# Patient Record
Sex: FEMALE | Race: WHITE | NOT HISPANIC OR LATINO | Employment: OTHER | URBAN - METROPOLITAN AREA
[De-identification: names, ages, dates, MRNs, and addresses within clinical notes are randomized per-mention and may not be internally consistent; named-entity substitution may affect disease eponyms.]

---

## 2017-02-28 DIAGNOSIS — I21.02 ST ELEVATION MYOCARDIAL INFARCTION INVOLVING LEFT ANTERIOR DESCENDING (LAD) CORONARY ARTERY (HCC): ICD-10-CM

## 2017-03-01 RX ORDER — METOPROLOL SUCCINATE 50 MG/1
50 TABLET, EXTENDED RELEASE ORAL DAILY
Qty: 90 TAB | Refills: 3 | Status: ON HOLD | OUTPATIENT
Start: 2017-03-01 | End: 2018-10-14

## 2017-04-13 ENCOUNTER — HOSPITAL ENCOUNTER (OUTPATIENT)
Dept: RADIOLOGY | Facility: MEDICAL CENTER | Age: 72
End: 2017-04-13
Attending: FAMILY MEDICINE
Payer: MEDICARE

## 2017-04-13 ENCOUNTER — OFFICE VISIT (OUTPATIENT)
Dept: URGENT CARE | Facility: PHYSICIAN GROUP | Age: 72
End: 2017-04-13
Payer: MEDICARE

## 2017-04-13 VITALS
SYSTOLIC BLOOD PRESSURE: 130 MMHG | OXYGEN SATURATION: 98 % | BODY MASS INDEX: 28.62 KG/M2 | WEIGHT: 172 LBS | RESPIRATION RATE: 24 BRPM | DIASTOLIC BLOOD PRESSURE: 78 MMHG | TEMPERATURE: 99 F | HEART RATE: 117 BPM

## 2017-04-13 DIAGNOSIS — R05.8 PRODUCTIVE COUGH: ICD-10-CM

## 2017-04-13 DIAGNOSIS — R07.9 CHEST PAIN, UNSPECIFIED TYPE: ICD-10-CM

## 2017-04-13 DIAGNOSIS — R07.89 CHEST WALL PAIN: ICD-10-CM

## 2017-04-13 DIAGNOSIS — I25.2 HISTORY OF ACUTE MYOCARDIAL INFARCTION: ICD-10-CM

## 2017-04-13 DIAGNOSIS — J20.9 ACUTE BRONCHITIS, UNSPECIFIED ORGANISM: ICD-10-CM

## 2017-04-13 PROCEDURE — 3014F SCREEN MAMMO DOC REV: CPT | Mod: 8P | Performed by: FAMILY MEDICINE

## 2017-04-13 PROCEDURE — 1101F PT FALLS ASSESS-DOCD LE1/YR: CPT | Performed by: FAMILY MEDICINE

## 2017-04-13 PROCEDURE — 93000 ELECTROCARDIOGRAM COMPLETE: CPT | Performed by: FAMILY MEDICINE

## 2017-04-13 PROCEDURE — 71020 DX-CHEST-2 VIEWS: CPT

## 2017-04-13 PROCEDURE — G8432 DEP SCR NOT DOC, RNG: HCPCS | Performed by: FAMILY MEDICINE

## 2017-04-13 PROCEDURE — 1036F TOBACCO NON-USER: CPT | Performed by: FAMILY MEDICINE

## 2017-04-13 PROCEDURE — 99214 OFFICE O/P EST MOD 30 MIN: CPT | Performed by: FAMILY MEDICINE

## 2017-04-13 PROCEDURE — G8598 ASA/ANTIPLAT THER USED: HCPCS | Performed by: FAMILY MEDICINE

## 2017-04-13 PROCEDURE — 4040F PNEUMOC VAC/ADMIN/RCVD: CPT | Performed by: FAMILY MEDICINE

## 2017-04-13 PROCEDURE — 3017F COLORECTAL CA SCREEN DOC REV: CPT | Mod: 8P | Performed by: FAMILY MEDICINE

## 2017-04-13 PROCEDURE — G8419 CALC BMI OUT NRM PARAM NOF/U: HCPCS | Performed by: FAMILY MEDICINE

## 2017-04-13 RX ORDER — LEVOFLOXACIN 500 MG/1
500 TABLET, FILM COATED ORAL DAILY
Qty: 7 TAB | Refills: 0 | Status: SHIPPED | OUTPATIENT
Start: 2017-04-13 | End: 2017-04-20

## 2017-04-13 RX ORDER — ALBUTEROL SULFATE 90 UG/1
1-2 AEROSOL, METERED RESPIRATORY (INHALATION) EVERY 6 HOURS PRN
Qty: 8.5 G | Refills: 0 | Status: ON HOLD | OUTPATIENT
Start: 2017-04-13 | End: 2018-10-14

## 2017-04-13 ASSESSMENT — ENCOUNTER SYMPTOMS
DIARRHEA: 0
MYALGIAS: 1
SHORTNESS OF BREATH: 1
FEVER: 1
EYE DISCHARGE: 0
DOUBLE VISION: 0
BRUISES/BLEEDS EASILY: 0
VOMITING: 0
CHILLS: 1
NERVOUS/ANXIOUS: 0
SPUTUM PRODUCTION: 1
NAUSEA: 0
SORE THROAT: 0
DIZZINESS: 0
PALPITATIONS: 0
HEADACHES: 0
BACK PAIN: 1
COUGH: 1

## 2017-04-13 NOTE — PROGRESS NOTES
Subjective:      Amrita Torrez is a 72 y.o. female who presents with Shortness of Breath    Patient presents to urgent care with cough and chest congestion for 3 days. She feels some pain on the left side of her back when she coughs or takes really deep breaths. The cough has become productive over the past 24 hours with yellow mucus. She's had fevers and chills as well patient had an acute MI in December 2015. She is a diabetic state under good control. Denies any nausea vomiting or diarrhea.    Shortness of Breath  Associated symptoms include a fever and sputum production. Pertinent negatives include no headaches, leg swelling, rash, sore throat or vomiting.       Review of Systems   Constitutional: Positive for fever, chills and malaise/fatigue.   HENT: Negative for sore throat.    Eyes: Negative for double vision and discharge.   Respiratory: Positive for cough, sputum production and shortness of breath.    Cardiovascular: Negative for palpitations and leg swelling.   Gastrointestinal: Negative for nausea, vomiting and diarrhea.   Genitourinary: Negative for dysuria and urgency.   Musculoskeletal: Positive for myalgias and back pain.   Skin: Negative for itching and rash.   Neurological: Negative for dizziness and headaches.   Endo/Heme/Allergies: Does not bruise/bleed easily.   Psychiatric/Behavioral: The patient is not nervous/anxious.        PMH:  has a past medical history of Hypertension; Diabetes; CAD (coronary artery disease); Goiter; Thyroid nodule; Hyperlipidemia; Heart attack; and Pericardial effusion. She also has no past medical history of Breast cancer.  MEDS:   Current outpatient prescriptions:   •  metoprolol SR (TOPROL XL) 50 MG TABLET SR 24 HR, Take 1 Tab by mouth every day., Disp: 90 Tab, Rfl: 3  •  clindamycin (CLEOCIN) 300 MG Cap, 1 CAP 3 TIMES A DAY X 10 DAYS., Disp: 30 Cap, Rfl: 0  •  benazepril (LOTENSIN) 10 MG Tab, Take 10 mg by mouth every day., Disp: , Rfl:   •  glimepiride (AMARYL)  4 MG Tab, Take 4 mg by mouth 2 times a day., Disp: , Rfl:   •  trazodone (DESYREL) 50 MG Tab, Take 50 mg by mouth every evening., Disp: , Rfl:   •  vitamin D, Ergocalciferol, (DRISDOL) 02936 UNITS Cap capsule, Take 50,000 Units by mouth every 7 days., Disp: , Rfl:   •  prasugrel (EFFIENT) 10 MG Tab, Take 1 Tab by mouth every day., Disp: 30 Tab, Rfl: 6  •  therapeutic multivitamin-minerals (THERAGRAN-M) Tab, Take 1 Tab by mouth every day., Disp: , Rfl:   •  atorvastatin (LIPITOR) 20 MG Tab, Take 1 Tab by mouth every evening., Disp: 30 Tab, Rfl: 0  •  aspirin (ASA) 81 MG CHEW, Take 81 mg by mouth every day., Disp: , Rfl:   ALLERGIES:   Allergies   Allergen Reactions   • Pcn [Penicillins] Hives and Swelling   • Sulfa Drugs Hives and Swelling   SURGHX:   Past Surgical History   Procedure Laterality Date   • Other orthopedic surgery  7/11     knee   • Other abdominal surgery  1966     appendectomy   • Gyn surgery  1978     fibroids   • Gyn surgery       hysterectomy   • Charity by laparoscopy  9/14/2012     Performed by ABDI NAVARRO at SURGERY SAME DAY [facility] ORS   • Laparoscopy  12/1/2014     Performed by Abdi Navarro M.D. at SURGERY SAME DAY [facility] ORS   • Cardiac cath     SOCHX:  reports that she quit smoking about 39 years ago. Her smoking use included Cigarettes. She has a 40 pack-year smoking history. She has never used smokeless tobacco. She reports that she does not drink alcohol or use illicit drugs.  FH: Family history was reviewed, no pertinent findings to report   Objective:     /78 mmHg  Pulse 117  Temp(Src) 37.2 °C (99 °F)  Resp 24  Wt 78.019 kg (172 lb)  SpO2 98%     Physical Exam   Constitutional: She is oriented to person, place, and time. She appears well-developed and well-nourished. No distress.   HENT:   Mouth/Throat: Oropharynx is clear and moist.   Cardiovascular:   tachycardic   Pulmonary/Chest: Effort normal. No respiratory distress. She has wheezes. She has no rales.  She exhibits tenderness.   Musculoskeletal: Normal range of motion. She exhibits no edema or tenderness.   Neurological: She is alert and oriented to person, place, and time.   Skin: Skin is warm and dry. No rash noted. She is not diaphoretic. No erythema. No pallor.   Psychiatric: She has a normal mood and affect. Her behavior is normal. Judgment and thought content normal.     Diagnostics: EKG compared to 11 2016 no significant changes                       Chest x-ray  Per my review no lobar consolidation     Assessment/Plan:     1. Chest pain, unspecified type  EKG    EKG    DX-CHEST-2 VIEWS   2. Chest wall pain     3. Productive cough     4. Acute bronchitis, unspecified organism  levofloxacin (LEVAQUIN) 500 MG tablet    albuterol 108 (90 BASE) MCG/ACT Aero Soln inhalation aerosol     Follow-up with primary care provider within 4-5 days, emergency room precautions discussed.  Patient and/or family appears understanding of information.

## 2017-04-13 NOTE — MR AVS SNAPSHOT
Amrita Costa Shan   2017 10:30 AM   Office Visit   MRN: [id number]    Department:  [facility]   Dept Phone:  [phone]    Description:  Female : 1945   Provider:  Candace Wheatley D.O.           Reason for Visit     Shortness of Breath cough with some pain in her back with deep breaths x3 days      Allergies as of 2017     Allergen Noted Reactions    Pcn [Penicillins] 2011   Hives, Swelling    Sulfa Drugs 2011   Hives, Swelling      You were diagnosed with     Chest pain, unspecified type   [1555103]       Chest wall pain   [470859]       Productive cough   [735568]         Vital Signs     Blood Pressure Pulse Temperature Respirations Weight Oxygen Saturation    130/78 mmHg 117 37.2 °C (99 °F) 24 78.019 kg (172 lb) 98%    Smoking Status                   Former Smoker           Basic Information     Date Of Birth Sex Race Ethnicity Preferred Language    1945 Female White Non- English      Your appointments     2017  9:15 AM   FOLLOW UP with Sylvester Tobar M.D.   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 Low  2016 - Present    HLD (hyperlipidemia) E78.5 Low  2016 - Present    Shortness of breath R06.02 High  2/3/2016 - Present    Pericardial effusion I31.3 High  2/3/2016 - Present    Cough R05 Medium  2/3/2016 - Present    Thyroid nodule E04.1 Low  2/3/2016 - Present    Paroxysmal supraventricular tachycardia (CMS-HCC) I47.1 Medium  2016 - Present    Palpitations R00.2   3/14/2016 - Present    Syncope R55 High  3/14/2016 - Present    CAD s/p DOMINGA mLAD for STEMI 2015 Z95.9 Medium  2016 - Present    Coronary artery disease involving  native coronary artery of native heart without angina pectoris I25.10   4/27/2016 - Present    UTI (urinary tract infection) N39.0   11/16/2016 - Present    DM2 (diabetes mellitus, type 2) (CMS-[facility]) E11.9 Medium  11/17/2016 - Present    CKD (chronic kidney disease), stage III N18.3 Medium  11/17/2016 - Present    Pyuria N39.0 Medium  11/17/2016 - Present    Hypokalemia E87.6 Medium  11/17/2016 - Present      Health Maintenance        Date Due Completion Dates    DIABETES MONOFILAMENT / LE EXAM 1945 ---    RETINAL SCREENING 4/13/1961 ---    IMM DTaP/Tdap/Td Vaccine (1 - Tdap) 4/13/1962 ---    PAP SMEAR 4/13/1964 ---    COLONOSCOPY 4/13/1993 ---    IMM ZOSTER VACCINE 4/13/2003 ---    URINE ACR / MICROALBUMIN 1/15/2014 1/15/2013    MAMMOGRAM 1/29/2014 1/29/2013, 5/4/2011, 4/15/2011    IMM PNEUMOCOCCAL 65+ (ADULT) LOW/MEDIUM RISK SERIES (2 of 2 - PPSV23) 10/23/2016 10/23/2015    A1C SCREENING 6/14/2017 12/14/2016, 6/27/2016, 1/4/2016, 9/14/2015, 7/26/2014, 11/15/2013, 1/15/2013, 6/5/2012, 11/8/2011    FASTING LIPID PROFILE 12/14/2017 12/14/2016, 6/27/2016, 9/14/2015, 5/7/2015, 7/26/2014, 11/15/2013, 1/15/2013, 6/5/2012, 11/8/2011    SERUM CREATININE 12/14/2017 12/14/2016, 11/17/2016, 11/16/2016, 6/27/2016, 3/14/2016, 1/4/2016, 12/26/2015, 12/25/2015, 12/24/2015, 12/24/2015, 12/23/2015, 12/23/2015, 12/23/2015, 12/22/2015, 9/14/2015, 5/7/2015, 12/1/2014, 7/26/2014, 1/9/2014, 11/15/2013, 1/15/2013, 9/12/2012, 8/20/2012, 6/5/2012, 11/8/2011    BONE DENSITY 1/29/2018 1/29/2013            Current Immunizations     13-VALENT PCV PREVNAR 10/23/2015    Influenza Vaccine Quad Inj (Pf) 10/23/2015    Tuberculin Skin Test 6/21/2013, 6/13/2013  9:10 AM, 6/6/2013 12:55 PM      Below and/or attached are the medications your provider expects you to take. Review all of your home medications and newly ordered medications with your provider and/or pharmacist. Follow medication instructions as directed by your provider and/or  pharmacist. Please keep your medication list with you and share with your provider. Update the information when medications are discontinued, doses are changed, or new medications (including over-the-counter products) are added; and carry medication information at all times in the event of emergency situations     Allergies:  PCN - Hives,Swelling     SULFA DRUGS - Hives,Swelling               Medications  Valid as of: April 13, 2017 - 11:42 AM    Generic Name Brand Name Tablet Size Instructions for use    Aspirin (Chew Tab) ASA 81 MG Take 81 mg by mouth every day.        Atorvastatin Calcium (Tab) LIPITOR 20 MG Take 1 Tab by mouth every evening.        Benazepril HCl (Tab) LOTENSIN 10 MG Take 10 mg by mouth every day.        Clindamycin HCl (Cap) CLEOCIN 300 MG 1 CAP 3 TIMES A DAY X 10 DAYS.        Ergocalciferol (Cap) DRISDOL 49255 UNITS Take 50,000 Units by mouth every 7 days.        Glimepiride (Tab) AMARYL 4 MG Take 4 mg by mouth 2 times a day.        Metoprolol Succinate (TABLET SR 24 HR) TOPROL XL 50 MG Take 1 Tab by mouth every day.        Multiple Vitamins-Minerals (Tab) THERAGRAN-M  Take 1 Tab by mouth every day.        Prasugrel HCl (Tab) EFFIENT 10 MG Take 1 Tab by mouth every day.        TraZODone HCl (Tab) DESYREL 50 MG Take 50 mg by mouth every evening.        .                 Medicines prescribed today were sent to:     [id number] - [place], [place] - [address] [address]    Phone: [phone] Fax: [phone]    Open 24 Hours?: No      Medication refill instructions:       If your prescription bottle indicates you have medication refills left, it is not necessary to call your provider’s office. Please contact your pharmacy and they will refill your medication.    If your prescription bottle indicates you do not have any refills left, you may request refills at any time through one of the following ways: The online GroupCharger system (except Urgent Care), by  calling your provider’s office, or by asking your pharmacy to contact your provider’s office with a refill request. Medication refills are processed only during regular business hours and may not be available until the next business day. Your provider may request additional information or to have a follow-up visit with you prior to refilling your medication.   *Please Note: Medication refills are assigned a new Rx number when refilled electronically. Your pharmacy may indicate that no refills were authorized even though a new prescription for the same medication is available at the pharmacy. Please request the medicine by name with the pharmacy before contacting your provider for a refill.        Your To Do List     Future Labs/Procedures Complete By Expires    DX-CHEST-2 VIEWS  As directed 10/14/2017    EKG  As directed 4/13/2018    EKG  As directed 4/13/2018         Packetworx Access Code: [email or web address]  Expires: 4/19/2017 12:19 PM    Packetworx  A secure, online tool to manage your health information     [employer]s Packetworx® is a secure, online tool that connects you to your personalized health information from the privacy of your home -- day or night - making it very easy for you to manage your healthcare. Once the activation process is completed, you can even access your medical information using the Packetworx jj, which is available for free in the Apple Jj store or Google Play store.     Packetworx provides the following levels of access (as shown below):   My Chart Features   [facility] Primary Care Doctor [facility]  Specialists [facility]  Urgent  Care Non-[facility]  Primary Care  Doctor   Email your healthcare team securely and privately 24/7 X X X    Manage appointments: schedule your next appointment; view details of past/upcoming appointments X      Request prescription refills. X      View recent personal medical records, including lab and immunizations X X X X   View health record, including health history,  allergies, medications X X X X   Read reports about your outpatient visits, procedures, consult and ER notes X X X X   See your discharge summary, which is a recap of your hospital and/or ER visit that includes your diagnosis, lab results, and care plan. X X       How to register for [employer]:  1. Go to  [email or web address].  2. Click on the Sign Up Now box, which takes you to the New Member Sign Up page. You will need to provide the following information:  a. Enter your [employer] Access Code exactly as it appears at the top of this page. (You will not need to use this code after you’ve completed the sign-up process. If you do not sign up before the expiration date, you must request a new code.)   b. Enter your date of birth.   c. Enter your home email address.   d. Click Submit, and follow the next screen’s instructions.  3. Create a [employer] ID. This will be your [employer] login ID and cannot be changed, so think of one that is secure and easy to remember.  4. Create a [employer] password. You can change your password at any time.  5. Enter your Password Reset Question and Answer. This can be used at a later time if you forget your password.   6. Enter your e-mail address. This allows you to receive e-mail notifications when new information is available in [employer].  7. Click Sign Up. You can now view your health information.    For assistance activating your [employer] account, call [phone]

## 2017-05-01 ENCOUNTER — HOSPITAL ENCOUNTER (OUTPATIENT)
Dept: LAB | Facility: MEDICAL CENTER | Age: 72
End: 2017-05-01
Attending: FAMILY MEDICINE
Payer: MEDICARE

## 2017-05-01 LAB
ALBUMIN SERPL BCP-MCNC: 3.6 G/DL (ref 3.2–4.9)
ALBUMIN/GLOB SERPL: 1.2 G/DL
ALP SERPL-CCNC: 70 U/L (ref 30–99)
ALT SERPL-CCNC: 10 U/L (ref 2–50)
ANION GAP SERPL CALC-SCNC: 8 MMOL/L (ref 0–11.9)
AST SERPL-CCNC: 10 U/L (ref 12–45)
BILIRUB SERPL-MCNC: 1.3 MG/DL (ref 0.1–1.5)
BUN SERPL-MCNC: 19 MG/DL (ref 8–22)
CALCIUM SERPL-MCNC: 9.1 MG/DL (ref 8.5–10.5)
CHLORIDE SERPL-SCNC: 101 MMOL/L (ref 96–112)
CHOLEST SERPL-MCNC: 180 MG/DL (ref 100–199)
CO2 SERPL-SCNC: 28 MMOL/L (ref 20–33)
CREAT SERPL-MCNC: 1.08 MG/DL (ref 0.5–1.4)
EST. AVERAGE GLUCOSE BLD GHB EST-MCNC: 249 MG/DL
GFR SERPL CREATININE-BSD FRML MDRD: 50 ML/MIN/1.73 M 2
GLOBULIN SER CALC-MCNC: 2.9 G/DL (ref 1.9–3.5)
GLUCOSE SERPL-MCNC: 315 MG/DL (ref 65–99)
HBA1C MFR BLD: 10.3 % (ref 0–5.6)
HDLC SERPL-MCNC: 37 MG/DL
LDLC SERPL CALC-MCNC: 112 MG/DL
POTASSIUM SERPL-SCNC: 4.4 MMOL/L (ref 3.6–5.5)
PROT SERPL-MCNC: 6.5 G/DL (ref 6–8.2)
SODIUM SERPL-SCNC: 137 MMOL/L (ref 135–145)
TRIGL SERPL-MCNC: 157 MG/DL (ref 0–149)

## 2017-05-01 PROCEDURE — 80061 LIPID PANEL: CPT

## 2017-05-01 PROCEDURE — 80053 COMPREHEN METABOLIC PANEL: CPT

## 2017-05-01 PROCEDURE — 83036 HEMOGLOBIN GLYCOSYLATED A1C: CPT

## 2017-06-19 ENCOUNTER — HOSPITAL ENCOUNTER (EMERGENCY)
Facility: MEDICAL CENTER | Age: 72
End: 2017-06-19
Attending: EMERGENCY MEDICINE
Payer: MEDICARE

## 2017-06-19 ENCOUNTER — APPOINTMENT (OUTPATIENT)
Dept: RADIOLOGY | Facility: MEDICAL CENTER | Age: 72
End: 2017-06-19
Attending: EMERGENCY MEDICINE
Payer: MEDICARE

## 2017-06-19 VITALS
OXYGEN SATURATION: 95 % | TEMPERATURE: 97.6 F | HEART RATE: 90 BPM | SYSTOLIC BLOOD PRESSURE: 145 MMHG | DIASTOLIC BLOOD PRESSURE: 90 MMHG | WEIGHT: 163.58 LBS | RESPIRATION RATE: 16 BRPM | BODY MASS INDEX: 27.25 KG/M2 | HEIGHT: 65 IN

## 2017-06-19 DIAGNOSIS — R10.9 FLANK PAIN: ICD-10-CM

## 2017-06-19 LAB
ALBUMIN SERPL BCP-MCNC: 4.2 G/DL (ref 3.2–4.9)
ALBUMIN/GLOB SERPL: 1.2 G/DL
ALP SERPL-CCNC: 67 U/L (ref 30–99)
ALT SERPL-CCNC: 12 U/L (ref 2–50)
ANION GAP SERPL CALC-SCNC: 11 MMOL/L (ref 0–11.9)
APPEARANCE UR: CLEAR
AST SERPL-CCNC: 16 U/L (ref 12–45)
BASOPHILS # BLD AUTO: 0.4 % (ref 0–1.8)
BASOPHILS # BLD: 0.03 K/UL (ref 0–0.12)
BILIRUB SERPL-MCNC: 1.8 MG/DL (ref 0.1–1.5)
BUN SERPL-MCNC: 19 MG/DL (ref 8–22)
CALCIUM SERPL-MCNC: 9.8 MG/DL (ref 8.5–10.5)
CHLORIDE SERPL-SCNC: 99 MMOL/L (ref 96–112)
CO2 SERPL-SCNC: 22 MMOL/L (ref 20–33)
COLOR UR AUTO: YELLOW
CREAT SERPL-MCNC: 1.07 MG/DL (ref 0.5–1.4)
EOSINOPHIL # BLD AUTO: 0.14 K/UL (ref 0–0.51)
EOSINOPHIL NFR BLD: 1.7 % (ref 0–6.9)
ERYTHROCYTE [DISTWIDTH] IN BLOOD BY AUTOMATED COUNT: 39.1 FL (ref 35.9–50)
GFR SERPL CREATININE-BSD FRML MDRD: 50 ML/MIN/1.73 M 2
GLOBULIN SER CALC-MCNC: 3.6 G/DL (ref 1.9–3.5)
GLUCOSE SERPL-MCNC: 153 MG/DL (ref 65–99)
GLUCOSE UR QL STRIP.AUTO: NEGATIVE MG/DL
HCT VFR BLD AUTO: 47.1 % (ref 37–47)
HGB BLD-MCNC: 17.2 G/DL (ref 12–16)
IMM GRANULOCYTES # BLD AUTO: 0.03 K/UL (ref 0–0.11)
IMM GRANULOCYTES NFR BLD AUTO: 0.4 % (ref 0–0.9)
KETONES UR QL STRIP.AUTO: NEGATIVE MG/DL
LEUKOCYTE ESTERASE UR QL STRIP.AUTO: ABNORMAL
LIPASE SERPL-CCNC: 22 U/L (ref 11–82)
LYMPHOCYTES # BLD AUTO: 2.14 K/UL (ref 1–4.8)
LYMPHOCYTES NFR BLD: 25.6 % (ref 22–41)
MCH RBC QN AUTO: 28.9 PG (ref 27–33)
MCHC RBC AUTO-ENTMCNC: 36.5 G/DL (ref 33.6–35)
MCV RBC AUTO: 79.2 FL (ref 81.4–97.8)
MONOCYTES # BLD AUTO: 0.78 K/UL (ref 0–0.85)
MONOCYTES NFR BLD AUTO: 9.3 % (ref 0–13.4)
NEUTROPHILS # BLD AUTO: 5.23 K/UL (ref 2–7.15)
NEUTROPHILS NFR BLD: 62.6 % (ref 44–72)
NITRITE UR QL STRIP.AUTO: NEGATIVE
NRBC # BLD AUTO: 0 K/UL
NRBC BLD AUTO-RTO: 0 /100 WBC
PH UR STRIP.AUTO: 7 [PH]
PLATELET # BLD AUTO: 231 K/UL (ref 164–446)
PMV BLD AUTO: 9.2 FL (ref 9–12.9)
POTASSIUM SERPL-SCNC: 3.4 MMOL/L (ref 3.6–5.5)
PROT SERPL-MCNC: 7.8 G/DL (ref 6–8.2)
PROT UR QL STRIP: >=300 MG/DL
RBC # BLD AUTO: 5.95 M/UL (ref 4.2–5.4)
RBC UR QL AUTO: ABNORMAL
SODIUM SERPL-SCNC: 132 MMOL/L (ref 135–145)
SP GR UR: 1.02
WBC # BLD AUTO: 8.4 K/UL (ref 4.8–10.8)

## 2017-06-19 PROCEDURE — 36415 COLL VENOUS BLD VENIPUNCTURE: CPT

## 2017-06-19 PROCEDURE — 81002 URINALYSIS NONAUTO W/O SCOPE: CPT

## 2017-06-19 PROCEDURE — 99284 EMERGENCY DEPT VISIT MOD MDM: CPT

## 2017-06-19 PROCEDURE — 80053 COMPREHEN METABOLIC PANEL: CPT

## 2017-06-19 PROCEDURE — 700102 HCHG RX REV CODE 250 W/ 637 OVERRIDE(OP): Performed by: EMERGENCY MEDICINE

## 2017-06-19 PROCEDURE — 74176 CT ABD & PELVIS W/O CONTRAST: CPT

## 2017-06-19 PROCEDURE — 85025 COMPLETE CBC W/AUTO DIFF WBC: CPT

## 2017-06-19 PROCEDURE — A9270 NON-COVERED ITEM OR SERVICE: HCPCS | Performed by: EMERGENCY MEDICINE

## 2017-06-19 PROCEDURE — 83690 ASSAY OF LIPASE: CPT

## 2017-06-19 RX ORDER — NAPROXEN 375 MG/1
375 TABLET ORAL ONCE
Status: COMPLETED | OUTPATIENT
Start: 2017-06-19 | End: 2017-06-19

## 2017-06-19 RX ORDER — NAPROXEN 500 MG/1
500 TABLET ORAL 2 TIMES DAILY WITH MEALS
Qty: 15 TAB | Refills: 0 | Status: ON HOLD | OUTPATIENT
Start: 2017-06-19 | End: 2018-10-14

## 2017-06-19 RX ADMIN — NAPROXEN 375 MG: 375 TABLET ORAL at 20:46

## 2017-06-19 ASSESSMENT — PAIN SCALES - GENERAL: PAINLEVEL_OUTOF10: 8

## 2017-06-19 NOTE — ED AVS SNAPSHOT
6/19/2017    Amrita Torrez  [address]  [facility] [address]    Dear Amrita:    [facility] wants to ensure your discharge home is safe and you or your loved ones have had all of your questions answered regarding your care after you leave the hospital.    Below is a list of resources and contact information should you have any questions regarding your hospital stay, follow-up instructions, or active medical symptoms.    Questions or Concerns Regarding… Contact   Medical Questions Related to Your Discharge  (7 days a week, 8am-5pm) Contact a Nurse Care Coordinator   [phone]   Medical Questions Not Related to Your Discharge  (24 hours a day / 7 days a week)  Contact the Nurse Health Line   [phone]    Medications or Discharge Instructions Refer to your discharge packet   or contact your [facility] Primary Care Provider   [phone]   Follow-up Appointment(s) Schedule your appointment via EverybodyCar   or contact Scheduling [phone]   Billing Review your statement via EverybodyCar  or contact Billing [phone]   Medical Records Review your records via EverybodyCar   or contact Medical Records [phone]     You may receive a telephone call within two days of discharge. This call is to make certain you understand your discharge instructions and have the opportunity to have any questions answered. You can also easily access your medical information, test results and upcoming appointments via the EverybodyCar free online health management tool. You can learn more and sign up at ConsiderC/EverybodyCar. For assistance setting up your EverybodyCar account, please call [phone].    Once again, we want to ensure your discharge home is safe and that you have a clear understanding of any next steps in your care. If you have any questions or concerns, please do not hesitate to contact us, we are here for you. Thank you for choosing [facility] for your healthcare needs.    Sincerely,    Your [facility] Healthcare Team

## 2017-06-19 NOTE — ED AVS SNAPSHOT
bluepulse Access Code: [email or web address]  Expires: 7/15/2017  4:14 AM    Your email address is not on file at [facility].  Email Addresses are required for you to sign up for bluepulse, please contact [phone] to verify your personal information and to provide your email address prior to attempting to register for bluepulse.    Amrita Torrez  [address] [place] [address]    bluepulse  A secure, online tool to manage your health information     [facility]’s bluepulse® is a secure, online tool that connects you to your personalized health information from the privacy of your home -- day or night - making it very easy for you to manage your healthcare. Once the activation process is completed, you can even access your medical information using the bluepulse jj, which is available for free in the Apple Jj store or Google Play store.     To learn more about bluepulse, visit [email or web address]    There are two levels of access available (as shown below):   My Chart Features  [facility] Primary Care Doctor [facility]  Specialists [facility]  Urgent  Care Non-[facility] Primary Care Doctor   Email your healthcare team securely and privately 24/7 X X X    Manage appointments: schedule your next appointment; view details of past/upcoming appointments X      Request prescription refills. X      View recent personal medical records, including lab and immunizations X X X X   View health record, including health history, allergies, medications X X X X   Read reports about your outpatient visits, procedures, consult and ER notes X X X X   See your discharge summary, which is a recap of your hospital and/or ER visit that includes your diagnosis, lab results, and care plan X X  X     How to register for bluepulse:  Once your e-mail address has been verified, follow the following steps to sign up for ProspectNowt.     1. Go to  [email or web address]  2. Click on the Sign Up Now box, which takes you to the New Member Sign Up page. You  will need to provide the following information:  a. Enter your UCAN Access Code exactly as it appears at the top of this page. (You will not need to use this code after you’ve completed the sign-up process. If you do not sign up before the expiration date, you must request a new code.)   b. Enter your date of birth.   c. Enter your home email address.   d. Click Submit, and follow the next screen’s instructions.  3. Create a Varentect ID. This will be your UCAN login ID and cannot be changed, so think of one that is secure and easy to remember.  4. Create a UCAN password. You can change your password at any time.  5. Enter your Password Reset Question and Answer. This can be used at a later time if you forget your password.   6. Enter your e-mail address. This allows you to receive e-mail notifications when new information is available in UCAN.  7. Click Sign Up. You can now view your health information.    For assistance activating your UCAN account, call [phone]

## 2017-06-19 NOTE — ED NOTES
Chief Complaint   Patient presents with   • RUQ Pain   • Back Pain     Patient c/o RUQ pain that wraps around to her back, says it started approximately 1 week ago. Patient has some nausea but denies vomiting or diarrhea. VSS, told to inform RN of changes. Given urine specimen cup.

## 2017-06-19 NOTE — ED AVS SNAPSHOT
Home Care Instructions                                                                                                                Amrita Torrez   MRN: [id number]    Department:  [facility], Emergency Dept   Date of Visit:  6/19/2017            [facility], Emergency Dept    [address]    [place] [address]    Phone:  [phone]      You were seen by     Rolan Griffin M.D.      Your Diagnosis Was     Flank pain     R10.9       Follow-up Information     1. Follow up with Fitz Turpin D.O..    Specialty:  Family Medicine    Why:  As needed    Contact information    [address] [facility] [place] [address] [phone]        Medication Information     Review all of your home medications and newly ordered medications with your primary doctor and/or pharmacist as soon as possible. Follow medication instructions as directed by your doctor and/or pharmacist.     Please keep your complete medication list with you and share with your physician. Update the information when medications are discontinued, doses are changed, or new medications (including over-the-counter products) are added; and carry medication information at all times in the event of emergency situations.               Medication List      START taking these medications        Instructions    Morning Afternoon Evening Bedtime    naproxen 500 MG Tabs   Commonly known as:  NAPROSYN        Take 1 Tab by mouth 2 times a day, with meals.   Dose:  500 mg                          ASK your doctor about these medications        Instructions    Morning Afternoon Evening Bedtime    albuterol 108 (90 BASE) MCG/ACT Aers inhalation aerosol        Inhale 1-2 Puffs by mouth every 6 hours as needed for Shortness of Breath.   Dose:  1-2 Puff                        aspirin 81 MG Chew chewable tablet   Commonly known as:  ASA        Take 81 mg by mouth every day.   Dose:  81 mg                        atorvastatin 20 MG Tabs     Commonly known as:  LIPITOR        Take 1 Tab by mouth every evening.   Dose:  20 mg                        benazepril 10 MG Tabs   Commonly known as:  LOTENSIN        Take 10 mg by mouth every day.   Dose:  10 mg                        clindamycin 300 MG Caps   Commonly known as:  CLEOCIN        1 CAP 3 TIMES A DAY X 10 DAYS.                        glimepiride 4 MG Tabs   Commonly known as:  AMARYL        Take 4 mg by mouth 2 times a day.   Dose:  4 mg                        metoprolol SR 50 MG Tb24   Commonly known as:  TOPROL XL        Take 1 Tab by mouth every day.   Dose:  50 mg                        prasugrel 10 MG Tabs   Commonly known as:  EFFIENT        Take 1 Tab by mouth every day.   Dose:  10 mg                        therapeutic multivitamin-minerals Tabs        Take 1 Tab by mouth every day.   Dose:  1 Tab                        trazodone 50 MG Tabs   Commonly known as:  DESYREL        Take 50 mg by mouth every evening.   Dose:  50 mg                        vitamin D (Ergocalciferol) 34848 UNITS Caps capsule   Commonly known as:  DRISDOL        Take 50,000 Units by mouth every 7 days.   Dose:  45376 Units                             Where to Get Your Medications      You can get these medications from any pharmacy     Bring a paper prescription for each of these medications    - naproxen 500 MG Tabs            Procedures and tests performed during your visit     CBC WITH DIFFERENTIAL    COMP METABOLIC PANEL    CT-RENAL COLIC EVALUATION(A/P W/O)    ESTIMATED GFR    LIPASE    POC UA        Discharge Instructions       Abdominal Pain, Adult  Many things can cause abdominal pain. Usually, abdominal pain is not caused by a disease and will improve without treatment. It can often be observed and treated at home. Your health care provider will do a physical exam and possibly order blood tests and X-rays to help determine the seriousness of your pain. However, in many cases, more time must pass before a  clear cause of the pain can be found. Before that point, your health care provider may not know if you need more testing or further treatment.  HOME CARE INSTRUCTIONS   Monitor your abdominal pain for any changes. The following actions may help to alleviate any discomfort you are experiencing:  · Only take over-the-counter or prescription medicines as directed by your health care provider.  · Do not take laxatives unless directed to do so by your health care provider.  · Try a clear liquid diet (broth, tea, or water) as directed by your health care provider. Slowly move to a bland diet as tolerated.  SEEK MEDICAL CARE IF:  · You have unexplained abdominal pain.  · You have abdominal pain associated with nausea or diarrhea.  · You have pain when you urinate or have a bowel movement.  · You experience abdominal pain that wakes you in the night.  · You have abdominal pain that is worsened or improved by eating food.  · You have abdominal pain that is worsened with eating fatty foods.  · You have a fever.  SEEK IMMEDIATE MEDICAL CARE IF:   · Your pain does not go away within 2 hours.  · You keep throwing up (vomiting).  · Your pain is felt only in portions of the abdomen, such as the right side or the left lower portion of the abdomen.  · You pass bloody or black tarry stools.  MAKE SURE YOU:  · Understand these instructions.    · Will watch your condition.    · Will get help right away if you are not doing well or get worse.       This information is not intended to replace advice given to you by your health care provider. Make sure you discuss any questions you have with your health care provider.     Document Released: 09/27/2006 Document Revised: 01/08/2016 Document Reviewed: 08/27/2014  Elsevier Interactive Patient Education ©2016 [employer]            Patient Information     Patient Information    Following emergency treatment: all patient requiring follow-up care must return either to a private physician or a  clinic if your condition worsens before you are able to obtain further medical attention, please return to the emergency room.     Billing Information    At [facility], we work to make the billing process streamlined for our patients.  Our Representatives are here to answer any questions you may have regarding your hospital bill.  If you have insurance coverage and have supplied your insurance information to us, we will submit a claim to your insurer on your behalf.  Should you have any questions regarding your bill, we can be reached online or by phone as follows:  Online: You are able pay your bills online or live chat with our representatives about any billing questions you may have. We are here to help Monday - Friday from 8:00am to 7:30pm and 9:00am - 12:00pm on Saturdays.  Please visit [email or web address]  for more information.   Phone:  [phone] or [phone]    Please note that your emergency physician, surgeon, pathologist, radiologist, anesthesiologist, and other specialists are not employed by [facility] and will therefore bill separately for their services.  Please contact them directly for any questions concerning their bills at the numbers below:     Emergency Physician Services:  [phone]  [facility]:  [phone]  [facility]:  [phone]  [facility]:  [phone]    1. Your final bill may vary from the amount quoted upon discharge if all procedures are not complete at that time, or if your doctor has additional procedures of which we are not aware. You will receive an additional bill if you return to the Emergency Department at [facility] for suture removal regardless of the facility of which the sutures were placed.     2. Please arrange for settlement of this account at the emergency registration.    3. All self-pay accounts are due in full at the time of treatment.  If you are unable to meet  this obligation then payment is expected within 4-5 days.     4. If you have had radiology studies (CT, X-ray, Ultrasound, MRI), you have received a preliminary result during your emergency department visit. Please contact the radiology department [phone] to receive a copy of your final result. Please discuss the Final result with your primary physician or with the follow up physician provided.     Crisis Hotline:  [place] Crisis Hotline:  8-965-RDDVXMW or 1-408.423.4992  [place] Crisis Hotline:    1-750.622.7505 or 387-967-3179         ED Discharge Follow Up Questions    1. In order to provide you with very good care, we would like to follow up with a phone call in the next few days.  May we have your permission to contact you?     YES /  NO    2. What is the best phone number to call you? (       )_____-__________    3. What is the best time to call you?      Morning  /  Afternoon  /  Evening                   Patient Signature:  ____________________________________________________________    Date:  ____________________________________________________________

## 2017-06-20 NOTE — ED NOTES
Discharge paperwork given with prescriptions.  Pt verbalized to follow up with pcp and return to er with any worsening symptoms or concerns.  Pt ambulated out of er in nad.

## 2017-06-20 NOTE — ED PROVIDER NOTES
ED Provider Note    HPI: Patient is a 72-year-old female who presented to the emergency department June 19, 2017 at 2:25 PM with a chief complaint of right flank pain.    Patient has had right flank pain that began about a week ago. The patient thinks this may be related to her lifting some furniture but is uncertain. She has had no pain in the anterior portion of her abdomen. She has had no nausea or vomiting. Pain is not really in positional in nature. Nothing in particular seems to make it better or worse. No fever no chills no cough. No other somatic complaints    Review of Systems: Positive for right flank and back pain negative for fever chills cough nausea vomiting. Review of systems reviewed the patient, all other systems negative    Past medical/surgical history: Hypertension diabetes coronary artery disease I cholesterol myocardial infarction cholecystectomy    Medications: Albuterol metoprolol Lotensin and Amaryl trazodone Effient Lipitor    Allergies: Penicillin sulfa    Social History:  Previous history smoking or alcohol use      Physical exam: Constitutional:  Pleasant female awake and alert  Vital signs: Temperature 97.8 pulse 91 respirations 16 blood pressure 182/100 pulse oximetry 99%  EYES: PERRL, EOMI, Conjunctivae and sclera normal, eyelids normal bilaterally.  Neck: Trachea midline. No cervical masses seen or palpated. Normal range of motion, supple. No meningeal signs elicited.  Cardiac: Regular rate and rhythm. S1-S2 present. No S3 or S4 present. No murmurs, rubs, or gallops heard. No edema or varicosities were seen.   Lungs: Clear to auscultation with good aeration throughout. No wheezes, rales, or rhonchi heard. Patient's chest wall moved symmetrically with each respiratory effort. Patient was not making use of accessory muscles of respiration in breathing.  Abdomen: Soft nontender to palpation. Subjective pain in the right flank is not increased with palpation. No rebound or guarding  elicited. No organomegaly identified. No pulsatile abdominal masses identified.   Musculoskeletal:  no  pain with palpitation or movement of muscle, bone or joint , no obvious musculoskeletal deformities identified.  Neurologic: alert and awake answers questions appropriately. Moves all four extremities independently, no gross focal abnormalities identified. Normal strength and motor.  Skin: no rash or lesion seen, no palpable dermatologic lesions identified.  Psychiatric: not anxious, delusional, or hallucinating.    Medical decision making: Patient noted to be somewhat hypertensive. Patient has a history of hypertension and will follow up with primary care provider. Laboratory studies were obtained (please see lab sheet for all results) significant findings included mild hyponatremia sodium 132. Patient has a mild elevation in bilirubin. Her other liver functions are normal (1.8) lipase normal at 22 making pancreatitis unlikely. White count is normal making infection less likely. Urinalysis showed small blood and trace of leukocyte esterase but there were no nitrates. This would make infection unlikely but kidney stone is a possibility    CT imaging without contrast of the abdomen obtained to rule out kidney stones; no evidence of kidney stone or other abnormality seen.    Patient would appear to have musculoskeletal pain possibly caused by exertion. She'll be discharged on Naprosyn. She will follow-up with her primary care provider for general care. I suspect her symptoms have persisted due to the fact that this pain is in the right lower chest right upper quadrant is likely exacerbated due to breathing.    Impression right flank pain, likely musculoskeletal

## 2017-07-10 ENCOUNTER — HOSPITAL ENCOUNTER (OUTPATIENT)
Dept: RADIOLOGY | Facility: MEDICAL CENTER | Age: 72
End: 2017-07-10
Attending: FAMILY MEDICINE
Payer: MEDICARE

## 2017-07-10 DIAGNOSIS — S05.91XA RIGHT EYE INJURY, INITIAL ENCOUNTER: ICD-10-CM

## 2017-07-10 DIAGNOSIS — M25.551 RIGHT HIP PAIN: ICD-10-CM

## 2017-07-10 PROCEDURE — 70200 X-RAY EXAM OF EYE SOCKETS: CPT | Mod: RT

## 2017-07-10 PROCEDURE — 73502 X-RAY EXAM HIP UNI 2-3 VIEWS: CPT | Mod: RT

## 2017-10-03 ENCOUNTER — HOSPITAL ENCOUNTER (OUTPATIENT)
Dept: LAB | Facility: MEDICAL CENTER | Age: 72
End: 2017-10-03
Attending: NURSE PRACTITIONER
Payer: MEDICARE

## 2017-10-03 LAB
25(OH)D3 SERPL-MCNC: 33 NG/ML (ref 30–100)
ALBUMIN SERPL BCP-MCNC: 4 G/DL (ref 3.2–4.9)
ALBUMIN/GLOB SERPL: 1.1 G/DL
ALP SERPL-CCNC: 70 U/L (ref 30–99)
ALT SERPL-CCNC: 12 U/L (ref 2–50)
ANION GAP SERPL CALC-SCNC: 9 MMOL/L (ref 0–11.9)
AST SERPL-CCNC: 16 U/L (ref 12–45)
BILIRUB SERPL-MCNC: 1.1 MG/DL (ref 0.1–1.5)
BUN SERPL-MCNC: 23 MG/DL (ref 8–22)
CALCIUM SERPL-MCNC: 9.9 MG/DL (ref 8.5–10.5)
CHLORIDE SERPL-SCNC: 101 MMOL/L (ref 96–112)
CHOLEST SERPL-MCNC: 184 MG/DL (ref 100–199)
CO2 SERPL-SCNC: 26 MMOL/L (ref 20–33)
CREAT SERPL-MCNC: 1.05 MG/DL (ref 0.5–1.4)
EST. AVERAGE GLUCOSE BLD GHB EST-MCNC: 249 MG/DL
GFR SERPL CREATININE-BSD FRML MDRD: 51 ML/MIN/1.73 M 2
GLOBULIN SER CALC-MCNC: 3.6 G/DL (ref 1.9–3.5)
GLUCOSE SERPL-MCNC: 156 MG/DL (ref 65–99)
HBA1C MFR BLD: 10.3 % (ref 0–5.6)
HDLC SERPL-MCNC: 36 MG/DL
LDLC SERPL CALC-MCNC: 110 MG/DL
POTASSIUM SERPL-SCNC: 4.1 MMOL/L (ref 3.6–5.5)
PROT SERPL-MCNC: 7.6 G/DL (ref 6–8.2)
SODIUM SERPL-SCNC: 136 MMOL/L (ref 135–145)
TRIGL SERPL-MCNC: 190 MG/DL (ref 0–149)

## 2017-10-03 PROCEDURE — 82306 VITAMIN D 25 HYDROXY: CPT

## 2017-10-03 PROCEDURE — 80061 LIPID PANEL: CPT

## 2017-10-03 PROCEDURE — 36415 COLL VENOUS BLD VENIPUNCTURE: CPT

## 2017-10-03 PROCEDURE — 83036 HEMOGLOBIN GLYCOSYLATED A1C: CPT

## 2017-10-03 PROCEDURE — 80053 COMPREHEN METABOLIC PANEL: CPT

## 2017-12-15 ENCOUNTER — HOSPITAL ENCOUNTER (OUTPATIENT)
Dept: RADIOLOGY | Facility: MEDICAL CENTER | Age: 72
End: 2017-12-15
Attending: FAMILY MEDICINE
Payer: MEDICARE

## 2017-12-15 DIAGNOSIS — Z12.31 VISIT FOR SCREENING MAMMOGRAM: ICD-10-CM

## 2017-12-15 PROCEDURE — G0202 SCR MAMMO BI INCL CAD: HCPCS

## 2018-04-02 ENCOUNTER — HOSPITAL ENCOUNTER (OUTPATIENT)
Dept: LAB | Facility: MEDICAL CENTER | Age: 73
End: 2018-04-02
Attending: FAMILY MEDICINE
Payer: MEDICARE

## 2018-04-02 LAB
25(OH)D3 SERPL-MCNC: 38 NG/ML (ref 30–100)
ALBUMIN SERPL BCP-MCNC: 4 G/DL (ref 3.2–4.9)
ALBUMIN/GLOB SERPL: 1.2 G/DL
ALP SERPL-CCNC: 83 U/L (ref 30–99)
ALT SERPL-CCNC: 16 U/L (ref 2–50)
ANION GAP SERPL CALC-SCNC: 12 MMOL/L (ref 0–11.9)
AST SERPL-CCNC: 16 U/L (ref 12–45)
BASOPHILS # BLD AUTO: 0.5 % (ref 0–1.8)
BASOPHILS # BLD: 0.04 K/UL (ref 0–0.12)
BILIRUB SERPL-MCNC: 1.3 MG/DL (ref 0.1–1.5)
BUN SERPL-MCNC: 19 MG/DL (ref 8–22)
CALCIUM SERPL-MCNC: 9.7 MG/DL (ref 8.5–10.5)
CHLORIDE SERPL-SCNC: 101 MMOL/L (ref 96–112)
CHOLEST SERPL-MCNC: 204 MG/DL (ref 100–199)
CO2 SERPL-SCNC: 27 MMOL/L (ref 20–33)
CREAT SERPL-MCNC: 1.18 MG/DL (ref 0.5–1.4)
EOSINOPHIL # BLD AUTO: 0.17 K/UL (ref 0–0.51)
EOSINOPHIL NFR BLD: 2.1 % (ref 0–6.9)
ERYTHROCYTE [DISTWIDTH] IN BLOOD BY AUTOMATED COUNT: 45.2 FL (ref 35.9–50)
EST. AVERAGE GLUCOSE BLD GHB EST-MCNC: 275 MG/DL
GLOBULIN SER CALC-MCNC: 3.3 G/DL (ref 1.9–3.5)
GLUCOSE SERPL-MCNC: 224 MG/DL (ref 65–99)
HBA1C MFR BLD: 11.2 % (ref 0–5.6)
HCT VFR BLD AUTO: 47.2 % (ref 37–47)
HDLC SERPL-MCNC: 38 MG/DL
HGB BLD-MCNC: 16 G/DL (ref 12–16)
IMM GRANULOCYTES # BLD AUTO: 0.02 K/UL (ref 0–0.11)
IMM GRANULOCYTES NFR BLD AUTO: 0.2 % (ref 0–0.9)
LDLC SERPL CALC-MCNC: 131 MG/DL
LYMPHOCYTES # BLD AUTO: 2.06 K/UL (ref 1–4.8)
LYMPHOCYTES NFR BLD: 24.9 % (ref 22–41)
MCH RBC QN AUTO: 29.3 PG (ref 27–33)
MCHC RBC AUTO-ENTMCNC: 33.9 G/DL (ref 33.6–35)
MCV RBC AUTO: 86.3 FL (ref 81.4–97.8)
MONOCYTES # BLD AUTO: 0.69 K/UL (ref 0–0.85)
MONOCYTES NFR BLD AUTO: 8.3 % (ref 0–13.4)
NEUTROPHILS # BLD AUTO: 5.29 K/UL (ref 2–7.15)
NEUTROPHILS NFR BLD: 64 % (ref 44–72)
NRBC # BLD AUTO: 0 K/UL
NRBC BLD-RTO: 0 /100 WBC
PLATELET # BLD AUTO: 241 K/UL (ref 164–446)
PMV BLD AUTO: 10.5 FL (ref 9–12.9)
POTASSIUM SERPL-SCNC: 4.5 MMOL/L (ref 3.6–5.5)
PROT SERPL-MCNC: 7.3 G/DL (ref 6–8.2)
RBC # BLD AUTO: 5.47 M/UL (ref 4.2–5.4)
SODIUM SERPL-SCNC: 140 MMOL/L (ref 135–145)
TRIGL SERPL-MCNC: 176 MG/DL (ref 0–149)
WBC # BLD AUTO: 8.3 K/UL (ref 4.8–10.8)

## 2018-04-02 PROCEDURE — 85025 COMPLETE CBC W/AUTO DIFF WBC: CPT

## 2018-04-02 PROCEDURE — 83036 HEMOGLOBIN GLYCOSYLATED A1C: CPT

## 2018-04-02 PROCEDURE — 82306 VITAMIN D 25 HYDROXY: CPT

## 2018-04-02 PROCEDURE — 80061 LIPID PANEL: CPT

## 2018-04-02 PROCEDURE — 80053 COMPREHEN METABOLIC PANEL: CPT

## 2018-10-12 ENCOUNTER — APPOINTMENT (OUTPATIENT)
Dept: RADIOLOGY | Facility: MEDICAL CENTER | Age: 73
End: 2018-10-12
Attending: EMERGENCY MEDICINE
Payer: MEDICARE

## 2018-10-12 ENCOUNTER — HOSPITAL ENCOUNTER (OUTPATIENT)
Facility: MEDICAL CENTER | Age: 73
End: 2018-10-14
Attending: EMERGENCY MEDICINE | Admitting: HOSPITALIST
Payer: MEDICARE

## 2018-10-12 DIAGNOSIS — R73.9 HYPERGLYCEMIA: ICD-10-CM

## 2018-10-12 DIAGNOSIS — N17.9 AKI (ACUTE KIDNEY INJURY) (HCC): ICD-10-CM

## 2018-10-12 DIAGNOSIS — R06.03 ACUTE RESPIRATORY DISTRESS: ICD-10-CM

## 2018-10-12 DIAGNOSIS — I48.91 ATRIAL FIBRILLATION WITH RVR (HCC): ICD-10-CM

## 2018-10-12 LAB
ALBUMIN SERPL BCP-MCNC: 4 G/DL (ref 3.2–4.9)
ALBUMIN/GLOB SERPL: 1 G/DL
ALP SERPL-CCNC: 88 U/L (ref 30–99)
ALT SERPL-CCNC: 15 U/L (ref 2–50)
ANION GAP SERPL CALC-SCNC: 14 MMOL/L (ref 0–11.9)
APTT PPP: 26.8 SEC (ref 24.7–36)
AST SERPL-CCNC: 14 U/L (ref 12–45)
BASOPHILS # BLD AUTO: 0.5 % (ref 0–1.8)
BASOPHILS # BLD: 0.08 K/UL (ref 0–0.12)
BILIRUB SERPL-MCNC: 1.4 MG/DL (ref 0.1–1.5)
BNP SERPL-MCNC: 60 PG/ML (ref 0–100)
BUN SERPL-MCNC: 30 MG/DL (ref 8–22)
CALCIUM SERPL-MCNC: 9.8 MG/DL (ref 8.5–10.5)
CHLORIDE SERPL-SCNC: 96 MMOL/L (ref 96–112)
CO2 SERPL-SCNC: 20 MMOL/L (ref 20–33)
CREAT SERPL-MCNC: 1.79 MG/DL (ref 0.5–1.4)
EKG IMPRESSION: NORMAL
EOSINOPHIL # BLD AUTO: 0.07 K/UL (ref 0–0.51)
EOSINOPHIL NFR BLD: 0.5 % (ref 0–6.9)
ERYTHROCYTE [DISTWIDTH] IN BLOOD BY AUTOMATED COUNT: 38 FL (ref 35.9–50)
GLOBULIN SER CALC-MCNC: 4 G/DL (ref 1.9–3.5)
GLUCOSE SERPL-MCNC: 409 MG/DL (ref 65–99)
HCT VFR BLD AUTO: 45.4 % (ref 37–47)
HGB BLD-MCNC: 16.3 G/DL (ref 12–16)
IMM GRANULOCYTES # BLD AUTO: 0.05 K/UL (ref 0–0.11)
IMM GRANULOCYTES NFR BLD AUTO: 0.3 % (ref 0–0.9)
INR PPP: 1.03 (ref 0.87–1.13)
LIPASE SERPL-CCNC: 35 U/L (ref 11–82)
LYMPHOCYTES # BLD AUTO: 2.31 K/UL (ref 1–4.8)
LYMPHOCYTES NFR BLD: 14.9 % (ref 22–41)
MCH RBC QN AUTO: 28.9 PG (ref 27–33)
MCHC RBC AUTO-ENTMCNC: 35.9 G/DL (ref 33.6–35)
MCV RBC AUTO: 80.5 FL (ref 81.4–97.8)
MONOCYTES # BLD AUTO: 1.01 K/UL (ref 0–0.85)
MONOCYTES NFR BLD AUTO: 6.5 % (ref 0–13.4)
NEUTROPHILS # BLD AUTO: 12.01 K/UL (ref 2–7.15)
NEUTROPHILS NFR BLD: 77.3 % (ref 44–72)
NRBC # BLD AUTO: 0 K/UL
NRBC BLD-RTO: 0 /100 WBC
PLATELET # BLD AUTO: 306 K/UL (ref 164–446)
PMV BLD AUTO: 10.4 FL (ref 9–12.9)
POTASSIUM SERPL-SCNC: 4.5 MMOL/L (ref 3.6–5.5)
PROT SERPL-MCNC: 8 G/DL (ref 6–8.2)
PROTHROMBIN TIME: 13.7 SEC (ref 12–14.6)
RBC # BLD AUTO: 5.64 M/UL (ref 4.2–5.4)
SODIUM SERPL-SCNC: 130 MMOL/L (ref 135–145)
TROPONIN I SERPL-MCNC: 0.02 NG/ML (ref 0–0.04)
WBC # BLD AUTO: 15.5 K/UL (ref 4.8–10.8)

## 2018-10-12 PROCEDURE — 96374 THER/PROPH/DIAG INJ IV PUSH: CPT

## 2018-10-12 PROCEDURE — 85025 COMPLETE CBC W/AUTO DIFF WBC: CPT

## 2018-10-12 PROCEDURE — 83690 ASSAY OF LIPASE: CPT

## 2018-10-12 PROCEDURE — 85730 THROMBOPLASTIN TIME PARTIAL: CPT

## 2018-10-12 PROCEDURE — 71045 X-RAY EXAM CHEST 1 VIEW: CPT

## 2018-10-12 PROCEDURE — 84484 ASSAY OF TROPONIN QUANT: CPT

## 2018-10-12 PROCEDURE — 36415 COLL VENOUS BLD VENIPUNCTURE: CPT

## 2018-10-12 PROCEDURE — 700111 HCHG RX REV CODE 636 W/ 250 OVERRIDE (IP)

## 2018-10-12 PROCEDURE — 93005 ELECTROCARDIOGRAM TRACING: CPT | Performed by: EMERGENCY MEDICINE

## 2018-10-12 PROCEDURE — 93005 ELECTROCARDIOGRAM TRACING: CPT

## 2018-10-12 PROCEDURE — 700105 HCHG RX REV CODE 258: Performed by: EMERGENCY MEDICINE

## 2018-10-12 PROCEDURE — 80053 COMPREHEN METABOLIC PANEL: CPT

## 2018-10-12 PROCEDURE — 83880 ASSAY OF NATRIURETIC PEPTIDE: CPT

## 2018-10-12 PROCEDURE — 85610 PROTHROMBIN TIME: CPT

## 2018-10-12 PROCEDURE — 99285 EMERGENCY DEPT VISIT HI MDM: CPT

## 2018-10-12 RX ORDER — DILTIAZEM HYDROCHLORIDE 5 MG/ML
10 INJECTION INTRAVENOUS ONCE
Status: COMPLETED | OUTPATIENT
Start: 2018-10-12 | End: 2018-10-12

## 2018-10-12 RX ORDER — SODIUM CHLORIDE 9 MG/ML
500 INJECTION, SOLUTION INTRAVENOUS ONCE
Status: COMPLETED | OUTPATIENT
Start: 2018-10-12 | End: 2018-10-13

## 2018-10-12 RX ORDER — DILTIAZEM HYDROCHLORIDE 5 MG/ML
INJECTION INTRAVENOUS
Status: COMPLETED
Start: 2018-10-12 | End: 2018-10-12

## 2018-10-12 RX ADMIN — SODIUM CHLORIDE 500 ML: 9 INJECTION, SOLUTION INTRAVENOUS at 23:23

## 2018-10-12 RX ADMIN — DILTIAZEM HYDROCHLORIDE 10 MG: 5 INJECTION INTRAVENOUS at 23:31

## 2018-10-12 ASSESSMENT — PAIN SCALES - GENERAL: PAINLEVEL_OUTOF10: 6

## 2018-10-13 PROBLEM — R73.9 HYPERGLYCEMIA WITHOUT KETOSIS: Status: ACTIVE | Noted: 2018-10-13

## 2018-10-13 PROBLEM — I48.91 NEW ONSET ATRIAL FIBRILLATION (HCC): Status: ACTIVE | Noted: 2018-10-13

## 2018-10-13 PROBLEM — I10 HTN (HYPERTENSION): Status: ACTIVE | Noted: 2018-10-13

## 2018-10-13 PROBLEM — N28.9 ACUTE ON CHRONIC RENAL INSUFFICIENCY: Status: ACTIVE | Noted: 2018-10-13

## 2018-10-13 PROBLEM — E87.1 HYPONATREMIA: Status: ACTIVE | Noted: 2018-10-13

## 2018-10-13 PROBLEM — N18.9 ACUTE ON CHRONIC RENAL INSUFFICIENCY: Status: ACTIVE | Noted: 2018-10-13

## 2018-10-13 PROBLEM — D72.829 LEUKOCYTOSIS: Status: ACTIVE | Noted: 2018-10-13

## 2018-10-13 LAB
ANION GAP SERPL CALC-SCNC: 8 MMOL/L (ref 0–11.9)
ANISOCYTOSIS BLD QL SMEAR: ABNORMAL
BASOPHILS # BLD AUTO: 0 % (ref 0–1.8)
BASOPHILS # BLD: 0 K/UL (ref 0–0.12)
BUN SERPL-MCNC: 30 MG/DL (ref 8–22)
CALCIUM SERPL-MCNC: 9.3 MG/DL (ref 8.5–10.5)
CHLORIDE SERPL-SCNC: 103 MMOL/L (ref 96–112)
CO2 SERPL-SCNC: 25 MMOL/L (ref 20–33)
CREAT SERPL-MCNC: 1.44 MG/DL (ref 0.5–1.4)
EKG IMPRESSION: NORMAL
EOSINOPHIL # BLD AUTO: 0 K/UL (ref 0–0.51)
EOSINOPHIL NFR BLD: 0 % (ref 0–6.9)
ERYTHROCYTE [DISTWIDTH] IN BLOOD BY AUTOMATED COUNT: 39.7 FL (ref 35.9–50)
GLUCOSE BLD-MCNC: 161 MG/DL (ref 65–99)
GLUCOSE BLD-MCNC: 165 MG/DL (ref 65–99)
GLUCOSE BLD-MCNC: 198 MG/DL (ref 65–99)
GLUCOSE BLD-MCNC: 211 MG/DL (ref 65–99)
GLUCOSE BLD-MCNC: 212 MG/DL (ref 65–99)
GLUCOSE BLD-MCNC: 312 MG/DL (ref 65–99)
GLUCOSE BLD-MCNC: 352 MG/DL (ref 65–99)
GLUCOSE SERPL-MCNC: 118 MG/DL (ref 65–99)
HCT VFR BLD AUTO: 39.7 % (ref 37–47)
HGB BLD-MCNC: 14.1 G/DL (ref 12–16)
LYMPHOCYTES # BLD AUTO: 3.4 K/UL (ref 1–4.8)
LYMPHOCYTES NFR BLD: 37.4 % (ref 22–41)
MAGNESIUM SERPL-MCNC: 1.7 MG/DL (ref 1.5–2.5)
MANUAL DIFF BLD: NORMAL
MCH RBC QN AUTO: 29.1 PG (ref 27–33)
MCHC RBC AUTO-ENTMCNC: 35.5 G/DL (ref 33.6–35)
MCV RBC AUTO: 82 FL (ref 81.4–97.8)
MICROCYTES BLD QL SMEAR: ABNORMAL
MONOCYTES # BLD AUTO: 0.64 K/UL (ref 0–0.85)
MONOCYTES NFR BLD AUTO: 7 % (ref 0–13.4)
MORPHOLOGY BLD-IMP: NORMAL
NEUTROPHILS # BLD AUTO: 5.06 K/UL (ref 2–7.15)
NEUTROPHILS NFR BLD: 55.6 % (ref 44–72)
NRBC # BLD AUTO: 0 K/UL
NRBC BLD-RTO: 0 /100 WBC
OVALOCYTES BLD QL SMEAR: NORMAL
PLATELET # BLD AUTO: 233 K/UL (ref 164–446)
PLATELET BLD QL SMEAR: NORMAL
PMV BLD AUTO: 10.2 FL (ref 9–12.9)
POTASSIUM SERPL-SCNC: 4.1 MMOL/L (ref 3.6–5.5)
RBC # BLD AUTO: 4.84 M/UL (ref 4.2–5.4)
RBC BLD AUTO: PRESENT
SODIUM SERPL-SCNC: 136 MMOL/L (ref 135–145)
T4 FREE SERPL-MCNC: 0.94 NG/DL (ref 0.53–1.43)
TROPONIN I SERPL-MCNC: 0.08 NG/ML (ref 0–0.04)
TROPONIN I SERPL-MCNC: 0.1 NG/ML (ref 0–0.04)
TROPONIN I SERPL-MCNC: 0.1 NG/ML (ref 0–0.04)
TROPONIN I SERPL-MCNC: 0.13 NG/ML (ref 0–0.04)
TSH SERPL DL<=0.005 MIU/L-ACNC: 0.53 UIU/ML (ref 0.38–5.33)
WBC # BLD AUTO: 9.1 K/UL (ref 4.8–10.8)

## 2018-10-13 PROCEDURE — 84484 ASSAY OF TROPONIN QUANT: CPT

## 2018-10-13 PROCEDURE — 700102 HCHG RX REV CODE 250 W/ 637 OVERRIDE(OP): Performed by: EMERGENCY MEDICINE

## 2018-10-13 PROCEDURE — G0378 HOSPITAL OBSERVATION PER HR: HCPCS

## 2018-10-13 PROCEDURE — A9270 NON-COVERED ITEM OR SERVICE: HCPCS | Performed by: HOSPITALIST

## 2018-10-13 PROCEDURE — 85007 BL SMEAR W/DIFF WBC COUNT: CPT

## 2018-10-13 PROCEDURE — A9270 NON-COVERED ITEM OR SERVICE: HCPCS | Performed by: INTERNAL MEDICINE

## 2018-10-13 PROCEDURE — 700102 HCHG RX REV CODE 250 W/ 637 OVERRIDE(OP): Performed by: HOSPITALIST

## 2018-10-13 PROCEDURE — 36415 COLL VENOUS BLD VENIPUNCTURE: CPT

## 2018-10-13 PROCEDURE — 700102 HCHG RX REV CODE 250 W/ 637 OVERRIDE(OP): Performed by: INTERNAL MEDICINE

## 2018-10-13 PROCEDURE — 700105 HCHG RX REV CODE 258: Performed by: HOSPITALIST

## 2018-10-13 PROCEDURE — 84439 ASSAY OF FREE THYROXINE: CPT

## 2018-10-13 PROCEDURE — 82962 GLUCOSE BLOOD TEST: CPT

## 2018-10-13 PROCEDURE — 99220 PR INITIAL OBSERVATION CARE,LEVL III: CPT | Performed by: HOSPITALIST

## 2018-10-13 PROCEDURE — 93010 ELECTROCARDIOGRAM REPORT: CPT | Performed by: INTERNAL MEDICINE

## 2018-10-13 PROCEDURE — 85027 COMPLETE CBC AUTOMATED: CPT

## 2018-10-13 PROCEDURE — 93005 ELECTROCARDIOGRAM TRACING: CPT | Performed by: HOSPITALIST

## 2018-10-13 PROCEDURE — 99203 OFFICE O/P NEW LOW 30 MIN: CPT | Performed by: INTERNAL MEDICINE

## 2018-10-13 PROCEDURE — 84443 ASSAY THYROID STIM HORMONE: CPT

## 2018-10-13 PROCEDURE — 80048 BASIC METABOLIC PNL TOTAL CA: CPT

## 2018-10-13 PROCEDURE — 99226 PR SUBSEQUENT OBSERVATION CARE,LEVEL III: CPT | Performed by: INTERNAL MEDICINE

## 2018-10-13 PROCEDURE — 96372 THER/PROPH/DIAG INJ SC/IM: CPT

## 2018-10-13 PROCEDURE — 83735 ASSAY OF MAGNESIUM: CPT

## 2018-10-13 RX ORDER — BENAZEPRIL HYDROCHLORIDE 10 MG/1
10 TABLET ORAL DAILY
Status: DISCONTINUED | OUTPATIENT
Start: 2018-10-13 | End: 2018-10-13

## 2018-10-13 RX ORDER — TRAZODONE HYDROCHLORIDE 50 MG/1
50 TABLET ORAL NIGHTLY
Status: DISCONTINUED | OUTPATIENT
Start: 2018-10-13 | End: 2018-10-14 | Stop reason: HOSPADM

## 2018-10-13 RX ORDER — METOPROLOL TARTRATE 1 MG/ML
5 INJECTION, SOLUTION INTRAVENOUS
Status: DISCONTINUED | OUTPATIENT
Start: 2018-10-13 | End: 2018-10-14 | Stop reason: HOSPADM

## 2018-10-13 RX ORDER — SODIUM CHLORIDE 9 MG/ML
2000 INJECTION, SOLUTION INTRAVENOUS ONCE
Status: COMPLETED | OUTPATIENT
Start: 2018-10-13 | End: 2018-10-13

## 2018-10-13 RX ORDER — METOPROLOL SUCCINATE 50 MG/1
50 TABLET, EXTENDED RELEASE ORAL DAILY
Status: DISCONTINUED | OUTPATIENT
Start: 2018-10-13 | End: 2018-10-14 | Stop reason: HOSPADM

## 2018-10-13 RX ORDER — POLYETHYLENE GLYCOL 3350 17 G/17G
1 POWDER, FOR SOLUTION ORAL
Status: DISCONTINUED | OUTPATIENT
Start: 2018-10-13 | End: 2018-10-14 | Stop reason: HOSPADM

## 2018-10-13 RX ORDER — INSULIN GLARGINE 100 [IU]/ML
18 INJECTION, SOLUTION SUBCUTANEOUS EVERY EVENING
Status: DISCONTINUED | OUTPATIENT
Start: 2018-10-13 | End: 2018-10-14 | Stop reason: HOSPADM

## 2018-10-13 RX ORDER — METOPROLOL SUCCINATE 50 MG/1
100 TABLET, EXTENDED RELEASE ORAL DAILY
Status: DISCONTINUED | OUTPATIENT
Start: 2018-10-13 | End: 2018-10-13

## 2018-10-13 RX ORDER — SODIUM CHLORIDE 9 MG/ML
1000 INJECTION, SOLUTION INTRAVENOUS ONCE
Status: COMPLETED | OUTPATIENT
Start: 2018-10-13 | End: 2018-10-13

## 2018-10-13 RX ORDER — AMOXICILLIN 250 MG
2 CAPSULE ORAL 2 TIMES DAILY
Status: DISCONTINUED | OUTPATIENT
Start: 2018-10-13 | End: 2018-10-14 | Stop reason: HOSPADM

## 2018-10-13 RX ORDER — ASPIRIN 81 MG/1
81 TABLET, CHEWABLE ORAL DAILY
Status: DISCONTINUED | OUTPATIENT
Start: 2018-10-13 | End: 2018-10-14 | Stop reason: HOSPADM

## 2018-10-13 RX ORDER — CLINDAMYCIN HYDROCHLORIDE 150 MG/1
300 CAPSULE ORAL 3 TIMES DAILY
Status: DISCONTINUED | OUTPATIENT
Start: 2018-10-13 | End: 2018-10-14 | Stop reason: HOSPADM

## 2018-10-13 RX ORDER — INSULIN GLARGINE 100 [IU]/ML
20 INJECTION, SOLUTION SUBCUTANEOUS NIGHTLY
Status: ON HOLD | COMMUNITY
End: 2020-09-15 | Stop reason: SDUPTHER

## 2018-10-13 RX ORDER — ATORVASTATIN CALCIUM 20 MG/1
20 TABLET, FILM COATED ORAL EVERY EVENING
Status: DISCONTINUED | OUTPATIENT
Start: 2018-10-13 | End: 2018-10-14 | Stop reason: HOSPADM

## 2018-10-13 RX ORDER — METOPROLOL SUCCINATE 50 MG/1
50 TABLET, EXTENDED RELEASE ORAL DAILY
Status: DISCONTINUED | OUTPATIENT
Start: 2018-10-13 | End: 2018-10-13

## 2018-10-13 RX ORDER — SODIUM CHLORIDE 9 MG/ML
INJECTION, SOLUTION INTRAVENOUS CONTINUOUS
Status: DISCONTINUED | OUTPATIENT
Start: 2018-10-13 | End: 2018-10-14

## 2018-10-13 RX ORDER — BISACODYL 10 MG
10 SUPPOSITORY, RECTAL RECTAL
Status: DISCONTINUED | OUTPATIENT
Start: 2018-10-13 | End: 2018-10-14 | Stop reason: HOSPADM

## 2018-10-13 RX ADMIN — TRAZODONE HYDROCHLORIDE 50 MG: 50 TABLET ORAL at 03:31

## 2018-10-13 RX ADMIN — INSULIN HUMAN 5 UNITS: 100 INJECTION, SOLUTION PARENTERAL at 00:35

## 2018-10-13 RX ADMIN — MAGNESIUM GLUCONATE 500 MG ORAL TABLET 400 MG: 500 TABLET ORAL at 12:23

## 2018-10-13 RX ADMIN — CLINDAMYCIN HYDROCHLORIDE 300 MG: 150 CAPSULE ORAL at 12:23

## 2018-10-13 RX ADMIN — INSULIN HUMAN 4 UNITS: 100 INJECTION, SOLUTION PARENTERAL at 17:00

## 2018-10-13 RX ADMIN — INSULIN GLARGINE 18 UNITS: 100 INJECTION, SOLUTION SUBCUTANEOUS at 17:00

## 2018-10-13 RX ADMIN — CLINDAMYCIN HYDROCHLORIDE 300 MG: 150 CAPSULE ORAL at 05:49

## 2018-10-13 RX ADMIN — METOPROLOL SUCCINATE 50 MG: 50 TABLET, EXTENDED RELEASE ORAL at 05:49

## 2018-10-13 RX ADMIN — INSULIN HUMAN 4 UNITS: 100 INJECTION, SOLUTION PARENTERAL at 12:19

## 2018-10-13 RX ADMIN — APIXABAN 5 MG: 5 TABLET, FILM COATED ORAL at 17:05

## 2018-10-13 RX ADMIN — SODIUM CHLORIDE 2000 ML: 9 INJECTION, SOLUTION INTRAVENOUS at 05:49

## 2018-10-13 RX ADMIN — SODIUM CHLORIDE 1000 ML: 9 INJECTION, SOLUTION INTRAVENOUS at 03:32

## 2018-10-13 RX ADMIN — TRAZODONE HYDROCHLORIDE 50 MG: 50 TABLET ORAL at 19:57

## 2018-10-13 RX ADMIN — Medication 81 MG: at 05:49

## 2018-10-13 RX ADMIN — CLINDAMYCIN HYDROCHLORIDE 300 MG: 150 CAPSULE ORAL at 17:05

## 2018-10-13 RX ADMIN — INSULIN HUMAN 3 UNITS: 100 INJECTION, SOLUTION PARENTERAL at 19:54

## 2018-10-13 RX ADMIN — INSULIN HUMAN 3 UNITS: 100 INJECTION, SOLUTION PARENTERAL at 05:53

## 2018-10-13 RX ADMIN — APIXABAN 2.5 MG: 2.5 TABLET, FILM COATED ORAL at 03:35

## 2018-10-13 RX ADMIN — ATORVASTATIN CALCIUM 20 MG: 20 TABLET, FILM COATED ORAL at 17:05

## 2018-10-13 RX ADMIN — INSULIN GLARGINE 18 UNITS: 100 INJECTION, SOLUTION SUBCUTANEOUS at 03:42

## 2018-10-13 RX ADMIN — STANDARDIZED SENNA CONCENTRATE AND DOCUSATE SODIUM 2 TABLET: 8.6; 5 TABLET ORAL at 17:05

## 2018-10-13 ASSESSMENT — ENCOUNTER SYMPTOMS
SHORTNESS OF BREATH: 1
DEPRESSION: 0
WHEEZING: 0
NAUSEA: 0
DIARRHEA: 0
FEVER: 0
PALPITATIONS: 1
SENSORY CHANGE: 1
CHILLS: 0
ABDOMINAL PAIN: 0
DIZZINESS: 1
MYALGIAS: 0
COUGH: 0
VOMITING: 0
SORE THROAT: 0
HEMOPTYSIS: 0
HEADACHES: 0
BLURRED VISION: 0
LOSS OF CONSCIOUSNESS: 0
EYE PAIN: 0
PALPITATIONS: 0
BRUISES/BLEEDS EASILY: 0
EYE DISCHARGE: 0
SPEECH CHANGE: 0
TINGLING: 0
PHOTOPHOBIA: 0
FOCAL WEAKNESS: 0
NERVOUS/ANXIOUS: 0

## 2018-10-13 ASSESSMENT — LIFESTYLE VARIABLES
EVER_SMOKED: YES
ALCOHOL_USE: NO

## 2018-10-13 ASSESSMENT — COGNITIVE AND FUNCTIONAL STATUS - GENERAL
MOBILITY SCORE: 24
SUGGESTED CMS G CODE MODIFIER MOBILITY: CH
DAILY ACTIVITIY SCORE: 24
SUGGESTED CMS G CODE MODIFIER DAILY ACTIVITY: CH

## 2018-10-13 ASSESSMENT — COPD QUESTIONNAIRES
DO YOU EVER COUGH UP ANY MUCUS OR PHLEGM?: NO/ONLY WITH OCCASIONAL COLDS OR INFECTIONS
COPD SCREENING SCORE: 5
DURING THE PAST 4 WEEKS HOW MUCH DID YOU FEEL SHORT OF BREATH: SOME OF THE TIME
HAVE YOU SMOKED AT LEAST 100 CIGARETTES IN YOUR ENTIRE LIFE: YES
IN THE PAST 12 MONTHS DO YOU DO LESS THAN YOU USED TO BECAUSE OF YOUR BREATHING PROBLEMS: DISAGREE/UNSURE

## 2018-10-13 ASSESSMENT — PAIN SCALES - GENERAL
PAINLEVEL_OUTOF10: 0

## 2018-10-13 ASSESSMENT — PATIENT HEALTH QUESTIONNAIRE - PHQ9
2. FEELING DOWN, DEPRESSED, IRRITABLE, OR HOPELESS: NOT AT ALL
SUM OF ALL RESPONSES TO PHQ9 QUESTIONS 1 AND 2: 0
1. LITTLE INTEREST OR PLEASURE IN DOING THINGS: NOT AT ALL

## 2018-10-13 NOTE — CONSULTS
Cardiology Consult    Requested by Dr. Lane Peters      REASON FOR CONSULT: Chest pain shortness of breath atrial fibrillation paroxysmal previous CAD.    HPI: This a pleasant 73-year-old white female who is admitted with 3-4-day history of shortness of breath and orthopnea also noted palpitations no previous history of atrial fibrillation.  Was noted to be in atrial fibrillation with rapid ventricular response.  Now back in sinus rhythm.  Previous stenting by Dr. Verdin.  Possibly has some mild chest pain although it is unsure.  No previous history of stroke.  Does have history of diabetes.  Currently on Eliquis and beta-blockers.  Previous echocardiogram with some mild aortic stenosis.  Troponins were in the indeterminate range.  Patient denies smoking denies alcohol use.  No syncope but did get lightheaded with this arrhythmia.  Previous stenting of the LAD in 2015.  Chronic total occlusion of RCA at that time.    MEDICATIONS:      Current Facility-Administered Medications:   •  traZODone (DESYREL) tablet 50 mg, 50 mg, Oral, Nightly, Casandra Rutherford M.D., 50 mg at 10/13/18 0331  •  clindamycin (CLEOCIN) capsule 300 mg, 300 mg, Oral, TID, Casandra Rutherford M.D., 300 mg at 10/13/18 0549  •  atorvastatin (LIPITOR) tablet 20 mg, 20 mg, Oral, Q EVENING, Casandra Rutherford M.D.  •  aspirin (ASA) chewable tab 81 mg, 81 mg, Oral, DAILY, Casandra Rutherford M.D., 81 mg at 10/13/18 0549  •  senna-docusate (PERICOLACE or SENOKOT S) 8.6-50 MG per tablet 2 Tab, 2 Tab, Oral, BID **AND** polyethylene glycol/lytes (MIRALAX) PACKET 1 Packet, 1 Packet, Oral, QDAY PRN **AND** magnesium hydroxide (MILK OF MAGNESIA) suspension 30 mL, 30 mL, Oral, QDAY PRN **AND** bisacodyl (DULCOLAX) suppository 10 mg, 10 mg, Rectal, QDAY PRN, Casandra Rutherford M.D.  •  Metoprolol Tartrate (LOPRESSOR) injection 5 mg, 5 mg, Intravenous, Q5 MIN PRN, Casandra Rutherford M.D.  •  insulin regular (HUMULIN R) injection 2-10 Units, 2-10 Units, Subcutaneous, 4X/DAY  ACHS, Kim Camara M.D., 3 Units at 10/13/18 0553  •  insulin glargine (LANTUS) injection 18 Units, 18 Units, Subcutaneous, Q EVENING, Kim Camara M.D., 18 Units at 10/13/18 0342  •  metoprolol SR (TOPROL XL) tablet 50 mg, 50 mg, Oral, DAILY, Kim Camara M.D., 50 mg at 10/13/18 0549  •  apixaban (ELIQUIS) 2.5mg tablet 2.5 mg, 2.5 mg, Oral, BID, Casandra Rutherford M.D., 2.5 mg at 10/13/18 0335  •  magnesium oxide (MAG-OX) tablet 400 mg, 400 mg, Oral, DAILY, Lane Peters D.O.    ALLERGIES:   Ms. Torrez  is allergic to pcn [penicillins] and sulfa drugs.     SURGERIES:  Ms. Torrez   has a past surgical history that includes other orthopedic surgery (7/11); other abdominal surgery (1966); gyn surgery (1978); gyn surgery; aditya by laparoscopy (9/14/2012); laparoscopy (12/1/2014); and cardiac cath.     MEDICAL ILLNESSES:  Ms. Torrez   has a past medical history of CAD (coronary artery disease); Diabetes; Goiter; Heart attack (HCC); Hyperlipidemia; Hypertension; Pericardial effusion; and Thyroid nodule. She also has no past medical history of Breast cancer (HCC).     SOCIAL HISTORY:  Ms. Torrez   reports that she quit smoking about 40 years ago. Her smoking use included Cigarettes. She has a 40.00 pack-year smoking history. She has never used smokeless tobacco. She reports that she does not drink alcohol or use drugs.     FAMILY HISTORY:  Ms.'s Torrez  family history is not on file.      ROS:  Review of Systems   Constitutional: Negative for chills and fever.   HENT: Negative for congestion.    Eyes: Negative for blurred vision, pain and discharge.   Respiratory: Positive for shortness of breath. Negative for cough, hemoptysis and wheezing.    Cardiovascular: Positive for chest pain and palpitations.   Gastrointestinal: Negative for abdominal pain, nausea and vomiting.   Musculoskeletal: Negative for joint pain and myalgias.   Skin: Negative for itching and rash.   Neurological: Negative for speech change and loss of  "consciousness.   Endo/Heme/Allergies: Does not bruise/bleed easily.   Psychiatric/Behavioral: Negative for depression. The patient is not nervous/anxious.    All other systems reviewed and are negative.    In addition to the above, a complete review of system was performed and all other organs systems were normal    PHYSICAL EXAM:  Physical Exam   Constitutional: She is oriented to person, place, and time and well-developed, well-nourished, and in no distress.   Obese   HENT:   Head: Normocephalic and atraumatic.   Eyes: Pupils are equal, round, and reactive to light. EOM are normal.   Neck: Normal range of motion. Neck supple.   Cardiovascular: Normal rate and regular rhythm.  Exam reveals no gallop and no friction rub.    Pulmonary/Chest: Effort normal and breath sounds normal.   Abdominal: Soft. Bowel sounds are normal.   Musculoskeletal: Normal range of motion. She exhibits no edema, tenderness or deformity.   Neurological: She is alert and oriented to person, place, and time.   Skin: Skin is dry.   Psychiatric: Mood, memory, affect and judgment normal.       /55   Pulse 83   Temp 36.5 °C (97.7 °F)   Resp 18   Ht 1.626 m (5' 4\")   Wt 73.4 kg (161 lb 13.1 oz)   SpO2 96%   Breastfeeding? No   BMI 27.78 kg/m²      Lab Results   Component Value Date/Time    CHOLSTRLTOT 204 (H) 04/02/2018 09:45 AM     (H) 04/02/2018 09:45 AM    HDL 38 (A) 04/02/2018 09:45 AM    TRIGLYCERIDE 176 (H) 04/02/2018 09:45 AM       Lab Results   Component Value Date/Time    SODIUM 136 10/13/2018 09:12 AM    POTASSIUM 4.1 10/13/2018 09:12 AM    CHLORIDE 103 10/13/2018 09:12 AM    CO2 25 10/13/2018 09:12 AM    GLUCOSE 118 (H) 10/13/2018 09:12 AM    BUN 30 (H) 10/13/2018 09:12 AM    CREATININE 1.44 (H) 10/13/2018 09:12 AM     Lab Results   Component Value Date/Time    ALKPHOSPHAT 88 10/12/2018 10:55 PM    ASTSGOT 14 10/12/2018 10:55 PM    ALTSGPT 15 10/12/2018 10:55 PM    TBILIRUBIN 1.4 10/12/2018 10:55 PM      Lab Results "   Component Value Date/Time    BNPBTYPENAT 60 10/12/2018 10:55 PM     Recent Labs      10/12/18   2255  10/13/18   0246  10/13/18   0912   TROPONINI  0.02  0.10*  0.13*   BNPBTYPENAT  60   --    --      Recent Labs      10/12/18   2255  10/13/18   0912   WBC  15.5*  9.1   RBC  5.64*  4.84   HEMOGLOBIN  16.3*  14.1   HEMATOCRIT  45.4  39.7   MCV  80.5*  82.0   MCH  28.9  29.1   MCHC  35.9*  35.5*   RDW  38.0  39.7   PLATELETCT  306  233   MPV  10.4  10.2     TFTs normal    EKG: EKG this morning showing sinus rhythm Q's inferiorly poor R wave progression.  Previous EKG showing atrial fibrillation with rapid ventricular response.  Old electric cardiogram from  showing inferior cues and poor R wave progression.    Echocardiogram from  showing preserved LV function mild aortic stenosis.  New echo  pending.  Results for orders placed or performed during the hospital encounter of 10/12/18   EKG   Result Value Ref Range    Report       [facility] Emergency Dept.    Test Date:  2018-10-12  Pt Name:    SHANNAN ALCANTARA                 Department: ER  MRN:        [id number]                      Room:  Gender:     Female                       Technician: [id number]  :        1945                   Requested By:ER TRIAGE PROTOCOL  Order #:    [id number]                    Reading MD:    Measurements  Intervals                                Axis  Rate:       139                          P:  VA:                                      QRS:        -56  QRSD:       90                           T:          105  QT:         308  QTc:        469    Interpretive Statements  ATRIAL FIBRILLATION  PROBABLE LVH WITH SECONDARY REPOL ABNRM  PROBABLE INFERIOR INFARCT, OLD  BASELINE WANDER IN LEAD(S) II,III,aVR,aVL,aVF,V4  Compared to ECG 2016 12:37:26  Myocardial infarct finding now present  Sinus rhythm no longer present  Left anterior fascicular block no longer present     EKG   Result Value Ref Range    Report        [facility]    Test Date:  2018-10-13  Pt Name:    SHANNAN ALCANTARA                 Department: 183  MRN:        [id number]                      Room:       T823  Gender:     Female                       Technician: DARLENE  :        1945                   Requested By:HO HAUSER  Order #:    [id number]                    Reading MD: Isabelle Herman    Measurements  Intervals                                Axis  Rate:       91                           P:          50  SC:         172                          QRS:        -48  QRSD:       100                          T:          70  QT:         376  QTc:        463    Interpretive Statements  SINUS RHYTHM  LEFT ANTERIOR FASCICULAR BLOCK vs old inferior MI  Delayed R wave progression  Compared to ECG 10/12/2018 22:23:27  ATRIAL FIBRILLATION WITH RAPID VENTRICULAR RESPONSE no longer present      Electronically Signed On 10- 8:15:40 PDT by Isabelle Herman         IMAGING:   DX-CHEST-LIMITED (1 VIEW)   Final Result         No acute cardiopulmonary abnormalities are identified.      EC-ECHOCARDIOGRAM COMPLETE W/O CONT    (Results Pending)         Patient Active Problem List    Diagnosis Date Noted   • New onset atrial fibrillation (HCC) 10/13/2018     Priority: High   • Acute on chronic renal insufficiency 10/13/2018     Priority: High   • Syncope 2016     Priority: High   • Shortness of breath 2016     Priority: High   • Pericardial effusion 2016     Priority: High   • Hyponatremia 10/13/2018     Priority: Medium   • Hyperglycemia without ketosis 10/13/2018     Priority: Medium   • Leukocytosis 10/13/2018     Priority: Medium   • DM2 (diabetes mellitus, type 2) ([facility]) 2016     Priority: Medium   • CKD (chronic kidney disease), stage III ([facility]) 2016     Priority: Medium   • Pyuria 2016     Priority: Medium   • Hypokalemia 2016     Priority: Medium   • CAD s/p DOMINGA mLAD for STEMI 2015     Priority:  Medium   • Paroxysmal supraventricular tachycardia (HCC) 02/19/2016     Priority: Medium   • Cough 02/03/2016     Priority: Medium   • Type 2 diabetes mellitus without complication (HCC) 01/05/2016     Priority: Medium   • Thyroid nodule 02/03/2016     Priority: Low   • Meniere's disease of both ears 01/05/2016     Priority: Low   • HLD (hyperlipidemia) 01/05/2016     Priority: Low   • HTN (hypertension) 10/13/2018   • UTI (urinary tract infection) 11/16/2016   • Coronary artery disease involving native coronary artery of native heart without angina pectoris 04/27/2016   • Palpitations 03/14/2016         ASSESSMENT AND PLAN:   1.  Paroxysmal atrial fibrillation.  Now back in sinus rhythm.  Agree with use of beta-blockers.  If recurrent may need to consider antiarrhythmics.  Echocardiogram pending.  2.  Mild aortic stenosis echocardiogram pending.  3.  Anticoagulation continue.  Eliquis.  4.  Diabetes mellitus on medications.  5.  Old stenting borderline troponin recommend stress imaging.  6.  DC Effient.  7.  Continue statins.

## 2018-10-13 NOTE — ED NOTES
Pt reports a pervious Hx of MI. For the last week has been having increased weakness and SOB with exertion. Today having significant CP, right Arm pain/tinglking, slight dizziness, and feels anxious.

## 2018-10-13 NOTE — PROGRESS NOTES
Two RN skin assessment completed with DUANE Pride.  Bilateral heels boggy, calloused, red, and blanching.   Healing scabs noted to bilateral second toes.    Skin otherwise intact.

## 2018-10-13 NOTE — H&P
[facility] Medicine History & Physical Note    Date of Service  10/13/2018    Primary Care Physician  Fitz Turpin D.O.    Consultants  None    Code Status  Full    Chief Complaint  Chief Complaint   Patient presents with   • Shortness of Breath     Pt reporting SOB for the past 4 days, worse upon exertion. Hx: CHF and MI w/ stent placement.   • Numbness     LEFT arm numbness/tinging which started at approximately 19:30.   • Chest Pain     Chest pain described as a 7/10 aching since 19:30.       History of Presenting Illness  73 y.o. female who presented on 10/12/2018 with shortness of breath, palpitations, chest discomfort and right arm numbness.  The patient states that she has been short of breath and dizzy for the past week.  She did not seek immediate medical assistance.  However today, she developed a left-sided chest discomfort with radiation toward her right arm and associated right arm numbness.  She had no diaphoresis, but she does report significant palpitations.  She carries a known history of coronary artery disease and is on Effient, aspirin, and Lipitor.  She became concerned when she began to belch as she states that this is similar to her previous symptoms when she had an MI.  Otherwise, her recent medical history is positive for diagnosis of a skin infection secondary to a dog scratch for which she has been on clindamycin.  She denies any fevers, chills, vomiting, abdominal pain, diarrhea or dysuria.  Upon assessment in the emergency room, the patient is noted to be in new onset atrial fibrillation with rapid ventricular response.    Review of Systems  Review of Systems   Constitutional: Negative for chills and fever.   HENT: Negative for congestion and sore throat.    Eyes: Negative for photophobia.   Respiratory: Positive for shortness of breath. Negative for cough and wheezing.    Cardiovascular: Negative for chest pain and palpitations.        Chest discomfort with palpitations and  shortness of breath   Gastrointestinal: Negative for abdominal pain, diarrhea, nausea and vomiting.   Genitourinary: Negative for dysuria.   Musculoskeletal: Negative for myalgias.   Skin: Negative.    Neurological: Positive for dizziness and sensory change. Negative for tingling, focal weakness and headaches.   Psychiatric/Behavioral: Negative for depression and suicidal ideas.       Past Medical History  Past Medical History:   Diagnosis Date   • CAD (coronary artery disease)    • Diabetes     oral meds   • Goiter    • Heart attack (HCC)    • Hyperlipidemia    • Hypertension    • Pericardial effusion    • Thyroid nodule        Surgical History  Past Surgical History:   Procedure Laterality Date   • LAPAROSCOPY  12/1/2014    Performed by Abdi Navarro M.D. at SURGERY SAME DAY [facility] ORS   • BAN BY LAPAROSCOPY  9/14/2012    [facility]   • OTHER ORTHOPEDIC SURGERY  7/11    knee   • GYN SURGERY  1978    fibroids   • OTHER ABDOMINAL SURGERY  1966    appendectomy   • CARDIAC CATH     • GYN SURGERY      hysterectomy       Family History  History reviewed. No pertinent family history.    Social History  Social History   Substance Use Topics   • Smoking status: Former Smoker     Packs/day: 1.00     Years: 40.00     Types: Cigarettes     Quit date: 1/1/1978   • Smokeless tobacco: Never Used   • Alcohol use No       Allergies  Allergies   Allergen Reactions   • Pcn [Penicillins] Hives and Swelling   • Sulfa Drugs Hives and Swelling       Medications  No current facility-administered medications on file prior to encounter.      Current Outpatient Prescriptions on File Prior to Encounter   Medication Sig Dispense Refill   • naproxen (NAPROSYN) 500 MG Tab Take 1 Tab by mouth 2 times a day, with meals. 15 Tab 0   • albuterol 108 (90 BASE) MCG/ACT Aero Soln inhalation aerosol Inhale 1-2 Puffs by mouth every 6 hours as needed for Shortness of Breath. 8.5 g 0   •  metoprolol SR (TOPROL XL) 50 MG TABLET SR 24 HR Take 1 Tab by mouth every day. 90 Tab 3   • clindamycin (CLEOCIN) 300 MG Cap 1 CAP 3 TIMES A DAY X 10 DAYS. 30 Cap 0   • benazepril (LOTENSIN) 10 MG Tab Take 10 mg by mouth every day.     • glimepiride (AMARYL) 4 MG Tab Take 4 mg by mouth 2 times a day.     • trazodone (DESYREL) 50 MG Tab Take 50 mg by mouth every evening.     • vitamin D, Ergocalciferol, (DRISDOL) 32425 UNITS Cap capsule Take 50,000 Units by mouth every 7 days.     • prasugrel (EFFIENT) 10 MG Tab Take 1 Tab by mouth every day. 30 Tab 6   • therapeutic multivitamin-minerals (THERAGRAN-M) Tab Take 1 Tab by mouth every day.     • atorvastatin (LIPITOR) 20 MG Tab Take 1 Tab by mouth every evening. 30 Tab 0   • aspirin (ASA) 81 MG CHEW Take 81 mg by mouth every day.         Physical Exam  Hemodynamics  Temp (24hrs), Av.5 °C (97.7 °F), Min:36.5 °C (97.7 °F), Max:36.5 °C (97.7 °F)   Temperature: 36.5 °C (97.7 °F)  Pulse  Av.3  Min: 62  Max: 142 Heart Rate (Monitored): 93  Blood Pressure : (!) 182/76, NIBP: (!) 99/66      Respiratory      Respiration: 20, Pulse Oximetry: 98 %             Physical Exam  Capillary refill less than 3 seconds, distal pulses intact    Laboratory:  Recent Labs      10/12/18   2255   WBC  15.5*   RBC  5.64*   HEMOGLOBIN  16.3*   HEMATOCRIT  45.4   MCV  80.5*   MCH  28.9   MCHC  35.9*   RDW  38.0   PLATELETCT  306   MPV  10.4     Recent Labs      10/12/18   2255   SODIUM  130*   POTASSIUM  4.5   CHLORIDE  96   CO2  20   GLUCOSE  409*   BUN  30*   CREATININE  1.79*   CALCIUM  9.8     Recent Labs      10/12/18   2255   ALTSGPT  15   ASTSGOT  14   ALKPHOSPHAT  88   TBILIRUBIN  1.4   LIPASE  35   GLUCOSE  409*     Recent Labs      10/12/18   2255   APTT  26.8   INR  1.03     Recent Labs      10/12/18   2255   BNPBTYPENAT  60         Lab Results   Component Value Date    TROPONINI 0.02 10/12/2018       Imaging  Dx-chest-limited (1 View)    Result Date: 10/12/2018  10/12/2018  11:23 PM HISTORY/REASON FOR EXAM:  Chest Pain TECHNIQUE/EXAM DESCRIPTION AND NUMBER OF VIEWS: Single portable view of the chest. COMPARISON: 4/13/2017 FINDINGS: No pulmonary infiltrates or consolidations are noted. No pleural effusion. No pneumothorax. Stable cardiopericardial silhouette.     No acute cardiopulmonary abnormalities are identified.        Assessment/Plan:  Anticipate that patient will need less than 2 midnights for management of the discussed medical issues.    * New onset atrial fibrillation (HCC)   Assessment & Plan    Patient does not carry a history of atrial fibrillation.  Her chads score is 3 therefore I am starting her on warfarin.  She does not have a history of stroke therefore she does not require bridging.  We are somewhat limited in our choices of anticoagulation because of her current kidney function however if this improves overnight with fluids, then she may be a candidate for a novel oral anticoagulant.  In the meantime, I am going to increase the dose of her home metoprolol, provide her with as needed IV beta blockade.  We will monitor her on telemetry, check a TSH and T4 level, and obtain an echocardiogram.  If we have difficulty controlling her rates on an inpatient basis, then will consider a cardiology consultation otherwise she can follow-up on an outpatient basis.        Acute on chronic renal insufficiency   Assessment & Plan    Suspect that this is mild prerenal azotemia, expect improvement with fluids.  Continue fluids overnight and repeat chemistries in the morning.        Leukocytosis   Assessment & Plan    She recently had a skin infection on her right arm secondary to a dog scratch.  Continue home clindamycin.  Chest x-ray is negative.        Hyperglycemia without ketosis   Assessment & Plan    She has an elevated anion gap with hyperglycemia but no evidence of hypocapnia.  Expect improvement with fluids, continue fluids and monitor with Accu-Cheks.        Hyponatremia    Assessment & Plan    This is factitious secondary to hyperglycemia, expect improvement with fluids alone.  Continue and monitor.        DM2 (diabetes mellitus, type 2) (HCC)- (present on admission)   Assessment & Plan    Treatment as noted above, continue to monitor with Accu-Cheks and cover with insulin sliding scale.        HTN (hypertension)   Assessment & Plan    Okay to continue home Lotensin for now, if blood pressure drops and rates remain elevated, we will hold this to allow more room for rate control.        Coronary artery disease involving native coronary artery of native heart without angina pectoris- (present on admission)   Assessment & Plan    Continue home Effient, aspirin, and Lipitor.  Treatment as noted above for A. fib with RVR.  Patient had some chest discomfort but is now feeling improved with improvement in her heart rates.  Given her history, I will trend her troponin levels.  First troponin is negative.            Prophylaxis: Patient will be on Coumadin, no additional need for DVT prophylaxis, no PPI indicated, bowel protocol as needed

## 2018-10-13 NOTE — DISCHARGE PLANNING
Medical Social Work    Per chart review, pt may be switching from Effient to Eliquis. LSW or RN CM will need to check this for cost prior to hospital discharge.

## 2018-10-13 NOTE — ED NOTES
Hourly rounding performed. Assessed patient complaints and Bathroom/comfort needs.    Pt reports feeling significantly improved.

## 2018-10-13 NOTE — ASSESSMENT & PLAN NOTE
Okay to continue home Lotensin for now, if blood pressure drops and rates remain elevated, we will hold this to allow more room for rate control.

## 2018-10-13 NOTE — ASSESSMENT & PLAN NOTE
She recently had a skin infection on her right arm secondary to a dog scratch.  Continue home clindamycin.  Chest x-ray is negative.  -- likely hemo concentration as leukocytosis resolved with hydraiton.

## 2018-10-13 NOTE — ED PROVIDER NOTES
ER Provider Note     Scribed for Jaspreet Quinteros M.D. by Cherelle Edwards. 10/12/2018, 11:09 PM.    Primary Care Provider: Fitz Turpin D.O.  Means of Arrival: walk in  History obtained from: Patient  History limited by: None     CHIEF COMPLAINT  Chief Complaint   Patient presents with   • Shortness of Breath     Pt reporting SOB for the past 4 days, worse upon exertion. Hx: CHF and MI w/ stent placement.   • Numbness     LEFT arm numbness/tinging which started at approximately 19:30.   • Chest Pain     Chest pain described as a 7/10 aching since 19:30.       HPI  Amrita Torrez is a 73 y.o. Female with a history of congestive heart failure and myocardial infarction who presents to the Emergency Department for evaluation of shortness of breath onset one week ago. She confirms that the shortness of breath is exacerbated with exertion. She is additionally complaining of chest pain as well as left arm numbness and tingling that began at 7:30 PM tonight. She states that her heart rate has been increased today. Patient confirms associatd nausea, vomiting, and dizziness. Patient reports that she has two stents in place. She reports that she is unaware that she has atrial fibrillation.    REVIEW OF SYSTEMS  See HPI for further details. All other systems are negative.     PAST MEDICAL HISTORY   has a past medical history of CAD (coronary artery disease); Diabetes; Goiter; Heart attack (HCC); Hyperlipidemia; Hypertension; Pericardial effusion; and Thyroid nodule.    SURGICAL HISTORY   has a past surgical history that includes other orthopedic surgery (7/11); other abdominal surgery (1966); gyn surgery (1978); gyn surgery; aditya by laparoscopy (9/14/2012); laparoscopy (12/1/2014); and cardiac cath.    SOCIAL HISTORY  Social History   Substance Use Topics   • Smoking status: Former Smoker     Packs/day: 1.00     Years: 40.00     Types: Cigarettes     Quit date: 1/1/1978   • Smokeless tobacco: Never Used   • Alcohol use  "No      History   Drug Use No       FAMILY HISTORY  History reviewed. No pertinent family history.    CURRENT MEDICATIONS  Home Medications     Reviewed by Rahat Blanc R.N. (Registered Nurse) on 10/12/18 at 2232  Med List Status: Partial   Medication Last Dose Status   albuterol 108 (90 BASE) MCG/ACT Aero Soln inhalation aerosol  Active   aspirin (ASA) 81 MG CHEW  Active   atorvastatin (LIPITOR) 20 MG Tab  Active   benazepril (LOTENSIN) 10 MG Tab  Active   clindamycin (CLEOCIN) 300 MG Cap  Active   glimepiride (AMARYL) 4 MG Tab  Active   metoprolol SR (TOPROL XL) 50 MG TABLET SR 24 HR  Active   naproxen (NAPROSYN) 500 MG Tab  Active   prasugrel (EFFIENT) 10 MG Tab  Active   therapeutic multivitamin-minerals (THERAGRAN-M) Tab  Active   trazodone (DESYREL) 50 MG Tab  Active   vitamin D, Ergocalciferol, (DRISDOL) 24197 UNITS Cap capsule  Active                ALLERGIES  Allergies   Allergen Reactions   • Pcn [Penicillins] Hives and Swelling   • Sulfa Drugs Hives and Swelling       PHYSICAL EXAM  VITAL SIGNS: BP (!) 182/76   Pulse (!) 103   Temp 36.5 °C (97.7 °F) (Temporal)   Resp 20   Ht 1.626 m (5' 4\")   Wt 73.3 kg (161 lb 9.6 oz)   SpO2 99%   BMI 27.74 kg/m²       Constitutional: Alert in no apparent distress. Pale appearing.  HENT: No signs of trauma, Bilateral external ears normal, Nose normal.   Eyes: Pupils are equal and reactive, Conjunctiva normal, Non-icteric.   Neck: Normal range of motion, No tenderness, Supple, No stridor.   Lymphatic: No lymphadenopathy noted.   Cardiovascular: Irregularly irregular rate and tachycardic, no murmurs.   Thorax & Lungs: Normal breath sounds, No respiratory distress, No wheezing, No chest tenderness.   Abdomen: Bowel sounds normal, Soft, No tenderness, No masses, No pulsatile masses. No peritoneal signs.  Skin: Warm, Dry, No erythema, No rash.   Back: No bony tenderness, No CVA tenderness.   Extremities: Intact distal pulses, No edema, No tenderness, No " cyanosis.  Musculoskeletal: Good range of motion in all major joints. No tenderness to palpation or major deformities noted.   Neurologic: Alert , Normal motor function, Normal sensory function, No focal deficits noted.   Psychiatric: Affect normal, Judgment normal, Mood normal.     DIAGNOSTIC STUDIES / PROCEDURES    EKG Interpretation:  Interpreted by me  12 Lead EKG interpreted by me to show:  Atrial fibrillation with RVR  Rate 139  Axis: Normal  Intervals: Normal  Normal T waves  No ST elevation or depression  My impression of this EKG: Does not indicate ischemia at this time.     LABS  Labs Reviewed   CBC WITH DIFFERENTIAL - Abnormal; Notable for the following:        Result Value    WBC 15.5 (*)     RBC 5.64 (*)     Hemoglobin 16.3 (*)     MCV 80.5 (*)     MCHC 35.9 (*)     Neutrophils-Polys 77.30 (*)     Lymphocytes 14.90 (*)     Neutrophils (Absolute) 12.01 (*)     Monos (Absolute) 1.01 (*)     All other components within normal limits    Narrative:     Indicate which anticoagulants the patient is on:->UNKNOWN   COMP METABOLIC PANEL - Abnormal; Notable for the following:     Sodium 130 (*)     Anion Gap 14.0 (*)     Glucose 409 (*)     Bun 30 (*)     Creatinine 1.79 (*)     Globulin 4.0 (*)     All other components within normal limits    Narrative:     Indicate which anticoagulants the patient is on:->UNKNOWN   ESTIMATED GFR - Abnormal; Notable for the following:     GFR If  33 (*)     GFR If Non  28 (*)     All other components within normal limits    Narrative:     Indicate which anticoagulants the patient is on:->UNKNOWN   TROPONIN    Narrative:     Indicate which anticoagulants the patient is on:->UNKNOWN   BTYPE NATRIURETIC PEPTIDE    Narrative:     Indicate which anticoagulants the patient is on:->UNKNOWN   PROTHROMBIN TIME    Narrative:     Indicate which anticoagulants the patient is on:->UNKNOWN   APTT    Narrative:     Indicate which anticoagulants the patient is  on:->UNKNOWN   LIPASE    Narrative:     Indicate which anticoagulants the patient is on:->UNKNOWN     All labs reviewed by me.    RADIOLOGY  DX-CHEST-LIMITED (1 VIEW)   Final Result         No acute cardiopulmonary abnormalities are identified.           The radiologist's interpretation of all radiological studies have been reviewed by me.    COURSE & MEDICAL DECISION MAKING  Pertinent Labs & Imaging studies reviewed. (See chart for details)    This is a 73 y.o. female that presents with history of myocardial infarction and now in atrial fibrillation with rapid ventricular rate.  I am concerned this could be ischemic and less likely infectious.  I will treat her with diltiazem to control her rate.  This also could represent worsening heart failure.  We will get basic labs including coagulation studies and a BNP as well as troponin and then reassess the patient.    11:09 PM - Patient seen and examined at bedside. Ordered troponin, BNP, CBC, CMP, PTT, APTT, lipase, Dx-chest, EKG.  Patient will be medicated with  diltiazem for her symptoms. Patient will receive IV fluids secondary to tachycardia and inability to tolerate oral fluids due to clinical condition.    12:17 AM Paged Hospitalist.      12:48 AM Spoke to Dr. Rutherford (hospitalist) who agrees to admit the patient.    12:51 AM Patient reevaluated at bedside. She reports feeling better and has improved shortness of breath at this time. Additionally, her heart rate has improved after administration of IV fluids. Informed that she will be admitted at this time. Patient understands and agrees.     The patient was found to have a leukocytosis of 15.  She has no fevers.  The patient is sodium of 130 as well as a creatinine elevation of 1.7.  In addition the patient has a negative troponin.  Follow the patient's lipase is normal.  At this time I will admit the patient for a new kidney injury.  I will give her insulin for her hyperglycemia.  She was given fluids and had a  positive response with her heart rate improving.  She is no longer in atrial fibrillation with rapid ventricular rate after the medications have been given.    CRITICAL CARE  The very real possibilty of a deterioration of this patient's condition required the highest level of my preparedness for sudden, emergent intervention.  I provided critical care services, which included medication orders, frequent reevaluations of the patient's condition and response to treatment, ordering and reviewing test results, and discussing the case with various consultants.  The critical care time associated with the care of the patient was 35 minutes. Review chart for interventions. This time is exclusive of any other billable procedures.     DISPOSITION:  Patient will be admitted to Dr. Rutherford (hospoitalist) in critical condition.    FINAL IMPRESSION  1. Atrial fibrillation with RVR (HCC)    2. Acute respiratory distress    3. Hyperglycemia          The critical care time associated with the care of the patient was 35 minutes. This time is exclusive of any other billable procedures.     Cherelle ZAPIEN (Scribe), am scribing for, and in the presence of, Jaspreet Quinteros M.D..    Electronically signed by: Cherelle Edwards (Scribe), 10/12/2018    IJaspreet M.D. personally performed the services described in this documentation, as scribed by Cherelle Edwards in my presence, and it is both accurate and complete. C    The note accurately reflects work and decisions made by me.  Jaspreet Quinteros  10/13/2018  3:03 AM

## 2018-10-13 NOTE — PROGRESS NOTES
Bedside report received. Patient up to floor with belongings. Monitor room called, patient appears to be in sinus rhythm. EKG ordered to confirm. Patient encouraged to call before getting up, call light within reach. Bed locked and in lowest position.

## 2018-10-13 NOTE — ASSESSMENT & PLAN NOTE
She has an elevated anion gap with hyperglycemia but no evidence of hypocapnia.  Expect improvement with fluids, continue fluids and monitor with Accu-Cheks.

## 2018-10-13 NOTE — ASSESSMENT & PLAN NOTE
Patient does not carry a history of atrial fibrillation.    -- Her chads score is 3 therefore I am starting her on anticoagulant. I discussed coumadin versus eliquis with the patient.  Patient would like to try eliquis.  -- Continue  Metoprolol for rate control, provide her with as needed IV beta blockade.    -- monitor  telemetry,   -- normal TSH and T4 level  -- F/u echocardiogram.

## 2018-10-13 NOTE — PROGRESS NOTES
Report received from DUANE Henson . Assumed care of patient. Updated patient on plan of care. Fall precautions in place and call light within reach.

## 2018-10-13 NOTE — ASSESSMENT & PLAN NOTE
Continue home Effient, aspirin, and Lipitor.  Treatment as noted above for A. fib with RVR.   -- Patient had some chest discomfort but is now feeling improved with improvement in her heart rates.   -- However, there is elevation troponin levels, discussed with cardiologist.  Plan for stress test tomorrow.

## 2018-10-13 NOTE — ASSESSMENT & PLAN NOTE
This is factitious secondary to hyperglycemia, expect improvement with fluids alone.    -- improved with better controlled for blood glucose.  --Continue and monitor.

## 2018-10-13 NOTE — ED TRIAGE NOTES
"Amrita Costa Elder  73 y.o. female  Chief Complaint   Patient presents with   • Shortness of Breath     Pt reporting SOB for the past 4 days, worse upon exertion. Hx: CHF and MI w/ stent placement.   • Numbness     LEFT arm numbness/tinging which started at approximately 19:30.   • Chest Pain     Chest pain described as a 7/10 aching since 19:30.       Pt wheeled to triage for above complaint. Pt reports walking around [employer] when symptoms and above CC's became worse. Pt states this feels similar to her previous MI when she got stents placed.   Pt is alert and oriented, speaking in full sentences, follows commands and responds appropriately to questions. NAD. Resp are even and unlabored.     [facility], educated on triage process, and to inform staff of any changes or concerns.    Hx: CHF, MI (stents)    Blood Pressure : (!) 182/76, Pulse: (!) 121, Respiration: 20, Temperature: 36.5 °C (97.7 °F), Height: 162.6 cm (5' 4\"), Weight: 73.3 kg (161 lb 9.6 oz), BMI (Calculated): 27.74, BSA (Calculated): 1.8, Pulse Oximetry: 98 %, O2 Delivery: None (Room Air)    "

## 2018-10-13 NOTE — ASSESSMENT & PLAN NOTE
Suspect that this is mild prerenal azotemia,   -- improving with fluids.  Continue fluids overnight. Monitor renal function.

## 2018-10-13 NOTE — PROGRESS NOTES
Unable to complete med rec at this time. Pt reports that her daughter will be bringing in a list some time later today. Med rec tech will update med rec when information is verified

## 2018-10-13 NOTE — PROGRESS NOTES
Dr. Anderson updated that patient takes 18 units of lantus nightly as well as humulin sliding scale ACHS at home for her diabetes. These are not in her MAR for her current stay. Dr. Camara also updated that patient has converted back to sinus rhythm. Dr. Camara to put in further orders.

## 2018-10-13 NOTE — ASSESSMENT & PLAN NOTE
Treatment as noted above, continue to monitor with Accu-Cheks and cover with insulin sliding scale.  -- discussed importance of blood glucose control.

## 2018-10-14 ENCOUNTER — APPOINTMENT (OUTPATIENT)
Dept: RADIOLOGY | Facility: MEDICAL CENTER | Age: 73
End: 2018-10-14
Attending: INTERNAL MEDICINE
Payer: MEDICARE

## 2018-10-14 VITALS
SYSTOLIC BLOOD PRESSURE: 166 MMHG | BODY MASS INDEX: 28.04 KG/M2 | WEIGHT: 164.24 LBS | RESPIRATION RATE: 18 BRPM | DIASTOLIC BLOOD PRESSURE: 74 MMHG | HEART RATE: 66 BPM | OXYGEN SATURATION: 98 % | TEMPERATURE: 97.5 F | HEIGHT: 64 IN

## 2018-10-14 PROBLEM — I48.0 PAROXYSMAL ATRIAL FIBRILLATION (HCC): Status: ACTIVE | Noted: 2018-10-13

## 2018-10-14 PROBLEM — N28.9 ACUTE ON CHRONIC RENAL INSUFFICIENCY: Status: RESOLVED | Noted: 2018-10-13 | Resolved: 2018-10-14

## 2018-10-14 PROBLEM — D72.829 LEUKOCYTOSIS: Status: RESOLVED | Noted: 2018-10-13 | Resolved: 2018-10-14

## 2018-10-14 PROBLEM — N18.9 ACUTE ON CHRONIC RENAL INSUFFICIENCY: Status: RESOLVED | Noted: 2018-10-13 | Resolved: 2018-10-14

## 2018-10-14 LAB
ALBUMIN SERPL BCP-MCNC: 3.1 G/DL (ref 3.2–4.9)
BASOPHILS # BLD AUTO: 0.5 % (ref 0–1.8)
BASOPHILS # BLD: 0.04 K/UL (ref 0–0.12)
BUN SERPL-MCNC: 27 MG/DL (ref 8–22)
CALCIUM SERPL-MCNC: 9.2 MG/DL (ref 8.5–10.5)
CHLORIDE SERPL-SCNC: 107 MMOL/L (ref 96–112)
CO2 SERPL-SCNC: 25 MMOL/L (ref 20–33)
CREAT SERPL-MCNC: 1.3 MG/DL (ref 0.5–1.4)
EOSINOPHIL # BLD AUTO: 0.17 K/UL (ref 0–0.51)
EOSINOPHIL NFR BLD: 2.1 % (ref 0–6.9)
ERYTHROCYTE [DISTWIDTH] IN BLOOD BY AUTOMATED COUNT: 39.8 FL (ref 35.9–50)
GLUCOSE BLD-MCNC: 125 MG/DL (ref 65–99)
GLUCOSE BLD-MCNC: 125 MG/DL (ref 65–99)
GLUCOSE BLD-MCNC: 165 MG/DL (ref 65–99)
GLUCOSE SERPL-MCNC: 135 MG/DL (ref 65–99)
HCT VFR BLD AUTO: 36.4 % (ref 37–47)
HGB BLD-MCNC: 12.9 G/DL (ref 12–16)
IMM GRANULOCYTES # BLD AUTO: 0.02 K/UL (ref 0–0.11)
IMM GRANULOCYTES NFR BLD AUTO: 0.2 % (ref 0–0.9)
LYMPHOCYTES # BLD AUTO: 2.58 K/UL (ref 1–4.8)
LYMPHOCYTES NFR BLD: 32 % (ref 22–41)
MCH RBC QN AUTO: 29.3 PG (ref 27–33)
MCHC RBC AUTO-ENTMCNC: 35.4 G/DL (ref 33.6–35)
MCV RBC AUTO: 82.7 FL (ref 81.4–97.8)
MONOCYTES # BLD AUTO: 0.72 K/UL (ref 0–0.85)
MONOCYTES NFR BLD AUTO: 8.9 % (ref 0–13.4)
NEUTROPHILS # BLD AUTO: 4.54 K/UL (ref 2–7.15)
NEUTROPHILS NFR BLD: 56.3 % (ref 44–72)
NRBC # BLD AUTO: 0 K/UL
NRBC BLD-RTO: 0 /100 WBC
PHOSPHATE SERPL-MCNC: 3.9 MG/DL (ref 2.5–4.5)
PLATELET # BLD AUTO: 191 K/UL (ref 164–446)
PMV BLD AUTO: 10.1 FL (ref 9–12.9)
POTASSIUM SERPL-SCNC: 4.4 MMOL/L (ref 3.6–5.5)
RBC # BLD AUTO: 4.4 M/UL (ref 4.2–5.4)
SODIUM SERPL-SCNC: 140 MMOL/L (ref 135–145)
WBC # BLD AUTO: 8.1 K/UL (ref 4.8–10.8)

## 2018-10-14 PROCEDURE — 700102 HCHG RX REV CODE 250 W/ 637 OVERRIDE(OP): Performed by: INTERNAL MEDICINE

## 2018-10-14 PROCEDURE — 90662 IIV NO PRSV INCREASED AG IM: CPT | Performed by: INTERNAL MEDICINE

## 2018-10-14 PROCEDURE — A9270 NON-COVERED ITEM OR SERVICE: HCPCS | Performed by: HOSPITALIST

## 2018-10-14 PROCEDURE — 36415 COLL VENOUS BLD VENIPUNCTURE: CPT

## 2018-10-14 PROCEDURE — 82962 GLUCOSE BLOOD TEST: CPT | Mod: 91

## 2018-10-14 PROCEDURE — 99214 OFFICE O/P EST MOD 30 MIN: CPT | Mod: 25 | Performed by: INTERNAL MEDICINE

## 2018-10-14 PROCEDURE — 700111 HCHG RX REV CODE 636 W/ 250 OVERRIDE (IP): Performed by: INTERNAL MEDICINE

## 2018-10-14 PROCEDURE — 96372 THER/PROPH/DIAG INJ SC/IM: CPT

## 2018-10-14 PROCEDURE — 700105 HCHG RX REV CODE 258: Performed by: INTERNAL MEDICINE

## 2018-10-14 PROCEDURE — A9270 NON-COVERED ITEM OR SERVICE: HCPCS | Performed by: INTERNAL MEDICINE

## 2018-10-14 PROCEDURE — 700102 HCHG RX REV CODE 250 W/ 637 OVERRIDE(OP): Performed by: HOSPITALIST

## 2018-10-14 PROCEDURE — 85025 COMPLETE CBC W/AUTO DIFF WBC: CPT

## 2018-10-14 PROCEDURE — 700111 HCHG RX REV CODE 636 W/ 250 OVERRIDE (IP)

## 2018-10-14 PROCEDURE — 80069 RENAL FUNCTION PANEL: CPT

## 2018-10-14 PROCEDURE — G0378 HOSPITAL OBSERVATION PER HR: HCPCS

## 2018-10-14 PROCEDURE — 90471 IMMUNIZATION ADMIN: CPT

## 2018-10-14 PROCEDURE — A9502 TC99M TETROFOSMIN: HCPCS

## 2018-10-14 RX ORDER — METOPROLOL SUCCINATE 25 MG/1
25 TABLET, EXTENDED RELEASE ORAL ONCE
Status: COMPLETED | OUTPATIENT
Start: 2018-10-14 | End: 2018-10-14

## 2018-10-14 RX ORDER — HYDROCODONE BITARTRATE AND ACETAMINOPHEN 10; 325 MG/1; MG/1
1 TABLET ORAL ONCE
Status: COMPLETED | OUTPATIENT
Start: 2018-10-14 | End: 2018-10-14

## 2018-10-14 RX ORDER — LATANOPROST 50 UG/ML
1 SOLUTION/ DROPS OPHTHALMIC NIGHTLY
COMMUNITY
End: 2021-04-22

## 2018-10-14 RX ORDER — OMEPRAZOLE 20 MG/1
20 CAPSULE, DELAYED RELEASE ORAL DAILY
Status: ON HOLD | COMMUNITY
End: 2018-12-08

## 2018-10-14 RX ORDER — REGADENOSON 0.08 MG/ML
INJECTION, SOLUTION INTRAVENOUS
Status: COMPLETED
Start: 2018-10-14 | End: 2018-10-14

## 2018-10-14 RX ORDER — IRBESARTAN AND HYDROCHLOROTHIAZIDE 300; 12.5 MG/1; MG/1
1 TABLET, FILM COATED ORAL
Status: ON HOLD | COMMUNITY
End: 2018-10-14

## 2018-10-14 RX ADMIN — CLINDAMYCIN HYDROCHLORIDE 300 MG: 150 CAPSULE ORAL at 17:36

## 2018-10-14 RX ADMIN — ATORVASTATIN CALCIUM 20 MG: 20 TABLET, FILM COATED ORAL at 17:36

## 2018-10-14 RX ADMIN — METOPROLOL SUCCINATE 25 MG: 25 TABLET, EXTENDED RELEASE ORAL at 13:01

## 2018-10-14 RX ADMIN — STANDARDIZED SENNA CONCENTRATE AND DOCUSATE SODIUM 2 TABLET: 8.6; 5 TABLET ORAL at 17:36

## 2018-10-14 RX ADMIN — APIXABAN 5 MG: 5 TABLET, FILM COATED ORAL at 05:42

## 2018-10-14 RX ADMIN — HYDROCODONE BITARTRATE AND ACETAMINOPHEN 1 TABLET: 10; 325 TABLET ORAL at 13:01

## 2018-10-14 RX ADMIN — Medication 81 MG: at 05:42

## 2018-10-14 RX ADMIN — APIXABAN 5 MG: 5 TABLET, FILM COATED ORAL at 17:36

## 2018-10-14 RX ADMIN — INSULIN HUMAN 3 UNITS: 100 INJECTION, SOLUTION PARENTERAL at 17:29

## 2018-10-14 RX ADMIN — METOPROLOL SUCCINATE 50 MG: 50 TABLET, EXTENDED RELEASE ORAL at 05:42

## 2018-10-14 RX ADMIN — INFLUENZA A VIRUS A/MICHIGAN/45/2015 X-275 (H1N1) ANTIGEN (FORMALDEHYDE INACTIVATED), INFLUENZA A VIRUS A/SINGAPORE/INFIMH-16-0019/2016 IVR-186 (H3N2) ANTIGEN (FORMALDEHYDE INACTIVATED), AND INFLUENZA B VIRUS B/MARYLAND/15/2016 BX-69A (A B/COLORADO/6/2017-LIKE VIRUS) ANTIGEN (FORMALDEHYDE INACTIVATED) 0.5 ML: 60; 60; 60 INJECTION, SUSPENSION INTRAMUSCULAR at 18:04

## 2018-10-14 RX ADMIN — CLINDAMYCIN HYDROCHLORIDE 300 MG: 150 CAPSULE ORAL at 12:38

## 2018-10-14 RX ADMIN — SODIUM CHLORIDE: 9 INJECTION, SOLUTION INTRAVENOUS at 05:46

## 2018-10-14 RX ADMIN — MAGNESIUM GLUCONATE 500 MG ORAL TABLET 400 MG: 500 TABLET ORAL at 05:42

## 2018-10-14 RX ADMIN — REGADENOSON 0.4 MG: 0.08 INJECTION, SOLUTION INTRAVENOUS at 09:37

## 2018-10-14 RX ADMIN — INSULIN GLARGINE 18 UNITS: 100 INJECTION, SOLUTION SUBCUTANEOUS at 17:32

## 2018-10-14 ASSESSMENT — ENCOUNTER SYMPTOMS
STRIDOR: 0
PALPITATIONS: 0
FEVER: 0
DIZZINESS: 0
ABDOMINAL PAIN: 0
SHORTNESS OF BREATH: 0
WHEEZING: 0
COUGH: 0
APNEA: 0
BLURRED VISION: 0
CHEST TIGHTNESS: 0
CHOKING: 0

## 2018-10-14 ASSESSMENT — PAIN SCALES - GENERAL
PAINLEVEL_OUTOF10: 0
PAINLEVEL_OUTOF10: 0
PAINLEVEL_OUTOF10: 8
PAINLEVEL_OUTOF10: 0

## 2018-10-14 NOTE — PROGRESS NOTES
[facility] Hospitalist Progress Note    Date of Service: 10/14/2018    Chief Complaint  73 y.o. female admitted 10/12/2018 with shortness of breath and found to have afib rvr.  Patient denies any history of atrial fibrillation.    Interval Problem Update  10/13: Patient reports feeling much better after conversion from afib to sinus rhythm.   Patient denies chest pain now.  Discussed anticoagulation strategy with the patient.    Consultants/Specialty  Cardiologist: Dr. Garcia    Disposition  Home        Review of Systems   Constitutional: Negative for fever.   Eyes: Negative for blurred vision.   Respiratory: Negative for shortness of breath.    Cardiovascular: Negative for chest pain and palpitations.   Gastrointestinal: Negative for abdominal pain.   Genitourinary: Negative for dysuria.   Neurological: Negative for dizziness.      Physical Exam  Laboratory/Imaging   Hemodynamics  Temp (24hrs), Av.7 °C (98 °F), Min:36.3 °C (97.3 °F), Max:37 °C (98.6 °F)   Temperature: 36.3 °C (97.3 °F)  Pulse  Av.9  Min: 62  Max: 142   Blood Pressure : (!) 171/87 (RN notified)      Respiratory      Respiration: 20, Pulse Oximetry: 98 %             Fluids  No intake or output data in the 24 hours ending 10/14/18 1444    Nutrition  Orders Placed This Encounter   Procedures   • Diet Order Diabetic     Standing Status:   Standing     Number of Occurrences:   1     Order Specific Question:   Diet:     Answer:   Diabetic [3]     Physical Exam   Constitutional: She is oriented to person, place, and time. She appears well-nourished. No distress.   HENT:   Head: Normocephalic.   Mouth/Throat: No oropharyngeal exudate.   Eyes: Pupils are equal, round, and reactive to light. Right eye exhibits no discharge. Left eye exhibits no discharge.   Neck: Neck supple. No JVD present.   Cardiovascular: Normal rate and regular rhythm.    Pulmonary/Chest: Effort normal and breath sounds normal. No respiratory distress. She has no wheezes.    Abdominal: Soft. Bowel sounds are normal. She exhibits no distension. There is no tenderness.   Musculoskeletal: Normal range of motion. She exhibits no edema.   Neurological: She is alert and oriented to person, place, and time. No cranial nerve deficit.   Skin: Skin is warm and dry. She is not diaphoretic. No erythema.   Psychiatric: She has a normal mood and affect.       Recent Labs      10/12/18   2255  10/13/18   0912  10/14/18   0316   WBC  15.5*  9.1  8.1   RBC  5.64*  4.84  4.40   HEMOGLOBIN  16.3*  14.1  12.9   HEMATOCRIT  45.4  39.7  36.4*   MCV  80.5*  82.0  82.7   MCH  28.9  29.1  29.3   MCHC  35.9*  35.5*  35.4*   RDW  38.0  39.7  39.8   PLATELETCT  306  233  191   MPV  10.4  10.2  10.1     Recent Labs      10/12/18   2255  10/13/18   0912  10/14/18   0316   SODIUM  130*  136  140   POTASSIUM  4.5  4.1  4.4   CHLORIDE  96  103  107   CO2  20  25  25   GLUCOSE  409*  118*  135*   BUN  30*  30*  27*   CREATININE  1.79*  1.44*  1.30   CALCIUM  9.8  9.3  9.2     Recent Labs      10/12/18   2255   APTT  26.8   INR  1.03     Recent Labs      10/12/18   2255   BNPBTYPENAT  60              Assessment/Plan     * New onset atrial fibrillation (HCC)   Assessment & Plan    Patient does not carry a history of atrial fibrillation.    -- Her chads score is 3 therefore I am starting her on anticoagulant. I discussed coumadin versus eliquis with the patient.  Patient would like to try eliquis.  -- Continue  Metoprolol for rate control, provide her with as needed IV beta blockade.    -- monitor  telemetry,   -- normal TSH and T4 level  -- F/u echocardiogram.         Acute on chronic renal insufficiency   Assessment & Plan    Suspect that this is mild prerenal azotemia,   -- improving with fluids.  Continue fluids overnight. Monitor renal function.        Hyperglycemia without ketosis   Assessment & Plan    She has an elevated anion gap with hyperglycemia but no evidence of hypocapnia.  Expect improvement with fluids,  continue fluids and monitor with Accu-Cheks.        Hyponatremia   Assessment & Plan    This is factitious secondary to hyperglycemia, expect improvement with fluids alone.    -- improved with better controlled for blood glucose.  --Continue and monitor.        DM2 (diabetes mellitus, type 2) ([facility])- (present on admission)   Assessment & Plan    Treatment as noted above, continue to monitor with Accu-Cheks and cover with insulin sliding scale.  -- discussed importance of blood glucose control.        HTN (hypertension)   Assessment & Plan    Okay to continue home Lotensin for now, if blood pressure drops and rates remain elevated, we will hold this to allow more room for rate control.        Coronary artery disease involving native coronary artery of native heart without angina pectoris- (present on admission)   Assessment & Plan    Continue home Effient, aspirin, and Lipitor.  Treatment as noted above for A. fib with RVR.   -- Patient had some chest discomfort but is now feeling improved with improvement in her heart rates.   -- However, there is elevation troponin levels, discussed with cardiologist.  Plan for stress test tomorrow.          Quality-Core Measures   Reviewed items::  EKG reviewed, Labs reviewed and Medications reviewed  DVT: eliquis.

## 2018-10-14 NOTE — PROGRESS NOTES
Cardiology Follow Up Progress Note    Date of Service  10/14/2018    Attending Physician  Lane Peters D.O.    Chief Complaint   Dyspnea, found to be in new A. fib RVR, chest pain    HPI  Amrita Torrez is a 73 y.o. female admitted 10/12/2018 with symptomatic A. fib RVR      History of diabetes, coronary artery disease, PCI to LAD in 2015, chronic total occlusion of RCA at that time, hypertension, hyperlipidemia    Interim Events    10/14/18, maintaining normal sinus rhythm, MPI today  Review of Systems  Review of Systems   Respiratory: Negative for apnea, cough, choking, chest tightness, shortness of breath, wheezing and stridor.    Cardiovascular: Negative for chest pain and leg swelling.       Vital signs in last 24 hours  Temp:  [36.3 °C (97.3 °F)-37 °C (98.6 °F)] 36.6 °C (97.8 °F)  Pulse:  [63-83] 68  Resp:  [16-18] 16  BP: ()/(55-78) 123/75    Physical Exam  Physical Exam   Constitutional: She is oriented to person, place, and time. She appears well-developed.   HENT:   Head: Normocephalic.   Eyes: Conjunctivae are normal.   Neck: No JVD present. No thyromegaly present.   Cardiovascular: Normal rate and regular rhythm.    Pulses:       Carotid pulses are 2+ on the right side, and 2+ on the left side.       Radial pulses are 2+ on the right side, and 2+ on the left side.   Pulmonary/Chest: She has no wheezes.   Abdominal: Soft.   Neurological: She is alert and oriented to person, place, and time.   Skin: Skin is warm and dry.       Lab Review  Lab Results   Component Value Date/Time    WBC 8.1 10/14/2018 03:16 AM    RBC 4.40 10/14/2018 03:16 AM    HEMOGLOBIN 12.9 10/14/2018 03:16 AM    HEMATOCRIT 36.4 (L) 10/14/2018 03:16 AM    MCV 82.7 10/14/2018 03:16 AM    MCH 29.3 10/14/2018 03:16 AM    MCHC 35.4 (H) 10/14/2018 03:16 AM    MPV 10.1 10/14/2018 03:16 AM      Lab Results   Component Value Date/Time    SODIUM 140 10/14/2018 03:16 AM    POTASSIUM 4.4 10/14/2018 03:16 AM    CHLORIDE 107 10/14/2018 03:16  AM    CO2 25 10/14/2018 03:16 AM    GLUCOSE 135 (H) 10/14/2018 03:16 AM    BUN 27 (H) 10/14/2018 03:16 AM    CREATININE 1.30 10/14/2018 03:16 AM      Lab Results   Component Value Date/Time    ASTSGOT 14 10/12/2018 10:55 PM    ALTSGPT 15 10/12/2018 10:55 PM     Lab Results   Component Value Date/Time    CHOLSTRLTOT 204 (H) 04/02/2018 09:45 AM     (H) 04/02/2018 09:45 AM    HDL 38 (A) 04/02/2018 09:45 AM    TRIGLYCERIDE 176 (H) 04/02/2018 09:45 AM    TROPONINI 0.08 (H) 10/13/2018 03:23 PM       Recent Labs      10/12/18   2255   BNPBTYPENAT  60         Assessment/Plan    A. fib with RVR, new onset, paroxysmal   currently in normal sinus rhythm.  On beta-blockers and  oral anticoagulation with apixaban.  Effient was stopped ( HX of PCI to LAD in 2015 )  Plan to follow up on TTE and MPI

## 2018-10-14 NOTE — DISCHARGE PLANNING
Medical Social Work    Eliquis is $47 at [facility] pharmacy. LSW updated Unit LSW.

## 2018-10-14 NOTE — PROGRESS NOTES
Called pts daughter @ [phone], not able to leave a message.  Will have swing check if pts daughter is in her room.  Celeste 10/14/2018

## 2018-10-14 NOTE — PROGRESS NOTES
Report received from DUANE Henson. Assumed care of patient. Updated patient on plan of care. Fall precautions in place and call light within reach.

## 2018-10-14 NOTE — PROGRESS NOTES
Assumed care of pt, beside report received from DUANE Box. Updated on POC, call light within reach all fall precautions within place. Bed locked and lowered. Pt instructed to call for assistance before getting up. All questions answered, no other needs at this time.

## 2018-10-15 NOTE — DISCHARGE INSTRUCTIONS
Discharge Instructions    Discharged to home by car with relative. Discharged via wheelchair, hospital escort: Yes.  Special equipment needed: Not Applicable    Be sure to schedule a follow-up appointment with your primary care doctor or any specialists as instructed.     Discharge Plan:   Influenza Vaccine Indication: Indicated: 9 to 64 years of age    I understand that a diet low in cholesterol, fat, and sodium is recommended for good health. Unless I have been given specific instructions below for another diet, I accept this instruction as my diet prescription.   Other diet: diabetic    Special Instructions: None    · Is patient discharged on Warfarin / Coumadin?   No     Depression / Suicide Risk    As you are discharged from this [facility] facility, it is important to learn how to keep safe from harming yourself.    Recognize the warning signs:  · Abrupt changes in personality, positive or negative- including increase in energy   · Giving away possessions  · Change in eating patterns- significant weight changes-  positive or negative  · Change in sleeping patterns- unable to sleep or sleeping all the time   · Unwillingness or inability to communicate  · Depression  · Unusual sadness, discouragement and loneliness  · Talk of wanting to die  · Neglect of personal appearance   · Rebelliousness- reckless behavior  · Withdrawal from people/activities they love  · Confusion- inability to concentrate     If you or a loved one observes any of these behaviors or has concerns about self-harm, here's what you can do:  · Talk about it- your feelings and reasons for harming yourself  · Remove any means that you might use to hurt yourself (examples: pills, rope, extension cords, firearm)  · Get professional help from the community (Mental Health, Substance Abuse, psychological counseling)  · Do not be alone:Call your Safe Contact- someone whom you trust who will be there for you.  · Call your local CRISIS HOTLINE  832-6966 or 130-399-3744  · Call your local Children's Mobile Crisis Response Team [place] (096) 979-5594 or www.INRFOOD  · Call the toll free National Suicide Prevention Hotlines   · National Suicide Prevention Lifeline 822-677-YYBZ (7000)  · MemberConnection Line Network 800-SUICIDE (445-4382)    Apixaban oral tablets  What is this medicine?  APIXABAN (a PIX a ban) is an anticoagulant (blood thinner). It is used to lower the chance of stroke in people with a medical condition called atrial fibrillation. It is also used to treat or prevent blood clots in the lungs or in the veins.  This medicine may be used for other purposes; ask your health care provider or pharmacist if you have questions.  COMMON BRAND NAME(S): Eliquis  What should I tell my health care provider before I take this medicine?  They need to know if you have any of these conditions:  -bleeding disorders  -bleeding in the brain  -blood in your stools (black or tarry stools) or if you have blood in your vomit  -history of stomach bleeding  -kidney disease  -liver disease  -mechanical heart valve  -an unusual or allergic reaction to apixaban, other medicines, foods, dyes, or preservatives  -pregnant or trying to get pregnant  -breast-feeding  How should I use this medicine?  Take this medicine by mouth with a glass of water. Follow the directions on the prescription label. You can take it with or without food. If it upsets your stomach, take it with food. Take your medicine at regular intervals. Do not take it more often than directed. Do not stop taking except on your doctor's advice. Stopping this medicine may increase your risk of a blot clot. Be sure to refill your prescription before you run out of medicine.  Talk to your pediatrician regarding the use of this medicine in children. Special care may be needed.  Overdosage: If you think you have taken too much of this medicine contact a poison control center or emergency room at  once.  NOTE: This medicine is only for you. Do not share this medicine with others.  What if I miss a dose?  If you miss a dose, take it as soon as you can. If it is almost time for your next dose, take only that dose. Do not take double or extra doses.  What may interact with this medicine?  This medicine may interact with the following:  -aspirin and aspirin-like medicines  -certain medicines for fungal infections like ketoconazole and itraconazole  -certain medicines for seizures like carbamazepine and phenytoin  -certain medicines that treat or prevent blood clots like warfarin, enoxaparin, and dalteparin  -clarithromycin  -NSAIDs, medicines for pain and inflammation, like ibuprofen or naproxen  -rifampin  -ritonavir  -Marcia's wort  This list may not describe all possible interactions. Give your health care provider a list of all the medicines, herbs, non-prescription drugs, or dietary supplements you use. Also tell them if you smoke, drink alcohol, or use illegal drugs. Some items may interact with your medicine.  What should I watch for while using this medicine?  Visit your doctor or health care professional for regular checks on your progress.  Notify your doctor or health care professional and seek emergency treatment if you develop breathing problems; changes in vision; chest pain; severe, sudden headache; pain, swelling, warmth in the leg; trouble speaking; sudden numbness or weakness of the face, arm or leg. These can be signs that your condition has gotten worse.  If you are going to have surgery or other procedure, tell your doctor that you are taking this medicine.  What side effects may I notice from receiving this medicine?  Side effects that you should report to your doctor or health care professional as soon as possible:  -allergic reactions like skin rash, itching or hives, swelling of the face, lips, or tongue  -signs and symptoms of bleeding such as bloody or black, tarry stools; red or  "dark-brown urine; spitting up blood or brown material that looks like coffee grounds; red spots on the skin; unusual bruising or bleeding from the eye, gums, or nose  This list may not describe all possible side effects. Call your doctor for medical advice about side effects. You may report side effects to FDA at 4-794-WUI-4373.  Where should I keep my medicine?  Keep out of the reach of children.  Store at room temperature between 20 and 25 degrees C (68 and 77 degrees F). Throw away any unused medicine after the expiration date.  NOTE: This sheet is a summary. It may not cover all possible information. If you have questions about this medicine, talk to your doctor, pharmacist, or health care provider.  © 2018 Elsevier/Gold Standard (2017-07-10 11:54:23)      Influenza, Adult  Influenza (“the flu\") is an infection in the lungs, nose, and throat (respiratory tract). It is caused by a virus. The flu causes many common cold symptoms, as well as a high fever and body aches. It can make you feel very sick.  The flu spreads easily from person to person (is contagious). Getting a flu shot (influenza vaccination) every year is the best way to prevent the flu.  Follow these instructions at home:  · Take over-the-counter and prescription medicines only as told by your doctor.  · Use a cool mist humidifier to add moisture (humidity) to the air in your home. This can make it easier to breathe.  · Rest as needed.  · Drink enough fluid to keep your pee (urine) clear or pale yellow.  · Cover your mouth and nose when you cough or sneeze.  · Wash your hands with soap and water often, especially after you cough or sneeze. If you cannot use soap and water, use hand .  · Stay home from work or school as told by your doctor. Unless you are visiting your doctor, try to avoid leaving home until your fever has been gone for 24 hours without the use of medicine.  · Keep all follow-up visits as told by your doctor. This is " important.  How is this prevented?  · Getting a yearly (annual) flu shot is the best way to avoid getting the flu. You may get the flu shot in late summer, fall, or winter. Ask your doctor when you should get your flu shot.  · Wash your hands often or use hand  often.  · Avoid contact with people who are sick during cold and flu season.  · Eat healthy foods.  · Drink plenty of fluids.  · Get enough sleep.  · Exercise regularly.  Contact a doctor if:  · You get new symptoms.  · You have:  ¨ Chest pain.  ¨ Watery poop (diarrhea).  ¨ A fever.  · Your cough gets worse.  · You start to have more mucus.  · You feel sick to your stomach (nauseous).  · You throw up (vomit).  Get help right away if:  · You start to be short of breath or have trouble breathing.  · Your skin or nails turn a bluish color.  · You have very bad pain or stiffness in your neck.  · You get a sudden headache.  · You get sudden pain in your face or ear.  · You cannot stop throwing up.  This information is not intended to replace advice given to you by your health care provider. Make sure you discuss any questions you have with your health care provider.  Document Released: 09/26/2009 Document Revised: 05/25/2017 Document Reviewed: 10/11/2016  [employer] Interactive Patient Education © 2017 [employer]

## 2018-10-15 NOTE — PROGRESS NOTES
Med rec complete per pt's weekly pill box- identified by pharmacist. Pill box returned to pt and placed in purse-Pt stated she will be DC'd today.

## 2018-10-16 ENCOUNTER — PATIENT OUTREACH (OUTPATIENT)
Dept: HEALTH INFORMATION MANAGEMENT | Facility: OTHER | Age: 73
End: 2018-10-16

## 2018-10-16 DIAGNOSIS — I48.0 PAROXYSMAL ATRIAL FIBRILLATION (HCC): ICD-10-CM

## 2018-10-16 NOTE — PROGRESS NOTES
10/16/2018 1028 - Discharge Outreach - received call from patient. States she has trouble hearing and didn't really understand what they found. Discussed diag of Afib. Concerned she might have had heart attack. Per notes, it was Afib and not a heart attack. States she is taking her Rx as instructed and has f/u appts with PCP and Cardiology. No further questions.

## 2018-10-24 ENCOUNTER — PATIENT OUTREACH (OUTPATIENT)
Dept: HEALTH INFORMATION MANAGEMENT | Facility: OTHER | Age: 73
End: 2018-10-24

## 2018-10-24 NOTE — PROGRESS NOTES
[facility]erred by Evangelina Ulloa, RN d/c caller. Needs CC services for afib, DM, CAD. Recent hospitalization for SOB, numbness, chest pain. Was d/c on 10/14.    Chart review complete. Left voice message with contact information to return call. Will attempt outreach again next week if patient has not returned call before then.

## 2018-11-01 ENCOUNTER — PATIENT OUTREACH (OUTPATIENT)
Dept: HEALTH INFORMATION MANAGEMENT | Facility: OTHER | Age: 73
End: 2018-11-01

## 2018-11-08 ENCOUNTER — PATIENT OUTREACH (OUTPATIENT)
Dept: HEALTH INFORMATION MANAGEMENT | Facility: OTHER | Age: 73
End: 2018-11-08

## 2018-11-08 DIAGNOSIS — Z79.899 MEDICATION MANAGEMENT: Primary | ICD-10-CM

## 2018-11-08 ASSESSMENT — ENCOUNTER SYMPTOMS
DIABETIC ASSOCIATED SYMPTOMS COMMENT: RIGHT FOOT
OCCASIONAL FEELINGS OF UNSTEADINESS: 0
LOSS OF SENSATION IN FEET: 1
DEPRESSION: 0

## 2018-11-08 ASSESSMENT — PATIENT HEALTH QUESTIONNAIRE - PHQ9: CLINICAL INTERPRETATION OF PHQ2 SCORE: 0

## 2018-11-08 NOTE — PROGRESS NOTES
"Incoming call from patient returning outreach call.     Patient states she is feeling better since recent hospitalization. She has decreased incidence of shortness of breath. Patient states she has been compliant with her medications--some have changed after her recent hospitalization. Referral placed for medication review by [facility] Pharmacist.     Patient states she has had one fall this year when she was arranging a throw rug and tripped over it. She fell and struck her face on the ground with vision field changes for a few days. Patient states she has already had a procedure for a \"bleeder\" in her eye and will require a follow up before being eligible for cataract surgery.     Patient states her blood sugars have been high \"because I've been bad\" in regards to her diet. Patient states she has follow up labs soon, [facility] RN recommended re-checking HbA1C if not already in the plan as patient's A1C was elevated in April 2018:  Lab Results   Component Value Date/Time    HBA1C 11.2 (H) 04/02/2018 09:45 AM     Patient states she was able to go off of her sliding scale insulin but takes 18 units lantus at bedtime. She checks her blood sugar before every meal. Patient's main stated barrier for managing her diabetes is eating a healthy diet. She states she loves vegetables, but doesn't want to be restricted from certain items. She is trying to swap some of her grains to whole wheat/whole grain versions. Educated patient about having a good balance and being aware of portion control if she wants to indulge every once in a while. Will send patient \"Knowledge is Power: Conquering Type 2 Diabetes\" packet. Already initated discussion about diabetic diet and A1C. Educated patient about [facility] Health Improvement Programs: patient would benefit from nutrition counseling which requires a PCP referral.     Patient states she has regular follow ups with her PCP, podiatrist, and optometrist.      Patient Stated Goals:  Lose weight  Eat " healthier    Plan:  -Provide DM Education, assess for evidence of learning at regular intervals  -Referral to CCM Pharmacist  -Recommend PCP Referral to nutrition counseling  -Follow up in 2 weeks

## 2018-11-08 NOTE — LETTER
November 8, 2018        Amrita Torrez  [address]  [place] [address]        Dear Amrita:    Welcome to [facility] Community Care Management! Your team of Registered Nurses, Social Workers, and Pharmacists are partnered with your [facility] Providers to assist you with accessing resources and education to support your individual needs. As you work with your Community Care Management Team, you will be empowered to be successful with learning how to self-manage your health with the Patient-Centered goals of helping you to feel better and staying out of the hospital. This program is at no cost to you as this is a part of [facility]’s ongoing commitment to serve the people of our community.    Benefits of working with your Community Care Management Team includes:    - Comprehensive assessment by a Registered Nurse on the telephone to identify your medical and health needs.   - Telephonic review of your medications by a Care Management Pharmacist to answer any questions you may have about your medications.  - Evaluation of social needs, such as, transportation; financial; food; housing; etc. by a Care Management  to connect you with eligible resources.  - For those eligible, we will connect you with the [facility] Health Program, offering access to services to assist you to manage your Congestive Heart Failure or Chronic Obstructive Pulmonary Disease in your own home.    Please contact me at [phone] to schedule an introductory call with your Registered Nurse. We are available 5 days a week, Monday through Friday from 8:00 a.m. - 5:00 p.m.      If you have any questions or concerns, please don't hesitate to call.    Sincerely,      Ella Helton R.N. Care Coordinator  [phone]    Electronically Signed

## 2018-11-09 ENCOUNTER — TELEPHONE (OUTPATIENT)
Dept: HEALTH INFORMATION MANAGEMENT | Facility: OTHER | Age: 73
End: 2018-11-09

## 2018-11-09 ENCOUNTER — HOSPITAL ENCOUNTER (OUTPATIENT)
Dept: LAB | Facility: MEDICAL CENTER | Age: 73
End: 2018-11-09
Attending: FAMILY MEDICINE
Payer: MEDICARE

## 2018-11-09 ENCOUNTER — PATIENT OUTREACH (OUTPATIENT)
Dept: HEALTH INFORMATION MANAGEMENT | Facility: OTHER | Age: 73
End: 2018-11-09

## 2018-11-09 PROCEDURE — 85025 COMPLETE CBC W/AUTO DIFF WBC: CPT

## 2018-11-09 PROCEDURE — 83036 HEMOGLOBIN GLYCOSYLATED A1C: CPT

## 2018-11-09 PROCEDURE — 80061 LIPID PANEL: CPT

## 2018-11-09 PROCEDURE — 80053 COMPREHEN METABOLIC PANEL: CPT

## 2018-11-10 LAB
ALBUMIN SERPL BCP-MCNC: 3.8 G/DL (ref 3.2–4.9)
ALBUMIN/GLOB SERPL: 1.1 G/DL
ALP SERPL-CCNC: 90 U/L (ref 30–99)
ALT SERPL-CCNC: 12 U/L (ref 2–50)
ANION GAP SERPL CALC-SCNC: 11 MMOL/L (ref 0–11.9)
AST SERPL-CCNC: 14 U/L (ref 12–45)
BASOPHILS # BLD AUTO: 0.6 % (ref 0–1.8)
BASOPHILS # BLD: 0.05 K/UL (ref 0–0.12)
BILIRUB SERPL-MCNC: 1.1 MG/DL (ref 0.1–1.5)
BUN SERPL-MCNC: 24 MG/DL (ref 8–22)
CALCIUM SERPL-MCNC: 10.1 MG/DL (ref 8.5–10.5)
CHLORIDE SERPL-SCNC: 102 MMOL/L (ref 96–112)
CHOLEST SERPL-MCNC: 177 MG/DL (ref 100–199)
CO2 SERPL-SCNC: 28 MMOL/L (ref 20–33)
CREAT SERPL-MCNC: 1.11 MG/DL (ref 0.5–1.4)
EOSINOPHIL # BLD AUTO: 0.23 K/UL (ref 0–0.51)
EOSINOPHIL NFR BLD: 2.8 % (ref 0–6.9)
ERYTHROCYTE [DISTWIDTH] IN BLOOD BY AUTOMATED COUNT: 43.1 FL (ref 35.9–50)
EST. AVERAGE GLUCOSE BLD GHB EST-MCNC: 275 MG/DL
GLOBULIN SER CALC-MCNC: 3.4 G/DL (ref 1.9–3.5)
GLUCOSE SERPL-MCNC: 235 MG/DL (ref 65–99)
HBA1C MFR BLD: 11.2 % (ref 0–5.6)
HCT VFR BLD AUTO: 43.3 % (ref 37–47)
HDLC SERPL-MCNC: 37 MG/DL
HGB BLD-MCNC: 14.7 G/DL (ref 12–16)
IMM GRANULOCYTES # BLD AUTO: 0.02 K/UL (ref 0–0.11)
IMM GRANULOCYTES NFR BLD AUTO: 0.2 % (ref 0–0.9)
LDLC SERPL CALC-MCNC: 112 MG/DL
LYMPHOCYTES # BLD AUTO: 2.09 K/UL (ref 1–4.8)
LYMPHOCYTES NFR BLD: 25.4 % (ref 22–41)
MCH RBC QN AUTO: 29.5 PG (ref 27–33)
MCHC RBC AUTO-ENTMCNC: 33.9 G/DL (ref 33.6–35)
MCV RBC AUTO: 86.9 FL (ref 81.4–97.8)
MONOCYTES # BLD AUTO: 0.73 K/UL (ref 0–0.85)
MONOCYTES NFR BLD AUTO: 8.9 % (ref 0–13.4)
NEUTROPHILS # BLD AUTO: 5.12 K/UL (ref 2–7.15)
NEUTROPHILS NFR BLD: 62.1 % (ref 44–72)
NRBC # BLD AUTO: 0 K/UL
NRBC BLD-RTO: 0 /100 WBC
PLATELET # BLD AUTO: 256 K/UL (ref 164–446)
PMV BLD AUTO: 11.2 FL (ref 9–12.9)
POTASSIUM SERPL-SCNC: 5.5 MMOL/L (ref 3.6–5.5)
PROT SERPL-MCNC: 7.2 G/DL (ref 6–8.2)
RBC # BLD AUTO: 4.98 M/UL (ref 4.2–5.4)
SODIUM SERPL-SCNC: 141 MMOL/L (ref 135–145)
TRIGL SERPL-MCNC: 141 MG/DL (ref 0–149)
WBC # BLD AUTO: 8.2 K/UL (ref 4.8–10.8)

## 2018-11-10 NOTE — TELEPHONE ENCOUNTER
I saw her once and wrote in my consult she was to be on beta blockers. I do not see a DC summary either so I am not sure what the hospitalist sent her home on.   Sounds like she should be on a beta blocker but we need to get her in to see one of our NP's.  Claire please get her in with general or apn

## 2018-11-10 NOTE — PROGRESS NOTES
Referral from  DUANE Jaramillo for medication review. Patient was discharged on 10/18/18 with a fib. She was taken off of her antihypertensives and her BP has been persistently elevated (patient reported 178/82 this AM, ). Metoprolol was not addressed on discharge summary, although it was discontinued in patient's chart. Outbound call to [facility] pharmacy. Technician states there is an order from metoprolol on file from 5/2018, however patient has never filled or picked up this medication. Discharge summaries instruct patient to see Dr. Garcia for f/u within 2 weeks, however she states she called to schedule and was told she would get a call with an appointment time. Message sent to Dr. Garcia for clarification on metoprolol and f/u appointment.     Patient has been keeping a log with BP and HR for Dr. Turpin. She has appt scheduled on Tuesday for follow up. She reports adherence to current medication regimen and denies any side effects at this time. No symptoms from elevated HR/BP. Patient given ER precautions. She verbalizes understanding.

## 2018-11-10 NOTE — TELEPHONE ENCOUNTER
Dear Dr. Garcia,    Patient was discharged on 10/18/18 with a fib. She was taken off of her antihypertensives and her BP has been elevated (178/82 this AM, ). Metoprolol was not addressed on discharge summary. Was she supposed to start taking metoprolol? She states she called scheduling and was not able to get an appointment with you within 2 weeks of discharge as discharge summary instructs her to.  Would you be able to get her in for a f/u visit?    Thanks for your consideration,  Cisco Baer, PharmD [phone]

## 2018-11-21 ENCOUNTER — OFFICE VISIT (OUTPATIENT)
Dept: CARDIOLOGY | Facility: MEDICAL CENTER | Age: 73
End: 2018-11-21
Payer: MEDICARE

## 2018-11-21 VITALS
OXYGEN SATURATION: 94 % | SYSTOLIC BLOOD PRESSURE: 136 MMHG | BODY MASS INDEX: 28.16 KG/M2 | HEART RATE: 76 BPM | RESPIRATION RATE: 16 BRPM | HEIGHT: 65 IN | DIASTOLIC BLOOD PRESSURE: 82 MMHG | WEIGHT: 169 LBS

## 2018-11-21 DIAGNOSIS — I35.0 AORTIC VALVE STENOSIS, ETIOLOGY OF CARDIAC VALVE DISEASE UNSPECIFIED: ICD-10-CM

## 2018-11-21 DIAGNOSIS — Z79.4 TYPE 2 DIABETES MELLITUS WITHOUT COMPLICATION, WITH LONG-TERM CURRENT USE OF INSULIN (HCC): ICD-10-CM

## 2018-11-21 DIAGNOSIS — I10 ESSENTIAL HYPERTENSION: ICD-10-CM

## 2018-11-21 DIAGNOSIS — Z95.820 S/P ANGIOPLASTY WITH STENT: ICD-10-CM

## 2018-11-21 DIAGNOSIS — E11.9 TYPE 2 DIABETES MELLITUS WITHOUT COMPLICATION, WITH LONG-TERM CURRENT USE OF INSULIN (HCC): ICD-10-CM

## 2018-11-21 DIAGNOSIS — I48.0 PAROXYSMAL ATRIAL FIBRILLATION (HCC): ICD-10-CM

## 2018-11-21 DIAGNOSIS — Z79.01 ON CONTINUOUS ORAL ANTICOAGULATION: ICD-10-CM

## 2018-11-21 PROCEDURE — 99215 OFFICE O/P EST HI 40 MIN: CPT | Performed by: INTERNAL MEDICINE

## 2018-11-21 RX ORDER — CLINDAMYCIN HYDROCHLORIDE 300 MG/1
300 CAPSULE ORAL
COMMUNITY
Start: 2018-11-20 | End: 2018-12-06

## 2018-11-21 RX ORDER — ERGOCALCIFEROL 1.25 MG/1
50000 CAPSULE ORAL
Status: ON HOLD | COMMUNITY
Start: 2018-11-05 | End: 2021-11-24

## 2018-11-21 RX ORDER — METOPROLOL TARTRATE 50 MG/1
50 TABLET, FILM COATED ORAL 2 TIMES DAILY
Qty: 60 TAB | Refills: 11 | Status: SHIPPED | OUTPATIENT
Start: 2018-11-21 | End: 2019-08-15

## 2018-11-21 RX ORDER — ALPRAZOLAM 0.25 MG/1
0.25 TABLET ORAL EVERY 8 HOURS PRN
COMMUNITY
Start: 2018-11-20 | End: 2019-07-18

## 2018-11-21 NOTE — PROGRESS NOTES
Chief Complaint   Patient presents with   • Palpitations       Subjective:   Amrita Torrez is a 73 y.o. female who presents today for hospital follow-up after being seen by Dr. Garcia for atrial fibrillation.  She has a history of mild aortic stenosis and coronary disease PCI to the LAD for STEMI in 2016 (Amadou).  She was lost to follow-up and no showed several appointments.  She presented to the emergency department with palpitations and was found to be in atrial fibrillation with rapid ventricular response spontaneous he converted to sinus rhythm.  She was initiated on metoprolol by cardiology for anticoagulation with Eliquis.  She was however discharged from the hospital by the hospital medicine service without any of her antihypertensive therapy or the metoprolol directed by cardiology.  She is currently taking Eliquis without difficulty.  She continues to have intermittent palpitations that are not bothersome.  She had a stress test in the hospital that was normal.    Past Medical History:   Diagnosis Date   • CAD (coronary artery disease)    • Diabetes     oral meds   • Goiter    • Heart attack (HCC)    • Hyperlipidemia    • Hypertension    • Pericardial effusion    • Thyroid nodule      Past Surgical History:   Procedure Laterality Date   • LAPAROSCOPY  12/1/2014    Performed by Mian Navarro M.D. at [facility]   • BAN BY LAPAROSCOPY  9/14/2012    Performed by MIAN NAVARRO at [facility]   • OTHER ORTHOPEDIC SURGERY  7/11    knee   • GYN SURGERY  1978    fibroids   • OTHER ABDOMINAL SURGERY  1966    appendectomy   • CARDIAC CATH     • GYN SURGERY      hysterectomy     History reviewed. No pertinent family history.  Social History     Social History   • Marital status:      Spouse name: N/A   • Number of children: N/A   • Years of education: N/A     Occupational History   • Not on file.     Social History Main Topics   • Smoking status: Former  "Smoker     Packs/day: 1.00     Years: 40.00     Types: Cigarettes     Quit date: 1/1/1978   • Smokeless tobacco: Never Used   • Alcohol use No   • Drug use: No   • Sexual activity: Not on file     Other Topics Concern   • Not on file     Social History Narrative   • No narrative on file     Allergies   Allergen Reactions   • Pcn [Penicillins] Hives and Swelling   • Sulfa Drugs Hives and Swelling     Outpatient Encounter Prescriptions as of 11/21/2018   Medication Sig Dispense Refill   • ALPRAZolam (XANAX) 0.25 MG Tab Take 0.25 mg by mouth as needed.     • clindamycin (CLEOCIN) 300 MG Cap Take 300 mg by mouth. As directed.     • vitamin D, Ergocalciferol, (DRISDOL) 29872 units Cap capsule Take 50,000 Units by mouth every 7 days.     • metoprolol (LOPRESSOR) 50 MG Tab Take 1 Tab by mouth 2 times a day. 60 Tab 11   • apixaban (ELIQUIS) 5mg Tab Take 1 Tab by mouth 2 Times a Day. 60 Tab 1   • omeprazole (PRILOSEC) 20 MG delayed-release capsule Take 20 mg by mouth every day.     • sertraline (ZOLOFT) 50 MG Tab Take 50 mg by mouth every bedtime.     • latanoprost (XALATAN) 0.005 % Solution Place 1 Drop in both eyes every evening.     • insulin glargine (LANTUS) 100 UNIT/ML Solution Inject 18 Units as instructed every evening.     • glimepiride (AMARYL) 4 MG Tab Take 4 mg by mouth 2 times a day.     • trazodone (DESYREL) 50 MG Tab Take 50 mg by mouth every bedtime.       No facility-administered encounter medications on file as of 11/21/2018.      Review of Systems   All other systems reviewed and are negative.       Objective:   /82 (BP Location: Left arm, Patient Position: Sitting, BP Cuff Size: Adult)   Pulse 76   Resp 16   Ht 1.651 m (5' 5\")   Wt 76.7 kg (169 lb)   SpO2 94%   BMI 28.12 kg/m²     Physical Exam   Constitutional: She is oriented to person, place, and time. She appears well-developed and well-nourished. No distress.   HENT:   Head: Normocephalic and atraumatic.   Right Ear: External ear normal. "   Left Ear: External ear normal.   Mouth/Throat: No oropharyngeal exudate.   Eyes: Pupils are equal, round, and reactive to light. Conjunctivae and EOM are normal. Right eye exhibits no discharge. Left eye exhibits no discharge. No scleral icterus.   Neck: Normal range of motion. Neck supple. No JVD present. No tracheal deviation present. No thyromegaly present.   Cardiovascular: Normal rate, regular rhythm, S1 normal, S2 normal and intact distal pulses.  PMI is not displaced.  Exam reveals no gallop, no S3, no S4 and no friction rub.    Murmur ( 2 out of 6 systolic ejection murmur right upper sternal border) heard.  Pulses:       Carotid pulses are 2+ on the right side, and 2+ on the left side.       Radial pulses are 2+ on the right side, and 2+ on the left side.        Popliteal pulses are 2+ on the right side, and 2+ on the left side.        Dorsalis pedis pulses are 2+ on the right side, and 2+ on the left side.        Posterior tibial pulses are 2+ on the right side, and 2+ on the left side.   Pulmonary/Chest: Effort normal and breath sounds normal. No respiratory distress. She has no wheezes. She has no rales. She exhibits no tenderness.   Abdominal: Soft. Bowel sounds are normal. She exhibits no distension. There is no tenderness.   Musculoskeletal: Normal range of motion. She exhibits no edema or tenderness.   Neurological: She is alert and oriented to person, place, and time. No cranial nerve deficit (Cranial nerves II through XII grossly intact).   Skin: Skin is warm and dry. No rash noted. She is not diaphoretic. No erythema.   Psychiatric: She has a normal mood and affect. Her behavior is normal. Judgment and thought content normal.   Vitals reviewed.    LABS:  Lab Results   Component Value Date/Time    CHOLSTRLTOT 177 11/09/2018 09:30 AM     (H) 11/09/2018 09:30 AM    HDL 37 (A) 11/09/2018 09:30 AM    TRIGLYCERIDE 141 11/09/2018 09:30 AM       Lab Results   Component Value Date/Time    WBC 8.2  11/09/2018 09:30 AM    RBC 4.98 11/09/2018 09:30 AM    HEMOGLOBIN 14.7 11/09/2018 09:30 AM    HEMATOCRIT 43.3 11/09/2018 09:30 AM    MCV 86.9 11/09/2018 09:30 AM    NEUTSPOLYS 62.10 11/09/2018 09:30 AM    LYMPHOCYTES 25.40 11/09/2018 09:30 AM    MONOCYTES 8.90 11/09/2018 09:30 AM    EOSINOPHILS 2.80 11/09/2018 09:30 AM    BASOPHILS 0.60 11/09/2018 09:30 AM    HYPOCHROMIA 1+ 09/12/2012 04:16 PM    ANISOCYTOSIS 1+ 10/13/2018 09:12 AM     Lab Results   Component Value Date/Time    SODIUM 141 11/09/2018 09:30 AM    POTASSIUM 5.5 11/09/2018 09:30 AM    CHLORIDE 102 11/09/2018 09:30 AM    CO2 28 11/09/2018 09:30 AM    GLUCOSE 235 (H) 11/09/2018 09:30 AM    BUN 24 (H) 11/09/2018 09:30 AM    CREATININE 1.11 11/09/2018 09:30 AM     Lab Results   Component Value Date    HBA1C 11.2 (H) 11/09/2018      Lab Results   Component Value Date/Time    ALKPHOSPHAT 90 11/09/2018 09:30 AM    ASTSGOT 14 11/09/2018 09:30 AM    ALTSGPT 12 11/09/2018 09:30 AM    TBILIRUBIN 1.1 11/09/2018 09:30 AM      Lab Results   Component Value Date/Time    BNPBTYPENAT 60 10/12/2018 10:55 PM      No results found for: TSH  Lab Results   Component Value Date/Time    PROTHROMBTM 13.7 10/12/2018 10:55 PM    INR 1.03 10/12/2018 10:55 PM        STRESS (10/14/2018):  Normal left ventricular size, ejection fraction, and wall motion.   No evidence of significant jeopardized viable myocardium or prior myocardial    infarction.    ECHO CONCLUSIONS (11/17/2016):  Compared to the images of the study done 2/15/16 - there has been.     Mild concentric left ventricular hypertrophy.  Normal left ventricular systolic function.  Left ventricular ejection fraction is visually estimated to be 65%.  Grade I diastolic dysfunction.  Mild aortic stenosis with peak and mean grdients of 15 and 9   respectively.  Right ventricular systolic pressure is estimated to be 20 mmHg.  Normal aortic root for body surface area.      Assessment:     1. Paroxysmal atrial fibrillation (HCC)   metoprolol (LOPRESSOR) 50 MG Tab   2. CAD s/p DOMINGA mLAD for STEMI 12/22/2015     3. On continuous oral anticoagulation     4. Essential hypertension     5. Type 2 diabetes mellitus without complication, with long-term current use of insulin ([facility])     6. Aortic valve stenosis, etiology of cardiac valve disease unspecified  EC-ECHOCARDIOGRAM COMPLETE W/O CONT       Medical Decision Making:  Today's Assessment / Status / Plan:     She had paroxysmal atrial fibrillation now on anticoagulation tolerating well but she was not discharged on appropriate medications by hospital medicine service.  Recommend initiation of Lopressor 50 mg twice daily as she tolerated 25 twice daily well in the hospital.  Continue Eliquis 5 mg twice daily and monitor renal function every 6 months with her PCP.  She may need further adjustments in her antihypertensive therapy and for the time being oral anticoagulation without antiplatelet therapy given her age and bleeding risk is reasonable stenting was years ago.    1.  Continue anti-coagulation  2.  Lopressor 50 mg twice daily, titration instructions given  3.  Follow-up with PCP for further blood pressure adjustments as needed  4.  Follow-up renal function testing in 6 months with PCP for dosing of Eliquis  5.  Follow-up with cardiology in 1 year  6.  Echocardiogram to follow aortic stenosis

## 2018-11-26 ENCOUNTER — PATIENT OUTREACH (OUTPATIENT)
Dept: HEALTH INFORMATION MANAGEMENT | Facility: OTHER | Age: 73
End: 2018-11-26

## 2018-11-26 NOTE — PROGRESS NOTES
"Routine outgoing call to patient for follow up:    Patient answered and was quite out of breath. She states she has to stop every 5 minutes during activity to rest and catch her breath. Patient states \"It just scares me cause this is how I was when I went in for a-fib.\" patient has been to follow ups with Dr. Burdick and Dr. Turpin. They have gotten her back on BP medications. This am her BP was 168/72. Encouraged patient to continue monitoring BP daily. Patient states she manually checks her HR when it feels high, she knows to rest to return to baseline. Per patient, Dr. Turpin prescribed alprazolam for anxiety-patient picked up prescription today.  Patient will have echo on 12/10/18 and f/u with Dr. Turpin in 3 months.     Patient c/o swelling in her foot where she couldn't walk easily on it or put on shoes. She saw her podiatrist who placed her in a walking boot and plans to re-do imaging after swelling decreases.     Patient states she had a stressful holiday due to lots of family in town. She states she had her great-grandchildren with her overnight on one day despite informing her grandchildren she didn't feel up to taking care of them. When they were picked up, patient was able to set limits with her grandson that she wasn't well enough to watch the children anymore.  Encouraged patient that setting limits, especially for her health, was a healthy and positive step. Patient states she feels better today than she did last week.     Patient states her blood sugars have been labile-highest in the 300s, then she was shaking yesterday and blood sugar was 68. Patient received DM education packet sent by this RN and really likes the format--she states she has been working to follow the diet recommendations and has even encouraged her daughter to follow them as well. Patient states she loves chicken, fish, turkey and that she has been trying to eat lots of salads and lots of vegetables. For breakfasts she " "usually eats a clementines, half an apple, or half banana with either whole wheat waffle/toast 2 slices or 1 slice raisin-cinnamon bread. Patient has switched from eating candied sweet potatoes to baked sweet potatoes. For lunch she usually eats a chicken sandwich with light cole or a salad with dressing on the side. Patient is excited that she has been drinking more water recently.     Will continue to encourage patient to take steps towards lowering A1C. Patient will follow up in 3 months with PCp and her goal is to have lost some weight and lowered her blood sugars. Patient states at this time she is unable to increase her physical activity 2/2 breathlessness. She states she can \"barely walk to the mailbox\" but is able to complete some light housework with breaks.   Lab Results   Component Value Date/Time    HBA1C 11.2 (H) 11/09/2018 09:30 AM      Plan:    -Reinforce DM education, encourage changes patient has already made  -[facility] RN will send patient Zac patient education sheets on \"Progressive Relaxation\" and \"Coping with Shortness of Breath: Controlling Stress.\"  -Follow up in 2-3 weeks or earlier as needed.               "

## 2018-11-26 NOTE — LETTER
November 27, 2018        Amrita Torrez  [address]  [place] [place] [address]        Dear Amrita:    Thank you for speaking with me this week.     Keep up the great work--I can tell you are working hard to make positive changes for your health!    Here is some additional information about relaxation techniques, deep breathing, and how to relax if you are feeling short of breath.     If you have any questions or concerns, please don't hesitate to call.        Sincerely,        Ella Helton R.N. Care Coordinator  [phone]    Electronically Signed

## 2018-11-28 ENCOUNTER — DOCUMENTATION (OUTPATIENT)
Dept: HEALTH INFORMATION MANAGEMENT | Facility: OTHER | Age: 73
End: 2018-11-28

## 2018-12-06 ENCOUNTER — APPOINTMENT (OUTPATIENT)
Dept: RADIOLOGY | Facility: MEDICAL CENTER | Age: 73
End: 2018-12-06
Payer: MEDICARE

## 2018-12-06 ENCOUNTER — HOSPITAL ENCOUNTER (OUTPATIENT)
Facility: MEDICAL CENTER | Age: 73
End: 2018-12-08
Attending: EMERGENCY MEDICINE | Admitting: HOSPITALIST
Payer: MEDICARE

## 2018-12-06 ENCOUNTER — APPOINTMENT (OUTPATIENT)
Dept: RADIOLOGY | Facility: MEDICAL CENTER | Age: 73
End: 2018-12-06
Attending: EMERGENCY MEDICINE
Payer: MEDICARE

## 2018-12-06 DIAGNOSIS — R07.9 CHEST PAIN, UNSPECIFIED TYPE: ICD-10-CM

## 2018-12-06 LAB
ALBUMIN SERPL BCP-MCNC: 3.6 G/DL (ref 3.2–4.9)
ALBUMIN/GLOB SERPL: 0.9 G/DL
ALP SERPL-CCNC: 77 U/L (ref 30–99)
ALT SERPL-CCNC: 10 U/L (ref 2–50)
ANION GAP SERPL CALC-SCNC: 10 MMOL/L (ref 0–11.9)
APTT PPP: 33.2 SEC (ref 24.7–36)
AST SERPL-CCNC: 12 U/L (ref 12–45)
BASOPHILS # BLD AUTO: 0.3 % (ref 0–1.8)
BASOPHILS # BLD: 0.03 K/UL (ref 0–0.12)
BILIRUB SERPL-MCNC: 1 MG/DL (ref 0.1–1.5)
BNP SERPL-MCNC: 166 PG/ML (ref 0–100)
BUN SERPL-MCNC: 18 MG/DL (ref 8–22)
CALCIUM SERPL-MCNC: 9.2 MG/DL (ref 8.5–10.5)
CHLORIDE SERPL-SCNC: 101 MMOL/L (ref 96–112)
CO2 SERPL-SCNC: 23 MMOL/L (ref 20–33)
CREAT SERPL-MCNC: 1.17 MG/DL (ref 0.5–1.4)
EKG IMPRESSION: NORMAL
EOSINOPHIL # BLD AUTO: 0.21 K/UL (ref 0–0.51)
EOSINOPHIL NFR BLD: 2.1 % (ref 0–6.9)
ERYTHROCYTE [DISTWIDTH] IN BLOOD BY AUTOMATED COUNT: 40.6 FL (ref 35.9–50)
GLOBULIN SER CALC-MCNC: 3.8 G/DL (ref 1.9–3.5)
GLUCOSE SERPL-MCNC: 306 MG/DL (ref 65–99)
HCT VFR BLD AUTO: 41.5 % (ref 37–47)
HGB BLD-MCNC: 14.6 G/DL (ref 12–16)
IMM GRANULOCYTES # BLD AUTO: 0.02 K/UL (ref 0–0.11)
IMM GRANULOCYTES NFR BLD AUTO: 0.2 % (ref 0–0.9)
INR PPP: 1.28 (ref 0.87–1.13)
LIPASE SERPL-CCNC: 30 U/L (ref 11–82)
LYMPHOCYTES # BLD AUTO: 2.02 K/UL (ref 1–4.8)
LYMPHOCYTES NFR BLD: 19.9 % (ref 22–41)
MCH RBC QN AUTO: 28.9 PG (ref 27–33)
MCHC RBC AUTO-ENTMCNC: 35.2 G/DL (ref 33.6–35)
MCV RBC AUTO: 82 FL (ref 81.4–97.8)
MONOCYTES # BLD AUTO: 0.85 K/UL (ref 0–0.85)
MONOCYTES NFR BLD AUTO: 8.4 % (ref 0–13.4)
NEUTROPHILS # BLD AUTO: 7.01 K/UL (ref 2–7.15)
NEUTROPHILS NFR BLD: 69.1 % (ref 44–72)
NRBC # BLD AUTO: 0 K/UL
NRBC BLD-RTO: 0 /100 WBC
PLATELET # BLD AUTO: 236 K/UL (ref 164–446)
PMV BLD AUTO: 9.8 FL (ref 9–12.9)
POTASSIUM SERPL-SCNC: 4.1 MMOL/L (ref 3.6–5.5)
PROT SERPL-MCNC: 7.4 G/DL (ref 6–8.2)
PROTHROMBIN TIME: 16.1 SEC (ref 12–14.6)
RBC # BLD AUTO: 5.06 M/UL (ref 4.2–5.4)
SODIUM SERPL-SCNC: 134 MMOL/L (ref 135–145)
TROPONIN I SERPL-MCNC: 0.02 NG/ML (ref 0–0.04)
WBC # BLD AUTO: 10.1 K/UL (ref 4.8–10.8)

## 2018-12-06 PROCEDURE — 99285 EMERGENCY DEPT VISIT HI MDM: CPT

## 2018-12-06 PROCEDURE — 84443 ASSAY THYROID STIM HORMONE: CPT

## 2018-12-06 PROCEDURE — 84484 ASSAY OF TROPONIN QUANT: CPT

## 2018-12-06 PROCEDURE — 85730 THROMBOPLASTIN TIME PARTIAL: CPT

## 2018-12-06 PROCEDURE — 85610 PROTHROMBIN TIME: CPT

## 2018-12-06 PROCEDURE — 93005 ELECTROCARDIOGRAM TRACING: CPT | Performed by: EMERGENCY MEDICINE

## 2018-12-06 PROCEDURE — 93005 ELECTROCARDIOGRAM TRACING: CPT

## 2018-12-06 PROCEDURE — 80053 COMPREHEN METABOLIC PANEL: CPT

## 2018-12-06 PROCEDURE — 85025 COMPLETE CBC W/AUTO DIFF WBC: CPT

## 2018-12-06 PROCEDURE — 71045 X-RAY EXAM CHEST 1 VIEW: CPT

## 2018-12-06 PROCEDURE — 83690 ASSAY OF LIPASE: CPT

## 2018-12-06 PROCEDURE — 83880 ASSAY OF NATRIURETIC PEPTIDE: CPT

## 2018-12-07 ENCOUNTER — APPOINTMENT (OUTPATIENT)
Dept: RADIOLOGY | Facility: MEDICAL CENTER | Age: 73
End: 2018-12-07
Attending: INTERNAL MEDICINE
Payer: MEDICARE

## 2018-12-07 ENCOUNTER — DOCUMENTATION (OUTPATIENT)
Dept: HEALTH INFORMATION MANAGEMENT | Facility: OTHER | Age: 73
End: 2018-12-07

## 2018-12-07 PROBLEM — R07.9 PAIN IN THE CHEST: Status: ACTIVE | Noted: 2018-12-07

## 2018-12-07 LAB
D DIMER PPP IA.FEU-MCNC: 0.74 UG/ML (FEU) (ref 0–0.5)
EKG IMPRESSION: NORMAL
GLUCOSE BLD-MCNC: 136 MG/DL (ref 65–99)
GLUCOSE BLD-MCNC: 136 MG/DL (ref 65–99)
GLUCOSE BLD-MCNC: 165 MG/DL (ref 65–99)
GLUCOSE BLD-MCNC: 79 MG/DL (ref 65–99)
TROPONIN I SERPL-MCNC: 0.02 NG/ML (ref 0–0.04)
TROPONIN I SERPL-MCNC: 0.03 NG/ML (ref 0–0.04)
TSH SERPL DL<=0.005 MIU/L-ACNC: 0.96 UIU/ML (ref 0.38–5.33)

## 2018-12-07 PROCEDURE — 700102 HCHG RX REV CODE 250 W/ 637 OVERRIDE(OP): Performed by: INTERNAL MEDICINE

## 2018-12-07 PROCEDURE — 36415 COLL VENOUS BLD VENIPUNCTURE: CPT

## 2018-12-07 PROCEDURE — A9270 NON-COVERED ITEM OR SERVICE: HCPCS | Performed by: INTERNAL MEDICINE

## 2018-12-07 PROCEDURE — 90471 IMMUNIZATION ADMIN: CPT

## 2018-12-07 PROCEDURE — 93010 ELECTROCARDIOGRAM REPORT: CPT | Performed by: INTERNAL MEDICINE

## 2018-12-07 PROCEDURE — 82962 GLUCOSE BLOOD TEST: CPT | Mod: 91

## 2018-12-07 PROCEDURE — 85379 FIBRIN DEGRADATION QUANT: CPT

## 2018-12-07 PROCEDURE — 96372 THER/PROPH/DIAG INJ SC/IM: CPT | Mod: XU

## 2018-12-07 PROCEDURE — A9270 NON-COVERED ITEM OR SERVICE: HCPCS | Performed by: HOSPITALIST

## 2018-12-07 PROCEDURE — 90732 PPSV23 VACC 2 YRS+ SUBQ/IM: CPT | Performed by: HOSPITALIST

## 2018-12-07 PROCEDURE — G0378 HOSPITAL OBSERVATION PER HR: HCPCS

## 2018-12-07 PROCEDURE — 700111 HCHG RX REV CODE 636 W/ 250 OVERRIDE (IP): Performed by: HOSPITALIST

## 2018-12-07 PROCEDURE — 700105 HCHG RX REV CODE 258: Performed by: HOSPITALIST

## 2018-12-07 PROCEDURE — 700102 HCHG RX REV CODE 250 W/ 637 OVERRIDE(OP): Performed by: HOSPITALIST

## 2018-12-07 PROCEDURE — 700117 HCHG RX CONTRAST REV CODE 255: Performed by: INTERNAL MEDICINE

## 2018-12-07 PROCEDURE — 99220 PR INITIAL OBSERVATION CARE,LEVL III: CPT | Performed by: HOSPITALIST

## 2018-12-07 PROCEDURE — 71275 CT ANGIOGRAPHY CHEST: CPT

## 2018-12-07 PROCEDURE — 84484 ASSAY OF TROPONIN QUANT: CPT

## 2018-12-07 PROCEDURE — 93005 ELECTROCARDIOGRAM TRACING: CPT | Performed by: HOSPITALIST

## 2018-12-07 RX ORDER — METOPROLOL TARTRATE 50 MG/1
50 TABLET, FILM COATED ORAL 2 TIMES DAILY
Status: DISCONTINUED | OUTPATIENT
Start: 2018-12-07 | End: 2018-12-08 | Stop reason: HOSPADM

## 2018-12-07 RX ORDER — GLIMEPIRIDE 4 MG/1
4 TABLET ORAL 2 TIMES DAILY WITH MEALS
Status: DISCONTINUED | OUTPATIENT
Start: 2018-12-07 | End: 2018-12-07

## 2018-12-07 RX ORDER — INSULIN GLARGINE 100 [IU]/ML
18 INJECTION, SOLUTION SUBCUTANEOUS NIGHTLY
Status: DISCONTINUED | OUTPATIENT
Start: 2018-12-07 | End: 2018-12-08 | Stop reason: HOSPADM

## 2018-12-07 RX ORDER — POLYETHYLENE GLYCOL 3350 17 G/17G
1 POWDER, FOR SOLUTION ORAL
Status: DISCONTINUED | OUTPATIENT
Start: 2018-12-07 | End: 2018-12-08 | Stop reason: HOSPADM

## 2018-12-07 RX ORDER — SUCRALFATE 1 G/1
1 TABLET ORAL EVERY 6 HOURS
Status: DISCONTINUED | OUTPATIENT
Start: 2018-12-07 | End: 2018-12-08 | Stop reason: HOSPADM

## 2018-12-07 RX ORDER — HYDRALAZINE HYDROCHLORIDE 20 MG/ML
20 INJECTION INTRAMUSCULAR; INTRAVENOUS EVERY 6 HOURS PRN
Status: DISCONTINUED | OUTPATIENT
Start: 2018-12-07 | End: 2018-12-08 | Stop reason: HOSPADM

## 2018-12-07 RX ORDER — OMEPRAZOLE 20 MG/1
20 CAPSULE, DELAYED RELEASE ORAL DAILY
Status: DISCONTINUED | OUTPATIENT
Start: 2018-12-07 | End: 2018-12-07

## 2018-12-07 RX ORDER — AMOXICILLIN 250 MG
2 CAPSULE ORAL 2 TIMES DAILY
Status: DISCONTINUED | OUTPATIENT
Start: 2018-12-07 | End: 2018-12-08 | Stop reason: HOSPADM

## 2018-12-07 RX ORDER — ENALAPRILAT 1.25 MG/ML
1.25 INJECTION INTRAVENOUS EVERY 6 HOURS PRN
Status: DISCONTINUED | OUTPATIENT
Start: 2018-12-07 | End: 2018-12-08 | Stop reason: HOSPADM

## 2018-12-07 RX ORDER — DEXTROSE MONOHYDRATE 25 G/50ML
25 INJECTION, SOLUTION INTRAVENOUS
Status: DISCONTINUED | OUTPATIENT
Start: 2018-12-07 | End: 2018-12-08 | Stop reason: HOSPADM

## 2018-12-07 RX ORDER — OMEPRAZOLE 20 MG/1
20 CAPSULE, DELAYED RELEASE ORAL 2 TIMES DAILY
Status: DISCONTINUED | OUTPATIENT
Start: 2018-12-07 | End: 2018-12-08 | Stop reason: HOSPADM

## 2018-12-07 RX ORDER — BISACODYL 10 MG
10 SUPPOSITORY, RECTAL RECTAL
Status: DISCONTINUED | OUTPATIENT
Start: 2018-12-07 | End: 2018-12-08 | Stop reason: HOSPADM

## 2018-12-07 RX ORDER — SODIUM CHLORIDE 9 MG/ML
1000 INJECTION, SOLUTION INTRAVENOUS ONCE
Status: COMPLETED | OUTPATIENT
Start: 2018-12-07 | End: 2018-12-07

## 2018-12-07 RX ADMIN — INSULIN HUMAN 2 UNITS: 100 INJECTION, SOLUTION PARENTERAL at 11:37

## 2018-12-07 RX ADMIN — APIXABAN 5 MG: 5 TABLET, FILM COATED ORAL at 06:33

## 2018-12-07 RX ADMIN — OMEPRAZOLE 20 MG: 20 CAPSULE, DELAYED RELEASE ORAL at 06:33

## 2018-12-07 RX ADMIN — GLIMEPIRIDE 4 MG: 4 TABLET ORAL at 06:33

## 2018-12-07 RX ADMIN — METOPROLOL TARTRATE 50 MG: 50 TABLET ORAL at 06:33

## 2018-12-07 RX ADMIN — APIXABAN 5 MG: 5 TABLET, FILM COATED ORAL at 17:30

## 2018-12-07 RX ADMIN — IOHEXOL 60 ML: 350 INJECTION, SOLUTION INTRAVENOUS at 18:15

## 2018-12-07 RX ADMIN — OMEPRAZOLE 20 MG: 20 CAPSULE, DELAYED RELEASE ORAL at 17:30

## 2018-12-07 RX ADMIN — SODIUM CHLORIDE 1000 ML: 9 INJECTION, SOLUTION INTRAVENOUS at 03:31

## 2018-12-07 RX ADMIN — STANDARDIZED SENNA CONCENTRATE AND DOCUSATE SODIUM 2 TABLET: 8.6; 5 TABLET, FILM COATED ORAL at 17:30

## 2018-12-07 RX ADMIN — SUCRALFATE 1 G: 1 TABLET ORAL at 11:37

## 2018-12-07 RX ADMIN — SERTRALINE 50 MG: 50 TABLET, FILM COATED ORAL at 20:41

## 2018-12-07 RX ADMIN — METOPROLOL TARTRATE 50 MG: 50 TABLET ORAL at 17:30

## 2018-12-07 RX ADMIN — PNEUMOCOCCAL VACCINE POLYVALENT 25 MCG
25; 25; 25; 25; 25; 25; 25; 25; 25; 25; 25; 25; 25; 25; 25; 25; 25; 25; 25; 25; 25; 25; 25 INJECTION, SOLUTION INTRAMUSCULAR; SUBCUTANEOUS at 06:33

## 2018-12-07 RX ADMIN — SUCRALFATE 1 G: 1 TABLET ORAL at 17:30

## 2018-12-07 ASSESSMENT — PAIN SCALES - GENERAL
PAINLEVEL_OUTOF10: 0
PAINLEVEL_OUTOF10: 0
PAINLEVEL_OUTOF10: 4
PAINLEVEL_OUTOF10: 0
PAINLEVEL_OUTOF10: 0

## 2018-12-07 ASSESSMENT — COGNITIVE AND FUNCTIONAL STATUS - GENERAL
SUGGESTED CMS G CODE MODIFIER MOBILITY: CH
MOBILITY SCORE: 24
SUGGESTED CMS G CODE MODIFIER DAILY ACTIVITY: CH
DAILY ACTIVITIY SCORE: 24

## 2018-12-07 ASSESSMENT — ENCOUNTER SYMPTOMS
DIARRHEA: 0
COUGH: 0
PALPITATIONS: 1
ABDOMINAL PAIN: 0
SHORTNESS OF BREATH: 1
TINGLING: 0
HEADACHES: 0
PHOTOPHOBIA: 0
FOCAL WEAKNESS: 0
SORE THROAT: 0
CHILLS: 0
FEVER: 0
VOMITING: 0
MYALGIAS: 0
DIZZINESS: 0
NAUSEA: 0
WHEEZING: 0
DEPRESSION: 0

## 2018-12-07 ASSESSMENT — CHA2DS2 SCORE
PRIOR STROKE OR TIA OR THROMBOEMBOLISM: NO
CHA2DS2 VASC SCORE: 6
AGE 65 TO 74: NO
AGE 75 OR GREATER: YES
HYPERTENSION: YES
CHF OR LEFT VENTRICULAR DYSFUNCTION: NO
VASCULAR DISEASE: YES
DIABETES: YES
SEX: FEMALE

## 2018-12-07 ASSESSMENT — PATIENT HEALTH QUESTIONNAIRE - PHQ9
1. LITTLE INTEREST OR PLEASURE IN DOING THINGS: NOT AT ALL
2. FEELING DOWN, DEPRESSED, IRRITABLE, OR HOPELESS: NOT AT ALL
SUM OF ALL RESPONSES TO PHQ9 QUESTIONS 1 AND 2: 0

## 2018-12-07 ASSESSMENT — LIFESTYLE VARIABLES: EVER_SMOKED: NEVER

## 2018-12-07 NOTE — ED TRIAGE NOTES
"Chief Complaint   Patient presents with   • Shortness of Breath     1.5 wk, worse with exertion unable to ambulate across the room   • Chest Pain     1.5 wk, radiates to center of back     Blood pressure (!) 196/97, pulse (!) 117, temperature 36.4 °C (97.5 °F), temperature source Temporal, resp. rate 20, height 1.651 m (5' 5\"), weight 74.5 kg (164 lb 3.9 oz), SpO2 97 %, not currently breastfeeding.    Pt brought to triage in a wheelchair secondary to Parkinson's tremors c/o SOB at rest, worsening with any exertion to the point that the pt is unable perform ADL's. Pt reports feelings of indigestions, is belching, c/o nausea with eating. Pt states this is similar to how she felt 2 years ago when she states she went into Afib and into an MI. Pt reports having an appointment for an echocardiogram next week.   "

## 2018-12-07 NOTE — PROGRESS NOTES
2 RN skin check done with George ZEPEDA.    Bilateral ears red and blanching. Pt educated to remove glasses when sleeping. Pt verbalized understanding.    No other skin breakdown at this time.

## 2018-12-07 NOTE — CARE PLAN
Problem: Communication  Goal: The ability to communicate needs accurately and effectively will improve  Outcome: PROGRESSING AS EXPECTED  Discussed POC and medications with patient. Pt verbalized understanding.      Problem: Safety  Goal: Will remain free from injury  Outcome: PROGRESSING AS EXPECTED  Bed locked and in lowest position. Bed alarm on. Pt is standby assist to the bathroom. Treaded socks provided. Call light and belongings within reach.  Patient educated to call for assistance. Pt verbalized understanding. Hourly rounding in place.

## 2018-12-07 NOTE — ED PROVIDER NOTES
ED Provider Note    CHIEF COMPLAINT  Chief Complaint   Patient presents with   • Shortness of Breath     1.5 wk, worse with exertion unable to ambulate across the room   • Chest Pain     1.5 wk, radiates to center of back       HPI  Amrita Torrez is a 73 y.o. female here for evaluation of chest pain and dyspnea.  The patient states that she has had intermittent chest pain, that radiates to her back, over the last 1-2 weeks.  She states this is intermittent in nature, and not associated with any abdominal pain, vomiting, or headache.  The patient is currently on Eliquis, and has been compliant.  The patient has not taken anything for the pain prior to arrival, and states that nothing alleviates or exacerbates her symptoms.  She states that a few years ago she had an MI that felt exactly the same way, and this is why she came in here to be evaluated.  She is on Eliquis for atrial fibrillation, she follows up with cardiology as necessary.    PAST MEDICAL HISTORY   has a past medical history of CAD (coronary artery disease); Diabetes; Goiter; Heart attack (HCC); Hyperlipidemia; Hypertension; MI (myocardial infarction) (HCC) (2016); Pericardial effusion; and Thyroid nodule.    SOCIAL HISTORY  Social History     Social History Main Topics   • Smoking status: Former Smoker     Packs/day: 1.00     Years: 40.00     Types: Cigarettes     Quit date: 1/1/1978   • Smokeless tobacco: Never Used   • Alcohol use No   • Drug use: No   • Sexual activity: Not on file       Family History  Disorders    SURGICAL HISTORY   has a past surgical history that includes other orthopedic surgery (7/11); other abdominal surgery (1966); gyn surgery (1978); gyn surgery; aditya by laparoscopy (9/14/2012); laparoscopy (12/1/2014); and cardiac cath.    CURRENT MEDICATIONS  Home Medications     Reviewed by Rossy Salazar R.N. (Registered Nurse) on 12/06/18 at 2035  Med List Status: Not Addressed   Medication Last Dose Status   ALPRAZolam  (XANAX) 0.25 MG Tab 12/6/2018 Active   apixaban (ELIQUIS) 5mg Tab 12/6/2018 Active   glimepiride (AMARYL) 4 MG Tab 12/6/2018 Active   insulin glargine (LANTUS) 100 UNIT/ML Solution 12/5/2018 Active   latanoprost (XALATAN) 0.005 % Solution 12/6/2018 Active   metoprolol (LOPRESSOR) 50 MG Tab 12/6/2018 Active   omeprazole (PRILOSEC) 20 MG delayed-release capsule 12/6/2018 Active   sertraline (ZOLOFT) 50 MG Tab 12/5/2018 Active   trazodone (DESYREL) 50 MG Tab 12/5/2018 Active   vitamin D, Ergocalciferol, (DRISDOL) 01067 units Cap capsule 12/6/2018 Active                ALLERGIES  Allergies   Allergen Reactions   • Pcn [Penicillins] Hives and Swelling   • Sulfa Drugs Hives and Swelling       REVIEW OF SYSTEMS  See HPI for further details. Review of systems as above, otherwise all other systems are negative.     PHYSICAL EXAM  Constitutional: Well developed, well nourished. No acute distress.  HEENT: Normocephalic, atraumatic. Posterior pharynx clear and moist.  Eyes:  EOMI. Normal sclera.  Neck: Supple, Full range of motion, nontender.  Chest/Pulmonary: clear to ausculation. Symmetrical expansion.   Cardio: Regular rate and rhythm with no murmur.   Abdomen: Soft, nontender. No peritoneal signs. No guarding. No palpable masses.  Musculoskeletal: No deformity, no edema, neurovascular intact.   Neuro: Clear speech, appropriate, cooperative, cranial nerves II-XII grossly intact.  Psych: Normal mood and affect    Results for orders placed or performed during the hospital encounter of 12/06/18   Troponin   Result Value Ref Range    Troponin I 0.02 0.00 - 0.04 ng/mL   Btype Natriuretic Peptide   Result Value Ref Range    B Natriuretic Peptide 166 (H) 0 - 100 pg/mL   CBC with Differential   Result Value Ref Range    WBC 10.1 4.8 - 10.8 K/uL    RBC 5.06 4.20 - 5.40 M/uL    Hemoglobin 14.6 12.0 - 16.0 g/dL    Hematocrit 41.5 37.0 - 47.0 %    MCV 82.0 81.4 - 97.8 fL    MCH 28.9 27.0 - 33.0 pg    MCHC 35.2 (H) 33.6 - 35.0 g/dL     RDW 40.6 35.9 - 50.0 fL    Platelet Count 236 164 - 446 K/uL    MPV 9.8 9.0 - 12.9 fL    Neutrophils-Polys 69.10 44.00 - 72.00 %    Lymphocytes 19.90 (L) 22.00 - 41.00 %    Monocytes 8.40 0.00 - 13.40 %    Eosinophils 2.10 0.00 - 6.90 %    Basophils 0.30 0.00 - 1.80 %    Immature Granulocytes 0.20 0.00 - 0.90 %    Nucleated RBC 0.00 /100 WBC    Neutrophils (Absolute) 7.01 2.00 - 7.15 K/uL    Lymphs (Absolute) 2.02 1.00 - 4.80 K/uL    Monos (Absolute) 0.85 0.00 - 0.85 K/uL    Eos (Absolute) 0.21 0.00 - 0.51 K/uL    Baso (Absolute) 0.03 0.00 - 0.12 K/uL    Immature Granulocytes (abs) 0.02 0.00 - 0.11 K/uL    NRBC (Absolute) 0.00 K/uL   Complete Metabolic Panel (CMP)   Result Value Ref Range    Sodium 134 (L) 135 - 145 mmol/L    Potassium 4.1 3.6 - 5.5 mmol/L    Chloride 101 96 - 112 mmol/L    Co2 23 20 - 33 mmol/L    Anion Gap 10.0 0.0 - 11.9    Glucose 306 (H) 65 - 99 mg/dL    Bun 18 8 - 22 mg/dL    Creatinine 1.17 0.50 - 1.40 mg/dL    Calcium 9.2 8.5 - 10.5 mg/dL    AST(SGOT) 12 12 - 45 U/L    ALT(SGPT) 10 2 - 50 U/L    Alkaline Phosphatase 77 30 - 99 U/L    Total Bilirubin 1.0 0.1 - 1.5 mg/dL    Albumin 3.6 3.2 - 4.9 g/dL    Total Protein 7.4 6.0 - 8.2 g/dL    Globulin 3.8 (H) 1.9 - 3.5 g/dL    A-G Ratio 0.9 g/dL   Prothrombin Time   Result Value Ref Range    PT 16.1 (H) 12.0 - 14.6 sec    INR 1.28 (H) 0.87 - 1.13   APTT   Result Value Ref Range    APTT 33.2 24.7 - 36.0 sec   Lipase   Result Value Ref Range    Lipase 30 11 - 82 U/L   ESTIMATED GFR   Result Value Ref Range    GFR If  54 (A) >60 mL/min/1.73 m 2    GFR If Non African American 45 (A) >60 mL/min/1.73 m 2   TROPONIN   Result Value Ref Range    Troponin I 0.03 0.00 - 0.04 ng/mL   EKG (NOW)   Result Value Ref Range    Report       [facility] Emergency Dept.    Test Date:  2018-12-06  Pt Name:    SHANNAN QUINTON                 Department: ER  MRN:        [id number]                      Room:  Gender:     Female                        Technician: [id number]  :        [id number]                   Requested By:ER TRIAGE PROTOCOL  Order #:    [id number]                    Reading MD:    Measurements  Intervals                                Axis  Rate:       126                          P:  NC:                                      QRS:        -52  QRSD:       88                           T:          83  QT:         340  QTc:        493    Interpretive Statements  ATRIAL FIBRILLATION  LEFT ANTERIOR FASCICULAR BLOCK  PROBABLE LVH WITH SECONDARY REPOL ABNRM  BORDERLINE PROLONGED QT INTERVAL  Compared to ECG 10/13/2018 02:17:57  Sinus rhythm no longer present  Myocardial infarct finding no longer present  Poor R-wave progression no longer present       DX-CHEST-LIMITED (1 VIEW)   Final Result      New blunted costophrenic sulci are suspicious for small effusions            PROCEDURES     MEDICAL RECORD  I have reviewed patient's medical record and pertinent results are listed above.    COURSE & MEDICAL DECISION MAKING  I have reviewed any medical record information, laboratory studies and radiographic results as noted above.    Dr. Rutherford will admit the pt.    1:06 AM  I spoke to Dr. BROWN on for cardiology.  He states to put the pt in overnight, and trend her trops.       FINAL IMPRESSION  Chest pain      Electronically signed by: Jann Candelaria, 2018 11:09 PM

## 2018-12-07 NOTE — ASSESSMENT & PLAN NOTE
Continue home Lantus and oral glyburide, monitor with Accu-Cheks and cover with insulin sliding scale.

## 2018-12-07 NOTE — H&P
"Hospital Medicine History & Physical Note    Date of Service  12/7/2018    Primary Care Physician  Fitz Turpin D.O.    Consultants  None    Code Status  Full    Chief Complaint  Chief Complaint   Patient presents with   • Shortness of Breath     1.5 wk, worse with exertion unable to ambulate across the room   • Chest Pain     1.5 wk, radiates to center of back       History of Presenting Illness  73 y.o. female who presented on 12/6/2018 with dyspnea on exertion and chest pain.  The patient reports that her symptoms began over a week ago.  She describes a \"pounding\" chest pain which is centralized, nonradiating, and occurs with exertion.  It is associated with shortness of breath and aggravated when she takes a deep breath.  She described alleviation with rest.  Patient carries a known history of coronary artery disease status post stenting several years ago and was recently diagnosed.  She is medically optimized on beta blockade as well as anticoagulation with Eliquis and states that she is medically compliant with all of her medications.  She denies any associated diaphoresis, or nausea.  She states that she has otherwise been in her usual state of health and denies any recent fevers, chills, URIs, abdominal pain, diarrhea or dysuria.    Review of Systems  Review of Systems   Constitutional: Negative for chills and fever.   HENT: Negative for congestion and sore throat.    Eyes: Negative for photophobia.   Respiratory: Positive for shortness of breath (Dyspnea on exertion). Negative for cough and wheezing.    Cardiovascular: Positive for chest pain and palpitations.   Gastrointestinal: Negative for abdominal pain, diarrhea, nausea and vomiting.   Genitourinary: Negative for dysuria.   Musculoskeletal: Negative for myalgias.   Skin: Negative.    Neurological: Negative for dizziness, tingling, focal weakness and headaches.   Psychiatric/Behavioral: Negative for depression and suicidal ideas.       Past " Medical History  Past Medical History:   Diagnosis Date   • MI (myocardial infarction) (HCC) 2016    x2 stints placed   • CAD (coronary artery disease)    • Diabetes     oral meds   • Goiter    • Heart attack (HCC)    • Hyperlipidemia    • Hypertension    • Pericardial effusion    • Thyroid nodule        Surgical History  Past Surgical History:   Procedure Laterality Date   • LAPAROSCOPY  12/1/2014    [facility]   • BAN BY LAPAROSCOPY  9/14/2012    Performed by ABDI NAVARRO at [facility]   • OTHER ORTHOPEDIC SURGERY  7/11    knee   • GYN SURGERY  1978    fibroids   • OTHER ABDOMINAL SURGERY  1966    appendectomy   • CARDIAC CATH     • GYN SURGERY      hysterectomy       Family History  Denies any family history of MIs or CVAs    Social History  Social History   Substance Use Topics   • Smoking status: Former Smoker     Packs/day: 1.00     Years: 40.00     Types: Cigarettes     Quit date: 1/1/1978   • Smokeless tobacco: Never Used   • Alcohol use No       Allergies  Allergies   Allergen Reactions   • Pcn [Penicillins] Hives and Swelling   • Sulfa Drugs Hives and Swelling       Medications  No current facility-administered medications on file prior to encounter.      Current Outpatient Prescriptions on File Prior to Encounter   Medication Sig Dispense Refill   • ALPRAZolam (XANAX) 0.25 MG Tab Take 0.25 mg by mouth as needed.     • vitamin D, Ergocalciferol, (DRISDOL) 02465 units Cap capsule Take 50,000 Units by mouth every 7 days.     • metoprolol (LOPRESSOR) 50 MG Tab Take 1 Tab by mouth 2 times a day. 60 Tab 11   • apixaban (ELIQUIS) 5mg Tab Take 1 Tab by mouth 2 Times a Day. 60 Tab 1   • omeprazole (PRILOSEC) 20 MG delayed-release capsule Take 20 mg by mouth every day.     • sertraline (ZOLOFT) 50 MG Tab Take 50 mg by mouth every bedtime.     • latanoprost (XALATAN) 0.005 % Solution Place 1 Drop in both eyes every evening.      • insulin glargine (LANTUS) 100 UNIT/ML Solution Inject 18 Units as instructed every evening.     • glimepiride (AMARYL) 4 MG Tab Take 4 mg by mouth 2 times a day.     • trazodone (DESYREL) 50 MG Tab Take 50 mg by mouth every bedtime.         Physical Exam  Hemodynamics  Temp (24hrs), Av.4 °C (97.5 °F), Min:36.4 °C (97.5 °F), Max:36.4 °C (97.5 °F)   Temperature: 36.4 °C (97.5 °F)  Pulse  Av.6  Min: 70  Max: 117 Heart Rate (Monitored): 73  Blood Pressure : 124/67, NIBP: 129/54      Respiratory      Respiration: 18, Pulse Oximetry: 90 %             Physical Exam   Constitutional: She is oriented to person, place, and time. No distress.   HENT:   Head: Normocephalic and atraumatic.   Right Ear: External ear normal.   Left Ear: External ear normal.   Eyes: EOM are normal. Right eye exhibits no discharge. Left eye exhibits no discharge.   Neck: Neck supple. No JVD present.   Cardiovascular: Normal rate, regular rhythm and normal heart sounds.    Pulmonary/Chest: Effort normal and breath sounds normal. No respiratory distress. She exhibits no tenderness.   Abdominal: Soft. Bowel sounds are normal. She exhibits no distension. There is no tenderness.   Musculoskeletal: Normal range of motion. She exhibits no edema.   Neurological: She is alert and oriented to person, place, and time. No cranial nerve deficit.   Skin: Skin is warm and dry. She is not diaphoretic. No erythema.   Psychiatric: She has a normal mood and affect. Her behavior is normal.   Nursing note and vitals reviewed.    Capillary refill less than 3 seconds, distal pulses intact    Laboratory:  Recent Labs      18   2105   WBC  10.1   RBC  5.06   HEMOGLOBIN  14.6   HEMATOCRIT  41.5   MCV  82.0   MCH  28.9   MCHC  35.2*   RDW  40.6   PLATELETCT  236   MPV  9.8     Recent Labs      18   2105   SODIUM  134*   POTASSIUM  4.1   CHLORIDE  101   CO2  23   GLUCOSE  306*   BUN  18   CREATININE  1.17   CALCIUM  9.2     Recent Labs      18    2105   ALTSGPT  10   ASTSGOT  12   ALKPHOSPHAT  77   TBILIRUBIN  1.0   LIPASE  30   GLUCOSE  306*     Recent Labs      12/06/18 2105   APTT  33.2   INR  1.28*     Recent Labs      12/06/18 2105   BNPBTYPENAT  166*         Lab Results   Component Value Date    TROPONINI 0.03 12/07/2018       Imaging  Dx-chest-limited (1 View)    Result Date: 12/6/2018 12/6/2018 10:24 PM HISTORY/REASON FOR EXAM: Right chest pain and shortness of breath with walking. TECHNIQUE/EXAM DESCRIPTION AND NUMBER OF VIEWS: Single portable view of the chest. COMPARISON: October 12. FINDINGS: Cardiomediastinal contours are notable for stable mild cardiac silhouette enlargement, aortic ectasia and atherosclerosis. Lungs demonstrate no consolidation. New blunting of the costophrenic sulci     New blunted costophrenic sulci are suspicious for small effusions        Assessment/Plan:  Anticipate that patient will need less than 2 midnights for management of the discussed medical issues.    * Pain in the chest   Assessment & Plan    Patient has a known history of paroxysmal atrial fibrillation, she was last seen by Dr. Tobar last month and was continued on anticoagulation with beta blockade.  She also underwent a stress test in October which was unrevealing.  She does carry a known history of coronary artery disease with stent several years ago.  Cardiology was consulted by the emergency room physician and they recommended monitoring overnight with trending of cardiac enzymes.  We will continue to watch her on telemetry to ensure that her atrial fibrillation remains rate controlled.  She was noted to have elevated rates at the time of presentation in the emergency room which has now spontaneously converted to sinus rhythm.  Pending the results of her cardiac enzyme trend, we may require further studies but at this point, I am holding off on a repeat stress test given her normal stress less than 2 months ago.     Paroxysmal atrial fibrillation  (HCC)- (present on admission)   Assessment & Plan    Continue telemetry monitoring, continue home Eliquis and metoprolol.     Type 2 diabetes mellitus without complication (HCC)- (present on admission)   Assessment & Plan    Continue home Lantus and oral glyburide, monitor with Accu-Cheks and cover with insulin sliding scale.         Prophylaxis: Patient is on Eliquis, no additional need for DVT prophylaxis, home PPI indicated, bowel protocol as needed

## 2018-12-07 NOTE — PROGRESS NOTES
Received EPIC inbox message for abnormal labs. Per chart review, patient in ED last night and currently admitted to T8 under observation/outpatient for intermittent chest pain and dyspnea.     Will monitor chart for dc plans and outreach to patient upon discharge. Will coordinate with inpatient CM as needed for continuity of care.

## 2018-12-07 NOTE — ASSESSMENT & PLAN NOTE
Patient has a known history of paroxysmal atrial fibrillation, she was last seen by Dr. Tobar last month and was continued on anticoagulation with beta blockade.  She also underwent a stress test in October which was unrevealing at that time.  She does carry a known history of coronary artery disease with stent several years ago.  Cardiology was consulted by the emergency room physician and they recommended monitoring overnight with trending of cardiac enzymes.  We will continue to watch her on telemetry to ensure that her atrial fibrillation remains rate controlled.  She was noted to have elevated rates at the time of presentation in the emergency room which is now spontaneously converted to sinus rhythm.  Pending the results of her cardiac enzyme trend, we may require further studies but at this point, I am holding off on a repeat stress test given her normal stress less than 2 months ago.

## 2018-12-07 NOTE — PROGRESS NOTES
Bedside report received. Patient A&O x4. ALEJANDRO.  on the monitor.  Complains of pain in back with swallowing. POC discussed with patient. Patient verbalized understanding. Call light and belongings within reach. Bed locked and in lowest position, alarm and fall precautions in place.

## 2018-12-07 NOTE — ED NOTES
Bedside report to receiving DUANE Irby for T816. Questions answered. All belongings accounted for at transfer.

## 2018-12-07 NOTE — CARE PLAN
Problem: Communication  Goal: The ability to communicate needs accurately and effectively will improve  Outcome: PROGRESSING AS EXPECTED      Problem: Safety  Goal: Will remain free from injury  Outcome: PROGRESSING AS EXPECTED  Pt educated about use of call light and bed alarm when getting out of bed. Call light within reach. Bed alarm in use. Pt verbalized understanding and calls appropriately. Personal slipper socks in place. Bed in low and locked position. Appropriate sign outside of room.       Problem: Knowledge Deficit  Goal: Knowledge of disease process/condition, treatment plan, diagnostic tests, and medications will improve  Outcome: PROGRESSING AS EXPECTED  Discussed POC with pt. Answered all questions appropriately. White board updated. All medications and interventions explained before performed. Pt verbalized understanding.

## 2018-12-07 NOTE — PROGRESS NOTES
73-year-old female with history of CAD, 2 stents placement, diabetes, hypertension, pericardial effusion, admitted with complaint of chest pain and shortness of breath.  Ordered CTA due to elevated d-dimer.  Additionally, ordered repeat heart ultrasound.

## 2018-12-07 NOTE — PROGRESS NOTES
Pt up to unit via [facility] with two RN. Pt walked from [facility] to bed SBA with shuffle gait. Tele monitor in place. Monitor room notified.    Assumed care report received. Assessment completed. AOx4. Pt resting in bed, denies any pain at this time. Pt states she usually gets the chest pain when she is up walking around. Pt denied chest pain during transfer to bed from [facility]. No other complaints at this time. Plan of care discussed, verbalized understanding. Fall precautions in place. Call light within reach. White board updated. Bed alarm in use. Pt wears boot on L foot when up walking around to reduce swelling given to her by her foot doctor. Pt not currently wearing boot at this time. Pt states she doesn't have to wear it for short distances, such as getting up to the bathroom.

## 2018-12-07 NOTE — ED NOTES
Pt wheeled to R3 with family at side. Pt asked to change into gown. Chart up for ERP.    3-point gait

## 2018-12-08 ENCOUNTER — APPOINTMENT (OUTPATIENT)
Dept: CARDIOLOGY | Facility: MEDICAL CENTER | Age: 73
End: 2018-12-08
Attending: INTERNAL MEDICINE
Payer: MEDICARE

## 2018-12-08 ENCOUNTER — PATIENT OUTREACH (OUTPATIENT)
Dept: HEALTH INFORMATION MANAGEMENT | Facility: OTHER | Age: 73
End: 2018-12-08

## 2018-12-08 VITALS
SYSTOLIC BLOOD PRESSURE: 162 MMHG | WEIGHT: 175.71 LBS | BODY MASS INDEX: 29.27 KG/M2 | DIASTOLIC BLOOD PRESSURE: 86 MMHG | OXYGEN SATURATION: 94 % | RESPIRATION RATE: 17 BRPM | HEIGHT: 65 IN | HEART RATE: 73 BPM | TEMPERATURE: 98.7 F

## 2018-12-08 LAB
GLUCOSE BLD-MCNC: 133 MG/DL (ref 65–99)
GLUCOSE BLD-MCNC: 156 MG/DL (ref 65–99)
GLUCOSE BLD-MCNC: 178 MG/DL (ref 65–99)
LV EJECT FRACT  99904: 65

## 2018-12-08 PROCEDURE — 93306 TTE W/DOPPLER COMPLETE: CPT | Mod: 26 | Performed by: INTERNAL MEDICINE

## 2018-12-08 PROCEDURE — 99217 PR OBSERVATION CARE DISCHARGE: CPT | Performed by: INTERNAL MEDICINE

## 2018-12-08 PROCEDURE — A9270 NON-COVERED ITEM OR SERVICE: HCPCS | Performed by: INTERNAL MEDICINE

## 2018-12-08 PROCEDURE — G0378 HOSPITAL OBSERVATION PER HR: HCPCS

## 2018-12-08 PROCEDURE — 82962 GLUCOSE BLOOD TEST: CPT

## 2018-12-08 PROCEDURE — 700102 HCHG RX REV CODE 250 W/ 637 OVERRIDE(OP): Performed by: INTERNAL MEDICINE

## 2018-12-08 PROCEDURE — 93306 TTE W/DOPPLER COMPLETE: CPT

## 2018-12-08 PROCEDURE — A9270 NON-COVERED ITEM OR SERVICE: HCPCS | Performed by: HOSPITALIST

## 2018-12-08 PROCEDURE — 700102 HCHG RX REV CODE 250 W/ 637 OVERRIDE(OP): Performed by: HOSPITALIST

## 2018-12-08 PROCEDURE — 96372 THER/PROPH/DIAG INJ SC/IM: CPT

## 2018-12-08 RX ORDER — AMLODIPINE BESYLATE 5 MG/1
5 TABLET ORAL DAILY
Qty: 30 TAB | Refills: 1 | Status: SHIPPED | OUTPATIENT
Start: 2018-12-08 | End: 2019-07-18

## 2018-12-08 RX ORDER — SUCRALFATE 1 G/1
1 TABLET ORAL EVERY 6 HOURS
Qty: 120 TAB | Refills: 3 | Status: SHIPPED | OUTPATIENT
Start: 2018-12-08 | End: 2019-08-15

## 2018-12-08 RX ORDER — OMEPRAZOLE 20 MG/1
20 CAPSULE, DELAYED RELEASE ORAL 2 TIMES DAILY
Qty: 60 CAP | Refills: 1 | Status: SHIPPED | OUTPATIENT
Start: 2018-12-08 | End: 2019-07-18

## 2018-12-08 RX ORDER — LISINOPRIL 5 MG/1
5 TABLET ORAL
Status: DISCONTINUED | OUTPATIENT
Start: 2018-12-08 | End: 2018-12-08 | Stop reason: HOSPADM

## 2018-12-08 RX ADMIN — SUCRALFATE 1 G: 1 TABLET ORAL at 16:51

## 2018-12-08 RX ADMIN — OMEPRAZOLE 20 MG: 20 CAPSULE, DELAYED RELEASE ORAL at 05:44

## 2018-12-08 RX ADMIN — SUCRALFATE 1 G: 1 TABLET ORAL at 00:27

## 2018-12-08 RX ADMIN — OMEPRAZOLE 20 MG: 20 CAPSULE, DELAYED RELEASE ORAL at 16:51

## 2018-12-08 RX ADMIN — INSULIN HUMAN 2 UNITS: 100 INJECTION, SOLUTION PARENTERAL at 16:54

## 2018-12-08 RX ADMIN — SUCRALFATE 1 G: 1 TABLET ORAL at 05:45

## 2018-12-08 RX ADMIN — METOPROLOL TARTRATE 50 MG: 50 TABLET ORAL at 05:45

## 2018-12-08 RX ADMIN — STANDARDIZED SENNA CONCENTRATE AND DOCUSATE SODIUM 2 TABLET: 8.6; 5 TABLET, FILM COATED ORAL at 05:45

## 2018-12-08 RX ADMIN — SUCRALFATE 1 G: 1 TABLET ORAL at 11:47

## 2018-12-08 RX ADMIN — STANDARDIZED SENNA CONCENTRATE AND DOCUSATE SODIUM 2 TABLET: 8.6; 5 TABLET, FILM COATED ORAL at 16:51

## 2018-12-08 RX ADMIN — APIXABAN 5 MG: 5 TABLET, FILM COATED ORAL at 05:45

## 2018-12-08 RX ADMIN — METOPROLOL TARTRATE 50 MG: 50 TABLET ORAL at 16:51

## 2018-12-08 RX ADMIN — INSULIN HUMAN 2 UNITS: 100 INJECTION, SOLUTION PARENTERAL at 11:42

## 2018-12-08 RX ADMIN — APIXABAN 5 MG: 5 TABLET, FILM COATED ORAL at 16:51

## 2018-12-08 RX ADMIN — LISINOPRIL 5 MG: 5 TABLET ORAL at 08:23

## 2018-12-08 ASSESSMENT — PAIN SCALES - GENERAL: PAINLEVEL_OUTOF10: 0

## 2018-12-08 NOTE — PROGRESS NOTES
Received report from Monica ZEPEDA. Discussed pt plan of care. Any questions and concerns addressed at this time. Pt resting in bed. Bed locked and in lowest position. Educated pt on use of call light and call light within reach. Tele monitor in place. No other needs at this time.

## 2018-12-08 NOTE — CARE PLAN
Problem: Safety  Goal: Will remain free from falls  Outcome: PROGRESSING AS EXPECTED  Educated pt on fall precautions and safety. Mobility assessed. Bed locked and in lowest position. Call light and personal belongings within reach. Pt calls appropriately.     Problem: Pain Management  Goal: Pain level will decrease to patient's comfort goal  Outcome: PROGRESSING AS EXPECTED  Discussed importance of pain management and notifying RN of any chest pain. Pt denies any pain at this time.

## 2018-12-08 NOTE — CARE PLAN
Problem: Safety  Goal: Will remain free from falls  Outcome: PROGRESSING AS EXPECTED  Educated patient on the importance of good hand hygiene for self and staff, verbalized understanding.

## 2018-12-08 NOTE — DISCHARGE SUMMARY
Discharge Summary    CHIEF COMPLAINT ON ADMISSION  Chief Complaint   Patient presents with   • Shortness of Breath     1.5 wk, worse with exertion unable to ambulate across the room   • Chest Pain     1.5 wk, radiates to center of back       Reason for Admission  Trouble breathing; Chest pain     Admission Date  12/6/2018    CODE STATUS  Full Code    HPI & HOSPITAL COURSE  This is a 73 y.o. female with history of diabetes type 2, paroxysmal A. fib, CAD presented with complaints of chest pain radiating to the and shortness of breath on exertion.  For details see H&P note.  She has had a typical chest pain, mostly when she was swallowing liquids or food.  Her EKG was non-concerning and troponin were not elevated.  She was started on antiacid and sucralfate with improvement of symptoms.  Due to elevated d-dimer, CTA of pulmonary artery was done negative for pulmonary embolism.  Telemetry did not reveal any arrhythmia or tachycardia.  Amlodipine added for better blood pressure control  Heart ultrasound did not reveal any significant changes from previous echo.  Patient was reassured and discharged home with cardiology follow-up         Therefore, she is discharged in fair and stable condition to home with close outpatient follow-up.    The patient recovered much more quickly than anticipated on admission.    Discharge Date  12/8/2018    FOLLOW UP ITEMS POST DISCHARGE  Cardiology    DISCHARGE DIAGNOSES  Principal Problem:    Pain in the chest POA: Unknown  Active Problems:    Type 2 diabetes mellitus without complication (HCC) POA: Yes    Paroxysmal atrial fibrillation (HCC) POA: Yes  Resolved Problems:    * No resolved hospital problems. *      FOLLOW UP  Future Appointments  Date Time Provider Department Center   12/10/2018 7:15 AM [facility] EXAM 9 ECHO [facility]   11/15/2019 8:15 AM Sylvester Tobar M.D. [facility] None     No follow-up provider specified.    MEDICATIONS ON DISCHARGE     Medication List      START  taking these medications      Instructions   amLODIPine 5 MG Tabs  Commonly known as:  NORVASC   Take 1 Tab by mouth every day.  Dose:  5 mg     sucralfate 1 GM Tabs  Commonly known as:  CARAFATE   Take 1 Tab by mouth every 6 hours.  Dose:  1 g        CHANGE how you take these medications      Instructions   omeprazole 20 MG delayed-release capsule  What changed:  when to take this  Commonly known as:  PRILOSEC   Take 1 Cap by mouth 2 times a day.  Dose:  20 mg        CONTINUE taking these medications      Instructions   ALPRAZolam 0.25 MG Tabs  Commonly known as:  XANAX   Take 0.25 mg by mouth every 8 hours as needed for Anxiety.  Dose:  0.25 mg     apixaban 5mg Tabs  Commonly known as:  ELIQUIS   Take 1 Tab by mouth 2 Times a Day.  Dose:  5 mg     glimepiride 4 MG Tabs  Commonly known as:  AMARYL   Take 4 mg by mouth 2 times a day.  Dose:  4 mg     insulin glargine 100 UNIT/ML Soln  Commonly known as:  LANTUS   Inject 18 Units as instructed every evening.  Dose:  18 Units     metoprolol 50 MG Tabs  Commonly known as:  LOPRESSOR   Take 1 Tab by mouth 2 times a day.  Dose:  50 mg     sertraline 50 MG Tabs  Commonly known as:  ZOLOFT   Take 50 mg by mouth every bedtime.  Dose:  50 mg     traZODone 50 MG Tabs  Commonly known as:  DESYREL   Take 50 mg by mouth every bedtime.  Dose:  50 mg     vitamin D (Ergocalciferol) 37165 units Caps capsule  Commonly known as:  DRISDOL   Take 50,000 Units by mouth every 7 days.  Dose:  37037 Units     XALATAN 0.005 % Soln  Generic drug:  latanoprost   Place 1 Drop in both eyes every evening.  Dose:  1 Drop            Allergies  Allergies   Allergen Reactions   • Pcn [Penicillins] Hives and Swelling   • Sulfa Drugs Hives and Swelling       DIET  Orders Placed This Encounter   Procedures   • Diet Order Diabetic     Standing Status:   Standing     Number of Occurrences:   1     Order Specific Question:   Diet:     Answer:   Diabetic [3]       ACTIVITY  As tolerated.  Weight bearing as  tolerated    CONSULTATIONS  none    PROCEDURES  None    LABORATORY  Lab Results   Component Value Date    SODIUM 134 (L) 12/06/2018    POTASSIUM 4.1 12/06/2018    CHLORIDE 101 12/06/2018    CO2 23 12/06/2018    GLUCOSE 306 (H) 12/06/2018    BUN 18 12/06/2018    CREATININE 1.17 12/06/2018        Lab Results   Component Value Date    WBC 10.1 12/06/2018    HEMOGLOBIN 14.6 12/06/2018    HEMATOCRIT 41.5 12/06/2018    PLATELETCT 236 12/06/2018      EC-ECHOCARDIOGRAM COMPLETE W/O CONT   Final Result      EC-ECHOCARDIOGRAM COMPLETE W/ CONT         CT-CTA CHEST PULMONARY ARTERY W/ RECONS   Final Result      1.  No evidence of pulmonary embolism.      2.  Minimal bibasilar atelectasis or pneumonia and small bilateral pleural effusions.            DX-CHEST-LIMITED (1 VIEW)   Final Result      New blunted costophrenic sulci are suspicious for small effusions        CONCLUSIONS  Normal left ventricular systolic function.   Mild aortic stenosis. Transvalvular gradients are - Peak:  30 mmHg,   Mean: 16 mmHg.  Mild concentric left ventricular hypertrophy.     Total time of the discharge process exceeds 48 minutes.

## 2018-12-09 ENCOUNTER — PATIENT OUTREACH (OUTPATIENT)
Dept: HEALTH INFORMATION MANAGEMENT | Facility: OTHER | Age: 73
End: 2018-12-09

## 2018-12-09 NOTE — DISCHARGE INSTRUCTIONS
Discharge Instructions    Discharged to home by car with relative. Discharged via wheelchair, hospital escort: Yes.  Special equipment needed: Not Applicable    Be sure to schedule a follow-up appointment with your primary care doctor or any specialists as instructed.     Discharge Plan:   Pneumococcal Vaccine Administered/Refused: Given (See MAR)  Influenza Vaccine Indication: Not indicated: Previously immunized this influenza season and > 8 years of age    I understand that a diet low in cholesterol, fat, and sodium is recommended for good health. Unless I have been given specific instructions below for another diet, I accept this instruction as my diet prescription.   Other diet: diabetic      Special Instructions: Chest Pain, Nonspecific  It is often hard to give a specific diagnosis for the cause of chest pain. There is always a chance that your pain could be related to something serious, like a heart attack or a blood clot in the lungs. You need to follow up with your caregiver for further evaluation. More lab tests or other studies such as X-rays, electrocardiography, stress testing, or cardiac imaging may be needed to find the cause of your pain.  Most of the time, nonspecific chest pain improves within 2 to 3 days with rest and mild pain medicine. For the next few days, avoid physical exertion or activities that bring on pain. Do not smoke. Avoid drinking alcohol. Call your caregiver for routine follow-up as advised.   SEEK IMMEDIATE MEDICAL CARE IF:  · You develop increased chest pain or pain that radiates to the arm, neck, jaw, back, or abdomen.   · You develop shortness of breath, increased coughing, or you start coughing up blood.   · You have severe back or abdominal pain, nausea, or vomiting.   · You develop severe weakness, fainting, fever, or chills.   Document Released: 12/18/2006 Document Revised: 03/11/2013 Document Reviewed: 06/06/2008  [employer]® Patient Information ©2013 [employer].Sucralfate tablets  What is this medicine?  SUCRALFATE (GLADYS rainer fate) helps to treat ulcers of the intestine.  This medicine may be used for other purposes; ask your health care provider or pharmacist if you have questions.  COMMON BRAND NAME(S): Carafate  What should I tell my health care provider before I take this medicine?  They need to know if you have any of these conditions:  -kidney disease  -an unusual or allergic reaction to sucralfate, other medicines, foods, dyes, or preservatives  -pregnant or trying to get pregnant  -breast-feeding  How should I use this medicine?  Take this medicine by mouth with a glass of water. Follow the directions on the prescription label. This medicine works best if you take it on an empty stomach, 1 hour before meals. Take your doses at regular intervals. Do not take your medicine more often than directed. Do not stop taking except on your doctor's advice.  Talk to your pediatrician regarding the use of this medicine in children. Special care may be needed.  Overdosage: If you think you have taken too much of this medicine contact a poison control center or emergency room at once.  NOTE: This medicine is only for you. Do not share this medicine with others.  What if I miss a dose?  If you miss a dose, take it as soon as you can. If it is almost time for your next dose, take only that dose. Do not take double or extra doses.  What may interact with this medicine?  -antacid  -cimetidine  -digoxin  -ketoconazole  -phenytoin  -quinidine  -ranitidine  -some antibiotics like ciprofloxacin, norfloxacin, and ofloxacin  -theophylline  -thyroid hormones  -warfarin  This list may not describe all possible interactions. Give your health care provider a list of all the medicines, herbs, non-prescription drugs, or dietary supplements you use. Also tell them if you smoke, drink alcohol, or use illegal drugs. Some items may interact with your medicine.  What should I watch for while  using this medicine?  Visit your doctor or health care professional for regular check ups. Let your doctor know if your symptoms do not improve or if you feel worse.  Antacids should not be taken within one half hour before or after this medicine.  What side effects may I notice from receiving this medicine?  Side effects that you should report to your doctor or health care professional as soon as possible:  -allergic reactions like skin rash, itching or hives, swelling of the face, lips, or tongue  -difficulty breathing  Side effects that usually do not require medical attention (report to your doctor or health care professional if they continue or are bothersome):  -back pain  -constipation  -drowsy, dizzy  -dry mouth  -headache  -stomach upset, gas  -trouble sleeping  This list may not describe all possible side effects. Call your doctor for medical advice about side effects. You may report side effects to FDA at 5-746-FDA-3852.  Where should I keep my medicine?  Keep out of the reach of children.  Store at room temperature between 15 and 30 degrees C (59 and 86 degrees F). Keep container tightly closed. Throw away any unused medicine after the expiration date.  NOTE: This sheet is a summary. It may not cover all possible information. If you have questions about this medicine, talk to your doctor, pharmacist, or health care provider.  © 2018 Elsevier/Gold Standard (2009-08-19 15:46:20)  Amlodipine tablets  What is this medicine?  AMLODIPINE (am MANUEL di peen) is a calcium-channel blocker. It affects the amount of calcium found in your heart and muscle cells. This relaxes your blood vessels, which can reduce the amount of work the heart has to do. This medicine is used to lower high blood pressure. It is also used to prevent chest pain.  This medicine may be used for other purposes; ask your health care provider or pharmacist if you have questions.  COMMON BRAND NAME(S): Norvasc  What should I tell my health care  provider before I take this medicine?  They need to know if you have any of these conditions:  -heart problems like heart failure or aortic stenosis  -liver disease  -an unusual or allergic reaction to amlodipine, other medicines, foods, dyes, or preservatives  -pregnant or trying to get pregnant  -breast-feeding  How should I use this medicine?  Take this medicine by mouth with a glass of water. Follow the directions on the prescription label. Take your medicine at regular intervals. Do not take more medicine than directed.  Talk to your pediatrician regarding the use of this medicine in children. Special care may be needed. This medicine has been used in children as young as 6.  Persons over 65 years old may have a stronger reaction to this medicine and need smaller doses.  Overdosage: If you think you have taken too much of this medicine contact a poison control center or emergency room at once.  NOTE: This medicine is only for you. Do not share this medicine with others.  What if I miss a dose?  If you miss a dose, take it as soon as you can. If it is almost time for your next dose, take only that dose. Do not take double or extra doses.  What may interact with this medicine?  -herbal or dietary supplements  -local or general anesthetics  -medicines for high blood pressure  -medicines for prostate problems  -rifampin  This list may not describe all possible interactions. Give your health care provider a list of all the medicines, herbs, non-prescription drugs, or dietary supplements you use. Also tell them if you smoke, drink alcohol, or use illegal drugs. Some items may interact with your medicine.  What should I watch for while using this medicine?  Visit your doctor or health care professional for regular check ups. Check your blood pressure and pulse rate regularly. Ask your health care professional what your blood pressure and pulse rate should be, and when you should contact him or her.  This medicine may  make you feel confused, dizzy or lightheaded. Do not drive, use machinery, or do anything that needs mental alertness until you know how this medicine affects you. To reduce the risk of dizzy or fainting spells, do not sit or stand up quickly, especially if you are an older patient. Avoid alcoholic drinks; they can make you more dizzy.  Do not suddenly stop taking amlodipine. Ask your doctor or health care professional how you can gradually reduce the dose.  What side effects may I notice from receiving this medicine?  Side effects that you should report to your doctor or health care professional as soon as possible:  -allergic reactions like skin rash, itching or hives, swelling of the face, lips, or tongue  -breathing problems  -changes in vision or hearing  -chest pain  -fast, irregular heartbeat  -swelling of legs or ankles  Side effects that usually do not require medical attention (report to your doctor or health care professional if they continue or are bothersome):  -dry mouth  -facial flushing  -nausea, vomiting  -stomach gas, pain  -tired, weak  -trouble sleeping  This list may not describe all possible side effects. Call your doctor for medical advice about side effects. You may report side effects to FDA at 2-655-FDA-0560.  Where should I keep my medicine?  Keep out of the reach of children.  Store at room temperature between 59 and 86 degrees F (15 and 30 degrees C). Protect from light. Keep container tightly closed. Throw away any unused medicine after the expiration date.  NOTE: This sheet is a summary. It may not cover all possible information. If you have questions about this medicine, talk to your doctor, pharmacist, or health care provider.  © 2018 Elsevier/Gold Standard (2013-11-15 11:40:58)      · Is patient discharged on Warfarin / Coumadin?   No     Depression / Suicide Risk    As you are discharged from this [facility] facility, it is important to learn how to keep safe from harming  yourself.    Recognize the warning signs:  · Abrupt changes in personality, positive or negative- including increase in energy   · Giving away possessions  · Change in eating patterns- significant weight changes-  positive or negative  · Change in sleeping patterns- unable to sleep or sleeping all the time   · Unwillingness or inability to communicate  · Depression  · Unusual sadness, discouragement and loneliness  · Talk of wanting to die  · Neglect of personal appearance   · Rebelliousness- reckless behavior  · Withdrawal from people/activities they love  · Confusion- inability to concentrate     If you or a loved one observes any of these behaviors or has concerns about self-harm, here's what you can do:  · Talk about it- your feelings and reasons for harming yourself  · Remove any means that you might use to hurt yourself (examples: pills, rope, extension cords, firearm)  · Get professional help from the community (Mental Health, Substance Abuse, psychological counseling)  · Do not be alone:Call your Safe Contact- someone whom you trust who will be there for you.  · Call your local CRISIS HOTLINE 691-3716 or 717-101-4210  · Call your local Children's Mobile Crisis Response Team [place] (344) 802-3866 or www.[employer]  · Call the toll free National Suicide Prevention Hotlines   · National Suicide Prevention Lifeline 586-302-OULQ (9474)  · National Hope Line Network 800-SUICIDE (179-2301)

## 2018-12-09 NOTE — PROGRESS NOTES
Discharge Summary  Educated patient on new medications, s/s to look for when returning to the emergency room, and to make appointment with cardiologist and PCP within the next week. Patient verbalized understanding. PIV removed with no s/s of bleeding or infection at site. Vitals WNL. Escorted patient to ER  for discharge home.

## 2018-12-09 NOTE — PROGRESS NOTES
Placed discharge outreach phone call to pt s/p hospital discharge 12/8/18.  Left voicemail providing my contact information and instructions to call with any questions or concerns.

## 2018-12-10 ENCOUNTER — PATIENT OUTREACH (OUTPATIENT)
Dept: HEALTH INFORMATION MANAGEMENT | Facility: OTHER | Age: 73
End: 2018-12-10

## 2018-12-10 NOTE — PROGRESS NOTES
Situation, Background, Assessment, Recommendation     Situation    Outgoing call to patient to follow up on hospitalization last week.    Background       Patient admitted to [facility] for shortness of breath and chest pain/pressure x 1.5 weeks.    Assessment    Per chart review, patient started on amlodipine and sucralfate, to continue at discharge. Patient does not yet have PCP post-discharge follow up scheduled.    Recommendation Left voice message with contact information to return call. Will attempt outreach again at later date/time if no return call.

## 2018-12-13 ENCOUNTER — PATIENT OUTREACH (OUTPATIENT)
Dept: HEALTH INFORMATION MANAGEMENT | Facility: OTHER | Age: 73
End: 2018-12-13

## 2018-12-13 NOTE — PROGRESS NOTES
"Situation, Background, Assessment, Recommendation     Situation    Incoming call from patient returning CCM RN call to follow up after hospitalization.    Background       Patient on tele unit for 3 days last week for chest pain and shortness of breath. Patient states she was having a lot of belching and back pain/gas pain that scared her since she felt similar during a previous cardiac event.    Assessment    Patient states she feels a lot better now than when she went into the hospital. Patient denies chest pain, shortness of breath, and states that gas pains are gone.     Patient states she does have a cold now and has followed up with PCP who suggested OTC medication for symptom management and rest. Encouraged patient to maintain good hydration-patient states she does not like plain water but has found ICE sugar-free sparkling water helps her stay hydrated. Patient states she has a dry cough every once in a while.     Patient states blood sugars have been \"good\" since being out of the hospital, but that her BP has remained elevated. Yesterday's BP was 176/89. Patient states she picked up her amlodipine yesterday and has started taking it.    Recommendation -[facility] RN to follow up next week to check in with patient re: BP readings after starting the new medication and DM management.   -Patient to follow up with Dr. Burdick in January and PCP as needed.        "

## 2018-12-21 ENCOUNTER — PATIENT OUTREACH (OUTPATIENT)
Dept: HEALTH INFORMATION MANAGEMENT | Facility: OTHER | Age: 73
End: 2018-12-21

## 2018-12-21 NOTE — LETTER
"December 21, 2018        Amrita Torrez  [address]  [place] [address]        Dear Amrita:    Thank you for speaking with me today!     Here is additional information about the deep breathing and cough techniques we spoke about on the phone. Please do not hesitate to seek additional care if you have any worsening symptoms as described in the \"When to Seek Medical Care\" or \"Call 911\" sections.     Sending you wishes of good health and happiness for the holidays!    If you have any questions or concerns, please don't hesitate to call.        Sincerely,        Ella Helton R.N. Care Coordinator  [phone]    Electronically Signed     "

## 2018-12-26 ENCOUNTER — PATIENT OUTREACH (OUTPATIENT)
Dept: HEALTH INFORMATION MANAGEMENT | Facility: OTHER | Age: 73
End: 2018-12-26

## 2019-01-02 ENCOUNTER — PATIENT OUTREACH (OUTPATIENT)
Dept: HEALTH INFORMATION MANAGEMENT | Facility: OTHER | Age: 74
End: 2019-01-02

## 2019-01-02 ASSESSMENT — ENCOUNTER SYMPTOMS
DIABETIC SYMPTOM PROGRESSION: STABLE
DIABETIC ASSOCIATED SYMPTOMS: 1

## 2019-01-02 NOTE — PROGRESS NOTES
"SBAR Documentation: Situation, Background, Assessment, Recommendation     Situation    Routine outgoing call to follow up with patient.   Background       PMH: DM2, afib, CKD, HTN   Assessment    At last outreach, patient had a cold and/or bronchities with clear phlegm from post nasal drip. Patient saw PCP this week and he said she was stable, just continue to rest and avoid crowds of people where she could potentially  another virus. post nasal drip. Patient states she is releived that her heart and echo were good.     PCP discussed patient's depression as well as he felt she wasn't her \"normal\" self. Patient states the holidays have been hard without her  but she is enthusiastic about the new year and making positive changes in her life. Patient states she has been cleaning out some of her 's old things     Patient's BP at office visit--178/76. Patient states they have been ok at home but she hadn't checked yet today. Encouraged patient to continue checking BP regularly.     Patient states blood sugars have been labile from 120-160-400. She states she feels very shaky if BG goes below 100 but that it doesn't happen very often. Patient states her biggest habit to overcome is peppermints/chocolate candies and diet Pepsi. She is working on replacing the candies with fresh veggies/fruits and replacing diet Pepsi with ICE flavored welch. Patient states she recently bought celery, radishes, broccoli, cauliflower and is planning on eating salads for lunch with chicken.     Patient's goal continues to be to change her eating and exercise habits enough to see a change on her labwork next month.    Recommendation -[facility] RN to follow up in 2-3 weeks or sooner, if needed.    Escalation Protocol: No Escalation Needed     "

## 2019-01-23 ENCOUNTER — PATIENT OUTREACH (OUTPATIENT)
Dept: HEALTH INFORMATION MANAGEMENT | Facility: OTHER | Age: 74
End: 2019-01-23

## 2019-01-23 NOTE — PROGRESS NOTES
"SBAR Documentation: Situation, Background, Assessment, Recommendation     Situation    Routine outgoing call to patient to follow up on DM2 management.   Background       DM2, HTN, afib   Assessment    Patient reports she is feeling better. Patient denies continuing cold symptoms.     Patient reports her blood sugars have been a little high and labile this am 102 but usually >200 up to 360. Patient states one time she was \"really shaky\" and checked her blood sugar and it was high--pt states based on her symptoms she expected to be low.     Patient reports \"I dumped out all the candy cause I can't stop myself\" Patient asked her daughter not to bring sweets or cookies into the house; patient reports her daughter is supportive of her health goals and she expects her to cooperate with her request. Patient states she found some sugar free alternatives for her favorite items: chocolate and sugar free jelly.     Phone call disconnected when patient was midsentence. Attempted to call patient back x2 but it went straight to [facility] with contact info to return call.    Recommendation [facility] RN to follow up in 1-2 weeks or sooner, if needed.    Escalation Protocol: No Escalation Needed     "

## 2019-02-08 ENCOUNTER — PATIENT OUTREACH (OUTPATIENT)
Dept: HEALTH INFORMATION MANAGEMENT | Facility: OTHER | Age: 74
End: 2019-02-08

## 2019-02-19 ENCOUNTER — HOSPITAL ENCOUNTER (OUTPATIENT)
Dept: LAB | Facility: MEDICAL CENTER | Age: 74
End: 2019-02-19
Attending: FAMILY MEDICINE
Payer: MEDICARE

## 2019-02-19 LAB
ALBUMIN SERPL BCP-MCNC: 3.7 G/DL (ref 3.2–4.9)
ALBUMIN/GLOB SERPL: 1.3 G/DL
ALP SERPL-CCNC: 74 U/L (ref 30–99)
ALT SERPL-CCNC: 12 U/L (ref 2–50)
AMBIGUOUS DTTM AMBI4: NORMAL
ANION GAP SERPL CALC-SCNC: 8 MMOL/L (ref 0–11.9)
AST SERPL-CCNC: 15 U/L (ref 12–45)
BILIRUB SERPL-MCNC: 0.8 MG/DL (ref 0.1–1.5)
BUN SERPL-MCNC: 13 MG/DL (ref 8–22)
CALCIUM SERPL-MCNC: 9.7 MG/DL (ref 8.5–10.5)
CHLORIDE SERPL-SCNC: 102 MMOL/L (ref 96–112)
CHOLEST SERPL-MCNC: 183 MG/DL (ref 100–199)
CO2 SERPL-SCNC: 27 MMOL/L (ref 20–33)
CREAT SERPL-MCNC: 1.07 MG/DL (ref 0.5–1.4)
GLOBULIN SER CALC-MCNC: 2.8 G/DL (ref 1.9–3.5)
GLUCOSE SERPL-MCNC: 135 MG/DL (ref 65–99)
HDLC SERPL-MCNC: 36 MG/DL
LDLC SERPL CALC-MCNC: 110 MG/DL
POTASSIUM SERPL-SCNC: 4.1 MMOL/L (ref 3.6–5.5)
PROT SERPL-MCNC: 6.5 G/DL (ref 6–8.2)
SODIUM SERPL-SCNC: 137 MMOL/L (ref 135–145)
TRIGL SERPL-MCNC: 184 MG/DL (ref 0–149)

## 2019-02-19 PROCEDURE — 80053 COMPREHEN METABOLIC PANEL: CPT

## 2019-02-19 PROCEDURE — 80061 LIPID PANEL: CPT

## 2019-02-19 PROCEDURE — 83036 HEMOGLOBIN GLYCOSYLATED A1C: CPT

## 2019-02-20 LAB
EST. AVERAGE GLUCOSE BLD GHB EST-MCNC: 266 MG/DL
HBA1C MFR BLD: 10.9 % (ref 0–5.6)

## 2019-02-22 ENCOUNTER — PATIENT OUTREACH (OUTPATIENT)
Dept: HEALTH INFORMATION MANAGEMENT | Facility: OTHER | Age: 74
End: 2019-02-22

## 2019-02-23 NOTE — PROGRESS NOTES
SBAR Documentation: Situation, Background, Assessment, Recommendation     Situation    Attempted outgoing call to patient to follow up on status. Call attempted multiple times but continued to say the line has been disconnected.    Background       DM2, CKD, HTN. Labwork completed this week shows improvement with A1C since 11/9/18  Lab Results   Component Value Date/Time    HBA1C 10.9 (H) 02/19/2019 08:02 PM             Assessment    Patient's PCP office closed this afternoon. Patient had lab work completed this week that was ordered by PCP--CCM RN will reach out to PCP's office next week to see if patient's phone number has been changed.    Recommendation CCM RN to attempt outreach again next week.    Escalation Protocol: No Escalation Needed

## 2019-02-25 ENCOUNTER — PATIENT OUTREACH (OUTPATIENT)
Dept: HEALTH INFORMATION MANAGEMENT | Facility: OTHER | Age: 74
End: 2019-02-25

## 2019-03-08 ENCOUNTER — PATIENT OUTREACH (OUTPATIENT)
Dept: HEALTH INFORMATION MANAGEMENT | Facility: OTHER | Age: 74
End: 2019-03-08

## 2019-03-08 ASSESSMENT — ENCOUNTER SYMPTOMS
DIABETIC SYMPTOM PROGRESSION: STABLE
DIABETIC ASSOCIATED SYMPTOMS: 1

## 2019-03-08 NOTE — PROGRESS NOTES
3/8/19 @ 9460       SBAR Documentation: Situation, Background, Assessment, Recommendation     Situation    Outgoing call to follow up on patient status.    Background       Last several outreach calls have been unsuccessful.    Assessment    Patient answered and stated she has been doing ok. Patient c/o sore throat right now.     Patient states her BP has still been high. She has worked with her PCP and pharmacy to ensure her medication list was up to date, patient states some medications got cancelled last year during hospitalization.     Discussed recent lab work. Discussed progress made with A1C from 11.2 to 10.9. Patient seemed dissapointed she had not made better progress as she feels she made good changes. Provided encouragement to patient to continue with changes she has made to diet. Encouraged patient to increase activity and discuss possible endocrinology referral from PCP.     Patient states her weight has decreased slightly. At PCP office, 168 lbs. She is going to try a keto diet cookbook she received. Patient reports she threw all the candy away in the house to avoid temptation.     Patient is working on getting extra bedroom redone for family to come stay with her in April. Clarified with patient that it is different family members than those she felt overburdened with at the beginning of CCM services. Patient states she is excited for her family to be with her for a while.   Recommendation CCM RN to follow up in 2-3 weeks or sooner, if needed.    Escalation Protocol: No Escalation Needed     
caffeine

## 2019-03-26 ENCOUNTER — PATIENT OUTREACH (OUTPATIENT)
Dept: HEALTH INFORMATION MANAGEMENT | Facility: OTHER | Age: 74
End: 2019-03-26

## 2019-03-26 NOTE — LETTER
April 10, 2019        Amrita Torrez  [address]  [place] [address]        Dear Amrita:     Thank you for the privilege of working with you to understand and self-manage your diabetes. Keep up the great work you have been doing to improve your health and lower your A1C! Keep up the momentum you have going!    I have been unable to make contact with you at my last three (3) outreach attempts. At this time, I will discharge you from [facility] due to loss of contact. Please follow up with your provider(s) as scheduled.     In the event you continue to need [facility] Care Management services, please do not hesitate to call and we will continue working together. The Community Care Management Team is available 5 days a week, Monday through Friday from 8:00 a.m. - 5:00 p.m. at [phone].    This program is at no cost to you as this is a part of [facility]’s ongoing commitment to serve the people of our community.    If you have any questions or concerns, please don't hesitate to call.      Sincerely,      Ella Helton R.N. Care Coordinator    Electronically Signed

## 2019-04-10 NOTE — PROGRESS NOTES
SBAR Documentation: Situation, Background, Assessment, Recommendation     Situation    3/26/19 0914 Routine outgoing call to follow up on patient status. LVM for return call.     4/2/19 Follow up outreach attempt #2. LVM for return call.     4/10/19 Follow up outreach attempt #3. LVM for return call.    Background       PMH DM2, CKD stage III, afib   Assessment    Patient has been making steady strides to improve health over the last several months. She has been focusing on improving her diet. HbA1c has decreased from 11.2 to 10.9.     Patient has follow up appointments scheduled with PCP and cardiology.   Recommendation CCM RN will send loss of contact DC letter and discharge patient from CCM services at this time. Should patient respond to VM or letter requesting continued services, will reactivate Episode of Care.    Escalation Protocol: No Escalation Needed

## 2019-05-22 ENCOUNTER — HOSPITAL ENCOUNTER (OUTPATIENT)
Dept: RADIOLOGY | Facility: MEDICAL CENTER | Age: 74
End: 2019-05-22
Attending: INTERNAL MEDICINE
Payer: MEDICARE

## 2019-05-22 ENCOUNTER — HOSPITAL ENCOUNTER (OUTPATIENT)
Dept: RADIOLOGY | Facility: MEDICAL CENTER | Age: 74
End: 2019-05-22
Attending: FAMILY MEDICINE
Payer: MEDICARE

## 2019-05-22 DIAGNOSIS — N95.1 SYMPTOMATIC MENOPAUSAL OR FEMALE CLIMACTERIC STATES: ICD-10-CM

## 2019-05-22 DIAGNOSIS — Z12.31 VISIT FOR SCREENING MAMMOGRAM: ICD-10-CM

## 2019-05-22 PROCEDURE — 77063 BREAST TOMOSYNTHESIS BI: CPT

## 2019-05-22 PROCEDURE — 77080 DXA BONE DENSITY AXIAL: CPT

## 2019-05-25 ENCOUNTER — HOSPITAL ENCOUNTER (EMERGENCY)
Facility: MEDICAL CENTER | Age: 74
End: 2019-05-25
Attending: EMERGENCY MEDICINE
Payer: MEDICARE

## 2019-05-25 ENCOUNTER — APPOINTMENT (OUTPATIENT)
Dept: RADIOLOGY | Facility: MEDICAL CENTER | Age: 74
End: 2019-05-25
Attending: EMERGENCY MEDICINE
Payer: MEDICARE

## 2019-05-25 VITALS
BODY MASS INDEX: 27.49 KG/M2 | WEIGHT: 165 LBS | HEIGHT: 65 IN | TEMPERATURE: 97 F | DIASTOLIC BLOOD PRESSURE: 78 MMHG | SYSTOLIC BLOOD PRESSURE: 145 MMHG | OXYGEN SATURATION: 93 % | RESPIRATION RATE: 18 BRPM | HEART RATE: 89 BPM

## 2019-05-25 DIAGNOSIS — S80.02XA CONTUSION OF LEFT KNEE, INITIAL ENCOUNTER: ICD-10-CM

## 2019-05-25 PROCEDURE — 73564 X-RAY EXAM KNEE 4 OR MORE: CPT | Mod: LT

## 2019-05-25 PROCEDURE — 99283 EMERGENCY DEPT VISIT LOW MDM: CPT

## 2019-05-25 NOTE — ED NOTES
Pt received discharge instructions and understood allincluding follow up, [facility]

## 2019-05-25 NOTE — ED TRIAGE NOTES
"Chief Complaint   Patient presents with   • Knee Pain     L side knee. Pt states she hit her knee on some rocks a week ago when going down some stairs.      Pt wheeled to triage for above complaint.  Pt educated on triage process and informed to notify staff of any changes or worsening.  /83   Pulse (!) 110   Temp 36.1 °C (97 °F) (Temporal)   Resp 16   Ht 1.651 m (5' 5\")   Wt 74.8 kg (165 lb)   SpO2 93%   BMI 27.46 kg/m²     "

## 2019-05-25 NOTE — ED PROVIDER NOTES
ED Provider Note    CHIEF COMPLAINT   Chief Complaint   Patient presents with   • Knee Pain     L side knee. Pt states she hit her knee on some rocks a week ago when going down some stairs.        HPI   Amrita Torrez is a 74 y.o. female who presents anterior left knee pain 1 week after striking her knee against a rock.  No distal swelling or pain.  Pain greatest over the last 2 days however persistent since the injury.  No proximal leg pain or swelling.  She is currently taking antibiotics for ear infection.  She has been ambulating on the leg since the injury with some pain.    REVIEW OF SYSTEMS   Musculoskeletal: Left knee pain  Neurologic: No headache  Skin: Left knee swelling  Constitutional: No fever  ENT: Current treatment for ear infection      PAST MEDICAL HISTORY   Past Medical History:   Diagnosis Date   • CAD (coronary artery disease)    • Diabetes     oral meds   • Goiter    • Heart attack (HCC)    • Hyperlipidemia    • Hypertension    • MI (myocardial infarction) (HCC) 2016    x2 stints placed   • Pericardial effusion    • Thyroid nodule        FAMILY HISTORY  History reviewed. No pertinent family history.    SOCIAL HISTORY  Social History     Social History   • Marital status:      Spouse name: N/A   • Number of children: N/A   • Years of education: N/A     Social History Main Topics   • Smoking status: Former Smoker     Packs/day: 1.00     Years: 40.00     Types: Cigarettes     Quit date: 1/1/1978   • Smokeless tobacco: Never Used   • Alcohol use No   • Drug use: No   • Sexual activity: Not on file     Other Topics Concern   • Not on file     Social History Narrative   • No narrative on file       SURGICAL HISTORY  Past Surgical History:   Procedure Laterality Date   • LAPAROSCOPY  12/1/2014    Performed by Abdi Navarro M.D. at SURGERY SAME DAY [facility] ORS   • BAN BY LAPAROSCOPY  9/14/2012    Performed by ABDI NAVARRO at SURGERY SAME DAY [facility] ORS   • OTHER ORTHOPEDIC  "SURGERY  7/11    knee   • GYN SURGERY  1978    fibroids   • OTHER ABDOMINAL SURGERY  1966    appendectomy   • GYN SURGERY      hysterectomy   • ZZZ CARDIAC CATH         CURRENT MEDICATIONS   Home Medications    **Home medications have not yet been reviewed for this encounter**         ALLERGIES   Allergies   Allergen Reactions   • Pcn [Penicillins] Hives and Swelling   • Sulfa Drugs Hives and Swelling       PHYSICAL EXAM  VITAL SIGNS: /83   Pulse (!) 110   Temp 36.1 °C (97 °F) (Temporal)   Resp 16   Ht 1.651 m (5' 5\")   Wt 74.8 kg (165 lb)   SpO2 93%   BMI 27.46 kg/m²   Skin: Slight anterior swelling left knee, no bruising or erythema.  No abnormal warmth to palpation of the knee.   Vascular: Intact distal capillary refill.   Musculoskeletal: Left patella is tender.  Medial knee joint line mildly tender.  No ligamentous instability, no pain with stressing the knee.  Posterior knee and lateral knee nontender.  Left hip, left ankle are nontender.  Neurologic: Sensation intact    RADIOLOGY/PROCEDURES  DX-KNEE COMPLETE 4+ LEFT   Final Result      1.  No acute findings.      2.  Moderate osteoarthritis      3.  Atherosclerosis.            COURSE & MEDICAL DECISION MAKING  Pertinent Labs & Imaging studies reviewed. (See chart for details)  On exam no evidence of septic arthritis.  Patient is on blood thinners however knee x-ray does not show effusion, hemarthrosis unlikely.  No evidence of distal swelling or pain, no thigh pain or swelling, DVT unlikely by exam as well as patient currently taking blood thinner.  X-ray negative for fracture.  She is advised likely contusion or sprain.  Patient did not require immobilizer or crutches.  She is advised to follow-up with orthopedist in 1 week if no improvement.    FINAL IMPRESSION     1. Contusion of left knee, initial encounter              Electronically signed by: Javier Scales, 5/25/2019 7:20 AM    "

## 2019-05-28 DIAGNOSIS — I48.0 PAF (PAROXYSMAL ATRIAL FIBRILLATION) (HCC): Primary | ICD-10-CM

## 2019-07-18 ENCOUNTER — APPOINTMENT (OUTPATIENT)
Dept: RADIOLOGY | Facility: MEDICAL CENTER | Age: 74
End: 2019-07-18
Attending: EMERGENCY MEDICINE
Payer: MEDICARE

## 2019-07-18 ENCOUNTER — APPOINTMENT (OUTPATIENT)
Dept: RADIOLOGY | Facility: MEDICAL CENTER | Age: 74
End: 2019-07-18
Attending: INTERNAL MEDICINE
Payer: MEDICARE

## 2019-07-18 ENCOUNTER — HOSPITAL ENCOUNTER (OUTPATIENT)
Facility: MEDICAL CENTER | Age: 74
End: 2019-07-20
Attending: EMERGENCY MEDICINE | Admitting: INTERNAL MEDICINE
Payer: MEDICARE

## 2019-07-18 ENCOUNTER — APPOINTMENT (OUTPATIENT)
Dept: CARDIOLOGY | Facility: MEDICAL CENTER | Age: 74
End: 2019-07-18
Attending: INTERNAL MEDICINE
Payer: MEDICARE

## 2019-07-18 ENCOUNTER — APPOINTMENT (OUTPATIENT)
Dept: RADIOLOGY | Facility: MEDICAL CENTER | Age: 74
End: 2019-07-18
Attending: HOSPITALIST
Payer: MEDICARE

## 2019-07-18 DIAGNOSIS — E87.6 HYPOKALEMIA: ICD-10-CM

## 2019-07-18 DIAGNOSIS — N18.30 CKD (CHRONIC KIDNEY DISEASE), STAGE III (HCC): ICD-10-CM

## 2019-07-18 DIAGNOSIS — R73.9 HYPERGLYCEMIA: ICD-10-CM

## 2019-07-18 DIAGNOSIS — R55 SYNCOPE, UNSPECIFIED SYNCOPE TYPE: ICD-10-CM

## 2019-07-18 DIAGNOSIS — E87.1 HYPONATREMIA: ICD-10-CM

## 2019-07-18 DIAGNOSIS — I10 ESSENTIAL HYPERTENSION: ICD-10-CM

## 2019-07-18 PROBLEM — D68.318 ACQUIRED CIRCULATING ANTICOAGULANTS (HCC): Status: ACTIVE | Noted: 2019-07-18

## 2019-07-18 PROBLEM — R79.89 ELEVATED TROPONIN: Status: ACTIVE | Noted: 2019-07-18

## 2019-07-18 LAB
ALBUMIN SERPL BCP-MCNC: 3.4 G/DL (ref 3.2–4.9)
ALBUMIN/GLOB SERPL: 1 G/DL
ALP SERPL-CCNC: 91 U/L (ref 30–99)
ALT SERPL-CCNC: 11 U/L (ref 2–50)
ANION GAP SERPL CALC-SCNC: 13 MMOL/L (ref 0–11.9)
APTT PPP: 26 SEC (ref 24.7–36)
AST SERPL-CCNC: 14 U/L (ref 12–45)
BASOPHILS # BLD AUTO: 0.6 % (ref 0–1.8)
BASOPHILS # BLD: 0.04 K/UL (ref 0–0.12)
BILIRUB SERPL-MCNC: 1.5 MG/DL (ref 0.1–1.5)
BUN SERPL-MCNC: 17 MG/DL (ref 8–22)
CALCIUM SERPL-MCNC: 8.9 MG/DL (ref 8.5–10.5)
CHLORIDE SERPL-SCNC: 95 MMOL/L (ref 96–112)
CO2 SERPL-SCNC: 23 MMOL/L (ref 20–33)
CREAT SERPL-MCNC: 1.09 MG/DL (ref 0.5–1.4)
D DIMER PPP IA.FEU-MCNC: 0.68 UG/ML (FEU) (ref 0–0.5)
EKG IMPRESSION: NORMAL
EOSINOPHIL # BLD AUTO: 0.13 K/UL (ref 0–0.51)
EOSINOPHIL NFR BLD: 1.8 % (ref 0–6.9)
ERYTHROCYTE [DISTWIDTH] IN BLOOD BY AUTOMATED COUNT: 38.3 FL (ref 35.9–50)
EST. AVERAGE GLUCOSE BLD GHB EST-MCNC: 338 MG/DL
ETHANOL BLD-MCNC: 0 G/DL
GLOBULIN SER CALC-MCNC: 3.4 G/DL (ref 1.9–3.5)
GLUCOSE BLD-MCNC: 409 MG/DL (ref 65–99)
GLUCOSE BLD-MCNC: 448 MG/DL (ref 65–99)
GLUCOSE SERPL-MCNC: 548 MG/DL (ref 65–99)
HBA1C MFR BLD: 13.4 % (ref 0–5.6)
HCT VFR BLD AUTO: 44.2 % (ref 37–47)
HGB BLD-MCNC: 16.1 G/DL (ref 12–16)
IMM GRANULOCYTES # BLD AUTO: 0.04 K/UL (ref 0–0.11)
IMM GRANULOCYTES NFR BLD AUTO: 0.6 % (ref 0–0.9)
INR PPP: 1.12 (ref 0.87–1.13)
LYMPHOCYTES # BLD AUTO: 1.56 K/UL (ref 1–4.8)
LYMPHOCYTES NFR BLD: 21.7 % (ref 22–41)
MAGNESIUM SERPL-MCNC: 1.9 MG/DL (ref 1.5–2.5)
MCH RBC QN AUTO: 28.2 PG (ref 27–33)
MCHC RBC AUTO-ENTMCNC: 36.4 G/DL (ref 33.6–35)
MCV RBC AUTO: 77.5 FL (ref 81.4–97.8)
MONOCYTES # BLD AUTO: 0.54 K/UL (ref 0–0.85)
MONOCYTES NFR BLD AUTO: 7.5 % (ref 0–13.4)
NEUTROPHILS # BLD AUTO: 4.87 K/UL (ref 2–7.15)
NEUTROPHILS NFR BLD: 67.8 % (ref 44–72)
NRBC # BLD AUTO: 0 K/UL
NRBC BLD-RTO: 0 /100 WBC
PLATELET # BLD AUTO: 214 K/UL (ref 164–446)
PMV BLD AUTO: 10.4 FL (ref 9–12.9)
POTASSIUM SERPL-SCNC: 3.6 MMOL/L (ref 3.6–5.5)
PROT SERPL-MCNC: 6.8 G/DL (ref 6–8.2)
PROTHROMBIN TIME: 14.7 SEC (ref 12–14.6)
RBC # BLD AUTO: 5.7 M/UL (ref 4.2–5.4)
SODIUM SERPL-SCNC: 131 MMOL/L (ref 135–145)
TROPONIN T SERPL-MCNC: 27 NG/L (ref 6–19)
TROPONIN T SERPL-MCNC: 28 NG/L (ref 6–19)
TROPONIN T SERPL-MCNC: 28 NG/L (ref 6–19)
WBC # BLD AUTO: 7.2 K/UL (ref 4.8–10.8)

## 2019-07-18 PROCEDURE — 70450 CT HEAD/BRAIN W/O DYE: CPT

## 2019-07-18 PROCEDURE — 80307 DRUG TEST PRSMV CHEM ANLYZR: CPT

## 2019-07-18 PROCEDURE — 93005 ELECTROCARDIOGRAM TRACING: CPT

## 2019-07-18 PROCEDURE — 94760 N-INVAS EAR/PLS OXIMETRY 1: CPT

## 2019-07-18 PROCEDURE — 83036 HEMOGLOBIN GLYCOSYLATED A1C: CPT

## 2019-07-18 PROCEDURE — 96374 THER/PROPH/DIAG INJ IV PUSH: CPT | Mod: XU

## 2019-07-18 PROCEDURE — 36415 COLL VENOUS BLD VENIPUNCTURE: CPT

## 2019-07-18 PROCEDURE — 85730 THROMBOPLASTIN TIME PARTIAL: CPT

## 2019-07-18 PROCEDURE — 85025 COMPLETE CBC W/AUTO DIFF WBC: CPT

## 2019-07-18 PROCEDURE — 71275 CT ANGIOGRAPHY CHEST: CPT

## 2019-07-18 PROCEDURE — 96375 TX/PRO/DX INJ NEW DRUG ADDON: CPT | Mod: XU

## 2019-07-18 PROCEDURE — 700117 HCHG RX CONTRAST REV CODE 255: Performed by: HOSPITALIST

## 2019-07-18 PROCEDURE — 80053 COMPREHEN METABOLIC PANEL: CPT

## 2019-07-18 PROCEDURE — 96376 TX/PRO/DX INJ SAME DRUG ADON: CPT | Mod: XU

## 2019-07-18 PROCEDURE — 700111 HCHG RX REV CODE 636 W/ 250 OVERRIDE (IP): Performed by: INTERNAL MEDICINE

## 2019-07-18 PROCEDURE — 71045 X-RAY EXAM CHEST 1 VIEW: CPT

## 2019-07-18 PROCEDURE — 99285 EMERGENCY DEPT VISIT HI MDM: CPT

## 2019-07-18 PROCEDURE — G0378 HOSPITAL OBSERVATION PER HR: HCPCS

## 2019-07-18 PROCEDURE — 99218 PR INITIAL OBSERVATION CARE,LEVL I: CPT | Performed by: INTERNAL MEDICINE

## 2019-07-18 PROCEDURE — 85610 PROTHROMBIN TIME: CPT

## 2019-07-18 PROCEDURE — 96372 THER/PROPH/DIAG INJ SC/IM: CPT | Mod: XU

## 2019-07-18 PROCEDURE — 85379 FIBRIN DEGRADATION QUANT: CPT

## 2019-07-18 PROCEDURE — 93005 ELECTROCARDIOGRAM TRACING: CPT | Performed by: EMERGENCY MEDICINE

## 2019-07-18 PROCEDURE — 83735 ASSAY OF MAGNESIUM: CPT

## 2019-07-18 PROCEDURE — 700102 HCHG RX REV CODE 250 W/ 637 OVERRIDE(OP): Performed by: INTERNAL MEDICINE

## 2019-07-18 PROCEDURE — 82962 GLUCOSE BLOOD TEST: CPT

## 2019-07-18 PROCEDURE — A9270 NON-COVERED ITEM OR SERVICE: HCPCS | Performed by: INTERNAL MEDICINE

## 2019-07-18 PROCEDURE — 84484 ASSAY OF TROPONIN QUANT: CPT

## 2019-07-18 PROCEDURE — 93880 EXTRACRANIAL BILAT STUDY: CPT

## 2019-07-18 PROCEDURE — 700105 HCHG RX REV CODE 258: Performed by: INTERNAL MEDICINE

## 2019-07-18 PROCEDURE — 700111 HCHG RX REV CODE 636 W/ 250 OVERRIDE (IP): Performed by: HOSPITALIST

## 2019-07-18 PROCEDURE — 700111 HCHG RX REV CODE 636 W/ 250 OVERRIDE (IP): Performed by: EMERGENCY MEDICINE

## 2019-07-18 RX ORDER — LABETALOL HYDROCHLORIDE 5 MG/ML
10 INJECTION, SOLUTION INTRAVENOUS EVERY 4 HOURS PRN
Status: DISCONTINUED | OUTPATIENT
Start: 2019-07-18 | End: 2019-07-20 | Stop reason: HOSPADM

## 2019-07-18 RX ORDER — SUCRALFATE 1 G/1
1 TABLET ORAL EVERY 6 HOURS
Status: DISCONTINUED | OUTPATIENT
Start: 2019-07-18 | End: 2019-07-20 | Stop reason: HOSPADM

## 2019-07-18 RX ORDER — ROSUVASTATIN CALCIUM 5 MG/1
5 TABLET, COATED ORAL
Refills: 0 | Status: ON HOLD | COMMUNITY
Start: 2019-04-25 | End: 2019-07-20

## 2019-07-18 RX ORDER — ONDANSETRON 2 MG/ML
4 INJECTION INTRAMUSCULAR; INTRAVENOUS EVERY 4 HOURS PRN
Status: DISCONTINUED | OUTPATIENT
Start: 2019-07-18 | End: 2019-07-20 | Stop reason: HOSPADM

## 2019-07-18 RX ORDER — ONDANSETRON 2 MG/ML
4 INJECTION INTRAMUSCULAR; INTRAVENOUS ONCE
Status: COMPLETED | OUTPATIENT
Start: 2019-07-18 | End: 2019-07-18

## 2019-07-18 RX ORDER — HYDRALAZINE HYDROCHLORIDE 20 MG/ML
10 INJECTION INTRAMUSCULAR; INTRAVENOUS EVERY 4 HOURS PRN
Status: DISCONTINUED | OUTPATIENT
Start: 2019-07-18 | End: 2019-07-20 | Stop reason: HOSPADM

## 2019-07-18 RX ORDER — ACETAMINOPHEN 325 MG/1
650 TABLET ORAL EVERY 6 HOURS PRN
Status: DISCONTINUED | OUTPATIENT
Start: 2019-07-18 | End: 2019-07-20 | Stop reason: HOSPADM

## 2019-07-18 RX ORDER — AMOXICILLIN 250 MG
2 CAPSULE ORAL 2 TIMES DAILY
Status: DISCONTINUED | OUTPATIENT
Start: 2019-07-18 | End: 2019-07-20 | Stop reason: HOSPADM

## 2019-07-18 RX ORDER — SODIUM CHLORIDE, SODIUM LACTATE, POTASSIUM CHLORIDE, CALCIUM CHLORIDE 600; 310; 30; 20 MG/100ML; MG/100ML; MG/100ML; MG/100ML
INJECTION, SOLUTION INTRAVENOUS CONTINUOUS
Status: DISCONTINUED | OUTPATIENT
Start: 2019-07-18 | End: 2019-07-20 | Stop reason: HOSPADM

## 2019-07-18 RX ORDER — TRAZODONE HYDROCHLORIDE 50 MG/1
50 TABLET ORAL
Status: DISCONTINUED | OUTPATIENT
Start: 2019-07-18 | End: 2019-07-20 | Stop reason: HOSPADM

## 2019-07-18 RX ORDER — POLYETHYLENE GLYCOL 3350 17 G/17G
1 POWDER, FOR SOLUTION ORAL
Status: DISCONTINUED | OUTPATIENT
Start: 2019-07-18 | End: 2019-07-20 | Stop reason: HOSPADM

## 2019-07-18 RX ORDER — BISACODYL 10 MG
10 SUPPOSITORY, RECTAL RECTAL
Status: DISCONTINUED | OUTPATIENT
Start: 2019-07-18 | End: 2019-07-20 | Stop reason: HOSPADM

## 2019-07-18 RX ORDER — OMEPRAZOLE 20 MG/1
20 CAPSULE, DELAYED RELEASE ORAL DAILY
Status: DISCONTINUED | OUTPATIENT
Start: 2019-07-19 | End: 2019-07-20 | Stop reason: HOSPADM

## 2019-07-18 RX ORDER — IRBESARTAN 150 MG/1
150 TABLET ORAL DAILY
Status: DISCONTINUED | OUTPATIENT
Start: 2019-07-19 | End: 2019-07-20 | Stop reason: HOSPADM

## 2019-07-18 RX ORDER — LATANOPROST 50 UG/ML
1 SOLUTION/ DROPS OPHTHALMIC NIGHTLY
Status: DISCONTINUED | OUTPATIENT
Start: 2019-07-18 | End: 2019-07-20 | Stop reason: HOSPADM

## 2019-07-18 RX ORDER — ENALAPRILAT 1.25 MG/ML
1.25 INJECTION INTRAVENOUS EVERY 6 HOURS PRN
Status: DISCONTINUED | OUTPATIENT
Start: 2019-07-18 | End: 2019-07-20 | Stop reason: HOSPADM

## 2019-07-18 RX ORDER — METOPROLOL TARTRATE 50 MG/1
50 TABLET, FILM COATED ORAL 2 TIMES DAILY
Status: DISCONTINUED | OUTPATIENT
Start: 2019-07-18 | End: 2019-07-20 | Stop reason: HOSPADM

## 2019-07-18 RX ORDER — IRBESARTAN 150 MG/1
150 TABLET ORAL DAILY
COMMUNITY
Start: 2019-05-24 | End: 2019-08-15

## 2019-07-18 RX ORDER — OMEPRAZOLE 20 MG/1
20 CAPSULE, DELAYED RELEASE ORAL DAILY
COMMUNITY
End: 2020-09-14

## 2019-07-18 RX ORDER — ERGOCALCIFEROL 1.25 MG/1
50000 CAPSULE ORAL
Status: DISCONTINUED | OUTPATIENT
Start: 2019-07-21 | End: 2019-07-20 | Stop reason: HOSPADM

## 2019-07-18 RX ORDER — INSULIN GLARGINE 100 [IU]/ML
20 INJECTION, SOLUTION SUBCUTANEOUS NIGHTLY
Status: DISCONTINUED | OUTPATIENT
Start: 2019-07-18 | End: 2019-07-20 | Stop reason: HOSPADM

## 2019-07-18 RX ORDER — ROSUVASTATIN CALCIUM 5 MG/1
5 TABLET, COATED ORAL DAILY
Status: DISCONTINUED | OUTPATIENT
Start: 2019-07-19 | End: 2019-07-19

## 2019-07-18 RX ORDER — ONDANSETRON 4 MG/1
4 TABLET, ORALLY DISINTEGRATING ORAL EVERY 4 HOURS PRN
Status: DISCONTINUED | OUTPATIENT
Start: 2019-07-18 | End: 2019-07-20 | Stop reason: HOSPADM

## 2019-07-18 RX ADMIN — IOHEXOL 55 ML: 350 INJECTION, SOLUTION INTRAVENOUS at 21:55

## 2019-07-18 RX ADMIN — ONDANSETRON 4 MG: 2 INJECTION INTRAMUSCULAR; INTRAVENOUS at 18:57

## 2019-07-18 RX ADMIN — SUCRALFATE 1 G: 1 TABLET ORAL at 18:23

## 2019-07-18 RX ADMIN — PROCHLORPERAZINE EDISYLATE 10 MG: 5 INJECTION INTRAMUSCULAR; INTRAVENOUS at 21:09

## 2019-07-18 RX ADMIN — SUCRALFATE 1 G: 1 TABLET ORAL at 23:42

## 2019-07-18 RX ADMIN — METOPROLOL TARTRATE 50 MG: 50 TABLET ORAL at 18:27

## 2019-07-18 RX ADMIN — APIXABAN 5 MG: 5 TABLET, FILM COATED ORAL at 18:27

## 2019-07-18 RX ADMIN — INSULIN GLARGINE 20 UNITS: 100 INJECTION, SOLUTION SUBCUTANEOUS at 21:17

## 2019-07-18 RX ADMIN — SENNOSIDES, DOCUSATE SODIUM 2 TABLET: 50; 8.6 TABLET, FILM COATED ORAL at 18:23

## 2019-07-18 RX ADMIN — SODIUM CHLORIDE, POTASSIUM CHLORIDE, SODIUM LACTATE AND CALCIUM CHLORIDE: 600; 310; 30; 20 INJECTION, SOLUTION INTRAVENOUS at 18:57

## 2019-07-18 RX ADMIN — LATANOPROST 1 DROP: 50 SOLUTION OPHTHALMIC at 21:09

## 2019-07-18 RX ADMIN — HYDRALAZINE HYDROCHLORIDE 10 MG: 20 INJECTION INTRAMUSCULAR; INTRAVENOUS at 18:23

## 2019-07-18 RX ADMIN — ONDANSETRON 4 MG: 2 INJECTION INTRAMUSCULAR; INTRAVENOUS at 15:40

## 2019-07-18 RX ADMIN — INSULIN HUMAN 14 UNITS: 100 INJECTION, SOLUTION PARENTERAL at 19:03

## 2019-07-18 RX ADMIN — FENTANYL CITRATE 50 MCG: 0.05 INJECTION, SOLUTION INTRAMUSCULAR; INTRAVENOUS at 15:40

## 2019-07-18 ASSESSMENT — LIFESTYLE VARIABLES
EVER_SMOKED: YES
ALCOHOL_USE: NO

## 2019-07-18 NOTE — ED NOTES
Med Rec completed per patient and home pharmacy ([facility])   Allergies reviewed  No ORAL antibiotics in last 14 days

## 2019-07-18 NOTE — ED NOTES
After Visit Summary   2/26/2018    Pinky Page    MRN: [id number]           Patient Information     Date Of Birth          1951        Visit Information        Provider Department      2/26/2018 12:45 PM Joana De Leon MD [facility]        Today's Diagnoses     Encounter for long-term (current) use of medications    -  1    Schizoaffective disorder, depressive type (H)          Care Instructions    -continue current medications, no changes  -complete lab work before next visit, this includes fasting lipid panel  -return to clinic in 6 weeks or sooner if needed          Follow-ups after your visit        Follow-up notes from your care team     Return in about 6 weeks (around 4/9/2018) for 30 MFU.      Your next 10 appointments already scheduled     Mar 19, 2018  1:00 PM CDT   (Arrive by 12:45 PM)   Return Visit with Damon Gr, PhD [facility] ([facility])    [address] [place] [address]  [address]rd [facility] [address]   [phone]            Apr 09, 2018 12:45 PM CDT   Adult Med Follow UP with Joana De Leon MD   Psychiatry Clinic ([facility])    [address] [address] [phone]            May 10, 2018 11:30 AM CDT   Return Sleep Patient with Brianda Reddy MD   [facility] [place], Sleep Study ([facility])    [address] [place] [id number] [phone]            Jul 09, 2018 11:30 AM CDT   (Arrive by 11:15 AM)   Return Visit with Marcos Magdaleno MD   [facility] ([facility])    [address] [place] [address]  [address] [facility] [address]   [phone]            Nov 15, 2018  1:30 PM CST   RETURN GENERAL with Michael Lopez MD   Eye Clinic ([facility])    [address] Clin  Going to [place].    [address]  [facility] [address]   [phone]              Who to contact     Please call your clinic at [phone] to:    Ask questions about your health    Make or cancel appointments    Discuss your medicines    Learn about your test results    Speak to your doctor            Additional Information About Your Visit        MyChart Information     [employer] is an electronic gateway that provides easy, online access to your medical records. With [employer], you can request a clinic appointment, read your test results, renew a prescription or communicate with your care team.     To sign up for [employer] visit the website at [email or web address]   You will be asked to enter the access code listed below, as well as some personal information. Please follow the directions to create your username and password.     Your access code is: [email or web address]  Expires: 2018  3:00 PM     Your access code will  in 90 days. If you need help or a new code, please contact your [facility] or call [phone] for assistance.        Care EveryWhere ID     This is your Care EveryWhere ID. This could be used by other organizations to access your [facility] medical records  [id number]        Your Vitals Were     Pulse BMI (Body Mass Index)                78 40.68 kg/m2           Blood Pressure from Last 3 Encounters:   18 144/78   18 151/78   18 149/84    Weight from Last 3 Encounters:   18 107.5 kg (237 lb)   18 107 kg (236 lb)   18 105.8 kg (233 lb 3.2 oz)                 Today's Medication Changes          These changes are accurate as of 18 11:59 PM.  If you have any questions, ask your nurse or doctor.               These medicines have changed or have updated prescriptions.        Dose/Directions    metFORMIN 500 MG 24 hr tablet   Commonly known as:  GLUCOPHAGE-XR   This may have changed:    - how much to take  - when to take this   Used for:  Diabetes  mellitus, type 2 (H)        Dose:  1000 mg   Take 2 tablets (1,000 mg) by mouth 2 times daily (with meals)   Quantity:  90 tablet   Refills:  3            Where to get your medicines      These medications were sent to [place], [place] - [address] [facility]  [address] [facility] [address]     Phone:  [phone]     FLUoxetine 40 MG capsule    ziprasidone 40 MG capsule                Primary Care Provider Office Phone # Fax #    Shamika Ileana Kelley [phone]       [facility] [address] [facility] [address]        Equal Access to Services     [facility]: Hadii james ku hadasho Soomaali, waaxda luqadaha, qaybta kaalmada aderupinderyada, autumn caldwell . So [facility] [phone].    ATENCIÓN: Si habla español, tiene a deluca disposición servicios gratuitos de asistencia lingüística. Llame al [phone].    We comply with applicable federal civil rights laws and [place] laws. We do not discriminate on the basis of race, color, national origin, age, disability, sex, sexual orientation, or gender identity.            Thank you!     Thank you for choosing [facility]  for your care. Our goal is always to provide you with excellent care. Hearing back from our patients is one way we can continue to improve our services. Please take a few minutes to complete the written survey that you may receive in the mail after your visit with us. Thank you!             Your Updated Medication List - Protect others around you: Learn how to safely use, store and throw away your medicines at www.disposemymeds.org.          This list is accurate as of 2/26/18 11:59 PM.  Always use your most recent med list.                   Brand Name Dispense Instructions for use Diagnosis    acetaminophen 500 MG tablet    TYLENOL    1 Bottle    Take 2 tablets (1,000 mg) by mouth every 6 hours as needed    Sprain of left ankle, unspecified ligament, initial  encounter       alum & mag hydroxide-simethicone 200-200-20 MG/5ML Susp suspension    MYLANTA/MAALOX     Take 30 mLs by mouth daily as needed for indigestion        amLODIPine 5 MG tablet    NORVASC    30 tablet    Take 2 tablets (10 mg) by mouth daily    Essential hypertension with goal blood pressure less than 130/85       artificial saliva Aers spray      Take 1 spray by mouth 3 times daily as needed for dry mouth        artificial tears Oint ophthalmic ointment     1 Tube    Apply  to eye. Place 0.5 inches into both eyes at bedtime    Insufficiency of tear film of both eyes       ARTIFICIAL TEARS OP      Apply 1 drop to eye 4 times daily.        aspirin 81 MG tablet     100    Take 1 tablet by mouth daily. ENTERIC COATED.        atorvastatin 40 MG tablet    LIPITOR    90 tablet    Take 1 tablet (40 mg) by mouth daily    Hyperlipidemia LDL goal <100       blood glucose monitoring test strip    no brand specified    100 each    Use to test blood sugar 3 times daily or as directed.    Type 2 diabetes mellitus with microalbuminuria, with long-term current use of insulin (H)       calcium polycarbophil 625 MG tablet    FIBERCON     Take 2 tablets by mouth daily        calcium-vitamin D 250-125 MG-UNIT Tabs      Take 1 tablet by mouth 2 times daily. Calcium 250 mg/Vit D 125 IU        CLARITIN 10 MG tablet   Generic drug:  loratadine     30    1 TAB PO QD (Once per day) as needed for ALLERGY SYMPTOMS        clotrimazole 1 % cream    LOTRIMIN    50 g    Apply topically 2 times daily as needed    Dermatophytosis       eucerin cream      Apply  topically as needed. Apply to thigh PRN dry skin        exenatide 10 MCG/0.04ML injection    BYETTA    1 Syringe    Inject 10 mcg Subcutaneous 2 times daily (before meals)    Type 2 diabetes mellitus with microalbuminuria, with long-term current use of insulin (H)       FLUoxetine 40 MG capsule    PROzac    30 capsule    Take 1 capsule (40 mg) by mouth daily    Schizoaffective  disorder, depressive type (H)       fluticasone 50 MCG/ACT spray    FLONASE    16 g    Spray 1 spray into both nostrils daily    Seasonal allergic rhinitis       furosemide 20 MG tablet    LASIX    60 tablet    Take 1 tablet (20 mg) by mouth 2 times daily    Lower leg edema       GEL-SASHA DENTINBLOC DT      Apply  to affected area. GEL. Apply to 2nd molar on bottom right daily at bedtime.        glucagon 1 MG kit    GLUCAGON EMERGENCY    2 mg    Inject 1 mg into the muscle once for 1 dose    Type 2 diabetes mellitus with microalbuminuria, with long-term current use of insulin (H)       insulin glargine 100 UNIT/ML injection    LANTUS    8 mL    Inject 20 Units Subcutaneous At Bedtime    Type 2 diabetes mellitus with microalbuminuria, with long-term current use of insulin (H)       LACTAID 3000 UNIT tablet   Generic drug:  lactase      Take 4 tabs daily with meals.        levothyroxine 175 MCG tablet    SYNTHROID/LEVOTHROID    30 tablet    Take 1 tablet (175 mcg) by mouth daily Give on empty stomach    Other specified hypothyroidism       LORazepam 1 MG tablet    ATIVAN    35 tablet    Take 1 tablet (1 mg) by mouth At Bedtime .  May take additional 1mg daily PRN for agitation.    Generalized anxiety disorder       losartan 100 MG tablet    COZAAR    90 tablet    Take 1 tablet by mouth daily.    Hypertension goal BP (blood pressure) < 130/80, Diabetes mellitus type 2, insulin dependent (H), CKD (chronic kidney disease) stage 2, GFR 60-89 ml/min       melatonin 3 MG tablet     60 tablet    Take 1 tablet (3 mg) by mouth nightly as needed for sleep    Other insomnia       metFORMIN 500 MG 24 hr tablet    GLUCOPHAGE-XR    90 tablet    Take 2 tablets (1,000 mg) by mouth 2 times daily (with meals)    Diabetes mellitus, type 2 (H)       MILK OF MAGNESIA PO      Take  by mouth. Take 30 mL as needed for constipation.        NEURONTIN PO      Take 900 mg by mouth 3 times daily.        nystatin 949429 UNIT/GM Powd    MYCOSTATIN     60 g    Apply topically 3 times daily as needed    Candidiasis of skin       polyethylene glycol powder    MIRALAX/GLYCOLAX     Take 1 capful by mouth 2 times daily. 17 GM PO BID        saline 0.9 % Soln      Spray 2 sprays in nostril as needed.        SENNA S 8.6-50 MG per tablet   Generic drug:  senna-docusate      Take 2 tablets by mouth At Bedtime.        solifenacin 10 MG tablet    VESICARE    30 tablet    Take 1 tablet (10 mg) by mouth daily    Urge incontinence       ziprasidone 40 MG capsule    GEODON    60 capsule    Take 1 capsule (40 mg) by mouth 2 times daily (with meals)    Schizoaffective disorder, depressive type (H)

## 2019-07-18 NOTE — ED TRIAGE NOTES
BIB [facility] to rm 12, pt was at [employer], felt weak, sat down, and does not remember falling.  Did not feel dizzy prior to falling.  C/o of a L sided headache, BP elevated, FS >500.  Pt also vomited a couple times after her syncopal episode.  Family at the bedside.

## 2019-07-18 NOTE — ED PROVIDER NOTES
ED Provider Note    Scribed for Roc Uribe M.D. by John Brown. 7/18/2019  2:56 PM    Primary care provider: Fitz Turpin D.O.  Means of arrival: Ambulance  History obtained from: Patient  History limited by: None    CHIEF COMPLAINT  Chief Complaint   Patient presents with   • Syncope   • Headache       HPI  Amrita Torrez is a 74 y.o. female who presents to the Emergency Department via ambulance for evaluation after experiencing a syncopal episode today. She remembers feeling associated lightheaded while at [employer] and sat down on a bench; She does not know what happened afterwards, however when she woke up people were standing over her. She is now complaining of a headache with associated nausea, and left finger tingling. Family member states that the patient called her when she was having the episode and sounded slurred in her speech. Denies chest pain, or difficulty breathing. She is currently on Eliquis. Her last MI was approximately 3 years ago and when she received her stents. She has chronic left leg weakness.    REVIEW OF SYSTEMS  Pertinent positives include syncope, nausea, and left finger tingling. Pertinent negatives include no chest pain, or difficulty breathing..  All other systems reviewed and negative.    PAST MEDICAL HISTORY   has a past medical history of CAD (coronary artery disease); Diabetes; Goiter; Heart attack (HCC); Hyperlipidemia; Hypertension; MI (myocardial infarction) (HCC) (2016); Pericardial effusion; and Thyroid nodule.    SURGICAL HISTORY   has a past surgical history that includes other orthopedic surgery (7/11); other abdominal surgery (1966); gyn surgery (1978); gyn surgery; aditya by laparoscopy (9/14/2012); laparoscopy (12/1/2014); and zzz cardiac cath.    SOCIAL HISTORY  Social History   Substance Use Topics   • Smoking status: Former Smoker     Packs/day: 1.00     Years: 40.00     Types: Cigarettes     Quit date: 1/1/1978   • Smokeless tobacco: Never Used   •  "Alcohol use No      History   Drug Use No       FAMILY HISTORY  None noted     CURRENT MEDICATIONS  No current facility-administered medications on file prior to encounter.      Current Outpatient Prescriptions on File Prior to Encounter   Medication Sig Dispense Refill   • apixaban (ELIQUIS) 5mg Tab Take 1 Tab by mouth 2 Times a Day. 180 Tab 2   • sucralfate (CARAFATE) 1 GM Tab Take 1 Tab by mouth every 6 hours. 120 Tab 3   • vitamin D, Ergocalciferol, (DRISDOL) 27274 units Cap capsule Take 50,000 Units by mouth every Sunday.     • metoprolol (LOPRESSOR) 50 MG Tab Take 1 Tab by mouth 2 times a day. 60 Tab 11   • sertraline (ZOLOFT) 50 MG Tab Take 50 mg by mouth every bedtime.     • latanoprost (XALATAN) 0.005 % Solution Place 1 Drop in both eyes every evening.     • insulin glargine (LANTUS) 100 UNIT/ML Solution Inject 20 Units as instructed every evening.     • trazodone (DESYREL) 50 MG Tab Take 50 mg by mouth every bedtime.        ALLERGIES  Allergies   Allergen Reactions   • Pcn [Penicillins] Hives and Swelling   • Sulfa Drugs Hives and Swelling       PHYSICAL EXAM  VITAL SIGNS: BP (!) 192/79   Pulse 80   Temp 36.3 °C (97.3 °F) (Temporal)   Resp 16   Ht 1.651 m (5' 5\")   Wt 70.3 kg (155 lb)   SpO2 99%   BMI 25.79 kg/m²     Constitutional: Well developed, Well nourished, no distress, Non-toxic appearance.   HENT: Normocephalic, Tenderness to palpation to left occipital area without hematoma, Atraumatic, Bilateral external ears normal, Oropharynx moist, No oral exudates.   Eyes: PERRLA, EOMI, Conjunctiva normal, No discharge.   Neck: Slight left sided soft tissue swelling, no midline tenderness, Supple, No stridor.   Lymphatic: No lymphadenopathy noted.   Cardiovascular: Normal heart rate, Normal rhythm.   Thorax & Lungs: Clear to auscultation bilaterally, No respiratory distress, No wheezing, No crackles.   Abdomen: Soft, No tenderness, No masses, No pulsatile masses.   Skin: Warm, Dry, No erythema, No " rash.   Extremities:, No edema No cyanosis.   Musculoskeletal: No tenderness to palpation or major deformities noted.  Intact distal pulses  Neurologic: Awake, alert. Cranial nerves 2-11 intact, muscle strength 5/5 in bilateral upper and lower extremities except left lower extremity  with 4/5 weakness.  Psychiatric: Affect normal, Judgment normal, Mood normal.     LABS  Labs Reviewed   CBC WITH DIFFERENTIAL - Abnormal; Notable for the following:        Result Value    RBC 5.70 (*)     Hemoglobin 16.1 (*)     MCV 77.5 (*)     MCHC 36.4 (*)     Lymphocytes 21.70 (*)     All other components within normal limits    Narrative:     Indicate which anticoagulants the patient is on:->UNKNOWN   COMP METABOLIC PANEL - Abnormal; Notable for the following:     Sodium 131 (*)     Chloride 95 (*)     Anion Gap 13.0 (*)     Glucose 548 (*)     All other components within normal limits    Narrative:     Indicate which anticoagulants the patient is on:->UNKNOWN   TROPONIN - Abnormal; Notable for the following:     Troponin T 27 (*)     All other components within normal limits    Narrative:     Indicate which anticoagulants the patient is on:->UNKNOWN   PROTHROMBIN TIME - Abnormal; Notable for the following:     PT 14.7 (*)     All other components within normal limits    Narrative:     Indicate which anticoagulants the patient is on:->UNKNOWN   ESTIMATED GFR - Abnormal; Notable for the following:     GFR If  59 (*)     GFR If Non  49 (*)     All other components within normal limits    Narrative:     Indicate which anticoagulants the patient is on:->UNKNOWN   D-DIMER - Abnormal; Notable for the following:     D-Dimer Screen 0.68 (*)     All other components within normal limits   TROPONIN - Abnormal; Notable for the following:     Troponin T 28 (*)     All other components within normal limits   APTT    Narrative:     Indicate which anticoagulants the patient is on:->UNKNOWN   MAGNESIUM   HEMOGLOBIN  A1C    Narrative:     Please call lab to add to previous sample   DIAGNOSTIC ALCOHOL    Narrative:     Please call lab to add to previous sample   TROPONIN   URINE DRUG SCREEN     All labs reviewed by me.    EKG  Results for orders placed or performed during the hospital encounter of 19   EKG (NOW)   Result Value Ref Range    Report       [facility] Emergency Dept[facility]    Test Date:  2019  Pt Name:    SHANNAN ALCANTARA                 Department: ER  MRN:        [id number]                      Room:       RD 12  Gender:     Female                       Technician: [id number]  :        [id number]                   Requested By:ER TRIAGE PROTOCOL  Order #:    [id number]                    Reading MD: PEDRO AUGUSTIN MD    Measurements  Intervals                                Axis  Rate:       78                           P:          31  WV:         156                          QRS:        -55  QRSD:       104                          T:          65  QT:         416  QTc:        474    Interpretive Statements  SINUS RHYTHM  LEFT ANTERIOR FASCICULAR BLOCK  LATE PRECORDIAL R/S TRANSITION  LVH WITH SECONDARY REPOLARIZATION ABNORMALITY  Compared to ECG 2018 06:59:39  Early repolarization now present    Electronically Signed On 2019 15:03:28 PDT by PEDRO AUGUSTIN MD          RADIOLOGY  CT-HEAD W/O   Final Result      No acute intracranial abnormality is identified.      Atrophy      There are periventricular and subcortical white matter changes present.  This finding is nonspecific and could be from previous small vessel ischemia, demyelination, or gliosis.      Focal hypodensities in the basal ganglia bilaterally are likely related to prior lacunar infarcts.      DX-CHEST-PORTABLE (1 VIEW)   Final Result      No consolidation.      EC-ECHOCARDIOGRAM COMPLETE W/O CONT    (Results Pending)   US-CAROTID DOPPLER BILAT    (Results Pending)     The radiologist's interpretation of all  radiological studies have been reviewed by me.      COURSE & MEDICAL DECISION MAKING  Pertinent Labs & Imaging studies reviewed. (See chart for details)    I reviewed the patient's medical records which showed the patient has a history of diabetes, hypertension, CAD, and is status post 2 stents.     2:56 PM - Patient seen and examined at bedside. Patient will be treated with zofran 4 mg and fentanyl 50 mcg. Ordered CT head, chest x-ray, CBC, CMp, troponin, prtothrombin time, APTT, estimated GFR, and EKG to evaluate her symptoms. The differential diagnoses include but are not limited to: Intracranial bleeding, cardiac syncope, dehydration, orthostatic hypotension.    4:30 PM - Reviewed the patient's lab and radiology results.      4:43 PM I discussed the patient's case and the above findings with Dr. Luis (CDU) who agrees to admit the patient.           Decision Making:  Patient is coming in with syncope, head trauma, the patient is on blood thinners.  CT scan is negative, the patient is hyperglycemic without any evidence of DKA.  Discussed case with the hospitalist for admission the hospital for syncope work-up.    DISPOSITION:  Patient will be admitted to Dr. Mccoy, CDU  in guarded condition.     FINAL IMPRESSION  1. Syncope, unspecified syncope type    2. Hyperglycemia    3. Hyponatremia         IJohn (Patricibjessee), am scribing for, and in the presence of, Roc Uribe M.D..    Electronically signed by: John Brown (Scribe), 7/18/2019    Roc ZAPIEN M.D. personally performed the services described in this documentation, as scribed by John Brown in my presence, and it is both accurate and complete. C.     The note accurately reflects work and decisions made by me.  Roc Uribe  7/18/2019  9:07 PM

## 2019-07-19 ENCOUNTER — APPOINTMENT (OUTPATIENT)
Dept: CARDIOLOGY | Facility: MEDICAL CENTER | Age: 74
End: 2019-07-19
Attending: INTERNAL MEDICINE
Payer: MEDICARE

## 2019-07-19 ENCOUNTER — PATIENT OUTREACH (OUTPATIENT)
Dept: HEALTH INFORMATION MANAGEMENT | Facility: OTHER | Age: 74
End: 2019-07-19

## 2019-07-19 ENCOUNTER — TELEPHONE (OUTPATIENT)
Dept: CARDIOLOGY | Facility: MEDICAL CENTER | Age: 74
End: 2019-07-19

## 2019-07-19 PROBLEM — R07.9 PAIN IN THE CHEST: Status: RESOLVED | Noted: 2018-12-07 | Resolved: 2019-07-19

## 2019-07-19 PROBLEM — R73.9 HYPERGLYCEMIA WITHOUT KETOSIS: Status: RESOLVED | Noted: 2018-10-13 | Resolved: 2019-07-19

## 2019-07-19 PROBLEM — E87.1 HYPONATREMIA: Status: RESOLVED | Noted: 2018-10-13 | Resolved: 2019-07-19

## 2019-07-19 PROBLEM — I35.0 NONRHEUMATIC AORTIC VALVE STENOSIS: Status: ACTIVE | Noted: 2019-07-19

## 2019-07-19 LAB
AMPHET UR QL SCN: NEGATIVE
ANION GAP SERPL CALC-SCNC: 10 MMOL/L (ref 0–11.9)
BARBITURATES UR QL SCN: NEGATIVE
BASOPHILS # BLD AUTO: 0.4 % (ref 0–1.8)
BASOPHILS # BLD: 0.04 K/UL (ref 0–0.12)
BENZODIAZ UR QL SCN: NEGATIVE
BUN SERPL-MCNC: 21 MG/DL (ref 8–22)
BZE UR QL SCN: NEGATIVE
CALCIUM SERPL-MCNC: 8.6 MG/DL (ref 8.5–10.5)
CANNABINOIDS UR QL SCN: NEGATIVE
CHLORIDE SERPL-SCNC: 101 MMOL/L (ref 96–112)
CK MB SERPL-MCNC: 1.6 NG/ML (ref 0–5)
CO2 SERPL-SCNC: 24 MMOL/L (ref 20–33)
CREAT SERPL-MCNC: 1.4 MG/DL (ref 0.5–1.4)
EKG IMPRESSION: NORMAL
EOSINOPHIL # BLD AUTO: 0.01 K/UL (ref 0–0.51)
EOSINOPHIL NFR BLD: 0.1 % (ref 0–6.9)
ERYTHROCYTE [DISTWIDTH] IN BLOOD BY AUTOMATED COUNT: 39.7 FL (ref 35.9–50)
GLUCOSE BLD-MCNC: 236 MG/DL (ref 65–99)
GLUCOSE BLD-MCNC: 290 MG/DL (ref 65–99)
GLUCOSE BLD-MCNC: 301 MG/DL (ref 65–99)
GLUCOSE SERPL-MCNC: 260 MG/DL (ref 65–99)
HCT VFR BLD AUTO: 39.5 % (ref 37–47)
HGB BLD-MCNC: 13.8 G/DL (ref 12–16)
IMM GRANULOCYTES # BLD AUTO: 0.04 K/UL (ref 0–0.11)
IMM GRANULOCYTES NFR BLD AUTO: 0.4 % (ref 0–0.9)
LV EJECT FRACT  99904: 70
LV EJECT FRACT MOD 2C 99903: 69.78
LV EJECT FRACT MOD 4C 99902: 53.05
LV EJECT FRACT MOD BP 99901: 62.64
LYMPHOCYTES # BLD AUTO: 1.39 K/UL (ref 1–4.8)
LYMPHOCYTES NFR BLD: 12.9 % (ref 22–41)
MCH RBC QN AUTO: 27.4 PG (ref 27–33)
MCHC RBC AUTO-ENTMCNC: 34.9 G/DL (ref 33.6–35)
MCV RBC AUTO: 78.4 FL (ref 81.4–97.8)
METHADONE UR QL SCN: NEGATIVE
MONOCYTES # BLD AUTO: 0.74 K/UL (ref 0–0.85)
MONOCYTES NFR BLD AUTO: 6.9 % (ref 0–13.4)
NEUTROPHILS # BLD AUTO: 8.52 K/UL (ref 2–7.15)
NEUTROPHILS NFR BLD: 79.3 % (ref 44–72)
NRBC # BLD AUTO: 0 K/UL
NRBC BLD-RTO: 0 /100 WBC
OPIATES UR QL SCN: NEGATIVE
OXYCODONE UR QL SCN: NEGATIVE
PCP UR QL SCN: NEGATIVE
PLATELET # BLD AUTO: 232 K/UL (ref 164–446)
PMV BLD AUTO: 10.5 FL (ref 9–12.9)
POTASSIUM SERPL-SCNC: 3.5 MMOL/L (ref 3.6–5.5)
PROPOXYPH UR QL SCN: NEGATIVE
RBC # BLD AUTO: 5.04 M/UL (ref 4.2–5.4)
SODIUM SERPL-SCNC: 135 MMOL/L (ref 135–145)
TROPONIN T SERPL-MCNC: 35 NG/L (ref 6–19)
TROPONIN T SERPL-MCNC: 38 NG/L (ref 6–19)
WBC # BLD AUTO: 10.7 K/UL (ref 4.8–10.8)

## 2019-07-19 PROCEDURE — 93306 TTE W/DOPPLER COMPLETE: CPT | Mod: 26 | Performed by: INTERNAL MEDICINE

## 2019-07-19 PROCEDURE — 700102 HCHG RX REV CODE 250 W/ 637 OVERRIDE(OP): Performed by: INTERNAL MEDICINE

## 2019-07-19 PROCEDURE — 96372 THER/PROPH/DIAG INJ SC/IM: CPT

## 2019-07-19 PROCEDURE — 36415 COLL VENOUS BLD VENIPUNCTURE: CPT

## 2019-07-19 PROCEDURE — G0378 HOSPITAL OBSERVATION PER HR: HCPCS

## 2019-07-19 PROCEDURE — 82962 GLUCOSE BLOOD TEST: CPT

## 2019-07-19 PROCEDURE — 97165 OT EVAL LOW COMPLEX 30 MIN: CPT

## 2019-07-19 PROCEDURE — 99214 OFFICE O/P EST MOD 30 MIN: CPT | Mod: GC | Performed by: INTERNAL MEDICINE

## 2019-07-19 PROCEDURE — 82553 CREATINE MB FRACTION: CPT

## 2019-07-19 PROCEDURE — 700105 HCHG RX REV CODE 258: Performed by: INTERNAL MEDICINE

## 2019-07-19 PROCEDURE — A9270 NON-COVERED ITEM OR SERVICE: HCPCS | Performed by: INTERNAL MEDICINE

## 2019-07-19 PROCEDURE — 93005 ELECTROCARDIOGRAM TRACING: CPT | Performed by: INTERNAL MEDICINE

## 2019-07-19 PROCEDURE — 93010 ELECTROCARDIOGRAM REPORT: CPT | Performed by: INTERNAL MEDICINE

## 2019-07-19 PROCEDURE — 84484 ASSAY OF TROPONIN QUANT: CPT

## 2019-07-19 PROCEDURE — 97161 PT EVAL LOW COMPLEX 20 MIN: CPT

## 2019-07-19 PROCEDURE — 80307 DRUG TEST PRSMV CHEM ANLYZR: CPT

## 2019-07-19 PROCEDURE — 80048 BASIC METABOLIC PNL TOTAL CA: CPT

## 2019-07-19 PROCEDURE — 93306 TTE W/DOPPLER COMPLETE: CPT

## 2019-07-19 PROCEDURE — 99226 PR SUBSEQUENT OBSERVATION CARE,LEVEL III: CPT | Performed by: INTERNAL MEDICINE

## 2019-07-19 PROCEDURE — 85025 COMPLETE CBC W/AUTO DIFF WBC: CPT

## 2019-07-19 RX ORDER — ROSUVASTATIN CALCIUM 20 MG/1
20 TABLET, COATED ORAL DAILY
Status: DISCONTINUED | OUTPATIENT
Start: 2019-07-20 | End: 2019-07-20 | Stop reason: HOSPADM

## 2019-07-19 RX ADMIN — METOPROLOL TARTRATE 50 MG: 50 TABLET ORAL at 17:58

## 2019-07-19 RX ADMIN — IRBESARTAN 150 MG: 150 TABLET ORAL at 05:28

## 2019-07-19 RX ADMIN — ACETAMINOPHEN 650 MG: 325 TABLET, FILM COATED ORAL at 23:46

## 2019-07-19 RX ADMIN — SODIUM CHLORIDE, POTASSIUM CHLORIDE, SODIUM LACTATE AND CALCIUM CHLORIDE: 600; 310; 30; 20 INJECTION, SOLUTION INTRAVENOUS at 05:27

## 2019-07-19 RX ADMIN — SUCRALFATE 1 G: 1 TABLET ORAL at 17:58

## 2019-07-19 RX ADMIN — SUCRALFATE 1 G: 1 TABLET ORAL at 23:44

## 2019-07-19 RX ADMIN — APIXABAN 5 MG: 5 TABLET, FILM COATED ORAL at 17:59

## 2019-07-19 RX ADMIN — METOPROLOL TARTRATE 50 MG: 50 TABLET ORAL at 05:28

## 2019-07-19 RX ADMIN — INSULIN HUMAN 7 UNITS: 100 INJECTION, SOLUTION PARENTERAL at 11:59

## 2019-07-19 RX ADMIN — INSULIN HUMAN 4 UNITS: 100 INJECTION, SOLUTION PARENTERAL at 05:35

## 2019-07-19 RX ADMIN — INSULIN GLARGINE 20 UNITS: 100 INJECTION, SOLUTION SUBCUTANEOUS at 23:35

## 2019-07-19 RX ADMIN — INSULIN HUMAN 10 UNITS: 100 INJECTION, SOLUTION PARENTERAL at 17:57

## 2019-07-19 RX ADMIN — OMEPRAZOLE 20 MG: 20 CAPSULE, DELAYED RELEASE ORAL at 05:28

## 2019-07-19 RX ADMIN — APIXABAN 5 MG: 5 TABLET, FILM COATED ORAL at 05:27

## 2019-07-19 RX ADMIN — SODIUM CHLORIDE, POTASSIUM CHLORIDE, SODIUM LACTATE AND CALCIUM CHLORIDE: 600; 310; 30; 20 INJECTION, SOLUTION INTRAVENOUS at 12:59

## 2019-07-19 RX ADMIN — SERTRALINE HYDROCHLORIDE 50 MG: 50 TABLET ORAL at 19:37

## 2019-07-19 RX ADMIN — INSULIN HUMAN 3 UNITS: 100 INJECTION, SOLUTION PARENTERAL at 22:52

## 2019-07-19 RX ADMIN — TRAZODONE HYDROCHLORIDE 50 MG: 50 TABLET ORAL at 19:37

## 2019-07-19 RX ADMIN — SUCRALFATE 1 G: 1 TABLET ORAL at 13:00

## 2019-07-19 RX ADMIN — SENNOSIDES, DOCUSATE SODIUM 2 TABLET: 50; 8.6 TABLET, FILM COATED ORAL at 17:59

## 2019-07-19 RX ADMIN — LATANOPROST 1 DROP: 50 SOLUTION OPHTHALMIC at 19:37

## 2019-07-19 RX ADMIN — SODIUM CHLORIDE, POTASSIUM CHLORIDE, SODIUM LACTATE AND CALCIUM CHLORIDE: 600; 310; 30; 20 INJECTION, SOLUTION INTRAVENOUS at 19:35

## 2019-07-19 RX ADMIN — ROSUVASTATIN CALCIUM 5 MG: 5 TABLET, FILM COATED ORAL at 05:29

## 2019-07-19 RX ADMIN — SUCRALFATE 1 G: 1 TABLET ORAL at 05:29

## 2019-07-19 ASSESSMENT — COGNITIVE AND FUNCTIONAL STATUS - GENERAL
SUGGESTED CMS G CODE MODIFIER MOBILITY: CK
SUGGESTED CMS G CODE MODIFIER DAILY ACTIVITY: CH
MOBILITY SCORE: 24
DAILY ACTIVITIY SCORE: 24
SUGGESTED CMS G CODE MODIFIER MOBILITY: CH
MOVING FROM LYING ON BACK TO SITTING ON SIDE OF FLAT BED: A LITTLE
MOBILITY SCORE: 19
WALKING IN HOSPITAL ROOM: A LITTLE
MOVING TO AND FROM BED TO CHAIR: A LITTLE
SUGGESTED CMS G CODE MODIFIER DAILY ACTIVITY: CH
STANDING UP FROM CHAIR USING ARMS: A LITTLE
CLIMB 3 TO 5 STEPS WITH RAILING: A LITTLE
DAILY ACTIVITIY SCORE: 24

## 2019-07-19 ASSESSMENT — ENCOUNTER SYMPTOMS
ORTHOPNEA: 0
WEAKNESS: 0
WHEEZING: 0
FEVER: 0
TINGLING: 0
DIZZINESS: 0
SENSORY CHANGE: 0
DIARRHEA: 0
ABDOMINAL PAIN: 0
PALPITATIONS: 0
DIAPHORESIS: 0
PND: 0
INSOMNIA: 0
FOCAL WEAKNESS: 0
BRUISES/BLEEDS EASILY: 0
COUGH: 0
SHORTNESS OF BREATH: 0
DEPRESSION: 0
BLOOD IN STOOL: 0
FLANK PAIN: 0
CONSTIPATION: 0
HEADACHES: 0
SORE THROAT: 0
CHILLS: 0
NAUSEA: 0
VOMITING: 0
NERVOUS/ANXIOUS: 0
SINUS PAIN: 0
SPEECH CHANGE: 0

## 2019-07-19 ASSESSMENT — CHA2DS2 SCORE
PRIOR STROKE OR TIA OR THROMBOEMBOLISM: NO
AGE 75 OR GREATER: YES
SEX: FEMALE
CHF OR LEFT VENTRICULAR DYSFUNCTION: NO
VASCULAR DISEASE: YES
HYPERTENSION: YES
CHA2DS2 VASC SCORE: 6
DIABETES: YES
AGE 65 TO 74: NO

## 2019-07-19 ASSESSMENT — COPD QUESTIONNAIRES
IN THE PAST 12 MONTHS DO YOU DO LESS THAN YOU USED TO BECAUSE OF YOUR BREATHING PROBLEMS: DISAGREE/UNSURE
DURING THE PAST 4 WEEKS HOW MUCH DID YOU FEEL SHORT OF BREATH: NONE/LITTLE OF THE TIME
HAVE YOU SMOKED AT LEAST 100 CIGARETTES IN YOUR ENTIRE LIFE: YES
COPD SCREENING SCORE: 2
DO YOU EVER COUGH UP ANY MUCUS OR PHLEGM?: NO/ONLY WITH OCCASIONAL COLDS OR INFECTIONS
DO YOU EVER COUGH UP ANY MUCUS OR PHLEGM?: NO/ONLY WITH OCCASIONAL COLDS OR INFECTIONS
HAVE YOU SMOKED AT LEAST 100 CIGARETTES IN YOUR ENTIRE LIFE: NO/DON'T KNOW
DURING THE PAST 4 WEEKS HOW MUCH DID YOU FEEL SHORT OF BREATH: NONE/LITTLE OF THE TIME
COPD SCREENING SCORE: 4

## 2019-07-19 ASSESSMENT — LIFESTYLE VARIABLES
EVER_SMOKED: YES
EVER_SMOKED: NEVER

## 2019-07-19 ASSESSMENT — GAIT ASSESSMENTS
ASSISTIVE DEVICE: NONE;FRONT WHEEL WALKER
GAIT LEVEL OF ASSIST: MINIMAL ASSIST
DISTANCE (FEET): 200

## 2019-07-19 ASSESSMENT — PATIENT HEALTH QUESTIONNAIRE - PHQ9
SUM OF ALL RESPONSES TO PHQ9 QUESTIONS 1 AND 2: 0
1. LITTLE INTEREST OR PLEASURE IN DOING THINGS: NOT AT ALL
2. FEELING DOWN, DEPRESSED, IRRITABLE, OR HOPELESS: NOT AT ALL

## 2019-07-19 ASSESSMENT — ACTIVITIES OF DAILY LIVING (ADL): TOILETING: INDEPENDENT

## 2019-07-19 NOTE — THERAPY
"Occupational Therapy Evaluation completed.   Functional Status:  Supervised with ADLs and txfs  Plan of Care: Patient with no further skilled OT needs in the acute care setting at this time  Discharge Recommendations: Currently anticipate no further skilled therapy needs once patient is discharged from the inpatient setting.    See \"Rehab Therapy-Acute\" Patient Summary Report for complete documentation.    "

## 2019-07-19 NOTE — PROGRESS NOTES
Pt escorted  to CT via bed with transport, pt tolerated procedure well, now back to room, hooked back to the monitor.

## 2019-07-19 NOTE — PROGRESS NOTES
Got patient from ER, Fall precaution in placed, unsteady right now. Baseline is steady gait. Discuss plan of care.

## 2019-07-19 NOTE — PROGRESS NOTES
A/o,assessment completed,poc discussed, verbalized understanding, pt stated not feeling  good,denies cp,sob, continues to vomit, zofran ineffective, will notify MD, Labs drawn and sent to lab.call button within reach,will continue to monitor.

## 2019-07-19 NOTE — CARE PLAN
Problem: Communication  Goal: The ability to communicate needs accurately and effectively will improve  Outcome: PROGRESSING AS EXPECTED      Problem: Safety  Goal: Will remain free from injury  Outcome: PROGRESSING AS EXPECTED      Problem: Pain Management  Goal: Pain level will decrease to patient's comfort goal  Outcome: PROGRESSING AS EXPECTED      Problem: Knowledge Deficit  Goal: Knowledge of disease process/condition, treatment plan, diagnostic tests, and medications will improve  Outcome: PROGRESSING AS EXPECTED

## 2019-07-19 NOTE — ASSESSMENT & PLAN NOTE
-Mild, suspect this is demand ischemia from head trauma and syncope.   -Further work-up for syncope as above.  Trend troponins.  Check echocardiogram, and sent for d-dimer.  Monitor on telemetry.

## 2019-07-19 NOTE — PROGRESS NOTES
CTA  Chest and Trop  Resulted ,trop trending up to 35., Pt resting comfortably,not in any form of distress. SR on tele hr=85, Dr. Camara  Notified .

## 2019-07-19 NOTE — ASSESSMENT & PLAN NOTE
S/p DOMINGA to LAD for STEMI on 12/22/2015.  Continue home irbesartan, metoprolol, rosuvastatin, and Eliquis.  Cardiology consulted.  Planning for stress test tomorrow.

## 2019-07-19 NOTE — PROGRESS NOTES
Assumed patient care, update her Plan of care. For PT / OT this morning. Feeling much better, continue monitor FSBS and Troponin. Denies chest pain and no SOB.

## 2019-07-19 NOTE — TELEPHONE ENCOUNTER
Received clearance request from [facility] for pt to have a Colonoscopy on 7/24/19. Pt on [facility] and [facility] requesting 3 day hold.     To ADD to advise

## 2019-07-19 NOTE — THERAPY
"Physical Therapy Evaluation completed.   Bed Mobility:  Supine to Sit: Supervised  Transfers: Sit to Stand: Minimal Assist  Gait: Level Of Assist: Minimal Assist with Front-Wheel Walker       Plan of Care: Will benefit from Physical Therapy 2 times per week and Plan to complete next treatment by Monday 7/22  Discharge Recommendations: Equipment: Front-Wheel Walker. Post-acute therapy Discharge to home with outpatient or home health for additional skilled therapy services.    Pt is a 74 year old female admitted to the hospital for syncopal episode. Pt found to have hyperglycemia and hyponatremia. Pt with elevated trops that have continued to trend upwards, however, pt cleared by MD to see for PT eval. At time of initial evaluation, pt required minimal assist to SPV for mobility tasks. Pt's B LE strength WNL's seated EOB, however, did note multiple LOB posteriorly in sitting with dynamic movements of LE's. Sit to stand without AD, minimal assist due to balance. Pt ambulated around unit with and without use of FWW. Without use of FWW, pt with significant veering in B direction and lateral trunk sway. Pt with narrow SAMPSON and inconsistent step and stride length. With use of FWW, improved stability. Pt reports feeling more stable with FWW. Spoke with pt about benefits of using FWW for balance/safety until she returns to baseline mobility. Pt would benefit from ongoing PT intervention while in the acute care setting. Pt will need FWW for DC home and follow up with HH vs outpatient PT upon DC.     See \"Rehab Therapy-Acute\" Patient Summary Report for complete documentation.     "

## 2019-07-19 NOTE — ASSESSMENT & PLAN NOTE
Echocardiogram showed mild worsening of her aortic stenosis.  Peak gradient 39 mmHg, mean gradient 26 mmHg.  Spoke with our TAVR team who recommended outpatient follow-up.  Murmur present.

## 2019-07-19 NOTE — ASSESSMENT & PLAN NOTE
Rate controlled on current regimen.  Continue home metoprolol.  Continue home Eliquis for anticoagulation.

## 2019-07-19 NOTE — DISCHARGE PLANNING
Care Transition Team Assessment    Spoke with patient, daughter and friend  at bedside with verbal consent. Anticipate no needs @ present time. PT recommended walker and one was ordered and delivered. Daughter will be ride @ D/C.     Information Source  Orientation : Oriented x 4  Information Given By: Patient    Readmission Evaluation  Is this a readmission?: No    Interdisciplinary Discharge Planning  Does Admitting Nurse Feel This Could be a Complex Discharge?: No  Primary Care Physician: Papi  Lives with - Patient's Self Care Capacity: Adult Children  Patient or legal guardian wants to designate a caregiver (see row info): No  Support Systems: Children, Friends / Neighbors  Housing / Facility: [address]  Do You Take your Prescribed Medications Regularly: Yes  Able to Return to Previous ADL's: Yes  Mobility Issues: No  Prior Services: Home-Independent  Patient Expects to be Discharged to:: Home  Assistance Needed: No  Durable Medical Equipment: Not Applicable    Discharge Preparedness  What are your discharge supports?: Child  Prior Functional Level: Ambulatory    Functional Assesment  Prior Functional Level: Ambulatory    Finances  Prescription Coverage: Yes    Anticipated Discharge Information  Anticipated discharge disposition: Home  Discharge Address: [address] [place] [address]  Discharge Contact Phone Number: [phone]

## 2019-07-19 NOTE — ASSESSMENT & PLAN NOTE
Could be from dehydration/volume depletion, given poor skin turgor, and dry lips and oral mucosa.  Continue IV fluids.  Blood sugars improving.    No evidence of seizure activity.  Carotid ultrasound showed mild stenosis bilaterally.  Echocardiogram showed mildly worse aortic stenosis.    D-dimer elevated but CTA was negative for PE.  No arrhythmias noted on telemetry monitoring.  Urine drug screen and alcohol level were negative.  No neurologic deficits on exam.  Troponins are trending up so cardiology was consulted and is planning for stress test tomorrow.

## 2019-07-19 NOTE — ASSESSMENT & PLAN NOTE
-Likely stress related.  She is not in DKA.  -I will hydrate her with lactated Ringer at 125 cc/h.  I will resume her home dose Lantus, and put her on sliding cell insulin coverage.  Continue to trend.

## 2019-07-19 NOTE — ASSESSMENT & PLAN NOTE
Continue home Lantus.   Glucose improving since admission, down to 236.  Continue Accu-Cheks ACHS with SSI as required.  Updated A1c is 13.4%.  Continue diabetic diet.  Hypoglycemia protocol in place if needed.

## 2019-07-19 NOTE — PROGRESS NOTES
Did gave FSBS coverage, Sinus Rhythm on cardiac monitor, denies chest pain. Start IV fluids, discuss monitoring BP and FSBS. PRN medication given .

## 2019-07-19 NOTE — CONSULTS
Cardiology Consult Note:  Resident:  Luciano Dumont M.D.  Attending:  Marco A Rowland M.D.    Date of Note:  2019   Referring MD:  Dr. Dumas / DEANDRE Rasheed      Patient ID:  Amrita Gao     1945   Age/Sex 74 y.o. female   MRN [id number]       Chief Complaint / Reason for Consult:  AS, and elv.trop    History of Presenting Illness:  Amrita Torrez is a 74 y.o. female with prior history of CAD, with MI in 2015 (with 2 stents placed), as well as HTN, HLD, DM-2, p-A-fib (on Eliquis); who came to the hospital after syncopal episode.    Patient was walking at [facility] with her grandchild, when she felt short of breath, lightheaded, and had to sit down.  After she sat on the bench, she then passed out.  She does not recall the specifics of passing out, but says there was a gradual onset, after getting short of breath, tired, and feeling lightheaded.  When she came to, she states that she was told that she was speaking strangely, with weird word choices, and slurred speech.      She came to the hospital, and had no acute finding on head CT, negative CTA of the chest, and found to have elevated blood sugar of 448, in the ER (with an A1c of 13.4).  She was then started on LR fluids, in the ER (but felt not to be DKA), and given Lantus.  Her blood-sugars have since improved to the mid-200's.  She states that she takes 1 dose of insulins, at night, at home.  However, she states that she had previously been on 3 times daily insulin as well; however, that was stopped about 2 months ago, by an NP in her PCP office.    Her troponins have been somewhat elevated: 27, 28, 28, 35, 38; and her echo noted: moderate AS, with area 1.32, peak-gradient of 39, and mean gradient of 26.  In 2018, she also had an echo, but at that time, only had peak of 30, and mean of 16. (worsened from prior echo).   As a result, cardiology was consulted.       Additionally, she notes for the past 2 months, she  has been noting some balance problems, as well as shortness of breath / dyspnea, and near-syncope feeling, while walking long distances.  She notes that this is gotten worse over the past month.  However, she states that today was the first time that she actually passed out from this type of feeling.  She states that most of the prior sensations were primarily of being off balance (that had required some family members, help support her - as she felt as if she was going to fall backwards, while taking large/weird steps).  Chart review also notes past history of Meniere's; however, she again verifies that she was also having shortness of breath and lightheadedness during these episodes, as well as sensation of feeling as though she was about to pass-out (intermittently, for the past couple months).    Chart review notes she was previously lost to cardiology follow-up, but she notes that she has an out-patient cardiologist (Dr. Tobar), and was scheduled to have an appointment with him today.  However, she missed this appointment, as she is now in the hospital.       Review of Symptoms  GEN/CONST:   Denies fever, chills or diaphoresis.    H/E:     Denies blurry vision or eye pain.  ENT:   Denies sore throat, or ear pain.      + Tinnitus (Hx of Meniere's)     + chronic hearing decrease (forgot hearing aids)   CARDIO:    Denies chest pain, palpitations, orthopnea, or edema.      + See HPI.   RESP:   Denies wheezing, or coughing.       + shortness of breath / dyspnea;  see HPI.   GI:    Denies nausea, vomiting, diarrhea, constipation, or abdominal pain.   :   Denies dysuria or frequency.    HEME:  Denies easy bruising or bleeding,        (despite being on Eliquis).   ALLG:  Denies  asthma, or hives.    MSK:  Denies focal weakness, joint pains, or swellings,   SKIN:  Denies rashes, or sores.   NEURO:  Denies headache, memory loss      + syncope, as per HPI.   ENDO:  Denies heat or cold intolerance,    PSYCH:  Denies  mood disorders or substance abuse.          Past Medical History:   Past Medical History:   Diagnosis Date   • CAD (coronary artery disease)    • Diabetes     oral meds   • Goiter    • Heart attack (HCC)    • Hyperlipidemia    • Hypertension    • MI (myocardial infarction) (HCC) 2016    x2 stints placed   • Pericardial effusion    • Thyroid nodule        Past Surgical History:  Past Surgical History:   Procedure Laterality Date   • LAPAROSCOPY  12/1/2014    Performed by Abdi Navarro M.D. at SURGERY SAME DAY [facility] ORS   • BAN BY LAPAROSCOPY  9/14/2012    Performed by ABDI NAVARRO at SURGERY SAME DAY [facility] ORS   • OTHER ORTHOPEDIC SURGERY  7/11    knee   • GYN SURGERY  1978    fibroids   • OTHER ABDOMINAL SURGERY  1966    appendectomy   • GYN SURGERY      hysterectomy   • [facility] CARDIAC CATH           Family History:  No family history on file.    Social History:  Social History     Social History   • Marital status:      Spouse name: N/A   • Number of children: N/A   • Years of education: N/A     Occupational History   • Not on file.     Social History Main Topics   • Smoking status: Former Smoker     Packs/day: 1.00     Years: 40.00     Types: Cigarettes     Quit date: 1/1/1978   • Smokeless tobacco: Never Used   • Alcohol use No   • Drug use: No   • Sexual activity: Not on file     Other Topics Concern   • Not on file     Social History Narrative   • No narrative on file       Medication Allergy/Sensitivities:  Allergies   Allergen Reactions   • Pcn [Penicillins] Hives and Swelling   • Sulfa Drugs Hives and Swelling       Current Outpatient Medications:  Home Medications     Reviewed by Michael Chan (Pharmacy Tech) on 07/18/19 at 1614  Med List Status: Complete   Medication Last Dose Status   apixaban (ELIQUIS) 5mg Tab 7/18/2019 Active   insulin glargine (LANTUS) 100 UNIT/ML Solution 7/17/2019 Active   irbesartan (AVAPRO) 150 MG Tab 7/18/2019 Active   latanoprost (XALATAN) 0.005 %  Solution 7/17/2019 Active   metoprolol (LOPRESSOR) 50 MG Tab 7/18/2019 Active   omeprazole (PRILOSEC) 20 MG delayed-release capsule 7/18/2019 Active   rosuvastatin (CRESTOR) 5 MG Tab 7/18/2019 Active   sertraline (ZOLOFT) 50 MG Tab 7/17/2019 Active   sucralfate (CARAFATE) 1 GM Tab 7/18/2019 Active   trazodone (DESYREL) 50 MG Tab 7/17/2019 Active   vitamin D, Ergocalciferol, (DRISDOL) 40510 units Cap capsule 7/14/2019 Active                Current Hospital Medications:    Current Facility-Administered Medications:   •  fentaNYL (SUBLIMAZE) injection 50 mcg, 50 mcg, Intravenous, Q30 MIN PRN, Roc Uribe M.D., 50 mcg at 07/18/19 1540  •  lactated ringers infusion, , Intravenous, Continuous, Robinson Dumas M.D., Last Rate: 125 mL/hr at 07/19/19 0527  •  apixaban (ELIQUIS) tablet 5 mg, 5 mg, Oral, BID, Robinson Dumas M.D., 5 mg at 07/19/19 0527  •  insulin glargine (LANTUS) injection 20 Units, 20 Units, Subcutaneous, Nightly, Robinson Dumas M.D., 20 Units at 07/18/19 2117  •  irbesartan (AVAPRO) tablet 150 mg, 150 mg, Oral, DAILY, Robinson Dumas M.D., 150 mg at 07/19/19 0528  •  latanoprost (XALATAN) 0.005 % ophthalmic solution 1 Drop, 1 Drop, Both Eyes, Nightly, Robinson Dumas M.D., 1 Drop at 07/18/19 2109  •  metoprolol (LOPRESSOR) tablet 50 mg, 50 mg, Oral, BID, Robinson Dumas M.D., 50 mg at 07/19/19 0528  •  omeprazole (PRILOSEC) capsule 20 mg, 20 mg, Oral, DAILY, Robinson Dumas M.D., 20 mg at 07/19/19 0528  •  rosuvastatin (CRESTOR) tablet 5 mg, 5 mg, Oral, DAILY, Robinson Dumas M.D., 5 mg at 07/19/19 0529  •  sertraline (ZOLOFT) tablet 50 mg, 50 mg, Oral, QHS, Robinson Dumas M.D., Stopped at 07/18/19 2100  •  sucralfate (CARAFATE) tablet 1 g, 1 g, Oral, Q6HRS, Robinson Dumas M.D., 1 g at 07/19/19 0529  •  traZODone (DESYREL) tablet 50 mg, 50 mg, Oral, QHS, Robinson Dumas M.D., Stopped at 07/18/19 2100  •  [START ON 7/21/2019] vitamin D (Ergocalciferol) (DRISDOL) capsule 50,000 Units, 50,000 Units,  Oral, Q SUNDAY, Robinson Dumas M.D.  •  Respiratory Care per Protocol, , Nebulization, Continuous RT, Robinson Dumas M.D.  •  acetaminophen (TYLENOL) tablet 650 mg, 650 mg, Oral, Q6HRS PRN, Robinosn Duams M.D.  •  enalaprilat (VASOTEC) injection 1.25 mg, 1.25 mg, Intravenous, Q6HRS PRN, Robinson Dumas M.D.  •  ondansetron (ZOFRAN) syringe/vial injection 4 mg, 4 mg, Intravenous, Q4HRS PRN, Robinson Dumas M.D., 4 mg at 07/18/19 1857  •  ondansetron (ZOFRAN ODT) dispertab 4 mg, 4 mg, Oral, Q4HRS PRN, Robinson Dumas M.D.  •  senna-docusate (PERICOLACE or SENOKOT S) 8.6-50 MG per tablet 2 Tab, 2 Tab, Oral, BID, 2 Tab at 07/18/19 1823 **AND** polyethylene glycol/lytes (MIRALAX) PACKET 1 Packet, 1 Packet, Oral, QDAY PRN **AND** magnesium hydroxide (MILK OF MAGNESIA) suspension 30 mL, 30 mL, Oral, QDAY PRN **AND** bisacodyl (DULCOLAX) suppository 10 mg, 10 mg, Rectal, QDAY PRN, Robinson Dumas M.D.  •  labetalol (NORMODYNE,TRANDATE) injection 10 mg, 10 mg, Intravenous, Q4HRS PRN, Robinson Dumas M.D.  •  insulin regular (HUMULIN R) injection 3-14 Units, 3-14 Units, Subcutaneous, 4X/DAY ACHS, 7 Units at 07/19/19 1159 **AND** Accu-Chek ACHS, , , Q AC AND BEDTIME(S) **AND** NOTIFY MD and PharmD, , , Once **AND** glucose 4 g chewable tablet 16 g, 16 g, Oral, Q15 MIN PRN **AND** DEXTROSE 10% BOLUS 250 mL, 250 mL, Intravenous, Q15 MIN PRN, Robinson Dumas M.D.  •  hydrALAZINE (APRESOLINE) injection 10 mg, 10 mg, Intravenous, Q4HRS PRN, Robinson Dumas M.D., 10 mg at 07/18/19 1823  •  prochlorperazine (COMPAZINE) injection 10 mg, 10 mg, Intravenous, Q6HRS PRN, Kim Christie M.D., 10 mg at 07/18/19 2109        Physical Exam     Vitals:    07/19/19 0456 07/19/19 0807 07/19/19 0954 07/19/19 1225   BP: 112/63 111/56  127/60   Pulse: 89 70 75 77   Resp: 17 17 16 17   Temp: 36.5 °C (97.7 °F) 36.9 °C (98.5 °F)  36.8 °C (98.3 °F)   TempSrc: Temporal Temporal  Temporal   SpO2: 97% 98% 96% 97%   Weight:       Height:          "Body mass index is 24.1 kg/m².  /60   Pulse 77   Temp 36.8 °C (98.3 °F) (Temporal)   Resp 17   Ht 1.651 m (5' 5\")   Wt 65.7 kg (144 lb 13.5 oz)   SpO2 97%   BMI 24.10 kg/m²   O2 therapy: Pulse Oximetry: 97 %, O2 (LPM): 0, FiO2%: 21 %, O2 Delivery: None (Room Air)    Physical Exam  GEN:   Alert and oriented, No apparent distress. (at time of exam).   H / E:   PERRLA.  EOMI. No scleral icterus.    ENT:   No erythema.  No exudates or discharges.     Trachea midline.  No stridor.  Supple neck.      Mallampati score: 3.     Impaired hearing.   HEART:  Regular rate and rhythm. No rubs or gallops.       + systolic murmur, but clear delineation of S2.     + radiation to carotids.   LUNGS:   Clear to auscultation and percussion bilaterally.   ABD/GI:  Soft.  +BS.  No rebound or guarding noted.  EXT/MSK:  No pedal edema.  Good distal pules.   Good general movements in all four extremities.   NEURO:  No focal weakness, bilaterally symmetrical sensation.   No tremor.  Currently with normal speech.  CN 2-12 intact.   SKIN: Clear. No rash noted.  PSYCH:  Normal mood and affect.       Data Review       Records Reviewed   (as per HPI, above, and as below).       Lab Data Review:  Recent Results (from the past 24 hour(s))   EKG (NOW)    Collection Time: 19  2:42 PM   Result Value Ref Range    Report       [facility] Emergency Dept.    Test Date:  2019  Pt Name:    SHANNAN ALCANTARA                 Department: ER  MRN:        [id number]                      Room:       RD 12  Gender:     Female                       Technician: [id number]  :        1945                   Requested By:ER TRIAGE PROTOCOL  Order #:    [id number]                    Reading MD: PEDRO AUGUSTIN MD    Measurements  Intervals                                Axis  Rate:       78                           P:          31  MO:         156                          QRS:        -55  QRSD:       104                          " T:          65  QT:         416  QTc:        474    Interpretive Statements  SINUS RHYTHM  LEFT ANTERIOR FASCICULAR BLOCK  LATE PRECORDIAL R/S TRANSITION  LVH WITH SECONDARY REPOLARIZATION ABNORMALITY  Compared to ECG 12/07/2018 06:59:39  Early repolarization now present    Electronically Signed On 7- 15:03:28 PDT by PEDRO AUGUSTIN MD     CBC WITH DIFFERENTIAL    Collection Time: 07/18/19  2:43 PM   Result Value Ref Range    WBC 7.2 4.8 - 10.8 K/uL    RBC 5.70 (H) 4.20 - 5.40 M/uL    Hemoglobin 16.1 (H) 12.0 - 16.0 g/dL    Hematocrit 44.2 37.0 - 47.0 %    MCV 77.5 (L) 81.4 - 97.8 fL    MCH 28.2 27.0 - 33.0 pg    MCHC 36.4 (H) 33.6 - 35.0 g/dL    RDW 38.3 35.9 - 50.0 fL    Platelet Count 214 164 - 446 K/uL    MPV 10.4 9.0 - 12.9 fL    Neutrophils-Polys 67.80 44.00 - 72.00 %    Lymphocytes 21.70 (L) 22.00 - 41.00 %    Monocytes 7.50 0.00 - 13.40 %    Eosinophils 1.80 0.00 - 6.90 %    Basophils 0.60 0.00 - 1.80 %    Immature Granulocytes 0.60 0.00 - 0.90 %    Nucleated RBC 0.00 /100 WBC    Neutrophils (Absolute) 4.87 2.00 - 7.15 K/uL    Lymphs (Absolute) 1.56 1.00 - 4.80 K/uL    Monos (Absolute) 0.54 0.00 - 0.85 K/uL    Eos (Absolute) 0.13 0.00 - 0.51 K/uL    Baso (Absolute) 0.04 0.00 - 0.12 K/uL    Immature Granulocytes (abs) 0.04 0.00 - 0.11 K/uL    NRBC (Absolute) 0.00 K/uL   COMP METABOLIC PANEL    Collection Time: 07/18/19  2:43 PM   Result Value Ref Range    Sodium 131 (L) 135 - 145 mmol/L    Potassium 3.6 3.6 - 5.5 mmol/L    Chloride 95 (L) 96 - 112 mmol/L    Co2 23 20 - 33 mmol/L    Anion Gap 13.0 (H) 0.0 - 11.9    Glucose 548 (HH) 65 - 99 mg/dL    Bun 17 8 - 22 mg/dL    Creatinine 1.09 0.50 - 1.40 mg/dL    Calcium 8.9 8.5 - 10.5 mg/dL    AST(SGOT) 14 12 - 45 U/L    ALT(SGPT) 11 2 - 50 U/L    Alkaline Phosphatase 91 30 - 99 U/L    Total Bilirubin 1.5 0.1 - 1.5 mg/dL    Albumin 3.4 3.2 - 4.9 g/dL    Total Protein 6.8 6.0 - 8.2 g/dL    Globulin 3.4 1.9 - 3.5 g/dL    A-G Ratio 1.0 g/dL   TROPONIN     Collection Time: 07/18/19  2:43 PM   Result Value Ref Range    Troponin T 27 (H) 6 - 19 ng/L   PRTOTHROMBIN TIME (INR)    Collection Time: 07/18/19  2:43 PM   Result Value Ref Range    PT 14.7 (H) 12.0 - 14.6 sec    INR 1.12 0.87 - 1.13   APTT    Collection Time: 07/18/19  2:43 PM   Result Value Ref Range    APTT 26.0 24.7 - 36.0 sec   ESTIMATED GFR    Collection Time: 07/18/19  2:43 PM   Result Value Ref Range    GFR If  59 (A) >60 mL/min/1.73 m 2    GFR If Non African American 49 (A) >60 mL/min/1.73 m 2   HEMOGLOBIN A1C    Collection Time: 07/18/19  2:43 PM   Result Value Ref Range    Glycohemoglobin 13.4 (H) 0.0 - 5.6 %    Est Avg Glucose 338 mg/dL   DIAGNOSTIC ALCOHOL    Collection Time: 07/18/19  2:43 PM   Result Value Ref Range    Diagnostic Alcohol 0.00 0.00 g/dL   D-DIMER    Collection Time: 07/18/19  5:06 PM   Result Value Ref Range    D-Dimer Screen 0.68 (H) 0.00 - 0.50 ug/mL (FEU)   Troponin    Collection Time: 07/18/19  5:06 PM   Result Value Ref Range    Troponin T 28 (H) 6 - 19 ng/L   MAGNESIUM    Collection Time: 07/18/19  5:06 PM   Result Value Ref Range    Magnesium 1.9 1.5 - 2.5 mg/dL   ACCU-CHEK GLUCOSE    Collection Time: 07/18/19  6:22 PM   Result Value Ref Range    Glucose - Accu-Ck 448 (HH) 65 - 99 mg/dL   Troponin    Collection Time: 07/18/19  8:35 PM   Result Value Ref Range    Troponin T 28 (H) 6 - 19 ng/L   ACCU-CHEK GLUCOSE    Collection Time: 07/18/19  9:04 PM   Result Value Ref Range    Glucose - Accu-Ck 409 (HH) 65 - 99 mg/dL   Troponin    Collection Time: 07/19/19  1:44 AM   Result Value Ref Range    Troponin T 35 (H) 6 - 19 ng/L   CBC with Differential    Collection Time: 07/19/19  1:44 AM   Result Value Ref Range    WBC 10.7 4.8 - 10.8 K/uL    RBC 5.04 4.20 - 5.40 M/uL    Hemoglobin 13.8 12.0 - 16.0 g/dL    Hematocrit 39.5 37.0 - 47.0 %    MCV 78.4 (L) 81.4 - 97.8 fL    MCH 27.4 27.0 - 33.0 pg    MCHC 34.9 33.6 - 35.0 g/dL    RDW 39.7 35.9 - 50.0 fL    Platelet Count  232 164 - 446 K/uL    MPV 10.5 9.0 - 12.9 fL    Neutrophils-Polys 79.30 (H) 44.00 - 72.00 %    Lymphocytes 12.90 (L) 22.00 - 41.00 %    Monocytes 6.90 0.00 - 13.40 %    Eosinophils 0.10 0.00 - 6.90 %    Basophils 0.40 0.00 - 1.80 %    Immature Granulocytes 0.40 0.00 - 0.90 %    Nucleated RBC 0.00 /100 WBC    Neutrophils (Absolute) 8.52 (H) 2.00 - 7.15 K/uL    Lymphs (Absolute) 1.39 1.00 - 4.80 K/uL    Monos (Absolute) 0.74 0.00 - 0.85 K/uL    Eos (Absolute) 0.01 0.00 - 0.51 K/uL    Baso (Absolute) 0.04 0.00 - 0.12 K/uL    Immature Granulocytes (abs) 0.04 0.00 - 0.11 K/uL    NRBC (Absolute) 0.00 K/uL   Basic Metabolic Panel (BMP)    Collection Time: 07/19/19  1:44 AM   Result Value Ref Range    Sodium 135 135 - 145 mmol/L    Potassium 3.5 (L) 3.6 - 5.5 mmol/L    Chloride 101 96 - 112 mmol/L    Co2 24 20 - 33 mmol/L    Glucose 260 (H) 65 - 99 mg/dL    Bun 21 8 - 22 mg/dL    Creatinine 1.40 0.50 - 1.40 mg/dL    Calcium 8.6 8.5 - 10.5 mg/dL    Anion Gap 10.0 0.0 - 11.9   ESTIMATED GFR    Collection Time: 07/19/19  1:44 AM   Result Value Ref Range    GFR If  44 (A) >60 mL/min/1.73 m 2    GFR If Non  37 (A) >60 mL/min/1.73 m 2   URINE DRUG SCREEN    Collection Time: 07/19/19  5:06 AM   Result Value Ref Range    Amphetamines Urine Negative Negative    Barbiturates Negative Negative    Benzodiazepines Negative Negative    Cocaine Metabolite Negative Negative    Methadone Negative Negative    Opiates Negative Negative    Oxycodone Negative Negative    Phencyclidine -Pcp Negative Negative    Propoxyphene Negative Negative    Cannabinoid Metab Negative Negative   ACCU-CHEK GLUCOSE    Collection Time: 07/19/19  5:26 AM   Result Value Ref Range    Glucose - Accu-Ck 236 (H) 65 - 99 mg/dL   EC-ECHOCARDIOGRAM COMPLETE W/O CONT    Collection Time: 07/19/19  6:00 AM   Result Value Ref Range    Eject.Frac. MOD BP 62.64     Eject.Frac. MOD 4C 53.05     Eject.Frac. MOD 2C 69.78     Left Ventrical Ejection  Fraction 70    TROPONIN    Collection Time: 19  9:45 AM   Result Value Ref Range    Troponin T 38 (H) 6 - 19 ng/L   EKG    Collection Time: 19 11:20 AM   Result Value Ref Range    Report       [facility]    Test Date:  2019  Pt Name:    SHANNAN ALCANTARA                 Department: [facility]  MRN:        [id number]                      Room:       [facility]  Gender:     Female                       Technician: SARAY  :        1945                   Requested By:JOSE RAUL CROWELL  Order #:    [id number]                    Reading MD:    Measurements  Intervals                                Axis  Rate:       79                           P:          60  TN:         164                          QRS:        -69  QRSD:       112                          T:          204  QT:         476  QTc:        546    Interpretive Statements  SINUS RHYTHM  LVH WITH IVCD, LAD AND SECONDARY REPOL ABNRM  Compared to ECG 2019 14:42:22  Intraventricular conduction delay now present  Left anterior fascicular block no longer present     ACCU-CHEK GLUCOSE    Collection Time: 19 11:57 AM   Result Value Ref Range    Glucose - Accu-Ck 290 (H) 65 - 99 mg/dL       Imaging/Procedures Review:    Imaging Review: Completed     EC-ECHOCARDIOGRAM COMPLETE W/O CONT   Final Result      CT-CTA CHEST PULMONARY ARTERY W/ RECONS   Final Result         1.  No pulmonary embolus appreciated.   2.  Large left thyroid nodule, recommend sonographic follow-up given size of lesion.   3.  Atherosclerosis and atherosclerotic coronary artery disease.      US-CAROTID DOPPLER BILAT   Final Result      CT-HEAD W/O   Final Result      No acute intracranial abnormality is identified.      Atrophy      There are periventricular and subcortical white matter changes present.  This finding is nonspecific and could be from previous small vessel ischemia, demyelination, or gliosis.      Focal hypodensities in the basal ganglia bilaterally are likely  related to prior lacunar infarcts.      DX-CHEST-PORTABLE (1 VIEW)   Final Result      No consolidation.           ECHO - 7/19/2019:  Moderate aortic stenosis   Transvalvular gradients are      - Peak: 39 mmHg, Mean: 26 mm Hg.  Mild concentric left ventricular hypertrophy.  Normal left ventricular systolic function.  Basal inferoseptal and basal inferior hypokinesis.  Left ventricular ejection fraction is visually estimated to be 70%.  Mild mitral regurgitation.  Mildly dilated left atrium.  Structurally normal tricuspid valve without significant stenosis or regurgitation.  Normal inferior vena cava size and inspiratory collapse.  The right ventricle was normal in size and function.  Unable to estimate pulmonary artery pressure due to an inadequate tricuspid regurgitant jet.  Normal pericardium without effusion.  Compared to prior echo on 12/8/18, aortic stenosis has slightly worsened.      EKG - review....   EKGs noted as above.   However, the most recent one had T-wave inversions (in lateral leads) - that were not there on prior EKG.   (in setting of slight increase of troponin levels).            Assessment & Plan       # Aortic Stenosis  (moderate).  # elevated Troponin level (mild)  # T-wave inversion (new) / abnormal EKG.   # Hx of paroxysmal A-fib  # HTN  Mild increase in AS, in past 8 months. But, given patient's DM-2, with hyperglycemia, unclear if AS is related to recent syncope episode.  Pt has 2 month hx of symptoms, that could be related to AS, but also might be attributed to hyperglycemia and hx of Meniere's dz.   - given symptoms, will require close cardiac follow-up, after (eventual) discharge.   - may consider changing HTN meds:       (can continue lopressor today).        -  Consider changing beta-blocker to coreg, tomorrow.           (for p-a-fib, in the setting of AS).         -  Consider use of ACE/ARB, or diuretic, in setting of AS.   - Given the change in EKG (new TWI), will get NM stress test  tomorrow.       Case discussed with primary team.   Primary team to manage her other issues, including goal of better diabetic control.      A computerized dictation system may have been used for this note.    Despite review, there may be some spelling or grammatical errors.    Luciano Dumont M.D.  7/19/2019   Service:  Cardiology    Attending: Marco A Rowland M.D.

## 2019-07-19 NOTE — H&P
Hospital Medicine History & Physical Note    Date of Service  7/18/2019    Primary Care Physician  Fitz Turpin D.O.    Consultants  none    Code Status  Full    Chief Complaint  Syncope    History of Presenting Illness  74 y.o. female with paroxysmal atrial fibrillation on anticoagulation, CAD, type 2 diabetes mellitus, hypertension, and hyperlipidemia, who was doing well until this afternoon while walking to the toy aisle at [facility] with her grandson, when she suddenly felt very weak, and lightheaded, and slightly short of breath.  She denied any chest pain, palpitations, focal weakness or numbness, or leg edema/pain.  She sat on the bench, and started to open a candy to eat.  However she subsequently passed out while sitting down.  She did not have any seizures, or episode of incontinence.  She hit her head on the floor.  She recovered her consciousness, but per report she had a brief confusion, with slight slurring of speech which resolved quickly.  She felt nauseated after she woke up, and had 3 episodes of vomiting in route to the ED.     Otherwise, she did not have any other complaints such as fevers, chills, diarrhea, abdominal pain, cough, or dysuria.  She states she ate adequately today, and actually went to [employer] [place] to eat before going to [facility].    ED course:  The patient was initially evaluated.  She had significantly elevated blood pressure.  Initial blood work-up showed no leukocytosis.  Sodium was 131, and anion gap of 13, with normal CO2.  Creatinine is within her baseline.  Troponin was mildly elevated at 27.  Head CT was obtained which did not show an acute intracranial pathology, with prior lacunar infarcts, and atrophy.  Chest x-ray (personally reviewed) did not show any infiltrates or consolidation, effusion, or pneumothorax.  EKG (personally reviewed) showed normal sinus rhythm, with old left anterior fascicular block, and LVH.  Patient was given fentanyl for pain control.  She was  subsequently admitted to the hospitalist service.    Review of Systems  ROS     Pertinent positives/negatives as mentioned above.     A complete review of systems was personally done by me. All other systems were negative.       Past Medical History   has a past medical history of CAD (coronary artery disease); Diabetes; Goiter; Heart attack (HCC); Hyperlipidemia; Hypertension; MI (myocardial infarction) (HCC) (2016); Pericardial effusion; and Thyroid nodule. She also has no past medical history of Breast cancer (HCC).    Surgical History   has a past surgical history that includes other orthopedic surgery (7/11); other abdominal surgery (1966); gyn surgery (1978); gyn surgery; aditya by laparoscopy (9/14/2012); laparoscopy (12/1/2014); and zzz cardiac cath.     Family History  Reviewed. Non-contributory.       Social History   reports that she quit smoking about 41 years ago. Her smoking use included Cigarettes. She has a 40.00 pack-year smoking history. She has never used smokeless tobacco. She reports that she does not drink alcohol or use drugs.    Allergies  Allergies   Allergen Reactions   • Pcn [Penicillins] Hives and Swelling   • Sulfa Drugs Hives and Swelling       Medications  Prior to Admission Medications   Prescriptions Last Dose Informant Patient Reported? Taking?   apixaban (ELIQUIS) 5mg Tab 7/18/2019 at AM Patient No No   Sig: Take 1 Tab by mouth 2 Times a Day.   insulin glargine (LANTUS) 100 UNIT/ML Solution 7/17/2019 at PM Patient Yes No   Sig: Inject 20 Units as instructed every evening.   irbesartan (AVAPRO) 150 MG Tab 7/18/2019 at AM Patient Yes No   Sig: Take 150 mg by mouth every day.   latanoprost (XALATAN) 0.005 % Solution 7/17/2019 at PM Patient Yes No   Sig: Place 1 Drop in both eyes every evening.   metoprolol (LOPRESSOR) 50 MG Tab 7/18/2019 at AM Patient No No   Sig: Take 1 Tab by mouth 2 times a day.   omeprazole (PRILOSEC) 20 MG delayed-release capsule 7/18/2019 at AM Patient Yes Yes    Sig: Take 20 mg by mouth every day.   rosuvastatin (CRESTOR) 5 MG Tab 7/18/2019 at AM Patient Yes No   Sig: Take 5 mg by mouth every day.   sertraline (ZOLOFT) 50 MG Tab 7/17/2019 at PM Patient Yes No   Sig: Take 50 mg by mouth every bedtime.   sucralfate (CARAFATE) 1 GM Tab 7/18/2019 at AM Patient No No   Sig: Take 1 Tab by mouth every 6 hours.   trazodone (DESYREL) 50 MG Tab 7/17/2019 at PM Patient Yes No   Sig: Take 50 mg by mouth every bedtime.   vitamin D, Ergocalciferol, (DRISDOL) 05743 units Cap capsule 7/14/2019 at UNK Patient Yes No   Sig: Take 50,000 Units by mouth every Sunday.      Facility-Administered Medications: None       Physical Exam  Temp:  [36.3 °C (97.3 °F)] 36.3 °C (97.3 °F)  Pulse:  [75-80] 78  Resp:  [16] 16  BP: (192)/(79) 192/79  SpO2:  [97 %-99 %] 97 %    Physical Exam   Constitutional: She is oriented to person, place, and time. She appears well-developed and well-nourished. No distress.   HENT:   Head: Normocephalic and atraumatic.   Mouth/Throat: No oropharyngeal exudate.   Dry lips and oral mucosa  Head contusion on the left forehead   Eyes: Pupils are equal, round, and reactive to light. Conjunctivae are normal. No scleral icterus.   Neck: Normal range of motion. Neck supple.   Cardiovascular: Normal rate and regular rhythm.  Exam reveals no gallop and no friction rub.    No murmur heard.  Pulmonary/Chest: Effort normal and breath sounds normal. No respiratory distress. She has no wheezes. She has no rales. She exhibits no tenderness.   Abdominal: Soft. Bowel sounds are normal. She exhibits no distension. There is no tenderness. There is no rebound and no guarding.   Musculoskeletal: Normal range of motion. She exhibits no edema or tenderness.   Lymphadenopathy:     She has no cervical adenopathy.   Neurological: She is alert and oriented to person, place, and time. No cranial nerve deficit.   Nonfocal neurologic exam.  Symmetric motor and sensory   Skin: Skin is warm and dry. No  rash noted. She is not diaphoretic. No erythema. No pallor.   Psychiatric: She has a normal mood and affect. Her behavior is normal. Judgment and thought content normal.   Nursing note and vitals reviewed.      Laboratory:  Recent Labs      07/18/19   1443   WBC  7.2   RBC  5.70*   HEMOGLOBIN  16.1*   HEMATOCRIT  44.2   MCV  77.5*   MCH  28.2   MCHC  36.4*   RDW  38.3   PLATELETCT  214   MPV  10.4     Recent Labs      07/18/19   1443   SODIUM  131*   POTASSIUM  3.6   CHLORIDE  95*   CO2  23   GLUCOSE  548*   BUN  17   CREATININE  1.09   CALCIUM  8.9     Recent Labs      07/18/19   1443   ALTSGPT  11   ASTSGOT  14   ALKPHOSPHAT  91   TBILIRUBIN  1.5   GLUCOSE  548*     Recent Labs      07/18/19   1443   APTT  26.0   INR  1.12     No results for input(s): NTPROBNP in the last 72 hours.      Recent Labs      07/18/19   1443   TROPONINT  27*       Urinalysis:    No results found     Imaging:  CT-HEAD W/O   Final Result      No acute intracranial abnormality is identified.      Atrophy      There are periventricular and subcortical white matter changes present.  This finding is nonspecific and could be from previous small vessel ischemia, demyelination, or gliosis.      Focal hypodensities in the basal ganglia bilaterally are likely related to prior lacunar infarcts.      DX-CHEST-PORTABLE (1 VIEW)   Final Result      No consolidation.      EC-ECHOCARDIOGRAM COMPLETE W/O CONT    (Results Pending)   US-CAROTID DOPPLER BILAT    (Results Pending)         Assessment/Plan:  I anticipate this patient is appropriate for observation status at this time.    * Syncope- (present on admission)   Assessment & Plan    -Could be from dehydration/volume depletion, given poor skin turgor, and dry lips and oral mucosa.  She is also hyperglycemic, along with hyponatremia.  She does have mild troponin elevation which could be demand ischemia.  No evidence of seizures.  - I will further work her up for syncope with echocardiogram, and  carotid ultrasound.  Check d-dimer, and if positive proceed to rule out PE.  Trend troponins.  - Monitor on telemetry to rule out any other arrhythmias. If telemetry negative, she may need an event monitor or ziopatch placed for extended monitoring. Will trend troponins.   - check blood alcohol level and urine drug screen.  - I will start her on IV fluid rehydration with LR at 125 cc/hr. Check orthostatic vitals.   -I will have nursing do neuro checks every 4 hours to monitor, especially that she fell and hit her head while on anticoagulation.  Low threshold to repeat head CT with any neurologic changes.     Elevated troponin- (present on admission)   Assessment & Plan    -Mild, suspect this is demand ischemia from head trauma and syncope.   -Further work-up for syncope as above.  Trend troponins.  Check echocardiogram, and sent for d-dimer.  Monitor on telemetry.     Hyperglycemia without ketosis- (present on admission)   Assessment & Plan    -Likely stress related.  She is not in DKA.  -I will hydrate her with lactated Ringer at 125 cc/h.  I will resume her home dose Lantus, and put her on sliding cell insulin coverage.  Continue to trend.     Acquired circulating anticoagulants (HCC)- (present on admission)   Assessment & Plan    -Will resume her on Eliquis.  No signs of bleeding, but watch closely.     Hypertension- (present on admission)   Assessment & Plan    -Elevated blood pressure likely due to stress.  Resume home dose Lopressor, and Avapro.  Monitor blood pressure trend closely, with as needed IV labetalol, Vasotec, and hydralazine for significant hypertension.     Paroxysmal atrial fibrillation (HCC)- (present on admission)   Assessment & Plan    -Maintaining normal sinus rhythm, and rate control.  Resume home dose Lopressor, and Eliquis.  Monitor on telemetry.     CKD (chronic kidney disease), stage III (HCC)- (present on admission)   Assessment & Plan    -Stable at baseline renal function.  Continue to  monitor.  Start IV fluid hydration.  - avoid nephrotoxins, and continue to renally dose all medications.     Coronary artery disease involving native coronary artery of native heart without angina pectoris- (present on admission)   Assessment & Plan    -Resume Eliquis, statin, metoprolol, and Avapro.  Does not appear to be an ACS.  Elevated troponin mild, likely from head trauma.  Trend troponins.  Check echocardiogram.  Monitor on telemetry.     HLD (hyperlipidemia)- (present on admission)   Assessment & Plan    -Resume Crestor.     Type 2 diabetes mellitus without complication (HCC)- (present on admission)   Assessment & Plan    -I will resume her home dose Lantus, and put her on sliding scale insulin coverage while in-house.  Check hemoglobin A1c. Accu-Cheks before meals and at bedtime. Goal to keep BP between 140-180 per 2019 ADA guidelines.           VTE prophylaxis: Eliquis

## 2019-07-19 NOTE — PROGRESS NOTES
Patient resting well, aware regarding stress test tomorrow. Denies chest pain. Seen by Cardiologist, for Tele transfer. Sinus Rhythm on cardiac monitor.

## 2019-07-19 NOTE — ASSESSMENT & PLAN NOTE
Maintained on Eliquis at home for paroxysmal atrial fibrillation.  No evidence of bleeding.  Will restart and monitor closely.

## 2019-07-19 NOTE — ASSESSMENT & PLAN NOTE
Elevated up to systolic of 214 yesterday.  Has trended down and been normal since approximately 8 PM last night.  Continue home irbesartan and metoprolol.  Per cardiology, will transition metoprolol to carvedilol prior to discharge.

## 2019-07-19 NOTE — ASSESSMENT & PLAN NOTE
Only mildly worse than her baseline. Continue IV fluids and recheck tomorrow.  Avoid nephrotoxins and renally dose all applicable medications.

## 2019-07-19 NOTE — PROGRESS NOTES
Hospital Medicine Daily Progress Note    Date of Service  7/19/2019    Chief Complaint  Syncope, headache    Hospital Course   Ms. Torrez is a 74-year-old female with a past medical history of paroxysmal atrial fibrillation maintained on Eliquis outpatient, CAD, type 2 diabetes, hypertension, and hyperlipidemia who was brought to the emergency department on 7/18/2019 after she experienced headache with a syncopal event while shopping at [employer].  After she woke up she vomited twice.  CT head was negative for acute abnormalities.  Her chest x-ray was unremarkable.  A blood sugar done by EMS was greater than 500 and her blood pressure was noted to be elevated.  Initial lab work-up done in the emergency room showed no leukocytosis, hyponatremia with a sodium of 131 likely related to hyperglycemia (blood sugar 548), anion gap elevation, and stable CKD.  An updated A1c was obtained and was 13.4.  Her diagnostic alcohol level was 0.  Troponins were elevated at 27 but were thought to be secondary to demand ischemia.  Her EKG showed sinus rhythm with no evidence of acute ischemia or infarct.  D-dimer was elevated at 0.68 so a CTA was done and was negative for pulmonary embolism. Carotid doppler studies showed very mild stenosis bilaterally.  An echocardiogram revealed mild worsening of her aortic stenosis.  This was discussed with our TAVR team who recommended outpatient follow-up.  However, her troponins continued to rise so cardiology was subsequently consulted.  On her follow-up EKG she did have new T wave inversions so they have recommended a nuclear medicine stress test to be done tomorrow.       Interval Problem Update  Feeling great, no chest pain or shortness of breath, denies any other abnormal symptoms.  Is in good spirits and laughing with her friends at the bedside.    Troponin is up to 35 today.  Urine drug screen was negative.  Her white count is normal, renal function stable, potassium minimally low at 3.5,  and her glucose is down to 236. Hyponatremia resolved now that her glucose is improved.      Afebrile overnight, HR 70s-90s, SBP 100s-214, O2 saturations are 95-99% on room air.    Ambulated with PT/OT this morning who recommended a front wheel walker, which has been delivered to the bedside.     Consultants/Specialty  Cardiology-Dr. Rowland    Code Status  Full code    Disposition  Admit to telemetry for continued observation pending stress test tomorrow.    Review of Systems  Review of Systems   Constitutional: Negative for chills, diaphoresis, fever and malaise/fatigue.   HENT: Positive for hearing loss. Negative for congestion, sinus pain and sore throat.    Respiratory: Negative for cough, shortness of breath and wheezing.    Cardiovascular: Negative for chest pain, palpitations, orthopnea, leg swelling and PND.   Gastrointestinal: Negative for abdominal pain, blood in stool, constipation, diarrhea, melena, nausea and vomiting.   Genitourinary: Negative for dysuria, flank pain, frequency, hematuria and urgency.   Musculoskeletal:        Denies pain   Skin: Negative for itching and rash.   Neurological: Negative for dizziness, tingling, sensory change, speech change, focal weakness, weakness and headaches.   Endo/Heme/Allergies: Does not bruise/bleed easily.   Psychiatric/Behavioral: Negative for depression. The patient is not nervous/anxious and does not have insomnia.    All other systems reviewed and are negative.     Physical Exam  Temp:  [36.3 °C (97.3 °F)-36.9 °C (98.5 °F)] 36.8 °C (98.3 °F)  Pulse:  [70-94] 77  Resp:  [16-18] 17  BP: (101-214)/() 127/60  SpO2:  [95 %-99 %] 97 %    Physical Exam   Constitutional: She is oriented to person, place, and time. She appears well-developed and well-nourished. She is active and cooperative. She does not appear ill. No distress.   HENT:   Head: Normocephalic and atraumatic.   Right Ear: External ear normal.   Left Ear: External ear normal.   Mouth/Throat:  Oropharynx is clear and moist and mucous membranes are normal. Abnormal dentition.   Eyes: Pupils are equal, round, and reactive to light. Conjunctivae are normal. Right eye exhibits no discharge. Left eye exhibits no discharge. No scleral icterus.   Neck: Normal range of motion and phonation normal. Neck supple. No JVD present.   Cardiovascular: Normal rate, regular rhythm and intact distal pulses.  Exam reveals no gallop and no friction rub.    Murmur heard.  Pulmonary/Chest: Effort normal and breath sounds normal. No accessory muscle usage or stridor. No respiratory distress. She has no decreased breath sounds. She has no wheezes. She has no rhonchi. She has no rales.   Abdominal: Soft. She exhibits no distension and no abdominal bruit. Bowel sounds are decreased. There is no tenderness. There is no rigidity and no guarding.   Musculoskeletal: Normal range of motion. She exhibits no edema.   Neurological: She is alert and oriented to person, place, and time. She has normal strength. No cranial nerve deficit or sensory deficit. Coordination and gait normal. GCS eye subscore is 4. GCS verbal subscore is 5. GCS motor subscore is 6.   Skin: Skin is warm and dry. No rash noted. She is not diaphoretic. No erythema. No pallor.   Psychiatric: She has a normal mood and affect. Her speech is normal and behavior is normal. Judgment and thought content normal. Cognition and memory are normal.   Nursing note and vitals reviewed.    Fluids    Intake/Output Summary (Last 24 hours) at 07/19/19 1324  Last data filed at 07/19/19 0509   Gross per 24 hour   Intake                0 ml   Output              550 ml   Net             -550 ml     Laboratory  Recent Labs      07/18/19   1443  07/19/19   0144   WBC  7.2  10.7   RBC  5.70*  5.04   HEMOGLOBIN  16.1*  13.8   HEMATOCRIT  44.2  39.5   MCV  77.5*  78.4*   MCH  28.2  27.4   MCHC  36.4*  34.9   RDW  38.3  39.7   PLATELETCT  214  232   MPV  10.4  10.5     Recent Labs       07/18/19   1443  07/19/19   0144   SODIUM  131*  135   POTASSIUM  3.6  3.5*   CHLORIDE  95*  101   CO2  23  24   GLUCOSE  548*  260*   BUN  17  21   CREATININE  1.09  1.40   CALCIUM  8.9  8.6     Recent Labs      07/18/19   1443   APTT  26.0   INR  1.12     Imaging  EC-ECHOCARDIOGRAM COMPLETE W/O CONT   Final Result      CT-CTA CHEST PULMONARY ARTERY W/ RECONS   Final Result         1.  No pulmonary embolus appreciated.   2.  Large left thyroid nodule, recommend sonographic follow-up given size of lesion.   3.  Atherosclerosis and atherosclerotic coronary artery disease.      US-CAROTID DOPPLER BILAT   Final Result      CT-HEAD W/O   Final Result      No acute intracranial abnormality is identified.      Atrophy      There are periventricular and subcortical white matter changes present.  This finding is nonspecific and could be from previous small vessel ischemia, demyelination, or gliosis.      Focal hypodensities in the basal ganglia bilaterally are likely related to prior lacunar infarcts.      DX-CHEST-PORTABLE (1 VIEW)   Final Result      No consolidation.         Assessment/Plan  * Syncope- (present on admission)   Assessment & Plan    Could be from dehydration/volume depletion, given poor skin turgor, and dry lips and oral mucosa.  Continue IV fluids.  Blood sugars improving.    No evidence of seizure activity.  Carotid ultrasound showed mild stenosis bilaterally.  Echocardiogram showed mildly worse aortic stenosis.    D-dimer elevated but CTA was negative for PE.  No arrhythmias noted on telemetry monitoring.  Urine drug screen and alcohol level were negative.  No neurologic deficits on exam.  Troponins are trending up so cardiology was consulted and is planning for stress test tomorrow.     Nonrheumatic aortic valve stenosis- (present on admission)   Assessment & Plan    Echocardiogram showed mild worsening of her aortic stenosis.  Peak gradient 39 mmHg, mean gradient 26 mmHg.  Spoke with our TAVR team who  recommended outpatient follow-up.  Murmur present.     Acquired circulating anticoagulants (HCC)- (present on admission)   Assessment & Plan    Maintained on Eliquis at home for paroxysmal atrial fibrillation.  No evidence of bleeding.  Will restart and monitor closely.     Hypertension- (present on admission)   Assessment & Plan    Elevated up to systolic of 214 yesterday.  Has trended down and been normal since approximately 8 PM last night.  Continue home irbesartan and metoprolol.  Per cardiology, will transition metoprolol to carvedilol prior to discharge.     Paroxysmal atrial fibrillation (HCC)- (present on admission)   Assessment & Plan    Rate controlled on current regimen.  Continue home metoprolol.  Continue home Eliquis for anticoagulation.     Hypokalemia- (present on admission)   Assessment & Plan    Very very mild at 3.5.  Will recheck tomorrow.     CKD (chronic kidney disease), stage III (HCC)- (present on admission)   Assessment & Plan    Only mildly worse than her baseline. Continue IV fluids and recheck tomorrow.  Avoid nephrotoxins and renally dose all applicable medications.     Coronary artery disease involving native coronary artery of native heart without angina pectoris- (present on admission)   Assessment & Plan    S/p DOMINGA to LAD for STEMI on 12/22/2015.  Continue home irbesartan, metoprolol, rosuvastatin, and Eliquis.  Cardiology consulted.  Planning for stress test tomorrow.     Dyslipidemia- (present on admission)   Assessment & Plan    Lipid profile checked in February of this year showed an elevated triglyceride level, low HDL, and elevated LDL.  Has been maintained on Crestor 5 mg nightly.  Increased to 20 mg nightly to start tomorrow.     Type 2 diabetes mellitus without complication (HCC)- (present on admission)   Assessment & Plan    Continue home Lantus.   Glucose improving since admission, down to 236.  Continue Accu-Cheks ACHS with SSI as required.  Updated A1c is  13.4%.  Continue diabetic diet.  Hypoglycemia protocol in place if needed.        VTE prophylaxis: Eliquis

## 2019-07-19 NOTE — CARE PLAN
Problem: Safety  Goal: Will remain free from injury  Outcome: MET Date Met: 07/19/19  Safety precaution in placed. Seen by PT and OT.     Problem: Knowledge Deficit  Goal: Knowledge of disease process/condition, treatment plan, diagnostic tests, and medications will improve  Outcome: MET Date Met: 07/19/19  Update plan of care. Continue monitoring Troponin .

## 2019-07-19 NOTE — ASSESSMENT & PLAN NOTE
Lipid profile checked in February of this year showed an elevated triglyceride level, low HDL, and elevated LDL.  Has been maintained on Crestor 5 mg nightly.  Increased to 20 mg nightly to start tomorrow.

## 2019-07-20 ENCOUNTER — APPOINTMENT (OUTPATIENT)
Dept: RADIOLOGY | Facility: MEDICAL CENTER | Age: 74
End: 2019-07-20
Attending: NURSE PRACTITIONER
Payer: MEDICARE

## 2019-07-20 VITALS
BODY MASS INDEX: 24.13 KG/M2 | HEIGHT: 65 IN | WEIGHT: 144.84 LBS | RESPIRATION RATE: 18 BRPM | OXYGEN SATURATION: 94 % | TEMPERATURE: 97.2 F | HEART RATE: 77 BPM | SYSTOLIC BLOOD PRESSURE: 148 MMHG | DIASTOLIC BLOOD PRESSURE: 75 MMHG

## 2019-07-20 LAB
ANION GAP SERPL CALC-SCNC: 8 MMOL/L (ref 0–11.9)
BASOPHILS # BLD AUTO: 0.4 % (ref 0–1.8)
BASOPHILS # BLD: 0.04 K/UL (ref 0–0.12)
BUN SERPL-MCNC: 22 MG/DL (ref 8–22)
CALCIUM SERPL-MCNC: 8.2 MG/DL (ref 8.5–10.5)
CHLORIDE SERPL-SCNC: 102 MMOL/L (ref 96–112)
CO2 SERPL-SCNC: 25 MMOL/L (ref 20–33)
CREAT SERPL-MCNC: 1.57 MG/DL (ref 0.5–1.4)
EOSINOPHIL # BLD AUTO: 0.24 K/UL (ref 0–0.51)
EOSINOPHIL NFR BLD: 2.7 % (ref 0–6.9)
ERYTHROCYTE [DISTWIDTH] IN BLOOD BY AUTOMATED COUNT: 41.5 FL (ref 35.9–50)
GLUCOSE BLD-MCNC: 185 MG/DL (ref 65–99)
GLUCOSE BLD-MCNC: 193 MG/DL (ref 65–99)
GLUCOSE BLD-MCNC: 257 MG/DL (ref 65–99)
GLUCOSE SERPL-MCNC: 156 MG/DL (ref 65–99)
HCT VFR BLD AUTO: 37.9 % (ref 37–47)
HGB BLD-MCNC: 12.8 G/DL (ref 12–16)
IMM GRANULOCYTES # BLD AUTO: 0.02 K/UL (ref 0–0.11)
IMM GRANULOCYTES NFR BLD AUTO: 0.2 % (ref 0–0.9)
LYMPHOCYTES # BLD AUTO: 3.07 K/UL (ref 1–4.8)
LYMPHOCYTES NFR BLD: 34.5 % (ref 22–41)
MAGNESIUM SERPL-MCNC: 1.7 MG/DL (ref 1.5–2.5)
MCH RBC QN AUTO: 27.2 PG (ref 27–33)
MCHC RBC AUTO-ENTMCNC: 33.8 G/DL (ref 33.6–35)
MCV RBC AUTO: 80.5 FL (ref 81.4–97.8)
MONOCYTES # BLD AUTO: 0.85 K/UL (ref 0–0.85)
MONOCYTES NFR BLD AUTO: 9.6 % (ref 0–13.4)
NEUTROPHILS # BLD AUTO: 4.67 K/UL (ref 2–7.15)
NEUTROPHILS NFR BLD: 52.6 % (ref 44–72)
NRBC # BLD AUTO: 0 K/UL
NRBC BLD-RTO: 0 /100 WBC
PLATELET # BLD AUTO: 187 K/UL (ref 164–446)
PMV BLD AUTO: 10.6 FL (ref 9–12.9)
POTASSIUM SERPL-SCNC: 3.1 MMOL/L (ref 3.6–5.5)
RBC # BLD AUTO: 4.71 M/UL (ref 4.2–5.4)
SODIUM SERPL-SCNC: 135 MMOL/L (ref 135–145)
TROPONIN T SERPL-MCNC: 25 NG/L (ref 6–19)
WBC # BLD AUTO: 8.9 K/UL (ref 4.8–10.8)

## 2019-07-20 PROCEDURE — 700102 HCHG RX REV CODE 250 W/ 637 OVERRIDE(OP): Performed by: INTERNAL MEDICINE

## 2019-07-20 PROCEDURE — 80048 BASIC METABOLIC PNL TOTAL CA: CPT

## 2019-07-20 PROCEDURE — A9270 NON-COVERED ITEM OR SERVICE: HCPCS | Performed by: INTERNAL MEDICINE

## 2019-07-20 PROCEDURE — G0378 HOSPITAL OBSERVATION PER HR: HCPCS

## 2019-07-20 PROCEDURE — A9270 NON-COVERED ITEM OR SERVICE: HCPCS | Performed by: NURSE PRACTITIONER

## 2019-07-20 PROCEDURE — 84484 ASSAY OF TROPONIN QUANT: CPT

## 2019-07-20 PROCEDURE — 700102 HCHG RX REV CODE 250 W/ 637 OVERRIDE(OP): Performed by: NURSE PRACTITIONER

## 2019-07-20 PROCEDURE — 700105 HCHG RX REV CODE 258: Performed by: INTERNAL MEDICINE

## 2019-07-20 PROCEDURE — 83735 ASSAY OF MAGNESIUM: CPT

## 2019-07-20 PROCEDURE — 99214 OFFICE O/P EST MOD 30 MIN: CPT | Mod: GC,25 | Performed by: INTERNAL MEDICINE

## 2019-07-20 PROCEDURE — 85025 COMPLETE CBC W/AUTO DIFF WBC: CPT

## 2019-07-20 PROCEDURE — 700111 HCHG RX REV CODE 636 W/ 250 OVERRIDE (IP)

## 2019-07-20 PROCEDURE — A9502 TC99M TETROFOSMIN: HCPCS

## 2019-07-20 PROCEDURE — 99217 PR OBSERVATION CARE DISCHARGE: CPT | Performed by: INTERNAL MEDICINE

## 2019-07-20 PROCEDURE — 82962 GLUCOSE BLOOD TEST: CPT

## 2019-07-20 PROCEDURE — 96372 THER/PROPH/DIAG INJ SC/IM: CPT | Mod: XU

## 2019-07-20 PROCEDURE — 36415 COLL VENOUS BLD VENIPUNCTURE: CPT

## 2019-07-20 RX ORDER — POTASSIUM CHLORIDE 20 MEQ/1
40 TABLET, EXTENDED RELEASE ORAL ONCE
Status: COMPLETED | OUTPATIENT
Start: 2019-07-20 | End: 2019-07-20

## 2019-07-20 RX ORDER — REGADENOSON 0.08 MG/ML
INJECTION, SOLUTION INTRAVENOUS
Status: COMPLETED
Start: 2019-07-20 | End: 2019-07-20

## 2019-07-20 RX ORDER — ROSUVASTATIN CALCIUM 5 MG/1
20 TABLET, COATED ORAL
Qty: 30 TAB | Refills: 0 | Status: SHIPPED | OUTPATIENT
Start: 2019-07-20 | End: 2019-08-15

## 2019-07-20 RX ADMIN — SENNOSIDES, DOCUSATE SODIUM 2 TABLET: 50; 8.6 TABLET, FILM COATED ORAL at 05:30

## 2019-07-20 RX ADMIN — SUCRALFATE 1 G: 1 TABLET ORAL at 12:42

## 2019-07-20 RX ADMIN — POTASSIUM CHLORIDE 40 MEQ: 1500 TABLET, EXTENDED RELEASE ORAL at 08:02

## 2019-07-20 RX ADMIN — ROSUVASTATIN CALCIUM 20 MG: 20 TABLET, FILM COATED ORAL at 05:30

## 2019-07-20 RX ADMIN — INSULIN HUMAN 7 UNITS: 100 INJECTION, SOLUTION PARENTERAL at 12:45

## 2019-07-20 RX ADMIN — REGADENOSON 0.4 MG: 0.08 INJECTION, SOLUTION INTRAVENOUS at 09:41

## 2019-07-20 RX ADMIN — IRBESARTAN 150 MG: 150 TABLET ORAL at 05:30

## 2019-07-20 RX ADMIN — OMEPRAZOLE 20 MG: 20 CAPSULE, DELAYED RELEASE ORAL at 05:30

## 2019-07-20 RX ADMIN — SODIUM CHLORIDE, POTASSIUM CHLORIDE, SODIUM LACTATE AND CALCIUM CHLORIDE: 600; 310; 30; 20 INJECTION, SOLUTION INTRAVENOUS at 05:29

## 2019-07-20 RX ADMIN — SUCRALFATE 1 G: 1 TABLET ORAL at 05:30

## 2019-07-20 RX ADMIN — APIXABAN 5 MG: 5 TABLET, FILM COATED ORAL at 05:30

## 2019-07-20 RX ADMIN — METOPROLOL TARTRATE 50 MG: 50 TABLET ORAL at 05:30

## 2019-07-20 RX ADMIN — INSULIN HUMAN 3 UNITS: 100 INJECTION, SOLUTION PARENTERAL at 08:05

## 2019-07-20 NOTE — PROGRESS NOTES
Handoff report received. Assumed patient care. Patient resting in bed.  Denied current pain. Patient not in distress, AAOX 4.Safety precautions in place. Call light and personal belongings within reach. Educated to call for assistance if needed.

## 2019-07-20 NOTE — DISCHARGE INSTRUCTIONS
Discharge Instructions    Discharged to home by car with relative. Discharged via wheelchair, hospital escort: Yes.  Special equipment needed: Not Applicable    Be sure to schedule a follow-up appointment with your primary care doctor or any specialists as instructed.     Discharge Plan:   Diet Plan: Discussed  Activity Level: Discussed  Confirmed Follow up Appointment: Appointment Scheduled  Confirmed Symptoms Management: Discussed  Medication Reconciliation Updated: Yes  Influenza Vaccine Indication: Not indicated: Previously immunized this influenza season and > 8 years of age    I understand that a diet low in cholesterol, fat, and sodium is recommended for good health. Unless I have been given specific instructions below for another diet, I accept this instruction as my diet prescription.     Special Instructions: None    · Is patient discharged on Warfarin / Coumadin?   No     Depression / Suicide Risk    As you are discharged from this [facility] facility, it is important to learn how to keep safe from harming yourself.    Recognize the warning signs:  · Abrupt changes in personality, positive or negative- including increase in energy   · Giving away possessions  · Change in eating patterns- significant weight changes-  positive or negative  · Change in sleeping patterns- unable to sleep or sleeping all the time   · Unwillingness or inability to communicate  · Depression  · Unusual sadness, discouragement and loneliness  · Talk of wanting to die  · Neglect of personal appearance   · Rebelliousness- reckless behavior  · Withdrawal from people/activities they love  · Confusion- inability to concentrate     If you or a loved one observes any of these behaviors or has concerns about self-harm, here's what you can do:  · Talk about it- your feelings and reasons for harming yourself  · Remove any means that you might use to hurt yourself (examples: pills, rope, extension cords, firearm)  · Get professional help  from the community (Mental Health, Substance Abuse, psychological counseling)  · Do not be alone:Call your Safe Contact- someone whom you trust who will be there for you.  · Call your local CRISIS HOTLINE 113-1022 or 010-389-3514  · Call your local Children's Mobile Crisis Response Team [place] (516) 546-2838 or www.Pulmonx  · Call the toll free National Suicide Prevention Hotlines   · National Suicide Prevention Lifeline 047-021-XABI (8565)  · National Hope Line Network 800-SUICIDE (055-0818)      Discharge Instructions per Edward Stevenson M.D.    DIET: cardiac, diabetic, Healthy, low-carb, high-fiber, fruits and vegetables    ACTIVITY: As tolerated    DIAGNOSIS: Troponin elevation, acute kidney injury, electrolyte dis balance     Return to ER if worsening chest pain, severe shortness of breath, significant blood in stool or urine associated with confusion, severe weakness, fever    It is very important to take prescription as instructed.  Follow-up with cardiology as scheduled  Please try to arrange an appointment with primary care within a week or Please call [phone] and make appointment with post discharge clinic if primary care not able to arrange an appointment within a week to reevaluate your kidney function.    You need repeat lab work within a week.  Lab orders are in place.  Patient needs to show up to any [facility] and she can get the lab work done.  Continue to drink plenty of fluid.   Avoid medication NSAIDs which are ibuprofen, a Advil, Motrin etc.        Syncope  Introduction  Syncope is when you lose temporarily pass out (faint). Signs that you may be about to pass out include:  · Feeling dizzy or light-headed.  · Feeling sick to your stomach (nauseous).  · Seeing all white or all black.  · Having cold, clammy skin.  If you passed out, get help right away. Call your local emergency services (911 in the [place] Do not drive yourself to the hospital.  Follow these instructions at home:  Pay  attention to any changes in your symptoms. Take these actions to help with your condition:  · Have someone stay with you until you feel stable.  · Do not drive, use machinery, or play sports until your doctor says it is okay.  · Keep all follow-up visits as told by your doctor. This is important.  · If you start to feel like you might pass out, lie down right away and raise (elevate) your feet above the level of your heart. Breathe deeply and steadily. Wait until all of the symptoms are gone.  · Drink enough fluid to keep your pee (urine) clear or pale yellow.  · If you are taking blood pressure or heart medicine, get up slowly and spend many minutes getting ready to sit and then stand. This can help with dizziness.  · Take over-the-counter and prescription medicines only as told by your doctor.  Get help right away if:  · You have a very bad headache.  · You have unusual pain in your chest, tummy, or back.  · You are bleeding from your mouth or rectum.  · You have black or tarry poop (stool).  · You have a very fast or uneven heartbeat (palpitations).  · It hurts to breathe.  · You pass out once or more than once.  · You have jerky movements that you cannot control (seizure).  · You are confused.  · You have trouble walking.  · You are very weak.  · You have vision problems.  These symptoms may be an emergency. Do not wait to see if the symptoms will go away. Get medical help right away. Call your local emergency services (911 in the [place] Do not drive yourself to the hospital.   This information is not intended to replace advice given to you by your health care provider. Make sure you discuss any questions you have with your health care provider.  Document Released: 06/05/2009 Document Revised: 05/25/2017 Document Reviewed: 08/31/2016  © 2017 Elsevier      Weakness  Weakness is a lack of strength. You may feel weak all over your body or just in one part of your body. Weakness can be serious. In some cases, you  may need more medical tests.  HOME CARE  · Rest.  · Eat a well-balanced diet.  · Try to exercise every day.  · Only take medicines as told by your doctor.  GET HELP RIGHT AWAY IF:   · You cannot do your normal daily activities.  · You cannot walk up and down stairs, or you feel very tired when you do so.  · You have shortness of breath or chest pain.  · You have trouble moving parts of your body.  · You have weakness in only one body part or on only one side of the body.  · You have a fever.  · You have trouble speaking or swallowing.  · You cannot control when you pee (urinate) or poop (bowel movement).  · You have black or bloody throw up (vomit) or poop.  · Your weakness gets worse or spreads to other body parts.  · You have new aches or pains.  MAKE SURE YOU:   · Understand these instructions.  · Will watch your condition.  · Will get help right away if you are not doing well or get worse.  This information is not intended to replace advice given to you by your health care provider. Make sure you discuss any questions you have with your health care provider.  Document Released: 11/30/2009 Document Revised: 06/18/2013 Document Reviewed: 10/07/2016  Elsevier Interactive Patient Education © 2017 Elsevier Inc.

## 2019-07-20 NOTE — DISCHARGE SUMMARY
Discharge Summary    CHIEF COMPLAINT ON ADMISSION  Chief Complaint   Patient presents with   • Syncope   • Headache       Reason for Admission  EMS     Admission Date  7/18/2019    CODE STATUS  Full Code    HPI & HOSPITAL COURSE  74-year-old female with a past medical history of paroxysmal atrial fibrillation maintained on Eliquis outpatient, CAD, type 2 diabetes, hypertension, and hyperlipidemia who was brought to the emergency department on 7/18/2019 after she experienced headache with a syncopal event while shopping at [employer].  After she woke up she vomited twice.  CT head was negative for acute abnormalities.  Her chest x-ray was unremarkable.  A blood sugar done by EMS was greater than 500 and her blood pressure was noted to be elevated.  Initial lab work-up done in the emergency room showed no leukocytosis, hyponatremia with a sodium of 131 likely related to hyperglycemia (blood sugar 548), anion gap elevation, and stable CKD.  An updated A1c was obtained and was 13.4.  Her diagnostic alcohol level was 0.  Troponins were elevated at 27 but were thought to be secondary to demand ischemia.  Her EKG showed sinus rhythm with no evidence of acute ischemia or infarct.  D-dimer was elevated at 0.68 so a CTA was done and was negative for pulmonary embolism. Carotid doppler studies showed very mild stenosis bilaterally.  An echocardiogram revealed mild worsening of her aortic stenosis.  This was discussed with our TAVR team who recommended outpatient follow-up.  However, her troponins continued to rise so cardiology was subsequently consulted.  On her follow-up EKG she did have new T wave inversions so they have recommended a nuclear medicine.  Stress test 07/20/2019 was unremarkable.  No further recommendation per cardiology besides compliant with medication and better blood pressure and diabetes controled.  Noticed slight worse if her creatinine due to contrast exposure on admission from chest CT. I did advise  patient to avoid nephrotoxic medications and also close follow-up with primary care and repeat lab work within a week.  Lab orders in place.  Patient will follow-up with primary care or post discharge clinic if she is not able to see primary care within a week.  Patient verbalized understanding.  Instructions provided      Therefore, she is discharged in guarded and stable condition to home with close outpatient follow-up.    The patient met 2-midnight criteria for an inpatient stay at the time of discharge.    Discharge Date  07/20/2019    FOLLOW UP ITEMS POST DISCHARGE  CMP. Kidney function     DISCHARGE DIAGNOSES  Principal Problem (Resolved):    Syncope POA: Yes  Active Problems:    Nonrheumatic aortic valve stenosis POA: Yes    Type 2 diabetes mellitus without complication (HCC) POA: Yes    Dyslipidemia POA: Yes    Coronary artery disease involving native coronary artery of native heart without angina pectoris POA: Yes    CKD (chronic kidney disease), stage III (HCC) POA: Yes    Paroxysmal atrial fibrillation (HCC) POA: Yes    Hypertension POA: Yes    Acquired circulating anticoagulants (HCC) POA: Yes  Resolved Problems:    Hyperglycemia without ketosis POA: Yes    Hypokalemia POA: Yes      FOLLOW UP  Future Appointments  Date Time Provider Department Center   9/6/2019 8:45 AM Sylvester Tobar M.D. [facility] None   11/15/2019 8:15 AM Sylvester Tobar M.D. [facility] None     Fitz Turpin D.O.  [address] [facility] [address]  [phone]      PLEASE CONTACT PCP OFFICE DIRECTLY TO ARRANGE YOUR FOLLOW UP APPOINTMENT. THANK YOU      MEDICATIONS ON DISCHARGE     Medication List      CHANGE how you take these medications      Instructions   rosuvastatin 5 MG Tabs  What changed:  how much to take  Commonly known as:  CRESTOR   Take 4 Tabs by mouth every day.  Dose:  20 mg        CONTINUE taking these medications      Instructions   apixaban 5mg Tabs  Commonly known as:  ELIQUIS   Take 1 Tab by mouth 2 Times a  Day.  Dose:  5 mg     insulin glargine 100 UNIT/ML Soln  Commonly known as:  LANTUS   Inject 20 Units as instructed every evening.  Dose:  20 Units     irbesartan 150 MG Tabs  Commonly known as:  AVAPRO   Take 150 mg by mouth every day.  Dose:  150 mg     metoprolol 50 MG Tabs  Commonly known as:  LOPRESSOR   Take 1 Tab by mouth 2 times a day.  Dose:  50 mg     omeprazole 20 MG delayed-release capsule  Commonly known as:  PRILOSEC   Take 20 mg by mouth every day.  Dose:  20 mg     sertraline 50 MG Tabs  Commonly known as:  ZOLOFT   Take 50 mg by mouth every bedtime.  Dose:  50 mg     sucralfate 1 GM Tabs  Commonly known as:  CARAFATE   Take 1 Tab by mouth every 6 hours.  Dose:  1 g     traZODone 50 MG Tabs  Commonly known as:  DESYREL   Take 50 mg by mouth every bedtime.  Dose:  50 mg     vitamin D (Ergocalciferol) 49153 units Caps capsule  Commonly known as:  DRISDOL   Take 50,000 Units by mouth every Sunday.  Dose:  29710 Units     XALATAN 0.005 % Soln  Generic drug:  latanoprost   Place 1 Drop in both eyes every evening.  Dose:  1 Drop            Allergies  Allergies   Allergen Reactions   • Pcn [Penicillins] Hives and Swelling   • Sulfa Drugs Hives and Swelling       DIET  Orders Placed This Encounter   Procedures   • Diet Order Diabetic     Standing Status:   Standing     Number of Occurrences:   1     Order Specific Question:   Diet:     Answer:   Diabetic [3]       ACTIVITY  As tolerated.  Weight bearing as tolerated    CONSULTATIONS  Cardiology     PROCEDURES  Stress test     LABORATORY  Lab Results   Component Value Date    SODIUM 135 07/20/2019    POTASSIUM 3.1 (L) 07/20/2019    CHLORIDE 102 07/20/2019    CO2 25 07/20/2019    GLUCOSE 156 (H) 07/20/2019    BUN 22 07/20/2019    CREATININE 1.57 (H) 07/20/2019        Lab Results   Component Value Date    WBC 8.9 07/20/2019    HEMOGLOBIN 12.8 07/20/2019    HEMATOCRIT 37.9 07/20/2019    PLATELETCT 187 07/20/2019        Total time of the discharge process exceeds  30 minutes.

## 2019-07-20 NOTE — CONSULTS
[facility] CARDIOLOGY CONSULT NOTE      Note Author: Ilana Hayward M.D.  Date and Time Note Created: 2019 9:14 AM     Referring Physician: Edward Stevenson M.D.      Patient ID:  Name:  Amrita Torrez   :  1945  Age:  74 y.o. female   MRN:  [id number]                                                               Reason for Consult:      Aortic stenosis and elevated troponin      HPI:    74-year-old female with PMH of CAD with MI in  with 2 stents placed, as well as HTN, HLD, DMT2 and PAF on Eliquis who presented on 19 after a syncopal episode. Noncontrast head CT and CTA chest were negative for acute abnormalities. The patient was found to have elevated serum glucose to 448 with A1c 13.4 this admission. In addition, she was found to have mildly elevated troponin-Ts of 27, 28, 28, 35 and 38 in chronological order, as well as moderate AS on TTE, and Cardiology was therefore consulted on 19      Interval Events:  No acute events overnight.  Sinus rhythm 66-80 with rare PACs on telemetry overnight.  Troponin-T 25 this morning, down from 38 the previous day.  BMP significant for RICHARD, Cr bumped to 1.4 on 19 from 1.09 on 19. Cr 1.57 today.   Underwent MPI this morning, which showed no evidence of jeopardized viable myocardium or prior MI.      ROS:     Constitutional: Denies fevers and unintentional weight gain.  HEENT: Denies change in vision and sore throat.   Cardiovascular: Denies CP and palpitations and orthopnea.  Respiratory: Denies cough and wheezing.  GI: Denies abdominal pain, nausea, vomiting, diarrhea, and constipation.   : Denies dysuria or frequency.  MSK: Denies joint pain and swelling.  Neurological: Denies dizziness and headache this morning.  Hematology-Oncology: Denies easy bruising and bleeding.          Physical Exam:    Vitals:    19 1657 19 2000 19 0000 19 0400   BP: 127/94 138/61 145/74 144/72   Pulse: 77 72 72 70   Resp: 18 18 16 16   Temp:  36.1 °C (96.9 °F) 36.6 °C (97.8 °F) 36.3 °C (97.4 °F) 37 °C (98.6 °F)   TempSrc: Temporal Temporal Temporal Temporal   SpO2: 96% 95% 96% 93%   Weight:       Height:         General: No apparent distress, pleasant, cooperative.  HEENT: NCAT, EOMI, sclerae anicteric, normal conjunctivae, oropharynx clear.  Neck: Supple, no lymphadenopathy.     Heart: Regular rhythm, normal rate, [place] with radiation to the carotids.  Lung: Normal work of breathing, clear to auscultation bilaterally.  Abdomen: Soft, bowel sounds present, no peritoneal signs.  Extremities: No cyanosis, no edema, distal pulses intact.  Neuro: Alert, oriented, no focal deficits.  Skin: Warm, dry, intact, no suspicious rashes or lesions on visualized skin.  Psych: Normal mood, appropriate affect.      Data Review:     All records, including histories, medications, allergies, lab results and radiology findings, have been personally reviewed and summarized in the current documentation.    Recent Results (from the past 24 hour(s))   TROPONIN    Collection Time: 19  9:45 AM   Result Value Ref Range    Troponin T 38 (H) 6 - 19 ng/L   CKMB    Collection Time: 19  9:45 AM   Result Value Ref Range    CK-Mb 1.6 0.0 - 5.0 ng/mL   EKG    Collection Time: 19 11:20 AM   Result Value Ref Range    Report       [facility]    Test Date:  2019  Pt Name:    SHANNAN ALCANTARA                 Department: [facility]  MRN:        [id number]                      Room:       T711  Gender:     Female                       Technician: SARAY  :        1945                   Requested By:JOSE RAUL CROWELL  Order #:    [id number]                    Reading MD: Juan Carlos Stephens MD    Measurements  Intervals                                Axis  Rate:       79                           P:          60  AR:         164                          QRS:        -69  QRSD:       112                          T:          204  QT:         476  QTc:        546    Interpretive  Statements  SINUS RHYTHM  LVH WITH IVCD, LAD AND SECONDARY REPOL ABNRM  Compared to ECG 07/18/2019 14:42:22  ST changes mpre prominent  Electronically Signed On 7- 18:15:40 PDT by Juan Carlos Stephens MD     ACCU-CHEK GLUCOSE    Collection Time: 07/19/19 11:57 AM   Result Value Ref Range    Glucose - Accu-Ck 290 (H) 65 - 99 mg/dL   ACCU-CHEK GLUCOSE    Collection Time: 07/19/19  5:54 PM   Result Value Ref Range    Glucose - Accu-Ck 301 (H) 65 - 99 mg/dL   ACCU-CHEK GLUCOSE    Collection Time: 07/19/19 10:48 PM   Result Value Ref Range    Glucose - Accu-Ck 185 (H) 65 - 99 mg/dL   CBC WITH DIFFERENTIAL    Collection Time: 07/20/19  2:41 AM   Result Value Ref Range    WBC 8.9 4.8 - 10.8 K/uL    RBC 4.71 4.20 - 5.40 M/uL    Hemoglobin 12.8 12.0 - 16.0 g/dL    Hematocrit 37.9 37.0 - 47.0 %    MCV 80.5 (L) 81.4 - 97.8 fL    MCH 27.2 27.0 - 33.0 pg    MCHC 33.8 33.6 - 35.0 g/dL    RDW 41.5 35.9 - 50.0 fL    Platelet Count 187 164 - 446 K/uL    MPV 10.6 9.0 - 12.9 fL    Neutrophils-Polys 52.60 44.00 - 72.00 %    Lymphocytes 34.50 22.00 - 41.00 %    Monocytes 9.60 0.00 - 13.40 %    Eosinophils 2.70 0.00 - 6.90 %    Basophils 0.40 0.00 - 1.80 %    Immature Granulocytes 0.20 0.00 - 0.90 %    Nucleated RBC 0.00 /100 WBC    Neutrophils (Absolute) 4.67 2.00 - 7.15 K/uL    Lymphs (Absolute) 3.07 1.00 - 4.80 K/uL    Monos (Absolute) 0.85 0.00 - 0.85 K/uL    Eos (Absolute) 0.24 0.00 - 0.51 K/uL    Baso (Absolute) 0.04 0.00 - 0.12 K/uL    Immature Granulocytes (abs) 0.02 0.00 - 0.11 K/uL    NRBC (Absolute) 0.00 K/uL   Basic Metabolic Panel    Collection Time: 07/20/19  2:41 AM   Result Value Ref Range    Sodium 135 135 - 145 mmol/L    Potassium 3.1 (L) 3.6 - 5.5 mmol/L    Chloride 102 96 - 112 mmol/L    Co2 25 20 - 33 mmol/L    Glucose 156 (H) 65 - 99 mg/dL    Bun 22 8 - 22 mg/dL    Creatinine 1.57 (H) 0.50 - 1.40 mg/dL    Calcium 8.2 (L) 8.5 - 10.5 mg/dL    Anion Gap 8.0 0.0 - 11.9   MAGNESIUM    Collection Time: 07/20/19   2:41 AM   Result Value Ref Range    Magnesium 1.7 1.5 - 2.5 mg/dL   ESTIMATED GFR    Collection Time: 07/20/19  2:41 AM   Result Value Ref Range    GFR If  39 (A) >60 mL/min/1.73 m 2    GFR If Non  32 (A) >60 mL/min/1.73 m 2   TROPONIN    Collection Time: 07/20/19  6:22 AM   Result Value Ref Range    Troponin T 25 (H) 6 - 19 ng/L   ACCU-CHEK GLUCOSE    Collection Time: 07/20/19  8:05 AM   Result Value Ref Range    Glucose - Accu-Ck 193 (H) 65 - 99 mg/dL       TTE  Moderate aortic stenosis Transvalvular gradients are - Peak: 39 mmHg,   Mean: 26 mm Hg.  Mild concentric left ventricular hypertrophy.  Normal left ventricular systolic function.  Basal inferoseptal and basal inferior hypokinesis.  Left ventricular ejection 0fraction is visually estimated to be 70%.  Mild mitral regurgitation.  Mildly dilated left atrium.  Structurally normal tricuspid valve without significant stenosis or   regurgitation.  Normal inferior vena cava size and inspiratory collapse.  The right ventricle was normal in size and function.  Unable to estimate pulmonary artery pressure due to an inadequate   tricuspid regurgitant jet.  Normal pericardium without effusion.  Compared to prior echo on 12/8/18, aortic stenosis has slightly   worsened.      Impression and Medical Decision Making:    Aortic Stenosis, moderate  Elevated Troponin-T, mild  PAF on Eliquis  Hypertension    Mild increase in AS over the past 8 months, however, given the patient's poorly controlled diabetes with hyperglycemia on admission, it is unclear whether AS was the cause of the patient syncopal episode.   No evidence of significant jeopardized viable myocardium or prior MI on myocardial perfusion imaging scan this morning.    The patient is medically stable from a cardiology perspective to be discharged home with close outpatient cardiology follow-up.  Primary team to address RICHARD.  Consider changing better blocker to carvedilol.  Schedule  the patient outpatient cardiology follow-up within 7 days of discharge.  Consider adding ACEi or ARB to patient's home regimen once her RICHARD has resolved given AS and elevated BP.   Continue Crestor 20 mg nightly for hyperlipidemia.  Continue Eliquis for PAF.  Recommend close PCP follow-up for further evaluation and management of the patient's other chronic conditions, including diabetes.      Thank you for referring this patient to our cardiology service, we will sign off, please call or text via [employer] for questions or clarifications.

## 2019-07-20 NOTE — PROGRESS NOTES
Pt discharged to home with daughter. Discharge instructions reviewed, and signed. Pt escorted out with self and daughter.

## 2019-07-20 NOTE — CARE PLAN
Problem: Knowledge Deficit  Goal: Knowledge of disease process/condition, treatment plan, diagnostic tests, and medications will improve    Intervention: Assess knowledge level of disease process/condition, treatment plan, diagnostic tests, and medications  Pt educated on POC for the day, disease process, upcoming tests, and medication information. Will continue to answer questions and educate pt as necessary.       Problem: Infection  Goal: Will remain free from infection    Intervention: Assess signs and symptoms of infection  Appropriate protocols and standards utilized to minimize likelihood of infection and the spread of infection. Proper hand hygiene utilized and pt and family members educated on hand hygiene.

## 2019-07-20 NOTE — PROGRESS NOTES
· 2 RN skin check complete.  · Devices in place NA.  · Confirmed pressure ulcers found on NA.  · New potential pressure ulcers noted on NA.    · The following interventions in place Ambulation, pillows in use for support and positioning.      Pt generalized skin integumentary warm, CDI

## 2019-07-22 NOTE — TELEPHONE ENCOUNTER
Lilia Kowalski M.D.   You 3 days ago      Ok to proceed as planned and requested.     Lilia Kowalski M.D     =============================  Clearance form completed and faxed to [phone] as requested. Confirmation received. Form sent for scanning .

## 2019-08-05 ENCOUNTER — HOSPITAL ENCOUNTER (OUTPATIENT)
Dept: LAB | Facility: MEDICAL CENTER | Age: 74
End: 2019-08-05
Attending: FAMILY MEDICINE
Payer: MEDICARE

## 2019-08-05 PROCEDURE — 87086 URINE CULTURE/COLONY COUNT: CPT

## 2019-08-09 LAB
BACTERIA UR CULT: NORMAL
SIGNIFICANT IND 70042: NORMAL
SITE SITE: NORMAL
SOURCE SOURCE: NORMAL

## 2019-08-15 ENCOUNTER — APPOINTMENT (OUTPATIENT)
Dept: RADIOLOGY | Facility: MEDICAL CENTER | Age: 74
DRG: 639 | End: 2019-08-15
Attending: EMERGENCY MEDICINE
Payer: MEDICARE

## 2019-08-15 ENCOUNTER — APPOINTMENT (OUTPATIENT)
Dept: RADIOLOGY | Facility: MEDICAL CENTER | Age: 74
DRG: 639 | End: 2019-08-15
Attending: STUDENT IN AN ORGANIZED HEALTH CARE EDUCATION/TRAINING PROGRAM
Payer: MEDICARE

## 2019-08-15 ENCOUNTER — HOSPITAL ENCOUNTER (INPATIENT)
Facility: MEDICAL CENTER | Age: 74
LOS: 5 days | DRG: 639 | End: 2019-08-20
Attending: EMERGENCY MEDICINE | Admitting: INTERNAL MEDICINE
Payer: MEDICARE

## 2019-08-15 DIAGNOSIS — E11.10 DIABETIC KETOACIDOSIS WITHOUT COMA ASSOCIATED WITH TYPE 2 DIABETES MELLITUS (HCC): ICD-10-CM

## 2019-08-15 DIAGNOSIS — I48.91 ATRIAL FIBRILLATION, UNSPECIFIED TYPE (HCC): ICD-10-CM

## 2019-08-15 DIAGNOSIS — Z79.4 TYPE 2 DIABETES MELLITUS WITHOUT COMPLICATION, WITH LONG-TERM CURRENT USE OF INSULIN (HCC): ICD-10-CM

## 2019-08-15 DIAGNOSIS — E11.9 TYPE 2 DIABETES MELLITUS WITHOUT COMPLICATION, WITH LONG-TERM CURRENT USE OF INSULIN (HCC): ICD-10-CM

## 2019-08-15 DIAGNOSIS — I48.0 PAROXYSMAL ATRIAL FIBRILLATION (HCC): ICD-10-CM

## 2019-08-15 PROBLEM — R11.2 NON-INTRACTABLE VOMITING WITH NAUSEA: Status: ACTIVE | Noted: 2019-08-15

## 2019-08-15 PROBLEM — R10.84 GENERALIZED ABDOMINAL PAIN: Status: ACTIVE | Noted: 2019-08-15

## 2019-08-15 LAB
ALBUMIN SERPL BCP-MCNC: 3.1 G/DL (ref 3.2–4.9)
ALBUMIN/GLOB SERPL: 0.9 G/DL
ALP SERPL-CCNC: 62 U/L (ref 30–99)
ALT SERPL-CCNC: 12 U/L (ref 2–50)
ANION GAP SERPL CALC-SCNC: 17 MMOL/L (ref 0–11.9)
ANION GAP SERPL CALC-SCNC: 19 MMOL/L (ref 0–11.9)
ANION GAP SERPL CALC-SCNC: 22 MMOL/L (ref 0–11.9)
AST SERPL-CCNC: 15 U/L (ref 12–45)
B-OH-BUTYR SERPL-MCNC: 5.5 MMOL/L (ref 0.02–0.27)
BASOPHILS # BLD AUTO: 0.4 % (ref 0–1.8)
BASOPHILS # BLD: 0.04 K/UL (ref 0–0.12)
BILIRUB SERPL-MCNC: 1.6 MG/DL (ref 0.1–1.5)
BUN SERPL-MCNC: 22 MG/DL (ref 8–22)
BUN SERPL-MCNC: 25 MG/DL (ref 8–22)
BUN SERPL-MCNC: 29 MG/DL (ref 8–22)
CALCIUM SERPL-MCNC: 7.9 MG/DL (ref 8.5–10.5)
CALCIUM SERPL-MCNC: 8.4 MG/DL (ref 8.5–10.5)
CALCIUM SERPL-MCNC: 8.4 MG/DL (ref 8.5–10.5)
CHLORIDE SERPL-SCNC: 101 MMOL/L (ref 96–112)
CHLORIDE SERPL-SCNC: 104 MMOL/L (ref 96–112)
CHLORIDE SERPL-SCNC: 99 MMOL/L (ref 96–112)
CO2 SERPL-SCNC: 14 MMOL/L (ref 20–33)
CO2 SERPL-SCNC: 14 MMOL/L (ref 20–33)
CO2 SERPL-SCNC: 15 MMOL/L (ref 20–33)
CREAT SERPL-MCNC: 1.11 MG/DL (ref 0.5–1.4)
CREAT SERPL-MCNC: 1.17 MG/DL (ref 0.5–1.4)
CREAT SERPL-MCNC: 1.29 MG/DL (ref 0.5–1.4)
EKG IMPRESSION: NORMAL
EKG IMPRESSION: NORMAL
EOSINOPHIL # BLD AUTO: 0.01 K/UL (ref 0–0.51)
EOSINOPHIL NFR BLD: 0.1 % (ref 0–6.9)
ERYTHROCYTE [DISTWIDTH] IN BLOOD BY AUTOMATED COUNT: 43.2 FL (ref 35.9–50)
GLOBULIN SER CALC-MCNC: 3.5 G/DL (ref 1.9–3.5)
GLUCOSE BLD-MCNC: 493 MG/DL (ref 65–99)
GLUCOSE SERPL-MCNC: 494 MG/DL (ref 65–99)
GLUCOSE SERPL-MCNC: 527 MG/DL (ref 65–99)
GLUCOSE SERPL-MCNC: 539 MG/DL (ref 65–99)
HCT VFR BLD AUTO: 40.8 % (ref 37–47)
HGB BLD-MCNC: 14.5 G/DL (ref 12–16)
IMM GRANULOCYTES # BLD AUTO: 0.08 K/UL (ref 0–0.11)
IMM GRANULOCYTES NFR BLD AUTO: 0.7 % (ref 0–0.9)
LACTATE BLD-SCNC: 1.8 MMOL/L (ref 0.5–2)
LYMPHOCYTES # BLD AUTO: 0.44 K/UL (ref 1–4.8)
LYMPHOCYTES NFR BLD: 4.1 % (ref 22–41)
MAGNESIUM SERPL-MCNC: 1.8 MG/DL (ref 1.5–2.5)
MCH RBC QN AUTO: 28.2 PG (ref 27–33)
MCHC RBC AUTO-ENTMCNC: 35.5 G/DL (ref 33.6–35)
MCV RBC AUTO: 79.4 FL (ref 81.4–97.8)
MONOCYTES # BLD AUTO: 0.61 K/UL (ref 0–0.85)
MONOCYTES NFR BLD AUTO: 5.7 % (ref 0–13.4)
NEUTROPHILS # BLD AUTO: 9.51 K/UL (ref 2–7.15)
NEUTROPHILS NFR BLD: 89 % (ref 44–72)
NRBC # BLD AUTO: 0 K/UL
NRBC BLD-RTO: 0 /100 WBC
PHOSPHATE SERPL-MCNC: 3.6 MG/DL (ref 2.5–4.5)
PLATELET # BLD AUTO: 154 K/UL (ref 164–446)
PMV BLD AUTO: 10.7 FL (ref 9–12.9)
POTASSIUM SERPL-SCNC: 3.6 MMOL/L (ref 3.6–5.5)
POTASSIUM SERPL-SCNC: 4 MMOL/L (ref 3.6–5.5)
POTASSIUM SERPL-SCNC: 4.1 MMOL/L (ref 3.6–5.5)
PROT SERPL-MCNC: 6.6 G/DL (ref 6–8.2)
RBC # BLD AUTO: 5.14 M/UL (ref 4.2–5.4)
SODIUM SERPL-SCNC: 134 MMOL/L (ref 135–145)
SODIUM SERPL-SCNC: 135 MMOL/L (ref 135–145)
SODIUM SERPL-SCNC: 136 MMOL/L (ref 135–145)
TROPONIN T SERPL-MCNC: 36 NG/L (ref 6–19)
WBC # BLD AUTO: 10.7 K/UL (ref 4.8–10.8)

## 2019-08-15 PROCEDURE — 82010 KETONE BODYS QUAN: CPT

## 2019-08-15 PROCEDURE — 700105 HCHG RX REV CODE 258: Performed by: STUDENT IN AN ORGANIZED HEALTH CARE EDUCATION/TRAINING PROGRAM

## 2019-08-15 PROCEDURE — 700102 HCHG RX REV CODE 250 W/ 637 OVERRIDE(OP): Performed by: STUDENT IN AN ORGANIZED HEALTH CARE EDUCATION/TRAINING PROGRAM

## 2019-08-15 PROCEDURE — 93005 ELECTROCARDIOGRAM TRACING: CPT

## 2019-08-15 PROCEDURE — 700111 HCHG RX REV CODE 636 W/ 250 OVERRIDE (IP): Performed by: STUDENT IN AN ORGANIZED HEALTH CARE EDUCATION/TRAINING PROGRAM

## 2019-08-15 PROCEDURE — 700111 HCHG RX REV CODE 636 W/ 250 OVERRIDE (IP): Performed by: INTERNAL MEDICINE

## 2019-08-15 PROCEDURE — 99291 CRITICAL CARE FIRST HOUR: CPT

## 2019-08-15 PROCEDURE — 82962 GLUCOSE BLOOD TEST: CPT

## 2019-08-15 PROCEDURE — 71045 X-RAY EXAM CHEST 1 VIEW: CPT

## 2019-08-15 PROCEDURE — 84100 ASSAY OF PHOSPHORUS: CPT

## 2019-08-15 PROCEDURE — 80053 COMPREHEN METABOLIC PANEL: CPT

## 2019-08-15 PROCEDURE — 96365 THER/PROPH/DIAG IV INF INIT: CPT

## 2019-08-15 PROCEDURE — 700105 HCHG RX REV CODE 258

## 2019-08-15 PROCEDURE — 85025 COMPLETE CBC W/AUTO DIFF WBC: CPT

## 2019-08-15 PROCEDURE — 700117 HCHG RX CONTRAST REV CODE 255: Performed by: STUDENT IN AN ORGANIZED HEALTH CARE EDUCATION/TRAINING PROGRAM

## 2019-08-15 PROCEDURE — 700111 HCHG RX REV CODE 636 W/ 250 OVERRIDE (IP): Performed by: EMERGENCY MEDICINE

## 2019-08-15 PROCEDURE — 80048 BASIC METABOLIC PNL TOTAL CA: CPT

## 2019-08-15 PROCEDURE — 84484 ASSAY OF TROPONIN QUANT: CPT

## 2019-08-15 PROCEDURE — 74174 CTA ABD&PLVS W/CONTRAST: CPT

## 2019-08-15 PROCEDURE — 83735 ASSAY OF MAGNESIUM: CPT

## 2019-08-15 PROCEDURE — 304561 HCHG STAT O2

## 2019-08-15 PROCEDURE — 99291 CRITICAL CARE FIRST HOUR: CPT | Performed by: INTERNAL MEDICINE

## 2019-08-15 PROCEDURE — 770022 HCHG ROOM/CARE - ICU (200)

## 2019-08-15 PROCEDURE — 96375 TX/PRO/DX INJ NEW DRUG ADDON: CPT

## 2019-08-15 PROCEDURE — 700105 HCHG RX REV CODE 258: Performed by: EMERGENCY MEDICINE

## 2019-08-15 PROCEDURE — 96366 THER/PROPH/DIAG IV INF ADDON: CPT

## 2019-08-15 PROCEDURE — 93005 ELECTROCARDIOGRAM TRACING: CPT | Performed by: EMERGENCY MEDICINE

## 2019-08-15 PROCEDURE — 83605 ASSAY OF LACTIC ACID: CPT

## 2019-08-15 RX ORDER — SODIUM CHLORIDE, SODIUM LACTATE, POTASSIUM CHLORIDE, AND CALCIUM CHLORIDE .6; .31; .03; .02 G/100ML; G/100ML; G/100ML; G/100ML
2000 INJECTION, SOLUTION INTRAVENOUS ONCE
Status: DISCONTINUED | OUTPATIENT
Start: 2019-08-15 | End: 2019-08-15

## 2019-08-15 RX ORDER — BISACODYL 10 MG
10 SUPPOSITORY, RECTAL RECTAL
Status: DISCONTINUED | OUTPATIENT
Start: 2019-08-15 | End: 2019-08-20 | Stop reason: HOSPADM

## 2019-08-15 RX ORDER — SODIUM CHLORIDE, SODIUM LACTATE, POTASSIUM CHLORIDE, CALCIUM CHLORIDE 600; 310; 30; 20 MG/100ML; MG/100ML; MG/100ML; MG/100ML
INJECTION, SOLUTION INTRAVENOUS CONTINUOUS
Status: DISCONTINUED | OUTPATIENT
Start: 2019-08-15 | End: 2019-08-15

## 2019-08-15 RX ORDER — MAGNESIUM SULFATE HEPTAHYDRATE 40 MG/ML
2 INJECTION, SOLUTION INTRAVENOUS
Status: DISCONTINUED | OUTPATIENT
Start: 2019-08-15 | End: 2019-08-15

## 2019-08-15 RX ORDER — DEXTROSE AND SODIUM CHLORIDE 10; .45 G/100ML; G/100ML
INJECTION, SOLUTION INTRAVENOUS CONTINUOUS
Status: DISCONTINUED | OUTPATIENT
Start: 2019-08-15 | End: 2019-08-15

## 2019-08-15 RX ORDER — SODIUM CHLORIDE 9 MG/ML
2000 INJECTION, SOLUTION INTRAVENOUS ONCE
Status: DISCONTINUED | OUTPATIENT
Start: 2019-08-15 | End: 2019-08-15

## 2019-08-15 RX ORDER — NITROFURANTOIN 25; 75 MG/1; MG/1
100 CAPSULE ORAL 2 TIMES DAILY
Status: ON HOLD | COMMUNITY
Start: 2019-08-05 | End: 2019-08-20

## 2019-08-15 RX ORDER — DEXTROSE AND SODIUM CHLORIDE 10; .45 G/100ML; G/100ML
INJECTION, SOLUTION INTRAVENOUS CONTINUOUS
Status: DISCONTINUED | OUTPATIENT
Start: 2019-08-15 | End: 2019-08-16

## 2019-08-15 RX ORDER — METOPROLOL TARTRATE 50 MG/1
50 TABLET, FILM COATED ORAL 2 TIMES DAILY
Status: DISCONTINUED | OUTPATIENT
Start: 2019-08-15 | End: 2019-08-18

## 2019-08-15 RX ORDER — LABETALOL HYDROCHLORIDE 5 MG/ML
10 INJECTION, SOLUTION INTRAVENOUS EVERY 4 HOURS PRN
Status: DISCONTINUED | OUTPATIENT
Start: 2019-08-15 | End: 2019-08-20 | Stop reason: HOSPADM

## 2019-08-15 RX ORDER — ROSUVASTATIN CALCIUM 10 MG/1
5 TABLET, COATED ORAL EVERY MORNING
Status: DISCONTINUED | OUTPATIENT
Start: 2019-08-15 | End: 2019-08-20 | Stop reason: HOSPADM

## 2019-08-15 RX ORDER — HEPARIN SODIUM 5000 [USP'U]/ML
5000 INJECTION, SOLUTION INTRAVENOUS; SUBCUTANEOUS EVERY 8 HOURS
Status: DISCONTINUED | OUTPATIENT
Start: 2019-08-15 | End: 2019-08-16

## 2019-08-15 RX ORDER — METOPROLOL TARTRATE 1 MG/ML
5 INJECTION, SOLUTION INTRAVENOUS
Status: DISCONTINUED | OUTPATIENT
Start: 2019-08-15 | End: 2019-08-17

## 2019-08-15 RX ORDER — MAGNESIUM SULFATE HEPTAHYDRATE 40 MG/ML
4 INJECTION, SOLUTION INTRAVENOUS
Status: DISCONTINUED | OUTPATIENT
Start: 2019-08-15 | End: 2019-08-15

## 2019-08-15 RX ORDER — MAGNESIUM SULFATE HEPTAHYDRATE 40 MG/ML
4 INJECTION, SOLUTION INTRAVENOUS
Status: COMPLETED | OUTPATIENT
Start: 2019-08-15 | End: 2019-08-15

## 2019-08-15 RX ORDER — DEXTROSE, SODIUM CHLORIDE, SODIUM LACTATE, POTASSIUM CHLORIDE, AND CALCIUM CHLORIDE 5; .6; .31; .03; .02 G/100ML; G/100ML; G/100ML; G/100ML; G/100ML
INJECTION, SOLUTION INTRAVENOUS CONTINUOUS
Status: DISCONTINUED | OUTPATIENT
Start: 2019-08-15 | End: 2019-08-16

## 2019-08-15 RX ORDER — SODIUM CHLORIDE 9 MG/ML
INJECTION, SOLUTION INTRAVENOUS
Status: COMPLETED
Start: 2019-08-15 | End: 2019-08-15

## 2019-08-15 RX ORDER — ROSUVASTATIN CALCIUM 5 MG/1
5 TABLET, COATED ORAL EVERY MORNING
COMMUNITY
End: 2021-04-22

## 2019-08-15 RX ORDER — DEXTROSE AND SODIUM CHLORIDE 5; .45 G/100ML; G/100ML
INJECTION, SOLUTION INTRAVENOUS CONTINUOUS
Status: DISCONTINUED | OUTPATIENT
Start: 2019-08-15 | End: 2019-08-15

## 2019-08-15 RX ORDER — SODIUM CHLORIDE 9 MG/ML
INJECTION, SOLUTION INTRAVENOUS CONTINUOUS
Status: DISCONTINUED | OUTPATIENT
Start: 2019-08-15 | End: 2019-08-16

## 2019-08-15 RX ORDER — ONDANSETRON 2 MG/ML
4 INJECTION INTRAMUSCULAR; INTRAVENOUS ONCE
Status: COMPLETED | OUTPATIENT
Start: 2019-08-15 | End: 2019-08-15

## 2019-08-15 RX ORDER — METOPROLOL TARTRATE 50 MG/1
50 TABLET, FILM COATED ORAL 2 TIMES DAILY
Status: ON HOLD | COMMUNITY
End: 2019-08-20 | Stop reason: SDUPTHER

## 2019-08-15 RX ORDER — ONDANSETRON 2 MG/ML
4 INJECTION INTRAMUSCULAR; INTRAVENOUS EVERY 4 HOURS PRN
Status: DISCONTINUED | OUTPATIENT
Start: 2019-08-15 | End: 2019-08-20 | Stop reason: HOSPADM

## 2019-08-15 RX ORDER — MAGNESIUM SULFATE HEPTAHYDRATE 40 MG/ML
2 INJECTION, SOLUTION INTRAVENOUS
Status: COMPLETED | OUTPATIENT
Start: 2019-08-15 | End: 2019-08-15

## 2019-08-15 RX ORDER — POTASSIUM CHLORIDE 7.45 MG/ML
10 INJECTION INTRAVENOUS
Status: COMPLETED | OUTPATIENT
Start: 2019-08-15 | End: 2019-08-16

## 2019-08-15 RX ORDER — POLYETHYLENE GLYCOL 3350 17 G/17G
1 POWDER, FOR SOLUTION ORAL
Status: DISCONTINUED | OUTPATIENT
Start: 2019-08-15 | End: 2019-08-20 | Stop reason: HOSPADM

## 2019-08-15 RX ORDER — DILTIAZEM HYDROCHLORIDE 5 MG/ML
0.25 INJECTION INTRAVENOUS ONCE
Status: COMPLETED | OUTPATIENT
Start: 2019-08-15 | End: 2019-08-15

## 2019-08-15 RX ORDER — DEXTROSE, SODIUM CHLORIDE, SODIUM LACTATE, POTASSIUM CHLORIDE, AND CALCIUM CHLORIDE 5; .6; .31; .03; .02 G/100ML; G/100ML; G/100ML; G/100ML; G/100ML
INJECTION, SOLUTION INTRAVENOUS CONTINUOUS
Status: DISCONTINUED | OUTPATIENT
Start: 2019-08-15 | End: 2019-08-15

## 2019-08-15 RX ORDER — SODIUM CHLORIDE 9 MG/ML
1000 INJECTION, SOLUTION INTRAVENOUS ONCE
Status: COMPLETED | OUTPATIENT
Start: 2019-08-15 | End: 2019-08-15

## 2019-08-15 RX ORDER — PROCHLORPERAZINE EDISYLATE 5 MG/ML
10 INJECTION INTRAMUSCULAR; INTRAVENOUS EVERY 6 HOURS PRN
Status: DISCONTINUED | OUTPATIENT
Start: 2019-08-15 | End: 2019-08-20 | Stop reason: HOSPADM

## 2019-08-15 RX ORDER — AMOXICILLIN 250 MG
2 CAPSULE ORAL 2 TIMES DAILY
Status: DISCONTINUED | OUTPATIENT
Start: 2019-08-15 | End: 2019-08-20 | Stop reason: HOSPADM

## 2019-08-15 RX ORDER — POTASSIUM CHLORIDE 7.45 MG/ML
10 INJECTION INTRAVENOUS
Status: DISPENSED | OUTPATIENT
Start: 2019-08-15 | End: 2019-08-15

## 2019-08-15 RX ORDER — SODIUM CHLORIDE 450 MG/100ML
INJECTION, SOLUTION INTRAVENOUS CONTINUOUS
Status: DISCONTINUED | OUTPATIENT
Start: 2019-08-15 | End: 2019-08-15

## 2019-08-15 RX ADMIN — SODIUM CHLORIDE: 9 INJECTION, SOLUTION INTRAVENOUS at 17:35

## 2019-08-15 RX ADMIN — SODIUM CHLORIDE 1000 ML: 9 INJECTION, SOLUTION INTRAVENOUS at 14:25

## 2019-08-15 RX ADMIN — SODIUM CHLORIDE 3 UNITS/HR: 9 INJECTION, SOLUTION INTRAVENOUS at 20:44

## 2019-08-15 RX ADMIN — ONDANSETRON 4 MG: 2 INJECTION INTRAMUSCULAR; INTRAVENOUS at 12:33

## 2019-08-15 RX ADMIN — FAMOTIDINE 20 MG: 10 INJECTION INTRAVENOUS at 12:32

## 2019-08-15 RX ADMIN — PROCHLORPERAZINE EDISYLATE 10 MG: 5 INJECTION INTRAMUSCULAR; INTRAVENOUS at 18:31

## 2019-08-15 RX ADMIN — DILTIAZEM HYDROCHLORIDE 16.45 MG: 5 INJECTION INTRAVENOUS at 12:23

## 2019-08-15 RX ADMIN — SODIUM CHLORIDE: 9 INJECTION, SOLUTION INTRAVENOUS at 16:15

## 2019-08-15 RX ADMIN — SODIUM CHLORIDE: 9 INJECTION, SOLUTION INTRAVENOUS at 23:34

## 2019-08-15 RX ADMIN — POTASSIUM CHLORIDE 10 MEQ: 7.46 INJECTION, SOLUTION INTRAVENOUS at 17:34

## 2019-08-15 RX ADMIN — HEPARIN SODIUM 5000 UNITS: 5000 INJECTION, SOLUTION INTRAVENOUS; SUBCUTANEOUS at 17:31

## 2019-08-15 RX ADMIN — DILTIAZEM HYDROCHLORIDE 5 MG/HR: 5 INJECTION INTRAVENOUS at 13:12

## 2019-08-15 RX ADMIN — POTASSIUM CHLORIDE 10 MEQ: 7.46 INJECTION, SOLUTION INTRAVENOUS at 18:02

## 2019-08-15 RX ADMIN — POTASSIUM CHLORIDE 10 MEQ: 7.46 INJECTION, SOLUTION INTRAVENOUS at 23:19

## 2019-08-15 RX ADMIN — IOHEXOL 100 ML: 350 INJECTION, SOLUTION INTRAVENOUS at 17:00

## 2019-08-15 RX ADMIN — HEPARIN SODIUM 5000 UNITS: 5000 INJECTION, SOLUTION INTRAVENOUS; SUBCUTANEOUS at 22:28

## 2019-08-15 RX ADMIN — MAGNESIUM SULFATE IN WATER 2 G: 40 INJECTION, SOLUTION INTRAVENOUS at 21:05

## 2019-08-15 ASSESSMENT — ENCOUNTER SYMPTOMS
INSOMNIA: 0
LOSS OF CONSCIOUSNESS: 0
WEIGHT LOSS: 1
PALPITATIONS: 0
DOUBLE VISION: 0
FALLS: 0
NAUSEA: 1
CONSTIPATION: 1
COUGH: 0
SINUS PAIN: 0
SPUTUM PRODUCTION: 0
FLANK PAIN: 0
DIZZINESS: 0
SENSORY CHANGE: 0
FEVER: 0
BLURRED VISION: 0
BRUISES/BLEEDS EASILY: 0
SEIZURES: 0
NERVOUS/ANXIOUS: 0
ABDOMINAL PAIN: 1
VOMITING: 1
SHORTNESS OF BREATH: 0
BACK PAIN: 0
SORE THROAT: 0
MYALGIAS: 0
PHOTOPHOBIA: 0
HEARTBURN: 0
WEAKNESS: 1
DIARRHEA: 0
BLOOD IN STOOL: 0
STRIDOR: 0
PALPITATIONS: 1
DIZZINESS: 1
SHORTNESS OF BREATH: 1
CHILLS: 0
FOCAL WEAKNESS: 0
HEADACHES: 0

## 2019-08-15 NOTE — ASSESSMENT & PLAN NOTE
[id number]: 6  Continue Eliquis  Now in A. fib with RVR  Likely due to inability to keep down medication in the setting of gastrointestinal upset  Placed on diltiazem infusion and titrate to heart rate of less than 100 off since 8/17  Restart home Lopressor  May require additional rate limiting medications  Closely monitor for hypotension  Continue to titrate home metoprolol 50mg -> 75mg TID  Correct electrolytes

## 2019-08-15 NOTE — ASSESSMENT & PLAN NOTE
Long-standing IDDM, HTN, CKD 3, at baseline    -Avoid nephro toxins   -Consider ACEi/ARB if needs additional antihypertensive as she has CKD.

## 2019-08-15 NOTE — H&P
Internal Medicine Admitting History and Physical    Note Author: Carlos Maria M.D.       Name Amrita Torrez     1945   Age/Sex 74 y.o. female   MRN [id number]   Code Status Full Code     After 5PM or if no immediate response to page, please call for cross-coverage  Attending/Team: Dr Ochoa / CLINTON Johnson See Patient List for primary contact information  Call [phone] to page    1st Call - Day Intern (R1):    2nd Call - Day Sr. Resident (R2/R3):   Dr Maria       Chief Complaint:   Dizziness, nausea, vomiting, poor PO intake x 1 week    HPI:  Pleasant, alert and oriented 74-year-old female with PMHx of paroxysmal A. fib on Eliquis and metoprolol, brought in by EMS for confusion, dizziness, nausea and vomiting for 1 week and found to have A. fib with ventricular rate in 150s.  Patient has not taken her outpatient metoprolol for 1 week, neither she taken her insulin, she is also not eaten or drunk anything during this time period.  She has had multiple admissions in the past for cardiovascular issues including A. fib RVR.    In the ER, B/P 172/115 on presentation which came down to 106/65, heart rate 149 on presentation which came down to 103 after diltiazem bolus and infusion, afebrile.  HCO3 14, AG 22, blood glucose 494, serum K3.6, subsequent beta hydroxybutyrate 5.5, GFR 48.  Administered 4 mg IV Zofran.    PMHx: Paroxysmal A. fib, Ménière's disease, CAD status post DOMINGA to LAD , CVA [not clinically significant, incidental finding], CKD 3, IDDM, HLD, essential hypertension and thyroid nodule clinically euthyroid as of .    She will be admitted to the ICU for titrated diltiazem infusion and management of DKA.        Review of Systems   Constitutional: Positive for malaise/fatigue. Negative for chills and fever.   HENT: Negative for congestion, hearing loss, sinus pain and sore throat.    Eyes: Negative for blurred vision, double vision and photophobia.   Respiratory: Negative for cough,  sputum production and shortness of breath.    Cardiovascular: Negative for chest pain, palpitations and leg swelling.   Gastrointestinal: Positive for abdominal pain, constipation, nausea and vomiting. Negative for blood in stool, diarrhea and heartburn.   Genitourinary: Negative for dysuria, flank pain, frequency and urgency.   Musculoskeletal: Negative for back pain, falls, joint pain and myalgias.   Skin: Negative for itching and rash.   Neurological: Positive for dizziness and weakness. Negative for focal weakness, seizures, loss of consciousness and headaches.   Endo/Heme/Allergies: Negative for environmental allergies. Does not bruise/bleed easily.   Psychiatric/Behavioral: The patient is not nervous/anxious and does not have insomnia.              Past Medical History (Chronic medical problem, known complications and current treatment)    Aortic dissection on the floor serviceParoxysmal A. fib, Ménière's disease, CAD status post DOMINGA to LAD 2016, CVA [not clinically significant, incidental finding], CKD 3, IDDM, HLD, essential hypertension and thyroid nodule clinically euthyroid as of 12/18.    Past Surgical History:  Past Surgical History:   Procedure Laterality Date   • LAPAROSCOPY  12/1/2014    Performed by Mian Navarro M.D. at SURGERY SAME DAY [facility] ORS   • BAN BY LAPAROSCOPY  9/14/2012    Performed by MIAN NAVARRO at SURGERY SAME DAY [facility] ORS   • OTHER ORTHOPEDIC SURGERY  7/11    knee   • GYN SURGERY  1978    fibroids   • OTHER ABDOMINAL SURGERY  1966    appendectomy   • GYN SURGERY      hysterectomy   • ZZZ CARDIAC CATH         Current Outpatient Medications:  Home Medications     Reviewed by Michael Dawkins (Pharmacy Tech) on 08/15/19 at 1333  Med List Status: Complete   Medication Last Dose Status   apixaban (ELIQUIS) 5mg Tab <2weeks Active   insulin glargine (LANTUS) 100 UNIT/ML Solution last week Active   latanoprost (XALATAN) 0.005 % Solution <2weeks Active   metoprolol  (LOPRESSOR) 50 MG Tab <2weeks Active   nitrofurantoin monohyd macro (MACROBID) 100 MG Cap unk Active   omeprazole (PRILOSEC) 20 MG delayed-release capsule <2weeks Active   POTASSIUM PO <2weeks Active   rosuvastatin (CRESTOR) 5 MG Tab <2weeks Active   trazodone (DESYREL) 50 MG Tab <2weeks Active   vitamin D, Ergocalciferol, (DRISDOL) 56816 units Cap capsule <2weeks Active                Medication Allergy/Sensitivities:  Allergies   Allergen Reactions   • Pcn [Penicillins] Hives, Shortness of Breath and Swelling   • Sulfa Drugs Hives, Shortness of Breath and Swelling         Family History (mandatory)   History reviewed. No pertinent family history.    Social History (mandatory)   Social History     Socioeconomic History   • Marital status:      Spouse name: Not on file   • Number of children: Not on file   • Years of education: Not on file   • Highest education level: Not on file   Occupational History   • Not on file   Social Needs   • Financial resource strain: Not on file   • Food insecurity:     Worry: Not on file     Inability: Not on file   • Transportation needs:     Medical: Not on file     Non-medical: Not on file   Tobacco Use   • Smoking status: Former Smoker     Packs/day: 1.00     Years: 40.00     Pack years: 40.00     Types: Cigarettes     Last attempt to quit: 1978     Years since quittin.6   • Smokeless tobacco: Never Used   Substance and Sexual Activity   • Alcohol use: No   • Drug use: No   • Sexual activity: Not on file   Lifestyle   • Physical activity:     Days per week: Not on file     Minutes per session: Not on file   • Stress: Not on file   Relationships   • Social connections:     Talks on phone: Not on file     Gets together: Not on file     Attends [employer] service: Not on file     Active member of club or organization: Not on file     Attends meetings of clubs or organizations: Not on file     Relationship status: Not on file   • Intimate partner violence:     Fear of  current or ex partner: Not on file     Emotionally abused: Not on file     Physically abused: Not on file     Forced sexual activity: Not on file   Other Topics Concern   • Not on file   Social History Narrative   • Not on file     Living situation: Lives alone  PCP : Fitz Turpin D.O.    Physical Exam     Vitals:    08/15/19 1600 08/15/19 1630 08/15/19 1730 08/15/19 1800   BP: 106/65 (!) 91/59 160/70 160/67   Pulse: (!) 103 84 94 88   Resp:   (!) 24 17   Temp:       TempSrc:       SpO2: 98% 98% 98% 97%   Weight:       Height:         Body mass index is 24.13 kg/m².  O2 therapy: Pulse Oximetry: 97 %, O2 (LPM): 2, O2 Delivery: Silicone Nasal Cannula    Physical Exam   Constitutional: She is oriented to person, place, and time. No distress.   Frail build   HENT:   Head: Normocephalic and atraumatic.   Right Ear: External ear normal.   Left Ear: External ear normal.   Nose: Nose normal.   Mouth/Throat: Oropharynx is clear and moist.   Eyes: Pupils are equal, round, and reactive to light. Conjunctivae and EOM are normal. Right eye exhibits no discharge. Left eye exhibits no discharge. No scleral icterus.   Neck: Normal range of motion. Neck supple. No thyromegaly present.   Cardiovascular: Regular rhythm and normal heart sounds.   No murmur heard.  Tachycardia, A. fib and RVR   Pulmonary/Chest: Effort normal and breath sounds normal. No respiratory distress. She has no wheezes. She has no rales.   Abdominal: Soft. She exhibits distension. She exhibits no mass. There is no tenderness. There is no guarding.   Hyperactive bowel sounds   Genitourinary:   Genitourinary Comments: Deferred   Musculoskeletal: Normal range of motion. She exhibits no edema, tenderness or deformity.   Lymphadenopathy:     She has no cervical adenopathy.   Neurological: She is alert and oriented to person, place, and time. She exhibits normal muscle tone. GCS score is 15.   Skin: Skin is warm and dry.   Psychiatric: Mood, memory, affect and  judgment normal.   Nursing note and vitals reviewed.        Data Review       Old Records Request:   Completed  Current Records review/summary: Completed    Lab Data Review:  Recent Results (from the past 24 hour(s))   CBC w/ Differential    Collection Time: 08/15/19 11:45 AM   Result Value Ref Range    WBC 10.7 4.8 - 10.8 K/uL    RBC 5.14 4.20 - 5.40 M/uL    Hemoglobin 14.5 12.0 - 16.0 g/dL    Hematocrit 40.8 37.0 - 47.0 %    MCV 79.4 (L) 81.4 - 97.8 fL    MCH 28.2 27.0 - 33.0 pg    MCHC 35.5 (H) 33.6 - 35.0 g/dL    RDW 43.2 35.9 - 50.0 fL    Platelet Count 154 (L) 164 - 446 K/uL    MPV 10.7 9.0 - 12.9 fL    Neutrophils-Polys 89.00 (H) 44.00 - 72.00 %    Lymphocytes 4.10 (L) 22.00 - 41.00 %    Monocytes 5.70 0.00 - 13.40 %    Eosinophils 0.10 0.00 - 6.90 %    Basophils 0.40 0.00 - 1.80 %    Immature Granulocytes 0.70 0.00 - 0.90 %    Nucleated RBC 0.00 /100 WBC    Neutrophils (Absolute) 9.51 (H) 2.00 - 7.15 K/uL    Lymphs (Absolute) 0.44 (L) 1.00 - 4.80 K/uL    Monos (Absolute) 0.61 0.00 - 0.85 K/uL    Eos (Absolute) 0.01 0.00 - 0.51 K/uL    Baso (Absolute) 0.04 0.00 - 0.12 K/uL    Immature Granulocytes (abs) 0.08 0.00 - 0.11 K/uL    NRBC (Absolute) 0.00 K/uL   Complete Metabolic Panel (CMP)    Collection Time: 08/15/19 11:45 AM   Result Value Ref Range    Sodium 135 135 - 145 mmol/L    Potassium 3.6 3.6 - 5.5 mmol/L    Chloride 99 96 - 112 mmol/L    Co2 14 (L) 20 - 33 mmol/L    Anion Gap 22.0 (H) 0.0 - 11.9    Glucose 494 (H) 65 - 99 mg/dL    Bun 22 8 - 22 mg/dL    Creatinine 1.11 0.50 - 1.40 mg/dL    Calcium 8.4 (L) 8.5 - 10.5 mg/dL    AST(SGOT) 15 12 - 45 U/L    ALT(SGPT) 12 2 - 50 U/L    Alkaline Phosphatase 62 30 - 99 U/L    Total Bilirubin 1.6 (H) 0.1 - 1.5 mg/dL    Albumin 3.1 (L) 3.2 - 4.9 g/dL    Total Protein 6.6 6.0 - 8.2 g/dL    Globulin 3.5 1.9 - 3.5 g/dL    A-G Ratio 0.9 g/dL   Troponin STAT    Collection Time: 08/15/19 11:45 AM   Result Value Ref Range    Troponin T 36 (H) 6 - 19 ng/L   ESTIMATED GFR     Collection Time: 08/15/19 11:45 AM   Result Value Ref Range    GFR If  58 (A) >60 mL/min/1.73 m 2    GFR If Non  48 (A) >60 mL/min/1.73 m 2   BETA-HYDROXYBUTYRIC ACID    Collection Time: 08/15/19 11:45 AM   Result Value Ref Range    beta-Hydroxybutyric Acid 5.50 (H) 0.02 - 0.27 mmol/L   EKG (NOW)    Collection Time: 08/15/19 11:51 AM   Result Value Ref Range    Report       [facility] Emergency Dept.    Test Date:  2019-08-15  Pt Name:    [facility]                 Department: ER  MRN:        [id number]                      Room:       RD 06  Gender:     Female                       Technician: [id number]  :        1945                   Requested By:ER TRIAGE PROTOCOL  Order #:    [id number]                    Reading MD:    Measurements  Intervals                                Axis  Rate:       147                          P:  AL:                                      QRS:        -50  QRSD:       104                          T:          94  QT:         320  QTc:        501    Interpretive Statements  Atrial fibrillation with rapid V-rate  Left anterior fascicular block  LVH with secondary repolarization abnormality  Anterior Q waves, possibly due to LVH  Compared to ECG 2019 11:20:56  Left anterior fascicular block now present  Q waves now present  Sinus rhythm no longer present  Intraventricular conduction delay no longer pr esent     EKG (NOW)    Collection Time: 08/15/19  4:48 PM   Result Value Ref Range    Report       [facility] Emergency Dept.    Test Date:  2019-08-15  Pt Name:    SHANNAN ELDER                 Department: ER  MRN:        [id number]                      Room:       RD 06  Gender:     Female                       Technician: [id number]  :        1945                   Requested By:ER TRIAGE PROTOCOL  Order #:    [id number]                    Reading MD:    Measurements  Intervals                                 Axis  Rate:       91                           P:          60  UT:         160                          QRS:        -43  QRSD:       100                          T:          49  QT:         396  QTc:        488    Interpretive Statements  SINUS RHYTHM  ATRIAL PREMATURE COMPLEX  LEFT AXIS DEVIATION  LATE PRECORDIAL R/S TRANSITION  LEFT VENTRICULAR HYPERTROPHY  BORDERLINE PROLONGED QT INTERVAL  Compared to ECG 08/15/2019 11:51:43  Atrial premature complex(es) now present  Left-axis deviation now present  Atrial fibrillation no longer present  Left anterior fas cicular block no longer present  Early repolarization no longer present  Q waves no longer present     ACCU-CHEK GLUCOSE    Collection Time: 08/15/19  5:53 PM   Result Value Ref Range    Glucose - Accu-Ck 493 (HH) 65 - 99 mg/dL       Imaging/Procedures Review:    Independant Imaging Review: Completed  CTA ABDOMEN PELVIS W & W/O POST PROCESS   Final Result      1.  Patent celiac, superior mesenteric and inferior mesenteric arteries.      2.  No evidence of bowel obstruction or inflammatory change.      3.  Atherosclerotic plaque involving the aorta primarily distally.      4.  Fatty liver.      5.  Splenomegaly.      6.  Small amount of ascites.      7.  Bibasilar atelectasis.      8.  Prior cholecystectomy.      DX-CHEST-PORTABLE (1 VIEW)   Final Result      Mild patchy opacity in the left costophrenic angle may represent atelectasis but pneumonitis is not excluded.      Atherosclerotic plaque.             EKG:   EKG Independent Review: Completed  QTc:501, HR: 147, Atrial fibrillation with rapid V-rate   Left anterior fascicular block   LVH with secondary repolarization abnormality   Anterior Q waves, possibly due to LVH     Records reviewed and summarized in current documentation :  Yes  [employer] teaching service handout given to patient:  No         Assessment/Plan     * Diabetic ketoacidosis without coma associated with type 2 diabetes mellitus (HCC)  Assessment &  Plan  - Hyperglycemia 494, patient has not been taking insulin for past 1 week, has also been having very poor p.o. intake for the past week secondary to nausea and vomiting  - Beta hydroxybutyrate elevated, bicarb 14, anion gap 22, serum K3.6 in ER  - Patient is likely significantly dehydrated secondary to poor p.o. intake  - Ongoing nausea and vomiting, received 1 dose of IV Zofran in ER  Plan  - Admit to ICU  - IVF NS 1 L bolus followed by infusion at 200 mL an hour  - IV KCl supplementation 40 mEq,   - recheck CMP after IV bolus and potassium supplementation to assess for ongoing fluid needs  - Caution with fluid resuscitation as patient has CHF with LVEF 47%, watch for clinical signs of fluid overload  - CXR in a.m.  -IV insulin per DKA protocol    Generalized abdominal pain  Assessment & Plan  -Abdominal pain, generalized, with periumbilical tenderness to palpation  - Concern for eccentric vascular disease/concentric ischemia due to recent stoppage of anticoagulation and prior history of coronary occlusion and CVA  - Pain might also be secondary to repeated nausea and vomiting  - CKD 3 at baseline  Plan  - IVF NS bolus and infusion at 200 mL/hour prior to CTA abdomen, continue IV fluids post study    Paroxysmal atrial fibrillation (HCC)- (present on admission)  Assessment & Plan  -Atrial fibrillation in RVR, ventricular rate 150 on presentation, currently low 100s post Cardizem bolus and infusion  - Patient has not been taking home medication [metoprolol 50 twice daily]  -Hemodynamically stable  -[id number] score 7  Plan  - Admit to ICU, telemetry  - Continue diltiazem drip  -Metoprolol tartrate 5 mg IV as needed x3 for ventricular rate> 130  - Consider digoxin load if refractory  - Resume metoprolol twice daily dose p.o. as tolerated    CKD (chronic kidney disease), stage III (HCC)- (present on admission)  Assessment & Plan  -Long-standing IDDM, HTN, CKD 3, at baseline  - CT abdomen with contrast IV to  assess for eccentric ischemia in setting of missed doses of anticoagulation and acute abdominal pain  -Monitor renal function, administer bolus and infusion fluids prior to study and post study      Thyroid nodule- (present on admission)  Assessment & Plan  -Does not complain of neck pain/symptoms of hypo-/hyperthyroidism  - Most recent TSH/FT4 WNL 12/18  Plan  -Recheck TSH, FT4      Anticipated Hospital stay:  >2 midnights        Quality Measures  Quality-Core Measures   Reviewed items::  EKG reviewed, Labs reviewed, Medications reviewed and Radiology images reviewed  Schaefer catheter::  No Schaefer  DVT prophylaxis pharmacological::  Heparin  Ulcer Prophylaxis::  Not indicated    PCP: Fitz Turpin D.O.

## 2019-08-15 NOTE — ED PROVIDER NOTES
ED Provider Note    CHIEF COMPLAINT  Chief Complaint   Patient presents with   • Atrial Fibrillation   • N/V       HPI  Amrita Torrez is a 74 y.o. female her for increased heart rate, nausea and vomiting. The pt states that she has been having some vomiting over the last two days.  Because of this, she has been unable to take her medications. She has a history of afib, and sees cards here for the same. She has no cp or sob currently.  She has had some intermittent dizziness over the last couple of days as well.  Nothing seems to alleviate or exacerbate her symptoms. She denies a history of the same.       PAST MEDICAL HISTORY   has a past medical history of CAD (coronary artery disease), Diabetes, Goiter, Heart attack (HCC), Hyperlipidemia, Hypertension, MI (myocardial infarction) ([facility]) (), Pericardial effusion, and Thyroid nodule.    SOCIAL HISTORY  Social History     Tobacco Use   • Smoking status: Former Smoker     Packs/day: 1.00     Years: 40.00     Pack years: 40.00     Types: Cigarettes     Last attempt to quit: 1978     Years since quittin.6   • Smokeless tobacco: Never Used   Substance and Sexual Activity   • Alcohol use: No   • Drug use: No   • Sexual activity: Not on file       Family History  No bleeding disorders     SURGICAL HISTORY   has a past surgical history that includes other orthopedic surgery (); other abdominal surgery (); gyn surgery (); gyn surgery; aditya by laparoscopy (2012); laparoscopy (2014); and zzz cardiac cath.    CURRENT MEDICATIONS  Home Medications    **Home medications have not yet been reviewed for this encounter**         ALLERGIES  Allergies   Allergen Reactions   • Pcn [Penicillins] Hives and Swelling   • Sulfa Drugs Hives and Swelling       REVIEW OF SYSTEMS  See HPI for further details. Review of systems as above, otherwise all other systems are negative.     PHYSICAL EXAM  Constitutional: Well developed, well nourished. mild acute  distress.  HEENT: Normocephalic, atraumatic. Posterior pharynx clear and moist.  Eyes:  EOMI. Normal sclera.  Neck: Supple, Full range of motion, nontender.  Chest/Pulmonary: clear to ausculation. Symmetrical expansion.   Cardio: irregularly irregular rate and rhythm with no murmur.   Abdomen: Soft, nontender. No peritoneal signs. No guarding. No palpable masses.  Musculoskeletal: No deformity, no edema, neurovascular intact.   Neuro: Clear speech, appropriate, cooperative, cranial nerves II-XII grossly intact.  Psych: Normal mood and affect    Results for orders placed or performed during the hospital encounter of 08/15/19   CBC w/ Differential   Result Value Ref Range    WBC 10.7 4.8 - 10.8 K/uL    RBC 5.14 4.20 - 5.40 M/uL    Hemoglobin 14.5 12.0 - 16.0 g/dL    Hematocrit 40.8 37.0 - 47.0 %    MCV 79.4 (L) 81.4 - 97.8 fL    MCH 28.2 27.0 - 33.0 pg    MCHC 35.5 (H) 33.6 - 35.0 g/dL    RDW 43.2 35.9 - 50.0 fL    Platelet Count 154 (L) 164 - 446 K/uL    MPV 10.7 9.0 - 12.9 fL    Neutrophils-Polys 89.00 (H) 44.00 - 72.00 %    Lymphocytes 4.10 (L) 22.00 - 41.00 %    Monocytes 5.70 0.00 - 13.40 %    Eosinophils 0.10 0.00 - 6.90 %    Basophils 0.40 0.00 - 1.80 %    Immature Granulocytes 0.70 0.00 - 0.90 %    Nucleated RBC 0.00 /100 WBC    Neutrophils (Absolute) 9.51 (H) 2.00 - 7.15 K/uL    Lymphs (Absolute) 0.44 (L) 1.00 - 4.80 K/uL    Monos (Absolute) 0.61 0.00 - 0.85 K/uL    Eos (Absolute) 0.01 0.00 - 0.51 K/uL    Baso (Absolute) 0.04 0.00 - 0.12 K/uL    Immature Granulocytes (abs) 0.08 0.00 - 0.11 K/uL    NRBC (Absolute) 0.00 K/uL   Complete Metabolic Panel (CMP)   Result Value Ref Range    Sodium 135 135 - 145 mmol/L    Potassium 3.6 3.6 - 5.5 mmol/L    Chloride 99 96 - 112 mmol/L    Co2 14 (L) 20 - 33 mmol/L    Anion Gap 22.0 (H) 0.0 - 11.9    Glucose 494 (H) 65 - 99 mg/dL    Bun 22 8 - 22 mg/dL    Creatinine 1.11 0.50 - 1.40 mg/dL    Calcium 8.4 (L) 8.5 - 10.5 mg/dL    AST(SGOT) 15 12 - 45 U/L    ALT(SGPT) 12 2 -  50 U/L    Alkaline Phosphatase 62 30 - 99 U/L    Total Bilirubin 1.6 (H) 0.1 - 1.5 mg/dL    Albumin 3.1 (L) 3.2 - 4.9 g/dL    Total Protein 6.6 6.0 - 8.2 g/dL    Globulin 3.5 1.9 - 3.5 g/dL    A-G Ratio 0.9 g/dL   Troponin STAT   Result Value Ref Range    Troponin T 36 (H) 6 - 19 ng/L   ESTIMATED GFR   Result Value Ref Range    GFR If  58 (A) >60 mL/min/1.73 m 2    GFR If Non  48 (A) >60 mL/min/1.73 m 2   EKG (NOW)   Result Value Ref Range    Report       [facility] Emergency Dept.    Test Date:  2019-08-15  Pt Name:    SHANNAN ALCANTARA                 Department: ER  MRN:        [id number]                      Room:       [facility]  Gender:     Female                       Technician: [id number]  :        1945                   Requested By:ER TRIAGE PROTOCOL  Order #:    [id number]                    Reading MD:    Measurements  Intervals                                Axis  Rate:       147                          P:  RI:                                      QRS:        -50  QRSD:       104                          T:          94  QT:         320  QTc:        501    Interpretive Statements  Atrial fibrillation with rapid V-rate  Left anterior fascicular block  LVH with secondary repolarization abnormality  Anterior Q waves, possibly due to LVH  Compared to ECG 2019 11:20:56  Left anterior fascicular block now present  Q waves now present  Sinus rhythm no longer present  Intraventricular conduction delay no longer pr esent       DX-CHEST-PORTABLE (1 VIEW)   Final Result      Mild patchy opacity in the left costophrenic angle may represent atelectasis but pneumonitis is not excluded.      Atherosclerotic plaque.          PROCEDURES     MEDICAL RECORD  I have reviewed patient's medical record and pertinent results are listed above.    COURSE & MEDICAL DECISION MAKING  I have reviewed any medical record information, laboratory studies and radiographic results as noted  above.    If you have had any blood pressure issues while here in the emergency department, please see your doctor for a further evaluation or work up.    CRITICAL CARE  The very real possibility of a deterioration of this patient's condition required the highest level of my preparedness for sudden, emergent intervention.  I provided critical care services, which included medication orders, frequent reevaluations of the patient's condition and response to treatment, ordering and reviewing test results, and discussing the case with various consultants.  The critical care time associated with the care of the patient was 40 minutes. Review chart for interventions. This time is exclusive of any other billable procedures.     12:15 pm  Patient remains comfortable, we will continue care with Cardizem bolus dose, and drip.    12:37 pm  Pt remains comfortable.      1:10 PM  The pt will be admitted to the [facility] service.  Heart rate 120s we are awaiting the cardizem drip    FINAL IMPRESSION  Atrial fibrillation with RVR.  Critical care 45 minutes.       Electronically signed by: Jann Candelaria, 8/15/2019 1:10 PM

## 2019-08-15 NOTE — CONSULTS
Critical Care Consultation    Date of consult: 8/15/2019    Referring Physician  Jann Candelaria D.O.    Reason for Consultation  AF RVR    History of Presenting Illness  74 y.o. female with a past medical history of coronary disease, diabetes, goiter, hyperlipidemia, hypertension, A. fib RVR, who presented 8/15/2019 with nausea, vomiting, tachycardia associated with dizziness.  Patient reports she has had 2 days of nausea vomiting is been unable to keep her medications down therefore has been unable to take her Lopressor for atrial fibrillation.  In the ER the patient's heart rate was noted to be 150s-160s, she was given a diltiazem bolus and started on infusion.  Unfortunately her heart rate is not able to be controlled without continuous infusion of Cardizem therefore she will require admission to the ICU for active titration of her calcium channel blocker infusion.  Initial labs also demonstrate significant hyperglycemia with a CO2 of 14, anion gap of 22 and beta hydroxybutyric acid 5.5.  The patient states she checks her blood sugar once a day and it was normal yesterday however she has not taken her insulin due to her illness.    Code Status  Full Code    Review of Systems  Review of Systems   Constitutional: Positive for malaise/fatigue and weight loss. Negative for chills and fever.   Respiratory: Positive for shortness of breath. Negative for cough, sputum production and stridor.    Cardiovascular: Positive for palpitations. Negative for chest pain.   Gastrointestinal: Positive for abdominal pain, nausea and vomiting.   Genitourinary: Negative for dysuria.   Musculoskeletal: Negative for myalgias.   Skin: Negative for rash.   Neurological: Positive for weakness. Negative for dizziness, sensory change and focal weakness.       Past Medical History   has a past medical history of CAD (coronary artery disease), Diabetes, Goiter, Heart attack (HCC), Hyperlipidemia, Hypertension, MI (myocardial infarction)  (HCC) (2016), Pericardial effusion, and Thyroid nodule. She also has no past medical history of Breast cancer (HCC).    Surgical History   has a past surgical history that includes other orthopedic surgery (7/11); other abdominal surgery (1966); gyn surgery (1978); gyn surgery; aditya by laparoscopy (9/14/2012); laparoscopy (12/1/2014); and zzz cardiac cath.    Family History  family history is not on file.    Social History   reports that she quit smoking about 41 years ago. Her smoking use included cigarettes. She has a 40.00 pack-year smoking history. She has never used smokeless tobacco. She reports that she does not drink alcohol or use drugs.    Medications  Home Medications     Reviewed by Michael Dawkins (Pharmacy Tech) on 08/15/19 at 1333  Med List Status: Complete   Medication Last Dose Status   apixaban (ELIQUIS) 5mg Tab <2weeks Active   insulin glargine (LANTUS) 100 UNIT/ML Solution last week Active   latanoprost (XALATAN) 0.005 % Solution <2weeks Active   metoprolol (LOPRESSOR) 50 MG Tab <2weeks Active   nitrofurantoin monohyd macro (MACROBID) 100 MG Cap unk Active   omeprazole (PRILOSEC) 20 MG delayed-release capsule <2weeks Active   POTASSIUM PO <2weeks Active   rosuvastatin (CRESTOR) 5 MG Tab <2weeks Active   trazodone (DESYREL) 50 MG Tab <2weeks Active   vitamin D, Ergocalciferol, (DRISDOL) 07165 units Cap capsule <2weeks Active              Current Facility-Administered Medications   Medication Dose Route Frequency Provider Last Rate Last Dose   • DILTIAZem (CARDIZEM) 100 mg in D5W 100 mL Infusion  0-15 mg/hr Intravenous Continuous Jann MISSY Candelaria, D.O. 5 mL/hr at 08/15/19 1312 5 mg/hr at 08/15/19 1312     Current Outpatient Medications   Medication Sig Dispense Refill   • apixaban (ELIQUIS) 5mg Tab Take 5 mg by mouth 2 Times a Day.     • metoprolol (LOPRESSOR) 50 MG Tab Take 50 mg by mouth 2 times a day.     • rosuvastatin (CRESTOR) 5 MG Tab Take 5 mg by mouth every morning.     • POTASSIUM PO  Take 1 Tab by mouth every morning.     • nitrofurantoin monohyd macro (MACROBID) 100 MG Cap Take 100 mg by mouth 2 times a day. 7 day course     • omeprazole (PRILOSEC) 20 MG delayed-release capsule Take 20 mg by mouth every day.     • vitamin D, Ergocalciferol, (DRISDOL) 73978 units Cap capsule Take 50,000 Units by mouth every Sunday.     • latanoprost (XALATAN) 0.005 % Solution Place 1 Drop in both eyes every evening.     • insulin glargine (LANTUS) 100 UNIT/ML Solution Inject 20 Units as instructed every evening.     • trazodone (DESYREL) 50 MG Tab Take 50 mg by mouth every bedtime.         Allergies  Allergies   Allergen Reactions   • Pcn [Penicillins] Hives, Shortness of Breath and Swelling   • Sulfa Drugs Hives, Shortness of Breath and Swelling       Vital Signs last 24 hours  Temp:  [37.2 °C (99 °F)] 37.2 °C (99 °F)  Pulse:  [111-149] 132  Resp:  [22] 22  BP: (121-172)/() 121/81  SpO2:  [97 %-98 %] 98 %    Physical Exam  Physical Exam   Constitutional: She is oriented to person, place, and time. She appears well-developed and well-nourished. No distress.   HENT:   Head: Normocephalic and atraumatic.   Right Ear: External ear normal.   Left Ear: External ear normal.   Nose: Nose normal.   Mouth/Throat: Mucous membranes are dry.   Eyes: Pupils are equal, round, and reactive to light. Conjunctivae and EOM are normal.   Neck: Neck supple. No JVD present. No tracheal deviation present.   Cardiovascular: Intact distal pulses and normal pulses. An irregularly irregular rhythm present. Tachycardia present.   Pulmonary/Chest: Breath sounds normal. No accessory muscle usage. Tachypnea noted. No respiratory distress. She has no decreased breath sounds.   Abdominal: Soft. She exhibits no distension. Bowel sounds are increased. There is tenderness.   Significantly tender to palpation, tenderness out of proportion to exam   Musculoskeletal: Normal range of motion. She exhibits no tenderness or deformity.    Neurological: She is alert and oriented to person, place, and time. She exhibits normal muscle tone. Coordination normal.   Skin: Skin is warm and dry. No rash noted.   Psychiatric: She has a normal mood and affect. Her behavior is normal.   Nursing note and vitals reviewed.      Fluids  No intake or output data in the 24 hours ending 08/15/19 1515    Laboratory  Recent Results (from the past 48 hour(s))   CBC w/ Differential    Collection Time: 08/15/19 11:45 AM   Result Value Ref Range    WBC 10.7 4.8 - 10.8 K/uL    RBC 5.14 4.20 - 5.40 M/uL    Hemoglobin 14.5 12.0 - 16.0 g/dL    Hematocrit 40.8 37.0 - 47.0 %    MCV 79.4 (L) 81.4 - 97.8 fL    MCH 28.2 27.0 - 33.0 pg    MCHC 35.5 (H) 33.6 - 35.0 g/dL    RDW 43.2 35.9 - 50.0 fL    Platelet Count 154 (L) 164 - 446 K/uL    MPV 10.7 9.0 - 12.9 fL    Neutrophils-Polys 89.00 (H) 44.00 - 72.00 %    Lymphocytes 4.10 (L) 22.00 - 41.00 %    Monocytes 5.70 0.00 - 13.40 %    Eosinophils 0.10 0.00 - 6.90 %    Basophils 0.40 0.00 - 1.80 %    Immature Granulocytes 0.70 0.00 - 0.90 %    Nucleated RBC 0.00 /100 WBC    Neutrophils (Absolute) 9.51 (H) 2.00 - 7.15 K/uL    Lymphs (Absolute) 0.44 (L) 1.00 - 4.80 K/uL    Monos (Absolute) 0.61 0.00 - 0.85 K/uL    Eos (Absolute) 0.01 0.00 - 0.51 K/uL    Baso (Absolute) 0.04 0.00 - 0.12 K/uL    Immature Granulocytes (abs) 0.08 0.00 - 0.11 K/uL    NRBC (Absolute) 0.00 K/uL   Complete Metabolic Panel (CMP)    Collection Time: 08/15/19 11:45 AM   Result Value Ref Range    Sodium 135 135 - 145 mmol/L    Potassium 3.6 3.6 - 5.5 mmol/L    Chloride 99 96 - 112 mmol/L    Co2 14 (L) 20 - 33 mmol/L    Anion Gap 22.0 (H) 0.0 - 11.9    Glucose 494 (H) 65 - 99 mg/dL    Bun 22 8 - 22 mg/dL    Creatinine 1.11 0.50 - 1.40 mg/dL    Calcium 8.4 (L) 8.5 - 10.5 mg/dL    AST(SGOT) 15 12 - 45 U/L    ALT(SGPT) 12 2 - 50 U/L    Alkaline Phosphatase 62 30 - 99 U/L    Total Bilirubin 1.6 (H) 0.1 - 1.5 mg/dL    Albumin 3.1 (L) 3.2 - 4.9 g/dL    Total Protein 6.6  6.0 - 8.2 g/dL    Globulin 3.5 1.9 - 3.5 g/dL    A-G Ratio 0.9 g/dL   Troponin STAT    Collection Time: 08/15/19 11:45 AM   Result Value Ref Range    Troponin T 36 (H) 6 - 19 ng/L   ESTIMATED GFR    Collection Time: 08/15/19 11:45 AM   Result Value Ref Range    GFR If  58 (A) >60 mL/min/1.73 m 2    GFR If Non  48 (A) >60 mL/min/1.73 m 2   EKG (NOW)    Collection Time: 08/15/19 11:51 AM   Result Value Ref Range    Report       [facility]    Test Date:  2019-08-15  Pt Name:    SHANNAN ALCANTARA                 Department: ER  MRN:        [id number]                      Room:       [facility]  Gender:     Female                       Technician: [id number]  :        [id number]                   Requested By:ER TRIAGE PROTOCOL  Order #:    [id number]                    Reading MD:    Measurements  Intervals                                Axis  Rate:       147                          P:  AZ:                                      QRS:        -50  QRSD:       104                          T:          94  QT:         320  QTc:        501    Interpretive Statements  Atrial fibrillation with rapid V-rate  Left anterior fascicular block  LVH with secondary repolarization abnormality  Anterior Q waves, possibly due to LVH  Compared to ECG 2019 11:20:56  Left anterior fascicular block now present  Q waves now present  Sinus rhythm no longer present  Intraventricular conduction delay no longer pr esent         Imaging  CTA ABDOMEN PELVIS W & W/O POST PROCESS   Final Result      1.  Patent celiac, superior mesenteric and inferior mesenteric arteries.      2.  No evidence of bowel obstruction or inflammatory change.      3.  Atherosclerotic plaque involving the aorta primarily distally.      4.  Fatty liver.      5.  Splenomegaly.      6.  Small amount of ascites.      7.  Bibasilar atelectasis.      8.  Prior cholecystectomy.      DX-CHEST-PORTABLE (1 VIEW)   Final Result       Mild patchy opacity in the left costophrenic angle may represent atelectasis but pneumonitis is not excluded.      Atherosclerotic plaque.      DX-CHEST-PORTABLE (1 VIEW)    (Results Pending)       Assessment/Plan  * Diabetic ketoacidosis without coma associated with type 2 diabetes mellitus (HCC)- (present on admission)  Assessment & Plan  ICU admission, cardiac monitoring  optimized intravascular volume with 2L IVF bolus  DKA protocol with insulin drip at 0.05 units/kg/hr  Every hour Accu-Cheks, every 4 hour BMP, mag, phos  Goal Magnesium: >2, Phosphorus: 2, K >4  Will transition to sliding scale insulin when anion gap <12, CO2 > 17      Non-intractable vomiting with nausea- (present on admission)  Assessment & Plan  Treat dehydration with IV fluid bolus  Symptomatically treat with Zofran, Compazine as needed    Generalized abdominal pain  Assessment & Plan  Pain out of proportion to exam  CT scan of abdomen to rule out mesenteric ischemia    Acquired circulating anticoagulants (HCC)- (present on admission)  Assessment & Plan  Eliquis use  Not actively bleeding    Hypertension- (present on admission)  Assessment & Plan  PRN medications available    Paroxysmal atrial fibrillation (HCC)- (present on admission)  Assessment & Plan  [id number]: 6  Continue Eliquis  Now in A. fib with RVR  Likely due to inability to keep down medication in the setting of gastrointestinal upset  Placed on diltiazem infusion and titrate to heart rate of less than 100  Restart home Lopressor  May require additional rate limiting medications  Closely monitor for hypotension    CKD (chronic kidney disease), stage III (HCC)- (present on admission)  Assessment & Plan  Monitor for worsening following CT with contrast  Renal dose meds, avoid nephrotoxins  Strict I/Os  Follow renal function      Thyroid nodule- (present on admission)  Assessment & Plan  TSH normal 8 months ago  Recheck today given A. fib with RVR    Dyslipidemia- (present on  admission)  Assessment & Plan  Continue Crestor      Discussed patient condition and risk of morbidity and/or mortality with RN, RT, Pharmacy, [employer] [facility] resident, Code status disscussed, Charge nurse / hot rounds, Patient and ERP.      The patient remains critically ill.  Critical care time = 35 minutes in directly providing and coordinating critical care and extensive data review.  No time overlap and excludes procedures.

## 2019-08-15 NOTE — ASSESSMENT & PLAN NOTE
Secondary to dehydration from poor oral intake, atrial fibrillation, non adherence. Patient had issue obtaining prescription for needles over the last couple months.    -RESOLVED.

## 2019-08-15 NOTE — ASSESSMENT & PLAN NOTE
Presented in afib w/RVR. Required diltiazem infusion. Likely secondary to non-adherence to medical regimen. [id number] >2. Metoprolol tartrate 75 mg po q8hrs needed to control rate inpatient (increased from 50 BID home dose). However, decreased adherence to regimen risk very high with this dosing. Will change to long acting succinate formula to help with adherence.    -Metoprolol Succinate 200 mg po qday.  -Apixaban 5 mg   -Follow up with cardiology and PCP.

## 2019-08-15 NOTE — ED TRIAGE NOTES
Pt BIB [facility], pt c/o N/V x4 days , pt has a history of A-fib , pt found to be in A-fib w/ RVR upon arrival of EMS. Pt also is diabetic and has not taken any meds or eaten or drank in 4 days. Pt received 8mg Zofran PTA

## 2019-08-15 NOTE — ASSESSMENT & PLAN NOTE
Eating and stooling without issues.    -IMPROVED/RESOVLED.  -Secondary to DKA and possible underlying gastroparesis component as well.

## 2019-08-16 ENCOUNTER — APPOINTMENT (OUTPATIENT)
Dept: RADIOLOGY | Facility: MEDICAL CENTER | Age: 74
DRG: 639 | End: 2019-08-16
Attending: STUDENT IN AN ORGANIZED HEALTH CARE EDUCATION/TRAINING PROGRAM
Payer: MEDICARE

## 2019-08-16 LAB
ALBUMIN SERPL BCP-MCNC: 2.1 G/DL (ref 3.2–4.9)
ALBUMIN SERPL BCP-MCNC: 2.6 G/DL (ref 3.2–4.9)
ALBUMIN SERPL BCP-MCNC: 2.6 G/DL (ref 3.2–4.9)
ALBUMIN/GLOB SERPL: 0.9 G/DL
ALBUMIN/GLOB SERPL: 0.9 G/DL
ALP SERPL-CCNC: 45 U/L (ref 30–99)
ALP SERPL-CCNC: 48 U/L (ref 30–99)
ALT SERPL-CCNC: 10 U/L (ref 2–50)
ALT SERPL-CCNC: 10 U/L (ref 2–50)
ANION GAP SERPL CALC-SCNC: 10 MMOL/L (ref 0–11.9)
ANION GAP SERPL CALC-SCNC: 5 MMOL/L (ref 0–11.9)
ANION GAP SERPL CALC-SCNC: 8 MMOL/L (ref 0–11.9)
ANION GAP SERPL CALC-SCNC: 9 MMOL/L (ref 0–11.9)
AST SERPL-CCNC: 16 U/L (ref 12–45)
AST SERPL-CCNC: 17 U/L (ref 12–45)
B-OH-BUTYR SERPL-MCNC: 0.03 MMOL/L (ref 0.02–0.27)
BASE EXCESS BLDV CALC-SCNC: -6 MMOL/L
BASOPHILS # BLD AUTO: 0.2 % (ref 0–1.8)
BASOPHILS # BLD: 0.02 K/UL (ref 0–0.12)
BILIRUB SERPL-MCNC: 0.6 MG/DL (ref 0.1–1.5)
BILIRUB SERPL-MCNC: 0.7 MG/DL (ref 0.1–1.5)
BODY TEMPERATURE: ABNORMAL CENTIGRADE
BUN SERPL-MCNC: 27 MG/DL (ref 8–22)
BUN SERPL-MCNC: 28 MG/DL (ref 8–22)
BUN SERPL-MCNC: 28 MG/DL (ref 8–22)
BUN SERPL-MCNC: 30 MG/DL (ref 8–22)
CA-I SERPL-SCNC: 1.1 MMOL/L (ref 1.1–1.3)
CALCIUM SERPL-MCNC: 6.7 MG/DL (ref 8.5–10.5)
CALCIUM SERPL-MCNC: 8.1 MG/DL (ref 8.5–10.5)
CALCIUM SERPL-MCNC: 8.2 MG/DL (ref 8.5–10.5)
CALCIUM SERPL-MCNC: 9.4 MG/DL (ref 8.5–10.5)
CHLORIDE SERPL-SCNC: 107 MMOL/L (ref 96–112)
CHLORIDE SERPL-SCNC: 108 MMOL/L (ref 96–112)
CHLORIDE SERPL-SCNC: 109 MMOL/L (ref 96–112)
CHLORIDE SERPL-SCNC: 116 MMOL/L (ref 96–112)
CO2 SERPL-SCNC: 14 MMOL/L (ref 20–33)
CO2 SERPL-SCNC: 16 MMOL/L (ref 20–33)
CO2 SERPL-SCNC: 21 MMOL/L (ref 20–33)
CO2 SERPL-SCNC: 22 MMOL/L (ref 20–33)
CREAT SERPL-MCNC: 1.11 MG/DL (ref 0.5–1.4)
CREAT SERPL-MCNC: 1.21 MG/DL (ref 0.5–1.4)
CREAT SERPL-MCNC: 1.29 MG/DL (ref 0.5–1.4)
CREAT SERPL-MCNC: 1.37 MG/DL (ref 0.5–1.4)
EOSINOPHIL # BLD AUTO: 0.01 K/UL (ref 0–0.51)
EOSINOPHIL NFR BLD: 0.1 % (ref 0–6.9)
ERYTHROCYTE [DISTWIDTH] IN BLOOD BY AUTOMATED COUNT: 45.1 FL (ref 35.9–50)
GLOBULIN SER CALC-MCNC: 2.9 G/DL (ref 1.9–3.5)
GLOBULIN SER CALC-MCNC: 3 G/DL (ref 1.9–3.5)
GLUCOSE BLD-MCNC: 103 MG/DL (ref 65–99)
GLUCOSE BLD-MCNC: 119 MG/DL (ref 65–99)
GLUCOSE BLD-MCNC: 124 MG/DL (ref 65–99)
GLUCOSE BLD-MCNC: 124 MG/DL (ref 65–99)
GLUCOSE BLD-MCNC: 128 MG/DL (ref 65–99)
GLUCOSE BLD-MCNC: 146 MG/DL (ref 65–99)
GLUCOSE BLD-MCNC: 151 MG/DL (ref 65–99)
GLUCOSE BLD-MCNC: 156 MG/DL (ref 65–99)
GLUCOSE BLD-MCNC: 171 MG/DL (ref 65–99)
GLUCOSE BLD-MCNC: 185 MG/DL (ref 65–99)
GLUCOSE BLD-MCNC: 189 MG/DL (ref 65–99)
GLUCOSE BLD-MCNC: 202 MG/DL (ref 65–99)
GLUCOSE BLD-MCNC: 283 MG/DL (ref 65–99)
GLUCOSE BLD-MCNC: 339 MG/DL (ref 65–99)
GLUCOSE BLD-MCNC: 350 MG/DL (ref 65–99)
GLUCOSE BLD-MCNC: 459 MG/DL (ref 65–99)
GLUCOSE BLD-MCNC: 476 MG/DL (ref 65–99)
GLUCOSE BLD-MCNC: 493 MG/DL (ref 65–99)
GLUCOSE BLD-MCNC: 83 MG/DL (ref 65–99)
GLUCOSE BLD-MCNC: 86 MG/DL (ref 65–99)
GLUCOSE BLD-MCNC: 96 MG/DL (ref 65–99)
GLUCOSE SERPL-MCNC: 110 MG/DL (ref 65–99)
GLUCOSE SERPL-MCNC: 279 MG/DL (ref 65–99)
GLUCOSE SERPL-MCNC: 295 MG/DL (ref 65–99)
GLUCOSE SERPL-MCNC: 346 MG/DL (ref 65–99)
HCO3 BLDV-SCNC: 19 MMOL/L (ref 24–28)
HCT VFR BLD AUTO: 37.1 % (ref 37–47)
HGB BLD-MCNC: 12.9 G/DL (ref 12–16)
IMM GRANULOCYTES # BLD AUTO: 0.07 K/UL (ref 0–0.11)
IMM GRANULOCYTES NFR BLD AUTO: 0.7 % (ref 0–0.9)
LACTATE BLD-SCNC: 1.6 MMOL/L (ref 0.5–2)
LYMPHOCYTES # BLD AUTO: 0.61 K/UL (ref 1–4.8)
LYMPHOCYTES NFR BLD: 5.8 % (ref 22–41)
MCH RBC QN AUTO: 28.2 PG (ref 27–33)
MCHC RBC AUTO-ENTMCNC: 34.8 G/DL (ref 33.6–35)
MCV RBC AUTO: 81 FL (ref 81.4–97.8)
MONOCYTES # BLD AUTO: 0.71 K/UL (ref 0–0.85)
MONOCYTES NFR BLD AUTO: 6.7 % (ref 0–13.4)
NEUTROPHILS # BLD AUTO: 9.11 K/UL (ref 2–7.15)
NEUTROPHILS NFR BLD: 86.5 % (ref 44–72)
NRBC # BLD AUTO: 0 K/UL
NRBC BLD-RTO: 0 /100 WBC
PCO2 BLDV: 35.6 MMHG (ref 41–51)
PH BLDV: 7.34 [PH] (ref 7.31–7.45)
PHOSPHATE SERPL-MCNC: 1.7 MG/DL (ref 2.5–4.5)
PLATELET # BLD AUTO: 157 K/UL (ref 164–446)
PMV BLD AUTO: 10.7 FL (ref 9–12.9)
PO2 BLDV: 23.2 MMHG (ref 25–40)
POTASSIUM SERPL-SCNC: 3.9 MMOL/L (ref 3.6–5.5)
POTASSIUM SERPL-SCNC: 4 MMOL/L (ref 3.6–5.5)
POTASSIUM SERPL-SCNC: 4.2 MMOL/L (ref 3.6–5.5)
POTASSIUM SERPL-SCNC: 4.6 MMOL/L (ref 3.6–5.5)
PROT SERPL-MCNC: 5.5 G/DL (ref 6–8.2)
PROT SERPL-MCNC: 5.6 G/DL (ref 6–8.2)
RBC # BLD AUTO: 4.58 M/UL (ref 4.2–5.4)
SAO2 % BLDV: 46.6 %
SODIUM SERPL-SCNC: 133 MMOL/L (ref 135–145)
SODIUM SERPL-SCNC: 136 MMOL/L (ref 135–145)
SODIUM SERPL-SCNC: 138 MMOL/L (ref 135–145)
SODIUM SERPL-SCNC: 138 MMOL/L (ref 135–145)
WBC # BLD AUTO: 10.5 K/UL (ref 4.8–10.8)

## 2019-08-16 PROCEDURE — 700102 HCHG RX REV CODE 250 W/ 637 OVERRIDE(OP): Performed by: INTERNAL MEDICINE

## 2019-08-16 PROCEDURE — 84100 ASSAY OF PHOSPHORUS: CPT

## 2019-08-16 PROCEDURE — A9270 NON-COVERED ITEM OR SERVICE: HCPCS | Performed by: INTERNAL MEDICINE

## 2019-08-16 PROCEDURE — 80048 BASIC METABOLIC PNL TOTAL CA: CPT

## 2019-08-16 PROCEDURE — 80053 COMPREHEN METABOLIC PANEL: CPT

## 2019-08-16 PROCEDURE — 700101 HCHG RX REV CODE 250: Performed by: STUDENT IN AN ORGANIZED HEALTH CARE EDUCATION/TRAINING PROGRAM

## 2019-08-16 PROCEDURE — 700111 HCHG RX REV CODE 636 W/ 250 OVERRIDE (IP): Performed by: INTERNAL MEDICINE

## 2019-08-16 PROCEDURE — 82010 KETONE BODYS QUAN: CPT

## 2019-08-16 PROCEDURE — 82803 BLOOD GASES ANY COMBINATION: CPT

## 2019-08-16 PROCEDURE — 99292 CRITICAL CARE ADDL 30 MIN: CPT | Performed by: INTERNAL MEDICINE

## 2019-08-16 PROCEDURE — 700105 HCHG RX REV CODE 258: Performed by: INTERNAL MEDICINE

## 2019-08-16 PROCEDURE — 99291 CRITICAL CARE FIRST HOUR: CPT | Performed by: INTERNAL MEDICINE

## 2019-08-16 PROCEDURE — 82962 GLUCOSE BLOOD TEST: CPT | Mod: 91

## 2019-08-16 PROCEDURE — 700102 HCHG RX REV CODE 250 W/ 637 OVERRIDE(OP): Performed by: STUDENT IN AN ORGANIZED HEALTH CARE EDUCATION/TRAINING PROGRAM

## 2019-08-16 PROCEDURE — 85025 COMPLETE CBC W/AUTO DIFF WBC: CPT

## 2019-08-16 PROCEDURE — 71045 X-RAY EXAM CHEST 1 VIEW: CPT

## 2019-08-16 PROCEDURE — 82040 ASSAY OF SERUM ALBUMIN: CPT

## 2019-08-16 PROCEDURE — 770022 HCHG ROOM/CARE - ICU (200)

## 2019-08-16 PROCEDURE — 700101 HCHG RX REV CODE 250: Performed by: INTERNAL MEDICINE

## 2019-08-16 PROCEDURE — 700105 HCHG RX REV CODE 258: Performed by: STUDENT IN AN ORGANIZED HEALTH CARE EDUCATION/TRAINING PROGRAM

## 2019-08-16 PROCEDURE — A9270 NON-COVERED ITEM OR SERVICE: HCPCS | Performed by: STUDENT IN AN ORGANIZED HEALTH CARE EDUCATION/TRAINING PROGRAM

## 2019-08-16 PROCEDURE — 700111 HCHG RX REV CODE 636 W/ 250 OVERRIDE (IP): Performed by: STUDENT IN AN ORGANIZED HEALTH CARE EDUCATION/TRAINING PROGRAM

## 2019-08-16 PROCEDURE — 82330 ASSAY OF CALCIUM: CPT

## 2019-08-16 PROCEDURE — 83605 ASSAY OF LACTIC ACID: CPT

## 2019-08-16 PROCEDURE — 74018 RADEX ABDOMEN 1 VIEW: CPT

## 2019-08-16 RX ORDER — CALCIUM CARBONATE 500 MG/1
500 TABLET, CHEWABLE ORAL DAILY
Status: DISCONTINUED | OUTPATIENT
Start: 2019-08-17 | End: 2019-08-20 | Stop reason: HOSPADM

## 2019-08-16 RX ORDER — INSULIN GLARGINE 100 [IU]/ML
20 INJECTION, SOLUTION SUBCUTANEOUS ONCE
Status: COMPLETED | OUTPATIENT
Start: 2019-08-16 | End: 2019-08-16

## 2019-08-16 RX ORDER — POTASSIUM CHLORIDE 7.45 MG/ML
10 INJECTION INTRAVENOUS
Status: COMPLETED | OUTPATIENT
Start: 2019-08-16 | End: 2019-08-16

## 2019-08-16 RX ORDER — FAMOTIDINE 20 MG/1
20 TABLET, FILM COATED ORAL 2 TIMES DAILY
Status: DISCONTINUED | OUTPATIENT
Start: 2019-08-16 | End: 2019-08-20 | Stop reason: HOSPADM

## 2019-08-16 RX ORDER — CALCIUM GLUCONATE 94 MG/ML
1 INJECTION, SOLUTION INTRAVENOUS ONCE
Status: DISCONTINUED | OUTPATIENT
Start: 2019-08-16 | End: 2019-08-16

## 2019-08-16 RX ORDER — MORPHINE SULFATE 4 MG/ML
2 INJECTION, SOLUTION INTRAMUSCULAR; INTRAVENOUS ONCE
Status: ACTIVE | OUTPATIENT
Start: 2019-08-16 | End: 2019-08-17

## 2019-08-16 RX ORDER — INSULIN GLARGINE 100 [IU]/ML
20 INJECTION, SOLUTION SUBCUTANEOUS EVERY EVENING
Status: DISCONTINUED | OUTPATIENT
Start: 2019-08-16 | End: 2019-08-20 | Stop reason: HOSPADM

## 2019-08-16 RX ADMIN — METOPROLOL TARTRATE 50 MG: 50 TABLET ORAL at 06:00

## 2019-08-16 RX ADMIN — DIBASIC SODIUM PHOSPHATE, MONOBASIC POTASSIUM PHOSPHATE AND MONOBASIC SODIUM PHOSPHATE 2 TABLET: 852; 155; 130 TABLET ORAL at 11:42

## 2019-08-16 RX ADMIN — FAMOTIDINE 20 MG: 20 TABLET ORAL at 19:39

## 2019-08-16 RX ADMIN — APIXABAN 5 MG: 5 TABLET, FILM COATED ORAL at 17:40

## 2019-08-16 RX ADMIN — POTASSIUM CHLORIDE 10 MEQ: 7.46 INJECTION, SOLUTION INTRAVENOUS at 03:27

## 2019-08-16 RX ADMIN — DIBASIC SODIUM PHOSPHATE, MONOBASIC POTASSIUM PHOSPHATE AND MONOBASIC SODIUM PHOSPHATE 2 TABLET: 852; 155; 130 TABLET ORAL at 17:42

## 2019-08-16 RX ADMIN — POTASSIUM CHLORIDE 10 MEQ: 7.46 INJECTION, SOLUTION INTRAVENOUS at 02:17

## 2019-08-16 RX ADMIN — PROCHLORPERAZINE EDISYLATE 10 MG: 5 INJECTION INTRAMUSCULAR; INTRAVENOUS at 19:35

## 2019-08-16 RX ADMIN — CALCIUM GLUCONATE 1 G: 98 INJECTION, SOLUTION INTRAVENOUS at 08:57

## 2019-08-16 RX ADMIN — POTASSIUM PHOSPHATE, MONOBASIC AND POTASSIUM PHOSPHATE, DIBASIC 30 MMOL: 224; 236 INJECTION, SOLUTION, CONCENTRATE INTRAVENOUS at 11:42

## 2019-08-16 RX ADMIN — POTASSIUM CHLORIDE 10 MEQ: 7.46 INJECTION, SOLUTION INTRAVENOUS at 08:22

## 2019-08-16 RX ADMIN — SODIUM CHLORIDE, SODIUM LACTATE, POTASSIUM CHLORIDE, CALCIUM CHLORIDE AND DEXTROSE MONOHYDRATE: 5; 600; 310; 30; 20 INJECTION, SOLUTION INTRAVENOUS at 03:09

## 2019-08-16 RX ADMIN — POTASSIUM CHLORIDE 10 MEQ: 7.46 INJECTION, SOLUTION INTRAVENOUS at 08:23

## 2019-08-16 RX ADMIN — HEPARIN SODIUM 5000 UNITS: 5000 INJECTION, SOLUTION INTRAVENOUS; SUBCUTANEOUS at 05:58

## 2019-08-16 RX ADMIN — LIDOCAINE HYDROCHLORIDE 15 ML: 20 SOLUTION OROPHARYNGEAL at 22:26

## 2019-08-16 RX ADMIN — SENNOSIDES, DOCUSATE SODIUM 2 TABLET: 50; 8.6 TABLET, FILM COATED ORAL at 05:57

## 2019-08-16 RX ADMIN — POTASSIUM CHLORIDE 10 MEQ: 7.46 INJECTION, SOLUTION INTRAVENOUS at 00:25

## 2019-08-16 RX ADMIN — INSULIN LISPRO 2 UNITS: 100 INJECTION, SOLUTION INTRAVENOUS; SUBCUTANEOUS at 17:40

## 2019-08-16 RX ADMIN — PROCHLORPERAZINE EDISYLATE 10 MG: 5 INJECTION INTRAMUSCULAR; INTRAVENOUS at 08:57

## 2019-08-16 RX ADMIN — LABETALOL HYDROCHLORIDE 10 MG: 5 INJECTION INTRAVENOUS at 21:27

## 2019-08-16 RX ADMIN — DEXTROSE AND SODIUM CHLORIDE: 10; .45 INJECTION, SOLUTION INTRAVENOUS at 12:05

## 2019-08-16 RX ADMIN — DEXTROSE AND SODIUM CHLORIDE: 10; .45 INJECTION, SOLUTION INTRAVENOUS at 05:13

## 2019-08-16 RX ADMIN — METOPROLOL TARTRATE 50 MG: 50 TABLET ORAL at 17:40

## 2019-08-16 RX ADMIN — INSULIN GLARGINE 20 UNITS: 100 INJECTION, SOLUTION SUBCUTANEOUS at 13:15

## 2019-08-16 RX ADMIN — ROSUVASTATIN CALCIUM 5 MG: 10 TABLET, FILM COATED ORAL at 05:58

## 2019-08-16 RX ADMIN — HEPARIN SODIUM 5000 UNITS: 5000 INJECTION, SOLUTION INTRAVENOUS; SUBCUTANEOUS at 14:12

## 2019-08-16 ASSESSMENT — ENCOUNTER SYMPTOMS
HEADACHES: 0
NERVOUS/ANXIOUS: 0
SEIZURES: 0
NAUSEA: 1
LOSS OF CONSCIOUSNESS: 0
DEPRESSION: 0
HEARTBURN: 0
SHORTNESS OF BREATH: 0
COUGH: 0
VOMITING: 0
WEIGHT LOSS: 0
PND: 0
HEARTBURN: 1
DOUBLE VISION: 0
FOCAL WEAKNESS: 0
CHILLS: 0
PALPITATIONS: 0
DIARRHEA: 1
DIZZINESS: 0
TINGLING: 0
DIARRHEA: 0
FEVER: 0
HEMOPTYSIS: 0
PALPITATIONS: 1
NAUSEA: 0
CLAUDICATION: 0
ABDOMINAL PAIN: 0
SORE THROAT: 0
INSOMNIA: 0
CONSTIPATION: 0

## 2019-08-16 ASSESSMENT — LIFESTYLE VARIABLES
EVER_SMOKED: YES
SUBSTANCE_ABUSE: 0

## 2019-08-16 NOTE — PROGRESS NOTES
Lab called with critical result of Calcium at 6.7. Critical lab result read back to lab.   Dr. Royal notified of critical lab result at 0700.  Critical lab result read back by Dr. Royal.

## 2019-08-16 NOTE — PROGRESS NOTES
Lab called with critical result of blood glucose of 539 at 1900. Critical lab result read back to lab.   Dr. Badillo notified of critical lab result at 1910.  Critical lab result read back by Dr. Badillo.

## 2019-08-16 NOTE — ASSESSMENT & PLAN NOTE
On 20 U Lantus at home, non adherence?     -HbA1c 13.4 July 2019  -DM education prior dc  -Social work consulted for eval as patient was not taking meds for quite a while (months due to no needles) prior to admission.

## 2019-08-16 NOTE — PROGRESS NOTES
Critical Care Progress Note    Date of admission  8/15/2019    Chief Complaint  74 y.o. female with a past medical history of coronary disease, diabetes, goiter, hyperlipidemia, hypertension, A. fib RVR, who presented 8/15/2019 with nausea, vomiting, tachycardia associated with dizziness.  Patient reports she has had 2 days of nausea vomiting is been unable to keep her medications down therefore has been unable to take her Lopressor for atrial fibrillation.  In the ER the patient's heart rate was noted to be 150s-160s, she was given a diltiazem bolus and started on infusion.  Unfortunately her heart rate is not able to be controlled without continuous infusion of Cardizem therefore she will require admission to the ICU for active titration of her calcium channel blocker infusion.  Initial labs also demonstrate significant hyperglycemia with a CO2 of 14, anion gap of 22 and beta hydroxybutyric acid 5.5.  The patient states she checks her blood sugar once a day and it was normal yesterday however she has not taken her insulin due to her illness. Taken from Dr Ochoa note.       Hospital Course  Admitted to ICU 8/15     Interval Problem Update  Reviewed last 24 hour events:  Neuro: a0x4 moves all ext no pain  HR: 60-80's  SBP:   Tmax: afebrile  GI: Nausea 2 loose stools  UOP: adequate  Lines: peripeheral   Resp: 2lk n/c   Vte: heparin sq  PPI/H2:none  Antibx: none    D5LR inc 175ml/hr  Insulin gtt 5->3 BS 86  Betahydroxy -> negative  Phos 1.7  iCa 1.1  kphos 40 meq  Na phos 2 BID    Diarrhea mucous  Transition insulin gtt off with home lantus  Tolerating clears no more nausea    Review of Systems  Review of Systems   Constitutional: Negative for chills, fever and weight loss.   HENT: Negative for congestion and sore throat.    Eyes: Negative for double vision.   Respiratory: Negative for cough, hemoptysis and shortness of breath.    Cardiovascular: Positive for palpitations. Negative for chest pain, claudication,  leg swelling and PND.   Gastrointestinal: Positive for diarrhea and heartburn. Negative for abdominal pain, nausea and vomiting.   Neurological: Negative for tingling, focal weakness, seizures, loss of consciousness and headaches.   Psychiatric/Behavioral: Negative for suicidal ideas. The patient is not nervous/anxious and does not have insomnia.    All other systems reviewed and are negative.       Vital Signs for last 24 hours   Temp:  [36.3 °C (97.4 °F)-37.3 °C (99.1 °F)] 36.6 °C (97.8 °F)  Pulse:  [61-88] 86  Resp:  [11-32] 24  BP: ()/(43-75) 137/74  SpO2:  [90 %-99 %] 91 %    Hemodynamic parameters for last 24 hours       Respiratory Information for the last 24 hours       Physical Exam   Physical Exam   Constitutional: She is oriented to person, place, and time. No distress.   Chronically ill appearing   Eyes: Pupils are equal, round, and reactive to light. EOM are normal.   Neck: No JVD present. No thyromegaly present.   Cardiovascular: Normal rate and regular rhythm.   No murmur heard.  Pulmonary/Chest: No respiratory distress. She has no wheezes. She has no rales.   Abdominal: She exhibits no distension and no mass. There is no rebound and no guarding.   Epigastric LUQ pain   Musculoskeletal: She exhibits no edema.   Neurological: She is alert and oriented to person, place, and time. No cranial nerve deficit. Coordination normal.   Answers quickly moves all extremities, AOX4   Skin: Skin is warm and dry. No rash noted. She is not diaphoretic. No erythema.   Psychiatric: She has a normal mood and affect.       Medications  Current Facility-Administered Medications   Medication Dose Route Frequency Provider Last Rate Last Dose   • phosphorus (K-PHOS-NEUTRAL, PHOSPHA 250 NEUTRAL) per tablet 2 Tab  2 Tab Oral BID Zay Flores M.D.   2 Tab at 08/16/19 1742   • insulin lispro (HUMALOG) injection 2-9 Units  2-9 Units Subcutaneous Q6HRS Kori Royal M.D.   2 Units at 08/16/19 1740    And   • glucose  4 g chewable tablet 16 g  16 g Oral Q15 MIN PRN Kori Royal M.D.        And   • DEXTROSE 10% BOLUS 250 mL  250 mL Intravenous Q15 MIN PRN Kori Roayl M.D.       • insulin glargine (LANTUS) injection 20 Units  20 Units Subcutaneous Q EVENING Kori Royal M.D.   Stopped at 08/16/19 1800   • apixaban (ELIQUIS) tablet 5 mg  5 mg Oral BID Kori Royal M.D.   5 mg at 08/16/19 1740   • Metoprolol Tartrate (LOPRESSOR) injection 5 mg  5 mg Intravenous Q5 MIN PRN Carlos Maria M.D.       • senna-docusate (PERICOLACE or SENOKOT S) 8.6-50 MG per tablet 2 Tab  2 Tab Oral BID Carlos Maria M.D.   Stopped at 08/16/19 1800    And   • polyethylene glycol/lytes (MIRALAX) PACKET 1 Packet  1 Packet Oral QDAY PRN Carlos Maria M.D.        And   • magnesium hydroxide (MILK OF MAGNESIA) suspension 30 mL  30 mL Oral QDAY PRN Carlos Maria M.D.        And   • bisacodyl (DULCOLAX) suppository 10 mg  10 mg Rectal QDAY PRN Carlos Maria M.D.       • Respiratory Care per Protocol   Nebulization Continuous RT Carlos Maria M.D.       • labetalol (NORMODYNE,TRANDATE) injection 10 mg  10 mg Intravenous Q4HRS PRN Carlos Maria M.D.       • metoprolol (LOPRESSOR) tablet 50 mg  50 mg Oral BID Carlos Maria M.D.   50 mg at 08/16/19 1740   • rosuvastatin (CRESTOR) tablet 5 mg  5 mg Oral QAM Carlos Maria M.D.   5 mg at 08/16/19 0558   • ondansetron (ZOFRAN) syringe/vial injection 4 mg  4 mg Intravenous Q4HRS PRN Marco A Ochoa Jr., D.O.       • prochlorperazine (COMPAZINE) injection 10 mg  10 mg Intravenous Q6HRS PRN Marco A Ochoa Jr., D.O.   10 mg at 08/16/19 0857   • MD ALERT-PHARMACY TO CONSULT FOR DKA MONITORING 1 Each  1 Each Other PRN Mike Morgan M.D.       • DILTIAZem (CARDIZEM) 100 mg in D5W 100 mL Infusion  0-15 mg/hr Intravenous Continuous Mike Morgan M.D.   Stopped at 08/15/19 2221   • potassium phosphates 30 mmol in  mL ivpb  30 mmol Intravenous Once PRN Mike QUINONEZ  CURTIS Morgan        Or   • sodium phosphate 30 mmol in 1/2  mL ivpb  30 mmol Intravenous Once PRN Mike Morgan M.D.           Fluids    Intake/Output Summary (Last 24 hours) at 8/16/2019 1748  Last data filed at 8/16/2019 1700  Gross per 24 hour   Intake 6864.51 ml   Output 400 ml   Net 6464.51 ml       Laboratory          Recent Labs     08/15/19  1806  08/16/19  0125 08/16/19  0540 08/16/19  0756 08/16/19  1118   SODIUM 134*   < > 138 138  --  136   POTASSIUM 4.1   < > 3.9 4.2  --  4.0   CHLORIDE 101   < > 107 116*  --  109   CO2 14*   < > 21 14*  --  22   BUN 25*   < > 30* 27*  --  28*   CREATININE 1.17   < > 1.29 1.11  --  1.37   MAGNESIUM 1.8  --   --   --   --   --    PHOSPHORUS 3.6  --   --   --  1.7*  --    CALCIUM 8.4*   < > 8.1* 6.7*  --  9.4    < > = values in this interval not displayed.     Recent Labs     08/15/19  1145  08/16/19  0125 08/16/19  0540 08/16/19  1118   ALTSGPT 12  --  10  --  10   ASTSGOT 15  --  17  --  16   ALKPHOSPHAT 62  --  48  --  45   TBILIRUBIN 1.6*  --  0.7  --  0.6   GLUCOSE 494*   < > 346* 110* 295*    < > = values in this interval not displayed.     Recent Labs     08/15/19  1145 08/16/19  0125 08/16/19  1118   WBC 10.7 10.5  --    NEUTSPOLYS 89.00* 86.50*  --    LYMPHOCYTES 4.10* 5.80*  --    MONOCYTES 5.70 6.70  --    EOSINOPHILS 0.10 0.10  --    BASOPHILS 0.40 0.20  --    ASTSGOT 15 17 16   ALTSGPT 12 10 10   ALKPHOSPHAT 62 48 45   TBILIRUBIN 1.6* 0.7 0.6     Recent Labs     08/15/19  1145 08/16/19  0125   RBC 5.14 4.58   HEMOGLOBIN 14.5 12.9   HEMATOCRIT 40.8 37.1   PLATELETCT 154* 157*       Imaging  X-Ray:  I have personally reviewed the images and compared with prior images., My impression is: left retrocardiac opacity ? effusion vs breast shadow and No film today    Assessment/Plan  * Diabetic ketoacidosis without coma associated with type 2 diabetes mellitus (HCC)- (present on admission)  Assessment & Plan  ICU admission, cardiac  monitoring  optimized intravascular volume with 2L IVF bolus  DKA protocol with insulin drip at 0.05 units/kg/hr  Every hour Accu-Cheks, every 4 hour BMP, mag, phos  Goal Magnesium: >2, Phosphorus: 2, K >4  Will transition to sliding scale insulin when anion gap <12, CO2 > 17    Transition today off insulin gtt recheck BMP at 8pm      Non-intractable vomiting with nausea- (present on admission)  Assessment & Plan  Treat dehydration with IV fluid bolus  Symptomatically treat with Zofran, Compazine as needed    Resolved tolerating diet/clears continue to monitor    Generalized abdominal pain  Assessment & Plan  Pain out of proportion to exam  CT scan of abdomen to rule out mesenteric ischemia -> negative for ischemia     Mild LUQ pain ? gastritis    Acquired circulating anticoagulants (HCC)- (present on admission)  Assessment & Plan  Eliquis use  Not actively bleeding    Hypertension- (present on admission)  Assessment & Plan  PRN medications available    Paroxysmal atrial fibrillation (HCC)- (present on admission)  Assessment & Plan  [id number]: 6  Continue Eliquis  Now in A. fib with RVR  Likely due to inability to keep down medication in the setting of gastrointestinal upset  Placed on diltiazem infusion and titrate to heart rate of less than 100  Restart home Lopressor  May require additional rate limiting medications  Closely monitor for hypotension    Off diltiazem gtt start home medication and monitor    CKD (chronic kidney disease), stage III (HCC)- (present on admission)  Assessment & Plan  Monitor for worsening following CT with contrast  Renal dose meds, avoid nephrotoxins  Strict I/Os  Follow renal function      Thyroid nodule- (present on admission)  Assessment & Plan  TSH normal 8 months ago  Recheck today given A. fib with RVR    Dyslipidemia- (present on admission)  Assessment & Plan  Continue Crestor       VTE:  Heparin  Ulcer: Not Indicated  Lines: None    I have performed a physical exam and  reviewed and updated ROS and Plan today (8/16/2019). In review of yesterday's note (8/15/2019), there are no changes except as documented above.     Discussed patient condition and risk of morbidity and/or mortality with RN, RT, Pharmacy, [facility] resident, Charge nurse / hot rounds and Patient     The patient remains critically ill from dka on insulin gtt with active titration.  Critical care time = 80 minutes in directly providing and coordinating critical care and extensive data review.  No time overlap and excludes procedures.

## 2019-08-16 NOTE — ASSESSMENT & PLAN NOTE
Monitor for worsening following CT with contrast  Renal dose meds, avoid nephrotoxins  Strict I/Os  Follow renal function

## 2019-08-16 NOTE — ASSESSMENT & PLAN NOTE
ICU admission, cardiac monitoring  optimized intravascular volume with 2L IVF bolus  DKA protocol with insulin drip at 0.05 units/kg/hr  Every hour Accu-Cheks, every 4 hour BMP, mag, phos  Goal Magnesium: >2, Phosphorus: 2, K >4  Will transition to sliding scale insulin when anion gap <12, CO2 > 17    Transition off insulin gtt 8/16  HgA1C 13 hasn't been using insulin diabetic education

## 2019-08-16 NOTE — PROGRESS NOTES
[facility] ICU Progress Note      Admit Date: 8/15/2019    Resident(s): Kori Royal M.D.   Attending:  CLINTON SANCHEZ/ Dr. Flores  Patient ID:    Name:  Amrita Torrez   YOB: 1945  Age:  74 y.o.  female   MRN:  [id number]    Hospital Course (carried forward and updated):  Amrita Torrez is a 74 y.o. female with PMH of PMHx of paroxysmal A. fib on Eliquis and metoprolol, poorly controlled DM on insulin (last HbA1C 13.4 on 07/2019) CAD status post DOMINGA to LAD 2016, CVA [not clinically significant, incidental finding], CKD 3, HLD, essential HTN presented mainly with confusion, dizziness, nausea and vomiting for 1 week.  Patient has not taken her outpatient metoprolol for 1 week, neither she taken her insulin, she is also not eaten or drunk anything during this time period. In the Er she was found to be in Afib with RVR in 150s, DKA with HCO3 14, AG 22, blood glucose 494. She was admitted to ICU for titrated diltiazem infusion for Afib and management of DKA.    Consultants:  Critical Care      Interval Events:  - No acute overnight events.   - Vitals stable, 100% paced, on 2 L O2  - Off Nicardipine drip since last night. Rate in 60-70s overnight  - Tolerating liquid diet, advance to regular diabetic diet as tolerated  - Transitioned to sub Q insulin (home dose 20 u Lantus + SSI) today in the afternoon   - PT/ OT consulted   - Ambulate as tolerated   - Wean o2 as tolerated   - Repleting electrolytes as needed     Review of Systems   Constitutional: Positive for malaise/fatigue. Negative for chills and fever.   Respiratory: Negative for cough and shortness of breath.    Cardiovascular: Negative for chest pain, palpitations and leg swelling.   Gastrointestinal: Positive for nausea. Negative for abdominal pain, constipation, diarrhea, heartburn and vomiting.   Genitourinary: Negative for dysuria, frequency and urgency.   Neurological: Negative for dizziness and headaches.   Psychiatric/Behavioral: Negative for  "depression, substance abuse and suicidal ideas.       PHYSICAL EXAM:  Vitals:    08/16/19 1000 08/16/19 1025 08/16/19 1100 08/16/19 1200   BP: 100/50 122/59 105/54 112/58   Pulse: 61 64 62 63   Resp: 19 18 17 (!) 21   Temp:    36.3 °C (97.4 °F)   TempSrc:    Temporal   SpO2: 97% 94% 90% 93%   Weight:       Height:        Body mass index is 25.75 kg/m².  Latest Vitals:  /58   Pulse 63   Temp 36.3 °C (97.4 °F) (Temporal)   Resp (!) 21   Ht 1.651 m (5' 5\")   Wt 70.2 kg (154 lb 12.2 oz)   SpO2 93%   BMI 25.75 kg/m²   O2 therapy: Pulse Oximetry: 93 %, O2 (LPM): 1, O2 Delivery: None (Room Air)  Vitals Range last 24h:  Temp:  [36.3 °C (97.4 °F)-37.3 °C (99.1 °F)] 36.3 °C (97.4 °F)  Pulse:  [] 63  Resp:  [11-24] 21  BP: ()/(43-95) 112/58  SpO2:  [90 %-99 %] 93 %  Date 08/16/19 0700 - 08/17/19 0659   Shift 4968-1661 0597-9391 1743-3441 24 Hour Total   INTAKE   P.O. 240   240     P.O. 240   240   I.V. 645.6   645.6     Insulin Volume 98.1   98.1     Volume (mL) (dextrose 10% and 0.45% NaCl infusion) 547.5   547.5   IV Piggyback 342.5   342.5     Volume (mL) (potassium chloride (KCL) ivpb 10 mEq) 100   100     Volume (mL) (calcium GLUConate 1 g in  mL IVPB) 205   205     Volume (mL) (potassium phosphates 30 mmol in  mL ivpb) 37.5   37.5   Shift Total 1228.1   1228.1   OUTPUT   Urine         Number of Times Voided 1 x   1 x   Stool         Number of Times Stooled 2 x   2 x   Shift Total       NET 1228.1   1228.1        Intake/Output Summary (Last 24 hours) at 8/16/2019 1256  Last data filed at 8/16/2019 1200  Gross per 24 hour   Intake 5584.51 ml   Output 400 ml   Net 5184.51 ml        Physical Exam   Constitutional: She is oriented to person, place, and time.   Elderly female lying in bed, in no apparent distress   HENT:   Head: Normocephalic and atraumatic.   Eyes: Pupils are equal, round, and reactive to light. Conjunctivae are normal.   Neck: Normal range of motion. No JVD present. "   Cardiovascular: Normal rate, regular rhythm and normal heart sounds.   Pulmonary/Chest: Effort normal and breath sounds normal. She has no wheezes.   Abdominal: Soft. She exhibits distension (Protuberant). There is no tenderness.   Musculoskeletal: Normal range of motion. She exhibits no edema.   Neurological: She is alert and oriented to person, place, and time.   No focal signs    Skin: Skin is dry. No erythema.   Psychiatric: Affect and judgment normal.           Recent Labs     08/15/19  1806  08/16/19  0125 08/16/19  0540 08/16/19  0756 08/16/19  1118   SODIUM 134*   < > 138 138  --  136   POTASSIUM 4.1   < > 3.9 4.2  --  4.0   CHLORIDE 101   < > 107 116*  --  109   CO2 14*   < > 21 14*  --  22   BUN 25*   < > 30* 27*  --  28*   CREATININE 1.17   < > 1.29 1.11  --  1.37   MAGNESIUM 1.8  --   --   --   --   --    PHOSPHORUS 3.6  --   --   --  1.7*  --    CALCIUM 8.4*   < > 8.1* 6.7*  --  9.4    < > = values in this interval not displayed.     Recent Labs     08/15/19  1145  08/16/19  0125 08/16/19  0540 08/16/19  1118   ALTSGPT 12  --  10  --  10   ASTSGOT 15  --  17  --  16   ALKPHOSPHAT 62  --  48  --  45   TBILIRUBIN 1.6*  --  0.7  --  0.6   GLUCOSE 494*   < > 346* 110* 295*    < > = values in this interval not displayed.     Recent Labs     08/15/19  1145 08/16/19  0125   RBC 5.14 4.58   HEMOGLOBIN 14.5 12.9   HEMATOCRIT 40.8 37.1   PLATELETCT 154* 157*     Recent Labs     08/15/19  1145 08/16/19  0125 08/16/19  1118   WBC 10.7 10.5  --    NEUTSPOLYS 89.00* 86.50*  --    LYMPHOCYTES 4.10* 5.80*  --    MONOCYTES 5.70 6.70  --    EOSINOPHILS 0.10 0.10  --    BASOPHILS 0.40 0.20  --    ASTSGOT 15 17 16   ALTSGPT 12 10 10   ALKPHOSPHAT 62 48 45   TBILIRUBIN 1.6* 0.7 0.6       Meds:  • phosphorus  2 Tab     • potassium phosphate ivpb  30 mmol 30 mmol (08/16/19 1142)   • insulin glargine  20 Units     • insulin lispro  2-9 Units      And   • glucose  16 g      And   • dextrose 10% bolus  250 mL     • Metoprolol  Tartrate  5 mg     • senna-docusate  2 Tab      And   • polyethylene glycol/lytes  1 Packet      And   • magnesium hydroxide  30 mL      And   • bisacodyl  10 mg     • Respiratory Care per Protocol       • heparin  5,000 Units     • labetalol  10 mg     • metoprolol  50 mg     • rosuvastatin  5 mg     • ondansetron  4 mg     • prochlorperazine  10 mg     • MD ALERT-PHARMACY TO CONSULT  1 Each     • insulin infusion (for DKA ONLY)  1 Units/hr 3 Units/hr (08/16/19 0802)   • DILTIAZem infusion  0-15 mg/hr Stopped (08/15/19 2221)   • potassium phosphate ivpb  30 mmol      Or   • sodium phosphate 30 mmol ivpb  30 mmol          Procedures:  NA    Imaging:  DX-CHEST-PORTABLE (1 VIEW)   Final Result         1.  No acute cardiopulmonary disease.   2.  Atherosclerosis      CTA ABDOMEN PELVIS W & W/O POST PROCESS   Final Result      1.  Patent celiac, superior mesenteric and inferior mesenteric arteries.      2.  No evidence of bowel obstruction or inflammatory change.      3.  Atherosclerotic plaque involving the aorta primarily distally.      4.  Fatty liver.      5.  Splenomegaly.      6.  Small amount of ascites.      7.  Bibasilar atelectasis.      8.  Prior cholecystectomy.      DX-CHEST-PORTABLE (1 VIEW)   Final Result      Mild patchy opacity in the left costophrenic angle may represent atelectasis but pneumonitis is not excluded.      Atherosclerotic plaque.          Problem and Plan:    * Diabetic ketoacidosis without coma associated with type 2 diabetes mellitus (HCC)- (present on admission)  Assessment & Plan  - Secondary to dehydration from poor oral intake, non compliance? Last HbA1c 13.8 on 07/2019  - Was on DKA protocol, transitioned today   - Still nauseous, no vomiting since this AM. On Compazine   - Repleting electrolytes as needed  - Advance to full Diabetic diet as tolerated       Non-intractable vomiting with nausea- (present on admission)  Assessment & Plan  2/2 DKA    Generalized abdominal  pain  Assessment & Plan  - Feeling better today  - 2/2 DKA  - CTM    Paroxysmal atrial fibrillation (HCC)- (present on admission)  Assessment & Plan  -Came with RVR, ventricular rate in 150s, currently stable in 60-70s. Off of nicardipine drip  -Hemodynamically stable currently   -[id number] score 7, on Eliquis at home  -Metoprolol tartrate 5 mg IV as needed x3 for ventricular rate> 130  -Resumed metoprolol twice daily dose p.o. as tolerated    CKD (chronic kidney disease), stage III (HCC)- (present on admission)  Assessment & Plan  -Long-standing IDDM, HTN, CKD 3, at baseline  - Avoid nephro toxins     Thyroid nodule- (present on admission)  Assessment & Plan  - Asymptomatic, stable   - Most recent TSH/FT4 WNL 12/18      Type 2 diabetes mellitus without complication (HCC)- (present on admission)  Assessment & Plan  On 20 U Lantus at home, non complaince?   Last HbA1c 13.4 (7/2019)  DM education prior dc   See DKA for mx      DISPO: ICU    CODE STATUS: FULL    Quality Measures:  Feeding: PO  Analgesia: PRN  Sedation: NA  Thromboprophylaxis: Heparin  Head of bed: >30 degrees  Ulcer prophylaxis: NA  Glycemic control: LAntus + SSI  Bowel care: bowel regimen  Indwelling lines: PIV  Deescalation of antibiotics:       Kori Royal M.D.

## 2019-08-16 NOTE — ASSESSMENT & PLAN NOTE
CT scan of abdomen to rule out mesenteric ischemia -> negative for ischemia or acute process    Mild LUQ pain ? Gastritis  Rx with Pepcid, Carafate, tums

## 2019-08-16 NOTE — CARE PLAN
Problem: Pain Management  Goal: Pain level will decrease to patient's comfort goal  Outcome: PROGRESSING AS EXPECTED   PRN meds available      Problem: Skin Integrity  Goal: Risk for impaired skin integrity will decrease  Outcome: PROGRESSING AS EXPECTED   Assess patient's skin at the beginning of the shift. Turn patient every two hours and monitor under all medical devices throughout entire shift. Maintain a clean, dry linen environment. Float heals and elbows. Waffle cushion and mepilex in place.

## 2019-08-16 NOTE — ASSESSMENT & PLAN NOTE
Treat dehydration with IV fluid bolus  Symptomatically treat with Zofran, Compazine as needed    Resolved/improving tolerating diet/clears continue to monitor  Treat for gastritis

## 2019-08-17 ENCOUNTER — APPOINTMENT (OUTPATIENT)
Dept: RADIOLOGY | Facility: MEDICAL CENTER | Age: 74
DRG: 639 | End: 2019-08-17
Attending: STUDENT IN AN ORGANIZED HEALTH CARE EDUCATION/TRAINING PROGRAM
Payer: MEDICARE

## 2019-08-17 LAB
ANION GAP SERPL CALC-SCNC: 10 MMOL/L (ref 0–11.9)
ANION GAP SERPL CALC-SCNC: 6 MMOL/L (ref 0–11.9)
BUN SERPL-MCNC: 22 MG/DL (ref 8–22)
BUN SERPL-MCNC: 26 MG/DL (ref 8–22)
CA-I SERPL-SCNC: 1.1 MMOL/L (ref 1.1–1.3)
CALCIUM SERPL-MCNC: 8 MG/DL (ref 8.5–10.5)
CALCIUM SERPL-MCNC: 8.3 MG/DL (ref 8.5–10.5)
CHLORIDE SERPL-SCNC: 106 MMOL/L (ref 96–112)
CHLORIDE SERPL-SCNC: 109 MMOL/L (ref 96–112)
CO2 SERPL-SCNC: 16 MMOL/L (ref 20–33)
CO2 SERPL-SCNC: 22 MMOL/L (ref 20–33)
CREAT SERPL-MCNC: 0.99 MG/DL (ref 0.5–1.4)
CREAT SERPL-MCNC: 1.11 MG/DL (ref 0.5–1.4)
EKG IMPRESSION: NORMAL
ERYTHROCYTE [DISTWIDTH] IN BLOOD BY AUTOMATED COUNT: 48.4 FL (ref 35.9–50)
GLUCOSE BLD-MCNC: 202 MG/DL (ref 65–99)
GLUCOSE BLD-MCNC: 275 MG/DL (ref 65–99)
GLUCOSE BLD-MCNC: 279 MG/DL (ref 65–99)
GLUCOSE BLD-MCNC: 297 MG/DL (ref 65–99)
GLUCOSE BLD-MCNC: 317 MG/DL (ref 65–99)
GLUCOSE SERPL-MCNC: 240 MG/DL (ref 65–99)
GLUCOSE SERPL-MCNC: 292 MG/DL (ref 65–99)
HCT VFR BLD AUTO: 40.5 % (ref 37–47)
HGB BLD-MCNC: 13.4 G/DL (ref 12–16)
LIPASE SERPL-CCNC: 11 U/L (ref 11–82)
MCH RBC QN AUTO: 27.3 PG (ref 27–33)
MCHC RBC AUTO-ENTMCNC: 33.1 G/DL (ref 33.6–35)
MCV RBC AUTO: 82.5 FL (ref 81.4–97.8)
PHOSPHATE SERPL-MCNC: 3.4 MG/DL (ref 2.5–4.5)
PLATELET # BLD AUTO: 189 K/UL (ref 164–446)
PMV BLD AUTO: 10.5 FL (ref 9–12.9)
POTASSIUM SERPL-SCNC: 4 MMOL/L (ref 3.6–5.5)
POTASSIUM SERPL-SCNC: 4.4 MMOL/L (ref 3.6–5.5)
RBC # BLD AUTO: 4.91 M/UL (ref 4.2–5.4)
SODIUM SERPL-SCNC: 132 MMOL/L (ref 135–145)
SODIUM SERPL-SCNC: 137 MMOL/L (ref 135–145)
TSH SERPL DL<=0.005 MIU/L-ACNC: 1.01 UIU/ML (ref 0.38–5.33)
WBC # BLD AUTO: 10.5 K/UL (ref 4.8–10.8)

## 2019-08-17 PROCEDURE — 83690 ASSAY OF LIPASE: CPT

## 2019-08-17 PROCEDURE — 99291 CRITICAL CARE FIRST HOUR: CPT | Performed by: INTERNAL MEDICINE

## 2019-08-17 PROCEDURE — 700111 HCHG RX REV CODE 636 W/ 250 OVERRIDE (IP): Performed by: STUDENT IN AN ORGANIZED HEALTH CARE EDUCATION/TRAINING PROGRAM

## 2019-08-17 PROCEDURE — 82330 ASSAY OF CALCIUM: CPT

## 2019-08-17 PROCEDURE — 700105 HCHG RX REV CODE 258: Performed by: INTERNAL MEDICINE

## 2019-08-17 PROCEDURE — 700101 HCHG RX REV CODE 250: Performed by: STUDENT IN AN ORGANIZED HEALTH CARE EDUCATION/TRAINING PROGRAM

## 2019-08-17 PROCEDURE — 84100 ASSAY OF PHOSPHORUS: CPT

## 2019-08-17 PROCEDURE — 700105 HCHG RX REV CODE 258: Performed by: STUDENT IN AN ORGANIZED HEALTH CARE EDUCATION/TRAINING PROGRAM

## 2019-08-17 PROCEDURE — 700102 HCHG RX REV CODE 250 W/ 637 OVERRIDE(OP): Performed by: STUDENT IN AN ORGANIZED HEALTH CARE EDUCATION/TRAINING PROGRAM

## 2019-08-17 PROCEDURE — 700102 HCHG RX REV CODE 250 W/ 637 OVERRIDE(OP): Performed by: INTERNAL MEDICINE

## 2019-08-17 PROCEDURE — 71045 X-RAY EXAM CHEST 1 VIEW: CPT

## 2019-08-17 PROCEDURE — 85027 COMPLETE CBC AUTOMATED: CPT

## 2019-08-17 PROCEDURE — 82962 GLUCOSE BLOOD TEST: CPT

## 2019-08-17 PROCEDURE — A9270 NON-COVERED ITEM OR SERVICE: HCPCS | Performed by: INTERNAL MEDICINE

## 2019-08-17 PROCEDURE — 700111 HCHG RX REV CODE 636 W/ 250 OVERRIDE (IP): Performed by: INTERNAL MEDICINE

## 2019-08-17 PROCEDURE — 84443 ASSAY THYROID STIM HORMONE: CPT

## 2019-08-17 PROCEDURE — 770022 HCHG ROOM/CARE - ICU (200)

## 2019-08-17 PROCEDURE — 80048 BASIC METABOLIC PNL TOTAL CA: CPT

## 2019-08-17 PROCEDURE — 93005 ELECTROCARDIOGRAM TRACING: CPT | Performed by: INTERNAL MEDICINE

## 2019-08-17 PROCEDURE — A9270 NON-COVERED ITEM OR SERVICE: HCPCS | Performed by: STUDENT IN AN ORGANIZED HEALTH CARE EDUCATION/TRAINING PROGRAM

## 2019-08-17 PROCEDURE — 93010 ELECTROCARDIOGRAM REPORT: CPT | Mod: 59 | Performed by: INTERNAL MEDICINE

## 2019-08-17 RX ORDER — SODIUM CHLORIDE, SODIUM LACTATE, POTASSIUM CHLORIDE, AND CALCIUM CHLORIDE .6; .31; .03; .02 G/100ML; G/100ML; G/100ML; G/100ML
1000 INJECTION, SOLUTION INTRAVENOUS ONCE
Status: COMPLETED | OUTPATIENT
Start: 2019-08-17 | End: 2019-08-17

## 2019-08-17 RX ORDER — METOPROLOL TARTRATE 1 MG/ML
5 INJECTION, SOLUTION INTRAVENOUS
Status: COMPLETED | OUTPATIENT
Start: 2019-08-17 | End: 2019-08-17

## 2019-08-17 RX ORDER — SODIUM CHLORIDE, SODIUM LACTATE, POTASSIUM CHLORIDE, AND CALCIUM CHLORIDE .6; .31; .03; .02 G/100ML; G/100ML; G/100ML; G/100ML
500 INJECTION, SOLUTION INTRAVENOUS ONCE
Status: DISCONTINUED | OUTPATIENT
Start: 2019-08-17 | End: 2019-08-17 | Stop reason: DRUGHIGH

## 2019-08-17 RX ORDER — SUCRALFATE ORAL 1 G/10ML
1 SUSPENSION ORAL EVERY 6 HOURS
Status: DISCONTINUED | OUTPATIENT
Start: 2019-08-17 | End: 2019-08-20

## 2019-08-17 RX ORDER — DILTIAZEM HYDROCHLORIDE 5 MG/ML
0.25 INJECTION INTRAVENOUS ONCE
Status: COMPLETED | OUTPATIENT
Start: 2019-08-17 | End: 2019-08-17

## 2019-08-17 RX ORDER — SODIUM BICARBONATE 650 MG/1
325 TABLET ORAL EVERY 8 HOURS
Status: DISPENSED | OUTPATIENT
Start: 2019-08-17 | End: 2019-08-19

## 2019-08-17 RX ADMIN — PROCHLORPERAZINE EDISYLATE 10 MG: 5 INJECTION INTRAMUSCULAR; INTRAVENOUS at 04:49

## 2019-08-17 RX ADMIN — ANTACID TABLETS 500 MG: 500 TABLET, CHEWABLE ORAL at 05:00

## 2019-08-17 RX ADMIN — APIXABAN 5 MG: 5 TABLET, FILM COATED ORAL at 17:11

## 2019-08-17 RX ADMIN — DILTIAZEM HYDROCHLORIDE 5 MG/HR: 5 INJECTION INTRAVENOUS at 04:16

## 2019-08-17 RX ADMIN — FAMOTIDINE 20 MG: 20 TABLET ORAL at 17:11

## 2019-08-17 RX ADMIN — SUCRALFATE 1 G: 1 SUSPENSION ORAL at 17:11

## 2019-08-17 RX ADMIN — METOPROLOL TARTRATE 50 MG: 50 TABLET ORAL at 05:03

## 2019-08-17 RX ADMIN — METOPROLOL TARTRATE 5 MG: 5 INJECTION, SOLUTION INTRAVENOUS at 00:36

## 2019-08-17 RX ADMIN — FAMOTIDINE 20 MG: 20 TABLET ORAL at 05:00

## 2019-08-17 RX ADMIN — SODIUM BICARBONATE 325 MG: 650 TABLET ORAL at 13:21

## 2019-08-17 RX ADMIN — SODIUM CHLORIDE, POTASSIUM CHLORIDE, SODIUM LACTATE AND CALCIUM CHLORIDE 1000 ML: 600; 310; 30; 20 INJECTION, SOLUTION INTRAVENOUS at 09:18

## 2019-08-17 RX ADMIN — METOPROLOL TARTRATE 5 MG: 5 INJECTION, SOLUTION INTRAVENOUS at 01:53

## 2019-08-17 RX ADMIN — METOPROLOL TARTRATE 5 MG: 5 INJECTION, SOLUTION INTRAVENOUS at 01:33

## 2019-08-17 RX ADMIN — ROSUVASTATIN CALCIUM 5 MG: 10 TABLET, FILM COATED ORAL at 05:00

## 2019-08-17 RX ADMIN — DILTIAZEM HYDROCHLORIDE 17.55 MG: 5 INJECTION INTRAVENOUS at 02:26

## 2019-08-17 RX ADMIN — DILTIAZEM HYDROCHLORIDE 5 MG/HR: 5 INJECTION INTRAVENOUS at 14:43

## 2019-08-17 RX ADMIN — PROCHLORPERAZINE EDISYLATE 10 MG: 5 INJECTION INTRAMUSCULAR; INTRAVENOUS at 13:28

## 2019-08-17 RX ADMIN — SUCRALFATE 1 G: 1 SUSPENSION ORAL at 22:45

## 2019-08-17 RX ADMIN — SODIUM BICARBONATE 325 MG: 650 TABLET ORAL at 22:45

## 2019-08-17 RX ADMIN — METOPROLOL TARTRATE 50 MG: 50 TABLET ORAL at 17:11

## 2019-08-17 RX ADMIN — APIXABAN 5 MG: 5 TABLET, FILM COATED ORAL at 05:00

## 2019-08-17 RX ADMIN — INSULIN GLARGINE 20 UNITS: 100 INJECTION, SOLUTION SUBCUTANEOUS at 17:17

## 2019-08-17 RX ADMIN — DIBASIC SODIUM PHOSPHATE, MONOBASIC POTASSIUM PHOSPHATE AND MONOBASIC SODIUM PHOSPHATE 2 TABLET: 852; 155; 130 TABLET ORAL at 05:00

## 2019-08-17 ASSESSMENT — ENCOUNTER SYMPTOMS
FOCAL WEAKNESS: 0
SEIZURES: 0
COUGH: 0
DIARRHEA: 0
CHILLS: 0
NAUSEA: 1
HEMOPTYSIS: 0
DEPRESSION: 0
CLAUDICATION: 0
WEIGHT LOSS: 0
SORE THROAT: 0
PND: 0
VOMITING: 1
ABDOMINAL PAIN: 0
CONSTIPATION: 0
HEARTBURN: 1
NERVOUS/ANXIOUS: 0
PALPITATIONS: 1
LOSS OF CONSCIOUSNESS: 0
HEARTBURN: 0
HEADACHES: 0
INSOMNIA: 0
PALPITATIONS: 0
DIZZINESS: 0
DOUBLE VISION: 0
FEVER: 0
DIARRHEA: 1
SHORTNESS OF BREATH: 0
TINGLING: 0

## 2019-08-17 ASSESSMENT — LIFESTYLE VARIABLES: SUBSTANCE_ABUSE: 0

## 2019-08-17 NOTE — PROGRESS NOTES
Critical Care Progress Note    Date of admission  8/15/2019    Chief Complaint  74 y.o. female with a past medical history of coronary disease, diabetes, goiter, hyperlipidemia, hypertension, A. fib RVR, who presented 8/15/2019 with nausea, vomiting, tachycardia associated with dizziness.  Patient reports she has had 2 days of nausea vomiting is been unable to keep her medications down therefore has been unable to take her Lopressor for atrial fibrillation.  In the ER the patient's heart rate was noted to be 150s-160s, she was given a diltiazem bolus and started on infusion.  Unfortunately her heart rate is not able to be controlled without continuous infusion of Cardizem therefore she will require admission to the ICU for active titration of her calcium channel blocker infusion.  Initial labs also demonstrate significant hyperglycemia with a CO2 of 14, anion gap of 22 and beta hydroxybutyric acid 5.5.  The patient states she checks her blood sugar once a day and it was normal yesterday however she has not taken her insulin due to her illness. Taken from Dr Ochoa note.       Hospital Course  Admitted to ICU 8/15     Interval Problem Update  Reviewed last 24 hour events:  diltizam gtt for afib, nausea and vomiting overnight  Neuro: aox4 epigastric pain   HR: dilt gtt at 5 for afib  SBP: 130-180's  Tmax: afebrile   GI: 6 bm's since yesterday starting to form solid stool   UOP: adequate  Lines: 3 peripheral   Resp: room air to 1l n/c   Vte: eliquis  PPI/H2:pepcid gastritis/home ppi  Antibx: none    lantus 20 units at 1am, lantus held night since poor PO  -300's  Medium sliding scale 11 -> large sliding scale    Bicarb tabs x 2 days  D/c phosphate tab  Afib dilt gtt -> try to get to take oral beta blockers  Lipase ->11  1l LR bolus over 2 hr  Compazine and zofran  Still with poor oral intake and on Dilt gtt  Recheck BMP    Review of Systems  Review of Systems   Constitutional: Negative for chills, fever and weight  loss.   HENT: Negative for congestion and sore throat.    Eyes: Negative for double vision.   Respiratory: Negative for cough, hemoptysis and shortness of breath.    Cardiovascular: Positive for palpitations. Negative for chest pain, claudication, leg swelling and PND.   Gastrointestinal: Positive for diarrhea, heartburn, nausea and vomiting. Negative for abdominal pain.   Neurological: Negative for tingling, focal weakness, seizures, loss of consciousness and headaches.   Psychiatric/Behavioral: Negative for suicidal ideas. The patient is not nervous/anxious and does not have insomnia.    All other systems reviewed and are negative.       Vital Signs for last 24 hours   Temp:  [36.6 °C (97.8 °F)-37.3 °C (99.1 °F)] 37.1 °C (98.8 °F)  Pulse:  [] 85  Resp:  [16-28] 19  BP: (121-181)/() 130/69  SpO2:  [91 %-97 %] 97 %    Hemodynamic parameters for last 24 hours       Respiratory Information for the last 24 hours       Physical Exam   Physical Exam   Constitutional: She is oriented to person, place, and time. No distress.   Chronically ill appearing   HENT:   Dry tachy mucous membranes   Eyes: Pupils are equal, round, and reactive to light. EOM are normal.   Neck: No JVD present. No thyromegaly present.   Cardiovascular: Normal rate and regular rhythm.   No murmur heard.  Pulmonary/Chest: No respiratory distress. She has no wheezes. She has no rales.   Abdominal: She exhibits no distension and no mass. There is tenderness. There is no rebound and no guarding.   Epigastric LUQ pain mild tenderness   Musculoskeletal: She exhibits no edema.   Neurological: She is alert and oriented to person, place, and time. No cranial nerve deficit. Coordination normal.   Answers quickly moves all extremities, AOX4   Skin: Skin is warm and dry. No rash noted. She is not diaphoretic. No erythema.   Psychiatric: She has a normal mood and affect.       Medications  Current Facility-Administered Medications   Medication Dose  Route Frequency Provider Last Rate Last Dose   • DILTIAZem (CARDIZEM) 100 mg in D5W 100 mL Infusion  0-15 mg/hr Intravenous Continuous Mike Morgan M.D. 10 mL/hr at 08/17/19 0738 10 mg/hr at 08/17/19 0738   • sodium bicarbonate tablet 325 mg  325 mg Oral Q8HRS Zay Flores M.D.   325 mg at 08/17/19 1321   • insulin lispro (HUMALOG) injection 3-14 Units  3-14 Units Subcutaneous 4X/DAY ACHS Zay Flores M.D.   7 Units at 08/17/19 1318    And   • glucose 4 g chewable tablet 16 g  16 g Oral Q15 MIN PRN Zay Flores M.D.        And   • DEXTROSE 10% BOLUS 250 mL  250 mL Intravenous Q15 MIN PRN Zay Flores M.D.       • sucralfate (CARAFATE) 1 GM/10ML suspension 1 g  1 g Oral Q6HRS Zay Flores M.D.       • insulin glargine (LANTUS) injection 20 Units  20 Units Subcutaneous Q EVENING Kori Royal M.D.   Stopped at 08/16/19 1800   • apixaban (ELIQUIS) tablet 5 mg  5 mg Oral BID Kori Royal M.D.   5 mg at 08/17/19 0500   • calcium carbonate (TUMS) chewable tab 500 mg  500 mg Oral DAILY Zay Flores M.D.   500 mg at 08/17/19 0500   • famotidine (PEPCID) tablet 20 mg  20 mg Oral BID Zay Flores M.D.   20 mg at 08/17/19 0500   • morphine (pf) 4 mg/ml injection 2 mg  2 mg Intravenous Once Mike Morgan M.D.   Stopped at 08/16/19 2145   • senna-docusate (PERICOLACE or SENOKOT S) 8.6-50 MG per tablet 2 Tab  2 Tab Oral BID Carlos Maria M.D.   Stopped at 08/16/19 1800    And   • polyethylene glycol/lytes (MIRALAX) PACKET 1 Packet  1 Packet Oral QDAY PRN Carlos Maria M.D.        And   • magnesium hydroxide (MILK OF MAGNESIA) suspension 30 mL  30 mL Oral QDAY PRN Carlos Maria M.D.        And   • bisacodyl (DULCOLAX) suppository 10 mg  10 mg Rectal QDAY PRN Carlos Maria M.D.       • Respiratory Care per Protocol   Nebulization Continuous RT Carlos Maria M.D.       • labetalol (NORMODYNE,TRANDATE) injection 10 mg  10 mg Intravenous Q4HRS PRN Carlos GRANDA  CURTIS Maria   10 mg at 08/16/19 2127   • metoprolol (LOPRESSOR) tablet 50 mg  50 mg Oral BID Carlos Maria M.D.   50 mg at 08/17/19 0503   • rosuvastatin (CRESTOR) tablet 5 mg  5 mg Oral QAM Carlos Maria M.D.   5 mg at 08/17/19 0500   • ondansetron (ZOFRAN) syringe/vial injection 4 mg  4 mg Intravenous Q4HRS PRN Marco A Ochoa Jr., D.O.       • prochlorperazine (COMPAZINE) injection 10 mg  10 mg Intravenous Q6HRS PRN Marco A Ochoa Jr., D.O.   10 mg at 08/17/19 1328       Fluids    Intake/Output Summary (Last 24 hours) at 8/17/2019 1529  Last data filed at 8/17/2019 1400  Gross per 24 hour   Intake 2393.33 ml   Output 900 ml   Net 1493.33 ml       Laboratory          Recent Labs     08/15/19  1806  08/16/19  0756 08/16/19  1118 08/16/19 2115 08/17/19  0355   SODIUM 134*   < >  --  136 133* 132*   POTASSIUM 4.1   < >  --  4.0 4.6 4.4   CHLORIDE 101   < >  --  109 108 106   CO2 14*   < >  --  22 16* 16*   BUN 25*   < >  --  28* 28* 26*   CREATININE 1.17   < >  --  1.37 1.21 1.11   MAGNESIUM 1.8  --   --   --   --   --    PHOSPHORUS 3.6  --  1.7*  --   --  3.4   CALCIUM 8.4*   < >  --  9.4 8.2* 8.3*    < > = values in this interval not displayed.     Recent Labs     08/15/19  1145  08/16/19  0125  08/16/19  1118 08/16/19 2115 08/17/19  0355 08/17/19  0933   ALTSGPT 12  --  10  --  10  --   --   --    ASTSGOT 15  --  17  --  16  --   --   --    ALKPHOSPHAT 62  --  48  --  45  --   --   --    TBILIRUBIN 1.6*  --  0.7  --  0.6  --   --   --    LIPASE  --   --   --   --   --   --   --  11   GLUCOSE 494*   < > 346*   < > 295* 279* 292*  --     < > = values in this interval not displayed.     Recent Labs     08/15/19  1145 08/16/19  0125 08/16/19  1118 08/17/19  0355   WBC 10.7 10.5  --  10.5   NEUTSPOLYS 89.00* 86.50*  --   --    LYMPHOCYTES 4.10* 5.80*  --   --    MONOCYTES 5.70 6.70  --   --    EOSINOPHILS 0.10 0.10  --   --    BASOPHILS 0.40 0.20  --   --    ASTSGOT 15 17 16  --    ALTSGPT 12 10 10  --     ALKPHOSPHAT 62 48 45  --    TBILIRUBIN 1.6* 0.7 0.6  --      Recent Labs     08/15/19  1145 08/16/19  0125 08/17/19  0355   RBC 5.14 4.58 4.91   HEMOGLOBIN 14.5 12.9 13.4   HEMATOCRIT 40.8 37.1 40.5   PLATELETCT 154* 157* 189       Imaging  X-Ray:  I have personally reviewed the images and compared with prior images. and No film today    Assessment/Plan  * Diabetic ketoacidosis without coma associated with type 2 diabetes mellitus (HCC)- (present on admission)  Assessment & Plan  ICU admission, cardiac monitoring  optimized intravascular volume with 2L IVF bolus  DKA protocol with insulin drip at 0.05 units/kg/hr  Every hour Accu-Cheks, every 4 hour BMP, mag, phos  Goal Magnesium: >2, Phosphorus: 2, K >4  Will transition to sliding scale insulin when anion gap <12, CO2 > 17    Transition off insulin gtt 8/16  Chemistry slight worse with poor blood sugar control with nausea   Recheck chemistry may need to go on insulin 180 protocol      Non-intractable vomiting with nausea- (present on admission)  Assessment & Plan  Treat dehydration with IV fluid bolus  Symptomatically treat with Zofran, Compazine as needed    Resolved tolerating diet/clears continue to monitor    Paroxysmal atrial fibrillation (HCC)- (present on admission)  Assessment & Plan  [id number]: 6  Continue Eliquis  Now in A. fib with RVR  Likely due to inability to keep down medication in the setting of gastrointestinal upset  Placed on diltiazem infusion and titrate to heart rate of less than 100  Restart home Lopressor  May require additional rate limiting medications  Closely monitor for hypotension    Went back on diltiazem gtt overnight and still requiring since nausea and unable to take PO's start home metoprolol when able  Correct electrolytes    Generalized abdominal pain  Assessment & Plan  CT scan of abdomen to rule out mesenteric ischemia -> negative for ischemia or acute process    Mild LUQ pain ? Gastritis  Rx with Pepcid, Carafate,  tums    Acquired circulating anticoagulants (HCC)- (present on admission)  Assessment & Plan  Eliquis use  Not actively bleeding    Hypertension- (present on admission)  Assessment & Plan  PRN medications available    CKD (chronic kidney disease), stage III (HCC)- (present on admission)  Assessment & Plan  Monitor for worsening following CT with contrast  Renal dose meds, avoid nephrotoxins  Strict I/Os  Follow renal function      Thyroid nodule- (present on admission)  Assessment & Plan  TSH normal 8 months ago ->recheck   Given A. fib with RVR    Dyslipidemia- (present on admission)  Assessment & Plan  Continue Crestor       VTE:  NOAC  Ulcer: H2 Antagonist gastritis  Lines: None    I have performed a physical exam and reviewed and updated ROS and Plan today (8/17/2019). In review of yesterday's note (8/16/2019), there are no changes except as documented above.     Discussed patient condition and risk of morbidity and/or mortality with RN, RT, Pharmacy, [facility] resident, Charge nurse / hot rounds and Patient     The patient remains critically ill from afib with rvr on diltiazem gtt with active titration.  Critical care time = 40 minutes in directly providing and coordinating critical care and extensive data review.  No time overlap and excludes procedures.

## 2019-08-17 NOTE — PROGRESS NOTES
[facility] ICU Progress Note      Admit Date: 8/15/2019    Resident(s): Kori Royal M.D.   Attending:  CLINTON SANCHEZ/ Dr. Flores  Patient ID:    Name:  Amrita Torrez   YOB: 1945  Age:  74 y.o.  female   MRN:  [id number]    Hospital Course (carried forward and updated):  Amrita Torrez is a 74 y.o. female with PMH of PMHx of paroxysmal A. fib on Eliquis and metoprolol, poorly controlled DM on insulin (last HbA1C 13.4 on 07/2019) CAD status post DOMINGA to LAD 2016, CVA [not clinically significant, incidental finding], CKD 3, HLD, essential HTN presented mainly with confusion, dizziness, nausea and vomiting for 1 week.  Patient has not taken her outpatient metoprolol for 1 week, neither she taken her insulin, she is also not eaten or drunk anything during this time period. In the Er she was found to be in Afib with RVR in 150s, DKA with HCO3 14, AG 22, blood glucose 494. She was admitted to ICU for titrated diltiazem infusion for Afib and management of DKA. She has been transitioned to sub q insulin on 08/16, still experiencing some nausea and vomiting. For afib, she was off of dilt drip for last 2 days, but needed to back on 08/17 due to repeat run of afib with RVR    Consultants:  Critical Care      Interval Events:  - Overnight went back to Afib with RVR in 130s, s/p Metoprolol IV 5mg x 3, then back on dilt drip, now @5  - Having multiple BMs, 6 overnight, stools are more formed.   - Decent UOP  - SBP in 110-130s, on 2L O2.  - Having nausea and throwing up, switched to clear liquid diet. On Zofran, QTc 468  - AG still closed, HCO3 16, Started HCO3 tabs, will give 500 LR bolus   - BS elevated in 250-300s, will see how much she can eat today. Might need to increase lantus.   - PT/ OT consulted   - Ambulate as tolerated   - Wean o2 as tolerated   - Repleting electrolytes as needed     Review of Systems   Constitutional: Positive for malaise/fatigue. Negative for chills and fever.   Respiratory: Negative  "for cough and shortness of breath.    Cardiovascular: Negative for chest pain, palpitations and leg swelling.   Gastrointestinal: Positive for nausea and vomiting. Negative for abdominal pain, constipation, diarrhea and heartburn.   Genitourinary: Negative for dysuria, frequency and urgency.   Neurological: Negative for dizziness and headaches.   Psychiatric/Behavioral: Negative for depression, substance abuse and suicidal ideas.       PHYSICAL EXAM:  Vitals:    08/17/19 0615 08/17/19 0630 08/17/19 0645 08/17/19 0800   BP: 143/76 138/69 149/81 121/61   Pulse: 94 82 69 71   Resp: 19 (!) 21 18 (!) 27   Temp:       TempSrc:       SpO2: 96% 97% 95% 94%   Weight:       Height:        Body mass index is 26.05 kg/m².  Latest Vitals:  /61   Pulse 71   Temp 37.1 °C (98.8 °F)   Resp (!) 27   Ht 1.651 m (5' 5\")   Wt 71 kg (156 lb 8.4 oz)   SpO2 94%   BMI 26.05 kg/m²   O2 therapy: Pulse Oximetry: 94 %, O2 (LPM): 1, O2 Delivery: Silicone Nasal Cannula  Vitals Range last 24h:  Temp:  [36.3 °C (97.4 °F)-37.3 °C (99.1 °F)] 37.1 °C (98.8 °F)  Pulse:  [] 71  Resp:  [16-32] 27  BP: (105-181)/() 121/61  SpO2:  [90 %-97 %] 94 %       Intake/Output Summary (Last 24 hours) at 8/17/2019 1030  Last data filed at 8/17/2019 0600  Gross per 24 hour   Intake 2564.77 ml   Output 500 ml   Net 2064.77 ml        Physical Exam   Constitutional: She is oriented to person, place, and time.   Elderly female lying in bed, in no apparent distress   HENT:   Head: Normocephalic and atraumatic.   Eyes: Pupils are equal, round, and reactive to light. Conjunctivae are normal.   Neck: Normal range of motion. No JVD present.   Cardiovascular: Normal rate, regular rhythm and normal heart sounds.   Pulmonary/Chest: Effort normal and breath sounds normal. She has no wheezes.   Abdominal: Soft. She exhibits distension (Protuberant). There is no tenderness.   Musculoskeletal: Normal range of motion. She exhibits no edema.   Neurological: She " is alert and oriented to person, place, and time.   No focal signs    Skin: Skin is dry. No erythema.   Psychiatric: Affect and judgment normal.           Recent Labs     08/15/19  1806  08/16/19  0756 08/16/19 1118 08/16/19 2115 08/17/19 0355   SODIUM 134*   < >  --  136 133* 132*   POTASSIUM 4.1   < >  --  4.0 4.6 4.4   CHLORIDE 101   < >  --  109 108 106   CO2 14*   < >  --  22 16* 16*   BUN 25*   < >  --  28* 28* 26*   CREATININE 1.17   < >  --  1.37 1.21 1.11   MAGNESIUM 1.8  --   --   --   --   --    PHOSPHORUS 3.6  --  1.7*  --   --  3.4   CALCIUM 8.4*   < >  --  9.4 8.2* 8.3*    < > = values in this interval not displayed.     Recent Labs     08/15/19  1145  08/16/19  0125  08/16/19  1118 08/16/19  2115 08/17/19  0355 08/17/19  0933   ALTSGPT 12  --  10  --  10  --   --   --    ASTSGOT 15  --  17  --  16  --   --   --    ALKPHOSPHAT 62  --  48  --  45  --   --   --    TBILIRUBIN 1.6*  --  0.7  --  0.6  --   --   --    LIPASE  --   --   --   --   --   --   --  11   GLUCOSE 494*   < > 346*   < > 295* 279* 292*  --     < > = values in this interval not displayed.     Recent Labs     08/15/19  1145 08/16/19  0125 08/17/19  0355   RBC 5.14 4.58 4.91   HEMOGLOBIN 14.5 12.9 13.4   HEMATOCRIT 40.8 37.1 40.5   PLATELETCT 154* 157* 189     Recent Labs     08/15/19  1145 08/16/19  0125 08/16/19  1118 08/17/19  0355   WBC 10.7 10.5  --  10.5   NEUTSPOLYS 89.00* 86.50*  --   --    LYMPHOCYTES 4.10* 5.80*  --   --    MONOCYTES 5.70 6.70  --   --    EOSINOPHILS 0.10 0.10  --   --    BASOPHILS 0.40 0.20  --   --    ASTSGOT 15 17 16  --    ALTSGPT 12 10 10  --    ALKPHOSPHAT 62 48 45  --    TBILIRUBIN 1.6* 0.7 0.6  --        Meds:  • DILTIAZem infusion  0-15 mg/hr 10 mg/hr (08/17/19 0738)   • sodium bicarbonate  325 mg     • LR  1,000 mL 1,000 mL (08/17/19 0918)   • insulin glargine  20 Units     • apixaban  5 mg     • calcium carbonate  500 mg     • famotidine  20 mg     • insulin lispro  2-9 Units      And   • glucose   16 g      And   • dextrose 10% bolus  250 mL     • morphine injection  2 mg     • senna-docusate  2 Tab      And   • polyethylene glycol/lytes  1 Packet      And   • magnesium hydroxide  30 mL      And   • bisacodyl  10 mg     • Respiratory Care per Protocol       • labetalol  10 mg     • metoprolol  50 mg     • rosuvastatin  5 mg     • ondansetron  4 mg     • prochlorperazine  10 mg     • MD ALERT-PHARMACY TO CONSULT  1 Each          Procedures:  NA    Imaging:  DX-CHEST-PORTABLE (1 VIEW)   Final Result      Minimal interstitial edema. No focal consolidation or pleural effusions.      [id number] VIEW   Final Result      Nonobstructive bowel gas pattern. Small amount of stool throughout the colon.      DX-CHEST-PORTABLE (1 VIEW)   Final Result         1.  No acute cardiopulmonary disease.   2.  Atherosclerosis      CTA ABDOMEN PELVIS W & W/O POST PROCESS   Final Result      1.  Patent celiac, superior mesenteric and inferior mesenteric arteries.      2.  No evidence of bowel obstruction or inflammatory change.      3.  Atherosclerotic plaque involving the aorta primarily distally.      4.  Fatty liver.      5.  Splenomegaly.      6.  Small amount of ascites.      7.  Bibasilar atelectasis.      8.  Prior cholecystectomy.      DX-CHEST-PORTABLE (1 VIEW)   Final Result      Mild patchy opacity in the left costophrenic angle may represent atelectasis but pneumonitis is not excluded.      Atherosclerotic plaque.          Problem and Plan:    * Diabetic ketoacidosis without coma associated with type 2 diabetes mellitus (HCC)- (present on admission)  Assessment & Plan  - Secondary to dehydration from poor oral intake, non compliance? Last HbA1c 13.8 on 07/2019  - Was on DKA protocol, transitioned on 08/17  - Feeling nauseous this AM, Zofran as needed  - Maybe still dehydrated, giving LR bolus  - Repleting electrolytes as needed  - Diet as tolerated      Non-intractable vomiting with nausea- (present on  admission)  Assessment & Plan  2/2 DKA, possibly dehydrated  Diet as tolerated   Giving LR bolus   Zofran and compazine as needed,     Paroxysmal atrial fibrillation (HCC)- (present on admission)  Assessment & Plan  - Came with RVR, ventricular rate in 150s, currently stable in 60-70s (stooped taking her home metoprolol for a week) Was on Dilt drip initially for a brief period of time, needed to go back up yesterday for another episode of RVR  - This time triggered from stress vs dehydration?   - Hemodynamically stable currently   - On Dilt drip, will titrate down as tolerated   - [id number] score 7, on Eliquis at home, resumed  - Metoprolol tartrate 5 mg IV as needed x3 for ventricular rate> 130  - Resumed metoprolol 50 twice daily dose p.o. as tolerated    Type 2 diabetes mellitus without complication (HCC)- (present on admission)  Assessment & Plan  On 20 U Lantus at home, non complaince?   Last HbA1c 13.4 (7/2019)  DM education prior dc   See DKA for mx    Generalized abdominal pain  Assessment & Plan  - Feeling better today  - 2/2 DKA  - CTM    CKD (chronic kidney disease), stage III (HCC)- (present on admission)  Assessment & Plan  -Long-standing IDDM, HTN, CKD 3, at baseline  - Avoid nephro toxins     Thyroid nodule- (present on admission)  Assessment & Plan  - Asymptomatic, stable   - Most recent TSH/FT4 WNL 12/18        DISPO: ICU    CODE STATUS: FULL    Quality Measures:  Feeding: PO  Analgesia: PRN  Sedation: NA  Thromboprophylaxis: Eliquis   Head of bed: >30 degrees  Ulcer prophylaxis: NA  Glycemic control: LAntus + SSI  Bowel care: bowel regimen  Indwelling lines: PIV  Deescalation of antibiotics:       Kori Royal M.D.

## 2019-08-17 NOTE — PROGRESS NOTES
Notified Dr. Morgan concerning patient in A.Fib with rates back in the 100-1teen's. New orders obtained. See MAR.

## 2019-08-17 NOTE — PROGRESS NOTES
Received report that patient has gone back into atrial fibrillation. Patient is reporting palpitations. Blood pressure is STABLE.     -Metoprolol 5 mg IV x3 decreased heart rate from 130s-140s to 110s -120s, but remains in afib.  -Diltiazem bolus 0.25 mg/kg given and HR decreased to 90s, but still in afib.   -Palpitations much improved/resolved.  -Will consider diltiazem infusion.

## 2019-08-18 ENCOUNTER — APPOINTMENT (OUTPATIENT)
Dept: RADIOLOGY | Facility: MEDICAL CENTER | Age: 74
DRG: 639 | End: 2019-08-18
Attending: STUDENT IN AN ORGANIZED HEALTH CARE EDUCATION/TRAINING PROGRAM
Payer: MEDICARE

## 2019-08-18 LAB
ANION GAP SERPL CALC-SCNC: 10 MMOL/L (ref 0–11.9)
BASOPHILS # BLD AUTO: 1 % (ref 0–1.8)
BASOPHILS # BLD: 0.09 K/UL (ref 0–0.12)
BUN SERPL-MCNC: 18 MG/DL (ref 8–22)
CALCIUM SERPL-MCNC: 8.4 MG/DL (ref 8.5–10.5)
CHLORIDE SERPL-SCNC: 102 MMOL/L (ref 96–112)
CO2 SERPL-SCNC: 22 MMOL/L (ref 20–33)
CREAT SERPL-MCNC: 1.01 MG/DL (ref 0.5–1.4)
EOSINOPHIL # BLD AUTO: 0.18 K/UL (ref 0–0.51)
EOSINOPHIL NFR BLD: 2 % (ref 0–6.9)
ERYTHROCYTE [DISTWIDTH] IN BLOOD BY AUTOMATED COUNT: 51.6 FL (ref 35.9–50)
GLUCOSE BLD-MCNC: 134 MG/DL (ref 65–99)
GLUCOSE BLD-MCNC: 137 MG/DL (ref 65–99)
GLUCOSE BLD-MCNC: 154 MG/DL (ref 65–99)
GLUCOSE BLD-MCNC: 168 MG/DL (ref 65–99)
GLUCOSE BLD-MCNC: 175 MG/DL (ref 65–99)
GLUCOSE BLD-MCNC: 179 MG/DL (ref 65–99)
GLUCOSE SERPL-MCNC: 193 MG/DL (ref 65–99)
HCT VFR BLD AUTO: 48.9 % (ref 37–47)
HGB BLD-MCNC: 15.2 G/DL (ref 12–16)
IMM GRANULOCYTES # BLD AUTO: 0.05 K/UL (ref 0–0.11)
IMM GRANULOCYTES NFR BLD AUTO: 0.5 % (ref 0–0.9)
LYMPHOCYTES # BLD AUTO: 1.22 K/UL (ref 1–4.8)
LYMPHOCYTES NFR BLD: 13.3 % (ref 22–41)
MAGNESIUM SERPL-MCNC: 1.9 MG/DL (ref 1.5–2.5)
MCH RBC QN AUTO: 27.3 PG (ref 27–33)
MCHC RBC AUTO-ENTMCNC: 31.1 G/DL (ref 33.6–35)
MCV RBC AUTO: 87.9 FL (ref 81.4–97.8)
MONOCYTES # BLD AUTO: 0.81 K/UL (ref 0–0.85)
MONOCYTES NFR BLD AUTO: 8.9 % (ref 0–13.4)
NEUTROPHILS # BLD AUTO: 6.79 K/UL (ref 2–7.15)
NEUTROPHILS NFR BLD: 74.3 % (ref 44–72)
NRBC # BLD AUTO: 0 K/UL
NRBC BLD-RTO: 0 /100 WBC
PHOSPHATE SERPL-MCNC: 2.7 MG/DL (ref 2.5–4.5)
PLATELET # BLD AUTO: 165 K/UL (ref 164–446)
PMV BLD AUTO: 10.8 FL (ref 9–12.9)
POTASSIUM SERPL-SCNC: 3.6 MMOL/L (ref 3.6–5.5)
RBC # BLD AUTO: 5.56 M/UL (ref 4.2–5.4)
SODIUM SERPL-SCNC: 134 MMOL/L (ref 135–145)
WBC # BLD AUTO: 9.1 K/UL (ref 4.8–10.8)

## 2019-08-18 PROCEDURE — 700102 HCHG RX REV CODE 250 W/ 637 OVERRIDE(OP): Performed by: INTERNAL MEDICINE

## 2019-08-18 PROCEDURE — 700102 HCHG RX REV CODE 250 W/ 637 OVERRIDE(OP): Performed by: STUDENT IN AN ORGANIZED HEALTH CARE EDUCATION/TRAINING PROGRAM

## 2019-08-18 PROCEDURE — A9270 NON-COVERED ITEM OR SERVICE: HCPCS | Performed by: STUDENT IN AN ORGANIZED HEALTH CARE EDUCATION/TRAINING PROGRAM

## 2019-08-18 PROCEDURE — 85025 COMPLETE CBC W/AUTO DIFF WBC: CPT

## 2019-08-18 PROCEDURE — 700111 HCHG RX REV CODE 636 W/ 250 OVERRIDE (IP): Performed by: INTERNAL MEDICINE

## 2019-08-18 PROCEDURE — 99233 SBSQ HOSP IP/OBS HIGH 50: CPT | Performed by: INTERNAL MEDICINE

## 2019-08-18 PROCEDURE — 71045 X-RAY EXAM CHEST 1 VIEW: CPT

## 2019-08-18 PROCEDURE — 80048 BASIC METABOLIC PNL TOTAL CA: CPT

## 2019-08-18 PROCEDURE — 84100 ASSAY OF PHOSPHORUS: CPT

## 2019-08-18 PROCEDURE — A9270 NON-COVERED ITEM OR SERVICE: HCPCS | Performed by: INTERNAL MEDICINE

## 2019-08-18 PROCEDURE — 83735 ASSAY OF MAGNESIUM: CPT

## 2019-08-18 PROCEDURE — 82962 GLUCOSE BLOOD TEST: CPT | Mod: 91

## 2019-08-18 PROCEDURE — 770022 HCHG ROOM/CARE - ICU (200)

## 2019-08-18 RX ORDER — POTASSIUM CHLORIDE 20 MEQ/1
40 TABLET, EXTENDED RELEASE ORAL 2 TIMES DAILY
Status: DISCONTINUED | OUTPATIENT
Start: 2019-08-18 | End: 2019-08-20 | Stop reason: HOSPADM

## 2019-08-18 RX ORDER — MAGNESIUM SULFATE HEPTAHYDRATE 40 MG/ML
2 INJECTION, SOLUTION INTRAVENOUS ONCE
Status: COMPLETED | OUTPATIENT
Start: 2019-08-18 | End: 2019-08-18

## 2019-08-18 RX ADMIN — ANTACID TABLETS 500 MG: 500 TABLET, CHEWABLE ORAL at 06:08

## 2019-08-18 RX ADMIN — SODIUM BICARBONATE 325 MG: 650 TABLET ORAL at 06:09

## 2019-08-18 RX ADMIN — ROSUVASTATIN CALCIUM 5 MG: 10 TABLET, FILM COATED ORAL at 06:09

## 2019-08-18 RX ADMIN — SUCRALFATE 1 G: 1 SUSPENSION ORAL at 23:57

## 2019-08-18 RX ADMIN — SENNOSIDES, DOCUSATE SODIUM 2 TABLET: 50; 8.6 TABLET, FILM COATED ORAL at 06:12

## 2019-08-18 RX ADMIN — METOPROLOL TARTRATE 50 MG: 50 TABLET ORAL at 06:08

## 2019-08-18 RX ADMIN — SODIUM BICARBONATE 325 MG: 650 TABLET ORAL at 17:05

## 2019-08-18 RX ADMIN — FAMOTIDINE 20 MG: 20 TABLET ORAL at 17:11

## 2019-08-18 RX ADMIN — FAMOTIDINE 20 MG: 20 TABLET ORAL at 06:09

## 2019-08-18 RX ADMIN — SUCRALFATE 1 G: 1 SUSPENSION ORAL at 17:11

## 2019-08-18 RX ADMIN — SENNOSIDES, DOCUSATE SODIUM 2 TABLET: 50; 8.6 TABLET, FILM COATED ORAL at 17:11

## 2019-08-18 RX ADMIN — SUCRALFATE 1 G: 1 SUSPENSION ORAL at 11:28

## 2019-08-18 RX ADMIN — SODIUM BICARBONATE 325 MG: 650 TABLET ORAL at 21:30

## 2019-08-18 RX ADMIN — MAGNESIUM SULFATE IN WATER 2 G: 40 INJECTION, SOLUTION INTRAVENOUS at 11:28

## 2019-08-18 RX ADMIN — PROCHLORPERAZINE EDISYLATE 10 MG: 5 INJECTION INTRAMUSCULAR; INTRAVENOUS at 06:49

## 2019-08-18 RX ADMIN — METOPROLOL TARTRATE 75 MG: 25 TABLET, FILM COATED ORAL at 21:30

## 2019-08-18 RX ADMIN — APIXABAN 5 MG: 5 TABLET, FILM COATED ORAL at 06:08

## 2019-08-18 RX ADMIN — POTASSIUM CHLORIDE 40 MEQ: 20 TABLET, EXTENDED RELEASE ORAL at 11:27

## 2019-08-18 RX ADMIN — SUCRALFATE 1 G: 1 SUSPENSION ORAL at 06:12

## 2019-08-18 RX ADMIN — POTASSIUM CHLORIDE 40 MEQ: 20 TABLET, EXTENDED RELEASE ORAL at 17:11

## 2019-08-18 RX ADMIN — METOPROLOL TARTRATE 75 MG: 25 TABLET, FILM COATED ORAL at 17:03

## 2019-08-18 RX ADMIN — INSULIN GLARGINE 20 UNITS: 100 INJECTION, SOLUTION SUBCUTANEOUS at 17:12

## 2019-08-18 RX ADMIN — APIXABAN 5 MG: 5 TABLET, FILM COATED ORAL at 17:11

## 2019-08-18 ASSESSMENT — ENCOUNTER SYMPTOMS
NAUSEA: 1
SEIZURES: 0
FOCAL WEAKNESS: 0
COUGH: 0
CONSTIPATION: 0
VOMITING: 0
TINGLING: 0
PALPITATIONS: 1
VOMITING: 1
PALPITATIONS: 0
HEARTBURN: 0
SHORTNESS OF BREATH: 0
HEADACHES: 0
DEPRESSION: 0
ABDOMINAL PAIN: 0
DOUBLE VISION: 0
INSOMNIA: 0
HEMOPTYSIS: 0
DIARRHEA: 0
CLAUDICATION: 0
LOSS OF CONSCIOUSNESS: 0
CHILLS: 0
SORE THROAT: 0
DIZZINESS: 0
NERVOUS/ANXIOUS: 0
WEIGHT LOSS: 0
PND: 0
FEVER: 0

## 2019-08-18 ASSESSMENT — CHA2DS2 SCORE
VASCULAR DISEASE: NO
SEX: FEMALE
CHF OR LEFT VENTRICULAR DYSFUNCTION: NO
CHA2DS2 VASC SCORE: 4
AGE 75 OR GREATER: YES
HYPERTENSION: NO
AGE 65 TO 74: NO
DIABETES: YES
PRIOR STROKE OR TIA OR THROMBOEMBOLISM: NO

## 2019-08-18 ASSESSMENT — LIFESTYLE VARIABLES: SUBSTANCE_ABUSE: 0

## 2019-08-18 NOTE — PROGRESS NOTES
[facility] Progress Note      Admit Date: 8/15/2019    Resident(s): Carlos Maria M.D.   Attending:  CLINTON SANCHEZ/ Dr. Flores  Patient ID:    Name:  Amrita Torrez   YOB: 1945  Age:  74 y.o.  female   MRN:  [id number]    Hospital Course (carried forward and updated):  Amrita Torrez is a 74 y.o. female with PMH of PMHx of paroxysmal A. fib on Eliquis and metoprolol, poorly controlled DM on insulin (last HbA1C 13.4 on 07/2019) CAD status post DOMINGA to LAD 2016, CVA [not clinically significant, incidental finding], CKD 3, HLD, essential HTN presented mainly with confusion, dizziness, nausea and vomiting for 1 week.  Patient has not taken her outpatient metoprolol for 1 week, neither she taken her insulin, she is also not eaten or drunk anything during this time period. In the Er she was found to be in Afib with RVR in 150s, DKA with HCO3 14, AG 22, blood glucose 494. She was admitted to ICU for titrated diltiazem infusion for Afib and management of DKA. She has been transitioned to sub q insulin on 08/16, still experiencing some nausea and vomiting. For afib, she was off of dilt drip for last 2 days, but needed to back on 08/17 due to repeat run of afib with RVR. She has currently been off of Diltiazem dip for >20 hours and is hemodynamically stable on Metoprolol, her home medication.   She is stable for transfer to telemetry floor.    Consultants:  Critical Care      Interval Events:  - Has been rate controlled on Metoprolol PO BID for >20 hours - Metoprolol dose increased to 75 BID.  - blood sugars stable in high 100s to low-mid 200s - she has not been taking her Insulin for several weeks. HbAqc 13. States she ran out of supplies and could not get any refills. DM education and supplies on discharge.  - One episode of vomiting small amount this AM after swallowing PO meds, possible component of gastroparesis secondary to longstanding DM.  - AAOx4, tolerating an oral diet, having small BMs, ambulating  "within room.  - stable for transfer to telemetry floor.          Review of Systems   Constitutional: Positive for malaise/fatigue. Negative for chills and fever.   Respiratory: Negative for cough and shortness of breath.    Cardiovascular: Negative for chest pain, palpitations and leg swelling.   Gastrointestinal: Positive for nausea and vomiting. Negative for abdominal pain, constipation, diarrhea and heartburn.   Genitourinary: Negative for dysuria, frequency and urgency.   Neurological: Negative for dizziness and headaches.   Psychiatric/Behavioral: Negative for depression, substance abuse and suicidal ideas.       PHYSICAL EXAM:  Vitals:    08/18/19 0800 08/18/19 1000 08/18/19 1400 08/18/19 1600   BP: 138/78 144/89 (!) 179/110 148/112   Pulse: 95 (!) 104 (!) 109 97   Resp: (!) 24 (!) 21 17    Temp: 36.4 °C (97.6 °F) 36.7 °C (98 °F)     TempSrc: Temporal Temporal     SpO2: 90% 97% 95% 94%   Weight:       Height:        Body mass index is 26.19 kg/m².  Latest Vitals:  /112   Pulse 97   Temp 36.7 °C (98 °F) (Temporal)   Resp 17   Ht 1.651 m (5' 5\")   Wt 71.4 kg (157 lb 6.5 oz)   SpO2 94%   BMI 26.19 kg/m²   O2 therapy: Pulse Oximetry: 94 %, O2 (LPM): 2, O2 Delivery: None (Room Air)  Vitals Range last 24h:  Temp:  [36.4 °C (97.6 °F)-37 °C (98.6 °F)] 36.7 °C (98 °F)  Pulse:  [] 97  Resp:  [13-27] 17  BP: (125-179)/() 148/112  SpO2:  [90 %-98 %] 94 %  Date 08/18/19 0700 - 08/19/19 0659   Shift 4599-4401 8023-6255 3249-6827 24 Hour Total   INTAKE   I.V. 13.3   13.3     Magnesium Sulfate Volume 13.3   13.3   Shift Total 13.3   13.3   OUTPUT   Urine         Number of Times Voided 1 x   1 x   Stool         Number of Times Stooled 1 x   1 x   Shift Total       NET 13.3   13.3        Intake/Output Summary (Last 24 hours) at 8/18/2019 1559  Last data filed at 8/18/2019 1200  Gross per 24 hour   Intake 525.83 ml   Output 550 ml   Net -24.17 ml        Physical Exam   Constitutional: She is oriented to " person, place, and time.   Elderly female lying in bed, in no apparent distress   HENT:   Head: Normocephalic and atraumatic.   Eyes: Pupils are equal, round, and reactive to light. Conjunctivae are normal.   Neck: Normal range of motion. No JVD present.   Cardiovascular: Normal rate, regular rhythm and normal heart sounds.   Pulmonary/Chest: Effort normal and breath sounds normal. She has no wheezes.   Abdominal: Soft. She exhibits distension (Protuberant). There is no tenderness.   Musculoskeletal: Normal range of motion. She exhibits no edema.   Neurological: She is alert and oriented to person, place, and time.   No focal signs    Skin: Skin is dry. No erythema.   Psychiatric: Affect and judgment normal.           Recent Labs     08/15/19  1806  08/16/19  0756  08/17/19  0355 08/17/19  1532 08/18/19  0705   SODIUM 134*   < >  --    < > 132* 137 134*   POTASSIUM 4.1   < >  --    < > 4.4 4.0 3.6   CHLORIDE 101   < >  --    < > 106 109 102   CO2 14*   < >  --    < > 16* 22 22   BUN 25*   < >  --    < > 26* 22 18   CREATININE 1.17   < >  --    < > 1.11 0.99 1.01   MAGNESIUM 1.8  --   --   --   --   --  1.9   PHOSPHORUS 3.6  --  1.7*  --  3.4  --  2.7   CALCIUM 8.4*   < >  --    < > 8.3* 8.0* 8.4*    < > = values in this interval not displayed.     Recent Labs     08/16/19  0125  08/16/19  1118  08/17/19  0355 08/17/19  0933 08/17/19  1532 08/18/19  0705   ALTSGPT 10  --  10  --   --   --   --   --    ASTSGOT 17  --  16  --   --   --   --   --    ALKPHOSPHAT 48  --  45  --   --   --   --   --    TBILIRUBIN 0.7  --  0.6  --   --   --   --   --    LIPASE  --   --   --   --   --  11  --   --    GLUCOSE 346*   < > 295*   < > 292*  --  240* 193*    < > = values in this interval not displayed.     Recent Labs     08/16/19  0125 08/17/19  0355 08/18/19  0705   RBC 4.58 4.91 5.56*   HEMOGLOBIN 12.9 13.4 15.2   HEMATOCRIT 37.1 40.5 48.9*   PLATELETCT 157* 189 165     Recent Labs     08/16/19  0125 08/16/19  1118  08/17/19  0355 08/18/19  0705   WBC 10.5  --  10.5 9.1   NEUTSPOLYS 86.50*  --   --  74.30*   LYMPHOCYTES 5.80*  --   --  13.30*   MONOCYTES 6.70  --   --  8.90   EOSINOPHILS 0.10  --   --  2.00   BASOPHILS 0.20  --   --  1.00   ASTSGOT 17 16  --   --    ALTSGPT 10 10  --   --    ALKPHOSPHAT 48 45  --   --    TBILIRUBIN 0.7 0.6  --   --        Meds:  • potassium chloride SA  40 mEq     • metoprolol  75 mg     • sodium bicarbonate  325 mg     • insulin lispro  3-14 Units      And   • glucose  16 g      And   • dextrose 10% bolus  250 mL     • sucralfate  1 g     • insulin glargine  20 Units     • apixaban  5 mg     • calcium carbonate  500 mg     • famotidine  20 mg     • senna-docusate  2 Tab      And   • polyethylene glycol/lytes  1 Packet      And   • magnesium hydroxide  30 mL      And   • bisacodyl  10 mg     • Respiratory Care per Protocol       • labetalol  10 mg     • rosuvastatin  5 mg     • ondansetron  4 mg     • prochlorperazine  10 mg          Procedures:  NA    Imaging:  DX-CHEST-PORTABLE (1 VIEW)   Final Result      Minimal interstitial edema. No new consolidation or pleural effusions.      DX-CHEST-PORTABLE (1 VIEW)   Final Result      Minimal interstitial edema. No focal consolidation or pleural effusions.      CY-FLTEHGA-1 VIEW   Final Result      Nonobstructive bowel gas pattern. Small amount of stool throughout the colon.      DX-CHEST-PORTABLE (1 VIEW)   Final Result         1.  No acute cardiopulmonary disease.   2.  Atherosclerosis      CTA ABDOMEN PELVIS W & W/O POST PROCESS   Final Result      1.  Patent celiac, superior mesenteric and inferior mesenteric arteries.      2.  No evidence of bowel obstruction or inflammatory change.      3.  Atherosclerotic plaque involving the aorta primarily distally.      4.  Fatty liver.      5.  Splenomegaly.      6.  Small amount of ascites.      7.  Bibasilar atelectasis.      8.  Prior cholecystectomy.      DX-CHEST-PORTABLE (1 VIEW)   Final Result       Mild patchy opacity in the left costophrenic angle may represent atelectasis but pneumonitis is not excluded.      Atherosclerotic plaque.          Problem and Plan:    * Diabetic ketoacidosis without coma associated with type 2 diabetes mellitus (HCC)- (present on admission)  Assessment & Plan  - Secondary to dehydration from poor oral intake, non compliance? Last HbA1c 13.8 on 07/2019  - Was on DKA protocol, transitioned on 08/17  - Feeling nauseous this AM, Zofran as needed  - Maybe still dehydrated, giving LR bolus  - Repleting electrolytes as needed  - Diet as tolerated      Non-intractable vomiting with nausea- (present on admission)  Assessment & Plan  2/2 DKA, possibly dehydrated  Diet as tolerated   Fluids PRN  Zofran and compazine as needed,     Paroxysmal atrial fibrillation (HCC)- (present on admission)  Assessment & Plan  - Came with RVR, ventricular rate in 150s, currently stable in 60-70s (stooped taking her home metoprolol for a week) Was on Dilt drip initially for a brief period of time, needed to go back up yesterday for another episode of RVR  - This time triggered from stress vs dehydration?   - Hemodynamically stable currently   - Off of Dilt gtt, restart PRN  - [id number] score 7, on Eliquis at home, resumed  - Metoprolol tartrate 5 mg IV as needed x3 for ventricular rate> 130  - Resumed metoprolol 75 twice daily dose p.o. as tolerated  - transfer to telemetry floor    Type 2 diabetes mellitus without complication (HCC)- (present on admission)  Assessment & Plan  On 20 U Lantus at home, non complaince?   Last HbA1c 13.4 (7/2019)  DM education prior dc   See DKA for mx    Generalized abdominal pain  Assessment & Plan  - Feeling better today  - 2/2 DKA versus gastroparesis  - CTM    Hypertension- (present on admission)  Assessment & Plan  - Metoprolol 75 po BID  - Labetalol PRN    CKD (chronic kidney disease), stage III (HCC)- (present on admission)  Assessment & Plan  -Long-standing  IDDM, HTN, CKD 3, at baseline  - Avoid nephro toxins     Thyroid nodule- (present on admission)  Assessment & Plan  - Asymptomatic, stable   - Most recent TSH/FT4 WNL 12/18      Dyslipidemia- (present on admission)  Assessment & Plan  - continue Crestor      DISPO: transfer to telemetry floor    CODE STATUS: FULL    Quality Measures:  Feeding: PO  Analgesia: PRN  Sedation: NA  Thromboprophylaxis: Eliquis   Head of bed: >30 degrees  Ulcer prophylaxis: NA  Glycemic control: LAntus + SSI  Bowel care: bowel regimen  Indwelling lines: PIV  Deescalation of antibiotics:       Carlos Maria M.D.

## 2019-08-18 NOTE — CARE PLAN
Problem: Communication  Goal: The ability to communicate needs accurately and effectively will improve  Intervention: [place] patient and significant other/support system to call light to alert staff of needs  Flowsheets (Taken 8/17/2019 2218)  Oriented to:: All of the Following : Location of Bathroom, Visiting Policy, Unit Routine, Call Light and Bedside Controls, Bedside Rail Policy, Smoking Policy, Rights and Responsibilities, Bedside Report, and Patient Education Notebook  Intervention: Reorient patient to environment as needed  Flowsheets (Taken 8/17/2019 2218)  Oriented to:: All of the Following : Location of Bathroom, Visiting Policy, Unit Routine, Call Light and Bedside Controls, Bedside Rail Policy, Smoking Policy, Rights and Responsibilities, Bedside Report, and Patient Education Notebook  Intervention: Educate patient and significant other/support system about the plan of care, procedures, treatments, medications and allow for questions  Flowsheets (Taken 8/17/2019 2218)  Pt & Family Have Been Educated on Methods Available to Report Concerns Related to Care, Treatment, Services, and Patient Safety Issues: Yes  Intervention: Use communication aids and/or /Language Line as appropriate  Flowsheets (Taken 8/17/2019 2218)  Patient's Primary Language: English     Problem: Safety  Goal: Will remain free from falls  Note:   Pt remains free from falls, Valencia Menendez fall assessment complete, appropriate interventions in place, call light in reach, bed alarm on, pt educated on need to call for assistance.

## 2019-08-18 NOTE — ASSESSMENT & PLAN NOTE
Worsening HTN over last couple days. Will add CCB. Consider ACEi. Initial trial of amlodipine 5 mg without significant effect.    -Amlodipine 10 mg po qday.  -Patient instructed to take daily BP readings and to bring in data to next cardiology/PCP appointment.

## 2019-08-18 NOTE — PROGRESS NOTES
Critical Care Progress Note    Date of admission  8/15/2019    Chief Complaint  74 y.o. female with a past medical history of coronary disease, diabetes, goiter, hyperlipidemia, hypertension, A. fib RVR, who presented 8/15/2019 with nausea, vomiting, tachycardia associated with dizziness.  Patient reports she has had 2 days of nausea vomiting is been unable to keep her medications down therefore has been unable to take her Lopressor for atrial fibrillation.  In the ER the patient's heart rate was noted to be 150s-160s, she was given a diltiazem bolus and started on infusion.  Unfortunately her heart rate is not able to be controlled without continuous infusion of Cardizem therefore she will require admission to the ICU for active titration of her calcium channel blocker infusion.  Initial labs also demonstrate significant hyperglycemia with a CO2 of 14, anion gap of 22 and beta hydroxybutyric acid 5.5.  The patient states she checks her blood sugar once a day and it was normal yesterday however she has not taken her insulin due to her illness. Taken from Dr Ochoa note.       Hospital Course  Admitted to ICU 8/15     Interval Problem Update  Reviewed last 24 hour events:  none  Neuro: aox4 intact  HR: off dilt 1400 afib   SBP: 120-170's  Tmax: afebrile  GI: full liquid -> regular diet, two small BM's watery  UOP: adequate 500ml  Lines: 3 peripheral   Resp: chronic 2l n/c   Vte: eliquis  PPI/H2:pepcid   Antibx: none    3.6 40 meq BID  Mag 2 gram  HgA1c 13 not taking medication    Metoprolol 75mg PO BID->TID  ?lasix cxr with early CHF  +7.4L  Tele floor    Review of Systems  Review of Systems   Constitutional: Negative for chills, fever and weight loss.   HENT: Negative for congestion and sore throat.    Eyes: Negative for double vision.   Respiratory: Negative for cough, hemoptysis and shortness of breath.    Cardiovascular: Positive for palpitations. Negative for chest pain, claudication, leg swelling and PND.    Gastrointestinal: Positive for nausea. Negative for abdominal pain, diarrhea, heartburn and vomiting.   Neurological: Negative for tingling, focal weakness, seizures, loss of consciousness and headaches.   Psychiatric/Behavioral: Negative for suicidal ideas. The patient is not nervous/anxious and does not have insomnia.    All other systems reviewed and are negative.       Vital Signs for last 24 hours   Temp:  [36.4 °C (97.6 °F)-37 °C (98.6 °F)] 36.7 °C (98 °F)  Pulse:  [] 104  Resp:  [13-27] 21  BP: (125-177)/() 144/89  SpO2:  [90 %-98 %] 97 %    Hemodynamic parameters for last 24 hours       Respiratory Information for the last 24 hours       Physical Exam   Physical Exam   Constitutional: She is oriented to person, place, and time. No distress.   Chronically ill appearing, sitting upright   HENT:   Dry tachy mucous membranes   Eyes: Pupils are equal, round, and reactive to light. EOM are normal.   Neck: No JVD present. No thyromegaly present.   Cardiovascular: Normal rate and regular rhythm.   No murmur heard.  Pulmonary/Chest: No respiratory distress. She has no wheezes. She has no rales.   Abdominal: She exhibits no distension and no mass. There is no tenderness. There is no rebound and no guarding.   Improved epigastric LUQ pain mild tenderness   Musculoskeletal: She exhibits no edema.   Neurological: She is alert and oriented to person, place, and time. No cranial nerve deficit. Coordination normal.   Answers quickly moves all extremities, AOX4   Skin: Skin is warm and dry. No rash noted. She is not diaphoretic. No erythema.   Psychiatric: She has a normal mood and affect.       Medications  Current Facility-Administered Medications   Medication Dose Route Frequency Provider Last Rate Last Dose   • potassium chloride SA (Kdur) tablet 40 mEq  40 mEq Oral BID Zay Flores M.D.   40 mEq at 08/18/19 1127   • metoprolol (LOPRESSOR) tablet 75 mg  75 mg Oral Q8HRS Zay Flores M.D.       •  sodium bicarbonate tablet 325 mg  325 mg Oral Q8HRS Zay Flores M.D.   325 mg at 08/18/19 0609   • insulin lispro (HUMALOG) injection 3-14 Units  3-14 Units Subcutaneous 4X/DAY DESTINI Flores M.D.   3 Units at 08/18/19 1133    And   • glucose 4 g chewable tablet 16 g  16 g Oral Q15 MIN PRN Zay Flores M.D.        And   • DEXTROSE 10% BOLUS 250 mL  250 mL Intravenous Q15 MIN PRN Zay Flores M.D.       • sucralfate (CARAFATE) 1 GM/10ML suspension 1 g  1 g Oral Q6HRS Zay Flores M.D.   1 g at 08/18/19 1128   • insulin glargine (LANTUS) injection 20 Units  20 Units Subcutaneous Q EVENING Kori Royal M.D.   20 Units at 08/17/19 1717   • apixaban (ELIQUIS) tablet 5 mg  5 mg Oral BID Kori Royal M.D.   5 mg at 08/18/19 0608   • calcium carbonate (TUMS) chewable tab 500 mg  500 mg Oral DAILY Zay Flores M.D.   500 mg at 08/18/19 0608   • famotidine (PEPCID) tablet 20 mg  20 mg Oral BID Zay Flores M.D.   20 mg at 08/18/19 0609   • senna-docusate (PERICOLACE or SENOKOT S) 8.6-50 MG per tablet 2 Tab  2 Tab Oral BID Carlos Maria M.D.   2 Tab at 08/18/19 0612    And   • polyethylene glycol/lytes (MIRALAX) PACKET 1 Packet  1 Packet Oral QDAY PRN Carlos Maria M.D.        And   • magnesium hydroxide (MILK OF MAGNESIA) suspension 30 mL  30 mL Oral QDAY PRN Carlos Maria M.D.        And   • bisacodyl (DULCOLAX) suppository 10 mg  10 mg Rectal QDAY PRN Carlos Maria M.D.       • Respiratory Care per Protocol   Nebulization Continuous RT Carlos Maria M.D.       • labetalol (NORMODYNE,TRANDATE) injection 10 mg  10 mg Intravenous Q4HRS PRN Carlos Maria M.D.   10 mg at 08/16/19 2127   • rosuvastatin (CRESTOR) tablet 5 mg  5 mg Oral QAM Carlos Maria M.D.   5 mg at 08/18/19 0609   • ondansetron (ZOFRAN) syringe/vial injection 4 mg  4 mg Intravenous Q4HRS PRN Marco A Ochoa Jr., D.O.       • prochlorperazine (COMPAZINE) injection 10 mg  10 mg Intravenous Q6HRS  CAROLINA Ochoa Jr., D.O.   10 mg at 08/18/19 0649       Fluids    Intake/Output Summary (Last 24 hours) at 8/18/2019 1544  Last data filed at 8/18/2019 0600  Gross per 24 hour   Intake 512.5 ml   Output 550 ml   Net -37.5 ml       Laboratory          Recent Labs     08/15/19  1806  08/16/19  0756  08/17/19  0355 08/17/19  1532 08/18/19  0705   SODIUM 134*   < >  --    < > 132* 137 134*   POTASSIUM 4.1   < >  --    < > 4.4 4.0 3.6   CHLORIDE 101   < >  --    < > 106 109 102   CO2 14*   < >  --    < > 16* 22 22   BUN 25*   < >  --    < > 26* 22 18   CREATININE 1.17   < >  --    < > 1.11 0.99 1.01   MAGNESIUM 1.8  --   --   --   --   --  1.9   PHOSPHORUS 3.6  --  1.7*  --  3.4  --  2.7   CALCIUM 8.4*   < >  --    < > 8.3* 8.0* 8.4*    < > = values in this interval not displayed.     Recent Labs     08/16/19 0125 08/16/19 1118 08/17/19 0355 08/17/19  0933 08/17/19  1532 08/18/19  0705   ALTSGPT 10  --  10  --   --   --   --   --    ASTSGOT 17  --  16  --   --   --   --   --    ALKPHOSPHAT 48  --  45  --   --   --   --   --    TBILIRUBIN 0.7  --  0.6  --   --   --   --   --    LIPASE  --   --   --   --   --  11  --   --    GLUCOSE 346*   < > 295*   < > 292*  --  240* 193*    < > = values in this interval not displayed.     Recent Labs     08/16/19 0125 08/16/19 1118 08/17/19 0355 08/18/19  0705   WBC 10.5  --  10.5 9.1   NEUTSPOLYS 86.50*  --   --  74.30*   LYMPHOCYTES 5.80*  --   --  13.30*   MONOCYTES 6.70  --   --  8.90   EOSINOPHILS 0.10  --   --  2.00   BASOPHILS 0.20  --   --  1.00   ASTSGOT 17 16  --   --    ALTSGPT 10 10  --   --    ALKPHOSPHAT 48 45  --   --    TBILIRUBIN 0.7 0.6  --   --      Recent Labs     08/16/19  0125 08/17/19  0355 08/18/19  0705   RBC 4.58 4.91 5.56*   HEMOGLOBIN 12.9 13.4 15.2   HEMATOCRIT 37.1 40.5 48.9*   PLATELETCT 157* 189 165       Imaging  X-Ray:  I have personally reviewed the images and compared with prior images. and No film today    Assessment/Plan  * Diabetic  ketoacidosis without coma associated with type 2 diabetes mellitus (HCC)- (present on admission)  Assessment & Plan  ICU admission, cardiac monitoring  optimized intravascular volume with 2L IVF bolus  DKA protocol with insulin drip at 0.05 units/kg/hr  Every hour Accu-Cheks, every 4 hour BMP, mag, phos  Goal Magnesium: >2, Phosphorus: 2, K >4  Will transition to sliding scale insulin when anion gap <12, CO2 > 17    Transition off insulin gtt 8/16  HgA1C 13 hasn't been using insulin diabetic education    Non-intractable vomiting with nausea- (present on admission)  Assessment & Plan  Treat dehydration with IV fluid bolus  Symptomatically treat with Zofran, Compazine as needed    Resolved/improving tolerating diet/clears continue to monitor  Treat for gastritis    Paroxysmal atrial fibrillation (HCC)- (present on admission)  Assessment & Plan  [id number]: 6  Continue Eliquis  Now in A. fib with RVR  Likely due to inability to keep down medication in the setting of gastrointestinal upset  Placed on diltiazem infusion and titrate to heart rate of less than 100 off since 8/17  Restart home Lopressor  May require additional rate limiting medications  Closely monitor for hypotension  Continue to titrate home metoprolol 50mg -> 75mg TID  Correct electrolytes    Generalized abdominal pain  Assessment & Plan  CT scan of abdomen to rule out mesenteric ischemia -> negative for ischemia or acute process    Mild LUQ pain ? Gastritis  Rx with Pepcid, Carafate, tums    Acquired circulating anticoagulants (HCC)- (present on admission)  Assessment & Plan  Eliquis use  Not actively bleeding    Hypertension- (present on admission)  Assessment & Plan  PRN medications available  Metoprolol     CKD (chronic kidney disease), stage III (HCC)- (present on admission)  Assessment & Plan  Monitor for worsening following CT with contrast  Renal dose meds, avoid nephrotoxins  Strict I/Os  Follow renal function      Thyroid nodule- (present on  admission)  Assessment & Plan  TSH normal 8 months ago ->1.01 normal  Given A. fib with RVR    Dyslipidemia- (present on admission)  Assessment & Plan  Continue Crestor       VTE:  NOAC  Ulcer: H2 Antagonist gastritis  Lines: None    I have performed a physical exam and reviewed and updated ROS and Plan today (8/18/2019). In review of yesterday's note (8/17/2019), there are no changes except as documented above.     Discussed patient condition and risk of morbidity and/or mortality with RN, RT, Pharmacy, [facility] resident, Charge nurse / hot rounds and Patient

## 2019-08-18 NOTE — PROGRESS NOTES
12 hour Chart Check    Monitor Summary: AFIB rate controlled 60-90's  -/.10/-    2 RN Skin Check Completed

## 2019-08-19 ENCOUNTER — APPOINTMENT (OUTPATIENT)
Dept: RADIOLOGY | Facility: MEDICAL CENTER | Age: 74
DRG: 639 | End: 2019-08-19
Attending: STUDENT IN AN ORGANIZED HEALTH CARE EDUCATION/TRAINING PROGRAM
Payer: MEDICARE

## 2019-08-19 PROBLEM — D50.9 MICROCYTIC ANEMIA: Status: ACTIVE | Noted: 2019-08-19

## 2019-08-19 PROBLEM — R71.8 MICROCYTOSIS: Status: ACTIVE | Noted: 2019-08-19

## 2019-08-19 LAB
ANION GAP SERPL CALC-SCNC: 7 MMOL/L (ref 0–11.9)
BASOPHILS # BLD AUTO: 0.5 % (ref 0–1.8)
BASOPHILS # BLD: 0.05 K/UL (ref 0–0.12)
BUN SERPL-MCNC: 17 MG/DL (ref 8–22)
CALCIUM SERPL-MCNC: 8.1 MG/DL (ref 8.5–10.5)
CHLORIDE SERPL-SCNC: 105 MMOL/L (ref 96–112)
CO2 SERPL-SCNC: 22 MMOL/L (ref 20–33)
CREAT SERPL-MCNC: 0.96 MG/DL (ref 0.5–1.4)
EOSINOPHIL # BLD AUTO: 0.38 K/UL (ref 0–0.51)
EOSINOPHIL NFR BLD: 3.5 % (ref 0–6.9)
ERYTHROCYTE [DISTWIDTH] IN BLOOD BY AUTOMATED COUNT: 46.8 FL (ref 35.9–50)
GLUCOSE BLD-MCNC: 139 MG/DL (ref 65–99)
GLUCOSE BLD-MCNC: 185 MG/DL (ref 65–99)
GLUCOSE BLD-MCNC: 195 MG/DL (ref 65–99)
GLUCOSE BLD-MCNC: 210 MG/DL (ref 65–99)
GLUCOSE SERPL-MCNC: 136 MG/DL (ref 65–99)
HCT VFR BLD AUTO: 41.8 % (ref 37–47)
HGB BLD-MCNC: 13.6 G/DL (ref 12–16)
IMM GRANULOCYTES # BLD AUTO: 0.06 K/UL (ref 0–0.11)
IMM GRANULOCYTES NFR BLD AUTO: 0.6 % (ref 0–0.9)
IRON SATN MFR SERPL: 21 % (ref 15–55)
IRON SERPL-MCNC: 36 UG/DL (ref 40–170)
LYMPHOCYTES # BLD AUTO: 1.63 K/UL (ref 1–4.8)
LYMPHOCYTES NFR BLD: 15.1 % (ref 22–41)
MAGNESIUM SERPL-MCNC: 2 MG/DL (ref 1.5–2.5)
MCH RBC QN AUTO: 26.9 PG (ref 27–33)
MCHC RBC AUTO-ENTMCNC: 32.5 G/DL (ref 33.6–35)
MCV RBC AUTO: 82.8 FL (ref 81.4–97.8)
MONOCYTES # BLD AUTO: 1.01 K/UL (ref 0–0.85)
MONOCYTES NFR BLD AUTO: 9.3 % (ref 0–13.4)
NEUTROPHILS # BLD AUTO: 7.69 K/UL (ref 2–7.15)
NEUTROPHILS NFR BLD: 71 % (ref 44–72)
NRBC # BLD AUTO: 0 K/UL
NRBC BLD-RTO: 0 /100 WBC
PHOSPHATE SERPL-MCNC: 2.4 MG/DL (ref 2.5–4.5)
PLATELET # BLD AUTO: 196 K/UL (ref 164–446)
PMV BLD AUTO: 10.1 FL (ref 9–12.9)
POTASSIUM SERPL-SCNC: 4.2 MMOL/L (ref 3.6–5.5)
RBC # BLD AUTO: 5.05 M/UL (ref 4.2–5.4)
SODIUM SERPL-SCNC: 134 MMOL/L (ref 135–145)
TIBC SERPL-MCNC: 169 UG/DL (ref 250–450)
TRANSFERRIN SERPL-MCNC: 123 MG/DL (ref 200–370)
WBC # BLD AUTO: 10.8 K/UL (ref 4.8–10.8)

## 2019-08-19 PROCEDURE — 700102 HCHG RX REV CODE 250 W/ 637 OVERRIDE(OP): Performed by: STUDENT IN AN ORGANIZED HEALTH CARE EDUCATION/TRAINING PROGRAM

## 2019-08-19 PROCEDURE — A9270 NON-COVERED ITEM OR SERVICE: HCPCS | Performed by: STUDENT IN AN ORGANIZED HEALTH CARE EDUCATION/TRAINING PROGRAM

## 2019-08-19 PROCEDURE — 700102 HCHG RX REV CODE 250 W/ 637 OVERRIDE(OP): Performed by: INTERNAL MEDICINE

## 2019-08-19 PROCEDURE — 83550 IRON BINDING TEST: CPT

## 2019-08-19 PROCEDURE — 82962 GLUCOSE BLOOD TEST: CPT | Mod: 91

## 2019-08-19 PROCEDURE — 83735 ASSAY OF MAGNESIUM: CPT

## 2019-08-19 PROCEDURE — 71045 X-RAY EXAM CHEST 1 VIEW: CPT

## 2019-08-19 PROCEDURE — 97161 PT EVAL LOW COMPLEX 20 MIN: CPT

## 2019-08-19 PROCEDURE — 83540 ASSAY OF IRON: CPT

## 2019-08-19 PROCEDURE — 84100 ASSAY OF PHOSPHORUS: CPT

## 2019-08-19 PROCEDURE — 700111 HCHG RX REV CODE 636 W/ 250 OVERRIDE (IP): Performed by: INTERNAL MEDICINE

## 2019-08-19 PROCEDURE — 99233 SBSQ HOSP IP/OBS HIGH 50: CPT | Mod: GC | Performed by: INTERNAL MEDICINE

## 2019-08-19 PROCEDURE — 84466 ASSAY OF TRANSFERRIN: CPT

## 2019-08-19 PROCEDURE — 85025 COMPLETE CBC W/AUTO DIFF WBC: CPT

## 2019-08-19 PROCEDURE — 80048 BASIC METABOLIC PNL TOTAL CA: CPT

## 2019-08-19 PROCEDURE — A9270 NON-COVERED ITEM OR SERVICE: HCPCS | Performed by: INTERNAL MEDICINE

## 2019-08-19 PROCEDURE — 770020 HCHG ROOM/CARE - TELE (206)

## 2019-08-19 RX ORDER — AMLODIPINE BESYLATE 10 MG/1
5 TABLET ORAL
Status: DISCONTINUED | OUTPATIENT
Start: 2019-08-19 | End: 2019-08-20 | Stop reason: HOSPADM

## 2019-08-19 RX ADMIN — APIXABAN 5 MG: 5 TABLET, FILM COATED ORAL at 17:19

## 2019-08-19 RX ADMIN — FAMOTIDINE 20 MG: 20 TABLET ORAL at 17:19

## 2019-08-19 RX ADMIN — PROCHLORPERAZINE EDISYLATE 10 MG: 5 INJECTION INTRAMUSCULAR; INTRAVENOUS at 03:14

## 2019-08-19 RX ADMIN — ROSUVASTATIN CALCIUM 5 MG: 10 TABLET, FILM COATED ORAL at 05:27

## 2019-08-19 RX ADMIN — AMLODIPINE BESYLATE 5 MG: 10 TABLET ORAL at 05:26

## 2019-08-19 RX ADMIN — POTASSIUM CHLORIDE 40 MEQ: 20 TABLET, EXTENDED RELEASE ORAL at 05:27

## 2019-08-19 RX ADMIN — METOPROLOL TARTRATE 75 MG: 25 TABLET, FILM COATED ORAL at 15:08

## 2019-08-19 RX ADMIN — POTASSIUM CHLORIDE 40 MEQ: 20 TABLET, EXTENDED RELEASE ORAL at 17:19

## 2019-08-19 RX ADMIN — SUCRALFATE 1 G: 1 SUSPENSION ORAL at 11:56

## 2019-08-19 RX ADMIN — ANTACID TABLETS 500 MG: 500 TABLET, CHEWABLE ORAL at 05:27

## 2019-08-19 RX ADMIN — SENNOSIDES, DOCUSATE SODIUM 2 TABLET: 50; 8.6 TABLET, FILM COATED ORAL at 05:26

## 2019-08-19 RX ADMIN — METOPROLOL TARTRATE 75 MG: 25 TABLET, FILM COATED ORAL at 05:26

## 2019-08-19 RX ADMIN — APIXABAN 5 MG: 5 TABLET, FILM COATED ORAL at 05:27

## 2019-08-19 RX ADMIN — METOPROLOL TARTRATE 75 MG: 25 TABLET, FILM COATED ORAL at 21:50

## 2019-08-19 RX ADMIN — FAMOTIDINE 20 MG: 20 TABLET ORAL at 05:27

## 2019-08-19 RX ADMIN — SUCRALFATE 1 G: 1 SUSPENSION ORAL at 17:19

## 2019-08-19 RX ADMIN — INSULIN GLARGINE 20 UNITS: 100 INJECTION, SOLUTION SUBCUTANEOUS at 17:12

## 2019-08-19 RX ADMIN — SUCRALFATE 1 G: 1 SUSPENSION ORAL at 05:27

## 2019-08-19 ASSESSMENT — COGNITIVE AND FUNCTIONAL STATUS - GENERAL
STANDING UP FROM CHAIR USING ARMS: A LITTLE
CLIMB 3 TO 5 STEPS WITH RAILING: A LITTLE
SUGGESTED CMS G CODE MODIFIER MOBILITY: CJ
SUGGESTED CMS G CODE MODIFIER MOBILITY: CI
MOVING FROM LYING ON BACK TO SITTING ON SIDE OF FLAT BED: A LITTLE
MOBILITY SCORE: 23
MOBILITY SCORE: 20
DAILY ACTIVITIY SCORE: 21
TOILETING: A LITTLE
CLIMB 3 TO 5 STEPS WITH RAILING: A LITTLE
DRESSING REGULAR UPPER BODY CLOTHING: A LITTLE
SUGGESTED CMS G CODE MODIFIER DAILY ACTIVITY: CJ
DRESSING REGULAR LOWER BODY CLOTHING: A LITTLE
WALKING IN HOSPITAL ROOM: A LITTLE

## 2019-08-19 ASSESSMENT — ENCOUNTER SYMPTOMS
DIZZINESS: 0
EYE PAIN: 0
HALLUCINATIONS: 0
SORE THROAT: 0
VOMITING: 0
DIARRHEA: 1
EYE DISCHARGE: 0
NAUSEA: 1
FOCAL WEAKNESS: 0
BRUISES/BLEEDS EASILY: 1
POLYDIPSIA: 0
PALPITATIONS: 0
MEMORY LOSS: 0
FEVER: 0
COUGH: 0
SHORTNESS OF BREATH: 0
FALLS: 0
CHILLS: 0
NECK PAIN: 0
LOSS OF CONSCIOUSNESS: 0
ABDOMINAL PAIN: 0

## 2019-08-19 ASSESSMENT — GAIT ASSESSMENTS
ASSISTIVE DEVICE: FRONT WHEEL WALKER
GAIT LEVEL OF ASSIST: SUPERVISED
DEVIATION: BRADYKINETIC
DISTANCE (FEET): 100

## 2019-08-19 NOTE — FACE TO FACE
Face to Face Supporting Documentation - Home Health    The encounter with this patient was in whole or in part the primary reason for home health admission.    Date of encounter:   Patient:                    MRN:                       YOB: 2021  Amrita Torrez  [id number]  1945     Home health to see patient for:  Physical Therapy evaluation and treatment    Skilled need for:  Recent Deterioration of Health Status with uncontrolled atrial fibirllation and diabetes leading to decreased activity with worsening debility.    Skilled nursing interventions to include:  Comment: Physical therpay    Homebound status evidenced by:  Need the aid of supportive devices such as crutches, canes, wheelchairs or walkers. Leaving home requires a considerable and taxing effort. There is a normal inability to leave the home.    Community Physician to provide follow up care: Fitz Turpin D.O.     Optional Interventions? Yes, Details: PT.      I certify the face to face encounter for this home health care referral meets the CMS requirements and the encounter/clinical assessment with the patient was, in whole, or in part, for the medical condition(s) listed above, which is the primary reason for home health care. Based on my clinical findings: the service(s) are medically necessary, support the need for home health care, and the homebound criteria are met.  I certify that this patient has had a face to face encounter by myself.  Mike Morgan M.D. - RUTH: [phone]

## 2019-08-19 NOTE — CARE PLAN
Problem: Communication  Goal: The ability to communicate needs accurately and effectively will improve  Outcome: PROGRESSING AS EXPECTED  Intervention: [place] patient and significant other/support system to call light to alert staff of needs  Flowsheets (Taken 8/17/2019 2218)  Oriented to:: All of the Following : Location of Bathroom, Visiting Policy, Unit Routine, Call Light and Bedside Controls, Bedside Rail Policy, Smoking Policy, Rights and Responsibilities, Bedside Report, and Patient Education Notebook  Intervention: Reorient patient to environment as needed  Flowsheets (Taken 8/17/2019 2218)  Oriented to:: All of the Following : Location of Bathroom, Visiting Policy, Unit Routine, Call Light and Bedside Controls, Bedside Rail Policy, Smoking Policy, Rights and Responsibilities, Bedside Report, and Patient Education Notebook  Intervention: Educate patient and significant other/support system about the plan of care, procedures, treatments, medications and allow for questions  Flowsheets (Taken 8/19/2019 4563)  Pt & Family Have Been Educated on Methods Available to Report Concerns Related to Care, Treatment, Services, and Patient Safety Issues: Yes  Intervention: Use communication aids and/or /Language Line as appropriate  Flowsheets (Taken 8/19/2019 3)  Patient's Primary Language: English     Problem: Safety  Goal: Will remain free from injury  Outcome: PROGRESSING AS EXPECTED  Note:   Pt remains free from new injury. Appropriate assessments complete, interventions in place, safety education provided. Pt oriented to room and call light in reach. Bed in lowest and locked position.

## 2019-08-19 NOTE — THERAPY
OT orders received. Per RN, has been up with SBA and pt reports she is back to her functional baseline and has no concerns regarding d/c home. Will cancel OT order as no OT needs.     Viviana Balderrama, OTR/L  Pager: [phone]

## 2019-08-19 NOTE — DIETARY
Brief Nutrition Services Note: Pt seen for DM diet education, pt states she has had education in past and declined verbal education but accepted handouts. Provided resources for follow up. Per MD notes, pt had hx of non intractable vomiting but this has now resolved. Pt reports appetite now improving and eating ~ 50% of last two meals and requesting snacks. Plan/Rec: Will add snacks twice daily.  Nutrition Representative to see pt daily for snacks/meal preferences as appropriate with diet order. RD to monitor for consistently adequate intake.

## 2019-08-19 NOTE — CARE PLAN
Problem: Nutritional:  Goal: Achieve adequate nutritional intake  Description  Patient will consume ~50% of meals.    Outcome: PROGRESSING AS EXPECTED

## 2019-08-19 NOTE — ASSESSMENT & PLAN NOTE
Interestingly, patient with microcytosis w/out concomitant anemia. Doubt, thalassemia. Suspect iron deficiency.    -Labs indicative of mixed anemia of chronic disease/inflammation and iron deficiency.  -Ferrous sulfate 325 mg M,W,F for 6-8 weeks then re-assess w/PCP.

## 2019-08-19 NOTE — PROGRESS NOTES
12 hour Chart Check  Monitor Summary: AFIB Converted to ST/SR at 1722  -/.08/-      2 RN Skin Check Completed

## 2019-08-19 NOTE — PROGRESS NOTES
[facility] Progress Note      Admit Date: 8/15/2019    Resident(s): Mike Morgan M.D.   Attending:  CLINTON SANCHEZ / Dr. Badillo    Patient ID:    Name:  Amrita Torrez   YOB: 1945  Age:  74 y.o.  female   MRN:  [id number]    Hospital Course (carried forward and updated):  Amrita Torrez is a 74 y.o. female with PMH of PMHx of paroxysmal A. fib on Eliquis and metoprolol, poorly controlled DM on insulin (last HbA1C 13.4 on 07/2019) CAD status post DOMINGA to LAD 2016, CVA [not clinically significant, incidental finding], CKD 3, HLD, essential HTN presented mainly with confusion, dizziness, nausea and vomiting for 1 week.  Patient has not taken her outpatient metoprolol for 1 week, neither she taken her insulin, she is also not eaten or drunk anything during this time period. In the Er she was found to be in Afib with RVR in 150s, DKA with HCO3 14, AG 22, blood glucose 494. She was admitted to ICU for titrated diltiazem infusion for Afib and management of DKA. She has been transitioned to sub q insulin on 08/16, still experiencing some nausea and vomiting. Recurrence of afib w/RVR requiring diltiazem infusion. Now off infusion and metoprolol tartrate increased to 75 mg BID. Blood glucose within goal < 180.    Consultants:  Critical Care     Interval Events:    -Nausea improved. HTN worsening. Glucose at goal.  -PT/ OT consulted for assessment/discharge planning.  -Ambulate as tolerated. Up to chair for all meals.  -Transfer to floor pending open bed.      NEURO:    A&Ox4  CARDIOVASC:   -180s persisting. Add amlodipine 5mg qday.       HR 60-80s. Off dilt infusion.    RESPIRATORY:   low 90s on RA.  GI/NUTRITION:   Continues w/loose stools. STABLE. Diabetic diet.  RENAL/FLUIDS/LYTES:  STABLE. Micturating on her own inhibiting I/Os accuracy.  HEME:    Microcytosis. Iron studies.  ID:     N/A. Afebrile.  ENDO:    Glucose control improved and below goal of 180.      Review of Systems  "  Constitutional: Positive for malaise/fatigue. Negative for chills and fever.   HENT: Negative for congestion and sore throat.    Eyes: Negative for pain and discharge.   Respiratory: Negative for cough and shortness of breath.    Cardiovascular: Negative for chest pain and palpitations.   Gastrointestinal: Positive for diarrhea and nausea. Negative for abdominal pain and vomiting.   Genitourinary: Negative for dysuria and urgency.   Musculoskeletal: Negative for falls and neck pain.   Skin: Positive for rash. Negative for itching.   Neurological: Negative for dizziness, focal weakness and loss of consciousness.   Endo/Heme/Allergies: Negative for polydipsia. Bruises/bleeds easily.   Psychiatric/Behavioral: Negative for hallucinations and memory loss.       PHYSICAL EXAM:  Vitals:    08/19/19 0700 08/19/19 0753 08/19/19 0800 08/19/19 1200   BP:  (!) 163/89 (!) 163/89 145/81   Pulse: 65 69 69 72   Resp: 18 20 20 (!) 25   Temp:  36.6 °C (97.9 °F) 36.6 °C (97.9 °F) 36.6 °C (97.9 °F)   TempSrc:  Temporal Temporal Temporal   SpO2: 97%  93% 92%   Weight:       Height:        Body mass index is 25.68 kg/m².  Latest Vitals:  /81   Pulse 72   Temp 36.6 °C (97.9 °F) (Temporal)   Resp (!) 25   Ht 1.651 m (5' 5\")   Wt 70 kg (154 lb 5.2 oz)   SpO2 92%   BMI 25.68 kg/m²   O2 therapy: Pulse Oximetry: 92 %, O2 (LPM): 0, O2 Delivery: None (Room Air)  Vitals Range last 24h:  Temp:  [36.5 °C (97.7 °F)-37.2 °C (98.9 °F)] 36.6 °C (97.9 °F)  Pulse:  [65-97] 72  Resp:  [1-25] 25  BP: (145-181)/() 145/81  SpO2:  [92 %-99 %] 92 %  Date 08/19/19 0700 - 08/20/19 0659   Shift 3316-1975 6962-4969 0393-4711 24 Hour Total   INTAKE   Shift Total       OUTPUT   Urine 350   350     Number of Times Voided 3 x   3 x     Urine Void (mL) 350   350   Stool         Number of Times Stooled 2 x   2 x   Shift Total 350   350   NET -350   -350        Intake/Output Summary (Last 24 hours) at 8/19/2019 1507  Last data filed at 8/19/2019 " 0800  Gross per 24 hour   Intake 500 ml   Output 350 ml   Net 150 ml        Physical Exam   Constitutional: She is oriented to person, place, and time and well-developed, well-nourished, and in no distress.   Appears older than stated age.   HENT:   Head: Normocephalic and atraumatic.   Mouth/Throat: No oropharyngeal exudate.   Dry mucous membranes.   Eyes: EOM are normal. No scleral icterus.   Neck: Normal range of motion.   Cardiovascular: Normal rate and regular rhythm. Exam reveals no gallop and no friction rub.   No murmur heard.  Pulmonary/Chest: Effort normal and breath sounds normal. No stridor. No respiratory distress. She has no wheezes. She has no rales.   Abdominal: Soft. Bowel sounds are normal. She exhibits no distension and no mass. There is no tenderness. There is no rebound and no guarding.   Musculoskeletal: Normal range of motion. She exhibits no edema or tenderness.   Lymphadenopathy:     She has no cervical adenopathy.   Neurological: She is alert and oriented to person, place, and time.   Skin: Skin is warm and dry. No rash noted. No erythema. No pallor.   Ecchymosis of forearms.   Psychiatric: Mood, memory, affect and judgment normal.           Recent Labs     08/17/19  0355 08/17/19  1532 08/18/19  0705 08/19/19  0523   SODIUM 132* 137 134* 134*   POTASSIUM 4.4 4.0 3.6 4.2   CHLORIDE 106 109 102 105   CO2 16* 22 22 22   BUN 26* 22 18 17   CREATININE 1.11 0.99 1.01 0.96   MAGNESIUM  --   --  1.9 2.0   PHOSPHORUS 3.4  --  2.7 2.4*   CALCIUM 8.3* 8.0* 8.4* 8.1*     Recent Labs     08/17/19  0933 08/17/19  1532 08/18/19  0705 08/19/19  0523   LIPASE 11  --   --   --    GLUCOSE  --  240* 193* 136*     Recent Labs     08/17/19  0355 08/18/19  0705 08/19/19  0523   RBC 4.91 5.56* 5.05   HEMOGLOBIN 13.4 15.2 13.6   HEMATOCRIT 40.5 48.9* 41.8   PLATELETCT 189 165 196   IRON  --   --  36*   TOTIRONBC  --   --  169*     Recent Labs     08/17/19  0355 08/18/19  0705 08/19/19  0523   WBC 10.5 9.1 10.8    NEUTSPOLYS  --  74.30* 71.00   LYMPHOCYTES  --  13.30* 15.10*   MONOCYTES  --  8.90 9.30   EOSINOPHILS  --  2.00 3.50   BASOPHILS  --  1.00 0.50       Meds:  • amLODIPine  5 mg     • potassium chloride SA  40 mEq     • metoprolol  75 mg     • insulin lispro  3-14 Units      And   • glucose  16 g      And   • dextrose 10% bolus  250 mL     • sucralfate  1 g     • insulin glargine  20 Units     • apixaban  5 mg     • calcium carbonate  500 mg     • famotidine  20 mg     • senna-docusate  2 Tab      And   • polyethylene glycol/lytes  1 Packet      And   • magnesium hydroxide  30 mL      And   • bisacodyl  10 mg     • Respiratory Care per Protocol       • labetalol  10 mg     • rosuvastatin  5 mg     • ondansetron  4 mg     • prochlorperazine  10 mg          Procedures:  na    Imaging:  DX-CHEST-PORTABLE (1 VIEW)   Final Result      Minimal interstitial edema. No new consolidation or pleural effusions.      DX-CHEST-PORTABLE (1 VIEW)   Final Result      Minimal interstitial edema. No new consolidation or pleural effusions.      DX-CHEST-PORTABLE (1 VIEW)   Final Result      Minimal interstitial edema. No focal consolidation or pleural effusions.      [id number] VIEW   Final Result      Nonobstructive bowel gas pattern. Small amount of stool throughout the colon.      DX-CHEST-PORTABLE (1 VIEW)   Final Result         1.  No acute cardiopulmonary disease.   2.  Atherosclerosis      CTA ABDOMEN PELVIS W & W/O POST PROCESS   Final Result      1.  Patent celiac, superior mesenteric and inferior mesenteric arteries.      2.  No evidence of bowel obstruction or inflammatory change.      3.  Atherosclerotic plaque involving the aorta primarily distally.      4.  Fatty liver.      5.  Splenomegaly.      6.  Small amount of ascites.      7.  Bibasilar atelectasis.      8.  Prior cholecystectomy.      DX-CHEST-PORTABLE (1 VIEW)   Final Result      Mild patchy opacity in the left costophrenic angle may represent atelectasis but  pneumonitis is not excluded.      Atherosclerotic plaque.          Problem and Plan:    * Diabetic ketoacidosis without coma associated with type 2 diabetes mellitus (HCC)- (present on admission)  Assessment & Plan  - Secondary to dehydration from poor oral intake, non compliance? Last     -RESOLVED.    Non-intractable vomiting with nausea- (present on admission)  Assessment & Plan  2/2 DKA, possibly dehydrated    -IMPROVED.  Diet as tolerated   Fluids PRN  Zofran and compazine as needed,     Paroxysmal atrial fibrillation (HCC)- (present on admission)  Assessment & Plan  Presented in afib w/RVR. Required diltiazem infusion. Likely secondary to non-adherence to medical regimen. [id number] >2.    -Metoprolol tartrate 75 mg po BID.   -increased from 50 BID.  -Apixaban 5 mg   - Metoprolol tartrate 5 mg IV as needed x3 for ventricular rate> 130  - transfer to telemetry floor    Type 2 diabetes mellitus without complication (HCC)- (present on admission)  Assessment & Plan  On 20 U Lantus at home, non adherence?     -HbA1c 13.4 July 2019  -DM education prior dc  -Social work consulted for eval as patient was not taking meds for ~1week prior to presentation.    Microcytosis  Assessment & Plan  Interestingly, patient with microcytosis w/out concomitant anemia. Doubt, thalassemia. Suspect iron deficiency.    -Ferritin, iron, TIBC, transferrin labs ordered.    Generalized abdominal pain  Assessment & Plan  - Feeling better today  - 2/2 DKA versus gastroparesis  - CTM    Hypertension- (present on admission)  Assessment & Plan  Worsening HTN over last couple days. Will add CCB.    -Amlodipine 5mg po qday started 19th Aug 2019.  - Labetalol PRN    CKD (chronic kidney disease), stage III (HCC)- (present on admission)  Assessment & Plan  Long-standing IDDM, HTN, CKD 3, at baseline    - Avoid nephro toxins   -Consider ACEi/ARB if needs additional antihypertensive as she has CKD.    Thyroid nodule- (present on  admission)  Assessment & Plan  - Asymptomatic, stable   - Most recent TSH/FT4 WNL 12/18      Dyslipidemia- (present on admission)  Assessment & Plan  -Rosuvastatin.      DISPO: Medical floor pending bed availability.    CODE STATUS: FULL    Quality Measures:  Feeding: PO  Analgesia: PRN  Sedation: NA  Thromboprophylaxis: Apixaban  Head of bed: >30 degrees  Ulcer prophylaxis: NA  Glycemic control: Correctional: Lispro / Basal: Glargine  Bowel care: Per protocol  Indwelling lines: PIV   Deescalation of antibiotics: NA      Mike Morgan M.D.

## 2019-08-20 ENCOUNTER — HOME HEALTH ADMISSION (OUTPATIENT)
Dept: HOME HEALTH SERVICES | Facility: HOME HEALTHCARE | Age: 74
End: 2019-08-20
Payer: MEDICARE

## 2019-08-20 ENCOUNTER — PATIENT OUTREACH (OUTPATIENT)
Dept: HEALTH INFORMATION MANAGEMENT | Facility: OTHER | Age: 74
End: 2019-08-20

## 2019-08-20 VITALS
TEMPERATURE: 97.9 F | HEIGHT: 65 IN | SYSTOLIC BLOOD PRESSURE: 168 MMHG | WEIGHT: 153.66 LBS | RESPIRATION RATE: 18 BRPM | OXYGEN SATURATION: 96 % | DIASTOLIC BLOOD PRESSURE: 82 MMHG | BODY MASS INDEX: 25.6 KG/M2 | HEART RATE: 68 BPM

## 2019-08-20 LAB
ANION GAP SERPL CALC-SCNC: 7 MMOL/L (ref 0–11.9)
BASOPHILS # BLD AUTO: 0.5 % (ref 0–1.8)
BASOPHILS # BLD: 0.07 K/UL (ref 0–0.12)
BUN SERPL-MCNC: 14 MG/DL (ref 8–22)
CALCIUM SERPL-MCNC: 8.5 MG/DL (ref 8.5–10.5)
CHLORIDE SERPL-SCNC: 100 MMOL/L (ref 96–112)
CO2 SERPL-SCNC: 26 MMOL/L (ref 20–33)
CREAT SERPL-MCNC: 0.98 MG/DL (ref 0.5–1.4)
EOSINOPHIL # BLD AUTO: 0.56 K/UL (ref 0–0.51)
EOSINOPHIL NFR BLD: 4.2 % (ref 0–6.9)
ERYTHROCYTE [DISTWIDTH] IN BLOOD BY AUTOMATED COUNT: 44.6 FL (ref 35.9–50)
GLUCOSE BLD-MCNC: 115 MG/DL (ref 65–99)
GLUCOSE BLD-MCNC: 212 MG/DL (ref 65–99)
GLUCOSE SERPL-MCNC: 125 MG/DL (ref 65–99)
HCT VFR BLD AUTO: 43.5 % (ref 37–47)
HGB BLD-MCNC: 14.3 G/DL (ref 12–16)
IMM GRANULOCYTES # BLD AUTO: 0.14 K/UL (ref 0–0.11)
IMM GRANULOCYTES NFR BLD AUTO: 1.1 % (ref 0–0.9)
LYMPHOCYTES # BLD AUTO: 2.62 K/UL (ref 1–4.8)
LYMPHOCYTES NFR BLD: 19.7 % (ref 22–41)
MAGNESIUM SERPL-MCNC: 1.7 MG/DL (ref 1.5–2.5)
MCH RBC QN AUTO: 26.8 PG (ref 27–33)
MCHC RBC AUTO-ENTMCNC: 32.9 G/DL (ref 33.6–35)
MCV RBC AUTO: 81.5 FL (ref 81.4–97.8)
MONOCYTES # BLD AUTO: 1.19 K/UL (ref 0–0.85)
MONOCYTES NFR BLD AUTO: 8.9 % (ref 0–13.4)
NEUTROPHILS # BLD AUTO: 8.72 K/UL (ref 2–7.15)
NEUTROPHILS NFR BLD: 65.6 % (ref 44–72)
NRBC # BLD AUTO: 0 K/UL
NRBC BLD-RTO: 0 /100 WBC
PHOSPHATE SERPL-MCNC: 2.1 MG/DL (ref 2.5–4.5)
PLATELET # BLD AUTO: 266 K/UL (ref 164–446)
PMV BLD AUTO: 10 FL (ref 9–12.9)
POTASSIUM SERPL-SCNC: 4.1 MMOL/L (ref 3.6–5.5)
RBC # BLD AUTO: 5.34 M/UL (ref 4.2–5.4)
SODIUM SERPL-SCNC: 133 MMOL/L (ref 135–145)
WBC # BLD AUTO: 13.3 K/UL (ref 4.8–10.8)

## 2019-08-20 PROCEDURE — 700102 HCHG RX REV CODE 250 W/ 637 OVERRIDE(OP): Performed by: INTERNAL MEDICINE

## 2019-08-20 PROCEDURE — 80048 BASIC METABOLIC PNL TOTAL CA: CPT

## 2019-08-20 PROCEDURE — 700102 HCHG RX REV CODE 250 W/ 637 OVERRIDE(OP): Performed by: STUDENT IN AN ORGANIZED HEALTH CARE EDUCATION/TRAINING PROGRAM

## 2019-08-20 PROCEDURE — A9270 NON-COVERED ITEM OR SERVICE: HCPCS | Performed by: STUDENT IN AN ORGANIZED HEALTH CARE EDUCATION/TRAINING PROGRAM

## 2019-08-20 PROCEDURE — A9270 NON-COVERED ITEM OR SERVICE: HCPCS | Performed by: INTERNAL MEDICINE

## 2019-08-20 PROCEDURE — 83735 ASSAY OF MAGNESIUM: CPT

## 2019-08-20 PROCEDURE — 85025 COMPLETE CBC W/AUTO DIFF WBC: CPT

## 2019-08-20 PROCEDURE — 99239 HOSP IP/OBS DSCHRG MGMT >30: CPT | Mod: GC | Performed by: INTERNAL MEDICINE

## 2019-08-20 PROCEDURE — 84100 ASSAY OF PHOSPHORUS: CPT

## 2019-08-20 PROCEDURE — 82962 GLUCOSE BLOOD TEST: CPT | Mod: 91

## 2019-08-20 RX ORDER — LISINOPRIL 5 MG/1
5 TABLET ORAL DAILY
Qty: 30 TAB | Refills: 0 | Status: SHIPPED | OUTPATIENT
Start: 2019-08-20 | End: 2019-08-20

## 2019-08-20 RX ORDER — METOPROLOL SUCCINATE 25 MG/1
100 TABLET, EXTENDED RELEASE ORAL
Status: COMPLETED | OUTPATIENT
Start: 2019-08-20 | End: 2019-08-20

## 2019-08-20 RX ORDER — FERROUS SULFATE 325(65) MG
325 TABLET ORAL
Qty: 90 TAB | Refills: 0 | Status: SHIPPED | OUTPATIENT
Start: 2019-08-21 | End: 2019-08-22

## 2019-08-20 RX ORDER — METOPROLOL SUCCINATE 200 MG/1
200 TABLET, EXTENDED RELEASE ORAL DAILY
Qty: 30 TAB | Refills: 1 | Status: SHIPPED | OUTPATIENT
Start: 2019-08-20 | End: 2019-08-22

## 2019-08-20 RX ORDER — AMLODIPINE BESYLATE 10 MG/1
10 TABLET ORAL DAILY
Qty: 30 TAB | Refills: 1 | Status: SHIPPED | OUTPATIENT
Start: 2019-08-20 | End: 2019-08-22

## 2019-08-20 RX ORDER — METOPROLOL TARTRATE 50 MG/1
75 TABLET, FILM COATED ORAL 2 TIMES DAILY
Qty: 90 TAB | Refills: 0 | Status: SHIPPED | OUTPATIENT
Start: 2019-08-20 | End: 2019-08-20

## 2019-08-20 RX ADMIN — POTASSIUM CHLORIDE 40 MEQ: 20 TABLET, EXTENDED RELEASE ORAL at 04:51

## 2019-08-20 RX ADMIN — DIBASIC SODIUM PHOSPHATE, MONOBASIC POTASSIUM PHOSPHATE AND MONOBASIC SODIUM PHOSPHATE 1 TABLET: 852; 155; 130 TABLET ORAL at 14:53

## 2019-08-20 RX ADMIN — SUCRALFATE 1 G: 1 SUSPENSION ORAL at 04:51

## 2019-08-20 RX ADMIN — AMLODIPINE BESYLATE 5 MG: 10 TABLET ORAL at 04:51

## 2019-08-20 RX ADMIN — APIXABAN 5 MG: 5 TABLET, FILM COATED ORAL at 04:52

## 2019-08-20 RX ADMIN — DIBASIC SODIUM PHOSPHATE, MONOBASIC POTASSIUM PHOSPHATE AND MONOBASIC SODIUM PHOSPHATE 1 TABLET: 852; 155; 130 TABLET ORAL at 10:40

## 2019-08-20 RX ADMIN — FAMOTIDINE 20 MG: 20 TABLET ORAL at 04:52

## 2019-08-20 RX ADMIN — METOPROLOL SUCCINATE 100 MG: 25 TABLET, EXTENDED RELEASE ORAL at 14:53

## 2019-08-20 RX ADMIN — SUCRALFATE 1 G: 1 SUSPENSION ORAL at 00:17

## 2019-08-20 RX ADMIN — ROSUVASTATIN CALCIUM 5 MG: 10 TABLET, FILM COATED ORAL at 04:51

## 2019-08-20 RX ADMIN — METOPROLOL TARTRATE 75 MG: 25 TABLET, FILM COATED ORAL at 04:51

## 2019-08-20 NOTE — CONSULTS
Diabetes education: Met with Amrita this afternoon. Pt has a hx of diabetes on insulin at home. Problem is she is struggling with getting correct prescriptions from her PCP to [facility] pharmacy. Pt stated she waited months to get her lantus prescription, then never got pen needles ( she has vials for lantus no syringes and now lantus pen and no pen needles). She also does not have strips for her current meter ( not covered by [facility]).  CDE called [facility] pharmacy. Lantus pens were picked up but no order for pen needles. They have generic prescription for meter and test strips but Medicare/[facility] requires specific directions, dx code, instructions and refills for Medicare to cover.  Discussed options at [facility] and handout given. Lantus ( or any insulin ) is $47.00.  Plan: CDE will meet with pt tomorrow and give a Contour next meter ( and instruct on it's use). At discharge pt will need prescriptions for Contour next test strips and lancets to test 3 x day, dx code E11.65, (directions, frequency, refills and dx code) as well as provider needs to have Medicare privileges. Pt will also need prescriptions for kanchan pen needles ( 4 mm box of 100). Please call [phone] if needs change.

## 2019-08-20 NOTE — PROGRESS NOTES
Diabetes education: CDE spoke with Dr. Morgan regarding prescriptions this am and prescriptions corrected. CDE confirmed with [facility], x 2 both receipt of e script and that prescriptions were covered and ready for .  Met with pt to give and instruct on a Contour next meter.  Pt also given 30 3/10 cc syringes ( as back up with pt's Lantus vials in the refrigerator), and box of lancets ( not ordered by MD).  Questions answered. Awaiting discharge.

## 2019-08-20 NOTE — DISCHARGE PLANNING
ATTN: Case Management  RE: Referral for Home Health    As of 08/20/2019, we have accepted the Home Health referral for the patient listed above.    A [facility] clinician will be out to see the patient within 48 hours. If you have any questions or concerns regarding the patient’s transition to Home Health, please do not hesitate to contact us at [phone].      We look forward to collaborating with you,  [facility] Health Team

## 2019-08-20 NOTE — DISCHARGE INSTRUCTIONS
Discharge Instructions    Discharged to home by car with relative. Discharged via wheelchair, hospital escort: Yes.  Special equipment needed: Walker (already have one at home)    Be sure to schedule a follow-up appointment with your primary care doctor or any specialists as instructed.     Check your blood pressure and heart rate once daily. Record this information in a journal /ledger and bring in this information to your primary care provider and cardiology appointments.    Discharge Plan:   Influenza Vaccine Indication: Indicated: Not available from distributor/    I understand that a diet low in cholesterol, fat, and sodium is recommended for good health. Unless I have been given specific instructions below for another diet, I accept this instruction as my diet prescription.   Other diet: Diabetic    Special Instructions: none    · Is patient discharged on Warfarin / Coumadin?   No     Depression / Suicide Risk    As you are discharged from this [facility] facility, it is important to learn how to keep safe from harming yourself.    Recognize the warning signs:  · Abrupt changes in personality, positive or negative- including increase in energy   · Giving away possessions  · Change in eating patterns- significant weight changes-  positive or negative  · Change in sleeping patterns- unable to sleep or sleeping all the time   · Unwillingness or inability to communicate  · Depression  · Unusual sadness, discouragement and loneliness  · Talk of wanting to die  · Neglect of personal appearance   · Rebelliousness- reckless behavior  · Withdrawal from people/activities they love  · Confusion- inability to concentrate     If you or a loved one observes any of these behaviors or has concerns about self-harm, here's what you can do:  · Talk about it- your feelings and reasons for harming yourself  · Remove any means that you might use to hurt yourself (examples: pills, rope, extension cords,  firearm)  · Get professional help from the community (Mental Health, Substance Abuse, psychological counseling)  · Do not be alone:Call your Safe Contact- someone whom you trust who will be there for you.  · Call your local CRISIS HOTLINE 774-8420 or 254-395-2748  · Call your local Children's Mobile Crisis Response Team [place] (229) 761-1718 or www.[employer]  · Call the toll free National Suicide Prevention Hotlines   · National Suicide Prevention Lifeline 302-651-LGQS (7997)  · [employer] Hope Line Network 800-SUICIDE (960-1160)      Diabetic Ketoacidosis  Diabetic ketoacidosis is a life-threatening complication of diabetes. If it is not treated, it can cause severe dehydration and organ damage and can lead to a coma or death.  What are the causes?  This condition develops when there is not enough of the hormone insulin in the body. Insulin helps the body to break down sugar for energy. Without insulin, the body cannot break down sugar, so it breaks down fats instead. This leads to the production of acids that are called ketones. Ketones are poisonous at high levels.  This condition can be triggered by:  · Stress on the body that is brought on by an illness.  · Medicines that raise blood glucose levels.  · Not taking diabetes medicine.  What are the signs or symptoms?  Symptoms of this condition include:  · Fatigue.  · Weight loss.  · Excessive thirst.  · Light-headedness.  · Fruity or sweet-smelling breath.  · Excessive urination.  · Vision changes.  · Confusion or irritability.  · Nausea.  · Vomiting.  · Rapid breathing.  · Abdominal pain.  · Feeling flushed.  How is this diagnosed?  This condition is diagnosed based on a medical history, a physical exam, and blood tests. You may also have a urine test that checks for ketones.  How is this treated?  This condition may be treated with:  · Fluid replacement. This may be done to correct dehydration.  · Insulin injections. These may be given through the  skin or through an IV tube.  · Electrolyte replacement. Electrolytes, such as potassium and sodium, may be given in pill form or through an IV tube.  · Antibiotic medicines. These may be prescribed if your condition was caused by an infection.  Follow these instructions at home:  Eating and drinking  · Drink enough fluids to keep your urine clear or pale yellow.  · If you cannot eat, alternate between drinking fluids with sugar (such as juice) and salty fluids (such as broth or bouillon).  · If you can eat, follow your usual diet and drink sugar-free liquids, such as water.  Other Instructions   · Take insulin as directed by your health care provider. Do not skip insulin injections. Do not use  insulin.  · If your blood sugar is over 240 mg/dL, monitor your urine ketones every 4-6 hours.  · If you were prescribed an antibiotic medicine, finish all of it even if you start to feel better.  · Rest and exercise only as directed by your health care provider.  · If you get sick, call your health care provider and begin treatment quickly. Your body often needs extra insulin to fight an illness.  · Check your blood glucose levels regularly. If your blood glucose is high, drink plenty of fluids. This helps to flush out ketones.  Contact a health care provider if:  · Your blood glucose level is too high or too low.  · You have ketones in your urine.  · You have a fever.  · You cannot eat.  · You cannot tolerate fluids.  · You have been vomiting for more than 2 hours.  · You continue to have symptoms of this condition.  · You develop new symptoms.  Get help right away if:  · Your blood glucose levels continue to be high (elevated).  · Your monitor reads “high” even when you are taking insulin.  · You faint.  · You have chest pain.  · You have trouble breathing.  · You have a sudden, severe headache.  · You have sudden weakness in one arm or one leg.  · You have sudden trouble speaking or swallowing.  · You have vomiting  or diarrhea that gets worse after 3 hours.  · You feel severely fatigued.  · You have trouble thinking.  · You have abdominal pain.  · You are severely dehydrated. Symptoms of severe dehydration include:  ¨ Extreme thirst.  ¨ Dry mouth.  ¨ Blue lips.  ¨ Cold hands and feet.  ¨ Rapid breathing.  This information is not intended to replace advice given to you by your health care provider. Make sure you discuss any questions you have with your health care provider.  Document Released: 12/15/2001 Document Revised: 05/25/2017 Document Reviewed: 11/25/2015  [employer] Interactive Patient Education © 2017 [employer]      Atrial Fibrillation  Introduction  Atrial fibrillation is a type of heartbeat that is irregular or fast (rapid). If you have this condition, your heart keeps quivering in a weird (chaotic) way. This condition can make it so your heart cannot pump blood normally. Having this condition gives a person more risk for stroke, heart failure, and other heart problems. There are different types of atrial fibrillation. Talk with your doctor to learn about the type that you have.  Follow these instructions at home:  · Take over-the-counter and prescription medicines only as told by your doctor.  · If your doctor prescribed a blood-thinning medicine, take it exactly as told. Taking too much of it can cause bleeding. If you do not take enough of it, you will not have the protection that you need against stroke and other problems.  · Do not use any tobacco products. These include cigarettes, chewing tobacco, and e-cigarettes. If you need help quitting, ask your doctor.  · If you have apnea (obstructive sleep apnea), manage it as told by your doctor.  · Do not drink alcohol.  · Do not drink beverages that have caffeine. These include coffee, soda, and tea.  · Maintain a healthy weight. Do not use diet pills unless your doctor says they are safe for you. Diet pills may make heart problems worse.  · Follow diet  instructions as told by your doctor.  · Exercise regularly as told by your doctor.  · Keep all follow-up visits as told by your doctor. This is important.  Contact a doctor if:  · You notice a change in the speed, rhythm, or strength of your heartbeat.  · You are taking a blood-thinning medicine and you notice more bruising.  · You get tired more easily when you move or exercise.  Get help right away if:  · You have pain in your chest or your belly (abdomen).  · You have sweating or weakness.  · You feel sick to your stomach (nauseous).  · You notice blood in your throw up (vomit), poop (stool), or pee (urine).  · You are short of breath.  · You suddenly have swollen feet and ankles.  · You feel dizzy.  · Your suddenly get weak or numb in your face, arms, or legs, especially if it happens on one side of your body.  · You have trouble talking, trouble understanding, or both.  · Your face or your eyelid droops on one side.  These symptoms may be an emergency. Do not wait to see if the symptoms will go away. Get medical help right away. Call your local emergency services (911 in the [place] Do not drive yourself to the hospital.   This information is not intended to replace advice given to you by your health care provider. Make sure you discuss any questions you have with your health care provider.  Document Released: 09/26/2009 Document Revised: 05/25/2017 Document Reviewed: 04/13/2016  © 2017 [employer]      Hypertension  Hypertension is another name for high blood pressure. High blood pressure forces your heart to work harder to pump blood. A blood pressure reading has two numbers, which includes a higher number over a lower number (example: 110/72).  Follow these instructions at home:  · Have your blood pressure rechecked by your doctor.  · Only take medicine as told by your doctor. Follow the directions carefully. The medicine does not work as well if you skip doses. Skipping doses also puts you at risk for  problems.  · Do not smoke.  · Monitor your blood pressure at home as told by your doctor.  Contact a doctor if:  · You think you are having a reaction to the medicine you are taking.  · You have repeat headaches or feel dizzy.  · You have puffiness (swelling) in your ankles.  · You have trouble with your vision.  Get help right away if:  · You get a very bad headache and are confused.  · You feel weak, numb, or faint.  · You get chest or belly (abdominal) pain.  · You throw up (vomit).  · You cannot breathe very well.  This information is not intended to replace advice given to you by your health care provider. Make sure you discuss any questions you have with your health care provider.  Document Released: 06/05/2009 Document Revised: 05/25/2017 Document Reviewed: 10/10/2014  [employer] Interactive Patient Education © 2017 [employer]

## 2019-08-20 NOTE — PROGRESS NOTES
Discharged after discharge teaching provided and education packet given to pt. IV removed. Discussed all medications with emphasis on BP medication changes and insulin regimen. Follow up appointments scheduled. All questions answered. Family at bedside and involved in discharge process. All belongings accounted for. Ambulated via wheelchair to  for  by daughter.

## 2019-08-20 NOTE — DISCHARGE PLANNING
Care Transition Team Discharge Planning    Anticipated Discharge Disposition: TBD    Action: Lsw spoke to Diabetes Educator regarding IPCSS indicating pt unable to acquire syringes and carries S[employer] insurance.    DE spoke to pt and she can acquire these. [facility] will need to be aware they are unable to write the scripts for d/c including insulin and supplies if they are not providers under Medicare. Also, they have to provide the ICD codes for all insulin and supplies.     MD please SEE Diabetes Educator note for all the appropriate supplies that pt's insurance will cover.       Barriers to Discharge: appropriate d/c meds/supplies to be successful at pharmacy [address]     Plan: f/u w/ [facility], medical team, pt, pharmacy etc

## 2019-08-20 NOTE — DISCHARGE PLANNING
Care Transition Team Discharge Planning    Anticipated Discharge Disposition: d/c to home w/ HH    Action: Lsw met w/ pt and acquired HH choice. Pt lives w/ her dtr, gdtr, and grtgndson.    RN indicates pt is d/cing today.    Pt chose [facility].    Lsw sent choice to [facility] and skyped to advise of pending fax. Lsw requested prioritization as pt d/cing today.     Barriers to Discharge: acceptance    Plan: f/u w/ [facility], medical team

## 2019-08-20 NOTE — DISCHARGE PLANNING
Received Choice form at 3600  Agency/Facility Name: [facility]  Referral sent per Choice form @ 9725

## 2019-08-20 NOTE — PROGRESS NOTES
Discharge orders received. DM supplies ordered and discussed with MD Morgan and DM educator. DM educator to provide additional necessary supplies at bedside prior to discharge. Discussed blood pressure regimen and follow up appointments with MD. Follow up appointments scheduled with PCP in one week and cardiology next month. Home health orders approved. Pt states her daughter is currently at work, unsure what time she will be available. Heart monitor disconnected and pt dressed per pt request.

## 2019-08-20 NOTE — CARE PLAN
"  Problem: Safety  Goal: Will remain free from falls  Outcome: PROGRESSING AS EXPECTED  Intervention: Assess risk factors for falls  Note:   Valencia diaz fall risk assessment complete. Bed locked and in lowest position. Bed alarm on. Verified pt has \"Fall Risk\" bracelet on wrist. Verified pt has \"Fall Risk\" sign outside of pt's room. Verified pt's room is close to nursing station. Pt's belongings and call light within reach. Pt educated on importance of pressing call light PRN.      Problem: Pain Management  Goal: Pain level will decrease to patient's comfort goal  Note:   Pt had no complaints of pain all shift.     "

## 2019-08-20 NOTE — PROGRESS NOTES
Diabetes education: Met with pt this afternoon. Please see consult note.  Plan: CDE will meet with pt tomorrow and give a Contour next meter ( and instruct on it's use). At discharge pt will need prescriptions for Contour next test strips and lancets to test 3 x day, dx code E11.65, (directions, frequency, refills and dx code) as well as provider needs to have Medicare privileges. Pt will also need prescriptions for kanchan pen needles ( 4 mm box of 100). Please call [phone] if needs change.

## 2019-08-20 NOTE — DISCHARGE SUMMARY
Internal Medicine Discharge Summary  Note Author: Mike Morgan M.D.       Name Amrita Torrez     1945   Age/Sex 74 y.o. female   MRN [id number]         Admit Date:  8/15/2019       Discharge Date:   19    Service:   [facility] Internal Medicine GOLD Team  Attending Physician(s):   Dr. Badillo       Senior Resident(s):   Dr. Morgan  PCP: Fitz Turpin D.O.      Primary Diagnosis:     Diabetic ketoacidosis  Paroxysmal atrial fibrillation with rapid ventricular response    Secondary Diagnoses:                Principal Problem:    Diabetic ketoacidosis without coma associated with type 2 diabetes mellitus (HCC) POA: Yes  Active Problems:    Paroxysmal atrial fibrillation (HCC) POA: Yes    Non-intractable vomiting with nausea POA: Yes    Type 2 diabetes mellitus without complication (HCC) POA: Yes    Generalized abdominal pain POA: Unknown    Microcytosis POA: Unknown    Dyslipidemia POA: Yes    Thyroid nodule POA: Yes    CKD (chronic kidney disease), stage III (HCC) POA: Yes    Hypertension POA: Yes    Acquired circulating anticoagulants (HCC) POA: Yes  Resolved Problems:    * No resolved hospital problems. *      Hospital Summary (Brief Narrative):         Ms. Amrita Torrez is a 74-year-old female with past medical history of paroxysmal atrial fibrillation, poorly controlled diabetes mellitus with long-term use of insulin, coronary artery disease status post drug-eluting stent , cerebrovascular accident, chronic kidney disease, hyperlipidemia, and hypertension who presented to emergency department for nausea, vomiting, dizziness, and poor p.o. Intake.    In the emergency department patient was found to be in atrial fibrillation with rapid ventricular response with heart rate in the 150s.  Additionally, patient history, and laboratory examinations were indicative of diabetic ketoacidosis.  Patient was started on IV fluids, and diltiazem infusion and admitted to the ICU for  "further care.  Patient's diabetic ketoacidosis resolved with fluids, dextrose infusions, and insulin with concomitant resolution of her nausea, vomiting, and dizziness.  It is suspected that patient's diabetic ketoacidosis was precipitated by nonadherence to medical regimen as patient had been out of her needle supply for \"months\" due to issues with prescription.  Regarding patient's atrial fibrillation she was initially able to convert out of atrial fibrillation with a diltiazem infusion; however, had subsequent return of atrial fibrillation requiring reinitiation of diltiazem infusion, and subsequent return to sinus rhythm. Her metoprolol tartrate was increased to 75 mg every 8 hours and she did not have further recurrence of atrial fibrillation this admission. However, in the interim she subsequently developed persistent hypertension requiring addition of amlodipine, for which she was discharged on. On discharge patient's metoprolol will be changed to long acting succinate formula to increase adherence versus every 8 hour dosing of short acting. During this admission patient was evaluated by physical therapy who recommended patient for home health with home physical therapy for further treatment of debility from hospitalization and acute worsening of chronic diseases. Patient was counseled on the importance of adherence to medical regimen to which she verbalized understanding and agreement to the plan.    Patient /Hospital Summary (Details -- Problem Oriented) :          Non-intractable vomiting with nausea  Assessment & Plan  Secondary to DKA.    -IMPROVED.  Diet as tolerated   Fluids PRN  Zofran and compazine as needed,     Paroxysmal atrial fibrillation (HCC)  Assessment & Plan  Presented in afib w/RVR. Required diltiazem infusion. Likely secondary to non-adherence to medical regimen. [id number] >2. Metoprolol tartrate 75 mg po q8hrs needed to control rate inpatient (increased from 50 BID home dose). " However, decreased adherence to regimen risk very high with this dosing. Will change to long acting succinate formula to help with adherence.    -Metoprolol Succinate 200 mg po qday.  -Apixaban 5 mg   -Follow up with cardiology and PCP.    * Diabetic ketoacidosis without coma associated with type 2 diabetes mellitus (HCC)  Assessment & Plan  Secondary to dehydration from poor oral intake, atrial fibrillation, non adherence. Patient had issue obtaining prescription for needles over the last couple months.    -RESOLVED.    Type 2 diabetes mellitus without complication (HCC)  Assessment & Plan  On 20 U Lantus at home, non adherence?     -HbA1c 13.4 July 2019  -DM education prior dc  -Social work consulted for eval as patient was not taking meds for quite a while (months due to no needles) prior to admission.    Microcytosis  Assessment & Plan  Interestingly, patient with microcytosis w/out concomitant anemia. Doubt, thalassemia. Suspect iron deficiency.    -Labs indicative of mixed anemia of chronic disease/inflammation and iron deficiency.  -Ferrous sulfate 325 mg M,W,F for 6-8 weeks then re-assess w/PCP.    Generalized abdominal pain  Assessment & Plan  Eating and stooling without issues.    -IMPROVED/RESOVLED.  -Secondary to DKA and possible underlying gastroparesis component as well.    Hypertension  Assessment & Plan  Worsening HTN over last couple days. Will add CCB. Consider ACEi. Initial trial of amlodipine 5 mg without significant effect.    -Amlodipine 10 mg po qday.  -Patient instructed to take daily BP readings and to bring in data to next cardiology/PCP appointment.    CKD (chronic kidney disease), stage III (HCC)  Assessment & Plan  Long-standing IDDM, HTN, CKD 3, at baseline    -Avoid nephro toxins   -Consider ACEi/ARB if needs additional antihypertensive as she has CKD.    Thyroid nodule  Assessment & Plan  - Asymptomatic, stable   - Most recent TSH/FT4 WNL 12/18      Dyslipidemia  Assessment &  Plan  -Rosuvastatin.      Consultants:     Critical Care    Procedures:        NA    Imaging/ Testing:      DX-CHEST-PORTABLE (1 VIEW)   Final Result      Minimal interstitial edema. No new consolidation or pleural effusions.      DX-CHEST-PORTABLE (1 VIEW)   Final Result      Minimal interstitial edema. No new consolidation or pleural effusions.      DX-CHEST-PORTABLE (1 VIEW)   Final Result      Minimal interstitial edema. No focal consolidation or pleural effusions.      [id number] VIEW   Final Result      Nonobstructive bowel gas pattern. Small amount of stool throughout the colon.      DX-CHEST-PORTABLE (1 VIEW)   Final Result         1.  No acute cardiopulmonary disease.   2.  Atherosclerosis      CTA ABDOMEN PELVIS W & W/O POST PROCESS   Final Result      1.  Patent celiac, superior mesenteric and inferior mesenteric arteries.      2.  No evidence of bowel obstruction or inflammatory change.      3.  Atherosclerotic plaque involving the aorta primarily distally.      4.  Fatty liver.      5.  Splenomegaly.      6.  Small amount of ascites.      7.  Bibasilar atelectasis.      8.  Prior cholecystectomy.      DX-CHEST-PORTABLE (1 VIEW)   Final Result      Mild patchy opacity in the left costophrenic angle may represent atelectasis but pneumonitis is not excluded.      Atherosclerotic plaque.            Discharge Medications:         Medication Reconciliation: Completed       Medication List      START taking these medications      Instructions   amLODIPine 10 MG Tabs  Commonly known as:  NORVASC   Doctor's comments:  For hypertension.  Take 1 Tab by mouth every day.  Dose:  10 mg     ferrous sulfate 325 (65 Fe) MG tablet  Start taking on:  8/21/2019   Doctor's comments:  Take one tab (325 mg) daily on Monday, Wednesday, and Friday.  Take 1 Tab by mouth every Monday, Wednesday, and Friday.  Dose:  325 mg     glucose blood strip   Doctor's comments:  Dx code: E11.65  1 Strip by Other route 3 times a day before  meals.  Dose:  1 Each     Insulin Pen Needle 32 G x 4 mm   Doctor's comments:  E11.65  1 Applicator by Does not apply route 3 times a day.  Dose:  1 Applicator     metoprolol 200 MG XL tablet  Commonly known as:  TOPROL-XL   Doctor's comments:  For atrial fibrillation.  Take 1 Tab by mouth every day.  Dose:  200 mg        CONTINUE taking these medications      Instructions   ELIQUIS 5mg Tabs  Generic drug:  apixaban   Take 5 mg by mouth 2 Times a Day.  Dose:  5 mg     insulin glargine 100 UNIT/ML Soln  Commonly known as:  LANTUS   Inject 20 Units as instructed every evening.  Dose:  20 Units     omeprazole 20 MG delayed-release capsule  Commonly known as:  PRILOSEC   Take 20 mg by mouth every day.  Dose:  20 mg     POTASSIUM PO   Take 1 Tab by mouth every morning.  Dose:  1 Tab     rosuvastatin 5 MG Tabs  Commonly known as:  CRESTOR   Take 5 mg by mouth every morning.  Dose:  5 mg     traZODone 50 MG Tabs  Commonly known as:  DESYREL   Take 50 mg by mouth every bedtime.  Dose:  50 mg     vitamin D (Ergocalciferol) 76717 units Caps capsule  Commonly known as:  DRISDOL   Take 50,000 Units by mouth every Sunday.  Dose:  50,000 Units     XALATAN 0.005 % Soln  Generic drug:  latanoprost   Place 1 Drop in both eyes every evening.  Dose:  1 Drop        STOP taking these medications    metoprolol 50 MG Tabs  Commonly known as:  LOPRESSOR     nitrofurantoin monohyd macro 100 MG Caps  Commonly known as:  MACROBID            Disposition:   Discharged to home    Diet:   Diabetic, and consistent carbohydrate.    Activity:   As tolerated.    Instructions:         The patient was instructed to return to the ER in the event of worsening symptoms. I have counseled the patient on the importance of compliance and the patient has agreed to proceed with all medical recommendations and follow up plan indicated above.   The patient understands that all medications come with benefits and risks. Risks may include permanent injury or death  and these risks can be minimized with close reassessment and monitoring.        Patient verbalized understanding, and agreement to the plan.    Primary Care Provider:    Fitz Turpin D.O.    Discharge summary faxed to primary care provider:  Deferred  Copy of discharge summary given to the patient: Deferred      Follow up appointment details :      Future Appointments   Date Time Provider Department Center   9/6/2019  8:45 AM Sylvester Tobar M.D. [facility] None   11/15/2019  8:15 AM Sylvester Tobar M.D. [facility] None     Fitz Turpin D.O.  [address] [facility] [address]  [phone]    Go on 8/27/2019  Please arrive for your appointment at 10:30 am . Thank you       Pending Studies:        Not applicable.    Time spent on discharge day patient visit, preparing discharge paperwork and arranging for patient follow up.    Summary of follow up issues:   Hypertension here in hospital requiring additional oral antihypertensive daily agent requiring follow-up of blood pressure.  Continued follow-up of diabetes control.  Follow-up for heart rate and atrial fibrillation.    Discharge Time (Minutes) :    45 min.  Hospital Course Type:  Inpatient Stay >2 midnights      Condition on Discharge    ______________________________________________________________________    Interval history/exam for day of discharge:      No acute events. No hypertensive urgency/emergency. Denies issues nor concerns. Wants to go home today.    Physical Exam   Constitutional: She is oriented to person, place, and time and well-developed, well-nourished, and in no distress.   Appears older than stated age.   HENT:   Head: Normocephalic and atraumatic.   Mouth/Throat: No oropharyngeal exudate.   Dry mucous membranes.   Eyes: EOM are normal. No scleral icterus.   Neck: Normal range of motion.   Cardiovascular: Normal rate and regular rhythm. Exam reveals no gallop and no friction rub.   No murmur heard.  Pulmonary/Chest: Effort normal and  breath sounds normal. No stridor. No respiratory distress. She has no wheezes. She has no rales.   Abdominal: Soft. Bowel sounds are normal. She exhibits no distension and no mass. There is no tenderness. There is no rebound and no guarding.   Musculoskeletal: Normal range of motion. She exhibits no edema or tenderness.   Lymphadenopathy:     She has no cervical adenopathy.   Neurological: She is alert and oriented to person, place, and time.   Skin: Skin is warm and dry. No rash noted. No erythema. No pallor.   Ecchymosis of forearms.   Psychiatric: Mood, memory, affect and judgment normal.       Most Recent Labs:    Lab Results   Component Value Date/Time    WBC 13.3 (H) 08/20/2019 05:10 AM    RBC 5.34 08/20/2019 05:10 AM    HEMOGLOBIN 14.3 08/20/2019 05:10 AM    HEMATOCRIT 43.5 08/20/2019 05:10 AM    MCV 81.5 08/20/2019 05:10 AM    MCH 26.8 (L) 08/20/2019 05:10 AM    MCHC 32.9 (L) 08/20/2019 05:10 AM    MPV 10.0 08/20/2019 05:10 AM    NEUTSPOLYS 65.60 08/20/2019 05:10 AM    LYMPHOCYTES 19.70 (L) 08/20/2019 05:10 AM    MONOCYTES 8.90 08/20/2019 05:10 AM    EOSINOPHILS 4.20 08/20/2019 05:10 AM    BASOPHILS 0.50 08/20/2019 05:10 AM    HYPOCHROMIA 1+ 09/12/2012 04:16 PM    ANISOCYTOSIS 1+ 10/13/2018 09:12 AM      Lab Results   Component Value Date/Time    SODIUM 133 (L) 08/20/2019 05:10 AM    POTASSIUM 4.1 08/20/2019 05:10 AM    CHLORIDE 100 08/20/2019 05:10 AM    CO2 26 08/20/2019 05:10 AM    GLUCOSE 125 (H) 08/20/2019 05:10 AM    BUN 14 08/20/2019 05:10 AM    CREATININE 0.98 08/20/2019 05:10 AM      Lab Results   Component Value Date/Time    ALTSGPT 10 08/16/2019 11:18 AM    ASTSGOT 16 08/16/2019 11:18 AM    ALKPHOSPHAT 45 08/16/2019 11:18 AM    TBILIRUBIN 0.6 08/16/2019 11:18 AM    LIPASE 11 08/17/2019 09:33 AM    ALBUMIN 2.6 (L) 08/16/2019 11:18 AM    GLOBULIN 2.9 08/16/2019 11:18 AM    INR 1.12 07/18/2019 02:43 PM     Lab Results   Component Value Date/Time    PROTHROMBTM 14.7 (H) 07/18/2019 02:43 PM    INR 1.12  07/18/2019 02:43 PM

## 2019-08-22 ENCOUNTER — APPOINTMENT (OUTPATIENT)
Dept: RADIOLOGY | Facility: MEDICAL CENTER | Age: 74
DRG: 871 | End: 2019-08-22
Attending: EMERGENCY MEDICINE
Payer: MEDICARE

## 2019-08-22 ENCOUNTER — HOSPITAL ENCOUNTER (INPATIENT)
Facility: MEDICAL CENTER | Age: 74
LOS: 3 days | DRG: 871 | End: 2019-08-25
Attending: EMERGENCY MEDICINE | Admitting: INTERNAL MEDICINE
Payer: MEDICARE

## 2019-08-22 ENCOUNTER — HOME CARE VISIT (OUTPATIENT)
Dept: HOME HEALTH SERVICES | Facility: HOME HEALTHCARE | Age: 74
End: 2019-08-22
Payer: MEDICARE

## 2019-08-22 ENCOUNTER — ANTICOAGULATION MONITORING (OUTPATIENT)
Dept: MEDICAL GROUP | Facility: PHYSICIAN GROUP | Age: 74
End: 2019-08-22

## 2019-08-22 VITALS
HEART RATE: 69 BPM | BODY MASS INDEX: 24.41 KG/M2 | TEMPERATURE: 97.8 F | RESPIRATION RATE: 16 BRPM | HEIGHT: 65 IN | DIASTOLIC BLOOD PRESSURE: 82 MMHG | SYSTOLIC BLOOD PRESSURE: 140 MMHG | OXYGEN SATURATION: 98 % | WEIGHT: 146.5 LBS

## 2019-08-22 DIAGNOSIS — R50.9 FEVER, UNSPECIFIED FEVER CAUSE: ICD-10-CM

## 2019-08-22 DIAGNOSIS — R53.1 WEAK: ICD-10-CM

## 2019-08-22 DIAGNOSIS — E87.1 HYPONATREMIA: ICD-10-CM

## 2019-08-22 PROBLEM — A41.9 SEPSIS (HCC): Status: ACTIVE | Noted: 2019-08-22

## 2019-08-22 PROBLEM — R65.10 SIRS (SYSTEMIC INFLAMMATORY RESPONSE SYNDROME) (HCC): Status: ACTIVE | Noted: 2019-08-22

## 2019-08-22 LAB
ALBUMIN SERPL BCP-MCNC: 2.6 G/DL (ref 3.2–4.9)
ALBUMIN/GLOB SERPL: 0.9 G/DL
ALP SERPL-CCNC: 51 U/L (ref 30–99)
ALT SERPL-CCNC: 9 U/L (ref 2–50)
ANION GAP SERPL CALC-SCNC: 12 MMOL/L (ref 0–11.9)
ANISOCYTOSIS BLD QL SMEAR: ABNORMAL
APPEARANCE UR: CLEAR
AST SERPL-CCNC: 12 U/L (ref 12–45)
BACTERIA #/AREA URNS HPF: ABNORMAL /HPF
BASOPHILS # BLD AUTO: 0 % (ref 0–1.8)
BASOPHILS # BLD: 0 K/UL (ref 0–0.12)
BILIRUB SERPL-MCNC: 1.1 MG/DL (ref 0.1–1.5)
BILIRUB UR QL STRIP.AUTO: NEGATIVE
BUN SERPL-MCNC: 14 MG/DL (ref 8–22)
CALCIUM SERPL-MCNC: 7.7 MG/DL (ref 8.5–10.5)
CHLORIDE SERPL-SCNC: 99 MMOL/L (ref 96–112)
CO2 SERPL-SCNC: 22 MMOL/L (ref 20–33)
COLOR UR: YELLOW
CREAT SERPL-MCNC: 1.09 MG/DL (ref 0.5–1.4)
EOSINOPHIL # BLD AUTO: 0 K/UL (ref 0–0.51)
EOSINOPHIL NFR BLD: 0 % (ref 0–6.9)
EPI CELLS #/AREA URNS HPF: ABNORMAL /HPF
ERYTHROCYTE [DISTWIDTH] IN BLOOD BY AUTOMATED COUNT: 43.3 FL (ref 35.9–50)
GLOBULIN SER CALC-MCNC: 2.9 G/DL (ref 1.9–3.5)
GLUCOSE BLD-MCNC: 212 MG/DL (ref 65–99)
GLUCOSE SERPL-MCNC: 228 MG/DL (ref 65–99)
GLUCOSE UR STRIP.AUTO-MCNC: 100 MG/DL
HCT VFR BLD AUTO: 38.3 % (ref 37–47)
HGB BLD-MCNC: 13.1 G/DL (ref 12–16)
KETONES UR STRIP.AUTO-MCNC: NEGATIVE MG/DL
LACTATE BLD-SCNC: 2.1 MMOL/L (ref 0.5–2)
LEUKOCYTE ESTERASE UR QL STRIP.AUTO: NEGATIVE
LYMPHOCYTES # BLD AUTO: 0 K/UL (ref 1–4.8)
LYMPHOCYTES NFR BLD: 0 % (ref 22–41)
MANUAL DIFF BLD: NORMAL
MCH RBC QN AUTO: 27.9 PG (ref 27–33)
MCHC RBC AUTO-ENTMCNC: 34.2 G/DL (ref 33.6–35)
MCV RBC AUTO: 81.5 FL (ref 81.4–97.8)
MICRO URNS: ABNORMAL
MICROCYTES BLD QL SMEAR: ABNORMAL
MONOCYTES # BLD AUTO: 0.78 K/UL (ref 0–0.85)
MONOCYTES NFR BLD AUTO: 2.6 % (ref 0–13.4)
MORPHOLOGY BLD-IMP: NORMAL
NEUTROPHILS # BLD AUTO: 29.12 K/UL (ref 2–7.15)
NEUTROPHILS NFR BLD: 89.6 % (ref 44–72)
NEUTS BAND NFR BLD MANUAL: 7.8 % (ref 0–10)
NITRITE UR QL STRIP.AUTO: NEGATIVE
NRBC # BLD AUTO: 0 K/UL
NRBC BLD-RTO: 0 /100 WBC
PH UR STRIP.AUTO: 6.5 [PH] (ref 5–8)
PLATELET # BLD AUTO: 243 K/UL (ref 164–446)
PLATELET BLD QL SMEAR: NORMAL
PMV BLD AUTO: 10.1 FL (ref 9–12.9)
POTASSIUM SERPL-SCNC: 3.3 MMOL/L (ref 3.6–5.5)
PROT SERPL-MCNC: 5.5 G/DL (ref 6–8.2)
PROT UR QL STRIP: 100 MG/DL
RBC # BLD AUTO: 4.7 M/UL (ref 4.2–5.4)
RBC # URNS HPF: ABNORMAL /HPF
RBC BLD AUTO: PRESENT
RBC UR QL AUTO: ABNORMAL
SODIUM SERPL-SCNC: 133 MMOL/L (ref 135–145)
SP GR UR STRIP.AUTO: 1.01
UROBILINOGEN UR STRIP.AUTO-MCNC: 0.2 MG/DL
WBC # BLD AUTO: 29.9 K/UL (ref 4.8–10.8)
WBC #/AREA URNS HPF: ABNORMAL /HPF

## 2019-08-22 PROCEDURE — 99285 EMERGENCY DEPT VISIT HI MDM: CPT

## 2019-08-22 PROCEDURE — 700101 HCHG RX REV CODE 250: Performed by: EMERGENCY MEDICINE

## 2019-08-22 PROCEDURE — 82962 GLUCOSE BLOOD TEST: CPT

## 2019-08-22 PROCEDURE — 96365 THER/PROPH/DIAG IV INF INIT: CPT

## 2019-08-22 PROCEDURE — 96367 TX/PROPH/DG ADDL SEQ IV INF: CPT

## 2019-08-22 PROCEDURE — 74177 CT ABD & PELVIS W/CONTRAST: CPT

## 2019-08-22 PROCEDURE — 99223 1ST HOSP IP/OBS HIGH 75: CPT | Performed by: INTERNAL MEDICINE

## 2019-08-22 PROCEDURE — 700105 HCHG RX REV CODE 258: Performed by: EMERGENCY MEDICINE

## 2019-08-22 PROCEDURE — 83605 ASSAY OF LACTIC ACID: CPT

## 2019-08-22 PROCEDURE — 80053 COMPREHEN METABOLIC PANEL: CPT

## 2019-08-22 PROCEDURE — 665001 SOC-HOME HEALTH

## 2019-08-22 PROCEDURE — 700111 HCHG RX REV CODE 636 W/ 250 OVERRIDE (IP): Performed by: EMERGENCY MEDICINE

## 2019-08-22 PROCEDURE — 85007 BL SMEAR W/DIFF WBC COUNT: CPT

## 2019-08-22 PROCEDURE — 770020 HCHG ROOM/CARE - TELE (206)

## 2019-08-22 PROCEDURE — 85027 COMPLETE CBC AUTOMATED: CPT

## 2019-08-22 PROCEDURE — 81001 URINALYSIS AUTO W/SCOPE: CPT

## 2019-08-22 PROCEDURE — 96375 TX/PRO/DX INJ NEW DRUG ADDON: CPT

## 2019-08-22 PROCEDURE — 71045 X-RAY EXAM CHEST 1 VIEW: CPT

## 2019-08-22 PROCEDURE — G0493 RN CARE EA 15 MIN HH/HOSPICE: HCPCS

## 2019-08-22 PROCEDURE — 87040 BLOOD CULTURE FOR BACTERIA: CPT

## 2019-08-22 RX ORDER — SODIUM CHLORIDE, SODIUM LACTATE, POTASSIUM CHLORIDE, AND CALCIUM CHLORIDE .6; .31; .03; .02 G/100ML; G/100ML; G/100ML; G/100ML
1000 INJECTION, SOLUTION INTRAVENOUS
Status: ACTIVE | OUTPATIENT
Start: 2019-08-22 | End: 2019-08-23

## 2019-08-22 RX ORDER — FERROUS SULFATE 325(65) MG
325 TABLET ORAL
COMMUNITY
End: 2021-06-15

## 2019-08-22 RX ORDER — IPRATROPIUM BROMIDE AND ALBUTEROL SULFATE 2.5; .5 MG/3ML; MG/3ML
3 SOLUTION RESPIRATORY (INHALATION)
Status: DISCONTINUED | OUTPATIENT
Start: 2019-08-22 | End: 2019-08-25 | Stop reason: HOSPADM

## 2019-08-22 RX ORDER — OMEPRAZOLE 20 MG/1
20 CAPSULE, DELAYED RELEASE ORAL DAILY
Status: DISCONTINUED | OUTPATIENT
Start: 2019-08-23 | End: 2019-08-25 | Stop reason: HOSPADM

## 2019-08-22 RX ORDER — METOPROLOL SUCCINATE 50 MG/1
200 TABLET, EXTENDED RELEASE ORAL DAILY
Status: DISCONTINUED | OUTPATIENT
Start: 2019-08-23 | End: 2019-08-25 | Stop reason: HOSPADM

## 2019-08-22 RX ORDER — SODIUM CHLORIDE 9 MG/ML
INJECTION, SOLUTION INTRAVENOUS CONTINUOUS
Status: DISCONTINUED | OUTPATIENT
Start: 2019-08-23 | End: 2019-08-25

## 2019-08-22 RX ORDER — ONDANSETRON 4 MG/1
4 TABLET, ORALLY DISINTEGRATING ORAL EVERY 4 HOURS PRN
Status: DISCONTINUED | OUTPATIENT
Start: 2019-08-22 | End: 2019-08-25 | Stop reason: HOSPADM

## 2019-08-22 RX ORDER — POLYETHYLENE GLYCOL 3350 17 G/17G
1 POWDER, FOR SOLUTION ORAL
Status: DISCONTINUED | OUTPATIENT
Start: 2019-08-22 | End: 2019-08-25 | Stop reason: HOSPADM

## 2019-08-22 RX ORDER — SODIUM CHLORIDE 9 MG/ML
1000 INJECTION, SOLUTION INTRAVENOUS ONCE
Status: COMPLETED | OUTPATIENT
Start: 2019-08-22 | End: 2019-08-22

## 2019-08-22 RX ORDER — FERROUS SULFATE 325(65) MG
325 TABLET ORAL
Status: DISCONTINUED | OUTPATIENT
Start: 2019-08-23 | End: 2019-08-25 | Stop reason: HOSPADM

## 2019-08-22 RX ORDER — INSULIN GLARGINE 100 [IU]/ML
20 INJECTION, SOLUTION SUBCUTANEOUS NIGHTLY
Status: DISCONTINUED | OUTPATIENT
Start: 2019-08-23 | End: 2019-08-25 | Stop reason: HOSPADM

## 2019-08-22 RX ORDER — NITROFURANTOIN 25; 75 MG/1; MG/1
100 CAPSULE ORAL EVERY 12 HOURS
Status: ON HOLD | COMMUNITY
Start: 2019-08-05 | End: 2019-08-25

## 2019-08-22 RX ORDER — ONDANSETRON 2 MG/ML
4 INJECTION INTRAMUSCULAR; INTRAVENOUS ONCE
Status: COMPLETED | OUTPATIENT
Start: 2019-08-22 | End: 2019-08-22

## 2019-08-22 RX ORDER — METOPROLOL SUCCINATE 200 MG/1
200 TABLET, EXTENDED RELEASE ORAL DAILY
COMMUNITY
End: 2020-09-14

## 2019-08-22 RX ORDER — AMOXICILLIN 250 MG
2 CAPSULE ORAL 2 TIMES DAILY
Status: DISCONTINUED | OUTPATIENT
Start: 2019-08-23 | End: 2019-08-25 | Stop reason: HOSPADM

## 2019-08-22 RX ORDER — TRAZODONE HYDROCHLORIDE 50 MG/1
50 TABLET ORAL
Status: DISCONTINUED | OUTPATIENT
Start: 2019-08-23 | End: 2019-08-25 | Stop reason: HOSPADM

## 2019-08-22 RX ORDER — ONDANSETRON 2 MG/ML
4 INJECTION INTRAMUSCULAR; INTRAVENOUS EVERY 4 HOURS PRN
Status: DISCONTINUED | OUTPATIENT
Start: 2019-08-22 | End: 2019-08-25 | Stop reason: HOSPADM

## 2019-08-22 RX ORDER — ROSUVASTATIN CALCIUM 5 MG/1
5 TABLET, COATED ORAL EVERY MORNING
Status: DISCONTINUED | OUTPATIENT
Start: 2019-08-23 | End: 2019-08-25 | Stop reason: HOSPADM

## 2019-08-22 RX ORDER — DOXYCYCLINE 100 MG/1
100 TABLET ORAL EVERY 12 HOURS
Status: DISCONTINUED | OUTPATIENT
Start: 2019-08-23 | End: 2019-08-25 | Stop reason: HOSPADM

## 2019-08-22 RX ORDER — AMLODIPINE BESYLATE 5 MG/1
5 TABLET ORAL DAILY
Status: ON HOLD | COMMUNITY
End: 2020-09-15 | Stop reason: SDUPTHER

## 2019-08-22 RX ORDER — BISACODYL 10 MG
10 SUPPOSITORY, RECTAL RECTAL
Status: DISCONTINUED | OUTPATIENT
Start: 2019-08-22 | End: 2019-08-25 | Stop reason: HOSPADM

## 2019-08-22 RX ORDER — ACETAMINOPHEN 325 MG/1
650 TABLET ORAL EVERY 6 HOURS PRN
Status: DISCONTINUED | OUTPATIENT
Start: 2019-08-22 | End: 2019-08-25 | Stop reason: HOSPADM

## 2019-08-22 RX ADMIN — METRONIDAZOLE 500 MG: 500 INJECTION, SOLUTION INTRAVENOUS at 23:13

## 2019-08-22 RX ADMIN — ONDANSETRON 4 MG: 2 INJECTION INTRAMUSCULAR; INTRAVENOUS at 20:55

## 2019-08-22 RX ADMIN — SODIUM CHLORIDE 1000 ML: 9 INJECTION, SOLUTION INTRAVENOUS at 22:47

## 2019-08-22 RX ADMIN — CEFTRIAXONE SODIUM 1 G: 1 INJECTION, POWDER, FOR SOLUTION INTRAMUSCULAR; INTRAVENOUS at 22:48

## 2019-08-22 RX ADMIN — SODIUM CHLORIDE 1000 ML: 9 INJECTION, SOLUTION INTRAVENOUS at 20:54

## 2019-08-22 SDOH — ECONOMIC STABILITY: HOUSING INSECURITY: EVIDENCE OF SMOKING MATERIAL: 0

## 2019-08-22 ASSESSMENT — PATIENT HEALTH QUESTIONNAIRE - PHQ9
1. LITTLE INTEREST OR PLEASURE IN DOING THINGS: 00
CLINICAL INTERPRETATION OF PHQ2 SCORE: 0
2. FEELING DOWN, DEPRESSED, IRRITABLE, OR HOPELESS: 00

## 2019-08-22 ASSESSMENT — ENCOUNTER SYMPTOMS
MYALGIAS: 0
FEVER: 1
NAUSEA: DENIES
FLANK PAIN: 0
SHORTNESS OF BREATH: 1
PALPITATIONS: 0
DIAPHORESIS: 0
SORE THROAT: 0
HEADACHES: 0
SPUTUM PRODUCTION: 1
BLURRED VISION: 0
COUGH: 1
NECK PAIN: 0
FOCAL WEAKNESS: 0
DIARRHEA: 0
SEIZURES: 0
BACK PAIN: 0
CHILLS: 1
VOMITING: 0
BRUISES/BLEEDS EASILY: 0
SHORTNESS OF BREATH: T
NAUSEA: 0
DIZZINESS: 0
ABDOMINAL PAIN: 0
VOMITING: DENIES
WHEEZING: 0
BLOOD IN STOOL: 0

## 2019-08-22 ASSESSMENT — ACTIVITIES OF DAILY LIVING (ADL): OASIS_M1830: 02

## 2019-08-22 NOTE — PROGRESS NOTES
Received referral from [facility]. Medications reviewed.     Drug-Drug: metoprolol and sertraline   Plasma concentrations and pharmacologic effects of Metoprolol may be increased by Selective Serotonin Reuptake Inhibitors.     Drug-Drug: traZODone and sertraline   Coadministration of Trazodone and Serotonin Modulators may lead to the development of serotonin syndrome.   Medium   Drug-Drug: POTASSIUM PO and omeprazole   Proton pump inhibitors may decrease gastrointestinal absorption of Calcium Salts and Potassium.   Medium   Drug-Drug: ferrous sulfate and omeprazole   Proton pump inhibitors may decrease the absorption of Iron Salts leading a decrease in the ability of Iron Salts to establish a positive iron balance in iron deficient patients.     If she has been on these medications for a number of months or years the likelihood of a adverse reaction is very low.    DONNIE BonillaS., Pharm.D., [facility]    This note was created using voice recognition software (Dragon). The accuracy of the dictation is limited by the abilities of the software. I have reviewed the note prior to signing, however some errors in grammar and context are still possible. If you have any questions related to this note please do not hesitate to contact our office.

## 2019-08-23 ENCOUNTER — HOME CARE VISIT (OUTPATIENT)
Dept: HOME HEALTH SERVICES | Facility: HOME HEALTHCARE | Age: 74
End: 2019-08-23
Payer: MEDICARE

## 2019-08-23 LAB
GLUCOSE BLD-MCNC: 183 MG/DL (ref 65–99)
GLUCOSE BLD-MCNC: 190 MG/DL (ref 65–99)
GLUCOSE BLD-MCNC: 247 MG/DL (ref 65–99)
LACTATE BLD-SCNC: 1.4 MMOL/L (ref 0.5–2)
LACTATE BLD-SCNC: 1.4 MMOL/L (ref 0.5–2)
PROCALCITONIN SERPL-MCNC: 26.04 NG/ML

## 2019-08-23 PROCEDURE — 99233 SBSQ HOSP IP/OBS HIGH 50: CPT | Performed by: HOSPITALIST

## 2019-08-23 PROCEDURE — 84145 PROCALCITONIN (PCT): CPT

## 2019-08-23 PROCEDURE — 700105 HCHG RX REV CODE 258: Performed by: INTERNAL MEDICINE

## 2019-08-23 PROCEDURE — 700102 HCHG RX REV CODE 250 W/ 637 OVERRIDE(OP): Performed by: FAMILY MEDICINE

## 2019-08-23 PROCEDURE — A9270 NON-COVERED ITEM OR SERVICE: HCPCS | Performed by: FAMILY MEDICINE

## 2019-08-23 PROCEDURE — 700111 HCHG RX REV CODE 636 W/ 250 OVERRIDE (IP): Performed by: INTERNAL MEDICINE

## 2019-08-23 PROCEDURE — A9270 NON-COVERED ITEM OR SERVICE: HCPCS | Performed by: INTERNAL MEDICINE

## 2019-08-23 PROCEDURE — 83605 ASSAY OF LACTIC ACID: CPT

## 2019-08-23 PROCEDURE — 700102 HCHG RX REV CODE 250 W/ 637 OVERRIDE(OP): Performed by: INTERNAL MEDICINE

## 2019-08-23 PROCEDURE — 36415 COLL VENOUS BLD VENIPUNCTURE: CPT

## 2019-08-23 PROCEDURE — 82962 GLUCOSE BLOOD TEST: CPT

## 2019-08-23 PROCEDURE — 770020 HCHG ROOM/CARE - TELE (206)

## 2019-08-23 RX ORDER — METOPROLOL SUCCINATE 50 MG/1
200 TABLET, EXTENDED RELEASE ORAL ONCE
Status: COMPLETED | OUTPATIENT
Start: 2019-08-23 | End: 2019-08-23

## 2019-08-23 RX ORDER — DIGOXIN 0.25 MG/ML
INJECTION INTRAMUSCULAR; INTRAVENOUS
Status: ACTIVE
Start: 2019-08-23 | End: 2019-08-23

## 2019-08-23 RX ADMIN — INSULIN HUMAN 2 UNITS: 100 INJECTION, SOLUTION PARENTERAL at 12:05

## 2019-08-23 RX ADMIN — SENNOSIDES, DOCUSATE SODIUM 2 TABLET: 50; 8.6 TABLET, FILM COATED ORAL at 17:31

## 2019-08-23 RX ADMIN — CEFTRIAXONE SODIUM 1 G: 1 INJECTION, POWDER, FOR SOLUTION INTRAMUSCULAR; INTRAVENOUS at 20:43

## 2019-08-23 RX ADMIN — DOXYCYCLINE 100 MG: 100 TABLET, FILM COATED ORAL at 02:16

## 2019-08-23 RX ADMIN — DOXYCYCLINE 100 MG: 100 TABLET, FILM COATED ORAL at 17:31

## 2019-08-23 RX ADMIN — SERTRALINE HYDROCHLORIDE 50 MG: 50 TABLET ORAL at 05:27

## 2019-08-23 RX ADMIN — SODIUM CHLORIDE: 9 INJECTION, SOLUTION INTRAVENOUS at 02:16

## 2019-08-23 RX ADMIN — INSULIN GLARGINE 20 UNITS: 100 INJECTION, SOLUTION SUBCUTANEOUS at 02:19

## 2019-08-23 RX ADMIN — APIXABAN 5 MG: 5 TABLET, FILM COATED ORAL at 17:31

## 2019-08-23 RX ADMIN — METOPROLOL SUCCINATE 200 MG: 50 TABLET, EXTENDED RELEASE ORAL at 20:42

## 2019-08-23 RX ADMIN — TRAZODONE HYDROCHLORIDE 50 MG: 50 TABLET ORAL at 20:43

## 2019-08-23 RX ADMIN — INSULIN HUMAN 2 UNITS: 100 INJECTION, SOLUTION PARENTERAL at 20:43

## 2019-08-23 RX ADMIN — OMEPRAZOLE 20 MG: 20 CAPSULE, DELAYED RELEASE ORAL at 05:26

## 2019-08-23 RX ADMIN — FERROUS SULFATE TAB 325 MG (65 MG ELEMENTAL FE) 325 MG: 325 (65 FE) TAB at 09:00

## 2019-08-23 RX ADMIN — ROSUVASTATIN CALCIUM 5 MG: 5 TABLET, FILM COATED ORAL at 05:27

## 2019-08-23 RX ADMIN — INSULIN GLARGINE 20 UNITS: 100 INJECTION, SOLUTION SUBCUTANEOUS at 20:43

## 2019-08-23 RX ADMIN — APIXABAN 5 MG: 5 TABLET, FILM COATED ORAL at 05:26

## 2019-08-23 ASSESSMENT — LIFESTYLE VARIABLES
TOTAL SCORE: 0
ALCOHOL_USE: NO
AVERAGE NUMBER OF DAYS PER WEEK YOU HAVE A DRINK CONTAINING ALCOHOL: 0
HAVE PEOPLE ANNOYED YOU BY CRITICIZING YOUR DRINKING: NO
TOTAL SCORE: 0
EVER FELT BAD OR GUILTY ABOUT YOUR DRINKING: NO
DOES PATIENT WANT TO STOP DRINKING: NO
CONSUMPTION TOTAL: NEGATIVE
ON A TYPICAL DAY WHEN YOU DRINK ALCOHOL HOW MANY DRINKS DO YOU HAVE: 0
EVER_SMOKED: NEVER
HAVE YOU EVER FELT YOU SHOULD CUT DOWN ON YOUR DRINKING: NO
EVER HAD A DRINK FIRST THING IN THE MORNING TO STEADY YOUR NERVES TO GET RID OF A HANGOVER: NO
HOW MANY TIMES IN THE PAST YEAR HAVE YOU HAD 5 OR MORE DRINKS IN A DAY: 0
TOTAL SCORE: 0

## 2019-08-23 ASSESSMENT — ENCOUNTER SYMPTOMS
PALPITATIONS: 0
DIZZINESS: 0
BLURRED VISION: 0
VOMITING: 0
BRUISES/BLEEDS EASILY: 0
SINUS PAIN: 0
CHILLS: 0
ORTHOPNEA: 0
HEARTBURN: 0
DOUBLE VISION: 0
FEVER: 1
NAUSEA: 0
COUGH: 1
SPUTUM PRODUCTION: 0
HEMOPTYSIS: 0
MYALGIAS: 0
DEPRESSION: 0

## 2019-08-23 ASSESSMENT — PATIENT HEALTH QUESTIONNAIRE - PHQ9
1. LITTLE INTEREST OR PLEASURE IN DOING THINGS: NOT AT ALL
SUM OF ALL RESPONSES TO PHQ9 QUESTIONS 1 AND 2: 0
2. FEELING DOWN, DEPRESSED, IRRITABLE, OR HOPELESS: NOT AT ALL

## 2019-08-23 NOTE — ED NOTES
Med Rec Updated and Complete per Pt's family at bedside  Allergies Reviewed    Pt completed a 7-day course of Macrobid 100mg every 12 hours Starting 08/05/19 Ending 08/11/19.    Pt on Eliquis 5mg twice daily with her last dose 08/22/19 AM.

## 2019-08-23 NOTE — PROGRESS NOTES
Patient admitted to room 714-1 at 0200.   Patient oriented to room and answered all questions.   Patient is A/O x 4. Patient has no complaints at this time. Respirations are even and unlabored, no chest pain noted, no distress noted. Will continue to monitor patient closely.

## 2019-08-23 NOTE — ED TRIAGE NOTES
"Chief Complaint   Patient presents with   • N/V   • Chills   • Fever       BIBA for above complaint. Patient was discharged yesterday for DKA. Patient has a temp of 100.4 f on arrival. Given 4 of Zofran.  on arrival.     /83   Pulse 90   Temp 38 °C (100.4 °F) (Oral)   Resp 18   Ht 1.651 m (5' 5\")   Wt 66.2 kg (146 lb)   BMI 24.30 kg/m²         "

## 2019-08-23 NOTE — ASSESSMENT & PLAN NOTE
Uncontrolled with hyperglycemia  Continue lantus and ssi   a1c 13 one month ago will need close monitoring

## 2019-08-23 NOTE — H&P
Hospital Medicine History & Physical Note    Date of Service  8/22/2019    Primary Care Physician  Fitz Turpin D.O.    Consultants  None    Code Status  Full code    Chief Complaint  Fevers and cough    History of Presenting Illness  74 y.o. female with a past medical history of insulin-dependent diabetes mellitus, hypertension, hyperlipidemia, atrial fibrillation on Xarelto, stroke, CAD status post stent placement, chronic respiratory failure on nocturnal oxygen who presented 8/22/2019 with fevers and cough for the past 3 days.  Patient reports a productive cough with green phlegm with associated shortness of breath.  She reports fevers and chills.  She denies any chest pain, headache, neck stiffness, abdominal pain, diarrhea or dysuria.  She was recently hospitalized on 8/15/2019 and was treated for diabetic ketoacidosis.    She was noted to be tachycardic and febrile in the ER  Initial blood work was significant for WBC 29.9, sodium 133, potassium 3.3 lactic acid 2.1  Chest x-ray interpreted by me reveals pulmonary vascular congestion and mild peripheral interstitial prominence    CT abdomen pelvis with IV contrast revealed Small BILATERAL pleural effusions, new since prior.  No acute inflammatory process in the abdomen noted    Review of Systems  Review of Systems   Constitutional: Positive for chills and fever. Negative for diaphoresis.   HENT: Negative for hearing loss and sore throat.    Eyes: Negative for blurred vision.   Respiratory: Positive for cough, sputum production and shortness of breath. Negative for wheezing.    Cardiovascular: Negative for chest pain, palpitations and leg swelling.   Gastrointestinal: Negative for abdominal pain, blood in stool, diarrhea, nausea and vomiting.   Genitourinary: Negative for dysuria, flank pain and urgency.   Musculoskeletal: Negative for back pain, joint pain, myalgias and neck pain.   Skin: Negative for rash.   Neurological: Negative for dizziness, focal  weakness, seizures and headaches.   Endo/Heme/Allergies: Does not bruise/bleed easily.   Psychiatric/Behavioral: Negative for suicidal ideas.   All other systems reviewed and are negative.      Past Medical History   has a past medical history of CAD (coronary artery disease), Diabetes, Goiter, Heart attack (HCC), Hyperlipidemia, Hypertension, MI (myocardial infarction) (HCC) (2016), Pericardial effusion, and Thyroid nodule. She also has no past medical history of Breast cancer (HCC).    Surgical History   has a past surgical history that includes other orthopedic surgery (7/11); other abdominal surgery (1966); gyn surgery (1978); gyn surgery; aditya by laparoscopy (9/14/2012); laparoscopy (12/1/2014); and zzz cardiac cath.     Family History  No pertinent family history     Social History   reports that she quit smoking about 41 years ago. Her smoking use included cigarettes. She has a 40.00 pack-year smoking history. She has never used smokeless tobacco. She reports that she does not drink alcohol or use drugs.    Allergies  Allergies   Allergen Reactions   • Pcn [Penicillins] Hives, Shortness of Breath and Swelling   • Sulfa Drugs Hives, Shortness of Breath and Swelling       Medications  Prior to Admission Medications   Prescriptions Last Dose Informant Patient Reported? Taking?   Omega-3 Fatty Acids (OMEGA-3 PO)   Yes No   Sig: Take 1 Capsule by mouth every day at 6 PM. Indications: suppliment   POTASSIUM PO 8/22/2019 at AM Patient Yes No   Sig: Take 1 Tab by mouth every morning.   amLODIPine (NORVASC) 10 MG Tab 8/22/2019 at AM  Yes Yes   Sig: Take 10 mg by mouth every day.   apixaban (ELIQUIS) 5mg Tab 8/22/2019 at AM Patient's Home Pharmacy Yes No   Sig: Take 5 mg by mouth 2 Times a Day. Indications: Cerebrovascular Accident or Stroke, Temporary Stroke   ferrous sulfate 325 (65 Fe) MG tablet 8/21/2019 at AM  Yes Yes   Sig: Take 325 mg by mouth every Monday, Wednesday, and Friday.   insulin glargine (LANTUS) 100  UNIT/ML Solution 8/21/2019 at PM Patient's Home Pharmacy Yes No   Sig: Inject 20 Units as instructed every evening. Indications: Type 2 Diabetes   latanoprost (XALATAN) 0.005 % Solution 8/21/2019 at PM Patient's Home Pharmacy Yes No   Sig: Place 1 Drop in both eyes every evening.   metoprolol (TOPROL XL) 200 MG XL tablet 8/22/2019 at AM  Yes Yes   Sig: Take 200 mg by mouth every day.   omeprazole (PRILOSEC) 20 MG delayed-release capsule 8/22/2019 at AM Patient's Home Pharmacy Yes No   Sig: Take 20 mg by mouth every day. Indications: Heartburn   rosuvastatin (CRESTOR) 5 MG Tab 8/22/2019 at AM Patient's Home Pharmacy Yes No   Sig: Take 5 mg by mouth every morning.   sertraline (ZOLOFT) 50 MG Tab 8/22/2019 at AM  Yes No   Sig: Take 50 mg by mouth every day. Indications: Major Depressive Disorder   trazodone (DESYREL) 50 MG Tab 8/21/2019 at PM Patient's Home Pharmacy Yes No   Sig: Take 50 mg by mouth every bedtime.   vitamin D, Ergocalciferol, (DRISDOL) 61942 units Cap capsule 8/11/2019 at AM Patient's Home Pharmacy Yes No   Sig: Take 50,000 Units by mouth every Sunday.      Facility-Administered Medications: None       Physical Exam  Temp:  [38 °C (100.4 °F)] 38 °C (100.4 °F)  Pulse:  [] 132  Resp:  [12-18] 12  BP: (109-140)/(63-83) 109/63  SpO2:  [90 %-93 %] 93 %    Physical Exam   Constitutional: She is oriented to person, place, and time. She appears well-developed and well-nourished. No distress.   HENT:   Head: Normocephalic and atraumatic.   Mouth/Throat: Oropharynx is clear and moist.   Eyes: Pupils are equal, round, and reactive to light. Conjunctivae are normal. Right eye exhibits no discharge. Left eye exhibits no discharge. No scleral icterus.   Neck: Normal range of motion. Neck supple. No tracheal deviation present.   Cardiovascular: Regular rhythm and normal heart sounds.   Tachycardic   Pulmonary/Chest: No stridor.   Increased effort  Diminished breath sounds  Bibasilar crackles   Abdominal: Soft.  Bowel sounds are normal. She exhibits no distension. There is no tenderness. There is no rebound and no guarding.   Musculoskeletal: Normal range of motion. She exhibits no edema, tenderness or deformity.   Lymphadenopathy:     She has no cervical adenopathy.   Neurological: She is alert and oriented to person, place, and time. No cranial nerve deficit. Coordination normal.   Skin: Skin is warm and dry. No rash noted. She is not diaphoretic. No erythema.   Psychiatric: She has a normal mood and affect. Her behavior is normal.   Nursing note and vitals reviewed.      Laboratory:  Recent Labs     08/20/19  0510 08/22/19 2021   WBC 13.3* 29.9*   RBC 5.34 4.70   HEMOGLOBIN 14.3 13.1   HEMATOCRIT 43.5 38.3   MCV 81.5 81.5   MCH 26.8* 27.9   MCHC 32.9* 34.2   RDW 44.6 43.3   PLATELETCT 266 243   MPV 10.0 10.1     Recent Labs     08/20/19  0510 08/22/19 2021   SODIUM 133* 133*   POTASSIUM 4.1 3.3*   CHLORIDE 100 99   CO2 26 22   GLUCOSE 125* 228*   BUN 14 14   CREATININE 0.98 1.09   CALCIUM 8.5 7.7*     Recent Labs     08/20/19  0510 08/22/19 2021   ALTSGPT  --  9   ASTSGOT  --  12   ALKPHOSPHAT  --  51   TBILIRUBIN  --  1.1   GLUCOSE 125* 228*         No results for input(s): NTPROBNP in the last 72 hours.      No results for input(s): TROPONINT in the last 72 hours.    Urinalysis:    Recent Labs     08/22/19 2050   SPECGRAVITY 1.015   GLUCOSEUR 100*   KETONES Negative   NITRITE Negative   LEUKESTERAS Negative   WBCURINE 0-2   RBCURINE 2-5*   BACTERIA Rare*   EPITHELCELL Few        Imaging:  CT-ABDOMEN-PELVIS WITH   Final Result      1.  Small BILATERAL pleural effusions, new since prior   2.  Prior cholecystectomy   3.  RIGHT renal cyst   4.  Prior hysterectomy   5.  Atherosclerosis   6.  No evidence of abdominal or pelvic mass, adenopathy, or inflammatory change.               DX-CHEST-PORTABLE (1 VIEW)   Final Result      1.  Findings suggestive of pulmonary edema, less likely atypical infection   2.   Atherosclerosis            Assessment/Plan:  I anticipate this patient will require at least two midnights for appropriate medical management, necessitating inpatient admission.    Sepsis (HCC)- (present on admission)  Assessment & Plan  This is simple sepsis without endorgan damage  Likely source is pulmonary  Patient has been started on IV fluids per septic protocol  Lactate is elevated, trend to resolution  Patient is started on IV ceftriaxone and doxycycline  Follow blood cultures  Check procalcitonin      Paroxysmal atrial fibrillation (HCC)- (present on admission)  Assessment & Plan  Continue Toprol and Eliquis    Hyponatremia- (present on admission)  Assessment & Plan  Hypovolemic hyponatremia  IV fluid hydration with normal saline  Monitor BMP and assess response      Type 2 diabetes mellitus without complication (HCC)- (present on admission)  Assessment & Plan  Uncontrolled with hyperglycemia  Start on insulin sliding scale with serial Accu-Checks  Check hemoglobin A1c  Hypoglycemic protocol in place        Hypertension- (present on admission)  Assessment & Plan  Continue Toprol    Hypokalemia- (present on admission)  Assessment & Plan  Replete with K. Dur 40    CKD (chronic kidney disease), stage III (HCC)- (present on admission)  Assessment & Plan  At baseline creatinine  Maintaining hydration  Avoiding nephrotoxics: NSAIDs etc  Monitor BMP        Dyslipidemia- (present on admission)  Assessment & Plan  Continue Crestor      VTE prophylaxis: Xarelto

## 2019-08-23 NOTE — PROGRESS NOTES
2 RN skin check complete with DUANE Mendosa.   Devices in place NA.  Skin assessed under devices NA.  Confirmed pressure ulcers found on NA.  New potential pressure ulcers noted on NA. Wound consult placed NA.  The following interventions in place: Patient can turn self in bed, pillows in place for offloading. Scan noted to mid upper back, edges approximated with no drainage noted. Scattered bruising and scratches noted. Callous to bilateral heels.

## 2019-08-23 NOTE — ASSESSMENT & PLAN NOTE
This is simple sepsis without endorgan damage  Likely pneumonia  Procalcitonin is elevated continue IV antibiotics cultures negative WBC trending down.

## 2019-08-23 NOTE — ED PROVIDER NOTES
ED Provider Note    CHIEF COMPLAINT  Chief Complaint   Patient presents with   • N/V   • Chills   • Fever       HPI  Amrita Torrez is a 74 y.o. female who presents with fever and chills.  The patient states she was discharged from the hospital yesterday after she was admitted for diabetic ketoacidosis.  She states her blood sugars have been elevated according to her home health nurse.  She states she is been compliant with her medical regimen.  Today she is had some fevers that are subjective in nature as well as chills.  She is also had some nausea and vomiting.  She denies pain.  She states he has no difficulties with urination.  She denies changes in her breathing patterns.  She does take oxygen at night with 2 L to the nasal cannula.  The patient has some generalized malaise but no focal weakness.    REVIEW OF SYSTEMS  See HPI for further details. All other systems are negative.     PAST MEDICAL HISTORY  Past Medical History:   Diagnosis Date   • MI (myocardial infarction) ([facility]) 2016    x2 stints placed   • CAD (coronary artery disease)    • Diabetes     oral meds   • Goiter    • Heart attack (HCC)    • Hyperlipidemia    • Hypertension    • Pericardial effusion    • Thyroid nodule        FAMILY HISTORY  [unfilled]    SOCIAL HISTORY  Social History     Socioeconomic History   • Marital status:      Spouse name: Not on file   • Number of children: Not on file   • Years of education: Not on file   • Highest education level: Not on file   Occupational History   • Not on file   Social Needs   • Financial resource strain: Not on file   • Food insecurity:     Worry: Not on file     Inability: Not on file   • Transportation needs:     Medical: Not on file     Non-medical: Not on file   Tobacco Use   • Smoking status: Former Smoker     Packs/day: 1.00     Years: 40.00     Pack years: 40.00     Types: Cigarettes     Last attempt to quit: 1978     Years since quittin.6   • Smokeless tobacco: Never Used  "  Substance and Sexual Activity   • Alcohol use: No   • Drug use: No   • Sexual activity: Not on file   Lifestyle   • Physical activity:     Days per week: Not on file     Minutes per session: Not on file   • Stress: Not on file   Relationships   • Social connections:     Talks on phone: Not on file     Gets together: Not on file     Attends Druze service: Not on file     Active member of club or organization: Not on file     Attends meetings of clubs or organizations: Not on file     Relationship status: Not on file   • Intimate partner violence:     Fear of current or ex partner: Not on file     Emotionally abused: Not on file     Physically abused: Not on file     Forced sexual activity: Not on file   Other Topics Concern   • Not on file   Social History Narrative   • Not on file       SURGICAL HISTORY  Past Surgical History:   Procedure Laterality Date   • LAPAROSCOPY  12/1/2014    [facility]   • BAN BY LAPAROSCOPY  9/14/2012    Performed by ABDI NAVARRO at [facility]   • OTHER ORTHOPEDIC SURGERY  7/11    knee   • GYN SURGERY  1978    fibroids   • OTHER ABDOMINAL SURGERY  1966    appendectomy   • GYN SURGERY      hysterectomy   • ZZZ CARDIAC CATH         CURRENT MEDICATIONS  Home Medications    **Home medications have not yet been reviewed for this encounter**         ALLERGIES  Allergies   Allergen Reactions   • Pcn [Penicillins] Hives, Shortness of Breath and Swelling   • Sulfa Drugs Hives, Shortness of Breath and Swelling       PHYSICAL EXAM  VITAL SIGNS: /83   Pulse 90   Temp 38 °C (100.4 °F) (Oral)   Resp 18   Ht 1.651 m (5' 5\")   Wt 66.2 kg (146 lb)   BMI 24.30 kg/m²  Room air O2: 94    Constitutional: Chronically ill in appearance  HENT: Normocephalic, Atraumatic, Bilateral external ears normal, Oropharynx dry, No oral exudates, Nose normal.   Eyes: PERRLA, EOMI, Conjunctiva normal, No discharge. "   Neck: Normal range of motion, No tenderness, Supple, No stridor.   Lymphatic: No lymphadenopathy noted.   Cardiovascular: Normal heart rate, Normal rhythm, No murmurs, No rubs, No gallops.   Thorax & Lungs: Normal breath sounds, No respiratory distress, No wheezing, No chest tenderness.   Abdomen: Bowel sounds normal, Soft, No tenderness, No masses, No pulsatile masses.   Skin: Warm, Dry, No erythema, No rash.   Back: No tenderness, No CVA tenderness.   Extremities: Intact distal pulses, No edema, No tenderness, No cyanosis, No clubbing.   Musculoskeletal: Good range of motion in all major joints. No tenderness to palpation or major deformities noted.   Neurologic: Alert & oriented x 3, Normal motor function, Normal sensory function, No focal deficits noted.   Psychiatric: Affect normal, Judgment normal, Mood normal.     RADIOLOGY/PROCEDURES  CT-ABDOMEN-PELVIS WITH   Final Result      1.  Small BILATERAL pleural effusions, new since prior   2.  Prior cholecystectomy   3.  RIGHT renal cyst   4.  Prior hysterectomy   5.  Atherosclerosis   6.  No evidence of abdominal or pelvic mass, adenopathy, or inflammatory change.               DX-CHEST-PORTABLE (1 VIEW)   Final Result      1.  Findings suggestive of pulmonary edema, less likely atypical infection   2.  Atherosclerosis            COURSE & MEDICAL DECISION MAKING  Pertinent Labs & Imaging studies reviewed. (See chart for details)  This a 74-year-old female who presents the emerge department subjective fevers and chills.  She also presents with generalized malaise.  The patient's metabolic panel does not show any evidence of diabetic ketoacidosis and her blood sugars only slightly elevated.  Chest x-ray does show some pulmonary edema but no evidence of a focal process such as pneumonia.  The patient's leukocytosis is very concerning and at this time I do not have a clear source of infection.  The patient was reexamined and she did have some mild diffuse tenderness  on repeat exam and therefore a CT scan of the abdomen pelvis was ordered and the patient received Rocephin and Flagyl for possible intra-abdominal source.  The CT scan does not show any evidence of an acute inflammatory process from an intra-abdominal standpoint.  She does have the increasing bilateral pleural effusions as well as some edema.  Therefore we have not been overly aggressive on fluid resuscitation.  She only has a slight lactic acidosis but I am concerned that she does have bacteremia but the source is not clear.  The patient will therefore be admitted to the hospitalist for further work-up and treatment.    FINAL IMPRESSION  1.  Fever  2.  Suspect bacteremia possible early sepsis    Disposition  The patient will be admitted in stable condition         Electronically signed by: Jean Claude Herman, 8/22/2019 8:23 PM

## 2019-08-23 NOTE — ED NOTES
Report given to Pawan ZEPEDA bedside. Pt taken to Tele on [facility]. Chart and belongings with patient. No acute signs distress present.

## 2019-08-24 PROBLEM — E83.42 HYPOMAGNESEMIA: Status: ACTIVE | Noted: 2019-08-24

## 2019-08-24 LAB
ANION GAP SERPL CALC-SCNC: 8 MMOL/L (ref 0–11.9)
BASOPHILS # BLD AUTO: 0.2 % (ref 0–1.8)
BASOPHILS # BLD: 0.03 K/UL (ref 0–0.12)
BUN SERPL-MCNC: 27 MG/DL (ref 8–22)
CALCIUM SERPL-MCNC: 7 MG/DL (ref 8.5–10.5)
CHLORIDE SERPL-SCNC: 104 MMOL/L (ref 96–112)
CO2 SERPL-SCNC: 22 MMOL/L (ref 20–33)
CREAT SERPL-MCNC: 1.55 MG/DL (ref 0.5–1.4)
EOSINOPHIL # BLD AUTO: 0.33 K/UL (ref 0–0.51)
EOSINOPHIL NFR BLD: 2.7 % (ref 0–6.9)
ERYTHROCYTE [DISTWIDTH] IN BLOOD BY AUTOMATED COUNT: 47.5 FL (ref 35.9–50)
GLUCOSE BLD-MCNC: 145 MG/DL (ref 65–99)
GLUCOSE BLD-MCNC: 159 MG/DL (ref 65–99)
GLUCOSE BLD-MCNC: 210 MG/DL (ref 65–99)
GLUCOSE BLD-MCNC: 213 MG/DL (ref 65–99)
GLUCOSE SERPL-MCNC: 151 MG/DL (ref 65–99)
HCT VFR BLD AUTO: 31.7 % (ref 37–47)
HGB BLD-MCNC: 10.4 G/DL (ref 12–16)
IMM GRANULOCYTES # BLD AUTO: 0.09 K/UL (ref 0–0.11)
IMM GRANULOCYTES NFR BLD AUTO: 0.7 % (ref 0–0.9)
LYMPHOCYTES # BLD AUTO: 1.24 K/UL (ref 1–4.8)
LYMPHOCYTES NFR BLD: 10 % (ref 22–41)
MAGNESIUM SERPL-MCNC: 1.4 MG/DL (ref 1.5–2.5)
MCH RBC QN AUTO: 27.7 PG (ref 27–33)
MCHC RBC AUTO-ENTMCNC: 32.8 G/DL (ref 33.6–35)
MCV RBC AUTO: 84.3 FL (ref 81.4–97.8)
MONOCYTES # BLD AUTO: 0.88 K/UL (ref 0–0.85)
MONOCYTES NFR BLD AUTO: 7.1 % (ref 0–13.4)
NEUTROPHILS # BLD AUTO: 9.77 K/UL (ref 2–7.15)
NEUTROPHILS NFR BLD: 79.3 % (ref 44–72)
NRBC # BLD AUTO: 0 K/UL
NRBC BLD-RTO: 0 /100 WBC
PLATELET # BLD AUTO: 198 K/UL (ref 164–446)
PMV BLD AUTO: 10.5 FL (ref 9–12.9)
POTASSIUM SERPL-SCNC: 3.8 MMOL/L (ref 3.6–5.5)
RBC # BLD AUTO: 3.76 M/UL (ref 4.2–5.4)
SODIUM SERPL-SCNC: 134 MMOL/L (ref 135–145)
WBC # BLD AUTO: 12.3 K/UL (ref 4.8–10.8)

## 2019-08-24 PROCEDURE — 700102 HCHG RX REV CODE 250 W/ 637 OVERRIDE(OP): Performed by: INTERNAL MEDICINE

## 2019-08-24 PROCEDURE — 36415 COLL VENOUS BLD VENIPUNCTURE: CPT

## 2019-08-24 PROCEDURE — 83735 ASSAY OF MAGNESIUM: CPT

## 2019-08-24 PROCEDURE — 700111 HCHG RX REV CODE 636 W/ 250 OVERRIDE (IP): Performed by: HOSPITALIST

## 2019-08-24 PROCEDURE — 82962 GLUCOSE BLOOD TEST: CPT | Mod: 91

## 2019-08-24 PROCEDURE — 85025 COMPLETE CBC W/AUTO DIFF WBC: CPT

## 2019-08-24 PROCEDURE — 99232 SBSQ HOSP IP/OBS MODERATE 35: CPT | Performed by: HOSPITALIST

## 2019-08-24 PROCEDURE — 700105 HCHG RX REV CODE 258: Performed by: INTERNAL MEDICINE

## 2019-08-24 PROCEDURE — 700111 HCHG RX REV CODE 636 W/ 250 OVERRIDE (IP): Performed by: INTERNAL MEDICINE

## 2019-08-24 PROCEDURE — 80048 BASIC METABOLIC PNL TOTAL CA: CPT

## 2019-08-24 PROCEDURE — 770020 HCHG ROOM/CARE - TELE (206)

## 2019-08-24 PROCEDURE — A9270 NON-COVERED ITEM OR SERVICE: HCPCS | Performed by: INTERNAL MEDICINE

## 2019-08-24 RX ORDER — MAGNESIUM SULFATE HEPTAHYDRATE 40 MG/ML
2 INJECTION, SOLUTION INTRAVENOUS ONCE
Status: COMPLETED | OUTPATIENT
Start: 2019-08-24 | End: 2019-08-24

## 2019-08-24 RX ADMIN — OMEPRAZOLE 20 MG: 20 CAPSULE, DELAYED RELEASE ORAL at 05:34

## 2019-08-24 RX ADMIN — SODIUM CHLORIDE: 9 INJECTION, SOLUTION INTRAVENOUS at 00:05

## 2019-08-24 RX ADMIN — DOXYCYCLINE 100 MG: 100 TABLET, FILM COATED ORAL at 17:41

## 2019-08-24 RX ADMIN — METOPROLOL SUCCINATE 200 MG: 50 TABLET, EXTENDED RELEASE ORAL at 17:40

## 2019-08-24 RX ADMIN — TRAZODONE HYDROCHLORIDE 50 MG: 50 TABLET ORAL at 20:26

## 2019-08-24 RX ADMIN — SODIUM CHLORIDE: 9 INJECTION, SOLUTION INTRAVENOUS at 23:10

## 2019-08-24 RX ADMIN — APIXABAN 5 MG: 5 TABLET, FILM COATED ORAL at 05:33

## 2019-08-24 RX ADMIN — DOXYCYCLINE 100 MG: 100 TABLET, FILM COATED ORAL at 05:34

## 2019-08-24 RX ADMIN — INSULIN HUMAN 3 UNITS: 100 INJECTION, SOLUTION PARENTERAL at 20:26

## 2019-08-24 RX ADMIN — SODIUM CHLORIDE: 9 INJECTION, SOLUTION INTRAVENOUS at 09:56

## 2019-08-24 RX ADMIN — INSULIN HUMAN 2 UNITS: 100 INJECTION, SOLUTION PARENTERAL at 12:27

## 2019-08-24 RX ADMIN — APIXABAN 5 MG: 5 TABLET, FILM COATED ORAL at 17:39

## 2019-08-24 RX ADMIN — MAGNESIUM SULFATE IN WATER 2 G: 40 INJECTION, SOLUTION INTRAVENOUS at 09:56

## 2019-08-24 RX ADMIN — SERTRALINE HYDROCHLORIDE 50 MG: 50 TABLET ORAL at 05:34

## 2019-08-24 RX ADMIN — CEFTRIAXONE SODIUM 1 G: 1 INJECTION, POWDER, FOR SOLUTION INTRAMUSCULAR; INTRAVENOUS at 20:26

## 2019-08-24 RX ADMIN — INSULIN HUMAN 3 UNITS: 100 INJECTION, SOLUTION PARENTERAL at 17:44

## 2019-08-24 RX ADMIN — INSULIN GLARGINE 20 UNITS: 100 INJECTION, SOLUTION SUBCUTANEOUS at 20:28

## 2019-08-24 RX ADMIN — ROSUVASTATIN CALCIUM 5 MG: 5 TABLET, FILM COATED ORAL at 05:34

## 2019-08-24 ASSESSMENT — ENCOUNTER SYMPTOMS
COUGH: 1
DOUBLE VISION: 0
HEMOPTYSIS: 0
BRUISES/BLEEDS EASILY: 0
CHILLS: 0
PALPITATIONS: 0
FEVER: 0
HEARTBURN: 0
NAUSEA: 0
BLURRED VISION: 0
DIZZINESS: 0
MYALGIAS: 0
DEPRESSION: 0
SINUS PAIN: 0
DIAPHORESIS: 0

## 2019-08-24 ASSESSMENT — CHA2DS2 SCORE
VASCULAR DISEASE: NO
AGE 75 OR GREATER: YES
PRIOR STROKE OR TIA OR THROMBOEMBOLISM: NO
CHF OR LEFT VENTRICULAR DYSFUNCTION: NO
DIABETES: YES
SEX: FEMALE
CHA2DS2 VASC SCORE: 5
AGE 65 TO 74: NO
HYPERTENSION: YES

## 2019-08-24 NOTE — PROGRESS NOTES
Bedside report received, assumed pt care. Pt sleeping in bed, no signs of distress or pain noted at this time. Tele monitor on, safety precautions in place, call light in reach. Will continue to monitor.

## 2019-08-24 NOTE — DOCUMENTATION QUERY
[facility]                                                                       Query Response Note      PATIENT:               SHANNAN ALCANTARA  ACCT #:                  [id number]  MRN:                     [id number]  :                      1945  ADMIT DATE:       2019 7:55 PM  DISCH DATE:          RESPONDING  PROVIDER #:        [id number]           QUERY TEXT:    Pulmonary edema is documented in the ED provider note and found on the chest x ray . Please further specify the chronicity and type:    NOTE:  If an appropriate response is not listed below, please respond with a new note.          The patient's Clinical Indicators include:  ? Pulmonary edema per the ED provider note and per the findings on the chest x ray dated .  last echocardiogram on  with LVEF 70%.  Sepsis with unknown source - thought to be pulmonary. pulmonary effusion  ? Treatments: lab draws, chest x ray, antibiotics  ? Risk factors: heart disease, pleural effusion, 02 dependence, chronic respiratory failure, AFIB, CKD  Options provided:   -- Acute Pulmonary Edema, Cardiac Related, Please specify CHF type, if applicable, and acuity of heart failure   -- Acute Pulmonary Edema, Non-Cardiogenic   -- Chronic Pulmonary Edema, Cardiac Related, Please specify CHF type, if applicable, and acuity of heart failure   -- Chronic Pulmonary Edema, Non-Cardiogenic   -- Unable to determine      Query created by: Natalee Houser on 2019 9:20 AM    RESPONSE TEXT:    Acute Pulmonary Edema, Non-Cardiogenic          Electronically signed by:  KULDIP RANGEL 2019 4:26 PM

## 2019-08-24 NOTE — PROGRESS NOTES
[facility] Medicine Daily Progress Note    Date of Service  8/24/2019    Chief Complaint  74 y.o. female admitted 8/22/2019 with sepsis, pneumonia      Interval Problem Update  Patient is resting in bed, she feels better, tolerating diet, no new complaints, no fever no chills no abdominal pain or shortness of breath.    Consultants/Specialty  none    Code Status  Full code    Disposition  Home when stable.    Review of Systems  Review of Systems   Constitutional: Negative for chills, diaphoresis and fever.   HENT: Negative for congestion and sinus pain.    Eyes: Negative for blurred vision and double vision.   Respiratory: Positive for cough. Negative for hemoptysis.    Cardiovascular: Negative for chest pain and palpitations.   Gastrointestinal: Negative for heartburn and nausea.   Genitourinary: Negative for dysuria and urgency.   Musculoskeletal: Negative for myalgias.   Skin: Negative for itching.   Neurological: Negative for dizziness.   Endo/Heme/Allergies: Does not bruise/bleed easily.   Psychiatric/Behavioral: Negative for depression.        Physical Exam  Temp:  [35.8 °C (96.5 °F)-36.6 °C (97.8 °F)] 35.8 °C (96.5 °F)  Pulse:  [] 68  Resp:  [16-18] 18  BP: (103-149)/() 126/70  SpO2:  [93 %-98 %] 94 %    Physical Exam   Constitutional: She is oriented to person, place, and time. She appears well-nourished. No distress.   HENT:   Head: Normocephalic and atraumatic.   Mouth/Throat: No oropharyngeal exudate.   Eyes: Conjunctivae are normal. No scleral icterus.   Neck: Normal range of motion. Neck supple.   Cardiovascular: Regular rhythm and normal heart sounds.   Pulmonary/Chest: Effort normal. No respiratory distress. She has no wheezes. She has rales.   Abdominal: Soft. Bowel sounds are normal. She exhibits no distension. There is no tenderness.   Musculoskeletal: Normal range of motion. She exhibits no edema.   Lymphadenopathy:     She has no cervical adenopathy.   Neurological: She is alert and  oriented to person, place, and time. She displays normal reflexes. She exhibits normal muscle tone.   Skin: No erythema.   Psychiatric: She has a normal mood and affect.   Nursing note and vitals reviewed.      Fluids    Intake/Output Summary (Last 24 hours) at 8/24/2019 1356  Last data filed at 8/24/2019 1000  Gross per 24 hour   Intake 560 ml   Output --   Net 560 ml       Laboratory  Recent Labs     08/22/19 2021 08/24/19  0326   WBC 29.9* 12.3*   RBC 4.70 3.76*   HEMOGLOBIN 13.1 10.4*   HEMATOCRIT 38.3 31.7*   MCV 81.5 84.3   MCH 27.9 27.7   MCHC 34.2 32.8*   RDW 43.3 47.5   PLATELETCT 243 198   MPV 10.1 10.5     Recent Labs     08/22/19 2021 08/24/19  0326   SODIUM 133* 134*   POTASSIUM 3.3* 3.8   CHLORIDE 99 104   CO2 22 22   GLUCOSE 228* 151*   BUN 14 27*   CREATININE 1.09 1.55*   CALCIUM 7.7* 7.0*                   Imaging  CT-ABDOMEN-PELVIS WITH   Final Result      1.  Small BILATERAL pleural effusions, new since prior   2.  Prior cholecystectomy   3.  RIGHT renal cyst   4.  Prior hysterectomy   5.  Atherosclerosis   6.  No evidence of abdominal or pelvic mass, adenopathy, or inflammatory change.               DX-CHEST-PORTABLE (1 VIEW)   Final Result      1.  Findings suggestive of pulmonary edema, less likely atypical infection   2.  Atherosclerosis           Assessment/Plan  Sepsis (HCC)- (present on admission)  Assessment & Plan  This is simple sepsis without endorgan damage  Likely pneumonia  Procalcitonin is elevated continue IV antibiotics cultures negative WBC trending down.      Paroxysmal atrial fibrillation (HCC)- (present on admission)  Assessment & Plan  onToprol and Eliquis    Hyponatremia- (present on admission)  Assessment & Plan  Probably dehydration, improving      Type 2 diabetes mellitus without complication (HCC)- (present on admission)  Assessment & Plan  Uncontrolled with hyperglycemia  Continue lantus and ssi   a1c 13 one month ago will need close  monitoring        Hypomagnesemia  Assessment & Plan  Supplementing with IV magnesium    Hypertension- (present on admission)  Assessment & Plan  On metoprolol, continue monitoring.  Stable    Hypokalemia- (present on admission)  Assessment & Plan  Resolved.    CKD (chronic kidney disease), stage III (HCC)- (present on admission)  Assessment & Plan  Creatinine increased to 1.55, continue IV fluids.  Follow-up in a.m.    Dyslipidemia- (present on admission)  Assessment & Plan  Continue statin       VTE prophylaxis: eliquis

## 2019-08-24 NOTE — CARE PLAN
Problem: Safety  Goal: Will remain free from falls  Outcome: PROGRESSING AS EXPECTED  Intervention: Implement fall precautions  Flowsheets (Taken 8/24/2019 0800)  Environmental Precautions: Treaded Slipper Socks on Patient;Personal Belongings, Wastebasket, Call Bell etc. in Easy Reach;Transferred to Stronger Side;Report Given to Other Health Care Providers Regarding Fall Risk;Bed in Low Position;Communication Sign for Patients & Families;Mobility Assessed & Appropriate Sign Placed     Problem: Knowledge Deficit  Goal: Knowledge of the prescribed therapeutic regimen will improve  Outcome: PROGRESSING AS EXPECTED  Note:   Pt updated on POC and pt questions answered. Pt is AOx4 and verbalized understanding of current treatment plan.

## 2019-08-24 NOTE — PROGRESS NOTES
[facility] Medicine Daily Progress Note    Date of Service  8/23/2019    Chief Complaint  74 y.o. female admitted 8/22/2019 with sepsis, pneumonia      Interval Problem Update  Patient is in bed, fever stable, cough, no pain, tolerating diet, able to speak in full sentences.    Consultants/Specialty  none    Code Status  Full code    Disposition  Home when stable.    Review of Systems  Review of Systems   Constitutional: Positive for fever. Negative for chills.   HENT: Negative for congestion and sinus pain.    Eyes: Negative for blurred vision and double vision.   Respiratory: Positive for cough. Negative for hemoptysis and sputum production.    Cardiovascular: Negative for chest pain, palpitations and orthopnea.   Gastrointestinal: Negative for heartburn, nausea and vomiting.   Genitourinary: Negative for dysuria and urgency.   Musculoskeletal: Negative for myalgias.   Skin: Negative for itching.   Neurological: Negative for dizziness.   Endo/Heme/Allergies: Does not bruise/bleed easily.   Psychiatric/Behavioral: Negative for depression.        Physical Exam  Temp:  [36.2 °C (97.1 °F)-38 °C (100.4 °F)] 36.2 °C (97.1 °F)  Pulse:  [] 81  Resp:  [0-18] 16  BP: ()/(45-83) 95/70  SpO2:  [88 %-97 %] 95 %    Physical Exam   Constitutional: She is oriented to person, place, and time. She appears well-nourished. No distress.   HENT:   Head: Normocephalic and atraumatic.   Mouth/Throat: No oropharyngeal exudate.   Eyes: Conjunctivae are normal. Right eye exhibits no discharge. Left eye exhibits no discharge. No scleral icterus.   Neck: Normal range of motion. Neck supple. No JVD present.   Cardiovascular: Regular rhythm and normal heart sounds.   Pulmonary/Chest: Effort normal. No stridor. No respiratory distress. She has no wheezes. She has rales.   Abdominal: Soft. Bowel sounds are normal. She exhibits no distension. There is no tenderness. There is no rebound.   Musculoskeletal: Normal range of motion. She  exhibits no edema.   Lymphadenopathy:     She has no cervical adenopathy.   Neurological: She is alert and oriented to person, place, and time. She displays normal reflexes. She exhibits normal muscle tone.   Skin: No erythema.   Psychiatric: She has a normal mood and affect.   Nursing note and vitals reviewed.      Fluids  No intake or output data in the 24 hours ending 08/23/19 1740    Laboratory  Recent Labs     08/22/19 2021   WBC 29.9*   RBC 4.70   HEMOGLOBIN 13.1   HEMATOCRIT 38.3   MCV 81.5   MCH 27.9   MCHC 34.2   RDW 43.3   PLATELETCT 243   MPV 10.1     Recent Labs     08/22/19 2021   SODIUM 133*   POTASSIUM 3.3*   CHLORIDE 99   CO2 22   GLUCOSE 228*   BUN 14   CREATININE 1.09   CALCIUM 7.7*                   Imaging  CT-ABDOMEN-PELVIS WITH   Final Result      1.  Small BILATERAL pleural effusions, new since prior   2.  Prior cholecystectomy   3.  RIGHT renal cyst   4.  Prior hysterectomy   5.  Atherosclerosis   6.  No evidence of abdominal or pelvic mass, adenopathy, or inflammatory change.               DX-CHEST-PORTABLE (1 VIEW)   Final Result      1.  Findings suggestive of pulmonary edema, less likely atypical infection   2.  Atherosclerosis           Assessment/Plan  Sepsis (HCC)- (present on admission)  Assessment & Plan  This is simple sepsis without endorgan damage  Likely pneumonia  Continue iv atb, f/u cx  F/u procalcitonin.       Paroxysmal atrial fibrillation (HCC)- (present on admission)  Assessment & Plan  onToprol and Eliquis    Hyponatremia- (present on admission)  Assessment & Plan  Probably dehydration, will f/u in am, continue ivf.      Type 2 diabetes mellitus without complication (HCC)- (present on admission)  Assessment & Plan  Uncontrolled with hyperglycemia  Continue lantus and ssi  F/u a1c,         Hypertension- (present on admission)  Assessment & Plan  On metoprolol, continue monitoring.     Hypokalemia- (present on admission)  Assessment & Plan  Replaced, will f/u in  darci.    CKD (chronic kidney disease), stage III (HCC)- (present on admission)  Assessment & Plan  Cr at baseline, continue monitoring.       Dyslipidemia- (present on admission)  Assessment & Plan  Continue statin         VTE prophylaxis: eliquis

## 2019-08-25 VITALS
HEIGHT: 65 IN | RESPIRATION RATE: 17 BRPM | HEART RATE: 72 BPM | BODY MASS INDEX: 27.25 KG/M2 | WEIGHT: 163.58 LBS | OXYGEN SATURATION: 93 % | TEMPERATURE: 97.1 F | SYSTOLIC BLOOD PRESSURE: 158 MMHG | DIASTOLIC BLOOD PRESSURE: 88 MMHG

## 2019-08-25 LAB
ANION GAP SERPL CALC-SCNC: 6 MMOL/L (ref 0–11.9)
BASOPHILS # BLD AUTO: 0.3 % (ref 0–1.8)
BASOPHILS # BLD: 0.04 K/UL (ref 0–0.12)
BUN SERPL-MCNC: 26 MG/DL (ref 8–22)
CALCIUM SERPL-MCNC: 7.7 MG/DL (ref 8.5–10.5)
CHLORIDE SERPL-SCNC: 105 MMOL/L (ref 96–112)
CO2 SERPL-SCNC: 21 MMOL/L (ref 20–33)
CREAT SERPL-MCNC: 1.28 MG/DL (ref 0.5–1.4)
EOSINOPHIL # BLD AUTO: 0.44 K/UL (ref 0–0.51)
EOSINOPHIL NFR BLD: 3.8 % (ref 0–6.9)
ERYTHROCYTE [DISTWIDTH] IN BLOOD BY AUTOMATED COUNT: 48 FL (ref 35.9–50)
GLUCOSE BLD-MCNC: 127 MG/DL (ref 65–99)
GLUCOSE SERPL-MCNC: 176 MG/DL (ref 65–99)
HCT VFR BLD AUTO: 36.3 % (ref 37–47)
HGB BLD-MCNC: 11.5 G/DL (ref 12–16)
IMM GRANULOCYTES # BLD AUTO: 0.06 K/UL (ref 0–0.11)
IMM GRANULOCYTES NFR BLD AUTO: 0.5 % (ref 0–0.9)
LYMPHOCYTES # BLD AUTO: 1.77 K/UL (ref 1–4.8)
LYMPHOCYTES NFR BLD: 15.3 % (ref 22–41)
MAGNESIUM SERPL-MCNC: 1.8 MG/DL (ref 1.5–2.5)
MCH RBC QN AUTO: 27 PG (ref 27–33)
MCHC RBC AUTO-ENTMCNC: 31.7 G/DL (ref 33.6–35)
MCV RBC AUTO: 85.2 FL (ref 81.4–97.8)
MONOCYTES # BLD AUTO: 0.88 K/UL (ref 0–0.85)
MONOCYTES NFR BLD AUTO: 7.6 % (ref 0–13.4)
NEUTROPHILS # BLD AUTO: 8.4 K/UL (ref 2–7.15)
NEUTROPHILS NFR BLD: 72.5 % (ref 44–72)
NRBC # BLD AUTO: 0 K/UL
NRBC BLD-RTO: 0 /100 WBC
PHOSPHATE SERPL-MCNC: 3.1 MG/DL (ref 2.5–4.5)
PLATELET # BLD AUTO: 244 K/UL (ref 164–446)
PMV BLD AUTO: 10.2 FL (ref 9–12.9)
POTASSIUM SERPL-SCNC: 3.9 MMOL/L (ref 3.6–5.5)
PROCALCITONIN SERPL-MCNC: 12.46 NG/ML
RBC # BLD AUTO: 4.26 M/UL (ref 4.2–5.4)
SODIUM SERPL-SCNC: 132 MMOL/L (ref 135–145)
WBC # BLD AUTO: 11.6 K/UL (ref 4.8–10.8)

## 2019-08-25 PROCEDURE — A9270 NON-COVERED ITEM OR SERVICE: HCPCS | Performed by: INTERNAL MEDICINE

## 2019-08-25 PROCEDURE — 99239 HOSP IP/OBS DSCHRG MGMT >30: CPT | Performed by: HOSPITALIST

## 2019-08-25 PROCEDURE — 83735 ASSAY OF MAGNESIUM: CPT

## 2019-08-25 PROCEDURE — 700105 HCHG RX REV CODE 258: Performed by: INTERNAL MEDICINE

## 2019-08-25 PROCEDURE — 84100 ASSAY OF PHOSPHORUS: CPT

## 2019-08-25 PROCEDURE — 80048 BASIC METABOLIC PNL TOTAL CA: CPT

## 2019-08-25 PROCEDURE — 700102 HCHG RX REV CODE 250 W/ 637 OVERRIDE(OP): Performed by: INTERNAL MEDICINE

## 2019-08-25 PROCEDURE — 36415 COLL VENOUS BLD VENIPUNCTURE: CPT

## 2019-08-25 PROCEDURE — 82962 GLUCOSE BLOOD TEST: CPT

## 2019-08-25 PROCEDURE — 85025 COMPLETE CBC W/AUTO DIFF WBC: CPT

## 2019-08-25 PROCEDURE — 84145 PROCALCITONIN (PCT): CPT

## 2019-08-25 RX ORDER — DOXYCYCLINE 100 MG/1
100 TABLET ORAL EVERY 12 HOURS
Qty: 8 TAB | Refills: 0 | Status: SHIPPED | OUTPATIENT
Start: 2019-08-25 | End: 2019-08-29

## 2019-08-25 RX ORDER — ALBUTEROL SULFATE 90 UG/1
2 AEROSOL, METERED RESPIRATORY (INHALATION) EVERY 6 HOURS PRN
Qty: 8.5 G | Refills: 0 | Status: SHIPPED | OUTPATIENT
Start: 2019-08-25 | End: 2020-09-14

## 2019-08-25 RX ORDER — CEFDINIR 300 MG/1
300 CAPSULE ORAL 2 TIMES DAILY
Qty: 8 CAP | Refills: 0 | Status: SHIPPED | OUTPATIENT
Start: 2019-08-25 | End: 2019-08-29

## 2019-08-25 RX ADMIN — OMEPRAZOLE 20 MG: 20 CAPSULE, DELAYED RELEASE ORAL at 05:37

## 2019-08-25 RX ADMIN — SODIUM CHLORIDE: 9 INJECTION, SOLUTION INTRAVENOUS at 09:24

## 2019-08-25 RX ADMIN — APIXABAN 5 MG: 5 TABLET, FILM COATED ORAL at 05:38

## 2019-08-25 RX ADMIN — SERTRALINE HYDROCHLORIDE 50 MG: 50 TABLET ORAL at 05:37

## 2019-08-25 RX ADMIN — DOXYCYCLINE 100 MG: 100 TABLET, FILM COATED ORAL at 05:37

## 2019-08-25 RX ADMIN — ROSUVASTATIN CALCIUM 5 MG: 5 TABLET, FILM COATED ORAL at 05:37

## 2019-08-25 NOTE — PROGRESS NOTES
Discharge instructions given to patient at bedside, verbalizes understanding and states plans for follow-up with PCP. New and home medication review, post-discharge activity level and worsening of symptoms needing follow-up care discussed. Telemetry monitor/IV cathlon removed. All belongings accounted for, all questions answered at this time.

## 2019-08-25 NOTE — PROGRESS NOTES
Morning report received at bedside. Care assumed. Pt alert and awake sitting up in bed. No complaints of pain or discomfort. AOx4 and on 2L O2. Tele monitor on. Bed in lowest position and locked. Call light and all belongings within reach. No further needs at this time.

## 2019-08-25 NOTE — DISCHARGE INSTRUCTIONS
Discharge Instructions per Rob Sepulveda M.D.    F/u with PCP in 1 week  Repeat bmp in 5 days    DIET: healthy diet    ACTIVITY: as tolerated    DIAGNOSIS: pneumonia    Return to ER if increasing shortness of breath, fever, chills, coughing, etc.                 Discharge Instructions    Discharged to home by car with relative. Discharged via wheelchair, hospital escort: Yes.  Special equipment needed: Not Applicable    Be sure to schedule a follow-up appointment with your primary care doctor or any specialists as instructed.     Discharge Plan:   Diet Plan: Discussed  Activity Level: Discussed  Confirmed Follow up Appointment: Appointment Scheduled  Confirmed Symptoms Management: Discussed  Medication Reconciliation Updated: Yes  Influenza Vaccine Indication: Patient Refuses    I understand that a diet low in cholesterol, fat, and sodium is recommended for good health. Unless I have been given specific instructions below for another diet, I accept this instruction as my diet prescription.     Special Instructions:    Community-Acquired Pneumonia, Adult  Introduction  Pneumonia is an infection of the lungs. One type of pneumonia can happen while a person is in a hospital. A different type can happen when a person is not in a hospital (community-acquired pneumonia). It is easy for this kind to spread from person to person. It can spread to you if you breathe near an infected person who coughs or sneezes. Some symptoms include:  · A dry cough.  · A wet (productive) cough.  · Fever.  · Sweating.  · Chest pain.  Follow these instructions at home:  · Take over-the-counter and prescription medicines only as told by your doctor.  ¨ Only take cough medicine if you are losing sleep.  ¨ If you were prescribed an antibiotic medicine, take it as told by your doctor. Do not stop taking the antibiotic even if you start to feel better.  · Sleep with your head and neck raised (elevated). You can do this by putting a few  pillows under your head, or you can sleep in a recliner.  · Do not use tobacco products. These include cigarettes, chewing tobacco, and e-cigarettes. If you need help quitting, ask your doctor.  · Drink enough water to keep your pee (urine) clear or pale yellow.  A shot (vaccine) can help prevent pneumonia. Shots are often suggested for:  · People older than 65 years of age.  · People older than 19 years of age:  ¨ Who are having cancer treatment.  ¨ Who have long-term (chronic) lung disease.  ¨ Who have problems with their body's defense system (immune system).  You may also prevent pneumonia if you take these actions:  · Get the flu (influenza) shot every year.  · Go to the dentist as often as told.  · Wash your hands often. If soap and water are not available, use hand .  Contact a doctor if:  · You have a fever.  · You lose sleep because your cough medicine does not help.  Get help right away if:  · You are short of breath and it gets worse.  · You have more chest pain.  · Your sickness gets worse. This is very serious if:  ¨ You are an older adult.  ¨ Your body's defense system is weak.  · You cough up blood.  This information is not intended to replace advice given to you by your health care provider. Make sure you discuss any questions you have with your health care provider.  Document Released: 06/05/2009 Document Revised: 05/25/2017 Document Reviewed: 04/13/2016  © 2017 Elsevier      · Is patient discharged on Warfarin / Coumadin?   No     Depression / Suicide Risk    As you are discharged from this [facility] facility, it is important to learn how to keep safe from harming yourself.    Recognize the warning signs:  · Abrupt changes in personality, positive or negative- including increase in energy   · Giving away possessions  · Change in eating patterns- significant weight changes-  positive or negative  · Change in sleeping patterns- unable to sleep or sleeping all the time   · Unwillingness  or inability to communicate  · Depression  · Unusual sadness, discouragement and loneliness  · Talk of wanting to die  · Neglect of personal appearance   · Rebelliousness- reckless behavior  · Withdrawal from people/activities they love  · Confusion- inability to concentrate     If you or a loved one observes any of these behaviors or has concerns about self-harm, here's what you can do:  · Talk about it- your feelings and reasons for harming yourself  · Remove any means that you might use to hurt yourself (examples: pills, rope, extension cords, firearm)  · Get professional help from the community (Mental Health, Substance Abuse, psychological counseling)  · Do not be alone:Call your Safe Contact- someone whom you trust who will be there for you.  · Call your local CRISIS HOTLINE 496-9913 or 678-963-8923  · Call your local Children's Mobile Crisis Response Team [place] (065) 492-6223 or www.Company  · Call the toll free National Suicide Prevention Hotlines   · National Suicide Prevention Lifeline 677-473-ALOX (9672)  · National Hope Line Network 800-SUICIDE (084-2172)

## 2019-08-25 NOTE — PROGRESS NOTES
Ambulated patient for home O2 test. Patient O2 sats at 90% on RA. Notified MD of findings. Awaiting discharge instructions.

## 2019-08-25 NOTE — CARE PLAN
Problem: Infection  Goal: Will remain free from infection  Outcome: PROGRESSING AS EXPECTED  Note:   Patient admitted from infection. No fever, chills or other signs or symptoms of infection. Antibiotic therapy in place. Will continue to monitor     Problem: Bowel/Gastric:  Goal: Normal bowel function is maintained or improved  Outcome: PROGRESSING AS EXPECTED  Note:   Patient having normal bowel movements. Bowel protocol in place

## 2019-08-25 NOTE — DISCHARGE PLANNING
Orders and face to face for HH are now in. REJI spoke with DIPAK Foster; she will notify [facility] and make sure there is not anything else that we need to do.

## 2019-08-25 NOTE — DISCHARGE PLANNING
ATTN: Case Management  RE: Referral for Home Health    As of 08/25/2019, we have accepted the Home Health referral for the patient listed above.    A [facility] clinician will be out to see the patient within 48 hours. If you have any questions or concerns regarding the patient’s transition to Home Health, please do not hesitate to contact us at [phone].      We look forward to collaborating with you,  [facility] Home Health Team

## 2019-08-25 NOTE — FACE TO FACE
Face to Face Supporting Documentation - Home Health    The encounter with this patient was in whole or in part the primary reason for home health admission.    Date of encounter:   Patient:                    MRN:                       YOB: 2021  Amrita Torrez  [id number]  1945     Home health to see patient for:  Skilled Nursing care for assessment, interventions & education    Skilled need for:  Comment: continue pt/ot and safety    Skilled nursing interventions to include:  Comment: continue pt/ot and safety    Homebound status evidenced by:  Needs the assistance of another person in order to leave the home. Leaving home requires a considerable and taxing effort. There is a normal inability to leave the home.    Community Physician to provide follow up care: Fitz Turpin D.O.     Optional Interventions? No      I certify the face to face encounter for this home health care referral meets the CMS requirements and the encounter/clinical assessment with the patient was, in whole, or in part, for the medical condition(s) listed above, which is the primary reason for home health care. Based on my clinical findings: the service(s) are medically necessary, support the need for home health care, and the homebound criteria are met.  I certify that this patient has had a face to face encounter by myself.  Rob Sepulveda M.D. - NPI: [id number]

## 2019-08-25 NOTE — DISCHARGE PLANNING
Anticipated Discharge Disposition: Home with HH    Action: CM received call from [facility] with [facility] stating that pt has been discharged but she was on service with [facility] and no new orders for HH were placed.   CM sent tiger text to Dr. Dickinson requesting order and face to face.     Barriers to Discharge:     Plan: [facility] requesting new orders for HH. [place] text sent to Dr. Dickinson.

## 2019-08-26 ENCOUNTER — HOME CARE VISIT (OUTPATIENT)
Dept: HOME HEALTH SERVICES | Facility: HOME HEALTHCARE | Age: 74
End: 2019-08-26
Payer: MEDICARE

## 2019-08-26 ENCOUNTER — PATIENT OUTREACH (OUTPATIENT)
Dept: HEALTH INFORMATION MANAGEMENT | Facility: OTHER | Age: 74
End: 2019-08-26

## 2019-08-26 NOTE — DISCHARGE SUMMARY
Discharge Summary    CHIEF COMPLAINT ON ADMISSION  Chief Complaint   Patient presents with   • N/V   • Chills   • Fever       Reason for Admission  Pneumonia    Admission Date  8/22/2019    CODE STATUS  Full code    HPI & HOSPITAL COURSE  Please see original H&P for specific information patient was admitted to sepsis due to pneumonia acute on chronic respiratory failure, patient was started on IV antibiotics, O2 per protocol, patient responded well to treatment, she stated she normally is on 2 L of oxygen at night, patient was able to ambulate today oxygen test with ambulation showed the patient was able to ambulate without oxygen without no shortness of breath, patient is afebrile, her white blood cell count is trending down procalcitonin is trending down, patient will continue on oral antibiotics to complete treatment for her pneumonia, patient is going to be discharged home today in stable condition, cultures are negative, patient expressed understanding of her discharge plan and agree with it all questions answered.       Therefore, she is discharged in good and stable condition to home with organized home healthcare and close outpatient follow-up.    The patient met 2-midnight criteria for an inpatient stay at the time of discharge.    Discharge Date  8/25/2019    FOLLOW UP ITEMS POST DISCHARGE  Primary care physician in 1 week    DISCHARGE DIAGNOSES  Active Problems:    Paroxysmal atrial fibrillation (HCC) POA: Yes    Sepsis (HCC) POA: Yes    Type 2 diabetes mellitus without complication (HCC) POA: Yes    Hyponatremia POA: Yes    Hypomagnesemia POA: Unknown    Dyslipidemia POA: Yes    CKD (chronic kidney disease), stage III (HCC) POA: Yes    Hypokalemia POA: Yes    Hypertension POA: Yes  Resolved Problems:    * No resolved hospital problems. *      FOLLOW UP  Future Appointments   Date Time Provider Department Center   8/26/2019 To Be Determined Bettina Hernandez R.N. [facility] None   8/29/2019 To Be Determined  Bettina Hernandez R.N. [facility] None   9/3/2019 To Be Determined DEVI ConwayN. [facility] None   9/6/2019  8:45 AM Sylvester Tobar M.D. [facility] None   9/6/2019 To Be Determined DEVI ConwayN. [facility] None   9/10/2019 To Be Determined DEVI ConwayN. [facility] None   9/13/2019 To Be Determined DEVI ConwayN. [facility] None   9/17/2019 To Be Determined DEVI ConwayN. [facility] None   9/24/2019 To Be Determined DEVI ConwayN. [facility] None   10/1/2019 To Be Determined DEVI ConwayN. [facility] None   10/8/2019 To Be Determined DEVI ConwayN. [facility] None   10/15/2019 To Be Determined DEVI ConwayN. [facility] None   11/15/2019  8:15 AM Sylvester Tobar M.D. [facility] None     Fitz Turpin D.O.  [address] [facility] [id number] [phone]    In 1 week        MEDICATIONS ON DISCHARGE     Medication List      START taking these medications      Instructions   albuterol 108 (90 Base) MCG/ACT Aers inhalation aerosol   Inhale 2 Puffs by mouth every 6 hours as needed for Shortness of Breath.  Dose:  2 Puff     cefdinir 300 MG Caps  Commonly known as:  OMNICEF   Take 1 Cap by mouth 2 times a day for 4 days.  Dose:  300 mg     doxycycline monohydrate 100 MG tablet  Commonly known as:  ADOXA   Take 1 Tab by mouth every 12 hours for 4 days.  Dose:  100 mg        CONTINUE taking these medications      Instructions   amLODIPine 10 MG Tabs  Commonly known as:  NORVASC   Take 10 mg by mouth every day.  Dose:  10 mg     ELIQUIS 5mg Tabs  Generic drug:  apixaban   Take 5 mg by mouth 2 Times a Day. Indications: Cerebrovascular Accident or Stroke, Temporary Stroke  Dose:  5 mg     ferrous sulfate 325 (65 Fe) MG tablet   Take 325 mg by mouth every Monday, Wednesday, and Friday.  Dose:  325 mg     insulin glargine 100 UNIT/ML Soln  Commonly known as:  LANTUS   Inject 20 Units as instructed every evening. Indications: Type 2 Diabetes  Dose:  20 Units     OMEGA-3 PO   Take 1 Capsule by mouth every day at 6 PM. Unknown  OTC Strength.  Indications: suppliment  Dose:  1 Capsule     omeprazole 20 MG delayed-release capsule  Commonly known as:  PRILOSEC   Take 20 mg by mouth every day. Indications: Heartburn  Dose:  20 mg     POTASSIUM PO   Take 1 Dose by mouth every morning. Unknown OTC Strength.  Dose:  1 Dose     rosuvastatin 5 MG Tabs  Commonly known as:  CRESTOR   Take 5 mg by mouth every morning.  Dose:  5 mg     sertraline 50 MG Tabs  Commonly known as:  ZOLOFT   Take 50 mg by mouth every day. Indications: Major Depressive Disorder  Dose:  50 mg     TOPROL  MG XL tablet  Generic drug:  metoprolol   Take 200 mg by mouth every day.  Dose:  200 mg     traZODone 50 MG Tabs  Commonly known as:  DESYREL   Take 50 mg by mouth every bedtime.  Dose:  50 mg     vitamin D (Ergocalciferol) 54216 units Caps capsule  Commonly known as:  DRISDOL   Take 50,000 Units by mouth every Sunday.  Dose:  50,000 Units     XALATAN 0.005 % Soln  Generic drug:  latanoprost   Place 1 Drop in both eyes every evening.  Dose:  1 Drop        STOP taking these medications    nitrofurantoin monohyd macro 100 MG Caps  Commonly known as:  MACROBID            Allergies  Allergies   Allergen Reactions   • Pcn [Penicillins] Hives, Shortness of Breath and Swelling   • Sulfa Drugs Hives, Shortness of Breath and Swelling       DIET  Healthy diet    ACTIVITY  As tolerated.  Weight bearing as tolerated    CONSULTATIONS  None    PROCEDURES  None    LABORATORY  Lab Results   Component Value Date    SODIUM 132 (L) 08/25/2019    POTASSIUM 3.9 08/25/2019    CHLORIDE 105 08/25/2019    CO2 21 08/25/2019    GLUCOSE 176 (H) 08/25/2019    BUN 26 (H) 08/25/2019    CREATININE 1.28 08/25/2019        Lab Results   Component Value Date    WBC 11.6 (H) 08/25/2019    HEMOGLOBIN 11.5 (L) 08/25/2019    HEMATOCRIT 36.3 (L) 08/25/2019    PLATELETCT 244 08/25/2019        Total time of the discharge process exceeds 36 minutes.

## 2019-08-27 LAB
BACTERIA BLD CULT: NORMAL
BACTERIA BLD CULT: NORMAL
SIGNIFICANT IND 70042: NORMAL
SIGNIFICANT IND 70042: NORMAL
SITE SITE: NORMAL
SITE SITE: NORMAL
SOURCE SOURCE: NORMAL
SOURCE SOURCE: NORMAL

## 2019-08-29 ENCOUNTER — ANTICOAGULATION MONITORING (OUTPATIENT)
Dept: MEDICAL GROUP | Facility: PHYSICIAN GROUP | Age: 74
End: 2019-08-29

## 2019-08-29 ENCOUNTER — HOME CARE VISIT (OUTPATIENT)
Dept: HOME HEALTH SERVICES | Facility: HOME HEALTHCARE | Age: 74
End: 2019-08-29
Payer: MEDICARE

## 2019-08-29 VITALS
RESPIRATION RATE: 18 BRPM | SYSTOLIC BLOOD PRESSURE: 125 MMHG | OXYGEN SATURATION: 98 % | DIASTOLIC BLOOD PRESSURE: 80 MMHG | TEMPERATURE: 98.7 F | WEIGHT: 163 LBS | BODY MASS INDEX: 27.16 KG/M2 | HEIGHT: 65 IN | HEART RATE: 93 BPM

## 2019-08-29 PROCEDURE — G0493 RN CARE EA 15 MIN HH/HOSPICE: HCPCS

## 2019-08-29 SDOH — ECONOMIC STABILITY: HOUSING INSECURITY
HOME SAFETY: OXYGEN SAFETY RISK ASSESSMENT PERFORMED. PATIENT DOES HAVE A NO SMOKING SIGN POSTED IN THE HOME. PATIENT DOES HAVE A WORKING FIRE EXTINGUISHER PRESENT IN THE HOME. SMOKE ALARMS ARE PRESENT AND FUNCTIONAL ON EACH LEVEL OF THE HOME. PATIENT DOES HAVE A

## 2019-08-29 SDOH — ECONOMIC STABILITY: HOUSING INSECURITY
HOME SAFETY: FIRE ESCAPE PLAN DEVELOPED. PATIENT DOES NOT HAVE FLAMMABLE MATERIALS PRESENT IN THE HOME PRESENTING A FIRE HAZARD. NO EVIDENCE FOUND OF SMOKING MATERIALS PRESENT IN THE HOME.

## 2019-08-29 SDOH — ECONOMIC STABILITY: HOUSING INSECURITY: EVIDENCE OF SMOKING MATERIAL: 0

## 2019-08-29 ASSESSMENT — ENCOUNTER SYMPTOMS
NAUSEA: DENIES
MENTAL STATUS CHANGE: 0
VOMITING: DENIES
SEVERE DYSPNEA: 1

## 2019-08-29 ASSESSMENT — PATIENT HEALTH QUESTIONNAIRE - PHQ9
2. FEELING DOWN, DEPRESSED, IRRITABLE, OR HOPELESS: 00
1. LITTLE INTEREST OR PLEASURE IN DOING THINGS: 00
CLINICAL INTERPRETATION OF PHQ2 SCORE: 0

## 2019-08-29 ASSESSMENT — ACTIVITIES OF DAILY LIVING (ADL): OASIS_M1830: 03

## 2019-08-29 NOTE — PROGRESS NOTES
Received referral from [facility]. Medications reviewed. No clinically significant interactions noted.

## 2019-09-02 ENCOUNTER — HOME CARE VISIT (OUTPATIENT)
Dept: HOME HEALTH SERVICES | Facility: HOME HEALTHCARE | Age: 74
End: 2019-09-02
Payer: MEDICARE

## 2019-09-03 ENCOUNTER — HOME CARE VISIT (OUTPATIENT)
Dept: HOME HEALTH SERVICES | Facility: HOME HEALTHCARE | Age: 74
End: 2019-09-03
Payer: MEDICARE

## 2019-09-03 VITALS
HEART RATE: 78 BPM | DIASTOLIC BLOOD PRESSURE: 78 MMHG | RESPIRATION RATE: 18 BRPM | OXYGEN SATURATION: 98 % | SYSTOLIC BLOOD PRESSURE: 130 MMHG | TEMPERATURE: 98.9 F

## 2019-09-03 VITALS
TEMPERATURE: 98.2 F | HEART RATE: 84 BPM | SYSTOLIC BLOOD PRESSURE: 132 MMHG | RESPIRATION RATE: 18 BRPM | OXYGEN SATURATION: 98 % | DIASTOLIC BLOOD PRESSURE: 68 MMHG

## 2019-09-03 PROCEDURE — G0299 HHS/HOSPICE OF RN EA 15 MIN: HCPCS

## 2019-09-03 PROCEDURE — G0151 HHCP-SERV OF PT,EA 15 MIN: HCPCS

## 2019-09-03 SDOH — ECONOMIC STABILITY: HOUSING INSECURITY
HOME SAFETY: OXYGEN SAFETY RISK ASSESSMENT PERFORMED. PATIENT PT WAS GIVEN A NO SMOKING SIGN AND PROVIDED EDUCATION ABOUT WHY IT IS IMPORTANT TO PLACE ONE. PATIENT DOES HAVE A WORKING FIRE EXTINGUISHER PRESENT IN THE HOME. SMOKE ALARMS ARE PRESENT AND FUNCTIONAL

## 2019-09-03 SDOH — ECONOMIC STABILITY: HOUSING INSECURITY
HOME SAFETY: ON EACH LEVEL OF THE HOME. PATIENT DOES HAVE A FIRE ESCAPE PLAN DEVELOPED. PATIENT DOES NOT HAVE FLAMMABLE MATERIALS PRESENT IN THE HOME PRESENTING A FIRE HAZARD. NO EVIDENCE FOUND OF SMOKING MATERIALS PRESENT IN THE HOME.

## 2019-09-03 ASSESSMENT — ACTIVITIES OF DAILY LIVING (ADL)
IADLS_COMMENTS: <!--EPICS-->SEE OT EVAL<!--EPICE-->
ADLS_COMMENTS: <!--EPICS-->SEE OT EVAL<!--EPICE-->

## 2019-09-03 ASSESSMENT — ENCOUNTER SYMPTOMS: SEVERE DYSPNEA: 1

## 2019-09-04 ENCOUNTER — HOME CARE VISIT (OUTPATIENT)
Dept: HOME HEALTH SERVICES | Facility: HOME HEALTHCARE | Age: 74
End: 2019-09-04
Payer: MEDICARE

## 2019-09-04 ASSESSMENT — ENCOUNTER SYMPTOMS
NAUSEA: DENIES
VOMITING: DENIES
DEBILITATING PAIN: 1
SEVERE DYSPNEA: 1

## 2019-09-05 ENCOUNTER — HOME CARE VISIT (OUTPATIENT)
Dept: HOME HEALTH SERVICES | Facility: HOME HEALTHCARE | Age: 74
End: 2019-09-05
Payer: MEDICARE

## 2019-09-05 PROCEDURE — G0299 HHS/HOSPICE OF RN EA 15 MIN: HCPCS

## 2019-09-06 ENCOUNTER — OFFICE VISIT (OUTPATIENT)
Dept: CARDIOLOGY | Facility: MEDICAL CENTER | Age: 74
End: 2019-09-06
Payer: MEDICARE

## 2019-09-06 ENCOUNTER — HOSPITAL ENCOUNTER (OUTPATIENT)
Dept: RADIOLOGY | Facility: MEDICAL CENTER | Age: 74
End: 2019-09-06
Attending: FAMILY MEDICINE
Payer: MEDICARE

## 2019-09-06 ENCOUNTER — HOME CARE VISIT (OUTPATIENT)
Dept: HOME HEALTH SERVICES | Facility: HOME HEALTHCARE | Age: 74
End: 2019-09-06
Payer: MEDICARE

## 2019-09-06 VITALS
HEIGHT: 65 IN | OXYGEN SATURATION: 95 % | DIASTOLIC BLOOD PRESSURE: 70 MMHG | WEIGHT: 158 LBS | SYSTOLIC BLOOD PRESSURE: 126 MMHG | HEART RATE: 100 BPM | BODY MASS INDEX: 26.33 KG/M2

## 2019-09-06 DIAGNOSIS — I48.0 PAROXYSMAL ATRIAL FIBRILLATION (HCC): ICD-10-CM

## 2019-09-06 DIAGNOSIS — E11.9 TYPE 2 DIABETES MELLITUS WITHOUT COMPLICATION, WITH LONG-TERM CURRENT USE OF INSULIN (HCC): ICD-10-CM

## 2019-09-06 DIAGNOSIS — Z79.4 TYPE 2 DIABETES MELLITUS WITHOUT COMPLICATION, WITH LONG-TERM CURRENT USE OF INSULIN (HCC): ICD-10-CM

## 2019-09-06 DIAGNOSIS — E78.5 DYSLIPIDEMIA: ICD-10-CM

## 2019-09-06 DIAGNOSIS — I25.10 CORONARY ARTERY DISEASE INVOLVING NATIVE CORONARY ARTERY OF NATIVE HEART WITHOUT ANGINA PECTORIS: ICD-10-CM

## 2019-09-06 DIAGNOSIS — N18.30 CKD (CHRONIC KIDNEY DISEASE), STAGE III (HCC): ICD-10-CM

## 2019-09-06 DIAGNOSIS — J18.9 UNRESOLVED PNEUMONIA: ICD-10-CM

## 2019-09-06 DIAGNOSIS — I35.0 NONRHEUMATIC AORTIC VALVE STENOSIS: ICD-10-CM

## 2019-09-06 PROBLEM — A41.9 SEPSIS (HCC): Status: RESOLVED | Noted: 2019-08-22 | Resolved: 2019-09-06

## 2019-09-06 PROCEDURE — 71046 X-RAY EXAM CHEST 2 VIEWS: CPT

## 2019-09-06 PROCEDURE — 99215 OFFICE O/P EST HI 40 MIN: CPT | Performed by: INTERNAL MEDICINE

## 2019-09-06 NOTE — PROGRESS NOTES
Chief Complaint   Patient presents with   • Atrial Fibrillation     f/v: 10MON   • Coronary Artery Disease       Subjective:   Amrita Torrez is a 74 y.o. female who presents today for follow-up of paroxysmal atrial fibrillation, chronic oral anticoagulation, coronary disease status post STEMI 2016 with LAD PCI (Dr. Tobar).  She was recently hospitalized for pneumonia and sepsis.  She is convalescing from this.  She has some mild lower extremity edema since her hospital stay.  Recent echocardiogram shows progression of her aortic stenosis to borderline moderate, it is asymptomatic.  Normal LV function.  She is NYHA class II at baseline but is somewhat worse due to her recent pneumonia.  Taking her medications as directed with no bleeding comp occasions.    Past Medical History:   Diagnosis Date   • CAD (coronary artery disease)    • Diabetes     oral meds   • Goiter    • Heart attack (HCC)    • Hyperlipidemia    • Hypertension    • MI (myocardial infarction) (HCC) 2016    x2 stints placed   • Pericardial effusion    • Thyroid nodule      Past Surgical History:   Procedure Laterality Date   • LAPAROSCOPY  12/1/2014    Performed by Abdi Navarro M.D. at SURGERY SAME DAY [facility] ORS   • BAN BY LAPAROSCOPY  9/14/2012    Performed by ABDI NAVARRO at SURGERY SAME DAY [facility] ORS   • OTHER ORTHOPEDIC SURGERY  7/11    knee   • GYN SURGERY  1978    fibroids   • OTHER ABDOMINAL SURGERY  1966    appendectomy   • GYN SURGERY      hysterectomy   • ZZZ CARDIAC CATH       History reviewed. No pertinent family history.  Social History     Socioeconomic History   • Marital status:      Spouse name: Not on file   • Number of children: Not on file   • Years of education: Not on file   • Highest education level: Not on file   Occupational History   • Not on file   Social Needs   • Financial resource strain: Not on file   • Food insecurity:     Worry: Not on file     Inability: Not on file   •  Transportation needs:     Medical: Not on file     Non-medical: Not on file   Tobacco Use   • Smoking status: Former Smoker     Packs/day: 1.00     Years: 40.00     Pack years: 40.00     Types: Cigarettes     Last attempt to quit: 1978     Years since quittin.7   • Smokeless tobacco: Never Used   Substance and Sexual Activity   • Alcohol use: No   • Drug use: No   • Sexual activity: Not on file   Lifestyle   • Physical activity:     Days per week: Not on file     Minutes per session: Not on file   • Stress: Not on file   Relationships   • Social connections:     Talks on phone: Not on file     Gets together: Not on file     Attends Yarsani service: Not on file     Active member of club or organization: Not on file     Attends meetings of clubs or organizations: Not on file     Relationship status: Not on file   • Intimate partner violence:     Fear of current or ex partner: Not on file     Emotionally abused: Not on file     Physically abused: Not on file     Forced sexual activity: Not on file   Other Topics Concern   • Not on file   Social History Narrative   • Not on file     Allergies   Allergen Reactions   • Pcn [Penicillins] Hives, Shortness of Breath and Swelling   • Sulfa Drugs Hives, Shortness of Breath and Swelling     Outpatient Encounter Medications as of 2019   Medication Sig Dispense Refill   • Home Care Oxygen Inhale 2 L/min by mouth Continuous.     • insulin regular (NOVOLIN R) 100 Unit/mL Solution Inject 2-8 Units as instructed 3 times a day before meals. PER SLIDING SCALE  150-200 = 2 UNITS  201-250 = 4 UNITS  251-300 = 6 UNITS  301-350 = 8 UNITS  >400 CALL MD     • acetaminophen (TYLENOL) 500 MG Tab Take 500-1,000 mg by mouth every 6 hours as needed. Indications: Pain     • albuterol 108 (90 Base) MCG/ACT Aero Soln inhalation aerosol Inhale 2 Puffs by mouth every 6 hours as needed for Shortness of Breath. 8.5 g 0   • Omega-3 Fatty Acids (OMEGA-3 PO) Take 1 Capsule by mouth every day  "at 6 PM. Unknown OTC Strength.  Indications: suppliment     • sertraline (ZOLOFT) 50 MG Tab Take 50 mg by mouth every day. Indications: Major Depressive Disorder     • amLODIPine (NORVASC) 10 MG Tab Take 10 mg by mouth every day. Indications: High Blood Pressure Disorder     • ferrous sulfate 325 (65 Fe) MG tablet Take 325 mg by mouth every Monday, Wednesday, and Friday. Indications: supplement     • metoprolol (TOPROL XL) 200 MG XL tablet Take 200 mg by mouth every day. Indications: High Blood Pressure Disorder     • apixaban (ELIQUIS) 5mg Tab Take 5 mg by mouth 2 Times a Day. Indications: Cerebrovascular Accident or Stroke, Temporary Stroke     • rosuvastatin (CRESTOR) 5 MG Tab Take 5 mg by mouth every morning. Indications: High Amount of Fats in the Blood     • POTASSIUM PO Take 1 Dose by mouth every morning. Unknown OTC Strength.     • omeprazole (PRILOSEC) 20 MG delayed-release capsule Take 20 mg by mouth every day. Indications: Heartburn     • vitamin D, Ergocalciferol, (DRISDOL) 17151 units Cap capsule Take 50,000 Units by mouth every Sunday.     • latanoprost (XALATAN) 0.005 % Solution Place 1 Drop in both eyes every evening.     • insulin glargine (LANTUS) 100 UNIT/ML Solution Inject 20 Units as instructed every evening. Indications: Type 2 Diabetes     • trazodone (DESYREL) 50 MG Tab Take 50 mg by mouth every bedtime. Indications: Trouble Sleeping       No facility-administered encounter medications on file as of 9/6/2019.      Review of Systems   All other systems reviewed and are negative.       Objective:   /70 (BP Location: Left arm, Patient Position: Sitting, BP Cuff Size: Adult)   Pulse 100   Ht 1.651 m (5' 5\")   Wt 71.7 kg (158 lb)   SpO2 95%   BMI 26.29 kg/m²     Physical Exam   Constitutional: She is oriented to person, place, and time. She appears well-developed and well-nourished. No distress.   HENT:   Head: Normocephalic and atraumatic.   Right Ear: External ear normal.   Left Ear: " External ear normal.   Mouth/Throat: No oropharyngeal exudate.   Eyes: Pupils are equal, round, and reactive to light. Conjunctivae and EOM are normal. Right eye exhibits no discharge. Left eye exhibits no discharge. No scleral icterus.   Neck: Normal range of motion. Neck supple. No JVD present. No tracheal deviation present. No thyromegaly present.   Cardiovascular: Normal rate, regular rhythm, S1 normal, S2 normal and intact distal pulses. PMI is not displaced. Exam reveals no gallop, no S3, no S4 and no friction rub.   Murmur ( 3 out of 6 systolic ejection murmur right upper sternal border ) heard.  Pulses:       Carotid pulses are 2+ on the right side, and 2+ on the left side.       Radial pulses are 2+ on the right side, and 2+ on the left side.        Popliteal pulses are 2+ on the right side, and 2+ on the left side.        Dorsalis pedis pulses are 2+ on the right side, and 2+ on the left side.        Posterior tibial pulses are 2+ on the right side, and 2+ on the left side.   Pulmonary/Chest: Effort normal and breath sounds normal. No respiratory distress. She has no wheezes. She has no rales. She exhibits no tenderness.   Abdominal: Soft. Bowel sounds are normal. She exhibits no distension. There is no tenderness.   Musculoskeletal: Normal range of motion. She exhibits edema ( 1-2+ bilateral lower extremities). She exhibits no tenderness.   Neurological: She is alert and oriented to person, place, and time. No cranial nerve deficit (Cranial nerves II through XII grossly intact).   Skin: Skin is warm and dry. No rash noted. She is not diaphoretic. No erythema.   Psychiatric: She has a normal mood and affect. Her behavior is normal. Judgment and thought content normal.   Vitals reviewed.    LABS:  Lab Results   Component Value Date/Time    CHOLSTRLTOT 183 02/19/2019 08:02 PM     (H) 02/19/2019 08:02 PM    HDL 36 (A) 02/19/2019 08:02 PM    TRIGLYCERIDE 184 (H) 02/19/2019 08:02 PM       Lab Results    Component Value Date/Time    WBC 11.6 (H) 08/25/2019 02:55 AM    RBC 4.26 08/25/2019 02:55 AM    HEMOGLOBIN 11.5 (L) 08/25/2019 02:55 AM    HEMATOCRIT 36.3 (L) 08/25/2019 02:55 AM    MCV 85.2 08/25/2019 02:55 AM    NEUTSPOLYS 72.50 (H) 08/25/2019 02:55 AM    LYMPHOCYTES 15.30 (L) 08/25/2019 02:55 AM    MONOCYTES 7.60 08/25/2019 02:55 AM    EOSINOPHILS 3.80 08/25/2019 02:55 AM    BASOPHILS 0.30 08/25/2019 02:55 AM    HYPOCHROMIA 1+ 09/12/2012 04:16 PM    ANISOCYTOSIS 1+ 08/22/2019 08:21 PM     Lab Results   Component Value Date/Time    SODIUM 132 (L) 08/25/2019 02:55 AM    POTASSIUM 3.9 08/25/2019 02:55 AM    CHLORIDE 105 08/25/2019 02:55 AM    CO2 21 08/25/2019 02:55 AM    GLUCOSE 176 (H) 08/25/2019 02:55 AM    BUN 26 (H) 08/25/2019 02:55 AM    CREATININE 1.28 08/25/2019 02:55 AM     Lab Results   Component Value Date    HBA1C 13.4 (H) 07/18/2019      Lab Results   Component Value Date/Time    ALKPHOSPHAT 51 08/22/2019 08:21 PM    ASTSGOT 12 08/22/2019 08:21 PM    ALTSGPT 9 08/22/2019 08:21 PM    TBILIRUBIN 1.1 08/22/2019 08:21 PM      Lab Results   Component Value Date/Time    BNPBTYPENAT 166 (H) 12/06/2018 09:05 PM      No results found for: TSH  Lab Results   Component Value Date/Time    PROTHROMBTM 14.7 (H) 07/18/2019 02:43 PM    INR 1.12 07/18/2019 02:43 PM        STRESS (10/14/2018):  Normal left ventricular size, ejection fraction, and wall motion.   No evidence of significant jeopardized viable myocardium or prior myocardial    infarction.    ECHO CONCLUSIONS (11/17/2016):  Compared to the images of the study done 2/15/16 - there has been.     Mild concentric left ventricular hypertrophy.  Normal left ventricular systolic function.  Left ventricular ejection fraction is visually estimated to be 65%.  Grade I diastolic dysfunction.  Mild aortic stenosis with peak and mean grdients of 15 and 9   respectively.  Right ventricular systolic pressure is estimated to be 20 mmHg.  Normal aortic root for body  surface area.      Assessment:     1. Paroxysmal atrial fibrillation (HCC)     2. Coronary artery disease involving native coronary artery of native heart without angina pectoris     3. Type 2 diabetes mellitus without complication, with long-term current use of insulin (HCC)     4. CKD (chronic kidney disease), stage III (HCC)     5. Nonrheumatic aortic valve stenosis     6. Dyslipidemia         Medical Decision Making:  Today's Assessment / Status / Plan:     Echo therapy is appropriate.  Tolerating anticoagulation.  Renal function is stable.  Aortic stenosis is now moderate.  She is recovering from her pneumonia.  Overall she states she is feeling better every day.    1.  Continue oral anticoagulation with direct oral anticoagulant  2.  Continue other cardiac medical therapy  3.  Follow-up yearly with cardiology with a yearly echocardiogram to be ordered by cardiology    Thank you for this interesting consultation. It was my pleasure to see Amrita Torrez today.    Sylvester Tobar MD, FACC, [facility]  Division of Interventional Cardiology  [facility]

## 2019-09-07 VITALS
DIASTOLIC BLOOD PRESSURE: 80 MMHG | HEART RATE: 88 BPM | OXYGEN SATURATION: 95 % | RESPIRATION RATE: 18 BRPM | TEMPERATURE: 98.1 F | SYSTOLIC BLOOD PRESSURE: 128 MMHG

## 2019-09-08 ASSESSMENT — ENCOUNTER SYMPTOMS
NAUSEA: DENIES
VOMITING: DENIES

## 2019-09-09 ENCOUNTER — HOME CARE VISIT (OUTPATIENT)
Dept: HOME HEALTH SERVICES | Facility: HOME HEALTHCARE | Age: 74
End: 2019-09-09
Payer: MEDICARE

## 2019-09-10 ENCOUNTER — HOME CARE VISIT (OUTPATIENT)
Dept: HOME HEALTH SERVICES | Facility: HOME HEALTHCARE | Age: 74
End: 2019-09-10
Payer: MEDICARE

## 2019-09-11 ENCOUNTER — HOME CARE VISIT (OUTPATIENT)
Dept: HOME HEALTH SERVICES | Facility: HOME HEALTHCARE | Age: 74
End: 2019-09-11
Payer: MEDICARE

## 2019-09-11 VITALS
OXYGEN SATURATION: 95 % | DIASTOLIC BLOOD PRESSURE: 80 MMHG | SYSTOLIC BLOOD PRESSURE: 110 MMHG | HEART RATE: 90 BPM | RESPIRATION RATE: 18 BRPM | TEMPERATURE: 98 F

## 2019-09-11 PROCEDURE — G0157 HHC PT ASSISTANT EA 15: HCPCS

## 2019-09-12 ENCOUNTER — HOME CARE VISIT (OUTPATIENT)
Dept: HOME HEALTH SERVICES | Facility: HOME HEALTHCARE | Age: 74
End: 2019-09-12
Payer: MEDICARE

## 2019-09-12 PROCEDURE — G0299 HHS/HOSPICE OF RN EA 15 MIN: HCPCS

## 2019-09-13 ENCOUNTER — HOME CARE VISIT (OUTPATIENT)
Dept: HOME HEALTH SERVICES | Facility: HOME HEALTHCARE | Age: 74
End: 2019-09-13
Payer: MEDICARE

## 2019-09-13 VITALS
HEART RATE: 82 BPM | RESPIRATION RATE: 18 BRPM | OXYGEN SATURATION: 100 % | SYSTOLIC BLOOD PRESSURE: 127 MMHG | DIASTOLIC BLOOD PRESSURE: 78 MMHG | TEMPERATURE: 98.6 F

## 2019-09-13 PROCEDURE — G0152 HHCP-SERV OF OT,EA 15 MIN: HCPCS

## 2019-09-13 ASSESSMENT — ACTIVITIES OF DAILY LIVING (ADL)
EATING_ASSISTANCE: 0
DRESSING_LB_ASSISTANCE: 0
LAUNDRY_ASSISTANCE: 0
TOILETING_ASSISTANCE: 0
GROOMING_ASSISTANCE: 0
TRANSPORTATION_ASSISTANCE: 6
BATHING_ASSISTANCE: 1
SHOPPING_ASSISTANCE: 6
TELEPHONE_ASSISTANCE: 0
MEAL_PREP_ASSISTANCE: 0
HOUSEKEEPING_ASSISTANCE: 4
DRESSING_UB_ASSISTANCE: 0
ORAL_CARE_ASSISTANCE: 0

## 2019-09-13 ASSESSMENT — ENCOUNTER SYMPTOMS
DIFFICULTY THINKING: 1
POOR JUDGMENT: 1

## 2019-09-16 ENCOUNTER — HOME CARE VISIT (OUTPATIENT)
Dept: HOME HEALTH SERVICES | Facility: HOME HEALTHCARE | Age: 74
End: 2019-09-16
Payer: MEDICARE

## 2019-09-16 VITALS
SYSTOLIC BLOOD PRESSURE: 128 MMHG | HEART RATE: 90 BPM | TEMPERATURE: 97.6 F | OXYGEN SATURATION: 98 % | WEIGHT: 150 LBS | DIASTOLIC BLOOD PRESSURE: 68 MMHG | RESPIRATION RATE: 18 BRPM | BODY MASS INDEX: 24.96 KG/M2

## 2019-09-16 VITALS
SYSTOLIC BLOOD PRESSURE: 137 MMHG | RESPIRATION RATE: 18 BRPM | TEMPERATURE: 98.2 F | OXYGEN SATURATION: 99 % | DIASTOLIC BLOOD PRESSURE: 78 MMHG | HEART RATE: 98 BPM

## 2019-09-16 PROCEDURE — G0151 HHCP-SERV OF PT,EA 15 MIN: HCPCS

## 2019-09-16 SDOH — ECONOMIC STABILITY: HOUSING INSECURITY: HOME SAFETY: PT LIVES WITH HER GRANDDAUGHTER IS LIVING HER FOR ANOTHER 2 MOTHS AND DAUGHTER LIVES HERE AS WELL.

## 2019-09-16 SDOH — ECONOMIC STABILITY: HOUSING INSECURITY: EVIDENCE OF SMOKING MATERIAL: 0

## 2019-09-17 ENCOUNTER — HOME CARE VISIT (OUTPATIENT)
Dept: HOME HEALTH SERVICES | Facility: HOME HEALTHCARE | Age: 74
End: 2019-09-17
Payer: MEDICARE

## 2019-09-17 VITALS
SYSTOLIC BLOOD PRESSURE: 130 MMHG | HEART RATE: 88 BPM | OXYGEN SATURATION: 99 % | RESPIRATION RATE: 17 BRPM | TEMPERATURE: 97.4 F | DIASTOLIC BLOOD PRESSURE: 90 MMHG

## 2019-09-17 PROCEDURE — G0299 HHS/HOSPICE OF RN EA 15 MIN: HCPCS

## 2019-09-17 ASSESSMENT — ACTIVITIES OF DAILY LIVING (ADL): TRANSPORTATION COMMENTS: TIRES EASILY

## 2019-09-18 ENCOUNTER — HOME CARE VISIT (OUTPATIENT)
Dept: HOME HEALTH SERVICES | Facility: HOME HEALTHCARE | Age: 74
End: 2019-09-18
Payer: MEDICARE

## 2019-09-18 PROCEDURE — G0152 HHCP-SERV OF OT,EA 15 MIN: HCPCS

## 2019-09-19 ENCOUNTER — HOME CARE VISIT (OUTPATIENT)
Dept: HOME HEALTH SERVICES | Facility: HOME HEALTHCARE | Age: 74
End: 2019-09-19
Payer: MEDICARE

## 2019-09-19 VITALS
OXYGEN SATURATION: 96 % | HEART RATE: 92 BPM | RESPIRATION RATE: 17 BRPM | TEMPERATURE: 97.6 F | DIASTOLIC BLOOD PRESSURE: 78 MMHG | SYSTOLIC BLOOD PRESSURE: 121 MMHG

## 2019-09-19 PROCEDURE — G0299 HHS/HOSPICE OF RN EA 15 MIN: HCPCS

## 2019-09-19 ASSESSMENT — ENCOUNTER SYMPTOMS
POOR JUDGMENT: 1
DIFFICULTY THINKING: 1

## 2019-09-20 VITALS
HEART RATE: 94 BPM | DIASTOLIC BLOOD PRESSURE: 70 MMHG | OXYGEN SATURATION: 98 % | RESPIRATION RATE: 17 BRPM | SYSTOLIC BLOOD PRESSURE: 134 MMHG | TEMPERATURE: 97.7 F

## 2019-09-20 SDOH — ECONOMIC STABILITY: HOUSING INSECURITY: EVIDENCE OF SMOKING MATERIAL: 0

## 2019-09-23 ENCOUNTER — HOME CARE VISIT (OUTPATIENT)
Dept: HOME HEALTH SERVICES | Facility: HOME HEALTHCARE | Age: 74
End: 2019-09-23
Payer: MEDICARE

## 2019-09-23 PROCEDURE — G0152 HHCP-SERV OF OT,EA 15 MIN: HCPCS

## 2019-09-23 SDOH — ECONOMIC STABILITY: HOUSING INSECURITY: EVIDENCE OF SMOKING MATERIAL: 0

## 2019-09-24 ENCOUNTER — PATIENT OUTREACH (OUTPATIENT)
Dept: HEALTH INFORMATION MANAGEMENT | Facility: OTHER | Age: 74
End: 2019-09-24

## 2019-09-24 VITALS
RESPIRATION RATE: 17 BRPM | SYSTOLIC BLOOD PRESSURE: 144 MMHG | TEMPERATURE: 98.8 F | HEART RATE: 82 BPM | OXYGEN SATURATION: 95 % | DIASTOLIC BLOOD PRESSURE: 92 MMHG

## 2019-09-24 ASSESSMENT — ENCOUNTER SYMPTOMS
DIFFICULTY THINKING: 1
POOR JUDGMENT: 1

## 2019-09-26 ENCOUNTER — HOSPITAL ENCOUNTER (OUTPATIENT)
Dept: RADIOLOGY | Facility: MEDICAL CENTER | Age: 74
End: 2019-09-26
Attending: FAMILY MEDICINE
Payer: MEDICARE

## 2019-09-26 ENCOUNTER — HOME CARE VISIT (OUTPATIENT)
Dept: HOME HEALTH SERVICES | Facility: HOME HEALTHCARE | Age: 74
End: 2019-09-26
Payer: MEDICARE

## 2019-09-26 VITALS
DIASTOLIC BLOOD PRESSURE: 90 MMHG | RESPIRATION RATE: 17 BRPM | OXYGEN SATURATION: 99 % | HEART RATE: 84 BPM | TEMPERATURE: 98 F | SYSTOLIC BLOOD PRESSURE: 130 MMHG

## 2019-09-26 DIAGNOSIS — J18.9 UNRESOLVED PNEUMONIA: ICD-10-CM

## 2019-09-26 PROCEDURE — 71046 X-RAY EXAM CHEST 2 VIEWS: CPT

## 2019-09-26 PROCEDURE — G0299 HHS/HOSPICE OF RN EA 15 MIN: HCPCS

## 2019-09-30 SDOH — ECONOMIC STABILITY: HOUSING INSECURITY: EVIDENCE OF SMOKING MATERIAL: 0

## 2019-10-01 NOTE — PROGRESS NOTES
A 74-year-old female was an emergent readmission to [facility] from 8/22/2019 to 8/25/2019 to treat Systemic inflammatory response syndrome (SIRS) of non-infectious origin without acute organ dysfunction. [facility] visited the patient bedside. The patient was discharged Home. The patient was not under clinical case management.    The patient was ordered to start/continue to take the following medications: Doxycycline (Adoxa), Cefdinir (Omnicef), and Albuterol 108 (90 Base). The patient successfully filled all medications.     The patient was ordered to follow-up with PCP and Specialist. The patient had the following appointments:     1) 9/6/2019 @ 8:45 Sylvester Tobar , cardiology - CONFIRMED AS KEPT     2) 9/18/2019 @ 10:30 Fitz Turpin, internal medicine - CONFIRMED AS KEPT  The patient has no future appointments scheduled.     [facility] identified that the patient had Healthcare Access barriers. [facility] was able to get more glucose test strips for the patient by coordinating with the patients insurance and PCP office.    [facility] followed the patient for a total of 32 days and Patient  was receptive to services. Patient attended follow up appointments, and Patient successfully recovered or avoided further complications.

## 2019-10-03 ENCOUNTER — HOME CARE VISIT (OUTPATIENT)
Dept: HOME HEALTH SERVICES | Facility: HOME HEALTHCARE | Age: 74
End: 2019-10-03
Payer: MEDICARE

## 2019-10-03 VITALS
WEIGHT: 147 LBS | DIASTOLIC BLOOD PRESSURE: 70 MMHG | TEMPERATURE: 96.8 F | RESPIRATION RATE: 18 BRPM | HEART RATE: 94 BPM | OXYGEN SATURATION: 98 % | SYSTOLIC BLOOD PRESSURE: 118 MMHG | BODY MASS INDEX: 24.46 KG/M2

## 2019-10-03 PROCEDURE — G0299 HHS/HOSPICE OF RN EA 15 MIN: HCPCS

## 2019-10-06 SDOH — ECONOMIC STABILITY: HOUSING INSECURITY: HOME SAFETY: O2 AT NOC ONLY

## 2019-10-06 SDOH — ECONOMIC STABILITY: HOUSING INSECURITY: EVIDENCE OF SMOKING MATERIAL: 0

## 2019-10-06 ASSESSMENT — ENCOUNTER SYMPTOMS: SHORTNESS OF BREATH: T

## 2019-10-10 ENCOUNTER — HOME CARE VISIT (OUTPATIENT)
Dept: HOME HEALTH SERVICES | Facility: HOME HEALTHCARE | Age: 74
End: 2019-10-10
Payer: MEDICARE

## 2019-10-10 VITALS
SYSTOLIC BLOOD PRESSURE: 150 MMHG | DIASTOLIC BLOOD PRESSURE: 81 MMHG | OXYGEN SATURATION: 98 % | TEMPERATURE: 96.8 F | WEIGHT: 158 LBS | RESPIRATION RATE: 18 BRPM | HEART RATE: 83 BPM | BODY MASS INDEX: 26.29 KG/M2

## 2019-10-10 PROCEDURE — G0299 HHS/HOSPICE OF RN EA 15 MIN: HCPCS

## 2019-10-13 SDOH — ECONOMIC STABILITY: HOUSING INSECURITY: EVIDENCE OF SMOKING MATERIAL: 0

## 2019-10-13 SDOH — ECONOMIC STABILITY: HOUSING INSECURITY: HOME SAFETY: O2 AT NIGHT ONLY

## 2019-10-17 ENCOUNTER — HOME CARE VISIT (OUTPATIENT)
Dept: HOME HEALTH SERVICES | Facility: HOME HEALTHCARE | Age: 74
End: 2019-10-17
Payer: MEDICARE

## 2019-10-17 PROCEDURE — G0493 RN CARE EA 15 MIN HH/HOSPICE: HCPCS

## 2019-10-19 VITALS
BODY MASS INDEX: 23.8 KG/M2 | DIASTOLIC BLOOD PRESSURE: 80 MMHG | HEART RATE: 88 BPM | SYSTOLIC BLOOD PRESSURE: 130 MMHG | RESPIRATION RATE: 17 BRPM | WEIGHT: 143 LBS | OXYGEN SATURATION: 98 % | TEMPERATURE: 96.9 F

## 2019-10-20 SDOH — ECONOMIC STABILITY: HOUSING INSECURITY: EVIDENCE OF SMOKING MATERIAL: 0

## 2019-10-20 ASSESSMENT — ACTIVITIES OF DAILY LIVING (ADL)
OASIS_M1830: 01
HOME_HEALTH_OASIS: 00

## 2019-10-20 ASSESSMENT — PATIENT HEALTH QUESTIONNAIRE - PHQ9: CLINICAL INTERPRETATION OF PHQ2 SCORE: 0

## 2020-01-13 ENCOUNTER — OFFICE VISIT (OUTPATIENT)
Dept: URGENT CARE | Facility: PHYSICIAN GROUP | Age: 75
End: 2020-01-13
Payer: MEDICARE

## 2020-01-13 ENCOUNTER — HOSPITAL ENCOUNTER (OUTPATIENT)
Dept: RADIOLOGY | Facility: MEDICAL CENTER | Age: 75
End: 2020-01-13
Attending: FAMILY MEDICINE
Payer: MEDICARE

## 2020-01-13 VITALS
SYSTOLIC BLOOD PRESSURE: 122 MMHG | RESPIRATION RATE: 20 BRPM | OXYGEN SATURATION: 97 % | HEART RATE: 102 BPM | HEIGHT: 65 IN | BODY MASS INDEX: 23.03 KG/M2 | TEMPERATURE: 98.2 F | DIASTOLIC BLOOD PRESSURE: 88 MMHG | WEIGHT: 138.2 LBS

## 2020-01-13 DIAGNOSIS — J22 LOWER RESPIRATORY INFECTION: ICD-10-CM

## 2020-01-13 DIAGNOSIS — J98.01 BRONCHOSPASM: ICD-10-CM

## 2020-01-13 PROCEDURE — 71046 X-RAY EXAM CHEST 2 VIEWS: CPT

## 2020-01-13 PROCEDURE — 99214 OFFICE O/P EST MOD 30 MIN: CPT | Performed by: FAMILY MEDICINE

## 2020-01-13 RX ORDER — DOXYCYCLINE HYCLATE 100 MG/1
100 CAPSULE ORAL 2 TIMES DAILY
Qty: 14 EACH | Refills: 0 | Status: SHIPPED | OUTPATIENT
Start: 2020-01-13 | End: 2020-01-20

## 2020-01-13 RX ORDER — ALBUTEROL SULFATE 90 UG/1
1-2 AEROSOL, METERED RESPIRATORY (INHALATION) EVERY 4 HOURS PRN
Qty: 1 INHALER | Refills: 0 | Status: SHIPPED | OUTPATIENT
Start: 2020-01-13 | End: 2020-09-14

## 2020-01-13 ASSESSMENT — ENCOUNTER SYMPTOMS
EYE PAIN: 0
CHILLS: 0
FEVER: 0
SHORTNESS OF BREATH: 1
DIZZINESS: 0
SPUTUM PRODUCTION: 1
COUGH: 1
SORE THROAT: 0
VOMITING: 0
NAUSEA: 0

## 2020-01-13 NOTE — PATIENT INSTRUCTIONS
Bronchitis  Bronchitis is a problem of the air tubes leading to your lungs. This problem makes it hard for air to get in and out of the lungs. You may cough a lot because your air tubes are narrow. Going without care can cause lasting (chronic) bronchitis.  HOME CARE   · Drink enough fluids to keep your pee (urine) clear or pale yellow.  · Use a cool mist humidifier.  · Quit smoking if you smoke. If you keep smoking, the bronchitis might not get better.  · Only take medicine as told by your doctor.  GET HELP RIGHT AWAY IF:   · Coughing keeps you awake.  · You start to wheeze.  · You become more sick or weak.  · You have a hard time breathing or get short of breath.  · You cough up blood.  · Coughing lasts more than 2 weeks.  · You have a fever.  · Your baby is older than 3 months with a rectal temperature of 102° F (38.9° C) or higher.  · Your baby is 3 months old or younger with a rectal temperature of 100.4° F (38° C) or higher.  MAKE SURE YOU:  · Understand these instructions.  · Will watch your condition.  · Will get help right away if you are not doing well or get worse.  Document Released: 06/05/2009 Document Revised: 03/11/2013 Document Reviewed: 11/19/2010  [employer]® Patient Information ©2014 [employer].

## 2020-01-13 NOTE — PROGRESS NOTES
Subjective:   Amrita Torrez is a 74 y.o. female who presents for Sore Throat and Cough        74-year-old female with a history of diabetes mellitus, proximal atrial fibrillation, coronary artery disease, and recent inpatient admission for pneumonia and sepsis October 2019 presents to the urgent care with a chief complaint of cough for the past 2 weeks.  The patient is on 2 L oxygen nasal cannula at night chronically.  The patient has experienced intermittent dyspnea yet mentions she is out of her inhaler.    Patient Active Problem List:     Type 2 diabetes mellitus without complication (HCC)     Meniere's disease of both ears     Dyslipidemia     Pericardial effusion     Thyroid nodule     Coronary artery disease involving native coronary artery of native heart without angina pectoris     CKD (chronic kidney disease), stage III (HCC)     Pyuria     Hypokalemia     Paroxysmal atrial fibrillation (HCC)     Hyponatremia     Hypertension     Acquired circulating anticoagulants (HCC)     Nonrheumatic aortic valve stenosis     Diabetic ketoacidosis without coma associated with type 2 diabetes mellitus (HCC)     Generalized abdominal pain     Non-intractable vomiting with nausea     Microcytosis     Hypomagnesemia        Cough   The cough is productive of sputum (Gray-brown sputum). Associated symptoms include shortness of breath. Pertinent negatives include no chest pain, chills, fever, rash or sore throat. She has tried OTC cough suppressant (Ricola) for the symptoms. Her past medical history is significant for pneumonia.     Review of Systems   Constitutional: Negative for chills and fever.   HENT: Negative for sore throat.    Eyes: Negative for pain.   Respiratory: Positive for cough, sputum production and shortness of breath.    Cardiovascular: Negative for chest pain.   Gastrointestinal: Negative for nausea and vomiting.   Genitourinary: Negative for dysuria.   Skin: Negative for rash.   Neurological: Negative  "for dizziness.     Allergies   Allergen Reactions   • Pcn [Penicillins] Hives, Shortness of Breath and Swelling   • Sulfa Drugs Hives, Shortness of Breath and Swelling      Objective:   /88 (BP Location: Left arm, Patient Position: Sitting, BP Cuff Size: Adult)   Pulse (!) 102   Temp 36.8 °C (98.2 °F) (Temporal)   Resp 20   Ht 1.651 m (5' 5\")   Wt 62.7 kg (138 lb 3.2 oz)   SpO2 97%   BMI 23.00 kg/m²   Physical Exam  Vitals signs and nursing note reviewed.   Constitutional:       General: She is not in acute distress.     Appearance: She is well-developed.   HENT:      Head: Normocephalic and atraumatic.      Right Ear: Tympanic membrane and external ear normal.      Left Ear: Tympanic membrane and external ear normal.      Nose: Nose normal.      Mouth/Throat:      Mouth: Mucous membranes are moist.   Eyes:      Conjunctiva/sclera: Conjunctivae normal.      Pupils: Pupils are equal, round, and reactive to light.   Cardiovascular:      Rate and Rhythm: Regular rhythm. Tachycardia present.      Heart sounds: No murmur.   Pulmonary:      Effort: Pulmonary effort is normal. No respiratory distress.      Breath sounds: Wheezing (minimal) and rhonchi (mild) present.   Abdominal:      General: There is no distension.      Palpations: Abdomen is soft.      Tenderness: There is no tenderness.   Musculoskeletal: Normal range of motion.   Skin:     General: Skin is warm and dry.   Neurological:      General: No focal deficit present.      Mental Status: She is alert and oriented to person, place, and time. Mental status is at baseline.      Gait: Gait (gait at baseline) normal.   Psychiatric:         Judgment: Judgment normal.       DX-CHEST-2 VIEWS   Order: [id number]   Status:  Final result   Visible to patient:  No (Not Released) Next appt:  None Dx:  Lower respiratory infection   Details     Reading Physician Reading Date Result Priority   Darryl Clinton M.D. 1/13/2020 Urgent Care      Narrative & " Impression        1/13/2020 8:59 AM     HISTORY/REASON FOR EXAM:  Cough, history of pneumonia     TECHNIQUE/EXAM DESCRIPTION AND NUMBER OF VIEWS:  Two views of the chest.     COMPARISON:  9/26/2019.     FINDINGS:     LUNGS: Clear. No effusions.  PNEUMOTHORAX: None.  LINES AND TUBES: None.  MEDIASTINUM: No cardiomegaly. Atherosclerosis. Coronary calcifications/stent.  MUSCULOSKELETAL STRUCTURES: No acute displaced fracture.  Cholecystectomy.     IMPRESSION:     No acute cardiopulmonary abnormality.             Last Resulted: 01/13/20  9:32 AM               Medical decision-making/course: The patient remained afebrile, hemodynamically and neurologically stable with no evidence of respiratory compromise throughout the urgent care course.  There was no immediate clinical indication for the necessity of emergency department evaluation or inpatient admission and the patient requested a trial of outpatient management.          Assessment/Plan:   1. Lower respiratory infection  - DX-CHEST-2 VIEWS; Future  - doxycycline (VIBRAMYCIN) 100 MG Cap; Take 1 Cap by mouth 2 times a day for 7 days.  Dispense: 14 Each; Refill: 0    2. Bronchospasm  - albuterol 108 (90 Base) MCG/ACT Aero Soln inhalation aerosol; Inhale 1-2 Puffs by mouth every four hours as needed for Shortness of Breath.  Dispense: 1 Inhaler; Refill: 0      Discussed close monitoring, return precautions, and supportive measures including maintaining adequate fluid hydration and caloric intake, relative rest and OTC symptom management including acetaminophen as needed for pain and/or fever.    Differential diagnosis, natural history, supportive care, and indications for immediate follow-up discussed.     Advised the patient to follow-up with the primary care physician for recheck, reevaluation, and consideration of further management.

## 2020-02-04 ENCOUNTER — HOSPITAL ENCOUNTER (OUTPATIENT)
Dept: RADIOLOGY | Facility: MEDICAL CENTER | Age: 75
End: 2020-02-04
Attending: FAMILY MEDICINE
Payer: MEDICARE

## 2020-02-04 ENCOUNTER — HOSPITAL ENCOUNTER (OUTPATIENT)
Dept: LAB | Facility: MEDICAL CENTER | Age: 75
End: 2020-02-04
Attending: FAMILY MEDICINE
Payer: MEDICARE

## 2020-02-04 DIAGNOSIS — J18.9 UNRESOLVED PNEUMONIA: ICD-10-CM

## 2020-02-04 LAB
ALBUMIN SERPL BCP-MCNC: 3.3 G/DL (ref 3.2–4.9)
ALBUMIN/GLOB SERPL: 0.9 G/DL
ALP SERPL-CCNC: 80 U/L (ref 30–99)
ALT SERPL-CCNC: 11 U/L (ref 2–50)
ANION GAP SERPL CALC-SCNC: 9 MMOL/L (ref 0–11.9)
AST SERPL-CCNC: 13 U/L (ref 12–45)
BASOPHILS # BLD AUTO: 0.7 % (ref 0–1.8)
BASOPHILS # BLD: 0.06 K/UL (ref 0–0.12)
BILIRUB SERPL-MCNC: 0.8 MG/DL (ref 0.1–1.5)
BUN SERPL-MCNC: 25 MG/DL (ref 8–22)
CALCIUM SERPL-MCNC: 9.8 MG/DL (ref 8.5–10.5)
CHLORIDE SERPL-SCNC: 98 MMOL/L (ref 96–112)
CHOLEST SERPL-MCNC: 200 MG/DL (ref 100–199)
CO2 SERPL-SCNC: 26 MMOL/L (ref 20–33)
CREAT SERPL-MCNC: 1.2 MG/DL (ref 0.5–1.4)
EOSINOPHIL # BLD AUTO: 0.13 K/UL (ref 0–0.51)
EOSINOPHIL NFR BLD: 1.5 % (ref 0–6.9)
ERYTHROCYTE [DISTWIDTH] IN BLOOD BY AUTOMATED COUNT: 41.2 FL (ref 35.9–50)
FASTING STATUS PATIENT QL REPORTED: NORMAL
GLOBULIN SER CALC-MCNC: 3.8 G/DL (ref 1.9–3.5)
GLUCOSE SERPL-MCNC: 496 MG/DL (ref 65–99)
HCT VFR BLD AUTO: 47 % (ref 37–47)
HDLC SERPL-MCNC: 38 MG/DL
HGB BLD-MCNC: 16 G/DL (ref 12–16)
IMM GRANULOCYTES # BLD AUTO: 0.06 K/UL (ref 0–0.11)
IMM GRANULOCYTES NFR BLD AUTO: 0.7 % (ref 0–0.9)
LDLC SERPL CALC-MCNC: 117 MG/DL
LYMPHOCYTES # BLD AUTO: 1.64 K/UL (ref 1–4.8)
LYMPHOCYTES NFR BLD: 19.4 % (ref 22–41)
MCH RBC QN AUTO: 27.9 PG (ref 27–33)
MCHC RBC AUTO-ENTMCNC: 34 G/DL (ref 33.6–35)
MCV RBC AUTO: 82 FL (ref 81.4–97.8)
MONOCYTES # BLD AUTO: 0.62 K/UL (ref 0–0.85)
MONOCYTES NFR BLD AUTO: 7.3 % (ref 0–13.4)
NEUTROPHILS # BLD AUTO: 5.95 K/UL (ref 2–7.15)
NEUTROPHILS NFR BLD: 70.4 % (ref 44–72)
NRBC # BLD AUTO: 0 K/UL
NRBC BLD-RTO: 0 /100 WBC
PLATELET # BLD AUTO: 254 K/UL (ref 164–446)
PMV BLD AUTO: 11.4 FL (ref 9–12.9)
POTASSIUM SERPL-SCNC: 4.5 MMOL/L (ref 3.6–5.5)
PROT SERPL-MCNC: 7.1 G/DL (ref 6–8.2)
RBC # BLD AUTO: 5.73 M/UL (ref 4.2–5.4)
SODIUM SERPL-SCNC: 133 MMOL/L (ref 135–145)
TRIGL SERPL-MCNC: 224 MG/DL (ref 0–149)
WBC # BLD AUTO: 8.5 K/UL (ref 4.8–10.8)

## 2020-02-04 PROCEDURE — 80061 LIPID PANEL: CPT

## 2020-02-04 PROCEDURE — 83036 HEMOGLOBIN GLYCOSYLATED A1C: CPT

## 2020-02-04 PROCEDURE — 80053 COMPREHEN METABOLIC PANEL: CPT

## 2020-02-04 PROCEDURE — 71046 X-RAY EXAM CHEST 2 VIEWS: CPT

## 2020-02-04 PROCEDURE — 36415 COLL VENOUS BLD VENIPUNCTURE: CPT

## 2020-02-04 PROCEDURE — 85025 COMPLETE CBC W/AUTO DIFF WBC: CPT

## 2020-02-05 LAB
EST. AVERAGE GLUCOSE BLD GHB EST-MCNC: 398 MG/DL
HBA1C MFR BLD: 15.5 % (ref 0–5.6)

## 2020-04-10 DIAGNOSIS — I48.0 PAROXYSMAL ATRIAL FIBRILLATION (HCC): ICD-10-CM

## 2020-04-10 RX ORDER — APIXABAN 5 MG/1
TABLET, FILM COATED ORAL
Qty: 180 TAB | Refills: 1 | Status: SHIPPED | OUTPATIENT
Start: 2020-04-10 | End: 2021-04-05

## 2020-06-17 ENCOUNTER — HOSPITAL ENCOUNTER (OUTPATIENT)
Dept: LAB | Facility: MEDICAL CENTER | Age: 75
End: 2020-06-17
Attending: FAMILY MEDICINE
Payer: MEDICARE

## 2020-06-17 PROCEDURE — 87086 URINE CULTURE/COLONY COUNT: CPT

## 2020-06-20 LAB
BACTERIA UR CULT: NORMAL
SIGNIFICANT IND 70042: NORMAL
SITE SITE: NORMAL
SOURCE SOURCE: NORMAL

## 2020-08-12 ENCOUNTER — HOSPITAL ENCOUNTER (OUTPATIENT)
Dept: LAB | Facility: MEDICAL CENTER | Age: 75
End: 2020-08-12
Attending: FAMILY MEDICINE
Payer: MEDICARE

## 2020-08-12 LAB
ALBUMIN SERPL BCP-MCNC: 3.2 G/DL (ref 3.2–4.9)
ALBUMIN/GLOB SERPL: 1 G/DL
ALP SERPL-CCNC: 65 U/L (ref 30–99)
ALT SERPL-CCNC: 18 U/L (ref 2–50)
ANION GAP SERPL CALC-SCNC: 14 MMOL/L (ref 7–16)
AST SERPL-CCNC: 22 U/L (ref 12–45)
BILIRUB SERPL-MCNC: 0.6 MG/DL (ref 0.1–1.5)
BUN SERPL-MCNC: 21 MG/DL (ref 8–22)
CALCIUM SERPL-MCNC: 9.4 MG/DL (ref 8.5–10.5)
CHLORIDE SERPL-SCNC: 100 MMOL/L (ref 96–112)
CHOLEST SERPL-MCNC: 135 MG/DL (ref 100–199)
CO2 SERPL-SCNC: 24 MMOL/L (ref 20–33)
CREAT SERPL-MCNC: 1.21 MG/DL (ref 0.5–1.4)
EST. AVERAGE GLUCOSE BLD GHB EST-MCNC: 309 MG/DL
FASTING STATUS PATIENT QL REPORTED: NORMAL
GLOBULIN SER CALC-MCNC: 3.2 G/DL (ref 1.9–3.5)
GLUCOSE SERPL-MCNC: 147 MG/DL (ref 65–99)
HBA1C MFR BLD: 12.4 % (ref 0–5.6)
HDLC SERPL-MCNC: 40 MG/DL
LDLC SERPL CALC-MCNC: 62 MG/DL
POTASSIUM SERPL-SCNC: 4.5 MMOL/L (ref 3.6–5.5)
PROT SERPL-MCNC: 6.4 G/DL (ref 6–8.2)
SODIUM SERPL-SCNC: 138 MMOL/L (ref 135–145)
TRIGL SERPL-MCNC: 166 MG/DL (ref 0–149)

## 2020-08-12 PROCEDURE — 83036 HEMOGLOBIN GLYCOSYLATED A1C: CPT

## 2020-08-12 PROCEDURE — 36415 COLL VENOUS BLD VENIPUNCTURE: CPT

## 2020-08-12 PROCEDURE — 80053 COMPREHEN METABOLIC PANEL: CPT

## 2020-08-12 PROCEDURE — 80061 LIPID PANEL: CPT

## 2020-08-14 ENCOUNTER — HOSPITAL ENCOUNTER (OUTPATIENT)
Dept: RADIOLOGY | Facility: MEDICAL CENTER | Age: 75
End: 2020-08-14
Attending: FAMILY MEDICINE
Payer: MEDICARE

## 2020-08-14 DIAGNOSIS — Z12.31 VISIT FOR SCREENING MAMMOGRAM: ICD-10-CM

## 2020-08-14 PROCEDURE — 77067 SCR MAMMO BI INCL CAD: CPT

## 2020-08-31 NOTE — PROGRESS NOTES
No chief complaint on file.    Patient is referred by Fitz Turpin D. for initial consult.    History of present illness:  Amrita Torrez 75 y.o. female presents today for tremor.   {TayAccompany:93659}    Duration/timing: ***  Context: Tremor:  Location: ***  Quality: ***  Severity: ***  Modifying factors: ***  Associated signs/symptoms: ***  Denies: {Denies:66620}     Patient has tried:  -***      Past medical history:   Past Medical History:   Diagnosis Date   • CAD (coronary artery disease)    • Diabetes     oral meds   • Goiter    • Heart attack (HCC)    • Hyperlipidemia    • Hypertension    • MI (myocardial infarction) (HCC) 2016    x2 stints placed   • Pericardial effusion    • Thyroid nodule        Past surgical history:   Past Surgical History:   Procedure Laterality Date   • LAPAROSCOPY  2014    [facility]   • BAN BY LAPAROSCOPY  2012    Performed by MIAN NAVARRO [facility]   • OTHER ORTHOPEDIC SURGERY      knee   • GYN SURGERY      fibroids   • OTHER ABDOMINAL SURGERY      appendectomy   • GYN SURGERY      hysterectomy   • ZZZ CARDIAC CATH         Family history:   No family history on file.    Social history:   Social History     Socioeconomic History   • Marital status:      Spouse name: Not on file   • Number of children: Not on file   • Years of education: Not on file   • Highest education level: Not on file   Occupational History   • Not on file   Social Needs   • Financial resource strain: Not on file   • Food insecurity     Worry: Not on file     Inability: Not on file   • Transportation needs     Medical: Not on file     Non-medical: Not on file   Tobacco Use   • Smoking status: Former Smoker     Packs/day: 1.00     Years: 40.00     Pack years: 40.00     Types: Cigarettes     Quit date: 1978     Years since quittin.6   • Smokeless tobacco: Never Used    Substance and Sexual Activity   • Alcohol use: No   • Drug use: No   • Sexual activity: Not on file   Lifestyle   • Physical activity     Days per week: Not on file     Minutes per session: Not on file   • Stress: Not on file   Relationships   • Social connections     Talks on phone: Not on file     Gets together: Not on file     Attends Protestant service: Not on file     Active member of club or organization: Not on file     Attends meetings of clubs or organizations: Not on file     Relationship status: Not on file   • Intimate partner violence     Fear of current or ex partner: Not on file     Emotionally abused: Not on file     Physically abused: Not on file     Forced sexual activity: Not on file   Other Topics Concern   • Not on file   Social History Narrative   • Not on file       Current medications:   Current Outpatient Medications   Medication   • ELIQUIS 5 MG Tab   • albuterol 108 (90 Base) MCG/ACT Aero Soln inhalation aerosol   • Trolamine Salicylate 10 % Lotion   • Home Care Oxygen   • insulin regular (NOVOLIN R) 100 Unit/mL Solution   • acetaminophen (TYLENOL) 500 MG Tab   • albuterol 108 (90 Base) MCG/ACT Aero Soln inhalation aerosol   • Omega-3 Fatty Acids (OMEGA-3 PO)   • sertraline (ZOLOFT) 50 MG Tab   • amLODIPine (NORVASC) 10 MG Tab   • ferrous sulfate 325 (65 Fe) MG tablet   • metoprolol (TOPROL XL) 200 MG XL tablet   • rosuvastatin (CRESTOR) 5 MG Tab   • omeprazole (PRILOSEC) 20 MG delayed-release capsule   • vitamin D, Ergocalciferol, (DRISDOL) 62470 units Cap capsule   • latanoprost (XALATAN) 0.005 % Solution   • insulin glargine (LANTUS) 100 UNIT/ML Solution   • trazodone (DESYREL) 50 MG Tab     No current facility-administered medications for this visit.        Medication Allergy:  Allergies   Allergen Reactions   • Pcn [Penicillins] Hives, Shortness of Breath and Swelling   • Sulfa Drugs Hives, Shortness of Breath and Swelling       ROS    Physical examination:   There were no vitals  filed for this visit.  General: Patient in well nourished in no apparent distress.  Eyes: {Tayeyes:07412} {Taylaterality:80540}.  HENT: Normocephalic, atraumatic. {tayent:60881}  Cardiovascular: No {Taycards:27758}.  Respiratory: Normal respiratory effort.   Skin: No appreciable signs of acute rashes or bruising.   Musculoskeletal: No signs of joint or muscle swelling.   Psychiatric: Pleasant.     NEUROLOGICAL EXAM: ***  Mental status: Awake, alert and fully oriented to {Tayorientation:20737}. Normal {Tayattention:94085}.   Speech and language: Speech is {Tayspeech:92617}.  Cranial nerve exam:  II: Pupils are equally round and reactive to light. Visual fields are intact by confrontation.  III, IV, VI: EOMI, no diplopia, no ptosis.  V: {TayCN5:40889}.  VII: {TayCN7:93302}.  VIII: {taycn8:22373}  IX: Palate elevates symmetrically, uvula is midline. Dysarthria is not present.  XI: Shoulder shrug are symmetrical and strong.   XII: {TayCN12:52522}    Motor exam:  {Taymotor:29280}    Muscle strength:     Right  Left  Deltoid   ***/5  ***/5      Biceps   ***/5  ***/5  Triceps  ***/5  ***/5   Wrist extensors ***/5  ***/5  Wrist flexors  ***/5  ***/5     ***/5  ***/5  Interossei  ***/5  ***/5  Thenar (APB)  ***/5  ***/5   Hip flexors  ***/5  ***/5  Quadriceps  ***/5  ***/5    Hamstrings  ***/5  ***/5  Dorsiflexors  ***/5  ***/5  Plantarflexors  ***/5  ***/5  Toe extension  ***/5  ***/5  NT = not tested    Sensory exam:  Intact to {Taysensation:11105} in {Taylaterality:78264} {Tayextemity:52895}.    Reflexes:       Right  Left  Biceps   ***/4  ***/4  Triceps  ***/4  ***/4  Brachioradialis ***/4  ***/4  Knee jerk  ***/4  ***/4  Ankle jerk  ***/4  ***/4   {Taylaterality:07095} toes are {Taytoes:13806}.      Coordination: shows a normal finger-nose-finger and heel to shin bilaterally.   Gait: {Taygait:36321}      ANCILLARY DATA REVIEWED:   Lab Data Review:  Lab Results   Component Value Date/Time    WBC 8.5 02/04/2020 01:27  PM    RBC 5.73 (H) 02/04/2020 01:27 PM    HEMOGLOBIN 16.0 02/04/2020 01:27 PM    HEMATOCRIT 47.0 02/04/2020 01:27 PM    MCV 82.0 02/04/2020 01:27 PM    MCH 27.9 02/04/2020 01:27 PM    MCHC 34.0 02/04/2020 01:27 PM    MPV 11.4 02/04/2020 01:27 PM    NEUTSPOLYS 70.40 02/04/2020 01:27 PM    LYMPHOCYTES 19.40 (L) 02/04/2020 01:27 PM    MONOCYTES 7.30 02/04/2020 01:27 PM    EOSINOPHILS 1.50 02/04/2020 01:27 PM    BASOPHILS 0.70 02/04/2020 01:27 PM    HYPOCHROMIA 1+ 09/12/2012 04:16 PM    ANISOCYTOSIS 1+ 08/22/2019 08:21 PM      Lab Results   Component Value Date/Time    SODIUM 138 08/12/2020 10:09 AM    POTASSIUM 4.5 08/12/2020 10:09 AM    CHLORIDE 100 08/12/2020 10:09 AM    CO2 24 08/12/2020 10:09 AM    GLUCOSE 147 (H) 08/12/2020 10:09 AM    BUN 21 08/12/2020 10:09 AM    CREATININE 1.21 08/12/2020 10:09 AM     Lab Results   Component Value Date/Time    ASTSGOT 22 08/12/2020 1009    ALTSGPT 18 08/12/2020 1009    ALKPHOSPHAT 65 08/12/2020 1009    ALBUMIN 3.2 08/12/2020 1009     Lab Results   Component Value Date/Time    HBA1C 12.4 (H) 08/12/2020 10:09 AM      GFR reduced  TSH obtained multiple times since 2016 in 2019 unremarkable    Imaging:   Echocardiogram July 2019:  Moderate aortic stenosis Transvalvular gradients are - Peak: 39 mmHg,   Mean: 26 mm Hg.  Mild concentric left ventricular hypertrophy.  Normal left ventricular systolic function.  Basal inferoseptal and basal inferior hypokinesis.  Left ventricular ejection 0fraction is visually estimated to be 70%.  Mild mitral regurgitation.  Mildly dilated left atrium.  Structurally normal tricuspid valve without significant stenosis or   regurgitation.  Normal inferior vena cava size and inspiratory collapse.  The right ventricle was normal in size and function.  Unable to estimate pulmonary artery pressure due to an inadequate   tricuspid regurgitant jet.  Normal pericardium without effusion.  Compared to prior echo on 12/8/18, aortic stenosis has slightly    Worsened.    MRI brain with without contrast 2012:  1. No acute abnormality.   2. There is no evidence of IAC lesion.   3. Mild cerebral atrophy and moderate chronic microvascular ischemic disease. When compared with the prior MRI the extent of the microvascular disease is increased.    CT head without contrast July 2019 for head trauma:  There are periventricular and subcortical white matter changes present.  This finding is nonspecific and could be from previous small vessel ischemia, demyelination, or gliosis.     Focal hypodensities in the basal ganglia bilaterally are likely related to prior lacunar infarcts.    Records reviewed: Chart reviewed.  No referral note attached.  Patient is established with anticoagulation clinic for paroxysmal atrial fibrillation.  There is a history of CKD and Ménière's.  There is a history of CAD in 2016.  Also history of aortic stenosis with progression.        ASSESSMENT AND PLAN:  ***    There are no diagnoses linked to this encounter.    Avoid beta-blockers  CYP inhibitors, inducers    FOLLOW-UP: No follow-ups on file.  EDUCATION AND COUNSELING:  -I personally discussed the following with the patient:   {Taydiscuss:77024}      The patient communicates understanding of the above and agrees that due to the complexity of his/her diagnosis, results of any testing and further recommendations will typically be discussed/made during a face to face encounter in my office. The patient and/or family further understands it is their responsibility to keep proper follow up.     Disclaimer  This dictation was created using voice recognition software. I have made every reasonable attempt to avoid dictation errors, but this document may contain an error not identified before finalizing. If the error changes the accuracy of the document, I would appreciate it being brought to my attention. Thank you very much.     Manju Becerra MD  Neurology  [facility]

## 2020-09-01 ENCOUNTER — APPOINTMENT (OUTPATIENT)
Dept: NEUROLOGY | Facility: MEDICAL CENTER | Age: 75
End: 2020-09-01
Payer: MEDICARE

## 2020-09-14 ENCOUNTER — APPOINTMENT (OUTPATIENT)
Dept: RADIOLOGY | Facility: MEDICAL CENTER | Age: 75
DRG: 639 | End: 2020-09-14
Attending: EMERGENCY MEDICINE
Payer: MEDICARE

## 2020-09-14 ENCOUNTER — HOSPITAL ENCOUNTER (INPATIENT)
Facility: MEDICAL CENTER | Age: 75
LOS: 1 days | DRG: 639 | End: 2020-09-15
Attending: EMERGENCY MEDICINE | Admitting: HOSPITALIST
Payer: MEDICARE

## 2020-09-14 DIAGNOSIS — R55 SYNCOPE, UNSPECIFIED SYNCOPE TYPE: ICD-10-CM

## 2020-09-14 DIAGNOSIS — R25.1 TREMORS OF NERVOUS SYSTEM: ICD-10-CM

## 2020-09-14 DIAGNOSIS — R73.9 HYPERGLYCEMIA: ICD-10-CM

## 2020-09-14 PROBLEM — E11.65 TYPE 2 DIABETES MELLITUS WITH HYPERGLYCEMIA, WITH LONG-TERM CURRENT USE OF INSULIN (HCC): Status: ACTIVE | Noted: 2020-09-14

## 2020-09-14 PROBLEM — E11.10 DIABETIC KETOACIDOSIS WITHOUT COMA ASSOCIATED WITH TYPE 2 DIABETES MELLITUS (HCC): Status: RESOLVED | Noted: 2019-08-15 | Resolved: 2020-09-14

## 2020-09-14 PROBLEM — Z79.4 TYPE 2 DIABETES MELLITUS WITH HYPERGLYCEMIA, WITH LONG-TERM CURRENT USE OF INSULIN (HCC): Status: ACTIVE | Noted: 2020-09-14

## 2020-09-14 PROBLEM — I10 ACCELERATED HYPERTENSION: Status: ACTIVE | Noted: 2020-09-14

## 2020-09-14 LAB
ALBUMIN SERPL BCP-MCNC: 3.1 G/DL (ref 3.2–4.9)
ALBUMIN/GLOB SERPL: 1 G/DL
ALP SERPL-CCNC: 107 U/L (ref 30–99)
ALT SERPL-CCNC: 18 U/L (ref 2–50)
ANION GAP SERPL CALC-SCNC: 11 MMOL/L (ref 7–16)
APTT PPP: 30.5 SEC (ref 24.7–36)
AST SERPL-CCNC: 22 U/L (ref 12–45)
B-OH-BUTYR SERPL-MCNC: <0.2 MMOL/L (ref 0.02–0.27)
BASE EXCESS BLDV CALC-SCNC: 1 MMOL/L
BASOPHILS # BLD AUTO: 0.4 % (ref 0–1.8)
BASOPHILS # BLD: 0.03 K/UL (ref 0–0.12)
BILIRUB SERPL-MCNC: 0.6 MG/DL (ref 0.1–1.5)
BODY TEMPERATURE: ABNORMAL CENTIGRADE
BUN SERPL-MCNC: 17 MG/DL (ref 8–22)
CALCIUM SERPL-MCNC: 8.9 MG/DL (ref 8.5–10.5)
CHLORIDE SERPL-SCNC: 94 MMOL/L (ref 96–112)
CO2 SERPL-SCNC: 24 MMOL/L (ref 20–33)
COVID ORDER STATUS COVID19: NORMAL
CREAT SERPL-MCNC: 1.01 MG/DL (ref 0.5–1.4)
EKG IMPRESSION: NORMAL
EOSINOPHIL # BLD AUTO: 0.12 K/UL (ref 0–0.51)
EOSINOPHIL NFR BLD: 1.6 % (ref 0–6.9)
ERYTHROCYTE [DISTWIDTH] IN BLOOD BY AUTOMATED COUNT: 38.9 FL (ref 35.9–50)
GLOBULIN SER CALC-MCNC: 3.2 G/DL (ref 1.9–3.5)
GLUCOSE BLD-MCNC: 228 MG/DL (ref 65–99)
GLUCOSE BLD-MCNC: 309 MG/DL (ref 65–99)
GLUCOSE BLD-MCNC: 338 MG/DL (ref 65–99)
GLUCOSE BLD-MCNC: 380 MG/DL (ref 65–99)
GLUCOSE SERPL-MCNC: 549 MG/DL (ref 65–99)
HCO3 BLDV-SCNC: 25 MMOL/L (ref 24–28)
HCT VFR BLD AUTO: 40.1 % (ref 37–47)
HGB BLD-MCNC: 14.4 G/DL (ref 12–16)
IMM GRANULOCYTES # BLD AUTO: 0.01 K/UL (ref 0–0.11)
IMM GRANULOCYTES NFR BLD AUTO: 0.1 % (ref 0–0.9)
INR PPP: 1.08 (ref 0.87–1.13)
LYMPHOCYTES # BLD AUTO: 1.43 K/UL (ref 1–4.8)
LYMPHOCYTES NFR BLD: 19 % (ref 22–41)
MCH RBC QN AUTO: 29.4 PG (ref 27–33)
MCHC RBC AUTO-ENTMCNC: 35.9 G/DL (ref 33.6–35)
MCV RBC AUTO: 82 FL (ref 81.4–97.8)
MONOCYTES # BLD AUTO: 0.6 K/UL (ref 0–0.85)
MONOCYTES NFR BLD AUTO: 8 % (ref 0–13.4)
NEUTROPHILS # BLD AUTO: 5.34 K/UL (ref 2–7.15)
NEUTROPHILS NFR BLD: 70.9 % (ref 44–72)
NRBC # BLD AUTO: 0 K/UL
NRBC BLD-RTO: 0 /100 WBC
PCO2 BLDV: 37.4 MMHG (ref 41–51)
PH BLDV: 7.44 [PH] (ref 7.31–7.45)
PLATELET # BLD AUTO: 213 K/UL (ref 164–446)
PMV BLD AUTO: 10 FL (ref 9–12.9)
PO2 BLDV: 37.7 MMHG (ref 25–40)
POTASSIUM SERPL-SCNC: 3.9 MMOL/L (ref 3.6–5.5)
PROT SERPL-MCNC: 6.3 G/DL (ref 6–8.2)
PROTHROMBIN TIME: 14.4 SEC (ref 12–14.6)
RBC # BLD AUTO: 4.89 M/UL (ref 4.2–5.4)
SAO2 % BLDV: 75.5 %
SARS-COV-2 RNA RESP QL NAA+PROBE: NOTDETECTED
SODIUM SERPL-SCNC: 129 MMOL/L (ref 135–145)
SPECIMEN SOURCE: NORMAL
TROPONIN T SERPL-MCNC: 34 NG/L (ref 6–19)
WBC # BLD AUTO: 7.5 K/UL (ref 4.8–10.8)

## 2020-09-14 PROCEDURE — 700102 HCHG RX REV CODE 250 W/ 637 OVERRIDE(OP): Performed by: HOSPITALIST

## 2020-09-14 PROCEDURE — 71045 X-RAY EXAM CHEST 1 VIEW: CPT

## 2020-09-14 PROCEDURE — 770020 HCHG ROOM/CARE - TELE (206)

## 2020-09-14 PROCEDURE — 85730 THROMBOPLASTIN TIME PARTIAL: CPT

## 2020-09-14 PROCEDURE — 93005 ELECTROCARDIOGRAM TRACING: CPT | Performed by: EMERGENCY MEDICINE

## 2020-09-14 PROCEDURE — 82010 KETONE BODYS QUAN: CPT

## 2020-09-14 PROCEDURE — 96374 THER/PROPH/DIAG INJ IV PUSH: CPT

## 2020-09-14 PROCEDURE — 82962 GLUCOSE BLOOD TEST: CPT

## 2020-09-14 PROCEDURE — 85610 PROTHROMBIN TIME: CPT

## 2020-09-14 PROCEDURE — 82803 BLOOD GASES ANY COMBINATION: CPT

## 2020-09-14 PROCEDURE — 99223 1ST HOSP IP/OBS HIGH 75: CPT | Mod: AI | Performed by: HOSPITALIST

## 2020-09-14 PROCEDURE — 85025 COMPLETE CBC W/AUTO DIFF WBC: CPT

## 2020-09-14 PROCEDURE — 700102 HCHG RX REV CODE 250 W/ 637 OVERRIDE(OP): Performed by: EMERGENCY MEDICINE

## 2020-09-14 PROCEDURE — 700101 HCHG RX REV CODE 250: Performed by: EMERGENCY MEDICINE

## 2020-09-14 PROCEDURE — U0003 INFECTIOUS AGENT DETECTION BY NUCLEIC ACID (DNA OR RNA); SEVERE ACUTE RESPIRATORY SYNDROME CORONAVIRUS 2 (SARS-COV-2) (CORONAVIRUS DISEASE [COVID-19]), AMPLIFIED PROBE TECHNIQUE, MAKING USE OF HIGH THROUGHPUT TECHNOLOGIES AS DESCRIBED BY CMS-2020-01-R: HCPCS

## 2020-09-14 PROCEDURE — A9270 NON-COVERED ITEM OR SERVICE: HCPCS | Performed by: HOSPITALIST

## 2020-09-14 PROCEDURE — 84484 ASSAY OF TROPONIN QUANT: CPT

## 2020-09-14 PROCEDURE — 96375 TX/PRO/DX INJ NEW DRUG ADDON: CPT

## 2020-09-14 PROCEDURE — 99285 EMERGENCY DEPT VISIT HI MDM: CPT

## 2020-09-14 PROCEDURE — 70450 CT HEAD/BRAIN W/O DYE: CPT

## 2020-09-14 PROCEDURE — 700105 HCHG RX REV CODE 258: Performed by: HOSPITALIST

## 2020-09-14 PROCEDURE — 80053 COMPREHEN METABOLIC PANEL: CPT

## 2020-09-14 PROCEDURE — 700101 HCHG RX REV CODE 250: Performed by: HOSPITALIST

## 2020-09-14 PROCEDURE — C9803 HOPD COVID-19 SPEC COLLECT: HCPCS | Performed by: INTERNAL MEDICINE

## 2020-09-14 RX ORDER — ROSUVASTATIN CALCIUM 10 MG/1
5 TABLET, COATED ORAL EVERY MORNING
Status: DISCONTINUED | OUTPATIENT
Start: 2020-09-14 | End: 2020-09-15 | Stop reason: HOSPADM

## 2020-09-14 RX ORDER — AMLODIPINE BESYLATE 5 MG/1
5 TABLET ORAL 2 TIMES DAILY
Status: DISCONTINUED | OUTPATIENT
Start: 2020-09-14 | End: 2020-09-15 | Stop reason: HOSPADM

## 2020-09-14 RX ORDER — ONDANSETRON 2 MG/ML
4 INJECTION INTRAMUSCULAR; INTRAVENOUS EVERY 4 HOURS PRN
Status: DISCONTINUED | OUTPATIENT
Start: 2020-09-14 | End: 2020-09-15 | Stop reason: HOSPADM

## 2020-09-14 RX ORDER — METFORMIN HYDROCHLORIDE 500 MG/1
500 TABLET, EXTENDED RELEASE ORAL 2 TIMES DAILY
Status: DISCONTINUED | OUTPATIENT
Start: 2020-09-15 | End: 2020-09-15 | Stop reason: HOSPADM

## 2020-09-14 RX ORDER — POLYETHYLENE GLYCOL 3350 17 G/17G
1 POWDER, FOR SOLUTION ORAL
Status: DISCONTINUED | OUTPATIENT
Start: 2020-09-14 | End: 2020-09-15 | Stop reason: HOSPADM

## 2020-09-14 RX ORDER — DEXTROSE MONOHYDRATE 25 G/50ML
50 INJECTION, SOLUTION INTRAVENOUS
Status: DISCONTINUED | OUTPATIENT
Start: 2020-09-14 | End: 2020-09-15 | Stop reason: HOSPADM

## 2020-09-14 RX ORDER — INSULIN GLARGINE 100 [IU]/ML
20 INJECTION, SOLUTION SUBCUTANEOUS NIGHTLY
Status: DISCONTINUED | OUTPATIENT
Start: 2020-09-14 | End: 2020-09-15 | Stop reason: HOSPADM

## 2020-09-14 RX ORDER — LABETALOL HYDROCHLORIDE 5 MG/ML
10-20 INJECTION, SOLUTION INTRAVENOUS EVERY 4 HOURS PRN
Status: DISCONTINUED | OUTPATIENT
Start: 2020-09-14 | End: 2020-09-15 | Stop reason: HOSPADM

## 2020-09-14 RX ORDER — METFORMIN HYDROCHLORIDE 500 MG/1
500 TABLET, EXTENDED RELEASE ORAL 2 TIMES DAILY
Status: DISCONTINUED | OUTPATIENT
Start: 2020-09-14 | End: 2020-09-14

## 2020-09-14 RX ORDER — ACETAMINOPHEN 325 MG/1
650 TABLET ORAL EVERY 6 HOURS PRN
Status: DISCONTINUED | OUTPATIENT
Start: 2020-09-14 | End: 2020-09-15 | Stop reason: HOSPADM

## 2020-09-14 RX ORDER — SODIUM CHLORIDE, SODIUM LACTATE, POTASSIUM CHLORIDE, CALCIUM CHLORIDE 600; 310; 30; 20 MG/100ML; MG/100ML; MG/100ML; MG/100ML
INJECTION, SOLUTION INTRAVENOUS CONTINUOUS
Status: DISCONTINUED | OUTPATIENT
Start: 2020-09-14 | End: 2020-09-15 | Stop reason: HOSPADM

## 2020-09-14 RX ORDER — BISACODYL 10 MG
10 SUPPOSITORY, RECTAL RECTAL
Status: DISCONTINUED | OUTPATIENT
Start: 2020-09-14 | End: 2020-09-15 | Stop reason: HOSPADM

## 2020-09-14 RX ORDER — AMOXICILLIN 250 MG
2 CAPSULE ORAL 2 TIMES DAILY
Status: DISCONTINUED | OUTPATIENT
Start: 2020-09-14 | End: 2020-09-15 | Stop reason: HOSPADM

## 2020-09-14 RX ORDER — METFORMIN HYDROCHLORIDE 500 MG/1
500 TABLET, EXTENDED RELEASE ORAL 2 TIMES DAILY
Status: ON HOLD | COMMUNITY
End: 2021-04-29

## 2020-09-14 RX ORDER — LATANOPROST 50 UG/ML
1 SOLUTION/ DROPS OPHTHALMIC NIGHTLY
Status: DISCONTINUED | OUTPATIENT
Start: 2020-09-14 | End: 2020-09-15 | Stop reason: HOSPADM

## 2020-09-14 RX ORDER — TRAZODONE HYDROCHLORIDE 50 MG/1
50 TABLET ORAL
Status: DISCONTINUED | OUTPATIENT
Start: 2020-09-14 | End: 2020-09-15 | Stop reason: HOSPADM

## 2020-09-14 RX ORDER — ENALAPRILAT 1.25 MG/ML
1.25 INJECTION INTRAVENOUS EVERY 6 HOURS PRN
Status: DISCONTINUED | OUTPATIENT
Start: 2020-09-14 | End: 2020-09-15 | Stop reason: HOSPADM

## 2020-09-14 RX ORDER — ONDANSETRON 4 MG/1
4 TABLET, ORALLY DISINTEGRATING ORAL EVERY 4 HOURS PRN
Status: DISCONTINUED | OUTPATIENT
Start: 2020-09-14 | End: 2020-09-15 | Stop reason: HOSPADM

## 2020-09-14 RX ORDER — METOPROLOL TARTRATE 1 MG/ML
5 INJECTION, SOLUTION INTRAVENOUS ONCE
Status: COMPLETED | OUTPATIENT
Start: 2020-09-14 | End: 2020-09-14

## 2020-09-14 RX ADMIN — APIXABAN 5 MG: 5 TABLET, FILM COATED ORAL at 17:10

## 2020-09-14 RX ADMIN — AMLODIPINE BESYLATE 5 MG: 5 TABLET ORAL at 17:10

## 2020-09-14 RX ADMIN — INSULIN HUMAN 10 UNITS: 100 INJECTION, SOLUTION PARENTERAL at 18:05

## 2020-09-14 RX ADMIN — METOPROLOL TARTRATE 5 MG: 5 INJECTION, SOLUTION INTRAVENOUS at 14:32

## 2020-09-14 RX ADMIN — LABETALOL HYDROCHLORIDE 20 MG: 5 INJECTION, SOLUTION INTRAVENOUS at 15:53

## 2020-09-14 RX ADMIN — INSULIN HUMAN 4 UNITS: 100 INJECTION, SOLUTION PARENTERAL at 22:36

## 2020-09-14 RX ADMIN — INSULIN GLARGINE 20 UNITS: 100 INJECTION, SOLUTION SUBCUTANEOUS at 22:36

## 2020-09-14 RX ADMIN — ROSUVASTATIN CALCIUM 5 MG: 10 TABLET, FILM COATED ORAL at 17:12

## 2020-09-14 RX ADMIN — DOCUSATE SODIUM 50 MG AND SENNOSIDES 8.6 MG 2 TABLET: 8.6; 5 TABLET, FILM COATED ORAL at 17:10

## 2020-09-14 RX ADMIN — TRAZODONE HYDROCHLORIDE 50 MG: 50 TABLET ORAL at 22:35

## 2020-09-14 RX ADMIN — SODIUM CHLORIDE, POTASSIUM CHLORIDE, SODIUM LACTATE AND CALCIUM CHLORIDE: 600; 310; 30; 20 INJECTION, SOLUTION INTRAVENOUS at 18:00

## 2020-09-14 RX ADMIN — ACETAMINOPHEN 650 MG: 325 TABLET, FILM COATED ORAL at 22:45

## 2020-09-14 RX ADMIN — LATANOPROST 1 DROP: 50 SOLUTION OPHTHALMIC at 22:35

## 2020-09-14 RX ADMIN — SERTRALINE HYDROCHLORIDE 50 MG: 50 TABLET ORAL at 17:10

## 2020-09-14 ASSESSMENT — COGNITIVE AND FUNCTIONAL STATUS - GENERAL
CLIMB 3 TO 5 STEPS WITH RAILING: A LOT
STANDING UP FROM CHAIR USING ARMS: A LITTLE
HELP NEEDED FOR BATHING: A LITTLE
SUGGESTED CMS G CODE MODIFIER DAILY ACTIVITY: CK
EATING MEALS: A LITTLE
PERSONAL GROOMING: A LITTLE
TURNING FROM BACK TO SIDE WHILE IN FLAT BAD: A LITTLE
DAILY ACTIVITIY SCORE: 17
WALKING IN HOSPITAL ROOM: A LOT
MOBILITY SCORE: 16
SUGGESTED CMS G CODE MODIFIER MOBILITY: CK
MOVING TO AND FROM BED TO CHAIR: A LITTLE
TOILETING: A LOT
DRESSING REGULAR LOWER BODY CLOTHING: A LITTLE
DRESSING REGULAR UPPER BODY CLOTHING: A LITTLE
MOVING FROM LYING ON BACK TO SITTING ON SIDE OF FLAT BED: A LITTLE

## 2020-09-14 ASSESSMENT — CHA2DS2 SCORE
DIABETES: YES
PRIOR STROKE OR TIA OR THROMBOEMBOLISM: NO
HYPERTENSION: NO
AGE 65 TO 74: NO
CHA2DS2 VASC SCORE: 4
AGE 75 OR GREATER: YES
CHF OR LEFT VENTRICULAR DYSFUNCTION: NO
VASCULAR DISEASE: NO
SEX: FEMALE

## 2020-09-14 ASSESSMENT — LIFESTYLE VARIABLES
AVERAGE NUMBER OF DAYS PER WEEK YOU HAVE A DRINK CONTAINING ALCOHOL: 0
HOW MANY TIMES IN THE PAST YEAR HAVE YOU HAD 5 OR MORE DRINKS IN A DAY: 0
TOTAL SCORE: 0
CONSUMPTION TOTAL: NEGATIVE
TOTAL SCORE: 0
HAVE YOU EVER FELT YOU SHOULD CUT DOWN ON YOUR DRINKING: NO
EVER HAD A DRINK FIRST THING IN THE MORNING TO STEADY YOUR NERVES TO GET RID OF A HANGOVER: NO
EVER FELT BAD OR GUILTY ABOUT YOUR DRINKING: NO
ON A TYPICAL DAY WHEN YOU DRINK ALCOHOL HOW MANY DRINKS DO YOU HAVE: 0
ALCOHOL_USE: NO
HAVE PEOPLE ANNOYED YOU BY CRITICIZING YOUR DRINKING: NO
TOTAL SCORE: 0

## 2020-09-14 ASSESSMENT — ENCOUNTER SYMPTOMS
SHORTNESS OF BREATH: 0
DIZZINESS: 1
COUGH: 0
ROS GI COMMENTS: DRY MOUTH
FEVER: 0
CHILLS: 0
FALLS: 1

## 2020-09-14 ASSESSMENT — FIBROSIS 4 INDEX
FIB4 SCORE: 1.57
FIB4 SCORE: 1.87
FIB4 SCORE: 1.87

## 2020-09-14 ASSESSMENT — PATIENT HEALTH QUESTIONNAIRE - PHQ9
SUM OF ALL RESPONSES TO PHQ9 QUESTIONS 1 AND 2: 0
2. FEELING DOWN, DEPRESSED, IRRITABLE, OR HOPELESS: NOT AT ALL
1. LITTLE INTEREST OR PLEASURE IN DOING THINGS: NOT AT ALL

## 2020-09-14 NOTE — ASSESSMENT & PLAN NOTE
BP in the ER is 210/122  She states that she has been compliant with norvasc 5 mg  Increase to norvasc 5 mg BID and add prn IV enalapril  She may need further oral meds  IV enalapril and labetalol ordered with holding parameters.

## 2020-09-14 NOTE — PROGRESS NOTES
Triage note    76 yo woman with CAD, DM, HTN, HLD who has had intermittent body tremors without LOC and also syncopal episodes, along with dizziness and headache for 1 month. She has not been to see any provider about these symptoms.  CTH no acute changes, CXR unremarkable  Hypertensive to 205/100, getting IV metop  Hyperglycemic >500, no DKA  Dr. Marie to admit

## 2020-09-14 NOTE — PROGRESS NOTES
Spiritual Care Note    Patient Information     Patient's Name: Amrita Torrez   MRN: [id number]    YOB: 1945   Age and Gender: 75 y.o. female   Service Area: ED [facility]   Room (and Bed): RD 04/04 RED   Ethnicity or Nationality:     Primary Language: English   Church/Spiritual preference: [employer])   Place of Residence: [place]   Family/Friends/Others Present: No   Clinical Team Present: No   Medical Diagnosis(-es)/Procedure(s): Tremors   Code Status: Prior    Date of Admission: 9/14/2020   Length of Stay: 0 days        Spiritual Care Provider Information:  Name of Spiritual Care Provider: Aixa Salazar  Title of Spiritual Care Provider: Associate   Phone Number: [phone]  E-mail: [email or web address]  Total time : 5 minutes    Spiritual Screen Results:    Gen Nursing        Palliative Care         Encounter/Request Information  Encounter/Request Type   Visited With: Patient  Nature of the Visit: Initial, On shift  Crisis Visit: ED  Referral From/ Origin of Request: [place] management rounds, Verbal staff    Religous Needs/Values       Spiritual Assessment     Spiritual Care Encounters    Interaction/Conversation: DUANE Rivera referred the  to this very pleasant pt, who declined a visit but thanked the  for stopping by.    Plan: Visit Upon Request    Notes:

## 2020-09-14 NOTE — ED NOTES
Pt increasingly hypertensive, ERP notified.  Pt has no c/o at this time, states resting comfortably.

## 2020-09-14 NOTE — ED TRIAGE NOTES
"BIB [facility] to R4, transferred from her PMD's office for further eval.  Pt c/o intermittent syncopal episodes and falls x 1 month, last one was 2 weeks ago.  Pt did strike her head at that time.  Pt is on Eliquis.  Pt reports that since the falls/syncopal episodes she has intermittent full body tremors.  Pt had an episode of tremors in the dr's office today.  Pt was awake throughout.      Pt is also a diabetic, blood sugar checked pta & was reading \"HI\".  Pt states she is compliant w/ medications.  Pt in gown, on monitor, chart up for ERP.   "

## 2020-09-14 NOTE — ED PROVIDER NOTES
ED Provider Note    Scribed for Roc Uribe M.D. by Oj Gama. 9/14/2020  12:16 PM    Primary care provider: Fitz Turpin D.O.  Means of arrival: Ambulance  History obtained from: Patient  History limited by: None    CHIEF COMPLAINT  Chief Complaint   Patient presents with   • Tremors   • Hyperglycemia   • Syncope       HPI  Amrita Torrez is a 75 y.o. female with hypertension, diabetes and CAD, who presents to the Emergency Department by ambulance for evaluation of intermittent full body tremors onset approximately 1 month ago. Today, she says she was being evaluated by her Primary Care Provider when she had an episode of the full body tremors. She states episodes last for a few minutes. She denies loss of consciousness with these episodes. She denies exacerbation of full body tremors with stress. She states her full body tremors onset after she last hit her head approximately 1 month ago. She reports she experienced intermittent syncopal episodes 1-2 months ago, which she fell and hit her head several times secondary to. She denies any falls or syncopal episodes for the past month. She says she did not come in to be seen because of vertigo. She admits to additional symptoms of intermittent headache. She denies alleviating factors. She is on Eliquis.     PPE Note: I personally donned PPE for all patient encounters during this visit, including being clean-shaven with a surgical mask, gloves, and eye protection.     Scribe remained outside the patient's room and did not have any contact with the patient for the duration of patient encounter.       REVIEW OF SYSTEMS  Pertinent positives include full body tremors, headache.   Pertinent negatives include no loss of consciousness with full body tremor episodes.    All other systems reviewed and negative.    PAST MEDICAL HISTORY   has a past medical history of CAD (coronary artery disease), Diabetes, Goiter, Heart attack (HCC), Hyperlipidemia,  "Hypertension, MI (myocardial infarction) (HCC) (2016), Pericardial effusion, and Thyroid nodule.    SURGICAL HISTORY   has a past surgical history that includes other orthopedic surgery (); other abdominal surgery (); gyn surgery (); gyn surgery; aditya by laparoscopy (2012); laparoscopy (2014); and zzz cardiac cath.    SOCIAL HISTORY  Social History     Tobacco Use   • Smoking status: Former Smoker     Packs/day: 1.00     Years: 40.00     Pack years: 40.00     Types: Cigarettes     Quit date: 1978     Years since quittin.7   • Smokeless tobacco: Never Used   Substance Use Topics   • Alcohol use: No   • Drug use: No      Social History     Substance and Sexual Activity   Drug Use No       FAMILY HISTORY  No family history on file.    CURRENT MEDICATIONS  Home Medications     Reviewed by Michael Pradhan (Pharmacy Tech) on 20 at 1148  Med List Status: Complete   Medication Last Dose Status   amLODIPine (NORVASC) 5 MG Tab 2020 Active   ELIQUIS 5 MG Tab 2020 Active   ferrous sulfate 325 (65 Fe) MG tablet 2020 Active   insulin glargine (LANTUS) 100 UNIT/ML Solution 2020 Active   insulin regular (NOVOLIN R) 100 Unit/mL Solution 2020 Active   latanoprost (XALATAN) 0.005 % Solution 2020 Active   metFORMIN ER (GLUCOPHAGE XR) 500 MG TABLET SR 24 HR 2020 Active   multivitamin (THERAGRAN) Tab 2020 Active   rosuvastatin (CRESTOR) 5 MG Tab 2020 Active   sertraline (ZOLOFT) 50 MG Tab 2020 Active   trazodone (DESYREL) 50 MG Tab 2020 Active   vitamin D, Ergocalciferol, (DRISDOL) 13102 units Cap capsule 2020 Active                ALLERGIES  Allergies   Allergen Reactions   • Pcn [Penicillins] Hives, Shortness of Breath and Swelling   • Sulfa Drugs Hives, Shortness of Breath and Swelling       PHYSICAL EXAM  VITAL SIGNS: BP (!) 188/91   Pulse 91   Temp 36.5 °C (97.7 °F) (Temporal)   Resp (!) 24   Ht 1.651 m (5' 5\")   Wt 65.8 kg " (145 lb)   SpO2 94%   BMI 24.13 kg/m²     Constitutional: Well developed, Well nourished, no acute distress, Non-toxic appearance.   HENT: Slight tenderness to palpation to the right occipital area. Normocephalic, Bilateral external ears normal, Oropharynx moist, No oral exudates.   Eyes: PERRLA, EOMI, Conjunctiva normal, No discharge.   Neck: No midline C-Spine tenderness. No tenderness, Supple, No stridor.   Lymphatic: No lymphadenopathy noted.   Cardiovascular: Normal heart rate, Normal rhythm.   Thorax & Lungs: Clear to auscultation bilaterally, No respiratory distress, No wheezing, No crackles.   Abdomen: Soft, No tenderness, No masses, No pulsatile masses.   Skin: Warm, Dry, No erythema, No rash.   Extremities:, No edema No cyanosis.   Musculoskeletal: No tenderness to palpation or major deformities noted.  Intact distal pulses  Back; No midline T-Spine tenderness.   Neurologic: Awake, alert. Moves all extremities spontaneously.  Psychiatric: Affect normal, Judgment normal, Mood normal.     LABS  Results for orders placed or performed during the hospital encounter of 09/14/20   CBC WITH DIFFERENTIAL   Result Value Ref Range    WBC 7.5 4.8 - 10.8 K/uL    RBC 4.89 4.20 - 5.40 M/uL    Hemoglobin 14.4 12.0 - 16.0 g/dL    Hematocrit 40.1 37.0 - 47.0 %    MCV 82.0 81.4 - 97.8 fL    MCH 29.4 27.0 - 33.0 pg    MCHC 35.9 (H) 33.6 - 35.0 g/dL    RDW 38.9 35.9 - 50.0 fL    Platelet Count 213 164 - 446 K/uL    MPV 10.0 9.0 - 12.9 fL    Neutrophils-Polys 70.90 44.00 - 72.00 %    Lymphocytes 19.00 (L) 22.00 - 41.00 %    Monocytes 8.00 0.00 - 13.40 %    Eosinophils 1.60 0.00 - 6.90 %    Basophils 0.40 0.00 - 1.80 %    Immature Granulocytes 0.10 0.00 - 0.90 %    Nucleated RBC 0.00 /100 WBC    Neutrophils (Absolute) 5.34 2.00 - 7.15 K/uL    Lymphs (Absolute) 1.43 1.00 - 4.80 K/uL    Monos (Absolute) 0.60 0.00 - 0.85 K/uL    Eos (Absolute) 0.12 0.00 - 0.51 K/uL    Baso (Absolute) 0.03 0.00 - 0.12 K/uL    Immature Granulocytes  (abs) 0.01 0.00 - 0.11 K/uL    NRBC (Absolute) 0.00 K/uL   COMP METABOLIC PANEL   Result Value Ref Range    Sodium 129 (L) 135 - 145 mmol/L    Potassium 3.9 3.6 - 5.5 mmol/L    Chloride 94 (L) 96 - 112 mmol/L    Co2 24 20 - 33 mmol/L    Anion Gap 11.0 7.0 - 16.0    Glucose 549 (HH) 65 - 99 mg/dL    Bun 17 8 - 22 mg/dL    Creatinine 1.01 0.50 - 1.40 mg/dL    Calcium 8.9 8.5 - 10.5 mg/dL    AST(SGOT) 22 12 - 45 U/L    ALT(SGPT) 18 2 - 50 U/L    Alkaline Phosphatase 107 (H) 30 - 99 U/L    Total Bilirubin 0.6 0.1 - 1.5 mg/dL    Albumin 3.1 (L) 3.2 - 4.9 g/dL    Total Protein 6.3 6.0 - 8.2 g/dL    Globulin 3.2 1.9 - 3.5 g/dL    A-G Ratio 1.0 g/dL   TROPONIN   Result Value Ref Range    Troponin T 34 (H) 6 - 19 ng/L   PRTOTHROMBIN TIME (INR)   Result Value Ref Range    PT 14.4 12.0 - 14.6 sec    INR 1.08 0.87 - 1.13   APTT   Result Value Ref Range    APTT 30.5 24.7 - 36.0 sec   BETA-HYDROXYBUTYRIC ACID   Result Value Ref Range    beta-Hydroxybutyric Acid <0.20 0.02 - 0.27 mmol/L   VENOUS BLOOD GAS   Result Value Ref Range    Venous Bg Ph 7.44 7.31 - 7.45    Venous Bg Pco2 37.4 (L) 41.0 - 51.0 mmHg    Venous Bg Po2 37.7 25.0 - 40.0 mmHg    Venous Bg O2 Saturation 75.5 %    Venous Bg Hco3 25 24 - 28 mmol/L    Venous Bg Base Excess 1 mmol/L    Body Temp see below Centigrade   ESTIMATED GFR   Result Value Ref Range    GFR If African American >60 >60 mL/min/1.73 m 2    GFR If Non  53 (A) >60 mL/min/1.73 m 2   Routine (COVID/SARS COV-2 In-House PCR up to 24 hours)    Specimen: Nasopharyngeal; Respirate   Result Value Ref Range    COVID Order Status Received    SARS-CoV-2, PCR (In-House)   Result Value Ref Range    SARS-CoV-2 Source NP Swab    ACCU-CHEK GLUCOSE   Result Value Ref Range    Glucose - Accu-Ck 380 (H) 65 - 99 mg/dL   EKG   Result Value Ref Range    Report       [facility] Emergency Dept.    Test Date:  2020-09-14  Pt Name:    SHANNAN ALCANTARA                 Department: ER  MRN:         [id number]                      Room:        04  Gender:     Female                       Technician: [id number]  :        [id number]                   Requested By:PEDRO AUGUSTIN  Order #:    [id number]                    Reading MD: PEDRO AUGUSTIN MD    Measurements  Intervals                                Axis  Rate:       77                           P:          52  AL:         180                          QRS:        -51  QRSD:       102                          T:          32  QT:         420  QTc:        476    Interpretive Statements  SINUS RHYTHM  LEFT ANTERIOR FASCICULAR BLOCK  LATE PRECORDIAL R/S TRANSITION  PROBABLE LEFT VENTRICULAR HYPERTROPHY  Compared to ECG 2019 00:28:44  Atrial fibrillation no longer present  Myocardial infarct finding no longer present  Electronically Signed On 2020 14:10:32 PDT by PEDRO LEIVA MD          RADIOLOGY  DX-CHEST-PORTABLE (1 VIEW)   Final Result         1. No acute cardiopulmonary abnormalities are identified.      CT-HEAD W/O   Final Result         1. No acute intracranial findings. No evidence of acute intracranial hemorrhage or mass lesion.      2. White matter lucencies most consistent with chronic small vessel ischemic change.                 The radiologist's interpretation of all radiological studies have been reviewed by me.      COURSE & MEDICAL DECISION MAKING  Pertinent Labs & Imaging studies reviewed. (See chart for details)    I reviewed the patient's medical records which showed the patient has history of hypertension, diabetes, CAD.     12:16 PM - Patient seen and examined at bedside. I informed the patient of my plan to run diagnostic studies to evaluate their symptoms including labs and imaging. Patient verbalizes understanding and support with my plan of care.  Ordered CT-Head, DX-Chest, EKG, Beta-Hydroxybutyric Acid, Venous Blood Gas, CBC with diff, CMP, Troponin, INR, APTT to evaluate her symptoms. The differential diagnoses  include but are not limited to: DKA, Hyperglycemia, Syncope, Intracranial Hemorrhage.     2:12 PM - The patient will be medicated with Lopressor 5 mg injection and insulin regular injection 10 units.     2:12 PM - Paged Hospitalist.      2:26 PM - I discussed the patient's case and the above findings with Dr. Martel (Hospitalist) who will assess the patient for hospitalization.       2:30 PM - I reevaluated the patient at bedside. I discussed the patient's diagnostic study results as noted above. I informed the patient of my plan to admit today given the patient's current presentation and diagnostic study results. Patient verbalizes understanding and support with my plan for admission.      Decision Making:  Patient with hyperglycemia, some tremors, syncopal episodes approximately month ago with head injury.  Believe the patient needs to be hospitalized, the patient did have significant hyperglycemia, which came down nicely by itself.  Discussed the case with the hospitalist for hospitalization.    DISPOSITION:  Patient will be hospitalized by Dr. Martel in guarded condition.     FINAL IMPRESSION  1. Syncope, unspecified syncope type    2. Hyperglycemia    3. Tremors of nervous system        Oj ZAPIEN (Patricibjessee), am scribing for, and in the presence of, Roc Uribe M.D..    Electronically signed by: Oj Gama (Phu), 9/14/2020    IRoc M.D. personally performed the services described in this documentation, as scribed by Oj Gama in my presence, and it is both accurate and complete.    The note accurately reflects work and decisions made by me.  Roc Uribe M.D.  9/14/2020  5:45 PM

## 2020-09-14 NOTE — ED NOTES
Med Rec completed per patient with medication list (returned)  Allergies reviewed  No ORAL antibiotics in last 14 days    Patient stated she took her Eliquis this AM but nothing else due to feeling dizzy

## 2020-09-14 NOTE — ED NOTES
EKG completed.   Additional labs drawn & sent.  Pt amb to the restroom & back w/ 1 person assist & a unsteady gait.

## 2020-09-14 NOTE — ED NOTES
Pt medicated for elevated BP.  FSBS rechecked prior to Insulin admin, fsbs 380, ERP notified, Insulin held.      Pt amb to the restroom & back, gait remains unsteady.   Pt had admit orders, awaiting a bed assignment.

## 2020-09-14 NOTE — ASSESSMENT & PLAN NOTE
Intermittent episodes  She will be monitored on tele and may benefit from an outpatient cardiac monitor  She had a normal EF of 70% 7/19 with aortic valve 1.3 cm squared

## 2020-09-14 NOTE — PROGRESS NOTES
2 RN skin check complete with Marquis RN  Devices in place n/a.  Skin assessed under devices n/a.    The following interventions in place freq reminders to turn, moisturizer.    BL ears red/slow to sean  Generalized scabs on upper extremities (from dog per pt)  BL heels pink/boggy  BL feet dry/flaky    No other skin breakdown noted.

## 2020-09-14 NOTE — ASSESSMENT & PLAN NOTE
Glucose is severely elevated at 549  Her HbA1c one month ago as 12.4  She will be given IV fluids and continue with Lantus 20 with sliding scale though may be a candidate for an increase in Lantus.

## 2020-09-14 NOTE — PROGRESS NOTES
Assumed care of pt, she is alert and oriented. Discussed POC with ED RN at bedside. Pt transferred to tele and placed on box and confirmed SR with monitor tech. Pt transferred to bed with hand held assist. Discussed safety. Bed is locked in lowest position and call light/personal belongings are within reach.

## 2020-09-14 NOTE — H&P
"Hospital Medicine History & Physical Note    Date of Service  9/14/2020    Primary Care Physician  Fitz Turpin D.O.    Consultants  none    Code Status  Full Code    Chief Complaint  Chief Complaint   Patient presents with   • Tremors   • Hyperglycemia   • Syncope       History of Presenting Illness  75 y.o. female who presented 9/14/2020 with dizziness and elevated blood sugars.   Ms. Torrez has a past medical history of paroxysmal defibrillation as well as coronary artery disease with stent to the LAD 2016 and insulin-dependent diabetes mellitus that went to her primary care office today and had an event in which she has been shaking.  Apparently according to patient she has been having these events for a few days she states she does not lose consciousness and is awake during them but it happens that her arms and legs tensed up \"I get the shakes so bad it is almost like a seizure but I can hear you and I can talk\".  She was referred here to the emergency room where she has a blood pressure of 210/120 and a glucose of 549.  She states she has been taking her insulin and her blood pressure medications.  Also notes she has been having some episodes where she gets dizzy and occasionally passes out though this has not happened for a few weeks.  These have not been exertional in nature.  She denies chest pain or shortness of breath.  Remitted for further work-up and evaluation of her severely accelerated hypertension as well as hyperglycemia.  She denies exposure to persons known to have COVID, she denies cough or shortness of breath, she denies fevers and chills, denies body aches, a screening COVID is been ordered per hospital policy.    Review of Systems  Review of Systems   Constitutional: Negative for chills and fever.   Respiratory: Negative for cough and shortness of breath.    Cardiovascular: Negative for chest pain.   Gastrointestinal:        Dry mouth   Musculoskeletal: Positive for falls. "   Neurological: Positive for dizziness.   All other systems reviewed and are negative.      Past Medical History   has a past medical history of CAD (coronary artery disease), Diabetes, Goiter, Heart attack (HCC), Hyperlipidemia, Hypertension, MI (myocardial infarction) ([facility]) (2016), Pericardial effusion, and Thyroid nodule.    Surgical History   has a past surgical history that includes other orthopedic surgery (7/11); other abdominal surgery (1966); gyn surgery (1978); gyn surgery; aditya by laparoscopy (9/14/2012); laparoscopy (12/1/2014); and zzz cardiac cath.     Family History  No family history of sudden death    Social History   reports that she quit smoking about 42 years ago. Her smoking use included cigarettes. She has a 40.00 pack-year smoking history. She has never used smokeless tobacco. She reports that she does not drink alcohol or use drugs.    Allergies  Allergies   Allergen Reactions   • Pcn [Penicillins] Hives, Shortness of Breath and Swelling   • Sulfa Drugs Hives, Shortness of Breath and Swelling       Medications  Prior to Admission Medications   Prescriptions Last Dose Informant Patient Reported? Taking?   ELIQUIS 5 MG Tab 9/14/2020 at AM Patient No No   Sig: TAKE 1 TABLET BY MOUTH TWICE A DAY   amLODIPine (NORVASC) 5 MG Tab 9/13/2020 at AM Patient Yes No   Sig: Take 5 mg by mouth every day. Indications: High Blood Pressure Disorder   ferrous sulfate 325 (65 Fe) MG tablet 9/11/2020 at AM Patient Yes No   Sig: Take 325 mg by mouth every Monday, Wednesday, and Friday. Indications: supplement   insulin glargine (LANTUS) 100 UNIT/ML Solution 9/13/2020 at PM Patient Yes No   Sig: Inject 20 Units as instructed every evening. Indications: Type 2 Diabetes   insulin regular (NOVOLIN R) 100 Unit/mL Solution 9/13/2020 at PM Patient Yes No   Sig: Inject 2-8 Units as instructed 4 times a day. PER SLIDING SCALE  150-200 = 2 UNITS  201-250 = 4 UNITS  251-300 = 6 UNITS  301-350 = 8 UNITS  >400 CALL MD    latanoprost (XALATAN) 0.005 % Solution 9/13/2020 at PM Patient Yes No   Sig: Place 1 Drop in both eyes every evening.   metFORMIN ER (GLUCOPHAGE XR) 500 MG TABLET SR 24 HR 9/13/2020 at PM Patient Yes Yes   Sig: Take 500 mg by mouth 2 times a day.   multivitamin (THERAGRAN) Tab 9/13/2020 at 0800 Patient Yes Yes   Sig: Take 1 Tab by mouth every day.   rosuvastatin (CRESTOR) 5 MG Tab 9/13/2020 at AM Patient Yes No   Sig: Take 5 mg by mouth every morning. Indications: High Amount of Fats in the Blood   sertraline (ZOLOFT) 50 MG Tab 9/13/2020 at 0800 Patient Yes No   Sig: Take 50 mg by mouth every day. Indications: Major Depressive Disorder   trazodone (DESYREL) 50 MG Tab 9/13/2020 at PM Patient Yes No   Sig: Take 50 mg by mouth every bedtime. Indications: Trouble Sleeping   vitamin D, Ergocalciferol, (DRISDOL) 36998 units Cap capsule 9/13/2020 at AM Patient Yes No   Sig: Take 50,000 Units by mouth every Sunday.      Facility-Administered Medications: None       Physical Exam  Temp:  [36.5 °C (97.7 °F)] 36.5 °C (97.7 °F)  Pulse:  [67-91] 67  Resp:  [12-20] 12  BP: (180-210)/() 210/122  SpO2:  [94 %-98 %] 97 %    Physical Exam  Vitals signs and nursing note reviewed.   Constitutional:       Appearance: Normal appearance. She is not ill-appearing.   HENT:      Head: Normocephalic and atraumatic.      Mouth/Throat:      Mouth: Mucous membranes are dry.      Pharynx: Oropharynx is clear.   Eyes:      General: No scleral icterus.     Conjunctiva/sclera: Conjunctivae normal.   Neck:      Musculoskeletal: Normal range of motion and neck supple.   Cardiovascular:      Rate and Rhythm: Normal rate and regular rhythm.      Heart sounds: Murmur present.   Pulmonary:      Effort: Pulmonary effort is normal. No respiratory distress.      Breath sounds: Normal breath sounds.   Abdominal:      General: There is no distension.      Tenderness: There is no abdominal tenderness.      Comments: ? Lipoma left abd approx 2 cm x 2 cm  and moveable mass    Musculoskeletal:      Right lower leg: No edema.      Left lower leg: No edema.   Skin:     General: Skin is warm and dry.      Comments: Scabs forearms   Neurological:      General: No focal deficit present.      Mental Status: She is alert and oriented to person, place, and time.   Psychiatric:         Mood and Affect: Mood normal.         Behavior: Behavior normal.         Laboratory:  Recent Labs     09/14/20  1127   WBC 7.5   RBC 4.89   HEMOGLOBIN 14.4   HEMATOCRIT 40.1   MCV 82.0   MCH 29.4   MCHC 35.9*   RDW 38.9   PLATELETCT 213   MPV 10.0     Recent Labs     09/14/20  1127   SODIUM 129*   POTASSIUM 3.9   CHLORIDE 94*   CO2 24   GLUCOSE 549*   BUN 17   CREATININE 1.01   CALCIUM 8.9     Recent Labs     09/14/20  1127   ALTSGPT 18   ASTSGOT 22   ALKPHOSPHAT 107*   TBILIRUBIN 0.6   GLUCOSE 549*     Recent Labs     09/14/20  1127   APTT 30.5   INR 1.08     No results for input(s): NTPROBNP in the last 72 hours.      Recent Labs     09/14/20  1127   TROPONINT 34*       Imaging:  DX-CHEST-PORTABLE (1 VIEW)   Final Result         1. No acute cardiopulmonary abnormalities are identified.      CT-HEAD W/O   Final Result         1. No acute intracranial findings. No evidence of acute intracranial hemorrhage or mass lesion.      2. White matter lucencies most consistent with chronic small vessel ischemic change.                     Assessment/Plan:  I anticipate this patient will require at least two midnights for appropriate medical management, necessitating inpatient admission.    Type 2 diabetes mellitus with hyperglycemia, with long-term current use of insulin ([facility])- (present on admission)  Assessment & Plan  Glucose is severely elevated at 549  Her HbA1c one month ago as 12.4  She will be given IV fluids and continue with Lantus 20 with sliding scale though may be a candidate for an increase in Lantus.    Syncope- (present on admission)  Assessment & Plan  Intermittent episodes  She will be  monitored on tele and may benefit from an outpatient cardiac monitor  She had a normal EF of 70% 7/19 with aortic valve 1.3 cm squared      Accelerated hypertension- (present on admission)  Assessment & Plan  BP in the ER is 210/122  She states that she has been compliant with norvasc 5 mg  Increase to norvasc 5 mg BID and add prn IV enalapril  She may need further oral meds  IV enalapril and labetalol ordered with holding parameters.     Paroxysmal atrial fibrillation (HCC)- (present on admission)  Assessment & Plan  Continue Eliquis for anticoag    CAD (coronary artery disease)- (present on admission)  Assessment & Plan  Hx stent LAD 2016

## 2020-09-15 VITALS
TEMPERATURE: 97.5 F | RESPIRATION RATE: 18 BRPM | HEIGHT: 65 IN | OXYGEN SATURATION: 97 % | DIASTOLIC BLOOD PRESSURE: 75 MMHG | WEIGHT: 141.09 LBS | BODY MASS INDEX: 23.51 KG/M2 | HEART RATE: 76 BPM | SYSTOLIC BLOOD PRESSURE: 160 MMHG

## 2020-09-15 LAB
EST. AVERAGE GLUCOSE BLD GHB EST-MCNC: 286 MG/DL
GLUCOSE BLD-MCNC: 163 MG/DL (ref 65–99)
GLUCOSE BLD-MCNC: 219 MG/DL (ref 65–99)
GLUCOSE BLD-MCNC: 235 MG/DL (ref 65–99)
HBA1C MFR BLD: 11.6 % (ref 0–5.6)

## 2020-09-15 PROCEDURE — 83036 HEMOGLOBIN GLYCOSYLATED A1C: CPT

## 2020-09-15 PROCEDURE — 700102 HCHG RX REV CODE 250 W/ 637 OVERRIDE(OP): Performed by: HOSPITALIST

## 2020-09-15 PROCEDURE — 99239 HOSP IP/OBS DSCHRG MGMT >30: CPT | Performed by: STUDENT IN AN ORGANIZED HEALTH CARE EDUCATION/TRAINING PROGRAM

## 2020-09-15 PROCEDURE — A9270 NON-COVERED ITEM OR SERVICE: HCPCS | Performed by: HOSPITALIST

## 2020-09-15 PROCEDURE — 82962 GLUCOSE BLOOD TEST: CPT

## 2020-09-15 PROCEDURE — 700105 HCHG RX REV CODE 258: Performed by: HOSPITALIST

## 2020-09-15 PROCEDURE — 36415 COLL VENOUS BLD VENIPUNCTURE: CPT

## 2020-09-15 RX ORDER — INSULIN GLARGINE 100 [IU]/ML
25 INJECTION, SOLUTION SUBCUTANEOUS DAILY
Qty: 7.5 ML | Refills: 1 | Status: SHIPPED | OUTPATIENT
Start: 2020-09-15 | End: 2020-10-15

## 2020-09-15 RX ORDER — AMLODIPINE BESYLATE 5 MG/1
10 TABLET ORAL DAILY
Qty: 30 TAB | Refills: 1 | Status: ON HOLD | OUTPATIENT
Start: 2020-09-15 | End: 2021-04-29

## 2020-09-15 RX ADMIN — APIXABAN 5 MG: 5 TABLET, FILM COATED ORAL at 05:42

## 2020-09-15 RX ADMIN — SERTRALINE HYDROCHLORIDE 50 MG: 50 TABLET ORAL at 05:42

## 2020-09-15 RX ADMIN — INSULIN HUMAN 4 UNITS: 100 INJECTION, SOLUTION PARENTERAL at 17:12

## 2020-09-15 RX ADMIN — SODIUM CHLORIDE, POTASSIUM CHLORIDE, SODIUM LACTATE AND CALCIUM CHLORIDE: 600; 310; 30; 20 INJECTION, SOLUTION INTRAVENOUS at 05:45

## 2020-09-15 RX ADMIN — INSULIN HUMAN 3 UNITS: 100 INJECTION, SOLUTION PARENTERAL at 09:09

## 2020-09-15 RX ADMIN — APIXABAN 5 MG: 5 TABLET, FILM COATED ORAL at 17:09

## 2020-09-15 RX ADMIN — AMLODIPINE BESYLATE 5 MG: 5 TABLET ORAL at 05:41

## 2020-09-15 RX ADMIN — ROSUVASTATIN CALCIUM 5 MG: 10 TABLET, FILM COATED ORAL at 05:42

## 2020-09-15 RX ADMIN — AMLODIPINE BESYLATE 5 MG: 5 TABLET ORAL at 17:09

## 2020-09-15 RX ADMIN — INSULIN HUMAN 4 UNITS: 100 INJECTION, SOLUTION PARENTERAL at 13:26

## 2020-09-15 RX ADMIN — METFORMIN HYDROCHLORIDE 500 MG: 500 TABLET, EXTENDED RELEASE ORAL at 17:10

## 2020-09-15 NOTE — PROGRESS NOTES
Assumed care of pt @ 1915, bedside report received from DUANE Salmon.  Pt A&OX4 and denies any pain.  Bed locked and lowered with call light within reach and questions answered during present time.

## 2020-09-15 NOTE — PROGRESS NOTES
Bedside report received from previous nurse regarding prior 12 hours.  POC reviewed with pt.  White board updated.  Pt verbalizes understanding.  Call light within reach.  Pt tolerated morning meds. Bed locked and lowered. Bed alarm set. All needs currently within reach. Cardiac monitor connected. No additional requests at this time.

## 2020-09-15 NOTE — CARE PLAN
Problem: Communication  Goal: The ability to communicate needs accurately and effectively will improve  Outcome: PROGRESSING AS EXPECTED     Problem: Safety  Goal: Will remain free from injury  Outcome: PROGRESSING AS EXPECTED     Problem: Bowel/Gastric:  Goal: Will not experience complications related to bowel motility  Outcome: PROGRESSING SLOWER THAN EXPECTED

## 2020-09-15 NOTE — DISCHARGE INSTRUCTIONS
Discharge Instructions    Discharged to home by car with relative. Discharged via wheelchair, hospital escort: Yes.  Special equipment needed: Not Applicable    Be sure to schedule a follow-up appointment with your primary care doctor or any specialists as instructed.     Discharge Plan:        I understand that a diet low in cholesterol, fat, and sodium is recommended for good health. Unless I have been given specific instructions below for another diet, I accept this instruction as my diet prescription.   Other diet: Diabetic    Special Instructions: None    · Is patient discharged on Warfarin / Coumadin?   No     Depression / Suicide Risk    As you are discharged from this [facility] facility, it is important to learn how to keep safe from harming yourself.    Recognize the warning signs:  · Abrupt changes in personality, positive or negative- including increase in energy   · Giving away possessions  · Change in eating patterns- significant weight changes-  positive or negative  · Change in sleeping patterns- unable to sleep or sleeping all the time   · Unwillingness or inability to communicate  · Depression  · Unusual sadness, discouragement and loneliness  · Talk of wanting to die  · Neglect of personal appearance   · Rebelliousness- reckless behavior  · Withdrawal from people/activities they love  · Confusion- inability to concentrate     If you or a loved one observes any of these behaviors or has concerns about self-harm, here's what you can do:  · Talk about it- your feelings and reasons for harming yourself  · Remove any means that you might use to hurt yourself (examples: pills, rope, extension cords, firearm)  · Get professional help from the community (Mental Health, Substance Abuse, psychological counseling)  · Do not be alone:Call your Safe Contact- someone whom you trust who will be there for you.  · Call your local CRISIS HOTLINE 251-6632 or 372-043-7363  · Call your local Children's Mobile  Crisis Response Team [place] (545) 260-3131 or www.Slidebean  · Call the toll free National Suicide Prevention Hotlines   · National Suicide Prevention Lifeline 726-299-NMMH (7909)  · [place] Hope Line Network 800-SUICIDE (258-5271)        Hyperglycemic Hyperosmolar State  Hyperglycemic hyperosmolar state is a serious condition in which you experience an extreme increase in your blood sugar (glucose) level. This makes your body become extremely dehydrated, which can be life-threatening. This condition is a result of uncontrolled or undiagnosed diabetes. It occurs most often in people who have type 2 diabetes (type 2 diabetes mellitus).  Certain hormones (insulin and glucagon) control the level of glucose that is in the blood. Insulin lowers blood glucose, and glucagon increases blood glucose. Hyperglycemia can result from having too little insulin in the bloodstream, or from the body not responding normally to insulin. Normally, the body gets rid of excess glucose through urine. If you do not drink enough fluids, or if you drink fluids that contain sugar, your body cannot get rid of excess glucose. This can result in hyperglycemic hyperosmolar state.  What are the causes?  This condition may be caused by:  · Infection.  · Medicines that cause you to become dehydrated or cause you to lose fluid.  · Certain illnesses.  · Not taking your diabetes medicine.  · New onset or diagnosis of diabetes.  · Cardiovascular disease (CVD).  What increases the risk?  The following factors make you more likely to develop this condition:  · Older age.  · Poor management of diabetes.  · Inability to eat or drink normally.  · Heart failure.  · Infection.  · Surgery.  · Illness.  What are the signs or symptoms?  Symptoms of this condition include:  · Extreme or increased thirst. This symptom may gradually disappear.  · Needing to urinate more often than usual.  · Dry mouth.  · Warm, dry skin that does not sweat even in high  temperatures.  · High fever.  · Sleepiness or confusion.  · Vision problems or vision loss.  · Seeing, hearing, tasting, smelling, or feeling things that are not real (hallucinations).  · Weakness.  · Weight loss.  · Vomiting.  How is this diagnosed?  Hyperglycemic hyperosmolar state is diagnosed based on your medical history, your symptoms, and a blood test to measure your blood glucose level.  How is this treated?  This condition is treated in the hospital. The goals of treatment are:  · To correct dehydration by replacing fluids that you have lost. Fluids will be given through an IV tube.  · To improve blood sugar levels using insulin or other medicines as needed.  · To treat the cause of hyperglycemia, such as an infection, illness, or newly diagnosed diabetes.  Follow these instructions at home:  General instructions  · Take over-the-counter and prescription medicines only as told by your health care provider.  · Do not use any products that contain nicotine or tobacco, such as cigarettes and e-cigarettes. If you need help quitting, ask your health care provider.  · Limit alcohol intake to no more than 1 drink a day for nonpregnant women and 2 drinks a day for men. One drink equals 12 oz of beer, 5 oz of wine, or 1½ oz of hard liquor.  · Stay hydrated, especially when you exercise, when you get sick, or when you spend time in hot temperatures.  · Learn to manage stress. If you need help with this, ask your health care provider.  · Keep all follow-up visits as told by your health care provider. This is important.  Eating and drinking    · Maintain a healthy weight.  · Exercise regularly, as directed by your health care provider.  · Eat healthy foods, such as:  ? Lean proteins.  ? Complex carbohydrates.  ? Fresh fruits and vegetables.  ? Low-fat dairy products.  ? Healthy fats.  · Drink enough fluid to keep your urine clear or pale yellow.  If You Have Diabetes:    · Make sure you know the early signs and  symptoms of hyperglycemia.  · Follow your diabetes management plan, as told by your health care provider. Make sure you:  ? Take your insulin and medicines as directed.  ? Follow your exercise plan.  ? Follow your meal plan. Eat on time, and do not skip meals.  ? Check your blood glucose as often as directed. Make sure to check your blood glucose before and after exercise. If you exercise longer or in a different way than usual, check your blood glucose more often.  ? Follow your sick day plan whenever you cannot eat or drink normally. Make this plan in advance with your health care provider.  · Share your diabetes management plan with people in your workplace, school, and household.  · Check your urine for ketones when you are ill and as often as told by your health care provider.  · Carry a medical alert card or wear medical alert jewelry.  Contact a health care provider if:  · You cannot eat or drink without throwing up.  · You develop a fever.  Get help right away if:  · You develop symptoms of hyperglycemic hyperosmolar state.  These symptoms may represent a serious problem that is an emergency. Do not wait to see if the symptoms will go away. Get medical help right away. Call your local emergency services (911 in the [place] Do not drive yourself to the hospital.  Summary  · Hyperglycemic hyperosmolar state is a serious condition in which you experience an extreme increase in your blood sugar (glucose) level. This makes your body become extremely dehydrated, which can be life-threatening.  · This condition is a result of uncontrolled or undiagnosed diabetes. It occurs most often in people who have type 2 diabetes (type 2 diabetes mellitus).  · This condition is treated in the hospital. Treatment may include fluids given through an IV tube and other medicines.  · Make sure you know the early signs and symptoms of hyperglycemia.  · Follow your diabetes management plan, as told by your health care provider.  This  information is not intended to replace advice given to you by your health care provider. Make sure you discuss any questions you have with your health care provider.  Document Released: 10/20/2005 Document Revised: 12/11/2017 Document Reviewed: 12/11/2017  [employer]vier Patient Education © 2020 [employer]

## 2020-09-15 NOTE — DISCHARGE SUMMARY
"Discharge Summary    CHIEF COMPLAINT ON ADMISSION  Chief Complaint   Patient presents with   • Tremors   • Hyperglycemia   • Syncope       Reason for Admission  High Blood Sugar     Admission Date  9/14/2020    CODE STATUS  Full Code    HPI & HOSPITAL COURSE  This is a 75 y.o. female with past medical history of Paroxysmal Atrial fibrillation, poorly controlled type II Diabetes Mellitus and coronary artery disease with stent to the LAD in 2016 who presented on 9/14 with symptoms of \"shaking\". On admission she was found to be in hypertensive urgency and severe hyperglycemia with a blood glucose over 500. She denies missing medications. She denies symptoms of palpitations, chest pain, shortness of breath, loss of consciousness, loss of bowel or bladder control. She has had syncopal episodes but none in the last 1 month. Suspect that her symptoms are due to hyperglycemia and hypertension. CT head was negative for acute pathology, echocardiogram done in July shows normal EF with moderate Aortic stenosis. Her symptoms have not occurred since admission and her tele monitoring has been uneventful. Patient will be discharged in medical stable condition with increase in her Lantus as well as her amlodipine.        Therefore, she is discharged in fair and stable condition to home with close outpatient follow-up.    The patient recovered much more quickly than anticipated on admission.    Discharge Date  09/15/20      FOLLOW UP ITEMS POST DISCHARGE  Follow up on glycemic control, increased lantus from 20 to 25 Units nightly   Follow up on blood pressure control, increased amlodipine from 5 to10 mg daily   Pt states she appointment with neurology, she should follow up with them as scheduled in regards to her Tremors   Follow up with cardiology as scheduled     DISCHARGE DIAGNOSES  Active Problems:    Type 2 diabetes mellitus with hyperglycemia, with long-term current use of insulin ([facility]) POA: Yes    Accelerated hypertension " POA: Yes    CAD (coronary artery disease) POA: Yes    Paroxysmal atrial fibrillation (HCC) POA: Yes  Resolved Problems:    Syncope POA: Yes      FOLLOW UP  Future Appointments   Date Time Provider Department Center   10/8/2020 12:45 PM Sylvester Tobar M.D. [facility] None     No follow-up provider specified.    MEDICATIONS ON DISCHARGE     Medication List      CHANGE how you take these medications      Instructions   amLODIPine 5 MG Tabs  What changed: how much to take  Commonly known as: NORVASC   Take 2 Tabs by mouth every day. Indications: High Blood Pressure Disorder  Dose: 10 mg     insulin glargine 100 UNIT/ML Soln  What changed:   · how much to take  · when to take this  Commonly known as: Lantus   Inject 25 Units as instructed every day for 30 days. Indications: Type 2 Diabetes  Dose: 25 Units        CONTINUE taking these medications      Instructions   Eliquis 5mg Tabs  Generic drug: apixaban   TAKE 1 TABLET BY MOUTH TWICE A DAY     ferrous sulfate 325 (65 Fe) MG tablet   Take 325 mg by mouth every Monday, Wednesday, and Friday. Indications: supplement  Dose: 325 mg     metFORMIN  MG Tb24  Commonly known as: GLUCOPHAGE XR   Take 500 mg by mouth 2 times a day.  Dose: 500 mg     multivitamin Tabs   Take 1 Tab by mouth every day.  Dose: 1 Tab     NovoLIN R 100 Unit/mL Soln  Generic drug: insulin regular   Doctor's comments: Per medication reconciliation  Inject 2-8 Units as instructed 4 times a day. PER SLIDING SCALE  150-200 = 2 UNITS  201-250 = 4 UNITS  251-300 = 6 UNITS  301-350 = 8 UNITS  >400 CALL MD  Dose: 2-8 Units     rosuvastatin 5 MG Tabs  Commonly known as: CRESTOR   Take 5 mg by mouth every morning. Indications: High Amount of Fats in the Blood  Dose: 5 mg     sertraline 50 MG Tabs  Commonly known as: ZOLOFT   Take 50 mg by mouth every day. Indications: Major Depressive Disorder  Dose: 50 mg     traZODone 50 MG Tabs  Commonly known as: DESYREL   Take 50 mg by mouth every bedtime. Indications:  Trouble Sleeping  Dose: 50 mg     vitamin D (Ergocalciferol) 1.25 MG (81617 UT) Caps capsule  Commonly known as: DRISDOL   Take 50,000 Units by mouth every Sunday.  Dose: 50,000 Units     Xalatan 0.005 % Soln  Generic drug: latanoprost   Place 1 Drop in both eyes every evening.  Dose: 1 Drop            Allergies  Allergies   Allergen Reactions   • Pcn [Penicillins] Hives, Shortness of Breath and Swelling   • Sulfa Drugs Hives, Shortness of Breath and Swelling       DIET  Orders Placed This Encounter   Procedures   • Diet Order Diabetic     Standing Status:   Standing     Number of Occurrences:   1     Order Specific Question:   Diet:     Answer:   Diabetic [3]       ACTIVITY  As tolerated.    CONSULTATIONS  NA    PROCEDURES  NA    LABORATORY  Lab Results   Component Value Date    SODIUM 129 (L) 09/14/2020    POTASSIUM 3.9 09/14/2020    CHLORIDE 94 (L) 09/14/2020    CO2 24 09/14/2020    GLUCOSE 549 (HH) 09/14/2020    BUN 17 09/14/2020    CREATININE 1.01 09/14/2020        Lab Results   Component Value Date    WBC 7.5 09/14/2020    HEMOGLOBIN 14.4 09/14/2020    HEMATOCRIT 40.1 09/14/2020    PLATELETCT 213 09/14/2020        Total time of the discharge process exceeds 33 minutes.

## 2020-09-15 NOTE — CARE PLAN
Problem: Communication  Goal: The ability to communicate needs accurately and effectively will improve  Outcome: PROGRESSING AS EXPECTED  Intervention: [place] patient and significant other/support system to call light to alert staff of needs  Note: Complete   Intervention: Reorient patient to environment as needed  Note: Complete      Problem: Safety  Goal: Will remain free from falls  Outcome: PROGRESSING AS EXPECTED  Intervention: Assess risk factors for falls  Note: Complete. Pt is steady on her feet with a shuffle when walking. Pt is a standby assist and uses the walker   Intervention: Implement fall precautions  Note: Bed alarm in the lowest position, bed locked, bed alarm on, pt has fall risk socks on, call light within reach, fall risk bracelet in place, pt educated

## 2020-09-16 NOTE — PROGRESS NOTES
Pt dc'd to home. IV and monitor removed; monitor room notified. Pt left unit via wheelchair with RN. Personal belongings with pt when leaving unit. Pt given discharge instructions prior to leaving unit including prescription and when to visit with physician; verbalizes understanding. Copy of discharge instructions with pt and in the chart.

## 2020-10-08 ENCOUNTER — OFFICE VISIT (OUTPATIENT)
Dept: CARDIOLOGY | Facility: MEDICAL CENTER | Age: 75
End: 2020-10-08
Payer: MEDICARE

## 2020-10-08 VITALS
DIASTOLIC BLOOD PRESSURE: 86 MMHG | OXYGEN SATURATION: 97 % | HEIGHT: 65 IN | HEART RATE: 93 BPM | BODY MASS INDEX: 24.32 KG/M2 | SYSTOLIC BLOOD PRESSURE: 142 MMHG | WEIGHT: 146 LBS

## 2020-10-08 DIAGNOSIS — Z95.820 S/P ANGIOPLASTY WITH STENT: ICD-10-CM

## 2020-10-08 DIAGNOSIS — E11.9 TYPE 2 DIABETES MELLITUS WITHOUT COMPLICATION, WITH LONG-TERM CURRENT USE OF INSULIN (HCC): ICD-10-CM

## 2020-10-08 DIAGNOSIS — Z79.4 TYPE 2 DIABETES MELLITUS WITHOUT COMPLICATION, WITH LONG-TERM CURRENT USE OF INSULIN (HCC): ICD-10-CM

## 2020-10-08 DIAGNOSIS — Z79.01 ON CONTINUOUS ORAL ANTICOAGULATION: ICD-10-CM

## 2020-10-08 DIAGNOSIS — I48.0 PAROXYSMAL ATRIAL FIBRILLATION (HCC): ICD-10-CM

## 2020-10-08 DIAGNOSIS — I10 ESSENTIAL HYPERTENSION: ICD-10-CM

## 2020-10-08 DIAGNOSIS — I35.0 NONRHEUMATIC AORTIC VALVE STENOSIS: ICD-10-CM

## 2020-10-08 PROCEDURE — 99215 OFFICE O/P EST HI 40 MIN: CPT | Performed by: INTERNAL MEDICINE

## 2020-10-08 ASSESSMENT — FIBROSIS 4 INDEX: FIB4 SCORE: 1.87

## 2020-10-08 NOTE — PROGRESS NOTES
Chief Complaint   Patient presents with   • Atrial Fibrillation     Paroxysmal atrial fibrillation (HCC)   • Coronary Artery Disease   • Dyslipidemia   • HTN (Controlled)       Subjective:   Amrita Torrez is a 75y.o. female who presents today for follow-up of paroxysmal atrial fibrillation, chronic oral anticoagulation, coronary disease status post STEMI 2016 with LAD PCI (Dr. Tobar).  She was recently hospitalized for pneumonia and sepsis.  She is convalescing from this.  She has some mild lower extremity edema since her hospital stay.  Recent echocardiogram shows progression of her aortic stenosis to borderline moderate, it is asymptomatic.  Normal LV function.      In the interim since last visit she feels well blood pressures are better at home than in the office today and she is taking her medications as directed.  Tolerating anticoagulation well.  No significant issues with atrial fibrillation.    Past Medical History:   Diagnosis Date   • CAD (coronary artery disease)    • Diabetes     oral meds   • Goiter    • Heart attack (HCC)    • Hyperlipidemia    • Hypertension    • MI (myocardial infarction) (HCC) 2016    x2 stints placed   • Pericardial effusion    • Thyroid nodule      Past Surgical History:   Procedure Laterality Date   • LAPAROSCOPY  12/1/2014    Performed by Abdi Navarro M.D. at SURGERY SAME DAY [facility] ORS   • BAN BY LAPAROSCOPY  9/14/2012    Performed by ABDI NAVARRO at SURGERY SAME DAY [facility] ORS   • OTHER ORTHOPEDIC SURGERY  7/11    knee   • GYN SURGERY  1978    fibroids   • OTHER ABDOMINAL SURGERY  1966    appendectomy   • GYN SURGERY      hysterectomy   • ZZZ CARDIAC CATH       History reviewed. No pertinent family history.  Social History     Socioeconomic History   • Marital status:      Spouse name: Not on file   • Number of children: Not on file   • Years of education: Not on file   • Highest education level: Not on file   Occupational History   • Not  on file   Social Needs   • Financial resource strain: Not on file   • Food insecurity     Worry: Not on file     Inability: Not on file   • Transportation needs     Medical: Not on file     Non-medical: Not on file   Tobacco Use   • Smoking status: Former Smoker     Packs/day: 1.00     Years: 40.00     Pack years: 40.00     Types: Cigarettes     Quit date: 1978     Years since quittin.7   • Smokeless tobacco: Never Used   Substance and Sexual Activity   • Alcohol use: No   • Drug use: No   • Sexual activity: Not on file   Lifestyle   • Physical activity     Days per week: Not on file     Minutes per session: Not on file   • Stress: Not on file   Relationships   • Social connections     Talks on phone: Not on file     Gets together: Not on file     Attends Confucianism service: Not on file     Active member of club or organization: Not on file     Attends meetings of clubs or organizations: Not on file     Relationship status: Not on file   • Intimate partner violence     Fear of current or ex partner: Not on file     Emotionally abused: Not on file     Physically abused: Not on file     Forced sexual activity: Not on file   Other Topics Concern   • Not on file   Social History Narrative   • Not on file     Allergies   Allergen Reactions   • Pcn [Penicillins] Hives, Shortness of Breath and Swelling   • Sulfa Drugs Hives, Shortness of Breath and Swelling     Outpatient Encounter Medications as of 10/8/2020   Medication Sig Dispense Refill   • insulin glargine (LANTUS) 100 UNIT/ML Solution Inject 25 Units as instructed every day for 30 days. Indications: Type 2 Diabetes 7.5 mL 1   • amLODIPine (NORVASC) 5 MG Tab Take 2 Tabs by mouth every day. Indications: High Blood Pressure Disorder 30 Tab 1   • metFORMIN ER (GLUCOPHAGE XR) 500 MG TABLET SR 24 HR Take 500 mg by mouth 2 times a day.     • ELIQUIS 5 MG Tab TAKE 1 TABLET BY MOUTH TWICE A  Tab 1   • insulin regular (NOVOLIN R) 100 Unit/mL Solution Inject 2-8  "Units as instructed 4 times a day. PER SLIDING SCALE  150-200 = 2 UNITS  201-250 = 4 UNITS  251-300 = 6 UNITS  301-350 = 8 UNITS  >400 CALL MD     • sertraline (ZOLOFT) 50 MG Tab Take 50 mg by mouth every day. Indications: Major Depressive Disorder     • ferrous sulfate 325 (65 Fe) MG tablet Take 325 mg by mouth every Monday, Wednesday, and Friday. Indications: supplement     • rosuvastatin (CRESTOR) 5 MG Tab Take 5 mg by mouth every morning. Indications: High Amount of Fats in the Blood     • vitamin D, Ergocalciferol, (DRISDOL) 76857 units Cap capsule Take 50,000 Units by mouth every Sunday.     • latanoprost (XALATAN) 0.005 % Solution Place 1 Drop in both eyes every evening.     • trazodone (DESYREL) 50 MG Tab Take 50 mg by mouth every bedtime. Indications: Trouble Sleeping     • multivitamin (THERAGRAN) Tab Take 1 Tab by mouth every day.       No facility-administered encounter medications on file as of 10/8/2020.      Review of Systems   All other systems reviewed and are negative.       Objective:   /86 (BP Location: Left arm, Patient Position: Sitting, BP Cuff Size: Adult)   Pulse 93   Ht 1.651 m (5' 5\")   Wt 66.2 kg (146 lb)   SpO2 97%   BMI 24.30 kg/m²     Physical Exam   Constitutional: She is oriented to person, place, and time. She appears well-developed and well-nourished. No distress.   HENT:   Head: Normocephalic and atraumatic.   Right Ear: External ear normal.   Left Ear: External ear normal.   Mouth/Throat: No oropharyngeal exudate.   Eyes: Pupils are equal, round, and reactive to light. Conjunctivae and EOM are normal. Right eye exhibits no discharge. Left eye exhibits no discharge. No scleral icterus.   Neck: Normal range of motion. Neck supple. No JVD present. No tracheal deviation present. No thyromegaly present.   Cardiovascular: Normal rate, regular rhythm, S1 normal, S2 normal and intact distal pulses. PMI is not displaced. Exam reveals no gallop, no S3, no S4 and no friction rub. "   Murmur ( 3 out of 6 systolic ejection murmur right upper sternal border ) heard.  Pulses:       Carotid pulses are 2+ on the right side and 2+ on the left side.       Radial pulses are 2+ on the right side and 2+ on the left side.        Popliteal pulses are 2+ on the right side and 2+ on the left side.        Dorsalis pedis pulses are 2+ on the right side and 2+ on the left side.        Posterior tibial pulses are 2+ on the right side and 2+ on the left side.   Pulmonary/Chest: Effort normal and breath sounds normal. No respiratory distress. She has no wheezes. She has no rales. She exhibits no tenderness.   Abdominal: Soft. Bowel sounds are normal. She exhibits no distension. There is no abdominal tenderness.   Musculoskeletal: Normal range of motion.         General: Edema ( 1-2+ bilateral lower extremities) present. No tenderness.   Neurological: She is alert and oriented to person, place, and time. No cranial nerve deficit (Cranial nerves II through XII grossly intact).   Skin: Skin is warm and dry. No rash noted. She is not diaphoretic. No erythema.   Psychiatric: She has a normal mood and affect. Her behavior is normal. Judgment and thought content normal.   Vitals reviewed.  No change in physical exam since prior 9/6/2019    LABS:  Lab Results   Component Value Date/Time    CHOLSTRLTOT 135 08/12/2020 10:09 AM    LDL 62 08/12/2020 10:09 AM    HDL 40 08/12/2020 10:09 AM    TRIGLYCERIDE 166 (H) 08/12/2020 10:09 AM       Lab Results   Component Value Date/Time    WBC 7.5 09/14/2020 11:27 AM    RBC 4.89 09/14/2020 11:27 AM    HEMOGLOBIN 14.4 09/14/2020 11:27 AM    HEMATOCRIT 40.1 09/14/2020 11:27 AM    MCV 82.0 09/14/2020 11:27 AM    NEUTSPOLYS 70.90 09/14/2020 11:27 AM    LYMPHOCYTES 19.00 (L) 09/14/2020 11:27 AM    MONOCYTES 8.00 09/14/2020 11:27 AM    EOSINOPHILS 1.60 09/14/2020 11:27 AM    BASOPHILS 0.40 09/14/2020 11:27 AM    HYPOCHROMIA 1+ 09/12/2012 04:16 PM    ANISOCYTOSIS 1+ 08/22/2019 08:21 PM     Lab  Results   Component Value Date/Time    SODIUM 129 (L) 09/14/2020 11:27 AM    POTASSIUM 3.9 09/14/2020 11:27 AM    CHLORIDE 94 (L) 09/14/2020 11:27 AM    CO2 24 09/14/2020 11:27 AM    GLUCOSE 549 (HH) 09/14/2020 11:27 AM    BUN 17 09/14/2020 11:27 AM    CREATININE 1.01 09/14/2020 11:27 AM     Lab Results   Component Value Date    HBA1C 11.6 (H) 09/15/2020      Lab Results   Component Value Date/Time    ALKPHOSPHAT 107 (H) 09/14/2020 11:27 AM    ASTSGOT 22 09/14/2020 11:27 AM    ALTSGPT 18 09/14/2020 11:27 AM    TBILIRUBIN 0.6 09/14/2020 11:27 AM      Lab Results   Component Value Date/Time    BNPBTYPENAT 166 (H) 12/06/2018 09:05 PM      No results found for: TSH  Lab Results   Component Value Date/Time    PROTHROMBTM 14.4 09/14/2020 11:27 AM    INR 1.08 09/14/2020 11:27 AM        STRESS (10/14/2018):  Normal left ventricular size, ejection fraction, and wall motion.   No evidence of significant jeopardized viable myocardium or prior myocardial    infarction.    ECHO CONCLUSIONS (11/17/2016):  Compared to the images of the study done 2/15/16 - there has been.     Mild concentric left ventricular hypertrophy.  Normal left ventricular systolic function.  Left ventricular ejection fraction is visually estimated to be 65%.  Grade I diastolic dysfunction.  Mild aortic stenosis with peak and mean grdients of 15 and 9   respectively.  Right ventricular systolic pressure is estimated to be 20 mmHg.  Normal aortic root for body surface area.      Assessment:     1. CAD s/p DOMINGA mLAD for STEMI 12/22/2015     2. Nonrheumatic aortic valve stenosis  EC-ECHOCARDIOGRAM COMPLETE W/O CONT   3. Paroxysmal atrial fibrillation (HCC)     4. On continuous oral anticoagulation     5. Essential hypertension     6. Type 2 diabetes mellitus without complication, with long-term current use of insulin (HCC)         Medical Decision Making:  Today's Assessment / Status / Plan:     Medical therapy is appropriate with stable renal function and  tolerating her anticoagulants.  She has moderate aortic stenosis that requires follow-up with an echocardiogram.  She is not having difficulty with too much atrial fibrillation blood pressures are better at home and her medication profile is appropriate.  Crease physical activity and follow-up yearly.

## 2020-11-20 ENCOUNTER — PRE-ADMISSION TESTING (OUTPATIENT)
Dept: ADMISSIONS | Facility: MEDICAL CENTER | Age: 75
End: 2020-11-20
Attending: OPHTHALMOLOGY
Payer: MEDICARE

## 2020-11-20 DIAGNOSIS — Z01.812 PRE-OPERATIVE LABORATORY EXAMINATION: ICD-10-CM

## 2020-11-20 LAB
ANION GAP SERPL CALC-SCNC: 10 MMOL/L (ref 7–16)
BUN SERPL-MCNC: 23 MG/DL (ref 8–22)
CALCIUM SERPL-MCNC: 9.6 MG/DL (ref 8.5–10.5)
CHLORIDE SERPL-SCNC: 97 MMOL/L (ref 96–112)
CO2 SERPL-SCNC: 26 MMOL/L (ref 20–33)
COVID ORDER STATUS COVID19: NORMAL
CREAT SERPL-MCNC: 1.44 MG/DL (ref 0.5–1.4)
ERYTHROCYTE [DISTWIDTH] IN BLOOD BY AUTOMATED COUNT: 39.7 FL (ref 35.9–50)
GLUCOSE SERPL-MCNC: 256 MG/DL (ref 65–99)
HCT VFR BLD AUTO: 42.5 % (ref 37–47)
HGB BLD-MCNC: 14.6 G/DL (ref 12–16)
MCH RBC QN AUTO: 28.6 PG (ref 27–33)
MCHC RBC AUTO-ENTMCNC: 34.4 G/DL (ref 33.6–35)
MCV RBC AUTO: 83.2 FL (ref 81.4–97.8)
PLATELET # BLD AUTO: 266 K/UL (ref 164–446)
PMV BLD AUTO: 10.6 FL (ref 9–12.9)
POTASSIUM SERPL-SCNC: 4.7 MMOL/L (ref 3.6–5.5)
RBC # BLD AUTO: 5.11 M/UL (ref 4.2–5.4)
SARS-COV-2 RNA RESP QL NAA+PROBE: NOTDETECTED
SODIUM SERPL-SCNC: 133 MMOL/L (ref 135–145)
SPECIMEN SOURCE: NORMAL
WBC # BLD AUTO: 9.9 K/UL (ref 4.8–10.8)

## 2020-11-20 PROCEDURE — U0003 INFECTIOUS AGENT DETECTION BY NUCLEIC ACID (DNA OR RNA); SEVERE ACUTE RESPIRATORY SYNDROME CORONAVIRUS 2 (SARS-COV-2) (CORONAVIRUS DISEASE [COVID-19]), AMPLIFIED PROBE TECHNIQUE, MAKING USE OF HIGH THROUGHPUT TECHNOLOGIES AS DESCRIBED BY CMS-2020-01-R: HCPCS

## 2020-11-20 PROCEDURE — 36415 COLL VENOUS BLD VENIPUNCTURE: CPT

## 2020-11-20 PROCEDURE — 80048 BASIC METABOLIC PNL TOTAL CA: CPT

## 2020-11-20 PROCEDURE — 85027 COMPLETE CBC AUTOMATED: CPT

## 2020-11-20 ASSESSMENT — FIBROSIS 4 INDEX: FIB4 SCORE: 1.87

## 2020-11-24 ENCOUNTER — ANESTHESIA (OUTPATIENT)
Dept: SURGERY | Facility: MEDICAL CENTER | Age: 75
End: 2020-11-24
Payer: MEDICARE

## 2020-11-24 ENCOUNTER — HOSPITAL ENCOUNTER (OUTPATIENT)
Facility: MEDICAL CENTER | Age: 75
End: 2020-11-24
Attending: OPHTHALMOLOGY | Admitting: OPHTHALMOLOGY
Payer: MEDICARE

## 2020-11-24 ENCOUNTER — ANESTHESIA EVENT (OUTPATIENT)
Dept: SURGERY | Facility: MEDICAL CENTER | Age: 75
End: 2020-11-24
Payer: MEDICARE

## 2020-11-24 VITALS
BODY MASS INDEX: 24.24 KG/M2 | RESPIRATION RATE: 16 BRPM | HEART RATE: 100 BPM | DIASTOLIC BLOOD PRESSURE: 72 MMHG | SYSTOLIC BLOOD PRESSURE: 136 MMHG | OXYGEN SATURATION: 94 % | WEIGHT: 145.5 LBS | HEIGHT: 65 IN | TEMPERATURE: 98.4 F

## 2020-11-24 LAB — GLUCOSE BLD-MCNC: 113 MG/DL (ref 65–99)

## 2020-11-24 PROCEDURE — 700101 HCHG RX REV CODE 250: Performed by: OPHTHALMOLOGY

## 2020-11-24 PROCEDURE — 501838 HCHG SUTURE GENERAL: Performed by: OPHTHALMOLOGY

## 2020-11-24 PROCEDURE — 160025 RECOVERY II MINUTES (STATS): Performed by: OPHTHALMOLOGY

## 2020-11-24 PROCEDURE — 160002 HCHG RECOVERY MINUTES (STAT): Performed by: OPHTHALMOLOGY

## 2020-11-24 PROCEDURE — A6410 STERILE EYE PAD: HCPCS | Performed by: OPHTHALMOLOGY

## 2020-11-24 PROCEDURE — 501836 HCHG SUTURE EYE: Performed by: OPHTHALMOLOGY

## 2020-11-24 PROCEDURE — 700111 HCHG RX REV CODE 636 W/ 250 OVERRIDE (IP): Performed by: OPHTHALMOLOGY

## 2020-11-24 PROCEDURE — A9270 NON-COVERED ITEM OR SERVICE: HCPCS | Performed by: OPHTHALMOLOGY

## 2020-11-24 PROCEDURE — 160029 HCHG SURGERY MINUTES - 1ST 30 MINS LEVEL 4: Performed by: OPHTHALMOLOGY

## 2020-11-24 PROCEDURE — 82962 GLUCOSE BLOOD TEST: CPT

## 2020-11-24 PROCEDURE — 700102 HCHG RX REV CODE 250 W/ 637 OVERRIDE(OP): Performed by: OPHTHALMOLOGY

## 2020-11-24 PROCEDURE — 160009 HCHG ANES TIME/MIN: Performed by: OPHTHALMOLOGY

## 2020-11-24 PROCEDURE — 160048 HCHG OR STATISTICAL LEVEL 1-5: Performed by: OPHTHALMOLOGY

## 2020-11-24 PROCEDURE — 500558 HCHG EYE SHIELD W/GARTER (FOX): Performed by: OPHTHALMOLOGY

## 2020-11-24 PROCEDURE — 160035 HCHG PACU - 1ST 60 MINS PHASE I: Performed by: OPHTHALMOLOGY

## 2020-11-24 PROCEDURE — 700105 HCHG RX REV CODE 258: Performed by: ANESTHESIOLOGY

## 2020-11-24 PROCEDURE — 700111 HCHG RX REV CODE 636 W/ 250 OVERRIDE (IP): Performed by: ANESTHESIOLOGY

## 2020-11-24 PROCEDURE — 700101 HCHG RX REV CODE 250: Performed by: ANESTHESIOLOGY

## 2020-11-24 PROCEDURE — 160046 HCHG PACU - 1ST 60 MINS PHASE II: Performed by: OPHTHALMOLOGY

## 2020-11-24 PROCEDURE — 700101 HCHG RX REV CODE 250

## 2020-11-24 PROCEDURE — 160041 HCHG SURGERY MINUTES - EA ADDL 1 MIN LEVEL 4: Performed by: OPHTHALMOLOGY

## 2020-11-24 RX ORDER — LIDOCAINE HYDROCHLORIDE 20 MG/ML
INJECTION, SOLUTION EPIDURAL; INFILTRATION; INTRACAUDAL; PERINEURAL PRN
Status: DISCONTINUED | OUTPATIENT
Start: 2020-11-24 | End: 2020-11-24 | Stop reason: SURG

## 2020-11-24 RX ORDER — OXYCODONE HCL 5 MG/5 ML
10 SOLUTION, ORAL ORAL
Status: DISCONTINUED | OUTPATIENT
Start: 2020-11-24 | End: 2020-11-24 | Stop reason: HOSPADM

## 2020-11-24 RX ORDER — HALOPERIDOL 5 MG/ML
1 INJECTION INTRAMUSCULAR
Status: DISCONTINUED | OUTPATIENT
Start: 2020-11-24 | End: 2020-11-24 | Stop reason: HOSPADM

## 2020-11-24 RX ORDER — ONDANSETRON 2 MG/ML
4 INJECTION INTRAMUSCULAR; INTRAVENOUS
Status: DISCONTINUED | OUTPATIENT
Start: 2020-11-24 | End: 2020-11-24 | Stop reason: HOSPADM

## 2020-11-24 RX ORDER — DEXTROSE MONOHYDRATE 25 G/50ML
INJECTION, SOLUTION INTRAVENOUS
Status: COMPLETED | OUTPATIENT
Start: 2020-11-24 | End: 2020-11-24

## 2020-11-24 RX ORDER — BALANCED SALT SOLUTION ENRICHED WITH BICARBONATE, DEXTROSE, AND GLUTATHIONE
KIT INTRAOCULAR
Status: DISCONTINUED | OUTPATIENT
Start: 2020-11-24 | End: 2020-11-24 | Stop reason: HOSPADM

## 2020-11-24 RX ORDER — FLURBIPROFEN SODIUM 0.3 MG/ML
SOLUTION/ DROPS OPHTHALMIC
Status: COMPLETED
Start: 2020-11-24 | End: 2020-11-24

## 2020-11-24 RX ORDER — VANCOMYCIN HYDROCHLORIDE 500 MG/10ML
INJECTION, POWDER, LYOPHILIZED, FOR SOLUTION INTRAVENOUS
Status: COMPLETED | OUTPATIENT
Start: 2020-11-24 | End: 2020-11-24

## 2020-11-24 RX ORDER — LABETALOL HYDROCHLORIDE 5 MG/ML
5 INJECTION, SOLUTION INTRAVENOUS
Status: DISCONTINUED | OUTPATIENT
Start: 2020-11-24 | End: 2020-11-24 | Stop reason: HOSPADM

## 2020-11-24 RX ORDER — OXYCODONE HCL 5 MG/5 ML
5 SOLUTION, ORAL ORAL
Status: DISCONTINUED | OUTPATIENT
Start: 2020-11-24 | End: 2020-11-24 | Stop reason: HOSPADM

## 2020-11-24 RX ORDER — SODIUM CHLORIDE, SODIUM LACTATE, POTASSIUM CHLORIDE, CALCIUM CHLORIDE 600; 310; 30; 20 MG/100ML; MG/100ML; MG/100ML; MG/100ML
INJECTION, SOLUTION INTRAVENOUS CONTINUOUS
Status: DISCONTINUED | OUTPATIENT
Start: 2020-11-24 | End: 2020-11-24 | Stop reason: HOSPADM

## 2020-11-24 RX ORDER — DEXAMETHASONE SODIUM PHOSPHATE 4 MG/ML
INJECTION, SOLUTION INTRA-ARTICULAR; INTRALESIONAL; INTRAMUSCULAR; INTRAVENOUS; SOFT TISSUE
Status: DISCONTINUED | OUTPATIENT
Start: 2020-11-24 | End: 2020-11-24 | Stop reason: HOSPADM

## 2020-11-24 RX ORDER — BALANCED SALT SOLUTION 6.4; .75; .48; .3; 3.9; 1.7 MG/ML; MG/ML; MG/ML; MG/ML; MG/ML; MG/ML
SOLUTION OPHTHALMIC
Status: DISCONTINUED | OUTPATIENT
Start: 2020-11-24 | End: 2020-11-24 | Stop reason: HOSPADM

## 2020-11-24 RX ORDER — HYDROMORPHONE HYDROCHLORIDE 1 MG/ML
0.1 INJECTION, SOLUTION INTRAMUSCULAR; INTRAVENOUS; SUBCUTANEOUS
Status: DISCONTINUED | OUTPATIENT
Start: 2020-11-24 | End: 2020-11-24 | Stop reason: HOSPADM

## 2020-11-24 RX ORDER — HYDROMORPHONE HYDROCHLORIDE 1 MG/ML
0.4 INJECTION, SOLUTION INTRAMUSCULAR; INTRAVENOUS; SUBCUTANEOUS
Status: DISCONTINUED | OUTPATIENT
Start: 2020-11-24 | End: 2020-11-24 | Stop reason: HOSPADM

## 2020-11-24 RX ORDER — CYCLOPENTOLATE HYDROCHLORIDE 10 MG/ML
SOLUTION/ DROPS OPHTHALMIC
Status: COMPLETED
Start: 2020-11-24 | End: 2020-11-24

## 2020-11-24 RX ORDER — ONDANSETRON 2 MG/ML
INJECTION INTRAMUSCULAR; INTRAVENOUS PRN
Status: DISCONTINUED | OUTPATIENT
Start: 2020-11-24 | End: 2020-11-24 | Stop reason: SURG

## 2020-11-24 RX ORDER — SODIUM CHLORIDE, SODIUM LACTATE, POTASSIUM CHLORIDE, CALCIUM CHLORIDE 600; 310; 30; 20 MG/100ML; MG/100ML; MG/100ML; MG/100ML
INJECTION, SOLUTION INTRAVENOUS
Status: DISCONTINUED | OUTPATIENT
Start: 2020-11-24 | End: 2020-11-24 | Stop reason: SURG

## 2020-11-24 RX ORDER — PHENYLEPHRINE HYDROCHLORIDE 25 MG/ML
SOLUTION/ DROPS OPHTHALMIC
Status: COMPLETED
Start: 2020-11-24 | End: 2020-11-24

## 2020-11-24 RX ORDER — METOPROLOL TARTRATE 1 MG/ML
1 INJECTION, SOLUTION INTRAVENOUS
Status: DISCONTINUED | OUTPATIENT
Start: 2020-11-24 | End: 2020-11-24 | Stop reason: HOSPADM

## 2020-11-24 RX ORDER — NEOMYCIN SULFATE, POLYMYXIN B SULFATE, AND DEXAMETHASONE 3.5; 10000; 1 MG/G; [USP'U]/G; MG/G
OINTMENT OPHTHALMIC
Status: DISCONTINUED | OUTPATIENT
Start: 2020-11-24 | End: 2020-11-24 | Stop reason: HOSPADM

## 2020-11-24 RX ORDER — MOXIFLOXACIN 5 MG/ML
SOLUTION/ DROPS OPHTHALMIC
Status: COMPLETED
Start: 2020-11-24 | End: 2020-11-24

## 2020-11-24 RX ORDER — TROPICAMIDE 10 MG/ML
1 SOLUTION/ DROPS OPHTHALMIC ONCE
Status: COMPLETED | OUTPATIENT
Start: 2020-11-24 | End: 2020-11-24

## 2020-11-24 RX ORDER — HYDROMORPHONE HYDROCHLORIDE 1 MG/ML
0.2 INJECTION, SOLUTION INTRAMUSCULAR; INTRAVENOUS; SUBCUTANEOUS
Status: DISCONTINUED | OUTPATIENT
Start: 2020-11-24 | End: 2020-11-24 | Stop reason: HOSPADM

## 2020-11-24 RX ADMIN — PHENYLEPHRINE HYDROCHLORIDE 1 DROP: 2.5 SOLUTION/ DROPS OPHTHALMIC at 12:50

## 2020-11-24 RX ADMIN — FLURBIPROFEN SODIUM 1 DROP: 0.3 SOLUTION/ DROPS OPHTHALMIC at 13:06

## 2020-11-24 RX ADMIN — MOXIFLOXACIN HYDROCHLORIDE 1 DROP: 5 SOLUTION/ DROPS OPHTHALMIC at 13:07

## 2020-11-24 RX ADMIN — ONDANSETRON 4 MG: 2 INJECTION INTRAMUSCULAR; INTRAVENOUS at 15:44

## 2020-11-24 RX ADMIN — TROPICAMIDE 1 DROP: 10 SOLUTION/ DROPS OPHTHALMIC at 13:07

## 2020-11-24 RX ADMIN — PHENYLEPHRINE HYDROCHLORIDE 1 DROP: 2.5 SOLUTION/ DROPS OPHTHALMIC at 13:07

## 2020-11-24 RX ADMIN — FENTANYL CITRATE 100 MCG: 50 INJECTION, SOLUTION INTRAMUSCULAR; INTRAVENOUS at 14:58

## 2020-11-24 RX ADMIN — TROPICAMIDE 1 DROP: 10 SOLUTION/ DROPS OPHTHALMIC at 12:50

## 2020-11-24 RX ADMIN — TROPICAMIDE 1 DROP: 10 SOLUTION/ DROPS OPHTHALMIC at 12:57

## 2020-11-24 RX ADMIN — MOXIFLOXACIN HYDROCHLORIDE 1 DROP: 5 SOLUTION/ DROPS OPHTHALMIC at 12:50

## 2020-11-24 RX ADMIN — EPHEDRINE SULFATE 10 MG: 50 INJECTION, SOLUTION INTRAVENOUS at 15:36

## 2020-11-24 RX ADMIN — CYCLOPENTOLATE HYDROCHLORIDE 1 DROP: 10 SOLUTION/ DROPS OPHTHALMIC at 12:50

## 2020-11-24 RX ADMIN — FLURBIPROFEN SODIUM 1 DROP: 0.3 SOLUTION/ DROPS OPHTHALMIC at 12:56

## 2020-11-24 RX ADMIN — EPHEDRINE SULFATE 10 MG: 50 INJECTION, SOLUTION INTRAVENOUS at 15:03

## 2020-11-24 RX ADMIN — CYCLOPENTOLATE HYDROCHLORIDE 1 DROP: 10 SOLUTION/ DROPS OPHTHALMIC at 12:56

## 2020-11-24 RX ADMIN — POVIDONE IODINE 15 ML: 100 SOLUTION TOPICAL at 13:24

## 2020-11-24 RX ADMIN — LIDOCAINE HYDROCHLORIDE 70 MG: 20 INJECTION, SOLUTION EPIDURAL; INFILTRATION; INTRACAUDAL at 14:58

## 2020-11-24 RX ADMIN — MOXIFLOXACIN HYDROCHLORIDE 1 DROP: 5 SOLUTION/ DROPS OPHTHALMIC at 12:56

## 2020-11-24 RX ADMIN — CYCLOPENTOLATE HYDROCHLORIDE 1 DROP: 10 SOLUTION/ DROPS OPHTHALMIC at 13:06

## 2020-11-24 RX ADMIN — EPHEDRINE SULFATE 10 MG: 50 INJECTION, SOLUTION INTRAVENOUS at 15:41

## 2020-11-24 RX ADMIN — SODIUM CHLORIDE, POTASSIUM CHLORIDE, SODIUM LACTATE AND CALCIUM CHLORIDE: 600; 310; 30; 20 INJECTION, SOLUTION INTRAVENOUS at 14:54

## 2020-11-24 RX ADMIN — FLURBIPROFEN SODIUM 1 DROP: 0.3 SOLUTION/ DROPS OPHTHALMIC at 12:51

## 2020-11-24 RX ADMIN — PHENYLEPHRINE HYDROCHLORIDE 1 DROP: 2.5 SOLUTION/ DROPS OPHTHALMIC at 12:56

## 2020-11-24 RX ADMIN — PROPOFOL 200 MG: 10 INJECTION, EMULSION INTRAVENOUS at 14:58

## 2020-11-24 ASSESSMENT — PAIN DESCRIPTION - PAIN TYPE: TYPE: SURGICAL PAIN

## 2020-11-24 ASSESSMENT — FIBROSIS 4 INDEX: FIB4 SCORE: 1.5

## 2020-11-24 NOTE — ANESTHESIA PREPROCEDURE EVALUATION
Relevant Problems   CARDIAC   (+) Accelerated hypertension   (+) CAD (coronary artery disease)   (+) Hypertension   (+) Nonrheumatic aortic valve stenosis   (+) Paroxysmal atrial fibrillation (HCC)         (+) CKD (chronic kidney disease), stage III      ENDO   (+) Type 2 diabetes mellitus with hyperglycemia, with long-term current use of insulin (HCC)       Physical Exam    Airway   Mallampati: II  TM distance: >3 FB  Neck ROM: full       Cardiovascular - normal exam  Rhythm: regular  Rate: normal  (-) murmur     Dental - normal exam           Pulmonary - normal exam  Breath sounds clear to auscultation     Abdominal    Neurological - normal exam                 Anesthesia Plan    ASA 3   ASA physical status 3 criteria: CAD/stents (> 3 months)    Plan - general       Airway plan will be LMA        Induction: intravenous    Postoperative Plan: Postoperative administration of opioids is intended.    Pertinent diagnostic labs and testing reviewed    Informed Consent:    Anesthetic plan and risks discussed with patient.    Use of blood products discussed with: patient whom consented to blood products.

## 2020-11-24 NOTE — DISCHARGE INSTRUCTIONS
RETINAL DETACHMENT OR VITRECTOMY INSTRUCTIONS    These instructions are provided so that you can have the best chance for a successful recovery from your recent eye surgery. In general, they will remain in effect for at least two to three weeks following your operation.   Please call my office to see me within one week following your discharge from the hospital. You can make this appointment by calling my office. Call Monday through Friday from 8:00 am to 5:00 pm. For patients who live a great distance from my office, I may ask you to make arrangements with your local ophthalmologist for follow up care instead of returning here.     Medications:     There medications are to be used while you are awake. A good night's sleep is an important part of the healing process.     1.   Tylenol should be sufficient for any pain; if not, please call my office.     2.   Other medications as directed.     Eye Care:    Leave eye patch in place until see in follow up tomorrow.    Appearance and sensation in the operated eye:    1.   It is normal for the operated eye to remain somewhat red, swollen and slightly irritated for four to six weeks.     2.   It is expected that there will be an increased amount of tearing and mattering in the operated eye.     Return of eyesight:     1.   You final best vision will not be known until the eye and retina are completely healed, which takes at least two to three months and sometimes much longer.     2.   Following this type of surgery, you may require a change in the prescription for your glasses or contact lenses. In general, checking for a new prescription is not done until at least two to three months following your surgery.     Physical Activities:    Things to Avoid:    1.   Aspirin  2.   Lifting or moving heavy objects (20 pounds or more)  3.   Rubbing the operated eye.   4.   Strenuous or jarring physical exertion such as running, jumping, aerobics and swimming.   5.   Driving a  car.  6.   Garden or yard work.  7.   Wearing of contact lenses in the operated eye for 4-6 weeks.  8.   Returning to work or school; depending upon the nature of eye surgery and occupation, I will advise you to refrain from returning to school or work for anywhere from 2-8 weeks.   9.   Air travel unless otherwise instructed.   10. Reading unless otherwise instructed.     Permissible Activities:    1.   Watching television and using your eyes in moderation.   2.   Cooking and light housework.   3.   Riding in a car on paved roads.  4.   Showering, bathing and shaving; however, avoid getting water in your eyes.     Indications for calling my office: Dr. Franklin [phone]    1.   Increased swelling or redness of the eyelids.  2.   Increased persistent pain in the eye which is not relieved by Tylenol or which does not go away within 24 hours.   3.   Decreased vision.

## 2020-11-24 NOTE — ANESTHESIA PROCEDURE NOTES
Airway    Date/Time: 11/24/2020 2:58 PM  Performed by: Lane Baca D.O.  Authorized by: Lane Baca D.O.     Location:  OR  Urgency:  Elective  Indications for Airway Management:  Anesthesia      Spontaneous Ventilation: absent    Sedation Level:  Deep  Preoxygenated: Yes    Mask Difficulty Assessment:  0 - not attempted  Final Airway Type:  Supraglottic airway  Final Supraglottic Airway:  Standard LMA    SGA Size:  3  Number of Attempts at Approach:  1

## 2020-11-25 ASSESSMENT — PAIN SCALES - GENERAL: PAIN_LEVEL: 0

## 2020-11-25 NOTE — OP REPORT
CC: Decrease vision right  Preop Diagnosis:  PDR with VH right eye and PDR left eye  Postop Diagnosis: Same  Procedure: 23G Vitrectomy, endolaser right eye and Indirect PRP left eye  Surgeons: Jean-Claude Franklin/Nuria Perez  Anesthesia: general  Complications: None  Indication: This is a patient with history of PDR now with VH OD. R/B/A were discussed with the patient and patient wanted tp roceed with surgery.  Details: Correct eye was marked in the preop area and patient recieved general anesthesia. Patient was prepped and drapd in usual sterile ophthalmic fashion. Site was marked 3mm from the limbus in inferior temporal quadrant. 23G trocars were placed in a beveled fashion. Infusion was placed in the eye and turns on. One site superior nasally and another site sup[erior temporally were then marked. 23G trocar were used in a beveled fashion. Light pipe and vitrector were inserted inside the eye. We noticed dense VH, we removed all the vitreous hemorrhage. We then performed 360 PRP. The trocars were then removed. Postop ancef and dexmethasone were then injected. Eye was patched and shielded. We then used indrect laser to do 360 PRP in the left eye. Patient tolerated the procedure well. Patient was taken to recovery room in good condition.

## 2020-11-25 NOTE — ANESTHESIA POSTPROCEDURE EVALUATION
Patient: Amrita Torrez    Procedure Summary     Date: 11/24/20 Room / Location: [facility] ROOM 24 / SURGERY SAME DAY [facility]    Anesthesia Start: 1454 Anesthesia Stop: 1608    Procedure: VITRECTOMY, POSTERIOR PORTION-RIGHT ENDO LASER LEFT PAN RETINAL LASER (N/A Eye) Diagnosis: (TRACTIONAL RETINAL DETACHMENT)    Surgeons: Jean-Claude Franklin M.D. Responsible Provider: Lane Baca D.O.    Anesthesia Type: general ASA Status: 3          Final Anesthesia Type: general  Last vitals  BP   Blood Pressure : 136/72    Temp   36.9 °C (98.4 °F)    Pulse   Pulse: 100   Resp   16    SpO2   94 %      Anesthesia Post Evaluation    Patient location during evaluation: PACU  Patient participation: complete - patient participated  Level of consciousness: awake and alert  Pain score: 0    Airway patency: patent  Anesthetic complications: no  Cardiovascular status: hemodynamically stable  Respiratory status: acceptable  Hydration status: euvolemic    PONV: none           Nurse Pain Score: 0 (NPRS)

## 2020-11-25 NOTE — ANESTHESIA TIME REPORT
Anesthesia Start and Stop Event Times     Date Time Event    11/24/2020 1448 Ready for Procedure     1454 Anesthesia Start     1608 Anesthesia Stop        Responsible Staff  11/24/20    Name Role Begin End    Lane Baca D.O. Anesth 1454 1608        Preop Diagnosis (Free Text):  Pre-op Diagnosis     TRACTIONAL RETINAL DETACHMENT        Preop Diagnosis (Codes):    Post op Diagnosis  Retinal detachment      Premium Reason  A. 3PM - 7AM    Comments:

## 2020-11-25 NOTE — OR NURSING
1607 Pt to PACU from OR. Report from anesthesia and OR RN. 6L mask O2 w OA in place. Respirations even and unlabored. VSS. R eye patch CDI.     1620 Pt waking at this time, dc'd OA. Respirations even and unlabored. Declines pain/nausea, back to sleep.     1637 Awake, tolerating water, on RA. Meets phase II.    1710 Discharge orders received. Meets discharge criteria at this time. No pain. No nausea. Tolerating PO. On RA. All lines and monitors discontinued. Reviewed discharge paperwork with pt and daughter. Discussed diet, activity, medications, follow up care and worsening symptoms. No questions at this time. Pt to be discharged to home via private vehicle. Pt escorted out of department in wheelchair with RN, daughter, and all belongings.

## 2021-01-08 DIAGNOSIS — Z23 NEED FOR VACCINATION: ICD-10-CM

## 2021-01-20 NOTE — PROGRESS NOTES
Chief Complaint   Patient presents with   • New Patient     Essential tremor       History of present illness:  Amrita Costa Elder 75 y.o. female with uncontrolled DM2, paroxysmal AF, coronary artery disease s/p STEMI s/p LAD DOMINGA presents today for tremors. She is not on a beta blocker.    Her tremors have been ongoing for 1 year in the bilateral upper extremities. She describes the tremors ascending from her hands up to her head. Currently it is less bothersome. She only notices the shaking once a week. Before it was 2-3 times/day. This occurs when she is sitting or walking. She is able to talk and is aware during these shaking spells. This has not occurred in over a month.     She also has headaches. She has had headaches since a COVID nasal swab. In the past she has only had minor headaches. She has right pain that ascends up to her right scalp and descends back down to her right eye. The headache is present daily since the end of November. There is no nausea or vomiting.     Past medical history:   Past Medical History:   Diagnosis Date   • Anxiety    • Arthritis     fingers/toes   • Breath shortness    • CAD (coronary artery disease)    • Cataract     bilat   • Dental disorder     upper denture   • Depression    • Diabetes     insulin and oral meds   • Goiter    • Heart attack ([facility])     Dr. Tobar   • High cholesterol    • Hyperlipidemia    • Hypertension    • MI (myocardial infarction) ([facility]) 2016    x2 stints placed   • Oxygen dependent     2 liters @ HS   • Pericardial effusion    • Pneumonia 2019   • Snoring    • Stroke ([facility]) 10/2019    reports possible   • Thyroid nodule        Past surgical history:   Past Surgical History:   Procedure Laterality Date   • VITRECTOMY POSTERIOR N/A 11/24/2020    Procedure: VITRECTOMY, POSTERIOR PORTION-RIGHT ENDO LASER LEFT PAN RETINAL LASER;  Surgeon: Jean-Claude Franklin M.D.;  Location: SURGERY SAME DAY [facility];  Service: Ophthalmology   • LAPAROSCOPY  12/1/2014     Performed by Abdi Navarro M.D. at SURGERY SAME DAY [facility]   • BAN BY LAPAROSCOPY  2012    Performed by ABDI NAVARRO at SURGERY SAME DAY [facility]   • OTHER ORTHOPEDIC SURGERY      knee   • GYN SURGERY      fibroids   • OTHER ABDOMINAL SURGERY      appendectomy   • GYN SURGERY      hysterectomy   • ZZZ CARDIAC CATH         Family history:   History reviewed. No pertinent family history.    Social history:   Social History     Socioeconomic History   • Marital status:      Spouse name: Not on file   • Number of children: Not on file   • Years of education: Not on file   • Highest education level: Not on file   Occupational History   • Not on file   Social Needs   • Financial resource strain: Not on file   • Food insecurity     Worry: Not on file     Inability: Not on file   • Transportation needs     Medical: Not on file     Non-medical: Not on file   Tobacco Use   • Smoking status: Former Smoker     Packs/day: 1.00     Years: 40.00     Pack years: 40.00     Types: Cigarettes     Quit date: 1978     Years since quittin.0   • Smokeless tobacco: Never Used   Substance and Sexual Activity   • Alcohol use: No   • Drug use: No   • Sexual activity: Not on file   Lifestyle   • Physical activity     Days per week: Not on file     Minutes per session: Not on file   • Stress: Not on file   Relationships   • Social connections     Talks on phone: Not on file     Gets together: Not on file     Attends Hindu service: Not on file     Active member of club or organization: Not on file     Attends meetings of clubs or organizations: Not on file     Relationship status: Not on file   • Intimate partner violence     Fear of current or ex partner: Not on file     Emotionally abused: Not on file     Physically abused: Not on file     Forced sexual activity: Not on file   Other Topics Concern   • Not on file   Social History Narrative   • Not on file       Current  "medications:   Current Outpatient Medications   Medication   • amLODIPine (NORVASC) 5 MG Tab   • multivitamin (THERAGRAN) Tab   • metFORMIN ER (GLUCOPHAGE XR) 500 MG TABLET SR 24 HR   • ELIQUIS 5 MG Tab   • insulin regular (NOVOLIN R) 100 Unit/mL Solution   • sertraline (ZOLOFT) 50 MG Tab   • rosuvastatin (CRESTOR) 5 MG Tab   • vitamin D, Ergocalciferol, (DRISDOL) 79720 units Cap capsule   • latanoprost (XALATAN) 0.005 % Solution   • trazodone (DESYREL) 50 MG Tab   • ferrous sulfate 325 (65 Fe) MG tablet     No current facility-administered medications for this visit.        Medication Allergy:  Allergies   Allergen Reactions   • Pcn [Penicillins] Hives, Shortness of Breath and Swelling   • Sulfa Drugs Hives, Shortness of Breath and Swelling       Review of systems:   Review of Systems   Constitutional: Positive for weight loss. Negative for chills and fever.   HENT: Positive for hearing loss and nosebleeds. Negative for tinnitus.    Eyes: Positive for blurred vision (Has macular degeneration in the right eye). Negative for double vision.   Respiratory: Positive for shortness of breath (Exertional ). Negative for cough.    Musculoskeletal: Negative for back pain and neck pain.   Neurological: Positive for dizziness (Has vertigo). Negative for sensory change and weakness.   Psychiatric/Behavioral: Positive for memory loss (Forgetful).        Physical examination:   Vitals:    01/21/21 0954 01/21/21 0959   BP: 118/74 118/74   BP Location: Left arm Left arm   Patient Position: Sitting Standing   BP Cuff Size: Adult Adult   Pulse: 96 89   Resp: 14    Temp: 36.5 °C (97.7 °F)    TempSrc: Temporal    SpO2: 97% 98%   Weight: 68.9 kg (151 lb 14.4 oz)    Height: 1.676 m (5' 6\")      Neurological Exam  Mental Status  Awake and alert. Speech is normal. Language is fluent with no aphasia.    Cranial Nerves  CN II: Right normal visual field. Left normal visual field.  CN III, IV, VI: Extraocular movements intact bilaterally. No " nystagmus. Normal saccades. Normal smooth pursuit.    Motor   Normal muscle tone. The following abnormal movements were seen: She has bilateral postural and action tremor of the upper extremities. There is a 'yes yes' head tremor. . Strength is 5/5 throughout all four extremities.    Reflexes                                           Right                      Left  Brachioradialis                    2+                         2+  Biceps                                 2+                         2+  Triceps                                2+                         2+  Patellar                                0                         0  Achilles                                0                         0    Coordination  Right: Finger-to-nose normal. Rapid alternating movement normal. Heel-to-shin normal.  Left: Finger-to-nose normal. Rapid alternating movement normal. Heel-to-shin normal.    Gait  Casual gait: Wide stance. Hesitant gait. Normal right arm swing. Normal left arm swing. Tandem gait abnormality:  She is unable to tandem.      Imagin/14/20 CT HEAD: I reviewed the images personally. There is generalized atrophy.     ASSESSMENT AND PLAN:  Problem List Items Addressed This Visit     Essential tremor      Other Visit Diagnoses     Chronic intractable headache, unspecified headache type        Relevant Orders    CT-HEAD W/O    Memory loss of unknown cause        Relevant Orders    VITAMIN B12    Unsteady gait when walking              1. Chronic intractable headache, unspecified headache type  - CT-HEAD W/O; Future    The patient describes a new headache since a COVID nasal swab in 2020. She has no history of headache. I have ordered a repeat CT head to evaluate. She is an uncontrolled diabetic. Her exam is negative for eye movement abnormalities.     2. Memory loss of unknown cause  - VITAMIN B12; Future    She has had cognitive decline and on exam is often confused. She endorses significant weight  loss recently. I have ordered a B12 level.     3. Unsteady gait when walking    She has a wide based gait and is unable to tandem and endorses numbness in her feet. With her uncontrolled diabetes, this is likely due to a diabetic peripheral neuropathy.     4. Essential tremor     This is not bothersome to the patient. She does not currently desire treatment.       FOLLOW-UP:   Return in about 3 months (around 4/21/2021) for telemedicine follow-up .    Total time spent for the day for this patient is: 51 minutes. I spent 36 minutes in face to face time and I spent 5 minutes pre-charting and 10 minutes in post-visit documentation.      OCTAVIO BaxterO.  [facility] Neurology   Movement Disorders Specialist

## 2021-01-21 ENCOUNTER — OFFICE VISIT (OUTPATIENT)
Dept: NEUROLOGY | Facility: MEDICAL CENTER | Age: 76
End: 2021-01-21
Attending: PSYCHIATRY & NEUROLOGY
Payer: MEDICARE

## 2021-01-21 VITALS
HEART RATE: 89 BPM | HEIGHT: 66 IN | DIASTOLIC BLOOD PRESSURE: 74 MMHG | SYSTOLIC BLOOD PRESSURE: 118 MMHG | OXYGEN SATURATION: 98 % | RESPIRATION RATE: 14 BRPM | TEMPERATURE: 97.7 F | BODY MASS INDEX: 24.41 KG/M2 | WEIGHT: 151.9 LBS

## 2021-01-21 DIAGNOSIS — G25.0 ESSENTIAL TREMOR: ICD-10-CM

## 2021-01-21 DIAGNOSIS — R26.81 UNSTEADY GAIT WHEN WALKING: ICD-10-CM

## 2021-01-21 DIAGNOSIS — R51.9 CHRONIC INTRACTABLE HEADACHE, UNSPECIFIED HEADACHE TYPE: ICD-10-CM

## 2021-01-21 DIAGNOSIS — R41.3 MEMORY LOSS OF UNKNOWN CAUSE: ICD-10-CM

## 2021-01-21 DIAGNOSIS — G89.29 CHRONIC INTRACTABLE HEADACHE, UNSPECIFIED HEADACHE TYPE: ICD-10-CM

## 2021-01-21 PROCEDURE — 99202 OFFICE O/P NEW SF 15 MIN: CPT | Performed by: PSYCHIATRY & NEUROLOGY

## 2021-01-21 PROCEDURE — 99212 OFFICE O/P EST SF 10 MIN: CPT | Performed by: PSYCHIATRY & NEUROLOGY

## 2021-01-21 PROCEDURE — 99204 OFFICE O/P NEW MOD 45 MIN: CPT | Performed by: PSYCHIATRY & NEUROLOGY

## 2021-01-21 ASSESSMENT — ENCOUNTER SYMPTOMS
WEAKNESS: 0
NECK PAIN: 0
COUGH: 0
FEVER: 0
CHILLS: 0
MEMORY LOSS: 1
DOUBLE VISION: 0
DIZZINESS: 1
BACK PAIN: 0
SENSORY CHANGE: 0
SHORTNESS OF BREATH: 1
WEIGHT LOSS: 1
BLURRED VISION: 1

## 2021-01-21 ASSESSMENT — FIBROSIS 4 INDEX: FIB4 SCORE: 1.5

## 2021-01-21 NOTE — PATIENT INSTRUCTIONS
I have ordered a CT scan of your brain given the new headaches and memory problems.     I have also ordered a vitamin B12 level. Go to the lab to have your blood drawn for this.     Continue your treatment for diabetes mellitus.     You have tremors but this will not be treated today.

## 2021-01-29 ENCOUNTER — HOSPITAL ENCOUNTER (OUTPATIENT)
Dept: RADIOLOGY | Facility: MEDICAL CENTER | Age: 76
End: 2021-01-29
Attending: PSYCHIATRY & NEUROLOGY
Payer: MEDICARE

## 2021-02-05 ENCOUNTER — APPOINTMENT (OUTPATIENT)
Dept: RADIOLOGY | Facility: MEDICAL CENTER | Age: 76
End: 2021-02-05
Attending: PSYCHIATRY & NEUROLOGY
Payer: MEDICARE

## 2021-02-12 ENCOUNTER — HOSPITAL ENCOUNTER (OUTPATIENT)
Dept: RADIOLOGY | Facility: MEDICAL CENTER | Age: 76
End: 2021-02-12
Attending: FAMILY MEDICINE
Payer: MEDICARE

## 2021-02-12 DIAGNOSIS — S81.801D WOUND OF RIGHT LEG, SUBSEQUENT ENCOUNTER: ICD-10-CM

## 2021-02-12 PROCEDURE — 73590 X-RAY EXAM OF LOWER LEG: CPT | Mod: RT

## 2021-02-17 ENCOUNTER — IMMUNIZATION (OUTPATIENT)
Dept: FAMILY PLANNING/WOMEN'S HEALTH CLINIC | Facility: IMMUNIZATION CENTER | Age: 76
End: 2021-02-17
Attending: INTERNAL MEDICINE
Payer: MEDICARE

## 2021-02-17 DIAGNOSIS — Z23 NEED FOR VACCINATION: ICD-10-CM

## 2021-02-17 DIAGNOSIS — Z23 ENCOUNTER FOR VACCINATION: Primary | ICD-10-CM

## 2021-02-17 PROCEDURE — 91300 PFIZER SARS-COV-2 VACCINE: CPT | Performed by: NURSE PRACTITIONER

## 2021-02-17 PROCEDURE — 0001A PFIZER SARS-COV-2 VACCINE: CPT | Performed by: NURSE PRACTITIONER

## 2021-03-11 ENCOUNTER — HOSPITAL ENCOUNTER (OUTPATIENT)
Dept: CARDIOLOGY | Facility: MEDICAL CENTER | Age: 76
End: 2021-03-11
Attending: INTERNAL MEDICINE
Payer: MEDICARE

## 2021-03-11 ENCOUNTER — HOSPITAL ENCOUNTER (OUTPATIENT)
Dept: RADIOLOGY | Facility: MEDICAL CENTER | Age: 76
End: 2021-03-11
Attending: PSYCHIATRY & NEUROLOGY
Payer: MEDICARE

## 2021-03-11 DIAGNOSIS — I35.0 NONRHEUMATIC AORTIC VALVE STENOSIS: ICD-10-CM

## 2021-03-11 DIAGNOSIS — G89.29 CHRONIC INTRACTABLE HEADACHE, UNSPECIFIED HEADACHE TYPE: ICD-10-CM

## 2021-03-11 DIAGNOSIS — R51.9 CHRONIC INTRACTABLE HEADACHE, UNSPECIFIED HEADACHE TYPE: ICD-10-CM

## 2021-03-11 PROCEDURE — 93306 TTE W/DOPPLER COMPLETE: CPT

## 2021-03-11 PROCEDURE — 70450 CT HEAD/BRAIN W/O DYE: CPT | Mod: MH

## 2021-03-12 LAB
LV EJECT FRACT  99904: 60
LV EJECT FRACT MOD 2C 99903: 48.83
LV EJECT FRACT MOD 4C 99902: 63.69
LV EJECT FRACT MOD BP 99901: 55.64

## 2021-03-12 PROCEDURE — 93306 TTE W/DOPPLER COMPLETE: CPT | Mod: 26 | Performed by: INTERNAL MEDICINE

## 2021-03-13 ENCOUNTER — IMMUNIZATION (OUTPATIENT)
Dept: FAMILY PLANNING/WOMEN'S HEALTH CLINIC | Facility: IMMUNIZATION CENTER | Age: 76
End: 2021-03-13
Attending: INTERNAL MEDICINE
Payer: MEDICARE

## 2021-03-13 DIAGNOSIS — Z23 ENCOUNTER FOR VACCINATION: Primary | ICD-10-CM

## 2021-03-13 PROCEDURE — 91300 PFIZER SARS-COV-2 VACCINE: CPT

## 2021-03-13 PROCEDURE — 0002A PFIZER SARS-COV-2 VACCINE: CPT

## 2021-04-22 ENCOUNTER — APPOINTMENT (OUTPATIENT)
Dept: RADIOLOGY | Facility: MEDICAL CENTER | Age: 76
DRG: 682 | End: 2021-04-22
Attending: EMERGENCY MEDICINE
Payer: MEDICARE

## 2021-04-22 ENCOUNTER — HOSPITAL ENCOUNTER (INPATIENT)
Facility: MEDICAL CENTER | Age: 76
LOS: 6 days | DRG: 682 | End: 2021-04-29
Attending: EMERGENCY MEDICINE | Admitting: HOSPITALIST
Payer: MEDICARE

## 2021-04-22 DIAGNOSIS — I95.1 ORTHOSTATIC HYPOTENSION: ICD-10-CM

## 2021-04-22 DIAGNOSIS — R55 SYNCOPE AND COLLAPSE: ICD-10-CM

## 2021-04-22 DIAGNOSIS — G25.0 ESSENTIAL TREMOR: ICD-10-CM

## 2021-04-22 DIAGNOSIS — R53.81 DEBILITY: ICD-10-CM

## 2021-04-22 PROBLEM — N18.9 ACUTE KIDNEY INJURY SUPERIMPOSED ON CHRONIC KIDNEY DISEASE (HCC): Status: ACTIVE | Noted: 2021-04-22

## 2021-04-22 PROBLEM — E87.20 LACTIC ACIDOSIS: Status: ACTIVE | Noted: 2021-04-22

## 2021-04-22 PROBLEM — N17.9 ACUTE KIDNEY INJURY SUPERIMPOSED ON CHRONIC KIDNEY DISEASE (HCC): Status: ACTIVE | Noted: 2021-04-22

## 2021-04-22 LAB
ALBUMIN SERPL BCP-MCNC: 2.9 G/DL (ref 3.2–4.9)
ALBUMIN/GLOB SERPL: 0.8 G/DL
ALP SERPL-CCNC: 83 U/L (ref 30–99)
ALT SERPL-CCNC: 16 U/L (ref 2–50)
ANION GAP SERPL CALC-SCNC: 12 MMOL/L (ref 7–16)
APPEARANCE UR: CLEAR
AST SERPL-CCNC: 20 U/L (ref 12–45)
BACTERIA #/AREA URNS HPF: ABNORMAL /HPF
BASOPHILS # BLD AUTO: 0.5 % (ref 0–1.8)
BASOPHILS # BLD: 0.06 K/UL (ref 0–0.12)
BILIRUB SERPL-MCNC: 0.6 MG/DL (ref 0.1–1.5)
BILIRUB UR QL STRIP.AUTO: NEGATIVE
BUN SERPL-MCNC: 32 MG/DL (ref 8–22)
CALCIUM SERPL-MCNC: 9.1 MG/DL (ref 8.5–10.5)
CHLORIDE SERPL-SCNC: 99 MMOL/L (ref 96–112)
CO2 SERPL-SCNC: 23 MMOL/L (ref 20–33)
COLOR UR: YELLOW
CREAT SERPL-MCNC: 1.66 MG/DL (ref 0.5–1.4)
EKG IMPRESSION: NORMAL
EOSINOPHIL # BLD AUTO: 0.15 K/UL (ref 0–0.51)
EOSINOPHIL NFR BLD: 1.2 % (ref 0–6.9)
EPI CELLS #/AREA URNS HPF: ABNORMAL /HPF
ERYTHROCYTE [DISTWIDTH] IN BLOOD BY AUTOMATED COUNT: 39 FL (ref 35.9–50)
EST. AVERAGE GLUCOSE BLD GHB EST-MCNC: 301 MG/DL
GLOBULIN SER CALC-MCNC: 3.7 G/DL (ref 1.9–3.5)
GLUCOSE BLD-MCNC: 171 MG/DL (ref 65–99)
GLUCOSE BLD-MCNC: 295 MG/DL (ref 65–99)
GLUCOSE SERPL-MCNC: 158 MG/DL (ref 65–99)
GLUCOSE UR STRIP.AUTO-MCNC: 500 MG/DL
HBA1C MFR BLD: 12.1 % (ref 4–5.6)
HCT VFR BLD AUTO: 41.8 % (ref 37–47)
HGB BLD-MCNC: 15.3 G/DL (ref 12–16)
IMM GRANULOCYTES # BLD AUTO: 0.04 K/UL (ref 0–0.11)
IMM GRANULOCYTES NFR BLD AUTO: 0.3 % (ref 0–0.9)
KETONES UR STRIP.AUTO-MCNC: NEGATIVE MG/DL
LACTATE BLD-SCNC: 1.4 MMOL/L (ref 0.5–2)
LACTATE BLD-SCNC: 3.4 MMOL/L (ref 0.5–2)
LEUKOCYTE ESTERASE UR QL STRIP.AUTO: NEGATIVE
LYMPHOCYTES # BLD AUTO: 2.48 K/UL (ref 1–4.8)
LYMPHOCYTES NFR BLD: 20.4 % (ref 22–41)
MCH RBC QN AUTO: 29.4 PG (ref 27–33)
MCHC RBC AUTO-ENTMCNC: 36.6 G/DL (ref 33.6–35)
MCV RBC AUTO: 80.2 FL (ref 81.4–97.8)
MICRO URNS: ABNORMAL
MONOCYTES # BLD AUTO: 0.94 K/UL (ref 0–0.85)
MONOCYTES NFR BLD AUTO: 7.7 % (ref 0–13.4)
NEUTROPHILS # BLD AUTO: 8.46 K/UL (ref 2–7.15)
NEUTROPHILS NFR BLD: 69.9 % (ref 44–72)
NITRITE UR QL STRIP.AUTO: NEGATIVE
NRBC # BLD AUTO: 0 K/UL
NRBC BLD-RTO: 0 /100 WBC
PH UR STRIP.AUTO: 5.5 [PH] (ref 5–8)
PLATELET # BLD AUTO: 277 K/UL (ref 164–446)
PMV BLD AUTO: 9.9 FL (ref 9–12.9)
POTASSIUM SERPL-SCNC: 3.3 MMOL/L (ref 3.6–5.5)
PROT SERPL-MCNC: 6.6 G/DL (ref 6–8.2)
PROT UR QL STRIP: >=300 MG/DL
RBC # BLD AUTO: 5.21 M/UL (ref 4.2–5.4)
RBC # URNS HPF: ABNORMAL /HPF
RBC UR QL AUTO: ABNORMAL
SARS-COV-2 RNA RESP QL NAA+PROBE: NOTDETECTED
SODIUM SERPL-SCNC: 134 MMOL/L (ref 135–145)
SP GR UR STRIP.AUTO: >=1.03
SPECIMEN SOURCE: NORMAL
TSH SERPL DL<=0.005 MIU/L-ACNC: 0.64 UIU/ML (ref 0.38–5.33)
UROBILINOGEN UR STRIP.AUTO-MCNC: 0.2 MG/DL
VIT B12 SERPL-MCNC: 545 PG/ML (ref 211–911)
WBC # BLD AUTO: 12.1 K/UL (ref 4.8–10.8)
WBC #/AREA URNS HPF: ABNORMAL /HPF

## 2021-04-22 PROCEDURE — 83605 ASSAY OF LACTIC ACID: CPT

## 2021-04-22 PROCEDURE — 85025 COMPLETE CBC W/AUTO DIFF WBC: CPT

## 2021-04-22 PROCEDURE — G0378 HOSPITAL OBSERVATION PER HR: HCPCS

## 2021-04-22 PROCEDURE — 700111 HCHG RX REV CODE 636 W/ 250 OVERRIDE (IP): Performed by: EMERGENCY MEDICINE

## 2021-04-22 PROCEDURE — 96372 THER/PROPH/DIAG INJ SC/IM: CPT

## 2021-04-22 PROCEDURE — U0005 INFEC AGEN DETEC AMPLI PROBE: HCPCS

## 2021-04-22 PROCEDURE — 70450 CT HEAD/BRAIN W/O DYE: CPT | Mod: ME

## 2021-04-22 PROCEDURE — 36415 COLL VENOUS BLD VENIPUNCTURE: CPT

## 2021-04-22 PROCEDURE — 80053 COMPREHEN METABOLIC PANEL: CPT

## 2021-04-22 PROCEDURE — A9270 NON-COVERED ITEM OR SERVICE: HCPCS | Performed by: STUDENT IN AN ORGANIZED HEALTH CARE EDUCATION/TRAINING PROGRAM

## 2021-04-22 PROCEDURE — 700105 HCHG RX REV CODE 258: Performed by: STUDENT IN AN ORGANIZED HEALTH CARE EDUCATION/TRAINING PROGRAM

## 2021-04-22 PROCEDURE — 93005 ELECTROCARDIOGRAM TRACING: CPT | Performed by: EMERGENCY MEDICINE

## 2021-04-22 PROCEDURE — 99285 EMERGENCY DEPT VISIT HI MDM: CPT

## 2021-04-22 PROCEDURE — 82607 VITAMIN B-12: CPT

## 2021-04-22 PROCEDURE — 96374 THER/PROPH/DIAG INJ IV PUSH: CPT

## 2021-04-22 PROCEDURE — 700102 HCHG RX REV CODE 250 W/ 637 OVERRIDE(OP): Performed by: STUDENT IN AN ORGANIZED HEALTH CARE EDUCATION/TRAINING PROGRAM

## 2021-04-22 PROCEDURE — 83036 HEMOGLOBIN GLYCOSYLATED A1C: CPT

## 2021-04-22 PROCEDURE — 82962 GLUCOSE BLOOD TEST: CPT

## 2021-04-22 PROCEDURE — 81001 URINALYSIS AUTO W/SCOPE: CPT

## 2021-04-22 PROCEDURE — U0003 INFECTIOUS AGENT DETECTION BY NUCLEIC ACID (DNA OR RNA); SEVERE ACUTE RESPIRATORY SYNDROME CORONAVIRUS 2 (SARS-COV-2) (CORONAVIRUS DISEASE [COVID-19]), AMPLIFIED PROBE TECHNIQUE, MAKING USE OF HIGH THROUGHPUT TECHNOLOGIES AS DESCRIBED BY CMS-2020-01-R: HCPCS

## 2021-04-22 PROCEDURE — 99218 PR INITIAL OBSERVATION CARE,LEVL I: CPT | Mod: GC | Performed by: HOSPITALIST

## 2021-04-22 PROCEDURE — 51798 US URINE CAPACITY MEASURE: CPT

## 2021-04-22 PROCEDURE — 84443 ASSAY THYROID STIM HORMONE: CPT

## 2021-04-22 PROCEDURE — 700105 HCHG RX REV CODE 258: Performed by: EMERGENCY MEDICINE

## 2021-04-22 RX ORDER — AMOXICILLIN 250 MG
2 CAPSULE ORAL 2 TIMES DAILY
Status: DISCONTINUED | OUTPATIENT
Start: 2021-04-23 | End: 2021-04-29 | Stop reason: HOSPADM

## 2021-04-22 RX ORDER — OXYCODONE HYDROCHLORIDE 5 MG/1
5 TABLET ORAL EVERY 6 HOURS PRN
Status: DISCONTINUED | OUTPATIENT
Start: 2021-04-22 | End: 2021-04-29 | Stop reason: HOSPADM

## 2021-04-22 RX ORDER — ERGOCALCIFEROL 1.25 MG/1
50000 CAPSULE ORAL
Status: DISCONTINUED | OUTPATIENT
Start: 2021-04-25 | End: 2021-04-29 | Stop reason: HOSPADM

## 2021-04-22 RX ORDER — INSULIN GLARGINE 100 [IU]/ML
15 INJECTION, SOLUTION SUBCUTANEOUS EVERY EVENING
Status: DISCONTINUED | OUTPATIENT
Start: 2021-04-22 | End: 2021-04-24

## 2021-04-22 RX ORDER — BISACODYL 10 MG
10 SUPPOSITORY, RECTAL RECTAL
Status: DISCONTINUED | OUTPATIENT
Start: 2021-04-22 | End: 2021-04-29 | Stop reason: HOSPADM

## 2021-04-22 RX ORDER — SODIUM CHLORIDE 9 MG/ML
1000 INJECTION, SOLUTION INTRAVENOUS ONCE
Status: COMPLETED | OUTPATIENT
Start: 2021-04-22 | End: 2021-04-22

## 2021-04-22 RX ORDER — SODIUM CHLORIDE 9 MG/ML
1000 INJECTION, SOLUTION INTRAVENOUS CONTINUOUS
Status: ACTIVE | OUTPATIENT
Start: 2021-04-22 | End: 2021-04-22

## 2021-04-22 RX ORDER — ONDANSETRON 2 MG/ML
4 INJECTION INTRAMUSCULAR; INTRAVENOUS ONCE
Status: COMPLETED | OUTPATIENT
Start: 2021-04-22 | End: 2021-04-22

## 2021-04-22 RX ORDER — LABETALOL HYDROCHLORIDE 5 MG/ML
10 INJECTION, SOLUTION INTRAVENOUS EVERY 4 HOURS PRN
Status: DISCONTINUED | OUTPATIENT
Start: 2021-04-22 | End: 2021-04-26

## 2021-04-22 RX ORDER — POTASSIUM CHLORIDE 20 MEQ/1
40 TABLET, EXTENDED RELEASE ORAL EVERY 4 HOURS
Status: COMPLETED | OUTPATIENT
Start: 2021-04-22 | End: 2021-04-22

## 2021-04-22 RX ORDER — POLYETHYLENE GLYCOL 3350 17 G/17G
1 POWDER, FOR SOLUTION ORAL
Status: DISCONTINUED | OUTPATIENT
Start: 2021-04-22 | End: 2021-04-29 | Stop reason: HOSPADM

## 2021-04-22 RX ORDER — ACETAMINOPHEN 325 MG/1
650 TABLET ORAL EVERY 6 HOURS PRN
Status: DISCONTINUED | OUTPATIENT
Start: 2021-04-22 | End: 2021-04-29 | Stop reason: HOSPADM

## 2021-04-22 RX ORDER — DEXTROSE MONOHYDRATE 25 G/50ML
50 INJECTION, SOLUTION INTRAVENOUS
Status: DISCONTINUED | OUTPATIENT
Start: 2021-04-22 | End: 2021-04-24

## 2021-04-22 RX ORDER — INSULIN GLARGINE 100 [IU]/ML
20 INJECTION, SOLUTION SUBCUTANEOUS EVERY EVENING
COMMUNITY

## 2021-04-22 RX ORDER — CARVEDILOL 6.25 MG/1
6.25 TABLET ORAL 2 TIMES DAILY WITH MEALS
Status: DISCONTINUED | OUTPATIENT
Start: 2021-04-22 | End: 2021-04-24

## 2021-04-22 RX ADMIN — OXYCODONE 5 MG: 5 TABLET ORAL at 20:52

## 2021-04-22 RX ADMIN — SODIUM CHLORIDE 1000 ML: 9 INJECTION, SOLUTION INTRAVENOUS at 18:27

## 2021-04-22 RX ADMIN — INSULIN LISPRO 2 UNITS: 100 INJECTION, SOLUTION INTRAVENOUS; SUBCUTANEOUS at 18:15

## 2021-04-22 RX ADMIN — ACETAMINOPHEN 650 MG: 325 TABLET ORAL at 17:33

## 2021-04-22 RX ADMIN — POTASSIUM CHLORIDE 40 MEQ: 1500 TABLET, EXTENDED RELEASE ORAL at 17:31

## 2021-04-22 RX ADMIN — ONDANSETRON 4 MG: 2 INJECTION INTRAMUSCULAR; INTRAVENOUS at 13:15

## 2021-04-22 RX ADMIN — INSULIN LISPRO 5 UNITS: 100 INJECTION, SOLUTION INTRAVENOUS; SUBCUTANEOUS at 20:48

## 2021-04-22 RX ADMIN — APIXABAN 5 MG: 5 TABLET, FILM COATED ORAL at 17:31

## 2021-04-22 RX ADMIN — INSULIN GLARGINE 15 UNITS: 100 INJECTION, SOLUTION SUBCUTANEOUS at 18:14

## 2021-04-22 RX ADMIN — POTASSIUM CHLORIDE 40 MEQ: 1500 TABLET, EXTENDED RELEASE ORAL at 20:40

## 2021-04-22 RX ADMIN — CARVEDILOL 6.25 MG: 6.25 TABLET, FILM COATED ORAL at 17:33

## 2021-04-22 RX ADMIN — SODIUM CHLORIDE 1000 ML: 9 INJECTION, SOLUTION INTRAVENOUS at 13:31

## 2021-04-22 ASSESSMENT — ENCOUNTER SYMPTOMS
VOMITING: 0
CLAUDICATION: 0
SPEECH CHANGE: 0
PHOTOPHOBIA: 0
DIZZINESS: 1
NERVOUS/ANXIOUS: 1
DIARRHEA: 0
MYALGIAS: 0
HEADACHES: 1
TREMORS: 1
SEIZURES: 0
WEIGHT LOSS: 0
BLURRED VISION: 1
ORTHOPNEA: 0
LOSS OF CONSCIOUSNESS: 1
HEARTBURN: 0
CONSTIPATION: 1
FEVER: 0
CHILLS: 0
FALLS: 1
ABDOMINAL PAIN: 0
DEPRESSION: 0
SHORTNESS OF BREATH: 0
SPUTUM PRODUCTION: 0
COUGH: 1
FOCAL WEAKNESS: 0
NAUSEA: 0

## 2021-04-22 ASSESSMENT — COGNITIVE AND FUNCTIONAL STATUS - GENERAL
DAILY ACTIVITIY SCORE: 18
TOILETING: A LITTLE
STANDING UP FROM CHAIR USING ARMS: A LITTLE
TURNING FROM BACK TO SIDE WHILE IN FLAT BAD: A LITTLE
HELP NEEDED FOR BATHING: A LITTLE
WALKING IN HOSPITAL ROOM: A LITTLE
CLIMB 3 TO 5 STEPS WITH RAILING: A LITTLE
PERSONAL GROOMING: A LITTLE
DRESSING REGULAR LOWER BODY CLOTHING: A LOT
MOBILITY SCORE: 18
SUGGESTED CMS G CODE MODIFIER MOBILITY: CK
DRESSING REGULAR UPPER BODY CLOTHING: A LITTLE
SUGGESTED CMS G CODE MODIFIER DAILY ACTIVITY: CK
MOVING FROM LYING ON BACK TO SITTING ON SIDE OF FLAT BED: A LITTLE
MOVING TO AND FROM BED TO CHAIR: A LITTLE

## 2021-04-22 ASSESSMENT — FIBROSIS 4 INDEX: FIB4 SCORE: 1.52

## 2021-04-22 ASSESSMENT — CHA2DS2 SCORE
AGE 75 OR GREATER: YES
SEX: FEMALE
VASCULAR DISEASE: YES
PRIOR STROKE OR TIA OR THROMBOEMBOLISM: NO
AGE 65 TO 74: NO
CHF OR LEFT VENTRICULAR DYSFUNCTION: NO
CHA2DS2 VASC SCORE: 6
DIABETES: YES
HYPERTENSION: YES

## 2021-04-22 ASSESSMENT — LIFESTYLE VARIABLES
ALCOHOL_USE: NO
DO YOU DRINK ALCOHOL: NO

## 2021-04-22 ASSESSMENT — PAIN DESCRIPTION - PAIN TYPE: TYPE: ACUTE PAIN

## 2021-04-22 NOTE — ED PROVIDER NOTES
ED Provider Note    CHIEF COMPLAINT  No chief complaint on file.      HPI  Amrita Torrez is a 76 y.o. female who presents after fall.  Patient reports that she fell this morning after standing and feeling dizzy while walking to the bathroom, she reports that she felt like she was going to pass out and then fell striking her head on the bedside table.  Patient reports she is felt very dizzy upon standing and having diffuse weakness over the last few weeks.  She denies any fevers constitutional symptoms.  Patient has a longstanding history of tremor that she reports gets worsened when she is nervous.  Patient has a history of CVA with residual weakness of her right side of her face.  Patient is on Eliquis.    REVIEW OF SYSTEMS  ROS    See HPI for further details. All other systems are negative.     PAST MEDICAL HISTORY   has a past medical history of Anxiety, Arthritis, Breath shortness, CAD (coronary artery disease), Cataract, Dental disorder, Depression, Diabetes, Goiter, Heart attack (H[facility]), High cholesterol, Hyperlipidemia, Hypertension, MI (myocardial infarction) ([facility]) (), Oxygen dependent, Pericardial effusion, Pneumonia (), Snoring, Stroke ([facility]) (10/2019), and Thyroid nodule.    SOCIAL HISTORY  Social History     Tobacco Use   • Smoking status: Former Smoker     Packs/day: 1.00     Years: 40.00     Pack years: 40.00     Types: Cigarettes     Quit date: 1978     Years since quittin.3   • Smokeless tobacco: Never Used   Substance and Sexual Activity   • Alcohol use: No   • Drug use: No   • Sexual activity: Not on file       SURGICAL HISTORY   has a past surgical history that includes other orthopedic surgery (); other abdominal surgery (); gyn surgery (); gyn surgery; aditya by laparoscopy (2012); laparoscopy (2014); zzz cardiac cath; and vitrectomy posterior (N/A, 2020).    CURRENT MEDICATIONS  Home Medications    **Home medications have not yet been reviewed for  this encounter**         ALLERGIES  Allergies   Allergen Reactions   • Pcn [Penicillins] Hives, Shortness of Breath and Swelling   • Sulfa Drugs Hives, Shortness of Breath and Swelling       PHYSICAL EXAM  There were no vitals filed for this visit.    Physical Exam   Constitutional: She is oriented to person, place, and time. She appears well-developed and well-nourished.   HENT:   Head: Normocephalic.   Minimal facial droop on patient's right, some mild ecchymosis over patient's left temple   Eyes: Conjunctivae are normal.   Cardiovascular: Normal rate and regular rhythm.   Pulmonary/Chest: Effort normal and breath sounds normal.   Abdominal: Soft. Bowel sounds are normal. She exhibits no distension. There is no abdominal tenderness. There is no rebound.   Musculoskeletal:      Cervical back: Normal range of motion and neck supple.      Comments: Cervical, thoracic, lumbar spine clear of any tenderness palpation or associated bony abnormality.  Bilateral upper and lower extremities are clear of any acute bony abnormality or tenderness of long bones or joints.   Neurological: She is alert and oriented to person, place, and time.   Diffuse tremor  Distal and proximal strength 5/5 in upper and lower extremities. Normal gait. No dysmetria. No sensation deficits. No visual field deficits. Cranial nerves intact.   Pelvis is stable without any tenderness to compression   Skin: Skin is warm and dry. No rash noted.   Psychiatric: She has a normal mood and affect. Her behavior is normal.         DIAGNOSTIC STUDIES / PROCEDURES    EKG  EKG time is 1244, left axis deviation, borderline short IL interval at 116, no ST elevation or depression consistent with acute regional ischemia, voltage criteria for LVH.    LABS  Results for orders placed or performed during the hospital encounter of 04/22/21   CBC WITH DIFFERENTIAL   Result Value Ref Range    WBC 12.1 (H) 4.8 - 10.8 K/uL    RBC 5.21 4.20 - 5.40 M/uL    Hemoglobin 15.3 12.0  - 16.0 g/dL    Hematocrit 41.8 37.0 - 47.0 %    MCV 80.2 (L) 81.4 - 97.8 fL    MCH 29.4 27.0 - 33.0 pg    MCHC 36.6 (H) 33.6 - 35.0 g/dL    RDW 39.0 35.9 - 50.0 fL    Platelet Count 277 164 - 446 K/uL    MPV 9.9 9.0 - 12.9 fL    Neutrophils-Polys 69.90 44.00 - 72.00 %    Lymphocytes 20.40 (L) 22.00 - 41.00 %    Monocytes 7.70 0.00 - 13.40 %    Eosinophils 1.20 0.00 - 6.90 %    Basophils 0.50 0.00 - 1.80 %    Immature Granulocytes 0.30 0.00 - 0.90 %    Nucleated RBC 0.00 /100 WBC    Neutrophils (Absolute) 8.46 (H) 2.00 - 7.15 K/uL    Lymphs (Absolute) 2.48 1.00 - 4.80 K/uL    Monos (Absolute) 0.94 (H) 0.00 - 0.85 K/uL    Eos (Absolute) 0.15 0.00 - 0.51 K/uL    Baso (Absolute) 0.06 0.00 - 0.12 K/uL    Immature Granulocytes (abs) 0.04 0.00 - 0.11 K/uL    NRBC (Absolute) 0.00 K/uL   LACTIC ACID   Result Value Ref Range    Lactic Acid 3.4 (H) 0.5 - 2.0 mmol/L   EKG   Result Value Ref Range    Report       [facility] Emergency Dept.    Test Date:  2021  Pt Name:    SHANNAN ALCANTARA                 Department: ER  MRN:        [id number]                      Room:        18  Gender:     Female                       Technician: [id number]  :        1945                   Requested By:SAEID SANDERS  Order #:    [id number]                    Reading MD:    Measurements  Intervals                                Axis  Rate:       85                           P:          -43  NV:         116                          QRS:        -54  QRSD:       104                          T:          115  QT:         396  QTc:        471    Interpretive Statements  SINUS RHYTHM  BORDERLINE SHORT NV INTERVAL  LEFT ANTERIOR FASCICULAR BLOCK  LVH WITH SECONDARY REPOLARIZATION ABNORMALITY  Compared to ECG 2020 12:15:52  Early repolarization now present         RADIOLOGY  CT-HEAD W/O   Final Result      1.  Generalized atrophy and chronic microvascular ischemic type changes.   2.  No acute intracranial abnormality.               COURSE & MEDICAL DECISION MAKING  Pertinent Labs & Imaging studies reviewed. (See chart for details)  Given patient's fall in the setting of being on Eliquis a CT head was checked.  This was done immediately upon presentation.  This was negative.  Patient's ongoing dizziness as well has her recurrent falls is concerning especially as she is on Eliquis.  Patient EKG is unchanged from prior.  Patient will have basic labs checked, basic labs reveal lactic acidosis likely secondary to chronic dehydration.  I do worry of patient is able to take care of herself at this point on her own and do wonder if she may benefit from a higher level of care.  Patient without any fevers chills or tachycardia or hypotension to suggest infectious etiology.  Patient's white count is very minimally elevated.  Patient will be admitted for ongoing care, possible further cardiac work-up and possible admission for nursing home versus rehab facility.      FINAL IMPRESSION  1.  Failure to thrive, near syncope, ground-level fall, head trauma on Eliquis, dehydration         Electronically signed by: Scott Valles M.D., 4/22/2021 12:30 PM

## 2021-04-22 NOTE — ED TRIAGE NOTES
"Chief Complaint   Patient presents with   • Head Injury     Pt got out of bed took a few steps then got dizzy and fell hitting her head on dresser, contusion to R side of head no bleeding. Possible LOC. Pt is on blood thinners. . GCS 15, VSS on RA.    • GLF     R/T dizziness   • Weakness     Generalized X 3 days     Pt BIB EMS for above complaints, VSS on RA, GCS 15, NAD. Hx of MI X 2 with stints, on eliquis. Hx stroke with baseline facial droop. Pt has a baseline tremor X 1 year with no dx.     Denies all s/sx of covid, denies recent travel, denies fevers.    /77   Pulse 87   Temp 36.6 °C (97.8 °F) (Temporal)   Resp 18   Ht 1.651 m (5' 5\")   Wt 70.3 kg (155 lb)   LMP  (LMP Unknown)   SpO2 95%   BMI 25.79 kg/m²      "

## 2021-04-23 PROBLEM — I95.1 ORTHOSTATIC HYPOTENSION: Status: ACTIVE | Noted: 2021-04-23

## 2021-04-23 PROBLEM — K21.9 GERD (GASTROESOPHAGEAL REFLUX DISEASE): Chronic | Status: ACTIVE | Noted: 2021-04-23

## 2021-04-23 LAB
ALBUMIN SERPL BCP-MCNC: 2.9 G/DL (ref 3.2–4.9)
ALBUMIN/GLOB SERPL: 0.8 G/DL
ALP SERPL-CCNC: 76 U/L (ref 30–99)
ALT SERPL-CCNC: 15 U/L (ref 2–50)
ANION GAP SERPL CALC-SCNC: 7 MMOL/L (ref 7–16)
AST SERPL-CCNC: 20 U/L (ref 12–45)
BASOPHILS # BLD AUTO: 0.5 % (ref 0–1.8)
BASOPHILS # BLD: 0.05 K/UL (ref 0–0.12)
BILIRUB SERPL-MCNC: 0.5 MG/DL (ref 0.1–1.5)
BUN SERPL-MCNC: 29 MG/DL (ref 8–22)
CALCIUM SERPL-MCNC: 8.7 MG/DL (ref 8.5–10.5)
CHLORIDE SERPL-SCNC: 107 MMOL/L (ref 96–112)
CO2 SERPL-SCNC: 24 MMOL/L (ref 20–33)
COMMENT 1642: NORMAL
CREAT SERPL-MCNC: 1.7 MG/DL (ref 0.5–1.4)
EOSINOPHIL # BLD AUTO: 0.23 K/UL (ref 0–0.51)
EOSINOPHIL NFR BLD: 2.3 % (ref 0–6.9)
ERYTHROCYTE [DISTWIDTH] IN BLOOD BY AUTOMATED COUNT: 42.8 FL (ref 35.9–50)
GLOBULIN SER CALC-MCNC: 3.5 G/DL (ref 1.9–3.5)
GLUCOSE BLD-MCNC: 187 MG/DL (ref 65–99)
GLUCOSE BLD-MCNC: 189 MG/DL (ref 65–99)
GLUCOSE BLD-MCNC: 206 MG/DL (ref 65–99)
GLUCOSE BLD-MCNC: 221 MG/DL (ref 65–99)
GLUCOSE SERPL-MCNC: 114 MG/DL (ref 65–99)
HCT VFR BLD AUTO: 44.8 % (ref 37–47)
HGB BLD-MCNC: 15.7 G/DL (ref 12–16)
IMM GRANULOCYTES # BLD AUTO: 0.03 K/UL (ref 0–0.11)
IMM GRANULOCYTES NFR BLD AUTO: 0.3 % (ref 0–0.9)
LYMPHOCYTES # BLD AUTO: 3.53 K/UL (ref 1–4.8)
LYMPHOCYTES NFR BLD: 34.7 % (ref 22–41)
MAGNESIUM SERPL-MCNC: 2 MG/DL (ref 1.5–2.5)
MCH RBC QN AUTO: 29.5 PG (ref 27–33)
MCHC RBC AUTO-ENTMCNC: 35 G/DL (ref 33.6–35)
MCV RBC AUTO: 84.1 FL (ref 81.4–97.8)
MONOCYTES # BLD AUTO: 0.81 K/UL (ref 0–0.85)
MONOCYTES NFR BLD AUTO: 8 % (ref 0–13.4)
MORPHOLOGY BLD-IMP: NORMAL
NEUTROPHILS # BLD AUTO: 5.51 K/UL (ref 2–7.15)
NEUTROPHILS NFR BLD: 54.2 % (ref 44–72)
NRBC # BLD AUTO: 0 K/UL
NRBC BLD-RTO: 0 /100 WBC
PLATELET # BLD AUTO: 241 K/UL (ref 164–446)
PLATELET BLD QL SMEAR: NORMAL
PMV BLD AUTO: 10.6 FL (ref 9–12.9)
POTASSIUM SERPL-SCNC: 4.3 MMOL/L (ref 3.6–5.5)
PROT SERPL-MCNC: 6.4 G/DL (ref 6–8.2)
RBC # BLD AUTO: 5.33 M/UL (ref 4.2–5.4)
SODIUM SERPL-SCNC: 138 MMOL/L (ref 135–145)
TROPONIN T SERPL-MCNC: 36 NG/L (ref 6–19)
WBC # BLD AUTO: 10.2 K/UL (ref 4.8–10.8)

## 2021-04-23 PROCEDURE — 97161 PT EVAL LOW COMPLEX 20 MIN: CPT

## 2021-04-23 PROCEDURE — 99232 SBSQ HOSP IP/OBS MODERATE 35: CPT | Mod: GC | Performed by: INTERNAL MEDICINE

## 2021-04-23 PROCEDURE — 770020 HCHG ROOM/CARE - TELE (206)

## 2021-04-23 PROCEDURE — 84484 ASSAY OF TROPONIN QUANT: CPT

## 2021-04-23 PROCEDURE — 700102 HCHG RX REV CODE 250 W/ 637 OVERRIDE(OP): Performed by: STUDENT IN AN ORGANIZED HEALTH CARE EDUCATION/TRAINING PROGRAM

## 2021-04-23 PROCEDURE — 700111 HCHG RX REV CODE 636 W/ 250 OVERRIDE (IP): Performed by: STUDENT IN AN ORGANIZED HEALTH CARE EDUCATION/TRAINING PROGRAM

## 2021-04-23 PROCEDURE — 51798 US URINE CAPACITY MEASURE: CPT

## 2021-04-23 PROCEDURE — A9270 NON-COVERED ITEM OR SERVICE: HCPCS | Performed by: STUDENT IN AN ORGANIZED HEALTH CARE EDUCATION/TRAINING PROGRAM

## 2021-04-23 PROCEDURE — 85025 COMPLETE CBC W/AUTO DIFF WBC: CPT

## 2021-04-23 PROCEDURE — 700105 HCHG RX REV CODE 258: Performed by: STUDENT IN AN ORGANIZED HEALTH CARE EDUCATION/TRAINING PROGRAM

## 2021-04-23 PROCEDURE — 700101 HCHG RX REV CODE 250: Performed by: STUDENT IN AN ORGANIZED HEALTH CARE EDUCATION/TRAINING PROGRAM

## 2021-04-23 PROCEDURE — 99222 1ST HOSP IP/OBS MODERATE 55: CPT | Performed by: INTERNAL MEDICINE

## 2021-04-23 PROCEDURE — 96372 THER/PROPH/DIAG INJ SC/IM: CPT

## 2021-04-23 PROCEDURE — 80053 COMPREHEN METABOLIC PANEL: CPT

## 2021-04-23 PROCEDURE — 82962 GLUCOSE BLOOD TEST: CPT | Mod: 91

## 2021-04-23 PROCEDURE — 36415 COLL VENOUS BLD VENIPUNCTURE: CPT

## 2021-04-23 PROCEDURE — 97165 OT EVAL LOW COMPLEX 30 MIN: CPT

## 2021-04-23 PROCEDURE — 83735 ASSAY OF MAGNESIUM: CPT

## 2021-04-23 RX ORDER — ONDANSETRON 2 MG/ML
4 INJECTION INTRAMUSCULAR; INTRAVENOUS EVERY 6 HOURS PRN
Status: DISCONTINUED | OUTPATIENT
Start: 2021-04-23 | End: 2021-04-29 | Stop reason: HOSPADM

## 2021-04-23 RX ORDER — INSULIN LISPRO 100 [IU]/ML
2 INJECTION, SOLUTION INTRAVENOUS; SUBCUTANEOUS
Qty: 1 EACH | Refills: 3 | Status: SHIPPED | OUTPATIENT
Start: 2021-04-23 | End: 2021-06-14

## 2021-04-23 RX ORDER — SODIUM CHLORIDE, SODIUM LACTATE, POTASSIUM CHLORIDE, CALCIUM CHLORIDE 600; 310; 30; 20 MG/100ML; MG/100ML; MG/100ML; MG/100ML
1000 INJECTION, SOLUTION INTRAVENOUS CONTINUOUS
Status: DISCONTINUED | OUTPATIENT
Start: 2021-04-23 | End: 2021-04-25

## 2021-04-23 RX ORDER — ONDANSETRON 4 MG/1
4 TABLET, ORALLY DISINTEGRATING ORAL EVERY 6 HOURS
Status: DISCONTINUED | OUTPATIENT
Start: 2021-04-23 | End: 2021-04-24

## 2021-04-23 RX ORDER — INSULIN ASPART 100 [IU]/ML
2 INJECTION, SOLUTION INTRAVENOUS; SUBCUTANEOUS
Qty: 1 EACH | Refills: 3 | Status: SHIPPED | OUTPATIENT
Start: 2021-04-23 | End: 2021-06-14

## 2021-04-23 RX ORDER — FAMOTIDINE 20 MG/1
20 TABLET, FILM COATED ORAL DAILY
Status: DISCONTINUED | OUTPATIENT
Start: 2021-04-23 | End: 2021-04-26

## 2021-04-23 RX ORDER — CALCIUM CARBONATE 500 MG/1
500 TABLET, CHEWABLE ORAL
Status: DISCONTINUED | OUTPATIENT
Start: 2021-04-23 | End: 2021-04-29 | Stop reason: HOSPADM

## 2021-04-23 RX ORDER — LIDOCAINE 50 MG/G
1 PATCH TOPICAL EVERY 24 HOURS
Status: DISCONTINUED | OUTPATIENT
Start: 2021-04-23 | End: 2021-04-25

## 2021-04-23 RX ORDER — FAMOTIDINE 20 MG/1
10 TABLET, FILM COATED ORAL ONCE
Status: COMPLETED | OUTPATIENT
Start: 2021-04-23 | End: 2021-04-23

## 2021-04-23 RX ADMIN — APIXABAN 5 MG: 5 TABLET, FILM COATED ORAL at 18:05

## 2021-04-23 RX ADMIN — FAMOTIDINE 10 MG: 20 TABLET ORAL at 06:50

## 2021-04-23 RX ADMIN — CARVEDILOL 6.25 MG: 6.25 TABLET, FILM COATED ORAL at 08:39

## 2021-04-23 RX ADMIN — ONDANSETRON 4 MG: 4 TABLET, ORALLY DISINTEGRATING ORAL at 15:13

## 2021-04-23 RX ADMIN — INSULIN LISPRO 2 UNITS: 100 INJECTION, SOLUTION INTRAVENOUS; SUBCUTANEOUS at 22:07

## 2021-04-23 RX ADMIN — INSULIN LISPRO 3 UNITS: 100 INJECTION, SOLUTION INTRAVENOUS; SUBCUTANEOUS at 18:08

## 2021-04-23 RX ADMIN — INSULIN GLARGINE 15 UNITS: 100 INJECTION, SOLUTION SUBCUTANEOUS at 18:11

## 2021-04-23 RX ADMIN — SERTRALINE HYDROCHLORIDE 50 MG: 50 TABLET ORAL at 05:34

## 2021-04-23 RX ADMIN — INSULIN LISPRO 2 UNITS: 100 INJECTION, SOLUTION INTRAVENOUS; SUBCUTANEOUS at 08:39

## 2021-04-23 RX ADMIN — ONDANSETRON 4 MG: 4 TABLET, ORALLY DISINTEGRATING ORAL at 18:06

## 2021-04-23 RX ADMIN — FAMOTIDINE 20 MG: 20 TABLET ORAL at 13:04

## 2021-04-23 RX ADMIN — SODIUM CHLORIDE, POTASSIUM CHLORIDE, SODIUM LACTATE AND CALCIUM CHLORIDE 1000 ML: 600; 310; 30; 20 INJECTION, SOLUTION INTRAVENOUS at 10:13

## 2021-04-23 RX ADMIN — APIXABAN 5 MG: 5 TABLET, FILM COATED ORAL at 05:34

## 2021-04-23 RX ADMIN — CARVEDILOL 6.25 MG: 6.25 TABLET, FILM COATED ORAL at 18:08

## 2021-04-23 RX ADMIN — CALCIUM CARBONATE 500 MG: 500 TABLET, CHEWABLE ORAL at 17:30

## 2021-04-23 RX ADMIN — LABETALOL HYDROCHLORIDE 10 MG: 5 INJECTION, SOLUTION INTRAVENOUS at 22:07

## 2021-04-23 RX ADMIN — LIDOCAINE HYDROCHLORIDE 15 ML: 20 SOLUTION OROPHARYNGEAL at 06:50

## 2021-04-23 RX ADMIN — INSULIN LISPRO 3 UNITS: 100 INJECTION, SOLUTION INTRAVENOUS; SUBCUTANEOUS at 13:04

## 2021-04-23 RX ADMIN — SODIUM CHLORIDE, POTASSIUM CHLORIDE, SODIUM LACTATE AND CALCIUM CHLORIDE 1000 ML: 600; 310; 30; 20 INJECTION, SOLUTION INTRAVENOUS at 21:57

## 2021-04-23 ASSESSMENT — LIFESTYLE VARIABLES
HAVE PEOPLE ANNOYED YOU BY CRITICIZING YOUR DRINKING: NO
TOTAL SCORE: 0
ON A TYPICAL DAY WHEN YOU DRINK ALCOHOL HOW MANY DRINKS DO YOU HAVE: 0
CONSUMPTION TOTAL: NEGATIVE
HAVE YOU EVER FELT YOU SHOULD CUT DOWN ON YOUR DRINKING: NO
EVER HAD A DRINK FIRST THING IN THE MORNING TO STEADY YOUR NERVES TO GET RID OF A HANGOVER: NO
HOW MANY TIMES IN THE PAST YEAR HAVE YOU HAD 5 OR MORE DRINKS IN A DAY: 0
TOTAL SCORE: 0
EVER FELT BAD OR GUILTY ABOUT YOUR DRINKING: NO
ALCOHOL_USE: NO
DOES PATIENT WANT TO STOP DRINKING: NO
TOTAL SCORE: 0
AVERAGE NUMBER OF DAYS PER WEEK YOU HAVE A DRINK CONTAINING ALCOHOL: 0

## 2021-04-23 ASSESSMENT — ENCOUNTER SYMPTOMS
DEPRESSION: 0
SPUTUM PRODUCTION: 0
CLAUDICATION: 0
NERVOUS/ANXIOUS: 0
ABDOMINAL PAIN: 0
FEVER: 0
CONSTIPATION: 1
DIZZINESS: 1
BLURRED VISION: 1
DIARRHEA: 0
HEARTBURN: 0
COUGH: 1
CHILLS: 0
VOMITING: 0
FALLS: 1
NAUSEA: 0
PHOTOPHOBIA: 0
WEIGHT LOSS: 0
TREMORS: 1
ORTHOPNEA: 0
LOSS OF CONSCIOUSNESS: 1
MYALGIAS: 0
SHORTNESS OF BREATH: 0
SEIZURES: 0
FOCAL WEAKNESS: 0
SPEECH CHANGE: 0
HEADACHES: 1

## 2021-04-23 ASSESSMENT — GAIT ASSESSMENTS
GAIT LEVEL OF ASSIST: MINIMAL ASSIST
DISTANCE (FEET): 40
ASSISTIVE DEVICE: FRONT WHEEL WALKER

## 2021-04-23 ASSESSMENT — COGNITIVE AND FUNCTIONAL STATUS - GENERAL
DRESSING REGULAR LOWER BODY CLOTHING: A LITTLE
STANDING UP FROM CHAIR USING ARMS: A LITTLE
WALKING IN HOSPITAL ROOM: A LITTLE
MOVING FROM LYING ON BACK TO SITTING ON SIDE OF FLAT BED: A LITTLE
EATING MEALS: A LITTLE
SUGGESTED CMS G CODE MODIFIER MOBILITY: CK
DRESSING REGULAR UPPER BODY CLOTHING: A LITTLE
TURNING FROM BACK TO SIDE WHILE IN FLAT BAD: A LITTLE
CLIMB 3 TO 5 STEPS WITH RAILING: A LITTLE
DAILY ACTIVITIY SCORE: 18
MOVING TO AND FROM BED TO CHAIR: A LITTLE
SUGGESTED CMS G CODE MODIFIER DAILY ACTIVITY: CK
HELP NEEDED FOR BATHING: A LITTLE
MOBILITY SCORE: 18
TOILETING: A LITTLE
PERSONAL GROOMING: A LITTLE

## 2021-04-23 ASSESSMENT — ACTIVITIES OF DAILY LIVING (ADL): TOILETING: INDEPENDENT

## 2021-04-23 ASSESSMENT — FIBROSIS 4 INDEX: FIB4 SCORE: 1.67

## 2021-04-23 NOTE — PROGRESS NOTES
"Daily Progress Note:     Date of Service: 4/23/2021  Primary Team: [facility] Team   Attending: Dr. Kelly MD  Senior Resident: Dr. Americo MD  Intern: Dr. Treva King M.D.  Contact:  [phone]    Chief Complaint:   Chief Complaint   Patient presents with   • Head Injury     Pt got out of bed took a few steps then got dizzy and fell hitting her head on dresser, contusion to R side of head no bleeding. Possible LOC. Pt is on blood thinners. . GCS 15, VSS on RA.    • GLF     R/T dizziness   • Weakness     Generalized X 3 days       Subjective:   NAEON. This morning, patient had acid reflux which was alleviated with famotidine and GI cocktail. Also stated that the right side of her back hurt though that may be due to her bed being uncomfortable. She also received 1x oxy for severe headache. She slept \"like a log\" and did not have any dizziness, shortness of breath, or chest pain.    Note: Later on in the morning, the patient had a 4.6 second pause on tele while going to the bathroom, and then started to vomit. Patient felt that she could not talk during the event. She was taken back to the bed, after which her symptoms resolved.    Consultants/Specialty:  None    Review of Systems:   Review of Systems   Constitutional: Positive for malaise/fatigue. Negative for chills, fever and weight loss.   HENT: Positive for hearing loss. Negative for congestion and tinnitus.         +[place]   Eyes: Positive for blurred vision. Negative for photophobia.        Chronic vision loss   Respiratory: Positive for cough. Negative for sputum production and shortness of breath.         Chronic cough, dry   Cardiovascular: Negative for chest pain, orthopnea, claudication and leg swelling.   Gastrointestinal: Positive for constipation. Negative for abdominal pain, diarrhea, heartburn, nausea and vomiting.   Genitourinary: Positive for frequency. Negative for dysuria.   Musculoskeletal: Positive for falls and joint pain. Negative " for myalgias.   Skin: Negative for rash.   Neurological: Positive for dizziness, tremors, loss of consciousness and headaches. Negative for speech change, focal weakness and seizures.        Tremor is chronic   Psychiatric/Behavioral: Negative for depression and suicidal ideas. The patient is not nervous/anxious.      Objective Data:   Physical Exam:   Vitals:   Temp:  [36.6 °C (97.8 °F)-36.9 °C (98.5 °F)] 36.7 °C (98 °F)  Pulse:  [36-90] 86  Resp:  [14-19] 16  BP: (106-227)/() 132/87  SpO2:  [94 %-98 %] 95 %     Orthostatic vitals:  Supine: /81, HR 74  Sitting: /76,   Standing: /76, HR 90    Physical Exam  Constitutional:       General: She is not in acute distress.     Appearance: Normal appearance. She is not diaphoretic.   HENT:      Head: Normocephalic.      Comments: Superficial swelling focal area on right parietal region, no skin break or laceration     Right Ear: External ear normal.      Left Ear: External ear normal.      Ears:      Comments: Abrasion left ear, decreased hearing on R compared to left (chronic)     Nose: Nose normal. No congestion.      Mouth/Throat:      Mouth: Mucous membranes are dry.      Pharynx: Oropharynx is clear. No oropharyngeal exudate.   Eyes:      General: No scleral icterus.     Extraocular Movements: Extraocular movements intact.      Pupils: Pupils are equal, round, and reactive to light.   Cardiovascular:      Rate and Rhythm: Normal rate and regular rhythm.      Pulses: Normal pulses.      Heart sounds: No murmur.      Comments: Holosystolic murmur RUSB  Pulmonary:      Effort: Pulmonary effort is normal. No respiratory distress.      Breath sounds: Normal breath sounds.   Abdominal:      General: Bowel sounds are normal. There is no distension.      Palpations: Abdomen is soft.      Tenderness: There is no abdominal tenderness.   Musculoskeletal:         General: Normal range of motion.      Cervical back: Normal range of motion and neck  supple. No rigidity.      Right lower leg: No edema.      Left lower leg: No edema.      Comments: Ecchymoses present on posterior aspect of R hand   Skin:     General: Skin is warm.      Capillary Refill: Capillary refill takes less than 2 seconds.      Comments: LE extremities cool; superficial abrasions noted right forearm and hand   Neurological:      Mental Status: She is alert and oriented to person, place, and time. Mental status is at baseline.      Cranial Nerves: No cranial nerve deficit.      Sensory: No sensory deficit.      Deep Tendon Reflexes: Reflexes normal.      Comments: Subtle right Facial droop  +subtle horizontal nystagmus B/l  Decreased sensation in R-sided V2 distribution of cranial nerves     Psychiatric:         Mood and Affect: Mood normal.         Thought Content: Thought content normal.           Labs:   Recent Labs     04/22/21  1251 04/23/21  0611   WBC 12.1* 10.2   RBC 5.21 5.33   HEMOGLOBIN 15.3 15.7   HEMATOCRIT 41.8 44.8   MCV 80.2* 84.1   MCH 29.4 29.5   RDW 39.0 42.8   PLATELETCT 277 241   MPV 9.9 10.6   NEUTSPOLYS 69.90 54.20   LYMPHOCYTES 20.40* 34.70   MONOCYTES 7.70 8.00   EOSINOPHILS 1.20 2.30   BASOPHILS 0.50 0.50     Recent Labs     04/22/21  1251 04/23/21  0611   SODIUM 134* 138   POTASSIUM 3.3* 4.3   CHLORIDE 99 107   CO2 23 24   GLUCOSE 158* 114*   BUN 32* 29*        Imaging:   CT-HEAD W/O   Final Result      1.  Generalized atrophy and chronic microvascular ischemic type changes.   2.  No acute intracranial abnormality.          Problem Representation:     77 y/o F with PMH significant for CAD (s/p STEMI 2016, with PCI to LAD, not on DAPT),  hx of moderate aortic stenosis (LVEF 60%, Vmax 3.3, mean grad 28mmhg TTE  3/11/2021), T2DM (A1c 12.1% 4/2021) c/b neuropathy, chronic vertigo d/t BPPV, hx of CVA (baseline subtle R. Facial droop/weakness), hx of macular degeneration (b/l R>L vision loss), and paroxysmal afib (chadsvasq 8, on eliquis), and hx of syncope secondary to  hypovolemia and hyperglycemia, who presents w/ 1 week of increased thirst, polyuria, and hypersomnia, several days of reduced PO intake, and 1x syncopal episode.    * Syncope and collapse- (present on admission)  Assessment & Plan  This is likely due to intravascular depletion combined with aortic stenosis (preload dependent). Intravascular depletion is due to polyuria from inadequate use of insulin due to insurance issues. Patient was clinically hypovolemic, w/ RICHARD, lactic acidosis, glucosuria on UA. Was previously given IVF resuscitation. Moderate AS confirmed via TTE 3/2021. Also complicated by problem of orthostatic hypotension (see additional problem). Vit B12 and TSH wnl. Trop 4/23 of 36.    -encourage PO intake, sodium restrict, carb consistent diet  -IVF hydration  -Cards consult, greatly appreciate recs    Orthostatic hypotension- (present on admission)  Assessment & Plan  This is likely contributory to the patient's presentation of syncope along with other factors such as intravascular volume depletion 2/2 polyuria from unmanaged diabetes. High fall risk.    -Encourage patient to be careful when ambulating, or changing positions from supine to sitting to standing/walking  -Cards consult, follow recs  -Fluid resuscitation  -Fall precautions  -Goal BP 140s/90s  -PT/OT    Acute kidney injury superimposed on chronic kidney disease (HCC)- (present on admission)  Assessment & Plan  Baseline Cr 1.2-1.3; on admission Cr 1.66 w/ BUN 32 elevated as well. This is likely prerenal given concurrent lactic acidosis and clinical picture. Also complicated by diabetic nephropathy.    -f/u prot/cr ratio  -ordered bladder scans, straight cath if PVR>400    Type 2 diabetes mellitus with hyperglycemia, with long-term current use of insulin ([facility])- (present on admission)  Assessment & Plan  Poorly controlled, a1c 12.1 in 4/2021. Home reg includes 25u lantus, SSI novolog (though patient has been rationing the latter d/t insurance  coverage issues). Significant diabetic nephropathy, protinuria, glucosuria; f/u prot/Cr. With RICHARD on CKD3.    Was started at reduced dose 15u lantus + SSI while inpatient. Will titrate and work on home lantus/novolog regimen depending on how BG levels are per accuchecks.    -4/23: Checking on price of novolog vs humalog for insurance coverage, will prescribed based on insurance coverage.  -consider NPH 70/30 as alternative for glucose management  -consider adding ACEi/ARB after resolution of RICHARD for renal protection    Lactic acidosis- (present on admission)  Assessment & Plan  RESOLVED  -secondary to hypovolemia  -3.4-->1.1 after 1L NS bolus    Nonrheumatic aortic valve stenosis- (present on admission)  Assessment & Plan  Recent TTE 3/2021-->LVEF 60%, mod aortic stenosis, mean gradient 28mmHg. This is stable since prior TTE in 2019. Pt follows w/ cardiology as outpatient.    This is likely preload dependent, could contribute to syncopal episodes in background of hypovolemia    -Continue IVF  -started carvedilol 6.25mg BID, will uptitrate to maximally tolerated dose  -labetalol PRN for BP>180/110  -Conservative treatment of BP given concurrent diagnosis of orthostatic hypoTN  -Per Dr. Larson: No need for pacemaker at this time, but patient will need TAVR for AS as well as outpatient dobutamine stress test    GERD (gastroesophageal reflux disease)- (present on admission)  Assessment & Plan  Famotidine scheduled  Scheduled Zofran  PRN GI cocktail + Tums      Essential tremor- (present on admission)  Assessment & Plan  -had an anxiety-driven episode of diffuse tremors that resolved w/ reassurance  -noted to be a chronic issue per chart review    Paroxysmal atrial fibrillation (HCC)- (present on admission)  Assessment & Plan  Chadvasc score 8, so stroke risk of 10.8% per year according to [place] Afib Cohort study. CT head upon admission ruled out traumatic intracranial injury. Currently in NSR.    -continue  eliquis  -monitor on tele    Hypokalemia- (present on admission)  Assessment & Plan  -replete, goal of K=4    CAD (coronary artery disease)- (present on admission)  Assessment & Plan  Hx of STEMI in 2016 w/ PCI to LAD, not on antiplatelets d/t concurrent eliquis use  For pAF, fall risk. Not on secondary CAD medical management.    -Starting: coreg 6.25mg BID-->titrate to maximal tolerated dose  -consider adding ACEi/ARB after resolution of RICHARD  -Change CAD medication regimen pending cards recs      Quality Measures:  Code: DNR/DNI  VTE: Apixaban  Diet: Cardiac, consistent carb  Disposition: Remain inpatient    Please note that this dictation was created using voice recognition software. I have worked with technical experts from [employer] [facility]  [employer] to optimize the interface.  I have made every reasonable attempt to correct obvious errors, but there may be errors of grammar and possibly content that I did not discover before finalizing the note.

## 2021-04-23 NOTE — ASSESSMENT & PLAN NOTE
3/11/21 TTE significant for low flow, lo gradient severe AS with normal EF.  Cardiology consulted, close outpatient follow up recommended.   Per Cardiologist, Dr. Larson, no need for PPM at this time, but patient will need TAVR for AS as well as outpatient dobutamine stress echo

## 2021-04-23 NOTE — DISCHARGE PLANNING
Anticipated Discharge Disposition: To be discussed with patient.     Action: Chart review completed, assessment attempted, but patient is sleeping. PT/OT notes recommending SNF.     Barriers to Discharge: Not medically clear, clarify d/c goals.     Plan: Care coordinator to talk with patient regarding d/c goals.

## 2021-04-23 NOTE — CARE PLAN
Problem: Communication  Goal: The ability to communicate needs accurately and effectively will improve  Outcome: PROGRESSING AS EXPECTED     Problem: Safety  Goal: Will remain free from injury  Outcome: PROGRESSING AS EXPECTED  Bed alarm on, patient educated on use of call light.      Problem: Pain Management  Goal: Pain level will decrease to patient's comfort goal  Outcome: PROGRESSING AS EXPECTED   Pain medication ordered for headache.

## 2021-04-23 NOTE — PROGRESS NOTES
Cardiology Follow Up Progress Note    Date of Service  4/23/2021    Attending Physician  Vlad Webb M.D.    PMH: PAF on Eliquis, anterior STEMI status post PCI to LAD 2016, type 2 diabetes, hypertension    Presented with syncope while walking to the bathroom, reports recent hyperglycemia ( reports no insurance coverage for insulin), dehydration    Recent echocardiogram suggestive of low-flow low gradient severe aortic stenosis with normal EF    Interim Events    No overnight cardiac events  Telemetry: Sinus  Hypertensive overnight and this morning, will adjust BP meds  Labs reviewed  CR, 1.67  A1c 12  NA, K stable   Encouraged out of bed, discussed with RN  Likely stable for discharge this afternoon when blood pressure stabilizes      Review of Systems  Review of Systems   Respiratory: Negative for apnea, cough, choking, chest tightness, shortness of breath, wheezing and stridor.        Vital signs in last 24 hours  Temp:  [36.7 °C (98 °F)-36.9 °C (98.5 °F)] 36.7 °C (98 °F)  Pulse:  [36-90] 86  Resp:  [15-19] 16  BP: (124-227)/() 132/87  SpO2:  [94 %-98 %] 95 %    Physical Exam  Physical Exam  Cardiovascular:      Rate and Rhythm: Normal rate and regular rhythm.   Pulmonary:      Effort: Pulmonary effort is normal.   Abdominal:      General: Abdomen is flat.   Skin:     General: Skin is warm.   Neurological:      General: No focal deficit present.      Mental Status: She is alert.   Psychiatric:         Mood and Affect: Mood normal.         Lab Review  Lab Results   Component Value Date/Time    WBC 10.2 04/23/2021 06:11 AM    RBC 5.33 04/23/2021 06:11 AM    HEMOGLOBIN 15.7 04/23/2021 06:11 AM    HEMATOCRIT 44.8 04/23/2021 06:11 AM    MCV 84.1 04/23/2021 06:11 AM    MCH 29.5 04/23/2021 06:11 AM    MCHC 35.0 04/23/2021 06:11 AM    MPV 10.6 04/23/2021 06:11 AM      Lab Results   Component Value Date/Time    SODIUM 138 04/23/2021 06:11 AM    POTASSIUM 4.3 04/23/2021 06:11 AM    CHLORIDE 107 04/23/2021 06:11  AM    CO2 24 04/23/2021 06:11 AM    GLUCOSE 114 (H) 04/23/2021 06:11 AM    BUN 29 (H) 04/23/2021 06:11 AM    CREATININE 1.70 (H) 04/23/2021 06:11 AM      Lab Results   Component Value Date/Time    ASTSGOT 20 04/23/2021 06:11 AM    ALTSGPT 15 04/23/2021 06:11 AM     Lab Results   Component Value Date/Time    CHOLSTRLTOT 135 08/12/2020 10:09 AM    LDL 62 08/12/2020 10:09 AM    HDL 40 08/12/2020 10:09 AM    TRIGLYCERIDE 166 (H) 08/12/2020 10:09 AM    TROPONINT 36 (H) 04/23/2021 06:11 AM       No results for input(s): NTPROBNP in the last 72 hours.    Cardiac Imaging and Procedures Review  EKG: Sinus rhythm    Echocardiogram:   3/11/2021  Left ventricle is small in size.  Moderate concentric left ventricular hypertrophy.  Left ventricular ejection fraction is visually estimated to be 60%.  Moderate aortic stenosis. Vmax is 3.3  m/s.  Mild mitral stenosis.  Normal inferior vena cava size and inspiratory collapse.  Compared to the images of the prior study done -  there has been no   significant change.       Cardiac Catheterization: Not applicable    Imaging  Chest X-Ray: Not applicable    Stress Test: Not applicable    Assessment/Plan    Syncope  -Likely in the setting of dehydration, hyperglycemia, poor p.o. intake, polyuria  -No recurrence  -No overnight arrhythmia  -Discussed with RN, encouraged out of bed with RN watching      Recent echo suggestive of low-flow low gradient severe aortic stenosis with normal EF  -Recommend outpatient dobutamine stress echo to evaluate aortic stenosis, will arrange  -Close follow-up with cardiology office    PAF  -On apixaban  -Carvedilol  -EKG on admit, sinus rhythm    RICHARD on CKD  -Baseline creatinine 1.2-1.3  -1.67 this morning    History of STEMI 2016 with PCI to LAD  -No aspirin, being on apixaban  -Continue with carvedilol  -Consider adding statins    Thank you for allowing me to participate in the care of this patient.  Appointment with our TAVR team 5/6/2021 at 2:15  PM      Please contact me with any questions.    CARLOS Wang.   Cardiologist, [facility]  [phone]

## 2021-04-23 NOTE — PROGRESS NOTES
Pt is new admission from ED   Bed to the lowest position and pt is able to to turn side to side, pillow in place and call light in placed.   Bed alarm initiated and periwick in patient. Informed pt to call for help.  Monitor called and pt currently in sinus rhythm 80.

## 2021-04-23 NOTE — PROGRESS NOTES
2 RN Skin Check    2 RN skin check complete with DUANE Urban.   Devices in place: Tele box, PIV.  Skin assessed under devices: yes.  Confirmed pressure ulcers found on: N/A.  New potential pressure ulcers noted on left ear. Wound consult placed Yes.  The following interventions in place Pillows, Mepilex, Lotion, Barrier cream and mepilex for heels. Pure wick in place.     Generalized bruising.  Left ear red and slow to sean.   Nose red and slow to sean.   Right side of head scab from fall.  Right hand bruising from fall.  Left elbow bruising from fall.   Redness and excoriation under red breast.   Right shin dry scab.  Right foot inner scab.  Bilateral heels dry, calloused, and flaky.  Toe nails curled.   Sacrum pink and blanching.

## 2021-04-23 NOTE — ASSESSMENT & PLAN NOTE
Poorly controlled, A1c 12.1 on 4/22/21. Home regimen is metformin 500 mg BID, glargine 25 units qhs, and SSI regular insulin; patient reported that she had not been taking her insulin as prescribed PTA due to issues with insurance coverage.  Complicated by diabetic neuropathy, nephropathy, and retinopathy.   Home metformin held on admission due to RICHARD and lactic acidosis  Glargine 30 units qhs   SSI and Hypoglycemia precautions   Lisinopril 5 mg daily with parameters and as renal functions allows.

## 2021-04-23 NOTE — CONSULTS
Reason for Consult:  Asked by Dr Webb to see this patient with syncope  Patient's PCP: Fitz Turpin D.O.    CC:   Chief Complaint   Patient presents with   • Head Injury     Pt got out of bed took a few steps then got dizzy and fell hitting her head on dresser, contusion to R side of head no bleeding. Possible LOC. Pt is on blood thinners. . GCS 15, VSS on RA.    • GLF     R/T dizziness   • Weakness     Generalized X 3 days       HPI: 76-year-old female patient with history of diabetes mellitus admitted with syncope while walking to the bathroom.  She has been having hyperglycemia for few days now, dehydration, dizziness.  This morning she was up to the bathroom, her blood pressure was high, she felt nauseous, vomited during that time she had 4 seconds pause.  She is back to her bed now, feels better.  Denies chest pain, shortness of breath.    Medications / Drug list prior to admission:  No current facility-administered medications on file prior to encounter.     Current Outpatient Medications on File Prior to Encounter   Medication Sig Dispense Refill   • insulin glargine (LANTUS) 100 UNIT/ML Solution Inject 25 Units under the skin every evening.     • ELIQUIS 5 MG Tab TAKE 1 TABLET BY MOUTH TWICE A  tablet 1   • amLODIPine (NORVASC) 5 MG Tab Take 2 Tabs by mouth every day. Indications: High Blood Pressure Disorder (Patient not taking: Reported on 4/22/2021) 30 Tab 1   • metFORMIN ER (GLUCOPHAGE XR) 500 MG TABLET SR 24 HR Take 500 mg by mouth 2 times a day.     • insulin regular (NOVOLIN R) 100 Unit/mL Solution Inject 2-8 Units as instructed 4 times a day. PER SLIDING SCALE  150-200 = 2 UNITS  201-250 = 4 UNITS  251-300 = 6 UNITS  301-350 = 8 UNITS  >400 CALL MD     • sertraline (ZOLOFT) 50 MG Tab Take 50 mg by mouth every day. Indications: Major Depressive Disorder     • ferrous sulfate 325 (65 Fe) MG tablet Take 325 mg by mouth every 7 days. Indications: supplement     • vitamin D,  Ergocalciferol, (DRISDOL) 33098 units Cap capsule Take 50,000 Units by mouth every Sunday.     • trazodone (DESYREL) 50 MG Tab Take 50 mg by mouth every bedtime. Indications: Trouble Sleeping         Current list of administered Medications:    Current Facility-Administered Medications:   •  lidocaine (LIDODERM) 5 % 1 Patch, 1 Patch, Transdermal, Q24HR, Treva King M.D., Stopped at 04/23/21 0700  •  lactated ringers infusion, 1,000 mL, Intravenous, Continuous, Treva King M.D.  •  lactated ringers infusion, 1,000 mL, Intravenous, Continuous, Treva King M.D., Last Rate: 100 mL/hr at 04/23/21 1013, 1,000 mL at 04/23/21 1013  •  famotidine (PEPCID) tablet 20 mg, 20 mg, Oral, DAILY, Treva King M.D.  •  hyoscyamine-maalox plus-lidocaine viscous (GI COCKTAIL) oral susp 15 mL, 15 mL, Oral, TID PRN, Treva King M.D.  •  senna-docusate (PERICOLACE or SENOKOT S) 8.6-50 MG per tablet 2 tablet, 2 tablet, Oral, BID, Stopped at 04/23/21 0600 **AND** polyethylene glycol/lytes (MIRALAX) PACKET 1 Packet, 1 Packet, Oral, QDAY PRN **AND** magnesium hydroxide (MILK OF MAGNESIA) suspension 30 mL, 30 mL, Oral, QDAY PRN **AND** bisacodyl (DULCOLAX) suppository 10 mg, 10 mg, Rectal, QDAY PRN, Hina Taveras M.D.  •  acetaminophen (Tylenol) tablet 650 mg, 650 mg, Oral, Q6HRS PRN, Hina Taveras M.D., 650 mg at 04/22/21 1733  •  labetalol (NORMODYNE/TRANDATE) injection 10 mg, 10 mg, Intravenous, Q4HRS PRN, Hina Taveras M.D.  •  insulin glargine (Lantus) injection, 15 Units, Subcutaneous, Q EVENING, 15 Units at 04/22/21 1814 **AND** insulin lispro (HumaLOG) injection, 2-9 Units, Subcutaneous, 4X/DAY ACHS, 2 Units at 04/23/21 0839 **AND** POC blood glucose manual result, , , Q AC AND BEDTIME(S) **AND** NOTIFY MD and PharmD, , , Once **AND** glucose 4 g chewable tablet 16 g, 16 g, Oral, Q15 MIN PRN **AND** dextrose 50% (D50W) injection 50 mL, 50 mL, Intravenous, Q15 MIN PRN, Hina Taveras M.D.  •  apixaban  (ELIQUIS) tablet 5 mg, 5 mg, Oral, BID, Hina Taveras M.D., 5 mg at 04/23/21 0534  •  sertraline (Zoloft) tablet 50 mg, 50 mg, Oral, DAILY, Hina Taveras M.D., 50 mg at 04/23/21 0534  •  [START ON 4/25/2021] vitamin D (Ergocalciferol) (DRISDOL) capsule 50,000 Units, 50,000 Units, Oral, Q SUNDAY, Hina Taveras M.D.  •  carvedilol (COREG) tablet 6.25 mg, 6.25 mg, Oral, BID WITH MEALS, Hina Taveras M.D., 6.25 mg at 04/23/21 0839  •  oxyCODONE immediate-release (ROXICODONE) tablet 5 mg, 5 mg, Oral, Q6HRS PRN, Apple Lucero M.D., 5 mg at 04/22/21 2052    Past Medical History:   Diagnosis Date   • Anxiety    • Arthritis     fingers/toes   • Breath shortness    • CAD (coronary artery disease)    • Cataract     bilat   • Dental disorder     upper denture   • Depression    • Diabetes     insulin and oral meds   • Goiter    • Heart attack ([facility])     Dr. Tobar   • High cholesterol    • Hyperlipidemia    • Hypertension    • MI (myocardial infarction) ([facility]) 2016    x2 stints placed   • Oxygen dependent     2 liters @ HS   • Pericardial effusion    • Pneumonia 2019   • Snoring    • Stroke ([facility]) 10/2019    reports possible   • Thyroid nodule        Past Surgical History:   Procedure Laterality Date   • VITRECTOMY POSTERIOR N/A 11/24/2020    Procedure: VITRECTOMY, POSTERIOR PORTION-RIGHT ENDO LASER LEFT PAN RETINAL LASER;  Surgeon: Jean-Claude Franklin M.D.;  Location: SURGERY SAME DAY [facility];  Service: Ophthalmology   • LAPAROSCOPY  12/1/2014    Performed by Mian Navarro M.D. at SURGERY SAME DAY [facility] ORS   • BAN BY LAPAROSCOPY  9/14/2012    Performed by MIAN NAVARRO at SURGERY SAME DAY [facility] ORS   • OTHER ORTHOPEDIC SURGERY  7/11    knee   • GYN SURGERY  1978    fibroids   • OTHER ABDOMINAL SURGERY  1966    appendectomy   • GYN SURGERY      hysterectomy   • ZZZ CARDIAC CATH         History reviewed. No pertinent family history.    Social History     Socioeconomic History   • Marital  status:      Spouse name: Not on file   • Number of children: Not on file   • Years of education: Not on file   • Highest education level: Not on file   Occupational History   • Not on file   Tobacco Use   • Smoking status: Former Smoker     Packs/day: 1.00     Years: 40.00     Pack years: 40.00     Types: Cigarettes     Quit date: 1978     Years since quittin.3   • Smokeless tobacco: Never Used   Substance and Sexual Activity   • Alcohol use: No   • Drug use: No   • Sexual activity: Not on file   Other Topics Concern   • Not on file   Social History Narrative   • Not on file     Social Determinants of Health     Financial Resource Strain:    • Difficulty of Paying Living Expenses:    Food Insecurity:    • Worried About Running Out of Food in the Last Year:    • Ran Out of Food in the Last Year:    Transportation Needs:    • Lack of Transportation (Medical):    • Lack of Transportation (Non-Medical):    Physical Activity:    • Days of Exercise per Week:    • Minutes of Exercise per Session:    Stress:    • Feeling of Stress :    Social Connections:    • Frequency of Communication with Friends and Family:    • Frequency of Social Gatherings with Friends and Family:    • Attends Jew Services:    • Active Member of Clubs or Organizations:    • Attends Club or Organization Meetings:    • Marital Status:    Intimate Partner Violence:    • Fear of Current or Ex-Partner:    • Emotionally Abused:    • Physically Abused:    • Sexually Abused:        ALLERGIES:  Allergies   Allergen Reactions   • Pcn [Penicillins] Hives, Shortness of Breath and Swelling   • Sulfa Drugs Hives, Shortness of Breath and Swelling       Review of systems:  A detailed review of symptoms was reviewed with patient. This is reviewed in H&P and PMH. ALL OTHERS reviewed and negative    Physical exam:  Patient Vitals for the past 24 hrs:   BP Temp Temp src Pulse Resp SpO2 Height Weight   21 1005 137/70 -- -- 74 15 -- -- --  "  04/23/21 0959 -- -- -- -- -- -- -- 65.4 kg (144 lb 2.9 oz)   04/23/21 0930 (!) 163/78 -- -- 78 18 -- -- --   04/23/21 0925 (!) 216/99 -- -- 85 19 -- -- --   04/23/21 0922 (!) 227/123 -- -- (!) 36 15 -- -- --   04/23/21 0905 146/76 -- -- 90 18 -- -- --   04/23/21 0903 (!) 170/76 -- -- 73 18 -- -- --   04/23/21 0900 (!) 178/81 -- -- 74 17 -- -- --   04/23/21 0728 (!) 163/86 36.7 °C (98 °F) Temporal 75 16 96 % -- --   04/23/21 0357 136/68 36.9 °C (98.5 °F) Temporal 69 18 94 % -- --   04/23/21 0033 124/70 36.9 °C (98.5 °F) Temporal 71 18 96 % -- --   04/22/21 2005 157/88 36.9 °C (98.5 °F) Temporal 79 18 94 % -- --   04/22/21 1721 (!) 169/96 36.7 °C (98 °F) Temporal 88 16 98 % -- --   04/22/21 1601 152/87 -- -- 83 -- 98 % -- --   04/22/21 1531 146/89 -- -- 85 -- 98 % -- --   04/22/21 1501 158/85 -- -- 79 14 95 % -- --   04/22/21 1431 (!) 165/84 -- -- 80 16 96 % -- --   04/22/21 1401 149/72 -- -- 82 -- 98 % -- --   04/22/21 1331 106/64 -- -- 86 16 95 % -- --   04/22/21 1301 137/84 -- -- 89 -- 97 % -- --   04/22/21 1251 132/77 36.6 °C (97.8 °F) Temporal 87 18 95 % -- --   04/22/21 1234 -- -- -- -- -- -- 1.651 m (5' 5\") 70.3 kg (155 lb)     General: No acute distress.   EYES: no jaundice  HEENT: OP clear   Neck: No bruits No JVD.   CVS: RRR. S1 + S2.  3 x 6 systolic murmur aortic area  Resp: CTAB. No wheezing or crackles/rhonchi.  Abdomen: Soft, NT, ND,  Skin: Grossly nothing acute no obvious rashes  Neurological: Alert, Moves all extremities, no cranial nerve defects on limited exam  Extremities: Pulse 2+ in b/l LE.  no edema. No cyanosis.       Data:  Laboratory studies:  Recent Results (from the past 24 hour(s))   EKG    Collection Time: 04/22/21 12:44 PM   Result Value Ref Range    Report       [facility] Emergency Dept.    Test Date:  2021-04-22  Pt Name:    SHANNAN ALCANTARA                 Department: ER  MRN:        [id number]                      Room:       BL 18  Gender:     Female                   "     Technician: [id number]  :        [id number]                   Requested By:SAEID SANDERS  Order #:    [id number]                    Reading MD:    Measurements  Intervals                                Axis  Rate:       85                           P:          -43  KS:         116                          QRS:        -54  QRSD:       104                          T:          115  QT:         396  QTc:        471    Interpretive Statements  SINUS RHYTHM  BORDERLINE SHORT KS INTERVAL  LEFT ANTERIOR FASCICULAR BLOCK  LVH WITH SECONDARY REPOLARIZATION ABNORMALITY  Compared to ECG 2020 12:15:52  Early repolarization now present     CBC WITH DIFFERENTIAL    Collection Time: 21 12:51 PM   Result Value Ref Range    WBC 12.1 (H) 4.8 - 10.8 K/uL    RBC 5.21 4.20 - 5.40 M/uL    Hemoglobin 15.3 12.0 - 16.0 g/dL    Hematocrit 41.8 37.0 - 47.0 %    MCV 80.2 (L) 81.4 - 97.8 fL    MCH 29.4 27.0 - 33.0 pg    MCHC 36.6 (H) 33.6 - 35.0 g/dL    RDW 39.0 35.9 - 50.0 fL    Platelet Count 277 164 - 446 K/uL    MPV 9.9 9.0 - 12.9 fL    Neutrophils-Polys 69.90 44.00 - 72.00 %    Lymphocytes 20.40 (L) 22.00 - 41.00 %    Monocytes 7.70 0.00 - 13.40 %    Eosinophils 1.20 0.00 - 6.90 %    Basophils 0.50 0.00 - 1.80 %    Immature Granulocytes 0.30 0.00 - 0.90 %    Nucleated RBC 0.00 /100 WBC    Neutrophils (Absolute) 8.46 (H) 2.00 - 7.15 K/uL    Lymphs (Absolute) 2.48 1.00 - 4.80 K/uL    Monos (Absolute) 0.94 (H) 0.00 - 0.85 K/uL    Eos (Absolute) 0.15 0.00 - 0.51 K/uL    Baso (Absolute) 0.06 0.00 - 0.12 K/uL    Immature Granulocytes (abs) 0.04 0.00 - 0.11 K/uL    NRBC (Absolute) 0.00 K/uL   CMP    Collection Time: 21 12:51 PM   Result Value Ref Range    Sodium 134 (L) 135 - 145 mmol/L    Potassium 3.3 (L) 3.6 - 5.5 mmol/L    Chloride 99 96 - 112 mmol/L    Co2 23 20 - 33 mmol/L    Anion Gap 12.0 7.0 - 16.0    Glucose 158 (H) 65 - 99 mg/dL    Bun 32 (H) 8 - 22 mg/dL    Creatinine 1.66 (H) 0.50 - 1.40 mg/dL    Calcium 9.1 8.5 -  10.5 mg/dL    AST(SGOT) 20 12 - 45 U/L    ALT(SGPT) 16 2 - 50 U/L    Alkaline Phosphatase 83 30 - 99 U/L    Total Bilirubin 0.6 0.1 - 1.5 mg/dL    Albumin 2.9 (L) 3.2 - 4.9 g/dL    Total Protein 6.6 6.0 - 8.2 g/dL    Globulin 3.7 (H) 1.9 - 3.5 g/dL    A-G Ratio 0.8 g/dL   LACTIC ACID    Collection Time: 04/22/21 12:51 PM   Result Value Ref Range    Lactic Acid 3.4 (H) 0.5 - 2.0 mmol/L   ESTIMATED GFR    Collection Time: 04/22/21 12:51 PM   Result Value Ref Range    GFR If  36 (A) >60 mL/min/1.73 m 2    GFR If Non African American 30 (A) >60 mL/min/1.73 m 2   SARS-CoV-2 PCR (24 hour In-House): Collect NP swab in [facility]    Collection Time: 04/22/21  1:55 PM    Specimen: Respirate   Result Value Ref Range    SARS-CoV-2 Source NP Swab     SARS-CoV-2 by PCR NotDetected    URINALYSIS,CULTURE IF INDICATED    Collection Time: 04/22/21  3:21 PM    Specimen: Urine   Result Value Ref Range    Color Yellow     Character Clear     Specific Gravity >=1.030 <1.035    Ph 5.5 5.0 - 8.0    Glucose 500 (A) Negative mg/dL    Ketones Negative Negative mg/dL    Protein >=300 (A) Negative mg/dL    Bilirubin Negative Negative    Urobilinogen, Urine 0.2 Negative    Nitrite Negative Negative    Leukocyte Esterase Negative Negative    Occult Blood Moderate (A) Negative    Micro Urine Req Microscopic    URINE MICROSCOPIC (W/UA)    Collection Time: 04/22/21  3:21 PM   Result Value Ref Range    WBC 2-5 /hpf    RBC 5-10 (A) /hpf    Bacteria Few (A) None /hpf    Epithelial Cells Few /hpf   POCT glucose device results    Collection Time: 04/22/21  5:37 PM   Result Value Ref Range    Glucose - Accu-Ck 171 (H) 65 - 99 mg/dL   LACTIC ACID    Collection Time: 04/22/21  6:09 PM   Result Value Ref Range    Lactic Acid 1.4 0.5 - 2.0 mmol/L   HEMOGLOBIN A1C    Collection Time: 04/22/21  6:09 PM   Result Value Ref Range    Glycohemoglobin 12.1 (H) 4.0 - 5.6 %    Est Avg Glucose 301 mg/dL   TSH WITH REFLEX TO FT4    Collection Time: 04/22/21   6:09 PM   Result Value Ref Range    TSH 0.640 0.380 - 5.330 uIU/mL   VITAMIN B12    Collection Time: 04/22/21  6:09 PM   Result Value Ref Range    Vitamin B12 -True Cobalamin 545 211 - 911 pg/mL   POCT glucose device results    Collection Time: 04/22/21  8:44 PM   Result Value Ref Range    Glucose - Accu-Ck 295 (H) 65 - 99 mg/dL   CBC with Differential    Collection Time: 04/23/21  6:11 AM   Result Value Ref Range    WBC 10.2 4.8 - 10.8 K/uL    RBC 5.33 4.20 - 5.40 M/uL    Hemoglobin 15.7 12.0 - 16.0 g/dL    Hematocrit 44.8 37.0 - 47.0 %    MCV 84.1 81.4 - 97.8 fL    MCH 29.5 27.0 - 33.0 pg    MCHC 35.0 33.6 - 35.0 g/dL    RDW 42.8 35.9 - 50.0 fL    Platelet Count 241 164 - 446 K/uL    MPV 10.6 9.0 - 12.9 fL    Neutrophils-Polys 54.20 44.00 - 72.00 %    Lymphocytes 34.70 22.00 - 41.00 %    Monocytes 8.00 0.00 - 13.40 %    Eosinophils 2.30 0.00 - 6.90 %    Basophils 0.50 0.00 - 1.80 %    Immature Granulocytes 0.30 0.00 - 0.90 %    Nucleated RBC 0.00 /100 WBC    Neutrophils (Absolute) 5.51 2.00 - 7.15 K/uL    Lymphs (Absolute) 3.53 1.00 - 4.80 K/uL    Monos (Absolute) 0.81 0.00 - 0.85 K/uL    Eos (Absolute) 0.23 0.00 - 0.51 K/uL    Baso (Absolute) 0.05 0.00 - 0.12 K/uL    Immature Granulocytes (abs) 0.03 0.00 - 0.11 K/uL    NRBC (Absolute) 0.00 K/uL   Comp Metabolic Panel (CMP)    Collection Time: 04/23/21  6:11 AM   Result Value Ref Range    Sodium 138 135 - 145 mmol/L    Potassium 4.3 3.6 - 5.5 mmol/L    Chloride 107 96 - 112 mmol/L    Co2 24 20 - 33 mmol/L    Anion Gap 7.0 7.0 - 16.0    Glucose 114 (H) 65 - 99 mg/dL    Bun 29 (H) 8 - 22 mg/dL    Creatinine 1.70 (H) 0.50 - 1.40 mg/dL    Calcium 8.7 8.5 - 10.5 mg/dL    AST(SGOT) 20 12 - 45 U/L    ALT(SGPT) 15 2 - 50 U/L    Alkaline Phosphatase 76 30 - 99 U/L    Total Bilirubin 0.5 0.1 - 1.5 mg/dL    Albumin 2.9 (L) 3.2 - 4.9 g/dL    Total Protein 6.4 6.0 - 8.2 g/dL    Globulin 3.5 1.9 - 3.5 g/dL    A-G Ratio 0.8 g/dL   Magnesium    Collection Time: 04/23/21  6:11 AM    Result Value Ref Range    Magnesium 2.0 1.5 - 2.5 mg/dL   ESTIMATED GFR    Collection Time: 04/23/21  6:11 AM   Result Value Ref Range    GFR If African American 35 (A) >60 mL/min/1.73 m 2    GFR If Non  29 (A) >60 mL/min/1.73 m 2   PERIPHERAL SMEAR REVIEW    Collection Time: 04/23/21  6:11 AM   Result Value Ref Range    Peripheral Smear Review see below    PLATELET ESTIMATE    Collection Time: 04/23/21  6:11 AM   Result Value Ref Range    Plt Estimation Normal    DIFFERENTIAL COMMENT    Collection Time: 04/23/21  6:11 AM   Result Value Ref Range    Comments-Diff see below    TROPONIN    Collection Time: 04/23/21  6:11 AM   Result Value Ref Range    Troponin T 36 (H) 6 - 19 ng/L   POCT glucose device results    Collection Time: 04/23/21  7:51 AM   Result Value Ref Range    Glucose - Accu-Ck 187 (H) 65 - 99 mg/dL       Imaging:  CT-HEAD W/O   Final Result      1.  Generalized atrophy and chronic microvascular ischemic type changes.   2.  No acute intracranial abnormality.        Echo 3/11/21  Prior echo 07/19/19.  Left ventricle is small in size.  Moderate concentric left ventricular hypertrophy.  Left ventricular ejection fraction is visually estimated to be 60%.  Moderate aortic stenosis. Vmax is 3.3  m/s.  Mild mitral stenosis.  Normal inferior vena cava size and inspiratory collapse.  Compared to the images of the prior study done -  there has been no   significant change.       EKG : personally reviewed by me  Sinus rhythm, VANDANA    All pertinent features of laboratory and imaging reviewed including primary images where applicable    Assessment / Plan:  76-year-old female patient with history of paroxysmal atrial fibrillation, coronary artery disease, history of anterior STEMI s/p PCI of LAD in 2016, diabetes mellitus, hypertension presents with dehydration, hyperglycemia, syncope.  Recent echocardiogram in March 21 reviewed, aortic valve appears severely narrowed visually.  Velocity across the  valve was 3.3 m/s however dimensionless index, stroke-volume index are low suggestive of possible paradoxical low flow low gradient severe aortic stenosis with normal EF.  Recommend optimizing her diabetes medications, blood sugars.  Adequate hydration, PT OT evaluation, fall precautions.  I would recommend outpatient dobutamine stress echocardiogram to evaluate her aortic stenosis, based on that we will further proceed.      It is my pleasure to participate in the care of Ms. Torrez.  Please do not hesitate to contact me with questions or concerns.    Tejas Ruiz M.D.    4/23/2021

## 2021-04-23 NOTE — ASSESSMENT & PLAN NOTE
History of STEMI in 2016 s.p. PCI to LAD, not on antiplatelets due to concurrent apixaban use for PAF, fall risk. Not on secondary CAD medical management.  Carvedilol held due to AV block   Lisinopril 5 mg daily, titrate up PRN  Amlodipine 5 mg daily   Close outpatient Cardiology follow up

## 2021-04-23 NOTE — H&P
"History & Physical Note    Date of Admission: 4/22/2021  Admission Status: Observation-Outpatient  [facility] Team: [facility]  Attending: Vlad Webb M.D.   Senior Resident: Dr. Manju Wilkerson  Intern: Dr. Hina Taveras  Contact Number: [phone]    Chief Complaint: syncope     History of Present Illness (HPI):   Amrita is a 76 y.o. female who presented 4/22/2021 with hx of CAD c/b STEMI s/p DOMINGA to LAD (2016, not on antiplatelets), hx of moderate aortic stenosis (LVEF 60%, Vmax 3.3, mean grad 28mmhg TTE  3/11/2021)DM2 (a1c 9/2020 11.6) c/b diabetic neuropathy, chronic vertigo d/t BPPV, hx of CVA (baseline subtle R. Facial droop/weakness), hx of macular degeneration (b/l R>L vision loss), and paroxysmal afib (chadsvasq 8, on eliquis), and hx of syncope secondary to hypovolemia and hyperglycemia, who presents w/ 1 week of increased thirst, polyuria, and hypersomnia, several days of reduced PO intake, and syncopal episode in the AM of 4/22/2021.    Over the last couple days, patient had been rationing her novolog mealtime insulin because her insurance was no longer covering it. As a result, she noted that at home her BGs have frequently been elevated. In the PM of 4/21/2021, her BG was \"too high for the monitor to read\". She took her 25u lantus and went to sleep. Today AM, her blood sugar was in the 380s, she took the appropriate about of novolog per her sliding scale and briefly felt better.     Then patient sat on the edge of bed (which she usually does as it prevents her vertigo symptoms which are chronic from causing imbalance and falls). She stood up and was walking to the bathroom, when she started feeling very lightheaded. She tried to make it back to the bed, but could not make it and fell to the ground and lost consciousness. Next thing she remembers is her daughter waking her up. She says she has a bump on the right side of her head that hurts. Pt says the above episode was different from her vertigo " symptoms, which she is very familiar with.     Pt denied fevers, chills, diaphoresis, SOB, chest pain, abdominal pain, or diarrhea. She endorsed polydipsia and polyuria, but says that due to hypersomnolence over past week, she has not had as much PO intake as she usually does.     Review of Systems:   Review of Systems   Constitutional: Positive for malaise/fatigue. Negative for chills, fever and weight loss.   HENT: Negative for congestion and tinnitus.    Eyes: Positive for blurred vision. Negative for photophobia.        Chronic vision loss   Respiratory: Positive for cough. Negative for sputum production and shortness of breath.         Chronic cough, dry   Cardiovascular: Negative for chest pain, orthopnea, claudication and leg swelling.   Gastrointestinal: Positive for constipation. Negative for abdominal pain, diarrhea, heartburn, nausea and vomiting.   Genitourinary: Positive for frequency. Negative for dysuria.   Musculoskeletal: Positive for falls and joint pain. Negative for myalgias.   Skin: Negative for rash.   Neurological: Positive for dizziness, tremors, loss of consciousness and headaches. Negative for speech change, focal weakness and seizures.        Tremor is chronic   Psychiatric/Behavioral: Negative for depression and suicidal ideas. The patient is nervous/anxious.          Past Medical History:   Past Medical History was reviewed with patient.   has a past medical history of Anxiety, Arthritis, Breath shortness, CAD (coronary artery disease), Cataract, Dental disorder, Depression, Diabetes, Goiter, Heart attack (HCC), High cholesterol, Hyperlipidemia, Hypertension, MI (myocardial infarction) ([facility]) (2016), Oxygen dependent, Pericardial effusion, Pneumonia (2019), Snoring, Stroke ([facility]) (10/2019), and Thyroid nodule. She also has no past medical history of Breast cancer ([facility]).    Past Surgical History: Past Surgical History was reviewed with patient.   has a past surgical history that includes  other orthopedic surgery (7/11); other abdominal surgery (1966); gyn surgery (1978); gyn surgery; aditya by laparoscopy (9/14/2012); laparoscopy (12/1/2014); zzz cardiac cath; and vitrectomy posterior (N/A, 11/24/2020).    Medications: Medications have been reviewed with patient.  Prior to Admission Medications   Prescriptions Last Dose Informant Patient Reported? Taking?   ELIQUIS 5 MG Tab 4/21/2021 at PM Patient No No   Sig: TAKE 1 TABLET BY MOUTH TWICE A DAY   amLODIPine (NORVASC) 5 MG Tab Not Taking at Unknown time Patient's Home Pharmacy No No   Sig: Take 2 Tabs by mouth every day. Indications: High Blood Pressure Disorder   Patient not taking: Reported on 4/22/2021   ferrous sulfate 325 (65 Fe) MG tablet unknown at unknown Patient's Home Pharmacy Yes No   Sig: Take 325 mg by mouth every 7 days. Indications: supplement   insulin glargine (LANTUS) 100 UNIT/ML Solution 4/21/2021 at PM Patient Yes Yes   Sig: Inject 25 Units under the skin every evening.   insulin regular (NOVOLIN R) 100 Unit/mL Solution 4/22/2021 at AM Patient Yes No   Sig: Inject 2-8 Units as instructed 4 times a day. PER SLIDING SCALE  150-200 = 2 UNITS  201-250 = 4 UNITS  251-300 = 6 UNITS  301-350 = 8 UNITS  >400 CALL MD   metFORMIN ER (GLUCOPHAGE XR) 500 MG TABLET SR 24 HR 4/21/2021 at PM Patient Yes No   Sig: Take 500 mg by mouth 2 times a day.   sertraline (ZOLOFT) 50 MG Tab unknown at unknown Patient's Home Pharmacy Yes No   Sig: Take 50 mg by mouth every day. Indications: Major Depressive Disorder   trazodone (DESYREL) 50 MG Tab unknown at unknown Patient's Home Pharmacy Yes No   Sig: Take 50 mg by mouth every bedtime. Indications: Trouble Sleeping   vitamin D, Ergocalciferol, (DRISDOL) 39854 units Cap capsule 4/18/2021 at PM Patient's Home Pharmacy Yes No   Sig: Take 50,000 Units by mouth every Sunday.      Facility-Administered Medications: None        Allergies: Allergies have been reviewed with patient.  Allergies   Allergen Reactions    • Pcn [Penicillins] Hives, Shortness of Breath and Swelling   • Sulfa Drugs Hives, Shortness of Breath and Swelling       Family History:   family history is not on file.     Social History:   Tobacco: quit 50 y ago; <10 PY hx   Alcohol: Denies   Recreational drugs (illegal and prescription):  Denies    Activity Level: Walks w/ FWW   Living situation:  Lives with daughter   Recent travel:  Denies  Primary Care Provider: reviewed Fitz Turpin D.O.    Physical Exam:   Vitals:  Temp:  [36.6 °C (97.8 °F)-36.7 °C (98 °F)] 36.7 °C (98 °F)  Pulse:  [79-89] 88  Resp:  [14-18] 16  BP: (106-169)/(64-96) 169/96  SpO2:  [95 %-98 %] 98 %    Physical Exam  Constitutional:       General: She is not in acute distress.     Appearance: Normal appearance. She is not diaphoretic.   HENT:      Head: Normocephalic.      Comments: Superficial swelling focal area on right parietal region, no skin break or laceration     Right Ear: External ear normal.      Left Ear: External ear normal.      Ears:      Comments: Abrasion left ear     Nose: Nose normal. No congestion.      Mouth/Throat:      Mouth: Mucous membranes are dry.      Pharynx: Oropharynx is clear. No oropharyngeal exudate.   Eyes:      General: No scleral icterus.     Extraocular Movements: Extraocular movements intact.      Pupils: Pupils are equal, round, and reactive to light.   Cardiovascular:      Rate and Rhythm: Normal rate and regular rhythm.      Pulses: Normal pulses.      Heart sounds: No murmur.      Comments: Holosystolic murmur RUSB  Pulmonary:      Effort: Pulmonary effort is normal. No respiratory distress.      Breath sounds: Normal breath sounds.   Abdominal:      General: Bowel sounds are normal. There is no distension.      Palpations: Abdomen is soft.      Tenderness: There is no abdominal tenderness.   Genitourinary:     Comments: purewick cath in place  Musculoskeletal:         General: Normal range of motion.      Cervical back: Normal range of  motion and neck supple. No rigidity.      Right lower leg: No edema.      Left lower leg: No edema.   Skin:     General: Skin is warm.      Capillary Refill: Capillary refill takes less than 2 seconds.      Comments: LE extremities cool; superficial abrasions noted right forearm and hand   Neurological:      General: No focal deficit present.      Mental Status: She is alert and oriented to person, place, and time. Mental status is at baseline.      Cranial Nerves: No cranial nerve deficit.      Sensory: No sensory deficit.      Deep Tendon Reflexes: Reflexes normal.      Comments: Subtle r. Facial droop   Psychiatric:         Mood and Affect: Mood normal.         Thought Content: Thought content normal.         Labs:   a1c 12.1  Lactic acid 3.4-->1.4   glucose, negative ketones, protein > 300  Na 134, glucose 158, BUN 32/Cr 1.66 (baseline 1.2-1.3)  K 3.3  WBC 12.1, Hgb 15.3,     EKG: Per my read, NSR 85, qtc 471, LAFB, Twave inv I, avL     Imaging:   CT head 4/22/2021  1.  Generalized atrophy and chronic microvascular ischemic type changes.   2.  No acute intracranial abnormality.        Previous Data Review: reviewed      Assessment and Plan:  Amrita is a 76 y.o. female who presented 4/22/2021 with hx of CAD c/b STEMI s/p DOMINGA to LAD (2016, not on antiplatelets), hx of moderate aortic stenosis (LVEF 60%, Vmax 3.3, mean grad 28mmhg TTE  3/11/2021), DM2 (a1c 9/2020 11.6) c/b diabetic neuropathy, chronic vertigo d/t BPPV, hx of CVA (baseline subtle R. Facial droop/weakness), hx of macular degeneration (b/l R>L vision loss), and paroxysmal afib (chadsvasq 8, on eliquis), and hx of syncope secondary to hypovolemia and hyperglycemia, who presents w/ 1 week of increased thirst, polyuria, and hypersomnia, several days of reduced PO intake, and syncopal episode in the AM of 4/22/2021. Concurrent findings include RICHARD, likely prerenal, lactic acidosis resolved w/ fluid resuscitation, significant glucosuria and  proteinuria. Notably in the background of stable moderate aortic stenosis, patient is preload dependent; acute decrease in cardiac output secondary to reduced preload could also have precipitated this syncopal episode. Less likely arrhythmogenic given clinical presentation described above. Uncontrolled hyperglycemia likely contributed to polyuria and intravascular volume depletion.       * Syncope and collapse  Assessment & Plan  -polyuria in setting of hyperglycemia, uncontrolled DM2  -hypersomnia, reduced PO intake  -clinically hypovolemic, w/ RICHARD, lactic acidosis, glucosuria on UA  -background of moderate, but stable aortic stenosis, preload dependent  -s/p 1L NS in ED; will replete additional 1L NS at 250cc/hr  -encourage PO intake, sodium restrict, carb consistent diet  -ordered orthostatic VS, will f/u  -f/u TSH, B12  -monitor on telemetry, low suspicion for arrhythmogenic etiology given clinical picture  -recent TTE 3/2021 w/ stable moderate aortic stenosis    Acute kidney injury superimposed on chronic kidney disease (HCC)  Assessment & Plan  -baseline Cr 1.2-1.3; on admission Cr 1.66 w/ BUN 32 elevated as well  -likely prerenal given concurrent lactic acidosis and clinical picture  -also consider intrinsic in setting of concurrent protinuria and glucosuria (diabetic nephropathy)  -f/u prot/cr ratio  -continue 1L more of NS at 250cc/hr and f/u AM renal panel  -ordered bladder scans, straight cath if PVR>400    Type 2 diabetes mellitus with hyperglycemia, with long-term current use of insulin (HCC)- (present on admission)  Assessment & Plan  -poorly controlled, a1c 12.1  -home reg includes 25u lantus, SSI novolog (though patient has been rationing the latter d/t insurance coverage issues)  -will start at reduced dose 15u lantus + SSI while inpatient  -significant diabetic nephropathy, protinuria, glucosuria; f/u prot/Cr   -background of RICHARD on CKD3  -given affordability above, consider NPH 70/30 as  alternative for glucose management  -consider adding ACEi/ARB after resolution of RICHARD for renal protection    Lactic acidosis  Assessment & Plan  -secondary to hypovolemia  -3.4-->1.1 after 1L NS bolus  -resolved    Nonrheumatic aortic valve stenosis- (present on admission)  Assessment & Plan  -recent TTE 3/2021-->LVEF 60%, mod aortic stenosis, mean gradient 28mmHg  -stable since prior TTE in 2019  -follows w/ cardiology as outpatient  -likely preload dependent, could contribute to syncopal episodes in background of hypovolemia  -fluid resuscitation as above  -started carvedilol 6.25mg BID, will uptitrate to maximally tolerated dose  -labetalol PRN for BP>180/110    Essential tremor- (present on admission)  Assessment & Plan  -had an anxiety-driven episode of diffuse tremors that resolved w/ reassurance  -noted to be a chronic issue per chart review    Paroxysmal atrial fibrillation (HCC)- (present on admission)  Assessment & Plan  -chadvasq 8  -CT head ruled out traumatic intracranial injury  -currently in NSR  -continue eliquis    Hypokalemia- (present on admission)  Assessment & Plan  -replete    CAD (coronary artery disease)- (present on admission)  Assessment & Plan  -hx of STEMI in 2016 w/ PCI to LAD  -not on antiplatelets d/t concurrent eliquis use  For pAF, fall risk  -not on secondary CAD medical management  -adding coreg 6.25mg BID-->titrate to maximal tolerated dose  -consider adding ACEi/ARB after resolution of RICHARD     JB BURT MD, PGY 1

## 2021-04-23 NOTE — THERAPY
Physical Therapy   Initial Evaluation     Patient Name: Amrita Costa Elder  Age:  76 y.o., Sex:  female  Medical Record #: [id number]  Today's Date: 4/23/2021     Precautions: Fall Risk    Assessment  Patient is 76 y.o. female s/p GLF, Patient reports that she fell this morning after standing and feeling dizzy while walking to the bathroom, she reports that she felt like she was going to pass out and then fell striking her head on the bedside table .  Additional factors influencing patient status / progress : today, pt needed Joseph for transfers, showing a tremor at rest that became more pronounced with ambulation, pt able to ambulate x 20 feet using FWW to toilet. Pt needed min A to sit on toilet, had a BM needed assist to complete clean up, Joseph to stand and then reported need to vomit. Emesis bag supplied and pt did vomit, then stopped talking for few seconds, quickly recovering enough to talk. Pt was assisted to w/c and back to bed with Joseph. Pt lives with daughter who works days. PT to follow to address problems noted below. .      Plan    Recommend Physical Therapy 3 times per week until therapy goals are met for the following treatments:  Bed Mobility, Gait Training, Neuro Re-Education / Balance, Stair Training and Therapeutic Activities    DC Equipment Recommendations: None  Discharge Recommendations: Recommend post-acute placement for additional physical therapy services prior to discharge home(likely to improve once more stable BP/tremors etc. )        Objective       04/23/21 0929   Vitals   Vitals Comments 227/123 when sitting on toilet, 216/99 once back to bed   Prior Living Situation   Prior Services None   Housing / Facility 1 Story House   Steps Into Home 2   Steps In Home 0   Rail Right Rail (Steps into Home)   Elevator No   Equipment Owned 4-Wheel Walker   Lives with - Patient's Self Care Capacity Adult Children  (daughter works days)   Prior Level of Functional Mobility   Bed Mobility Independent    Transfer Status Independent   Ambulation Independent   Distance Ambulation (Feet)   (community)   Assistive Devices Used 4-Wheel Walker   Stairs Independent   History of Falls   History of Falls Yes   Date of Last Fall   (2 falls in last 2 months)   Lower Body Muscle Tone   Lower Body Muscle Tone  X   Comments full body tremor more obvious when up to walk.    Gait Analysis   Gait Level Of Assist Minimal Assist   Assistive Device Front Wheel Walker   Distance (Feet) 40   # of Times Distance was Traveled 1   Deviation   (tremor, full body)   Bed Mobility    Supine to Sit   (up EOB with RN)   Sit to Supine Supervised   Scooting Supervised   Rolling Supervised   Functional Mobility   Sit to Stand Minimal Assist   Bed, Chair, Wheelchair Transfer Minimal Assist   Toilet Transfers Minimal Assist   Transfer Method Stand Pivot   Comments pt asked to use toilet, had a BM, able to wipe herself, did need assist to get completely clean. Once clean, pt needed min A to stand, then reports feeling like she will vomit, pt was assisted back onto toilet, emesis bag supplied, pt vomited, then leaned back and stopped talking, but quickly resumed talking. BP taken. Pt needed min A for stand-pivot to w/c and able to stand-pivot back to bed with Joseph    Short Term Goals    Short Term Goal # 1 Pt will perform bed mobility with mod indep in 6 visits.   Short Term Goal # 2 Pt will transfer with mod indep in 6 visits to improve functional indep.    Short Term Goal # 3 Pt will ambulate x 125 feet using FWW with supervision in 6 visits to improve functional indep.    Short Term Goal # 4 Pt will go up/down 2 stairs with supervision in 6 visits to access home.    Problem List    Problems Impaired Bed Mobility;Impaired Transfers;Impaired Ambulation;Functional Strength Deficit;Impaired Balance;Decreased Activity Tolerance   Anticipated Discharge Equipment and Recommendations   DC Equipment Recommendations None   Discharge Recommendations Recommend  post-acute placement for additional physical therapy services prior to discharge home  (likely to improve once more stable BP/tremors etc. )

## 2021-04-23 NOTE — ASSESSMENT & PLAN NOTE
TUMS, antiemetics and GI cocktail PRN.   Developed nausea and coffee ground emesis on 4/25/21 evening; H and H stable; continue PPI, continue to observe

## 2021-04-23 NOTE — THERAPY
Occupational Therapy   Initial Evaluation     Patient Name: Amrita Torrez  Age:  76 y.o., Sex:  female  Medical Record #: [id number]  Today's Date: 4/23/2021     Precautions  Precautions: Fall Risk    Assessment  Patient is 76 y.o. female with a diagnosis of Syncope and cllapse.  Pt currently limited by decreased functional mobility, activity tolerance, balance, which are affecting pt's ability to complete ADLs/IADLs at baseline. Pt would benefit from OT services in the acute care setting to maximize functional recovery.      Plan    Recommend Occupational Therapy 3 times per week until therapy goals are met for the following treatments:  Self Care/Activities of Daily Living and Therapeutic Activities.       Discharge Recommendations: (P) Recommend post-acute placement for additional occupational therapy services prior to discharge home        04/23/21 0947   Prior Living Situation   Prior Services None   Housing / Facility 1 Story House   Steps Into Home 2   Elevator No   Equipment Owned 4-Wheel Walker   Lives with - Patient's Self Care Capacity Adult Children  (DTR works days)   Prior Level of ADL Function   Self Feeding Independent   Grooming / Hygiene Independent   Bathing Independent   Dressing Independent   Toileting Independent   ADL Assessment   Grooming Supervision   Upper Body Dressing Supervision   Lower Body Dressing Moderate Assist   Toileting Moderate Assist   Functional Mobility   Sit to Stand Minimal Assist   Bed, Chair, Wheelchair Transfer Minimal Assist   Toilet Transfers Minimal Assist   Short Term Goals   Short Term Goal # 1 supervised with LB dressing   Short Term Goal # 2 supervised with ADL txfs

## 2021-04-23 NOTE — ASSESSMENT & PLAN NOTE
Likely multifactorial, including intravascular volume depletion secondary to polyuria from inadequate insulin use, aortic stenosis, arrhythmia.   Non-contrast head CT negative for acute intracranial process. TSH and vitamin B-12 WNL. Flat trop-T trend. Orthostatic vitals signs positive.   Noted on 4/26/21 to have episodes of 2nd degree AV block with 3-9 second pauses via tele monitoring.    Plan:  Cardiology following, appreciate recommendations   Close electrolyte monitoring and replacement   PT/OT

## 2021-04-23 NOTE — ASSESSMENT & PLAN NOTE
This is likely contributory to the patient's presentation of syncope along with other factors such as intravascular volume depletion 2/2 polyuria from unmanaged diabetes. High fall risk.  There could also be a component of autonomic dysfunction in this patient given her age and comorbidities.  Conservative management, e.g. slow position changes, adequate hydration, compression stockings whenever patient is out of bed and ambulating  Cardiology consulted, appreciate recommendations.   Fall precautions  PT and OT consulted, discharge to SNF recommended

## 2021-04-23 NOTE — DIETARY
"Nutrition services: Day 0 of admit.  Amrita Torrez is a 76 y.o. female with admitting DX of syncope and collapse.    Pt noted with wt loss on nutrition admit screen.  Pt appears thin however also sleeping covered in blankets.  RD attempted to wake pt for interview without success - will f/u as appropriate.  Below information obtained via the EMR.     Assessment:  Height: 165.1 cm (5' 5\")  Weight: 65.4 kg (144 lb 2.9 oz) - via bed scale.   Body mass index is 23.99 kg/m²., BMI classification: WNL.    Diet/Intake: Cardiac/Consistent CHO.  No PO intake documented @ this time.     Evaluation:   1. Pt noted with orthostatic hypotension, RICHARD superimposed on CKD, T2DM, GERD, paroxysmal a-fib, hypokalemia, and CAD.  2. Additional PMHx of HLD, MI, shortness of breath, and stroke.   3. +Malaise/fatgie, and constipation per progress notes.  4. Current clinical picture and MD progress notes reviewed.  5. Per chart review, pt weighed 66.2 kg October 2020 (6 months PTA), which is stable with current admit wt.  January 2021 office visit pt did weigh 68.9 kg - 5.1% wt loss over the past 3 months, which is not considered severe or significant however worth noting.  6. Pt noted with moderate fat loss to zygomatic arch per visual observation.  7. Labs: Glucose: 114, BUN: 29, Creat.: 1.70, Albumin: 2.9.  8. Meds: TUMS, Pepcid, Lantus, SSI, Zofran, Vitamin D, and Lactated ringers @ 1,000 mL.  9. LBM: PTA.     Malnutrition Risk: Pt noted with moderate fat loss to zygomatic arch however unable to meet malnutrition criteria per ASPEN Guidelines @ this time.     Recommendations/Plan:   1. Encourage intake of meals.  Continue to document % consumed under ADLs.    2. Continue to monitor weight.  3. Nutrition rep will continue to see patient for ongoing meal and snack preferences.     RD follows.             "

## 2021-04-23 NOTE — CARE PLAN
Problem: Nutritional:  Goal: Achieve adequate nutritional intake  Description: Patient will consume 50-75% or > of meals  Outcome: NOT MET

## 2021-04-23 NOTE — ASSESSMENT & PLAN NOTE
CKD likely secondary to diabetes.   Cr 1.4 at baseline  RICHARD likely secondary to pre-renal azotemia; question cardiorenal syndrome developing with poor cardiac output with moderate to severe aortic stenosis  4/28: Holding lisinopril.

## 2021-04-23 NOTE — PROGRESS NOTES
Patient was working with PT when she had a vagal response. She had a 4.6 second pause and started to vomit. BP was taken immediately and patient returned to bed. Patient was oriented X 4 after. She did say she felt like she couldn't talk during the event. Doctor notified.

## 2021-04-23 NOTE — ASSESSMENT & PLAN NOTE
Had an anxiety-driven episode of diffuse tremors that resolved with reassurance.  Chronic issue per chart review.

## 2021-04-24 ENCOUNTER — APPOINTMENT (OUTPATIENT)
Dept: RADIOLOGY | Facility: MEDICAL CENTER | Age: 76
DRG: 682 | End: 2021-04-24
Attending: STUDENT IN AN ORGANIZED HEALTH CARE EDUCATION/TRAINING PROGRAM
Payer: MEDICARE

## 2021-04-24 ENCOUNTER — PATIENT OUTREACH (OUTPATIENT)
Dept: HEALTH INFORMATION MANAGEMENT | Facility: OTHER | Age: 76
End: 2021-04-24

## 2021-04-24 PROBLEM — R53.81 DEBILITY: Chronic | Status: ACTIVE | Noted: 2021-04-24

## 2021-04-24 PROBLEM — R31.9 HEMATURIA: Status: ACTIVE | Noted: 2021-04-24

## 2021-04-24 LAB
ANION GAP SERPL CALC-SCNC: 7 MMOL/L (ref 7–16)
APPEARANCE UR: ABNORMAL
BACTERIA #/AREA URNS HPF: ABNORMAL /HPF
BILIRUB UR QL STRIP.AUTO: NEGATIVE
BUN SERPL-MCNC: 24 MG/DL (ref 8–22)
CALCIUM SERPL-MCNC: 8.9 MG/DL (ref 8.5–10.5)
CHLORIDE SERPL-SCNC: 108 MMOL/L (ref 96–112)
CO2 SERPL-SCNC: 23 MMOL/L (ref 20–33)
COLOR UR: ABNORMAL
CREAT SERPL-MCNC: 1.67 MG/DL (ref 0.5–1.4)
EPI CELLS #/AREA URNS HPF: ABNORMAL /HPF
GLUCOSE BLD-MCNC: 192 MG/DL (ref 65–99)
GLUCOSE BLD-MCNC: 224 MG/DL (ref 65–99)
GLUCOSE BLD-MCNC: 243 MG/DL (ref 65–99)
GLUCOSE BLD-MCNC: 280 MG/DL (ref 65–99)
GLUCOSE SERPL-MCNC: 103 MG/DL (ref 65–99)
GLUCOSE UR STRIP.AUTO-MCNC: 250 MG/DL
HCT VFR BLD AUTO: 35.6 % (ref 37–47)
HGB BLD-MCNC: 12.8 G/DL (ref 12–16)
HYALINE CASTS #/AREA URNS LPF: ABNORMAL /LPF
KETONES UR STRIP.AUTO-MCNC: NEGATIVE MG/DL
LEUKOCYTE ESTERASE UR QL STRIP.AUTO: NEGATIVE
MAGNESIUM SERPL-MCNC: 2 MG/DL (ref 1.5–2.5)
MICRO URNS: ABNORMAL
NITRITE UR QL STRIP.AUTO: POSITIVE
PH UR STRIP.AUTO: 5.5 [PH] (ref 5–8)
POTASSIUM SERPL-SCNC: 4.4 MMOL/L (ref 3.6–5.5)
PROT UR QL STRIP: >=300 MG/DL
RBC # URNS HPF: >150 /HPF
RBC UR QL AUTO: ABNORMAL
SODIUM SERPL-SCNC: 138 MMOL/L (ref 135–145)
SP GR UR STRIP.AUTO: 1.02
UROBILINOGEN UR STRIP.AUTO-MCNC: 0.2 MG/DL

## 2021-04-24 PROCEDURE — 87077 CULTURE AEROBIC IDENTIFY: CPT

## 2021-04-24 PROCEDURE — A9270 NON-COVERED ITEM OR SERVICE: HCPCS | Performed by: STUDENT IN AN ORGANIZED HEALTH CARE EDUCATION/TRAINING PROGRAM

## 2021-04-24 PROCEDURE — 700101 HCHG RX REV CODE 250: Performed by: STUDENT IN AN ORGANIZED HEALTH CARE EDUCATION/TRAINING PROGRAM

## 2021-04-24 PROCEDURE — 87086 URINE CULTURE/COLONY COUNT: CPT

## 2021-04-24 PROCEDURE — 700105 HCHG RX REV CODE 258: Performed by: STUDENT IN AN ORGANIZED HEALTH CARE EDUCATION/TRAINING PROGRAM

## 2021-04-24 PROCEDURE — 700102 HCHG RX REV CODE 250 W/ 637 OVERRIDE(OP): Performed by: STUDENT IN AN ORGANIZED HEALTH CARE EDUCATION/TRAINING PROGRAM

## 2021-04-24 PROCEDURE — 85014 HEMATOCRIT: CPT

## 2021-04-24 PROCEDURE — 82962 GLUCOSE BLOOD TEST: CPT | Mod: 91

## 2021-04-24 PROCEDURE — 81001 URINALYSIS AUTO W/SCOPE: CPT

## 2021-04-24 PROCEDURE — 36415 COLL VENOUS BLD VENIPUNCTURE: CPT

## 2021-04-24 PROCEDURE — 74176 CT ABD & PELVIS W/O CONTRAST: CPT | Mod: MG

## 2021-04-24 PROCEDURE — 83735 ASSAY OF MAGNESIUM: CPT

## 2021-04-24 PROCEDURE — 87186 SC STD MICRODIL/AGAR DIL: CPT

## 2021-04-24 PROCEDURE — 80048 BASIC METABOLIC PNL TOTAL CA: CPT

## 2021-04-24 PROCEDURE — 85018 HEMOGLOBIN: CPT

## 2021-04-24 PROCEDURE — 99232 SBSQ HOSP IP/OBS MODERATE 35: CPT | Mod: GC | Performed by: INTERNAL MEDICINE

## 2021-04-24 PROCEDURE — 99232 SBSQ HOSP IP/OBS MODERATE 35: CPT | Performed by: INTERNAL MEDICINE

## 2021-04-24 PROCEDURE — 770020 HCHG ROOM/CARE - TELE (206)

## 2021-04-24 RX ORDER — CARVEDILOL 12.5 MG/1
12.5 TABLET ORAL 2 TIMES DAILY WITH MEALS
Status: DISCONTINUED | OUTPATIENT
Start: 2021-04-24 | End: 2021-04-26

## 2021-04-24 RX ORDER — AMLODIPINE BESYLATE 5 MG/1
5 TABLET ORAL
Status: DISCONTINUED | OUTPATIENT
Start: 2021-04-24 | End: 2021-04-29 | Stop reason: HOSPADM

## 2021-04-24 RX ORDER — LISINOPRIL 5 MG/1
5 TABLET ORAL
Status: DISCONTINUED | OUTPATIENT
Start: 2021-04-24 | End: 2021-04-29 | Stop reason: HOSPADM

## 2021-04-24 RX ORDER — INSULIN GLARGINE 100 [IU]/ML
25 INJECTION, SOLUTION SUBCUTANEOUS EVERY EVENING
Status: DISCONTINUED | OUTPATIENT
Start: 2021-04-24 | End: 2021-04-25

## 2021-04-24 RX ORDER — DEXTROSE MONOHYDRATE 25 G/50ML
50 INJECTION, SOLUTION INTRAVENOUS
Status: DISCONTINUED | OUTPATIENT
Start: 2021-04-24 | End: 2021-04-25

## 2021-04-24 RX ORDER — HYDRALAZINE HYDROCHLORIDE 20 MG/ML
20 INJECTION INTRAMUSCULAR; INTRAVENOUS EVERY 6 HOURS PRN
Status: DISCONTINUED | OUTPATIENT
Start: 2021-04-24 | End: 2021-04-29 | Stop reason: HOSPADM

## 2021-04-24 RX ORDER — ONDANSETRON 4 MG/1
4 TABLET, ORALLY DISINTEGRATING ORAL EVERY 6 HOURS PRN
Status: DISCONTINUED | OUTPATIENT
Start: 2021-04-24 | End: 2021-04-29 | Stop reason: HOSPADM

## 2021-04-24 RX ADMIN — CALCIUM CARBONATE 500 MG: 500 TABLET, CHEWABLE ORAL at 07:30

## 2021-04-24 RX ADMIN — LISINOPRIL 5 MG: 5 TABLET ORAL at 10:13

## 2021-04-24 RX ADMIN — FAMOTIDINE 20 MG: 20 TABLET ORAL at 12:10

## 2021-04-24 RX ADMIN — LIDOCAINE 1 PATCH: 50 PATCH TOPICAL at 08:06

## 2021-04-24 RX ADMIN — ACETAMINOPHEN 650 MG: 325 TABLET ORAL at 20:35

## 2021-04-24 RX ADMIN — APIXABAN 5 MG: 5 TABLET, FILM COATED ORAL at 05:18

## 2021-04-24 RX ADMIN — CALCIUM CARBONATE 500 MG: 500 TABLET, CHEWABLE ORAL at 11:30

## 2021-04-24 RX ADMIN — INSULIN LISPRO 2 UNITS: 100 INJECTION, SOLUTION INTRAVENOUS; SUBCUTANEOUS at 08:30

## 2021-04-24 RX ADMIN — SERTRALINE HYDROCHLORIDE 50 MG: 50 TABLET ORAL at 05:18

## 2021-04-24 RX ADMIN — INSULIN LISPRO 3 UNITS: 100 INJECTION, SOLUTION INTRAVENOUS; SUBCUTANEOUS at 17:39

## 2021-04-24 RX ADMIN — INSULIN LISPRO 3 UNITS: 100 INJECTION, SOLUTION INTRAVENOUS; SUBCUTANEOUS at 13:02

## 2021-04-24 RX ADMIN — APIXABAN 5 MG: 5 TABLET, FILM COATED ORAL at 20:34

## 2021-04-24 RX ADMIN — AMLODIPINE BESYLATE 5 MG: 10 TABLET ORAL at 22:06

## 2021-04-24 RX ADMIN — SODIUM CHLORIDE, POTASSIUM CHLORIDE, SODIUM LACTATE AND CALCIUM CHLORIDE 1000 ML: 600; 310; 30; 20 INJECTION, SOLUTION INTRAVENOUS at 20:36

## 2021-04-24 RX ADMIN — CARVEDILOL 6.25 MG: 6.25 TABLET, FILM COATED ORAL at 08:06

## 2021-04-24 RX ADMIN — DOCUSATE SODIUM 50 MG AND SENNOSIDES 8.6 MG 2 TABLET: 8.6; 5 TABLET, FILM COATED ORAL at 05:18

## 2021-04-24 RX ADMIN — CARVEDILOL 12.5 MG: 12.5 TABLET, FILM COATED ORAL at 17:38

## 2021-04-24 RX ADMIN — LABETALOL HYDROCHLORIDE 10 MG: 5 INJECTION, SOLUTION INTRAVENOUS at 20:34

## 2021-04-24 RX ADMIN — CALCIUM CARBONATE 500 MG: 500 TABLET, CHEWABLE ORAL at 17:30

## 2021-04-24 RX ADMIN — INSULIN LISPRO 5 UNITS: 100 INJECTION, SOLUTION INTRAVENOUS; SUBCUTANEOUS at 20:50

## 2021-04-24 RX ADMIN — SODIUM CHLORIDE, POTASSIUM CHLORIDE, SODIUM LACTATE AND CALCIUM CHLORIDE 1000 ML: 600; 310; 30; 20 INJECTION, SOLUTION INTRAVENOUS at 08:05

## 2021-04-24 RX ADMIN — INSULIN GLARGINE 25 UNITS: 100 INJECTION, SOLUTION SUBCUTANEOUS at 17:41

## 2021-04-24 ASSESSMENT — ENCOUNTER SYMPTOMS
TREMORS: 1
NAUSEA: 0
FALLS: 1
MYALGIAS: 0
CHEST TIGHTNESS: 0
SPEECH CHANGE: 0
ABDOMINAL PAIN: 0
PHOTOPHOBIA: 0
FOCAL WEAKNESS: 0
APNEA: 0
COUGH: 1
CHILLS: 0
DIZZINESS: 1
DIARRHEA: 0
CONSTIPATION: 0
HEARTBURN: 0
WHEEZING: 0
NERVOUS/ANXIOUS: 0
WEIGHT LOSS: 0
HEADACHES: 1
CLAUDICATION: 0
BLOOD IN STOOL: 0
FLANK PAIN: 1
FEVER: 0
SEIZURES: 0
ORTHOPNEA: 0
STRIDOR: 0
DEPRESSION: 0
BLURRED VISION: 1
CHOKING: 0
SHORTNESS OF BREATH: 0
SPUTUM PRODUCTION: 0
COUGH: 0
VOMITING: 0

## 2021-04-24 ASSESSMENT — PAIN DESCRIPTION - PAIN TYPE: TYPE: ACUTE PAIN

## 2021-04-24 NOTE — PROGRESS NOTES
Daily Progress Note:     Date of Service: 4/24/2021  Primary Team: [facility] Team   Attending: Dr. Kelly MD  Senior Resident: Dr. Americo MD  Intern: Dr. Treva King M.D.  Contact:  [phone]    Chief Complaint:   Chief Complaint   Patient presents with   • Head Injury     Pt got out of bed took a few steps then got dizzy and fell hitting her head on dresser, contusion to R side of head no bleeding. Possible LOC. Pt is on blood thinners. . GCS 15, VSS on RA.    • GLF     R/T dizziness   • Weakness     Generalized X 3 days       Subjective:   NAEON. This morning, patient stated that she had a brief episode of pain in her right kidney area/right flank area and also was urinating maureen red blood.  Did not have any clots in the urine; patient did have a bowel movement the previous night which was soft but did not have any maureen blood and was not dark black.  She did not have any abdominal pain.  Denied having chest pain or shortness of breath.  She states she does have a remote history of kidney stones but does not remember the last time she got that.  No other complaints.    Consultants/Specialty:  None    Review of Systems:   Review of Systems   Constitutional: Positive for malaise/fatigue. Negative for chills, fever and weight loss.   HENT: Positive for hearing loss. Negative for congestion and tinnitus.    Eyes: Positive for blurred vision. Negative for photophobia.        Chronic vision loss   Respiratory: Positive for cough. Negative for sputum production and shortness of breath.         Chronic cough, dry   Cardiovascular: Negative for chest pain, orthopnea, claudication and leg swelling.   Gastrointestinal: Negative for abdominal pain, blood in stool, constipation, diarrhea, heartburn, melena, nausea and vomiting.   Genitourinary: Positive for flank pain, frequency and hematuria. Negative for dysuria.        1 brief episode of right flank pain, self resolved afterward   Musculoskeletal:  Positive for falls and joint pain. Negative for myalgias.   Skin: Negative for rash.   Neurological: Positive for dizziness, tremors and headaches. Negative for speech change, focal weakness and seizures.        Tremor is chronic   Psychiatric/Behavioral: Negative for depression and suicidal ideas. The patient is not nervous/anxious.      Objective Data:   Physical Exam:   Vitals:   Temp:  [36.7 °C (98 °F)-37.4 °C (99.3 °F)] 37.3 °C (99.1 °F)  Pulse:  [74-86] 79  Resp:  [16-18] 18  BP: (132-203)/() 176/74  SpO2:  [93 %-97 %] 94 %       Physical Exam  Vitals and nursing note reviewed.   Constitutional:       General: She is not in acute distress.     Appearance: Normal appearance. She is not diaphoretic.   HENT:      Head: Normocephalic.      Comments: Superficial swelling focal area on right parietal region, no skin break or laceration     Right Ear: External ear normal.      Left Ear: External ear normal.      Ears:      Comments: Abrasion left ear, decreased hearing on R compared to left (chronic)     Nose: Nose normal. No congestion.      Mouth/Throat:      Mouth: Mucous membranes are dry.      Pharynx: Oropharynx is clear. No oropharyngeal exudate.   Eyes:      General: No scleral icterus.     Extraocular Movements: Extraocular movements intact.      Pupils: Pupils are equal, round, and reactive to light.   Cardiovascular:      Rate and Rhythm: Normal rate and regular rhythm.      Pulses: Normal pulses.      Heart sounds: No murmur.      Comments: Holosystolic murmur RUSB  Pulmonary:      Effort: Pulmonary effort is normal. No respiratory distress.      Breath sounds: Normal breath sounds.   Abdominal:      General: Bowel sounds are normal. There is no distension.      Palpations: Abdomen is soft.      Tenderness: There is no abdominal tenderness.   Genitourinary:     Comments: Urine in cup at side of bed, with maroon color, maureen blood noted  Musculoskeletal:         General: Normal range of motion.       Cervical back: Normal range of motion and neck supple. No rigidity.      Right lower leg: No edema.      Left lower leg: No edema.      Comments: Ecchymoses present on posterior aspect of R hand   Skin:     General: Skin is warm and dry.      Capillary Refill: Capillary refill takes less than 2 seconds.      Comments: LE extremities cool; superficial abrasions noted right forearm and hand   Neurological:      Mental Status: She is alert and oriented to person, place, and time. Mental status is at baseline.      Cranial Nerves: No cranial nerve deficit.      Sensory: Sensory deficit present.      Deep Tendon Reflexes: Reflexes normal.      Comments: Subtle right Facial droop  +subtle horizontal nystagmus B/l  Decreased sensation in R-sided V2 distribution of cranial nerves     Psychiatric:         Mood and Affect: Mood normal.         Behavior: Behavior normal.         Thought Content: Thought content normal.         Judgment: Judgment normal.           Labs:   Recent Labs     04/22/21  1251 04/23/21  0611   WBC 12.1* 10.2   RBC 5.21 5.33   HEMOGLOBIN 15.3 15.7   HEMATOCRIT 41.8 44.8   MCV 80.2* 84.1   MCH 29.4 29.5   RDW 39.0 42.8   PLATELETCT 277 241   MPV 9.9 10.6   NEUTSPOLYS 69.90 54.20   LYMPHOCYTES 20.40* 34.70   MONOCYTES 7.70 8.00   EOSINOPHILS 1.20 2.30   BASOPHILS 0.50 0.50     Recent Labs     04/22/21  1251 04/23/21  0611 04/24/21  0102   SODIUM 134* 138 138   POTASSIUM 3.3* 4.3 4.4   CHLORIDE 99 107 108   CO2 23 24 23   GLUCOSE 158* 114* 103*   BUN 32* 29* 24*        Imaging:   CT-HEAD W/O   Final Result      1.  Generalized atrophy and chronic microvascular ischemic type changes.   2.  No acute intracranial abnormality.      CT-RENAL COLIC EVALUATION(A/P W/O)    (Results Pending)       Problem Representation:     77 y/o F with PMH significant for CAD (s/p STEMI 2016, with PCI to LAD, not on DAPT),  hx of moderate aortic stenosis (LVEF 60%, Vmax 3.3, mean grad 28mmhg TTE  3/11/2021), T2DM (A1c 12.1%  4/2021) c/b neuropathy, chronic vertigo d/t BPPV, hx of CVA (baseline subtle R. Facial droop/weakness), hx of macular degeneration (b/l R>L vision loss), and paroxysmal afib (chadsvasq 8, on eliquis), and hx of syncope secondary to hypovolemia and hyperglycemia, who presents w/ 1 week of increased thirst, polyuria, and hypersomnia, several days of reduced PO intake, and 1x syncopal episode.    * Syncope and collapse- (present on admission)  Assessment & Plan  This is likely due to intravascular depletion combined with aortic stenosis (preload dependent). Intravascular depletion is due to polyuria from inadequate use of insulin due to insurance issues. Patient was clinically hypovolemic, w/ RICHARD, lactic acidosis, glucosuria on UA. Was previously given IVF resuscitation. Moderate AS confirmed via TTE 3/2021. Also complicated by problem of orthostatic hypotension (see additional problem). Vit B12 and TSH wnl. Trop 4/23 of 36.    -encourage PO intake, sodium restrict, carb consistent diet  -IVF hydration  -Cards consult, greatly appreciate recs: No need for pacemaker at this time, will need to follow up outpatient for likely TAVR for AS    Hematuria  Assessment & Plan  Also see problem of RICHARD  On 4/24, had 1 episode of hematuria as well as Right flank pain which afterward self-resolved. No CVA tenderness b/l on physical exam. UA showed >150 RBCs, bloody, red color, as well as +nitrites, (-) leukocyte esterase. This is concerning in setting of RICHARD with persistently elevated Crt from baseline of 1.2-1.3    -Follow up on UCx  -CT renal without contrast    Orthostatic hypotension- (present on admission)  Assessment & Plan  This is likely contributory to the patient's presentation of syncope along with other factors such as intravascular volume depletion 2/2 polyuria from unmanaged diabetes. High fall risk.    -Encourage patient to be careful when ambulating, or changing positions from supine to sitting to  standing/walking  -Cards consult, follow recs  -Fluid resuscitation  -Fall precautions  -Goal BP 140s/90s  -PT/OT    Acute kidney injury superimposed on chronic kidney disease (HCC)- (present on admission)  Assessment & Plan  STABLE  Baseline Cr 1.2-1.3; on admission Cr 1.66 w/ BUN 32 elevated as well. This is likely prerenal given concurrent lactic acidosis and clinical picture. Also complicated by diabetic nephropathy. Patient is urinating regularly now though is having hematuria    -f/u prot/cr ratio  -Also see problem of hematuria      Type 2 diabetes mellitus with hyperglycemia, with long-term current use of insulin (HCC)- (present on admission)  Assessment & Plan  Poorly controlled, a1c 12.1 in 4/2021. Home reg includes 25u lantus, SSI novolog (though patient has been rationing the latter d/t insurance coverage issues). Significant diabetic nephropathy, protinuria, glucosuria; f/u prot/Cr. With RICHARD on CKD3.    -4/23: I called in Novolog and Humalog to the patient's home pharmacy, [facility] on [address]. Novolog set was $12 (covered), Humalog was not covered. Thus, I will discharge the patient on Novolog.  -ISS + hypoglycemia protocol + accuchecks  -Glargine 25U qPM (patient's home dose)  -Lisinopril started 4/24; will titrate up pending Crt labs    Debility- (present on admission)  Assessment & Plan  Patient lives w/ daughter, however, daughter states that the patient relies on the patient for help around the house and IADLs but daughter is not able to take care of her 24/7 due to daughter's job. PT/OT recommending snf; patient is weak upon ROS and physical exam, and has h/o falls.    -snf referral in place    Lactic acidosis- (present on admission)  Assessment & Plan  RESOLVED  -secondary to hypovolemia  -3.4-->1.1 after 1L NS bolus    Nonrheumatic aortic valve stenosis- (present on admission)  Assessment & Plan  Recent TTE 3/2021-->LVEF 60%, mod aortic stenosis, mean gradient 28mmHg. This is stable since  prior TTE in 2019. Pt follows w/ cardiology as outpatient. Cardiology has been consulted inpatient, appreciate recs.    This is likely preload dependent, could contribute to syncopal episodes in background of hypovolemia    -Continue IVF  -Carvedilol 12.5 mg BID  -labetalol PRN for BP>180/110, PRN hydralazine if labetalol does not improve HTN  -Conservative treatment of BP given concurrent diagnosis of orthostatic hypoTN  -Per Dr. Larson, Cards consult: No need for pacemaker at this time, but patient will need TAVR for AS as well as outpatient dobutamine stress test    GERD (gastroesophageal reflux disease)- (present on admission)  Assessment & Plan  Famotidine scheduled  Scheduled Zofran  PRN GI cocktail + Tums      Essential tremor- (present on admission)  Assessment & Plan  -had an anxiety-driven episode of diffuse tremors that resolved w/ reassurance  -noted to be a chronic issue per chart review    Paroxysmal atrial fibrillation (HCC)- (present on admission)  Assessment & Plan  Chadvasc score 8, so stroke risk of 10.8% per year according to Urdu Afib Cohort study. CT head upon admission ruled out traumatic intracranial injury. Currently in NSR.    -continue eliquis  -monitor on tele    Hypokalemia- (present on admission)  Assessment & Plan  -replete, goal of K=4    CAD (coronary artery disease)- (present on admission)  Assessment & Plan  Hx of STEMI in 2016 w/ PCI to LAD, not on antiplatelets d/t concurrent eliquis use  For pAF, fall risk. Not on secondary CAD medical management.    -Carvedilol 12.5 mg BID  -Lisinopril 5 mg qD, will titrate up pending RICHARD resolves  -Change CAD medication regimen pending cards recs      Quality Measures:  Code: DNR/DNI  VTE: Apixaban  Diet: Cardiac, consistent carb  Disposition: Remain inpatient    Please note that this dictation was created using voice recognition software. I have worked with technical experts from [employer] to optimize the interface.  I have made  every reasonable attempt to correct obvious errors, but there may be errors of grammar and possibly content that I did not discover before finalizing the note.

## 2021-04-24 NOTE — ASSESSMENT & PLAN NOTE
Developed hematuria, LLQ pain and some suprapubic tenderness 4/24/21-4/25/21. Leukocytosis on CBC. 4/24/21 urine culture grew Staph aureus.      Antibiotic therapy changed on 4/26/21 to empiric C3 and Flagyl for aspiration PNA, this should cover  Apixaban held due to hematuria; consider Urology consult if hematuria worsens or recurs  Monitor leukocytosis  4/28: MSSA in the urine.  Completed course of antibiotics.

## 2021-04-24 NOTE — CARE PLAN
Problem: Discharge Barriers/Planning  Goal: Patient's continuum of care needs will be met  Outcome: PROGRESSING AS EXPECTED  Pt to work with PT and if cleared will go home with outpatient valve follow up.      Problem: Pain Management  Goal: Pain level will decrease to patient's comfort goal  Outcome: PROGRESSING AS EXPECTED  No complaints of pain per pt.      Problem: Urinary Elimination:  Goal: Ability to reestablish a normal urinary elimination pattern will improve  Outcome: PROGRESSING AS EXPECTED  Pt now successfully urinating on own, will continue post void bladder scans.

## 2021-04-24 NOTE — ASSESSMENT & PLAN NOTE
The patient lives with her daughter, daughter states that the patient relies on her for ADLs; daughter notes inability to provide support 24-7 due to her job.   PT and OT consulted, discharge to SNF recommended.   SNF referral placed.

## 2021-04-24 NOTE — CARE PLAN
Problem: Safety  Goal: Will remain free from falls  Outcome: PROGRESSING AS EXPECTED    Patient seems more steady today. Up to bathroom with walker twice today, no vagal episodes.         Problem: Urinary Elimination:  Goal: Ability to reestablish a normal urinary elimination pattern will improve  Outcome: PROGRESSING AS EXPECTED    Patient started with hematuria this morning. Physician notified. Will continue to monitor output and notify the physician with any new changes.

## 2021-04-24 NOTE — PROGRESS NOTES
Patient started having hematuria this am. Notified physician and sent a urine to the lab. CT is ordered for later this morning to rule out calculi.

## 2021-04-24 NOTE — DISCHARGE PLANNING
Hospital Care Management Discharge Planning       Anticipated Discharge Disposition:   · SNF     Action:   · This RN CM met with the patient at the bedside to discuss SNF choice   · Pt chose 1. [facility]. [facility]. [facility]  · Verbal consent received to send referrals  · Choice form faxed to Sanya at extension [phone]  · Fax receipt received      Barriers to Discharge:   · SNF acceptance and availability   · Medical clearance      Plan:   · F/U with DPA   · Hospital Care Management Team to continue to provide support services and assistance with discharge planning as needed.     PASRR completed: [id number]

## 2021-04-24 NOTE — CARE PLAN
Problem: Safety  Goal: Will remain free from falls  Outcome: PROGRESSING AS EXPECTED    Patient calls for assistance. Patient is very unsteady and needs assistance of one plus a walker. PT to see patient.      Problem: Communication  Goal: The ability to communicate needs accurately and effectively will improve  Outcome: PROGRESSING AS EXPECTED    Patient communicating needs appropriately.

## 2021-04-25 PROBLEM — N30.90 CYSTITIS: Status: ACTIVE | Noted: 2021-04-24

## 2021-04-25 LAB
ANION GAP SERPL CALC-SCNC: 6 MMOL/L (ref 7–16)
BASOPHILS # BLD AUTO: 0.3 % (ref 0–1.8)
BASOPHILS # BLD: 0.04 K/UL (ref 0–0.12)
BUN SERPL-MCNC: 22 MG/DL (ref 8–22)
CALCIUM SERPL-MCNC: 8.5 MG/DL (ref 8.5–10.5)
CHLORIDE SERPL-SCNC: 102 MMOL/L (ref 96–112)
CO2 SERPL-SCNC: 25 MMOL/L (ref 20–33)
CREAT SERPL-MCNC: 1.48 MG/DL (ref 0.5–1.4)
EOSINOPHIL # BLD AUTO: 0.17 K/UL (ref 0–0.51)
EOSINOPHIL NFR BLD: 1.3 % (ref 0–6.9)
ERYTHROCYTE [DISTWIDTH] IN BLOOD BY AUTOMATED COUNT: 41.9 FL (ref 35.9–50)
GLUCOSE BLD-MCNC: 116 MG/DL (ref 65–99)
GLUCOSE BLD-MCNC: 211 MG/DL (ref 65–99)
GLUCOSE BLD-MCNC: 226 MG/DL (ref 65–99)
GLUCOSE BLD-MCNC: 302 MG/DL (ref 65–99)
GLUCOSE SERPL-MCNC: 127 MG/DL (ref 65–99)
HCT VFR BLD AUTO: 37.9 % (ref 37–47)
HGB BLD-MCNC: 13.4 G/DL (ref 12–16)
IMM GRANULOCYTES # BLD AUTO: 0.04 K/UL (ref 0–0.11)
IMM GRANULOCYTES NFR BLD AUTO: 0.3 % (ref 0–0.9)
LYMPHOCYTES # BLD AUTO: 2.2 K/UL (ref 1–4.8)
LYMPHOCYTES NFR BLD: 17.4 % (ref 22–41)
MAGNESIUM SERPL-MCNC: 1.7 MG/DL (ref 1.5–2.5)
MCH RBC QN AUTO: 29.8 PG (ref 27–33)
MCHC RBC AUTO-ENTMCNC: 35.4 G/DL (ref 33.6–35)
MCV RBC AUTO: 84.2 FL (ref 81.4–97.8)
MONOCYTES # BLD AUTO: 1.14 K/UL (ref 0–0.85)
MONOCYTES NFR BLD AUTO: 9 % (ref 0–13.4)
NEUTROPHILS # BLD AUTO: 9.04 K/UL (ref 2–7.15)
NEUTROPHILS NFR BLD: 71.7 % (ref 44–72)
NRBC # BLD AUTO: 0 K/UL
NRBC BLD-RTO: 0 /100 WBC
PLATELET # BLD AUTO: 206 K/UL (ref 164–446)
PMV BLD AUTO: 10.7 FL (ref 9–12.9)
POTASSIUM SERPL-SCNC: 4.1 MMOL/L (ref 3.6–5.5)
RBC # BLD AUTO: 4.5 M/UL (ref 4.2–5.4)
SODIUM SERPL-SCNC: 133 MMOL/L (ref 135–145)
WBC # BLD AUTO: 12.6 K/UL (ref 4.8–10.8)

## 2021-04-25 PROCEDURE — 99232 SBSQ HOSP IP/OBS MODERATE 35: CPT | Mod: GC | Performed by: INTERNAL MEDICINE

## 2021-04-25 PROCEDURE — 307059 PAD,EAR PROTECTOR: Performed by: HOSPITALIST

## 2021-04-25 PROCEDURE — 700102 HCHG RX REV CODE 250 W/ 637 OVERRIDE(OP): Performed by: STUDENT IN AN ORGANIZED HEALTH CARE EDUCATION/TRAINING PROGRAM

## 2021-04-25 PROCEDURE — 770020 HCHG ROOM/CARE - TELE (206)

## 2021-04-25 PROCEDURE — 700111 HCHG RX REV CODE 636 W/ 250 OVERRIDE (IP): Performed by: STUDENT IN AN ORGANIZED HEALTH CARE EDUCATION/TRAINING PROGRAM

## 2021-04-25 PROCEDURE — A9270 NON-COVERED ITEM OR SERVICE: HCPCS | Performed by: STUDENT IN AN ORGANIZED HEALTH CARE EDUCATION/TRAINING PROGRAM

## 2021-04-25 PROCEDURE — 700105 HCHG RX REV CODE 258: Performed by: STUDENT IN AN ORGANIZED HEALTH CARE EDUCATION/TRAINING PROGRAM

## 2021-04-25 PROCEDURE — 82962 GLUCOSE BLOOD TEST: CPT | Mod: 91

## 2021-04-25 PROCEDURE — 85025 COMPLETE CBC W/AUTO DIFF WBC: CPT

## 2021-04-25 PROCEDURE — 80048 BASIC METABOLIC PNL TOTAL CA: CPT

## 2021-04-25 PROCEDURE — 83735 ASSAY OF MAGNESIUM: CPT

## 2021-04-25 RX ORDER — DEXTROSE MONOHYDRATE 25 G/50ML
50 INJECTION, SOLUTION INTRAVENOUS
Status: DISCONTINUED | OUTPATIENT
Start: 2021-04-25 | End: 2021-04-29 | Stop reason: HOSPADM

## 2021-04-25 RX ORDER — NITROFURANTOIN 25; 75 MG/1; MG/1
100 CAPSULE ORAL 2 TIMES DAILY WITH MEALS
Status: DISCONTINUED | OUTPATIENT
Start: 2021-04-25 | End: 2021-04-26

## 2021-04-25 RX ORDER — LIDOCAINE 50 MG/G
2 PATCH TOPICAL EVERY 24 HOURS
Status: DISCONTINUED | OUTPATIENT
Start: 2021-04-26 | End: 2021-04-26

## 2021-04-25 RX ORDER — SODIUM CHLORIDE 9 MG/ML
INJECTION, SOLUTION INTRAVENOUS CONTINUOUS
Status: DISCONTINUED | OUTPATIENT
Start: 2021-04-25 | End: 2021-04-25 | Stop reason: ALTCHOICE

## 2021-04-25 RX ORDER — MAGNESIUM SULFATE HEPTAHYDRATE 40 MG/ML
2 INJECTION, SOLUTION INTRAVENOUS 2 TIMES DAILY
Status: COMPLETED | OUTPATIENT
Start: 2021-04-25 | End: 2021-04-25

## 2021-04-25 RX ORDER — INSULIN GLARGINE 100 [IU]/ML
30 INJECTION, SOLUTION SUBCUTANEOUS EVERY EVENING
Status: DISCONTINUED | OUTPATIENT
Start: 2021-04-25 | End: 2021-04-29 | Stop reason: HOSPADM

## 2021-04-25 RX ADMIN — SODIUM CHLORIDE: 9 INJECTION, SOLUTION INTRAVENOUS at 17:21

## 2021-04-25 RX ADMIN — NITROFURANTOIN MONOHYDRATE/MACROCRYSTALLINE 100 MG: 25; 75 CAPSULE ORAL at 16:44

## 2021-04-25 RX ADMIN — DOCUSATE SODIUM 50 MG AND SENNOSIDES 8.6 MG 2 TABLET: 8.6; 5 TABLET, FILM COATED ORAL at 17:14

## 2021-04-25 RX ADMIN — SODIUM CHLORIDE: 9 INJECTION, SOLUTION INTRAVENOUS at 10:07

## 2021-04-25 RX ADMIN — CALCIUM CARBONATE 500 MG: 500 TABLET, CHEWABLE ORAL at 16:44

## 2021-04-25 RX ADMIN — CALCIUM CARBONATE 500 MG: 500 TABLET, CHEWABLE ORAL at 12:46

## 2021-04-25 RX ADMIN — NITROFURANTOIN MONOHYDRATE/MACROCRYSTALLINE 100 MG: 25; 75 CAPSULE ORAL at 12:46

## 2021-04-25 RX ADMIN — INSULIN LISPRO 6 UNITS: 100 INJECTION, SOLUTION INTRAVENOUS; SUBCUTANEOUS at 16:44

## 2021-04-25 RX ADMIN — MAGNESIUM SULFATE 2 G: 2 INJECTION INTRAVENOUS at 07:57

## 2021-04-25 RX ADMIN — CARVEDILOL 12.5 MG: 12.5 TABLET, FILM COATED ORAL at 07:57

## 2021-04-25 RX ADMIN — ERGOCALCIFEROL 50000 UNITS: 1.25 CAPSULE ORAL at 10:07

## 2021-04-25 RX ADMIN — FAMOTIDINE 20 MG: 20 TABLET ORAL at 12:46

## 2021-04-25 RX ADMIN — MAGNESIUM SULFATE 2 G: 2 INJECTION INTRAVENOUS at 17:15

## 2021-04-25 RX ADMIN — AMLODIPINE BESYLATE 5 MG: 10 TABLET ORAL at 17:14

## 2021-04-25 RX ADMIN — SODIUM CHLORIDE, POTASSIUM CHLORIDE, SODIUM LACTATE AND CALCIUM CHLORIDE 1000 ML: 600; 310; 30; 20 INJECTION, SOLUTION INTRAVENOUS at 07:59

## 2021-04-25 RX ADMIN — DOCUSATE SODIUM 50 MG AND SENNOSIDES 8.6 MG 2 TABLET: 8.6; 5 TABLET, FILM COATED ORAL at 05:01

## 2021-04-25 RX ADMIN — SERTRALINE HYDROCHLORIDE 50 MG: 50 TABLET ORAL at 05:01

## 2021-04-25 RX ADMIN — ACETAMINOPHEN 650 MG: 325 TABLET ORAL at 21:33

## 2021-04-25 RX ADMIN — INSULIN GLARGINE 30 UNITS: 100 INJECTION, SOLUTION SUBCUTANEOUS at 17:15

## 2021-04-25 RX ADMIN — INSULIN LISPRO 3 UNITS: 100 INJECTION, SOLUTION INTRAVENOUS; SUBCUTANEOUS at 21:29

## 2021-04-25 RX ADMIN — CARVEDILOL 12.5 MG: 12.5 TABLET, FILM COATED ORAL at 16:44

## 2021-04-25 RX ADMIN — CALCIUM CARBONATE 500 MG: 500 TABLET, CHEWABLE ORAL at 07:30

## 2021-04-25 RX ADMIN — INSULIN LISPRO 3 UNITS: 100 INJECTION, SOLUTION INTRAVENOUS; SUBCUTANEOUS at 12:47

## 2021-04-25 ASSESSMENT — ENCOUNTER SYMPTOMS
COUGH: 1
DEPRESSION: 0
SPEECH CHANGE: 0
MYALGIAS: 0
ABDOMINAL PAIN: 0
DIZZINESS: 1
BLURRED VISION: 1
HEARTBURN: 0
CHILLS: 0
ORTHOPNEA: 0
BLOOD IN STOOL: 0
PHOTOPHOBIA: 0
SHORTNESS OF BREATH: 0
FOCAL WEAKNESS: 0
NERVOUS/ANXIOUS: 0
FLANK PAIN: 1
CONSTIPATION: 0
NAUSEA: 0
FALLS: 0
WEIGHT LOSS: 0
SPUTUM PRODUCTION: 0
FEVER: 0
VOMITING: 0
HEADACHES: 0
SEIZURES: 0
DIARRHEA: 0
TREMORS: 1
CLAUDICATION: 0

## 2021-04-25 ASSESSMENT — PAIN DESCRIPTION - PAIN TYPE
TYPE: ACUTE PAIN
TYPE: ACUTE PAIN;CHRONIC PAIN
TYPE: ACUTE PAIN

## 2021-04-25 ASSESSMENT — FIBROSIS 4 INDEX: FIB4 SCORE: 1.96

## 2021-04-25 NOTE — PROGRESS NOTES
Daily Progress Note:     Date of Service: 4/25/2021  Primary Team: [facility] Team   Attending: Dr. Kelly MD  Senior Resident: Dr. Americo MD  Intern: Dr. Treva King M.D.  Contact:  [phone]    Chief Complaint:   Chief Complaint   Patient presents with   • Head Injury     Pt got out of bed took a few steps then got dizzy and fell hitting her head on dresser, contusion to R side of head no bleeding. Possible LOC. Pt is on blood thinners. . GCS 15, VSS on RA.    • GLF     R/T dizziness   • Weakness     Generalized X 3 days       Subjective:   NAEON. The patient slept well and had 2 BMs yesterday. This AM, she states she still has some pain in her R flank (received 1x Tylenol) but was improved from yesterday. This a.m., patient stated that she had pain and burning on urination as well as left lower quadrant pain.  Also stated that she has some chills but no fever. Per RN staff, urine is not as bloody as on previous day. No chest pain, no shortness of breath, no abdominal discomfort.    Note: the patient's hg dropped from 15 on 4/24 to 12 later on; repeat Hg this AM showed Hg of 13.    Consultants/Specialty:  None    Review of Systems:   Review of Systems   Constitutional: Positive for malaise/fatigue. Negative for chills, fever and weight loss.   HENT: Positive for hearing loss. Negative for congestion and tinnitus.    Eyes: Positive for blurred vision. Negative for photophobia.        Chronic vision loss   Respiratory: Positive for cough. Negative for sputum production and shortness of breath.         Chronic cough, dry   Cardiovascular: Negative for chest pain, orthopnea, claudication and leg swelling.   Gastrointestinal: Negative for abdominal pain, blood in stool, constipation, diarrhea, heartburn, melena, nausea and vomiting.   Genitourinary: Positive for dysuria, flank pain, frequency and hematuria.   Musculoskeletal: Positive for joint pain. Negative for falls and myalgias.   Skin: Negative  for rash.   Neurological: Positive for dizziness and tremors. Negative for speech change, focal weakness, seizures and headaches.        Tremor is chronic   Psychiatric/Behavioral: Negative for depression and suicidal ideas. The patient is not nervous/anxious.      Objective Data:   Physical Exam:   Vitals:   Temp:  [36.4 °C (97.5 °F)-37.9 °C (100.3 °F)] 36.9 °C (98.4 °F)  Pulse:  [67-75] 73  Resp:  [14-16] 16  BP: (148-197)/(74-91) 150/87  SpO2:  [94 %-96 %] 96 %       Physical Exam  Vitals and nursing note reviewed.   Constitutional:       General: She is not in acute distress.     Appearance: Normal appearance. She is not diaphoretic.   HENT:      Head: Normocephalic.      Comments: Superficial swelling focal area on right parietal region, no skin break or laceration     Right Ear: External ear normal.      Left Ear: External ear normal.      Ears:      Comments: Abrasion left ear, decreased hearing on R compared to left (chronic)     Nose: Nose normal. No congestion.      Mouth/Throat:      Mouth: Mucous membranes are dry.      Pharynx: Oropharynx is clear. No oropharyngeal exudate.   Eyes:      General: No scleral icterus.     Extraocular Movements: Extraocular movements intact.      Pupils: Pupils are equal, round, and reactive to light.   Cardiovascular:      Rate and Rhythm: Normal rate and regular rhythm.      Pulses: Normal pulses.      Heart sounds: No murmur.      Comments: Holosystolic murmur RUSB  Pulmonary:      Effort: Pulmonary effort is normal. No respiratory distress.      Breath sounds: Normal breath sounds.   Abdominal:      General: Bowel sounds are normal. There is no distension.      Palpations: Abdomen is soft.      Tenderness: There is abdominal tenderness. There is guarding. There is no right CVA tenderness, left CVA tenderness or rebound.      Comments: Tenderness and left lower quadrant abdominal light palpation.  Slight suprapubic tenderness.   Genitourinary:     Comments: Pure wick on.   Darkish maroonish urine in canister, with some reddish sediment on bottom from urine.  Musculoskeletal:         General: Normal range of motion.      Cervical back: Normal range of motion and neck supple. No rigidity.      Right lower leg: No edema.      Left lower leg: No edema.      Comments: Ecchymoses present on posterior aspect of R hand   Skin:     General: Skin is warm and dry.      Capillary Refill: Capillary refill takes less than 2 seconds.      Comments: LE extremities cool; superficial abrasions noted right forearm and hand   Neurological:      Mental Status: She is alert and oriented to person, place, and time. Mental status is at baseline.      Cranial Nerves: No cranial nerve deficit.      Sensory: Sensory deficit present.      Deep Tendon Reflexes: Reflexes normal.      Comments: Subtle right Facial droop  +subtle horizontal nystagmus B/l  Decreased sensation in R-sided V2 distribution of cranial nerves     Psychiatric:         Mood and Affect: Mood normal.         Behavior: Behavior normal.         Thought Content: Thought content normal.         Judgment: Judgment normal.           Labs:   Recent Labs     04/22/21  1251 04/22/21  1251 04/23/21  0611 04/24/21  1938 04/25/21  0006   WBC 12.1*  --  10.2  --  12.6*   RBC 5.21  --  5.33  --  4.50   HEMOGLOBIN 15.3   < > 15.7 12.8 13.4   HEMATOCRIT 41.8   < > 44.8 35.6* 37.9   MCV 80.2*  --  84.1  --  84.2   MCH 29.4  --  29.5  --  29.8   RDW 39.0  --  42.8  --  41.9   PLATELETCT 277  --  241  --  206   MPV 9.9  --  10.6  --  10.7   NEUTSPOLYS 69.90  --  54.20  --  71.70   LYMPHOCYTES 20.40*  --  34.70  --  17.40*   MONOCYTES 7.70  --  8.00  --  9.00   EOSINOPHILS 1.20  --  2.30  --  1.30   BASOPHILS 0.50  --  0.50  --  0.30    < > = values in this interval not displayed.     Recent Labs     04/23/21  0611 04/24/21  0102 04/25/21  0006   SODIUM 138 138 133*   POTASSIUM 4.3 4.4 4.1   CHLORIDE 107 108 102   CO2 24 23 25   GLUCOSE 114* 103* 127*   BUN 29*  24* 22        Imaging:   CT-RENAL COLIC EVALUATION(A/P W/O)   Final Result      No urolithiasis, hydronephrosis or acute inflammatory abnormalities detected      Above average stool volume      Mild urinary bladder wall thickening most likely is due to incomplete distention although direct visualization may prove helpful if hematuria does not resolve      Small bilateral effusions, left heart enlargement      CT-HEAD W/O   Final Result      1.  Generalized atrophy and chronic microvascular ischemic type changes.   2.  No acute intracranial abnormality.          Problem Representation:     75 y/o F with PMH significant for CAD (s/p STEMI 2016, with PCI to LAD, not on DAPT),  hx of moderate aortic stenosis (LVEF 60%, Vmax 3.3, mean grad 28mmhg TTE  3/11/2021), T2DM (A1c 12.1% 4/2021) c/b neuropathy, chronic vertigo d/t BPPV, hx of CVA (baseline subtle R. Facial droop/weakness), hx of macular degeneration (b/l R>L vision loss), and paroxysmal afib (chadsvasq 8, on eliquis), and hx of syncope secondary to hypovolemia and hyperglycemia, who presents w/ 1 week of increased thirst, polyuria, and hypersomnia, several days of reduced PO intake, and 1x syncopal episode.  Now with dysuria and hematuria for 2 days.    * Syncope and collapse- (present on admission)  Assessment & Plan  This is likely due to intravascular depletion combined with aortic stenosis (preload dependent). Intravascular depletion is due to polyuria from inadequate use of insulin due to insurance issues. Patient was clinically hypovolemic, w/ RICHARD, lactic acidosis, glucosuria on UA. Was previously given IVF resuscitation. Moderate AS confirmed via TTE 3/2021. Also complicated by problem of orthostatic hypotension (see additional problem). Vit B12 and TSH wnl. Trop 4/23 of 36.    This is a complicated picture as the patient has both orthostatic hypotension as well as hypertension.    -Compression stockings, wear at all times when ambulating and not in  bed  -encourage PO intake, sodium restrict, carb consistent diet  -Cards consult, greatly appreciate recs: No need for pacemaker at this time, will need to follow up outpatient for likely TAVR for AS  -If symptoms do not improve, consider Florinef/fludrocortisone    Cystitis  Assessment & Plan  Also see problem of RICHARD  Currently having hematuria and right flank pain.  On 4/25, physical exam revealed left lower quadrant pain upon palpitation as well as some suprapubic tenderness. No CVA tenderness b/l on physical exam. UA showed >150 RBCs, bloody, red color, as well as +nitrites, (-) leukocyte esterase.  CT renal without contrast showed some bladder wall thickening but did not show any renal stones.  Hematuria is likely from cystitis in the context of using Eliquis resulting in bloody urine. No CVA tenderness appreciated upon physical exam.  White blood count of 12.6 on 4/25. Hemoglobin dropped on 4/24->4/25 from 15->12->13, likely dilutional due to IVF    -Nitrofurantoin 100 mg twice daily for 5 days (last day 4/29)  -Follow up on UCx  -Monitor's white blood count  -Hold Eliquis till hematuria resolved.  Afterward, restart Eliquis; if hematuria recurs, then consider urology consult with cystoscopy possibly    Orthostatic hypotension- (present on admission)  Assessment & Plan  This is likely contributory to the patient's presentation of syncope along with other factors such as intravascular volume depletion 2/2 polyuria from unmanaged diabetes. High fall risk.  There could also be a component of autonomic dysfunction in this patient given her age and comorbidities.    -Encourage patient to be careful when ambulating, or changing positions from supine to sitting to standing/walking  -Cards consult, follow recs  -Compression stockings whenever patient is out of bed and ambulating  -Fluid resuscitation  -Fall precautions  -Goal BP 140s/90s  -PT/OT; d/c to snf  -If symptoms do not improve, consider  Florinef/fludrocortisone    Acute kidney injury superimposed on chronic kidney disease (HCC)- (present on admission)  Assessment & Plan  IMPROVING  Baseline Cr 1.4; on admission Cr 1.66 w/ BUN 32 elevated as well. This is likely prerenal given concurrent lactic acidosis and clinical picture. Also complicated by diabetic nephropathy.    -f/u prot/cr ratio      Type 2 diabetes mellitus with hyperglycemia, with long-term current use of insulin (HCC)- (present on admission)  Assessment & Plan  Poorly controlled, a1c 12.1 in 4/2021. Home reg includes 25u lantus, SSI novolog (though patient has been rationing the latter d/t insurance coverage issues). Significant diabetic nephropathy, protinuria, glucosuria; f/u prot/Cr. With RICHARD on CKD3.    -4/23: I called in Novolog and Humalog to the patient's home pharmacy[facility]. Novolog set was $12 (covered), Humalog was not covered. Thus, I will discharge the patient on Novolog.  -ISS + hypoglycemia protocol + accuchecks  -Glargine 30U qPM (increased from patient's home dose)  -Lisinopril 5 mg daily    Lactic acidosis- (present on admission)  Assessment & Plan  RESOLVED  -secondary to hypovolemia  -3.4-->1.1 after 1L NS bolus    Nonrheumatic aortic valve stenosis- (present on admission)  Assessment & Plan  Recent TTE 3/2021-->LVEF 60%, mod aortic stenosis, mean gradient 28mmHg. This is stable since prior TTE in 2019. Pt follows w/ cardiology as outpatient. Cardiology has been consulted inpatient, appreciate recs.    This is likely preload dependent, could contribute to syncopal episodes in background of hypovolemia    -Continue IVF  -Carvedilol 12.5 mg BID  -labetalol PRN for BP>180/110, PRN hydralazine if labetalol does not improve HTN  -Conservative treatment of BP given concurrent diagnosis of orthostatic hypoTN  -Per Dr. Larson, Cards consult: No need for pacemaker at this time, but patient will need TAVR for AS as well as outpatient dobutamine stress  test    Hypertension- (present on admission)  Assessment & Plan  Continue carvedilol, amlodipine, lisinopril    Debility- (present on admission)  Assessment & Plan  Patient lives w/ daughter, however, daughter states that the patient relies on the patient for help around the house and IADLs but daughter is not able to take care of her 24/7 due to daughter's job. PT/OT recommending snf; patient is weak upon ROS and physical exam, and has h/o falls.    -snf referral in place    GERD (gastroesophageal reflux disease)- (present on admission)  Assessment & Plan  Famotidine scheduled  Scheduled Zofran  PRN GI cocktail + Tums      Essential tremor- (present on admission)  Assessment & Plan  -had an anxiety-driven episode of diffuse tremors that resolved w/ reassurance  -noted to be a chronic issue per chart review    Paroxysmal atrial fibrillation (HCC)- (present on admission)  Assessment & Plan  Chadvasc score 8, so stroke risk of 10.8% per year according to [place] Afib Cohort study. CT head upon admission ruled out traumatic intracranial injury. Currently in NSR.  Was taking Eliquis at home.    -4/25: Hold Eliquis due to hematuria in the context of cystitis.  Restart Eliquis after hematuria resolves.  -monitor on tele    Hypokalemia- (present on admission)  Assessment & Plan  -replete, goal of K=4    CAD (coronary artery disease)- (present on admission)  Assessment & Plan  Hx of STEMI in 2016 w/ PCI to LAD, not on antiplatelets d/t concurrent eliquis use  For pAF, fall risk. Not on secondary CAD medical management.    -Carvedilol 12.5 mg BID  -Lisinopril 5 mg qD, will titrate up pending RICHARD resolves  -Change CAD medication regimen pending cards recs      Quality Measures:  Code: DNR/DNI  VTE: Apixaban (holding on 4/25 due to hematuria in context of cystitis), SCDs, Jayjay peacock  Diet: Cardiac, consistent carb  Disposition: Remain inpatient    Please note that this dictation was created using voice recognition software. I  have worked with technical experts from [facility] to optimize the interface.  I have made every reasonable attempt to correct obvious errors, but there may be errors of grammar and possibly content that I did not discover before finalizing the note.

## 2021-04-25 NOTE — ASSESSMENT & PLAN NOTE
Continue amlodipine and low-dose lisinopril.   Developed arrhythmia on 4/26/21 with 3-9 second pauses; carvedilol held.  Hydralazine PRN if SBP > 180 mmHg.

## 2021-04-25 NOTE — CARE PLAN
Problem: Safety  Goal: Will remain free from injury  Outcome: PROGRESSING AS EXPECTED     Problem: Respiratory:  Goal: Respiratory status will improve  Outcome: PROGRESSING AS EXPECTED     Problem: Urinary Elimination:  Goal: Ability to reestablish a normal urinary elimination pattern will improve  Outcome: PROGRESSING AS EXPECTED

## 2021-04-25 NOTE — DISCHARGE PLANNING
Received Choice form at 0013 on 4-24-21  Agency/Facility Name: (1) [facility] (2) [facility] (3) [facility]  Referral sent per Choice form @ 2601

## 2021-04-25 NOTE — CARE PLAN
Problem: Communication  Goal: The ability to communicate needs accurately and effectively will improve  Outcome: PROGRESSING AS EXPECTED     Problem: Safety  Goal: Will remain free from injury  Outcome: PROGRESSING AS EXPECTED  Bed alarm on and bed locked in low position. Patient educated to call for help prior to getting out of bed. Patient appropriate.      Problem: Discharge Barriers/Planning  Goal: Patient's continuum of care needs will be met  Outcome: PROGRESSING AS EXPECTED  Discharge planning in place. Waiting medical clearance.      Problem: Urinary Elimination:  Goal: Ability to reestablish a normal urinary elimination pattern will improve  Outcome: PROGRESSING AS EXPECTED   Patient now able to void on her own. However, she does have new hematuria.

## 2021-04-25 NOTE — NON-PROVIDER
"Internal Medicine Medical Student Note  Note Author: Maral Srivastava, Student    Name Amrita Torrez     1945   Age/Sex 76 y.o. female   MRN [id number]   Code Status DNAR/DNI     Subjective:  No acute overnight events. Hematuria since yesterday, improving. This morning patient has pain with urination (\"it burns when I pee\").    ROS:  Constitutional: Chills. Demoes fever, night sweats, malaise/fatigue, nausea, vomiting or unplanned weight loss  HEENT: Denies masses in throat. Denies changes in hearing. Denies blurred or double vision  Cardio: Denies chest pain, palpitations, or edema  Pulm: Denies dyspnea, cough, or wheezing  GI: Denies abd pain, diarrhea or constipation  : Dysuria and hematuria  Skin: Denies rash  MSK: Denies pain in neck, back, and extremities  Neuro: Denies dizziness or syncope  Psych: Reports good affect and no depression. Denies suicidal or homicidal ideations    Physical Exam     Vitals:    21 0000 21 0400 21 0750 21 0757   BP: 148/74 (!) 168/79 150/87 150/87   Pulse: 71 67 73 73   Resp: 14 14 16    Temp: 37.9 °C (100.3 °F) 36.4 °C (97.5 °F) 36.9 °C (98.4 °F)    TempSrc: Temporal Temporal Temporal    SpO2: 94% 94% 96%    Weight:       Height:         Body mass index is 23.99 kg/m².    Oxygen Therapy:  Pulse Oximetry: 96 %, O2 (LPM): 0, O2 Delivery Device: None - Room Air    Physical Exam  Gen: A&OX4, well appearing, NAD, lying comfortably in bed  HEENT: Normocephalic, PERRLA, EOMI, MMM, neck supple   Cardio: RRR. No rubs, murmurs or gallops. No JVD  Pulm: CTAB. No wheezes, rales, or rhonchi  Abd: BS present in all 4 quadrants, soft. Tender to palpation with guarding in LLQ, suprapubic tenderness  MSK: normal bulk and tone. Normal ROM in all 4 ext  Skin: no new rashes or lesions observed  Neuro: CN 2-12 grossly intact, no new focal deficits  Psych: Normal affect, responds and interacts appropriately    Labs/Imaging:  Recent/pertinent lab results & imaging " reviewed.  Lab Results   Component Value Date/Time    SODIUM 133 (L) 04/25/2021 12:06 AM    POTASSIUM 4.1 04/25/2021 12:06 AM    CHLORIDE 102 04/25/2021 12:06 AM    CO2 25 04/25/2021 12:06 AM    GLUCOSE 127 (H) 04/25/2021 12:06 AM    BUN 22 04/25/2021 12:06 AM    CREATININE 1.48 (H) 04/25/2021 12:06 AM      Lab Results   Component Value Date/Time    WBC 12.6 (H) 04/25/2021 12:06 AM    RBC 4.50 04/25/2021 12:06 AM    HEMOGLOBIN 13.4 04/25/2021 12:06 AM    HEMATOCRIT 37.9 04/25/2021 12:06 AM    MCV 84.2 04/25/2021 12:06 AM    MCH 29.8 04/25/2021 12:06 AM    MCHC 35.4 (H) 04/25/2021 12:06 AM    MPV 10.7 04/25/2021 12:06 AM    NEUTSPOLYS 71.70 04/25/2021 12:06 AM    LYMPHOCYTES 17.40 (L) 04/25/2021 12:06 AM    MONOCYTES 9.00 04/25/2021 12:06 AM    EOSINOPHILS 1.30 04/25/2021 12:06 AM    BASOPHILS 0.30 04/25/2021 12:06 AM    HYPOCHROMIA 1+ 09/12/2012 04:16 PM    ANISOCYTOSIS 1+ 08/22/2019 08:21 PM      Lab Results   Component Value Date/Time    PROTHROMBTM 14.4 09/14/2020 11:27 AM    INR 1.08 09/14/2020 11:27 AM        CT-RENAL COLIC EVALUATION(A/P W/O)   Final Result      No urolithiasis, hydronephrosis or acute inflammatory abnormalities detected      Above average stool volume      Mild urinary bladder wall thickening most likely is due to incomplete distention although direct visualization may prove helpful if hematuria does not resolve      Small bilateral effusions, left heart enlargement      CT-HEAD W/O   Final Result      1.  Generalized atrophy and chronic microvascular ischemic type changes.   2.  No acute intracranial abnormality.          Medications:  Current Facility-Administered Medications   Medication Dose   • [START ON 4/26/2021] lidocaine (LIDODERM) 5 % 2 Patch  2 Patch   • magnesium sulfate IVPB premix 2 g  2 g   • NS infusion     • insulin glargine (Lantus) injection  30 Units    And   • insulin lispro (HumaLOG) injection  2-9 Units    And   • glucose 4 g chewable tablet 16 g  16 g    And   •  dextrose 50% (D50W) injection 50 mL  50 mL   • nitrofurantoin (MACROBID) capsule 100 mg  100 mg   • ondansetron (ZOFRAN ODT) dispertab 4 mg  4 mg   • lisinopril (PRINIVIL) tablet 5 mg  5 mg   • carvedilol (COREG) tablet 12.5 mg  12.5 mg   • hydrALAZINE (APRESOLINE) injection 20 mg  20 mg   • amLODIPine (NORVASC) tablet 5 mg  5 mg   • famotidine (PEPCID) tablet 20 mg  20 mg   • hyoscyamine-maalox plus-lidocaine viscous (GI COCKTAIL) oral susp 15 mL  15 mL   • ondansetron (ZOFRAN) syringe/vial injection 4 mg  4 mg   • calcium carbonate (TUMS) chewable tab 500 mg  500 mg   • senna-docusate (PERICOLACE or SENOKOT S) 8.6-50 MG per tablet 2 tablet  2 tablet    And   • polyethylene glycol/lytes (MIRALAX) PACKET 1 Packet  1 Packet    And   • magnesium hydroxide (MILK OF MAGNESIA) suspension 30 mL  30 mL    And   • bisacodyl (DULCOLAX) suppository 10 mg  10 mg   • acetaminophen (Tylenol) tablet 650 mg  650 mg   • labetalol (NORMODYNE/TRANDATE) injection 10 mg  10 mg   • [Held by provider] apixaban (ELIQUIS) tablet 5 mg  5 mg   • sertraline (Zoloft) tablet 50 mg  50 mg   • vitamin D (Ergocalciferol) (DRISDOL) capsule 50,000 Units  50,000 Units   • oxyCODONE immediate-release (ROXICODONE) tablet 5 mg  5 mg       Assessment/Plan   Pt is a 76 y.o. female with syncope after inadequate insulin regiment and polyuria that likely caused intravascular depletion     #Syncope and collapse  -Compression stockings at all times when ambulating, before getting out of bed.    #Acute kidney injury superimposed on chronic kidney disease  -keep following protein to creatinine ratio    #Cystitis   -Macrobid 100 mg BID for 5 days  -Monitor CBC  -Hold Eliquis until hematuria resolves    #Hypertension  -Resume lisinopril    Dispo: Inpatient

## 2021-04-25 NOTE — WOUND TEAM
In to see patient for wound consult T803-1.  Bilateral ears assessed and noted to be intact.  Patient does wear oxygen at home.  Educated and ordered ear foams.  No advanced wound care needs.                
Stroke of uncertain type/Nontraumatic Subarachnoid Hemorrhage

## 2021-04-25 NOTE — PROGRESS NOTES
MD House with [facility] paged regarding patient's Hgb of 12.8. MD updated on patient's hematuria. Patient's hemaglobin is dropping, however, with patient's high blood pressures, this RN received orders to give evening dose of eliquis. This RN will review morning labs and update MD House prior to giving morning eliquis .

## 2021-04-26 ENCOUNTER — PATIENT MESSAGE (OUTPATIENT)
Dept: HEALTH INFORMATION MANAGEMENT | Facility: OTHER | Age: 76
End: 2021-04-26

## 2021-04-26 ENCOUNTER — APPOINTMENT (OUTPATIENT)
Dept: RADIOLOGY | Facility: MEDICAL CENTER | Age: 76
DRG: 682 | End: 2021-04-26
Attending: STUDENT IN AN ORGANIZED HEALTH CARE EDUCATION/TRAINING PROGRAM
Payer: MEDICARE

## 2021-04-26 PROBLEM — K92.2 UPPER GI BLEED: Status: ACTIVE | Noted: 2021-04-26

## 2021-04-26 PROBLEM — J96.01 ACUTE RESPIRATORY FAILURE WITH HYPOXIA (HCC): Status: ACTIVE | Noted: 2021-04-26

## 2021-04-26 LAB
ALBUMIN SERPL BCP-MCNC: 2.7 G/DL (ref 3.2–4.9)
ALBUMIN/GLOB SERPL: 0.7 G/DL
ALP SERPL-CCNC: 93 U/L (ref 30–99)
ALT SERPL-CCNC: 13 U/L (ref 2–50)
ANION GAP SERPL CALC-SCNC: 10 MMOL/L (ref 7–16)
AST SERPL-CCNC: 15 U/L (ref 12–45)
BACTERIA UR CULT: ABNORMAL
BACTERIA UR CULT: ABNORMAL
BILIRUB SERPL-MCNC: 0.8 MG/DL (ref 0.1–1.5)
BUN SERPL-MCNC: 25 MG/DL (ref 8–22)
CALCIUM SERPL-MCNC: 9 MG/DL (ref 8.5–10.5)
CHLORIDE SERPL-SCNC: 98 MMOL/L (ref 96–112)
CO2 SERPL-SCNC: 23 MMOL/L (ref 20–33)
CREAT SERPL-MCNC: 1.47 MG/DL (ref 0.5–1.4)
EKG IMPRESSION: NORMAL
ERYTHROCYTE [DISTWIDTH] IN BLOOD BY AUTOMATED COUNT: 42.4 FL (ref 35.9–50)
GLOBULIN SER CALC-MCNC: 3.8 G/DL (ref 1.9–3.5)
GLUCOSE BLD-MCNC: 163 MG/DL (ref 65–99)
GLUCOSE BLD-MCNC: 191 MG/DL (ref 65–99)
GLUCOSE BLD-MCNC: 245 MG/DL (ref 65–99)
GLUCOSE BLD-MCNC: 260 MG/DL (ref 65–99)
GLUCOSE BLD-MCNC: 320 MG/DL (ref 65–99)
GLUCOSE SERPL-MCNC: 158 MG/DL (ref 65–99)
HCT VFR BLD AUTO: 42.3 % (ref 37–47)
HGB BLD-MCNC: 14.9 G/DL (ref 12–16)
LACTATE BLD-SCNC: 1.7 MMOL/L (ref 0.5–2)
MAGNESIUM SERPL-MCNC: 2.1 MG/DL (ref 1.5–2.5)
MCH RBC QN AUTO: 29.7 PG (ref 27–33)
MCHC RBC AUTO-ENTMCNC: 35.2 G/DL (ref 33.6–35)
MCV RBC AUTO: 84.4 FL (ref 81.4–97.8)
PHOSPHATE SERPL-MCNC: 2.7 MG/DL (ref 2.5–4.5)
PLATELET # BLD AUTO: 227 K/UL (ref 164–446)
PMV BLD AUTO: 10.9 FL (ref 9–12.9)
POTASSIUM SERPL-SCNC: 3.7 MMOL/L (ref 3.6–5.5)
PROCALCITONIN SERPL-MCNC: 2.8 NG/ML
PROT SERPL-MCNC: 6.5 G/DL (ref 6–8.2)
RBC # BLD AUTO: 5.01 M/UL (ref 4.2–5.4)
SIGNIFICANT IND 70042: ABNORMAL
SITE SITE: ABNORMAL
SODIUM SERPL-SCNC: 131 MMOL/L (ref 135–145)
SOURCE SOURCE: ABNORMAL
TROPONIN T SERPL-MCNC: 39 NG/L (ref 6–19)
WBC # BLD AUTO: 17.6 K/UL (ref 4.8–10.8)

## 2021-04-26 PROCEDURE — 700111 HCHG RX REV CODE 636 W/ 250 OVERRIDE (IP): Performed by: EMERGENCY MEDICINE

## 2021-04-26 PROCEDURE — 71045 X-RAY EXAM CHEST 1 VIEW: CPT

## 2021-04-26 PROCEDURE — 99221 1ST HOSP IP/OBS SF/LOW 40: CPT | Performed by: HOSPITALIST

## 2021-04-26 PROCEDURE — 99291 CRITICAL CARE FIRST HOUR: CPT | Performed by: INTERNAL MEDICINE

## 2021-04-26 PROCEDURE — 700111 HCHG RX REV CODE 636 W/ 250 OVERRIDE (IP): Performed by: STUDENT IN AN ORGANIZED HEALTH CARE EDUCATION/TRAINING PROGRAM

## 2021-04-26 PROCEDURE — 700102 HCHG RX REV CODE 250 W/ 637 OVERRIDE(OP): Performed by: HOSPITALIST

## 2021-04-26 PROCEDURE — 93005 ELECTROCARDIOGRAM TRACING: CPT | Performed by: HOSPITALIST

## 2021-04-26 PROCEDURE — 770022 HCHG ROOM/CARE - ICU (200)

## 2021-04-26 PROCEDURE — 700101 HCHG RX REV CODE 250: Performed by: STUDENT IN AN ORGANIZED HEALTH CARE EDUCATION/TRAINING PROGRAM

## 2021-04-26 PROCEDURE — 700102 HCHG RX REV CODE 250 W/ 637 OVERRIDE(OP): Performed by: STUDENT IN AN ORGANIZED HEALTH CARE EDUCATION/TRAINING PROGRAM

## 2021-04-26 PROCEDURE — A9270 NON-COVERED ITEM OR SERVICE: HCPCS | Performed by: HOSPITALIST

## 2021-04-26 PROCEDURE — 82962 GLUCOSE BLOOD TEST: CPT

## 2021-04-26 PROCEDURE — 84145 PROCALCITONIN (PCT): CPT

## 2021-04-26 PROCEDURE — 84100 ASSAY OF PHOSPHORUS: CPT

## 2021-04-26 PROCEDURE — C9113 INJ PANTOPRAZOLE SODIUM, VIA: HCPCS | Performed by: STUDENT IN AN ORGANIZED HEALTH CARE EDUCATION/TRAINING PROGRAM

## 2021-04-26 PROCEDURE — A9270 NON-COVERED ITEM OR SERVICE: HCPCS | Performed by: STUDENT IN AN ORGANIZED HEALTH CARE EDUCATION/TRAINING PROGRAM

## 2021-04-26 PROCEDURE — 85027 COMPLETE CBC AUTOMATED: CPT

## 2021-04-26 PROCEDURE — 83605 ASSAY OF LACTIC ACID: CPT

## 2021-04-26 PROCEDURE — 93010 ELECTROCARDIOGRAM REPORT: CPT | Performed by: INTERNAL MEDICINE

## 2021-04-26 PROCEDURE — 84484 ASSAY OF TROPONIN QUANT: CPT

## 2021-04-26 PROCEDURE — 87040 BLOOD CULTURE FOR BACTERIA: CPT

## 2021-04-26 PROCEDURE — 80053 COMPREHEN METABOLIC PANEL: CPT

## 2021-04-26 PROCEDURE — 700105 HCHG RX REV CODE 258: Performed by: STUDENT IN AN ORGANIZED HEALTH CARE EDUCATION/TRAINING PROGRAM

## 2021-04-26 PROCEDURE — 83735 ASSAY OF MAGNESIUM: CPT

## 2021-04-26 RX ORDER — OMEPRAZOLE 20 MG/1
20 CAPSULE, DELAYED RELEASE ORAL 2 TIMES DAILY
Status: DISCONTINUED | OUTPATIENT
Start: 2021-04-26 | End: 2021-04-29 | Stop reason: HOSPADM

## 2021-04-26 RX ORDER — PROCHLORPERAZINE EDISYLATE 5 MG/ML
10 INJECTION INTRAMUSCULAR; INTRAVENOUS EVERY 6 HOURS PRN
Status: DISCONTINUED | OUTPATIENT
Start: 2021-04-26 | End: 2021-04-29 | Stop reason: HOSPADM

## 2021-04-26 RX ORDER — POTASSIUM CHLORIDE 7.45 MG/ML
10 INJECTION INTRAVENOUS
Status: COMPLETED | OUTPATIENT
Start: 2021-04-26 | End: 2021-04-26

## 2021-04-26 RX ORDER — MAGNESIUM SULFATE HEPTAHYDRATE 40 MG/ML
4 INJECTION, SOLUTION INTRAVENOUS ONCE
Status: DISCONTINUED | OUTPATIENT
Start: 2021-04-26 | End: 2021-04-26

## 2021-04-26 RX ORDER — PANTOPRAZOLE SODIUM 40 MG/10ML
40 INJECTION, POWDER, LYOPHILIZED, FOR SOLUTION INTRAVENOUS 2 TIMES DAILY
Status: DISCONTINUED | OUTPATIENT
Start: 2021-04-26 | End: 2021-04-26

## 2021-04-26 RX ORDER — PROCHLORPERAZINE EDISYLATE 5 MG/ML
INJECTION INTRAMUSCULAR; INTRAVENOUS
Status: ACTIVE
Start: 2021-04-26 | End: 2021-04-26

## 2021-04-26 RX ORDER — PROMETHAZINE HYDROCHLORIDE 25 MG/1
25 TABLET ORAL EVERY 6 HOURS PRN
Status: DISCONTINUED | OUTPATIENT
Start: 2021-04-26 | End: 2021-04-29 | Stop reason: HOSPADM

## 2021-04-26 RX ADMIN — PANTOPRAZOLE SODIUM 40 MG: 40 INJECTION, POWDER, LYOPHILIZED, FOR SOLUTION INTRAVENOUS at 02:29

## 2021-04-26 RX ADMIN — INSULIN LISPRO 6 UNITS: 100 INJECTION, SOLUTION INTRAVENOUS; SUBCUTANEOUS at 09:54

## 2021-04-26 RX ADMIN — INSULIN LISPRO 2 UNITS: 100 INJECTION, SOLUTION INTRAVENOUS; SUBCUTANEOUS at 17:53

## 2021-04-26 RX ADMIN — CALCIUM CARBONATE 500 MG: 500 TABLET, CHEWABLE ORAL at 17:46

## 2021-04-26 RX ADMIN — METRONIDAZOLE 500 MG: 500 INJECTION, SOLUTION INTRAVENOUS at 05:14

## 2021-04-26 RX ADMIN — ONDANSETRON 4 MG: 2 INJECTION INTRAMUSCULAR; INTRAVENOUS at 00:47

## 2021-04-26 RX ADMIN — ONDANSETRON 4 MG: 2 INJECTION INTRAMUSCULAR; INTRAVENOUS at 06:39

## 2021-04-26 RX ADMIN — CEFTRIAXONE SODIUM 2 G: 2 INJECTION, POWDER, FOR SOLUTION INTRAMUSCULAR; INTRAVENOUS at 05:14

## 2021-04-26 RX ADMIN — AMLODIPINE BESYLATE 5 MG: 10 TABLET ORAL at 17:46

## 2021-04-26 RX ADMIN — LISINOPRIL 5 MG: 5 TABLET ORAL at 05:14

## 2021-04-26 RX ADMIN — INSULIN LISPRO 5 UNITS: 100 INJECTION, SOLUTION INTRAVENOUS; SUBCUTANEOUS at 20:46

## 2021-04-26 RX ADMIN — POTASSIUM CHLORIDE 10 MEQ: 10 INJECTION, SOLUTION INTRAVENOUS at 15:10

## 2021-04-26 RX ADMIN — PROCHLORPERAZINE EDISYLATE 10 MG: 5 INJECTION INTRAMUSCULAR; INTRAVENOUS at 09:36

## 2021-04-26 RX ADMIN — INSULIN GLARGINE 30 UNITS: 100 INJECTION, SOLUTION SUBCUTANEOUS at 18:00

## 2021-04-26 RX ADMIN — POTASSIUM CHLORIDE 10 MEQ: 10 INJECTION, SOLUTION INTRAVENOUS at 13:14

## 2021-04-26 RX ADMIN — OMEPRAZOLE 20 MG: 20 CAPSULE, DELAYED RELEASE ORAL at 17:46

## 2021-04-26 RX ADMIN — HYDRALAZINE HYDROCHLORIDE 20 MG: 20 INJECTION INTRAMUSCULAR; INTRAVENOUS at 06:39

## 2021-04-26 RX ADMIN — INSULIN LISPRO 3 UNITS: 100 INJECTION, SOLUTION INTRAVENOUS; SUBCUTANEOUS at 13:20

## 2021-04-26 RX ADMIN — SERTRALINE HYDROCHLORIDE 50 MG: 50 TABLET ORAL at 05:14

## 2021-04-26 ASSESSMENT — PAIN DESCRIPTION - PAIN TYPE
TYPE: ACUTE PAIN

## 2021-04-26 ASSESSMENT — ENCOUNTER SYMPTOMS
SEIZURES: 0
FEVER: 0
VOMITING: 1
NAUSEA: 1
HEADACHES: 0
EYE PAIN: 0
PALPITATIONS: 0
PHOTOPHOBIA: 0
BLOOD IN STOOL: 0
CHILLS: 0
FLANK PAIN: 0
SHORTNESS OF BREATH: 0
COUGH: 0
SORE THROAT: 0
FALLS: 1
ABDOMINAL PAIN: 0

## 2021-04-26 ASSESSMENT — FIBROSIS 4 INDEX: FIB4 SCORE: 1.43

## 2021-04-26 NOTE — PROGRESS NOTES
Cross Cover Medicine Note    Page at 0100 about multiple pauses on telemetry.  Per bedside nurse, patient began feeling nauseated and vomited small volume dark brown emesis.  Each bout of emesis followed by pauses (up to ~8 seconds) with some beats of second degree type 2 heart block and bradycardia.  On exam, patient had BP 120s/80s, HR 80s, and was hypoxemic requiring 2 L supplemental O2.  She denied pain anywhere, history of upper GI bleed or history of liver disease.  A&Ox2, heart RRR with systolic murmur, lungs CTAB, no abdominal tenderness, skin warm, well perfused.  Stat EKG was sinus and relatively unchanged from prior (on my read).  CXR showed increased interstitial prominence consistent with pulm edema (on my read).  WBC 17.6, Hb 14.9.  CMP relatively unchanged from 4/25.  Stool guiac card of emesis was positive.  Transcutaneous pacer pads were placed and PRN atropine ordered.  Patient was made NPO, started on PPI BID, and PRN antiemetics for GI bleed.  Will need GI consult in the morning.      0200 Patient became increasingly hypoxemic requiring up to 10 L oxymask.  Ceftriaxone and metronidazole were started to cover aspiration.  On call intensivist Dr. Holder was consulted who recommended transfer to the ICU for higher level of care.  TEG is pending.    This was discussed with my senior resident, the nurse, and the patient at the bedside.

## 2021-04-26 NOTE — CONSULTS
Hospital Medicine Consultation    Date of Service  4/26/2021    Referring Physician  Asael Farley M.D.    Consulting Physician  Jaspreet Conte D.O.    Reason for Consultation  Transfer out of ICU to telemetry and frequent PVC's    History of Presenting Illness  76 y.o. female w/ hx of paroxysmal afib on chronic anticoagulation, macular degeneration, chronic vertigo CAD, prior 3/21 Echo with severe AS, DM, admitted 4/22/2021 with syncope while ambulating to the bathroom. Over the prior days she had reduced oral intake and increase thirst/polyuria. While walking she had lightheadedness and fell losing consciousness. She was admitted with acute renal insufficiency. Her 4/22 Head CT was negative for acute finding. The patient was felt to be intravascularly depleted in setting of aortic stenosis as etiology of her syncope/dizziness.    Today: 4/26: WBC:17.6, Hgb:17.6  Over night she had episodes of vomiting emesis positive for blood along with long sinus pausea and increase need increase oxygen and was transferred to the ICU. She was held of anticoagulation. Her coreg was stopped for possible cause of her sinus pauses. There was also a concern of aspiration pneumonia. She is awake but confused to [place]. She knew the year. On going nausea per RN.    Review of Systems  Review of Systems   Unable to perform ROS: Mental acuity       Past Medical History   has a past medical history of Anxiety, Arthritis, Breath shortness, CAD (coronary artery disease), Cataract, Dental disorder, Depression, Diabetes, Goiter, Heart attack (HCC), High cholesterol, Hyperlipidemia, Hypertension, MI (myocardial infarction) (Formerly [facility]) (2016), Oxygen dependent, Pericardial effusion, Pneumonia (2019), Snoring, Stroke ([facility]) (10/2019), and Thyroid nodule. She also has no past medical history of Breast cancer (Formerly [facility]).    Surgical History   has a past surgical history that includes other orthopedic surgery (7/11); other abdominal surgery (1966);  gyn surgery (1978); gyn surgery; aditya by laparoscopy (9/14/2012); laparoscopy (12/1/2014); zzz cardiac cath; and vitrectomy posterior (N/A, 11/24/2020).    Family History  family history is not on file.    Social History   reports that she quit smoking about 43 years ago. Her smoking use included cigarettes. She has a 40.00 pack-year smoking history. She has never used smokeless tobacco. She reports that she does not drink alcohol and does not use drugs.    Medications  Prior to Admission Medications   Prescriptions Last Dose Informant Patient Reported? Taking?   ELIQUIS 5 MG Tab 4/21/2021 at PM Patient No No   Sig: TAKE 1 TABLET BY MOUTH TWICE A DAY   amLODIPine (NORVASC) 5 MG Tab Not Taking at Unknown time Patient's Home Pharmacy No No   Sig: Take 2 Tabs by mouth every day. Indications: High Blood Pressure Disorder   Patient not taking: Reported on 4/22/2021   ferrous sulfate 325 (65 Fe) MG tablet unknown at unknown Patient's Home Pharmacy Yes No   Sig: Take 325 mg by mouth every 7 days. Indications: supplement   insulin glargine (LANTUS) 100 UNIT/ML Solution 4/21/2021 at PM Patient Yes Yes   Sig: Inject 25 Units under the skin every evening.   insulin regular (NOVOLIN R) 100 Unit/mL Solution 4/22/2021 at AM Patient Yes No   Sig: Inject 2-8 Units as instructed 4 times a day. PER SLIDING SCALE  150-200 = 2 UNITS  201-250 = 4 UNITS  251-300 = 6 UNITS  301-350 = 8 UNITS  >400 CALL MD   metFORMIN ER (GLUCOPHAGE XR) 500 MG TABLET SR 24 HR 4/21/2021 at PM Patient Yes No   Sig: Take 500 mg by mouth 2 times a day.   sertraline (ZOLOFT) 50 MG Tab unknown at unknown Patient's Home Pharmacy Yes No   Sig: Take 50 mg by mouth every day. Indications: Major Depressive Disorder   trazodone (DESYREL) 50 MG Tab unknown at unknown Patient's Home Pharmacy Yes No   Sig: Take 50 mg by mouth every bedtime. Indications: Trouble Sleeping   vitamin D, Ergocalciferol, (DRISDOL) 04944 units Cap capsule 4/18/2021 at PM Patient's Home  Pharmacy Yes No   Sig: Take 50,000 Units by mouth every Sunday.      Facility-Administered Medications: None       Allergies  Allergies   Allergen Reactions   • Pcn [Penicillins] Hives, Shortness of Breath and Swelling   • Sulfa Drugs Hives, Shortness of Breath and Swelling       Physical Exam  Temp:  [36.4 °C (97.5 °F)-37.6 °C (99.7 °F)] 36.4 °C (97.5 °F)  Pulse:  [72-94] 90  Resp:  [18-31] 26  BP: (126-179)/(67-89) 169/79  SpO2:  [89 %-97 %] 96 %    Physical Exam  Vitals reviewed.   Constitutional:       Appearance: Normal appearance. She is ill-appearing. She is not diaphoretic.   HENT:      Head: Normocephalic and atraumatic.      Nose: Nose normal.      Mouth/Throat:      Pharynx: No oropharyngeal exudate.   Eyes:      General: No scleral icterus.        Right eye: No discharge.         Left eye: No discharge.      Extraocular Movements: Extraocular movements intact.      Conjunctiva/sclera: Conjunctivae normal.   Cardiovascular:      Rate and Rhythm: Normal rate and regular rhythm.      Pulses:           Radial pulses are 2+ on the right side and 2+ on the left side.        Dorsalis pedis pulses are 2+ on the right side and 2+ on the left side.      Heart sounds: No murmur.   Pulmonary:      Effort: Pulmonary effort is normal. No respiratory distress.      Breath sounds: Normal breath sounds. No wheezing or rales.   Abdominal:      General: Bowel sounds are normal. There is no distension.      Palpations: Abdomen is soft.      Tenderness: There is no abdominal tenderness.   Musculoskeletal:         General: No swelling or tenderness.      Cervical back: No tenderness. No muscular tenderness.      Right lower leg: No edema.      Left lower leg: No edema.   Skin:     Coloration: Skin is not jaundiced or pale.   Neurological:      General: No focal deficit present.      Mental Status: She is alert and oriented to person, place, and time. Mental status is at baseline.      Cranial Nerves: No cranial nerve deficit.    Psychiatric:         Mood and Affect: Mood normal.         Behavior: Behavior normal.         Fluids  Date 04/26/21 0700 - 04/27/21 0659   Shift 6589-9163 1129-5040 5923-1462 24 Hour Total   INTAKE   I.V. 55.8   55.8   IV Piggyback 200   200   Shift Total 255.8   255.8   OUTPUT   Shift Total       Weight (kg) 66 66 66 66       Laboratory  Recent Labs     04/24/21  1938 04/25/21  0006 04/26/21  0101   WBC  --  12.6* 17.6*   RBC  --  4.50 5.01   HEMOGLOBIN 12.8 13.4 14.9   HEMATOCRIT 35.6* 37.9 42.3   MCV  --  84.2 84.4   MCH  --  29.8 29.7   MCHC  --  35.4* 35.2*   RDW  --  41.9 42.4   PLATELETCT  --  206 227   MPV  --  10.7 10.9     Recent Labs     04/24/21  0102 04/25/21  0006 04/26/21  0101   SODIUM 138 133* 131*   POTASSIUM 4.4 4.1 3.7   CHLORIDE 108 102 98   CO2 23 25 23   GLUCOSE 103* 127* 158*   BUN 24* 22 25*   CREATININE 1.67* 1.48* 1.47*   CALCIUM 8.9 8.5 9.0                     Imaging  DX-CHEST-LIMITED (1 VIEW)   Final Result         1.  Interstitial pulmonary parenchymal prominence suggest chronic underlying lung disease, component of interstitial edema and/or infiltrates not excluded.   2.  Atherosclerosis      DX-CHEST-LIMITED (1 VIEW)   Final Result         1.  Pulmonary edema and/or infiltrates are identified, which appear somewhat increased since the prior exam.   2.  Atherosclerosis      CT-RENAL COLIC EVALUATION(A/P W/O)   Final Result      No urolithiasis, hydronephrosis or acute inflammatory abnormalities detected      Above average stool volume      Mild urinary bladder wall thickening most likely is due to incomplete distention although direct visualization may prove helpful if hematuria does not resolve      Small bilateral effusions, left heart enlargement      CT-HEAD W/O   Final Result      1.  Generalized atrophy and chronic microvascular ischemic type changes.   2.  No acute intracranial abnormality.          Assessment/Plan  * Syncope and collapse- (present on admission)  Assessment &  Plan  Likely multifactorial, including intravascular volume depletion secondary to polyuria from inadequate insulin use, aortic stenosis, arrhythmia.   Non-contrast head CT negative for acute intracranial process. TSH and vitamin B-12 WNL. Flat trop-T trend. Orthostatic vitals signs positive.   Noted on 4/26/21 to have episodes of 2nd degree AV block with 3-9 second pauses via tele monitoring.    Plan:  Cardiology following, appreciate recommendations   Close electrolyte monitoring and replacement   PT/OT    Upper GI bleed  Assessment & Plan  Developed nausea and coffee ground emesis on 4/25/21 evening.   4/26 ongoing vomiting but only bile green emesis noted.  Monitor Hgb  Hold on GI consult for now.  PPI BID  Possible Carlota Young tear  Hemodynamically stable.  Holding home apixaban     Acute respiratory failure with hypoxia (HCC)  Assessment & Plan  Likely secondary to aspiration; possible component of pulmonary edema per chest XR findings.  No history of lung disease. Had multiple episodes of coffee ground emesis this evening and developed worsening hypoxia requiring 10 LPM O2. Current workup less concerning for ACS or PE.     Plan:  Transferred to the ICU for higher level of care   Empiric C3 and Flagyl for aspiration pneumonia  O2 and RT per protocol   Code status is DNR-DNI, clarify GOC with the patient (and possibly the patient's daughter) once clinically improved    Cystitis  Assessment & Plan  Developed hematuria, LLQ pain and some suprapubic tenderness 4/24/21-4/25/21. Leukocytosis on CBC. 4/24/21 urine culture grew Staph aureus. Started on nitrofurantoin on 4/25/21.     Plan:  Antibiotic therapy changed on 4/26/21 to empiric C3 and Flagyl for aspiration PNA, this should cover  Apixaban held due to hematuria; consider Urology consult if hematuria worsens or recurs  Monitor leukocytosis    Orthostatic hypotension- (present on admission)  Assessment & Plan  This is likely contributory to the patient's  presentation of syncope along with other factors such as intravascular volume depletion 2/2 polyuria from unmanaged diabetes. High fall risk.  There could also be a component of autonomic dysfunction in this patient given her age and comorbidities.  Conservative management, e.g. slow position changes, adequate hydration, compression stockings whenever patient is out of bed and ambulating  Cardiology consulted, appreciate recommendations.   Fall precautions  PT and OT consulted, discharge to SNF recommended    Acute kidney injury superimposed on chronic kidney disease (HCC)- (present on admission)  Assessment & Plan  CKD likely secondary to diabetes.   Cr 1.4 at baseline; Cr 1.66 on admission with elevated BUN 32.   RICHARD likely secondary to pre-renal azotemia; resolved with fluid resuscitation.     Plan:  Strict ins and outs   Monitor renal function  Avoid nephrotoxic drugs  Renally dose all medications    Lactic acidosis- (present on admission)  Assessment & Plan  Resolved with fluid resuscitation.     Type 2 diabetes mellitus with hyperglycemia, with long-term current use of insulin ([facility])- (present on admission)  Assessment & Plan  Poorly controlled, A1c 12.1 on 4/22/21. Home regimen is metformin 500 mg BID, glargine 25 units qhs, and SSI regular insulin; patient reported that she had not been taking her insulin as prescribed PTA due to issues with insurance coverage.  Complicated by diabetic neuropathy, nephropathy, and retinopathy.   Home metformin held on admission due to RICHARD and lactic acidosis  Glargine 30 units qhs   SSI and Hypoglycemia precautions   Lisinopril 5 mg daily with parameters and as renal functions allows.    Nonrheumatic aortic valve stenosis- (present on admission)  Assessment & Plan  3/11/21 TTE significant for low flow, lo gradient severe AS with normal EF.  Cardiology consulted, close outpatient follow up recommended.     Plan:  Fluid replacement PRN  Holding betablocker due to development of  AV block   Cautious BP control considering orthostatic hypotension   Per Cardiologist, Dr. Larson, no need for PPM at this time, but patient will need TAVR for AS as well as outpatient dobutamine stress echo     Hypertension- (present on admission)  Assessment & Plan  Continue amlodipine and low-dose lisinopril.   Developed arrhythmia on 4/26/21 with 3-9 second pauses; carvedilol held.  Hydralazine PRN if SBP > 180 mmHg.    Debility- (present on admission)  Assessment & Plan  The patient lives with her daughter, daughter states that the patient relies on her for IADLs; daughter notes inability to provide support 24-7 due to her job.   PT and OT consulted, discharge to SNF recommended.   SNF referral placed.    GERD (gastroesophageal reflux disease)- (present on admission)  Assessment & Plan  TUMS, antiemetics and GI cocktail PRN.   Developed nausea and coffee ground emesis on 4/25/21 evening; H and H stable; started on IV PPI.     Essential tremor- (present on admission)  Assessment & Plan  Had an anxiety-driven episode of diffuse tremors that resolved with reassurance.  Chronic issue per chart review.    Paroxysmal atrial fibrillation (HCC)- (present on admission)  Assessment & Plan  On apixaban outpatient.   Monitor telemetry     Hypokalemia- (present on admission)  Assessment & Plan  CTM, replete PRN for goal of K > 4.     CAD (coronary artery disease)- (present on admission)  Assessment & Plan  History of STEMI in 2016 s.p. PCI to LAD, not on antiplatelets due to concurrent apixaban use for PAF, fall risk. Not on secondary CAD medical management.    Plan:  Carvedilol held due to AV block   Lisinopril 5 mg daily, titrate up PRN  Amlodipine 5 mg daily   Close outpatient Cardiology follow up

## 2021-04-26 NOTE — DIETARY
Nutrition Services: Update   Day 3 of admit.  Amrita Torrez is a 76 y.o. female with admitting DX of syncope and collapse.  Pt previously on T8 however transferred to [facility] early this AM d/t hypoxia and also noted with n/v w/ possible aspiration.  Pt previously on a cardiac/consistent CHO diet where PO intake varied from 25-50%.  Pt now NPO x today.  RD attempted to speak with pt at bedside to follow-up regarding wt hx - pt did not arouse to name.  Emesis bag at bedside w/ contents.  Again, difficult to truly assess muscle and fat stores @ this time.  Current clinical picture and MD progress notes reviewed.    RD continues to follow-up as appropriate and will continue to monitor nutrition POC.

## 2021-04-26 NOTE — ASSESSMENT & PLAN NOTE
Likely secondary to aspiration; possible component of pulmonary edema per chest XR findings.  No history of lung disease. Had multiple episodes of coffee ground emesis this evening and developed worsening hypoxia requiring 10 LPM O2. Current workup less concerning for ACS or PE.   Improved with treatment of possible aspiration pneumonia, pulmonary edema, monitor

## 2021-04-26 NOTE — PROGRESS NOTES
"Critical Care Progress Note    Date of admission  4/22/2021    Chief Complaint  \"The patient was lethargic and unable to provide much meaningful information. Per chart review, she was admitted on 4/22/21 with a 1-week history of poor PO intake, increased thirst, polyuria, and hypersomnolence, as well as a syncopal episode upon getting out of bed on the morning of admission that resulted in a GLF. This was in the setting of poor glycemic control secondary to the patient rationing her siding-scale insulin due to insurance coverage issues. The patient's PMH is significant for CAD s.p. STEMI s.p. DOMINGA to LAD in 2016, moderate-severe AS with normal EF per 3/11/21 TTE, BPPV, history of CVA with baseline R facial weakness, paroxysmal atrial fibrillation on apixaban, and poorly controlled type 2 diabetes. The patient was admitted to tele for syncope in the setting of dehydration and hyperglycemia. Cardiology was consulted. The patient was noted to have positive orthostatic VS. Her volume status and blood glucose control improved. Cardiology recommended medical management and close outpatient Cardiology follow up for a dobutamine stress echo to further evaluate the patient's AS. On 4/25/21, the patient developed hematuria and suprapubic tenderness during the day; her home apixaban was held and the patient was started on nitrofurantoin for UTI. The patient subsequently developed hematemesis 4/25/21 evening and was noted on tele around this time to have episodes of 2nd degree AV block with pauses > 3 seconds. She remained hemodynamically stable. The on-call Cardiologist was made aware of the patient's condition. The patient was started on IV PPI and antiemetics. Her nausea and vomiting improved but she developed worsening hypoxia requiring 10 LPM O2. Critical Care was consulted and the patient was transferred to the ICU for close monitoring and higher level of care.\"    Hospital Course  4/26 Transferred to ICU    Review of " Systems  Review of Systems   Constitutional: Negative for chills.   HENT: Negative for sore throat.    Respiratory: Negative for cough.    Cardiovascular: Negative for chest pain.   Gastrointestinal: Positive for nausea and vomiting. Negative for abdominal pain, blood in stool and melena.   All other systems reviewed and are negative.       Vital Signs for last 24 hours   Temp:  [36.9 °C (98.4 °F)-37.6 °C (99.7 °F)] 37.6 °C (99.7 °F)  Pulse:  [72-94] 91  Resp:  [16-31] 31  BP: (126-172)/(71-89) 168/79  SpO2:  [89 %-96 %] 95 %    Hemodynamic parameters for last 24 hours       Respiratory Information for the last 24 hours       Physical Exam   Physical Exam  Vitals and nursing note reviewed.   Constitutional:       Appearance: She is ill-appearing.   HENT:      Head: Normocephalic and atraumatic.      Right Ear: External ear normal.      Left Ear: External ear normal.      Nose: Nose normal.      Mouth/Throat:      Mouth: Mucous membranes are moist.      Pharynx: Oropharynx is clear.   Eyes:      Extraocular Movements: Extraocular movements intact.      Pupils: Pupils are equal, round, and reactive to light.   Cardiovascular:      Rate and Rhythm: Regular rhythm. Bradycardia present.      Pulses: Normal pulses.      Heart sounds: Normal heart sounds.   Pulmonary:      Effort: Pulmonary effort is normal.      Breath sounds: Normal breath sounds.   Abdominal:      General: Bowel sounds are normal.      Palpations: Abdomen is soft.   Musculoskeletal:         General: Normal range of motion.      Cervical back: Normal range of motion and neck supple.      Right lower leg: No edema.      Left lower leg: No edema.   Skin:     General: Skin is warm and dry.      Capillary Refill: Capillary refill takes less than 2 seconds.   Neurological:      General: No focal deficit present.      Mental Status: She is alert and oriented to person, place, and time.         Medications  Current Facility-Administered Medications   Medication  Dose Route Frequency Provider Last Rate Last Admin   • prochlorperazine (COMPAZINE) injection 10 mg  10 mg Intravenous Q6HRS PRN Abdi Mandujano M.D.       • PROCHLORPERAZINE EDISYLATE 10 MG/2ML INJ SOLN            • pantoprazole (PROTONIX) injection 40 mg  40 mg Intravenous BID Ilana Hayward M.D.   40 mg at 04/26/21 0229   • metroNIDAZOLE (FLAGYL) IVPB 500 mg  500 mg Intravenous Q8HRS Ilana Hayward M.D.   Stopped at 04/26/21 0614   • cefTRIAXone (ROCEPHIN) 2 g in  mL IVPB  2 g Intravenous Q24HRS Ilana Hayward M.D.   Stopped at 04/26/21 0544   • lidocaine (LIDODERM) 5 % 2 Patch  2 Patch Transdermal Q24HR Treva King M.D.       • insulin glargine (Lantus) injection  30 Units Subcutaneous Q EVENING Treva King M.D.   30 Units at 04/25/21 1715    And   • insulin lispro (HumaLOG) injection  2-9 Units Subcutaneous 4X/DAY ACHS Treva King M.D.   3 Units at 04/25/21 2129    And   • glucose 4 g chewable tablet 16 g  16 g Oral Q15 MIN PRN Treva King M.D.        And   • dextrose 50% (D50W) injection 50 mL  50 mL Intravenous Q15 MIN PRN Treva King M.D.       • ondansetron (ZOFRAN ODT) dispertab 4 mg  4 mg Oral Q6HRS PRN Treva King M.D.       • lisinopril (PRINIVIL) tablet 5 mg  5 mg Oral Q DAY Treva King M.D.   5 mg at 04/26/21 0514   • hydrALAZINE (APRESOLINE) injection 20 mg  20 mg Intravenous Q6HRS PRN Treva King M.D.   20 mg at 04/26/21 0639   • amLODIPine (NORVASC) tablet 5 mg  5 mg Oral Q DAY Karla House M.D.   5 mg at 04/25/21 1714   • hyoscyamine-maalox plus-lidocaine viscous (GI COCKTAIL) oral susp 15 mL  15 mL Oral TID PRN Treva King M.D.       • ondansetron (ZOFRAN) syringe/vial injection 4 mg  4 mg Intravenous Q6HRS PRN Treva King M.D.   4 mg at 04/26/21 0639   • calcium carbonate (TUMS) chewable tab 500 mg  500 mg Oral TID WITH MEALS Treva King M.D.   500 mg at 04/25/21 1644   • senna-docusate (PERICOLACE or SENOKOT S) 8.6-50 MG per tablet 2 tablet  2 tablet Oral  BID Hina Taveras M.D.   Stopped at 04/26/21 0600    And   • polyethylene glycol/lytes (MIRALAX) PACKET 1 Packet  1 Packet Oral QDAY PRN Hina Taveras M.D.        And   • magnesium hydroxide (MILK OF MAGNESIA) suspension 30 mL  30 mL Oral QDAY PRN Hina Taveras M.D.        And   • bisacodyl (DULCOLAX) suppository 10 mg  10 mg Rectal QDAY PRN Hina Taveras M.D.       • acetaminophen (Tylenol) tablet 650 mg  650 mg Oral Q6HRS PRN Hina Taveras M.D.   650 mg at 04/25/21 2133   • sertraline (Zoloft) tablet 50 mg  50 mg Oral DAILY Hina Taveras M.D.   50 mg at 04/26/21 0514   • vitamin D (Ergocalciferol) (DRISDOL) capsule 50,000 Units  50,000 Units Oral Q ROSARIO Hina Taveras M.D.   50,000 Units at 04/25/21 1007   • oxyCODONE immediate-release (ROXICODONE) tablet 5 mg  5 mg Oral Q6HRS PRN Apple Lucero M.D.   5 mg at 04/22/21 2052       Fluids    Intake/Output Summary (Last 24 hours) at 4/26/2021 0746  Last data filed at 4/25/2021 1652  Gross per 24 hour   Intake 240 ml   Output 400 ml   Net -160 ml       Laboratory          Recent Labs     04/24/21 0102 04/25/21  0006 04/26/21  0101   SODIUM 138 133* 131*   POTASSIUM 4.4 4.1 3.7   CHLORIDE 108 102 98   CO2 23 25 23   BUN 24* 22 25*   CREATININE 1.67* 1.48* 1.47*   MAGNESIUM 2.0 1.7 2.1   PHOSPHORUS  --   --  2.7   CALCIUM 8.9 8.5 9.0     Recent Labs     04/24/21  0102 04/25/21  0006 04/26/21  0101   ALTSGPT  --   --  13   ASTSGOT  --   --  15   ALKPHOSPHAT  --   --  93   TBILIRUBIN  --   --  0.8   GLUCOSE 103* 127* 158*     Recent Labs     04/25/21  0006 04/26/21  0101   WBC 12.6* 17.6*   NEUTSPOLYS 71.70  --    LYMPHOCYTES 17.40*  --    MONOCYTES 9.00  --    EOSINOPHILS 1.30  --    BASOPHILS 0.30  --    ASTSGOT  --  15   ALTSGPT  --  13   ALKPHOSPHAT  --  93   TBILIRUBIN  --  0.8     Recent Labs     04/24/21  1938 04/25/21  0006 04/26/21  0101   RBC  --  4.50 5.01   HEMOGLOBIN 12.8 13.4 14.9   HEMATOCRIT 35.6* 37.9 42.3   PLATELETCT  --   206 239       Imaging  X-Ray:  I have personally reviewed the images and compared with prior images.    Assessment/Plan  Acute respiratory failure with hypoxia (HCC)  Assessment & Plan  Likely secondary to aspiration; possible component of pulmonary edema per chest XR findings.  No history of lung disease. Had multiple episodes of coffee ground emesis this evening and developed worsening hypoxia requiring 10 LPM O2. Current workup less concerning for ACS or PE.     Plan:  Transferred to the ICU for higher level of care   Empiric C3 and Flagyl for aspiration pneumonia  O2 and RT per protocol   Code status is DNR-DNI, clarify GOC with the patient once clinically improved     DVT prophylaxis: No chemical DVT prophylaxis 2/2 concern for GI bleed  PUD prophylaxis: Omeprazole  Glycemic control: Sliding scale insulin  Nutrition: Diet  Lines: None  Schaefer: Need for strict I/O monitoring, remove when able  Mobility: Early mobility as tolerated  Goals of care: DNAR/DNI  Disposition: Transfer to hospitalist    I have performed a physical exam and reviewed and updated ROS and Plan today (4/26/2021). In review of yesterday's note (4/25/2021), there are no changes except as documented above.     Discussed patient condition and risk of morbidity and/or mortality with Hospitalist, RN, RT, Pharmacy, Charge nurse / hot rounds and Patient

## 2021-04-26 NOTE — CONSULTS
Pulmonary and Critical Care Medicine Attending Consultation Note    This lady has a history of CAD with prior STEMI and DOMINGA to the LAD, aortic stenosis with a mean valve gradient of 28 mmHg, DM type II with diabetic neuropathy, vertigo, prior stroke, macular degeneration, paroxysmal atrial fibrillation anticoagulated on apixaban, dyslipidemia and primary hypertension.  She was admitted to [facility] on or about 4/22/2021 with syncope.  This was felt to be due to intravascular volume depletion in the setting of aortic stenosis with a mean valve gradient of 28 mmHg.  This morning I was asked to see her on the telemetry unit due to increasing oxygen requirements.  Nursing reports that she has been vomiting and had some dark bloody emesis.  Her episodes of vomiting were associated with long sinus pauses.  Due to her increasing oxygen requirements, she is transferred to the ICU.    Physical examination:    99.7, sinus rhythm with a heart rate of 81, blood pressure 172/82, respiratory rate 22  Heart: She is in sinus rhythm.  She has had some sinus pauses.  Lungs: She has scattered coarse crackles at both lung bases.  She is requiring a 15 L simple facemask.  Abdomen: Soft, nondistended and nontender  Extremities: No clubbing or cyanosis  Neurologic: She is tired.  She arouses and answers questions.  She moves all 4.    Diagnostic data:    There is increased opacification in the perihilar regions, right greater than left as well as in the right lower lung zone  WBC 17,600, hemoglobin 14.9, hematocrit 42.3, platelet count 227,000  Sodium 131, potassium 3.7, chloride 98, CO2 23, BUN 25, creatinine 1.47, glucose 158, lactic acid 1.7  Urine culture on 4/24 with Staph aureus    Diagnoses:    Acute hypoxemic respiratory failure  Suspect aspiration pneumonia  Nausea, vomiting and hematemesis  Sinus pauses - vagal response with nausea and vomiting in a lady with significant underlying heart disease  CAD with prior STEMI and DOMINGA to the  LAD  Aortic stenosis with mean valve gradient of 28 mmHg  DM type II with peripheral neuropathy  PAF -previously anticoagulated on apixaban  Primary hypertension  Staph aureus UTI  Hypokalemia  DNR/DNI status    Plan/medical decision making:    This lady will be transferred to the ICU for close observation.  I am going to place her on intravenous ceftriaxone as well as metronidazole.  All anticoagulants will be discontinued.  She is not on apixaban.  I will check a thromboelastogram with platelet mapping.  I will trend her hemoglobin every 8 hours.  I will place her on IV pantoprazole, 40 mg twice daily.  I will replete her potassium.  I will stop her carvedilol with her sinus pauses.    I have assessed and reassessed her respiratory status, blood pressure, hemodynamics, cardiovascular status as well as her neurologic status.  She is at increased risk for worsening respiratory, cardiovascular and gastrointestinal system dysfunction.    The patient remains critically ill.  Critical care time = 45 minutes in directly providing and coordinating critical care and extensive data review.  No time overlap and excludes procedures.    Discussed with [facility] resident, RN      Jaspreet Holder MD  Pulmonary and Critical Care Medicine

## 2021-04-26 NOTE — ASSESSMENT & PLAN NOTE
Developed nausea and coffee ground emesis on 4/25/21 evening.   4/26 ongoing vomiting but only bile green emesis noted.  Monitor Hgb  Hold on GI consult for now.  PPI BID  Possible Carlota Young tear  Hemodynamically stable.  Holding home apixaban

## 2021-04-26 NOTE — HOSPITAL COURSE
"Chief Complaint  \"The patient was lethargic and unable to provide much meaningful information. Per chart review, she was admitted on 4/22/21 with a 1-week history of poor PO intake, increased thirst, polyuria, and hypersomnolence, as well as a syncopal episode upon getting out of bed on the morning of admission that resulted in a GLF. This was in the setting of poor glycemic control secondary to the patient rationing her siding-scale insulin due to insurance coverage issues. The patient's PMH is significant for CAD s.p. STEMI s.p. DOMINGA to LAD in 2016, moderate-severe AS with normal EF per 3/11/21 TTE, BPPV, history of CVA with baseline R facial weakness, paroxysmal atrial fibrillation on apixaban, and poorly controlled type 2 diabetes. The patient was admitted to tele for syncope in the setting of dehydration and hyperglycemia. Cardiology was consulted. The patient was noted to have positive orthostatic VS. Her volume status and blood glucose control improved. Cardiology recommended medical management and close outpatient Cardiology follow up for a dobutamine stress echo to further evaluate the patient's AS. On 4/25/21, the patient developed hematuria and suprapubic tenderness during the day; her home apixaban was held and the patient was started on nitrofurantoin for UTI. The patient subsequently developed hematemesis 4/25/21 evening and was noted on tele around this time to have episodes of 2nd degree AV block with pauses > 3 seconds. She remained hemodynamically stable. The on-call Cardiologist was made aware of the patient's condition. The patient was started on IV PPI and antiemetics. Her nausea and vomiting improved but she developed worsening hypoxia requiring 10 LPM O2. Critical Care was consulted and the patient was transferred to the ICU for close monitoring and higher level of care.\"    Hospital Course  4/26 Transferred to ICU, episodes of bradycardia with vomiting, D/C pantoprazole as vomiting bilious, " D/C lidocaine patch

## 2021-04-26 NOTE — DISCHARGE PLANNING
Care Transition Team Discharge Planning    Anticipated Discharge Disposition: TBD    Action: Per chart review, admitted for syncope, on T8 completed choice for SNF which was faxed to [facility], [facility], and 3-[facility]. Pt was transferred to ICU d/t increased needs for O2.    Barriers to Discharge: medical clearance/stability    Plan: Lsw will continue to follow, attend rounds for medical updates, and assist w/ d/c planning.

## 2021-04-26 NOTE — CARE PLAN
Problem: Nutritional:  Goal: Achieve adequate nutritional intake  Description: Patient will consume 50-75% or > of meals  Outcome: PROGRESSING SLOWER THAN EXPECTED

## 2021-04-26 NOTE — ASSESSMENT & PLAN NOTE
Likely secondary to aspiration; possible component of pulmonary edema per chest XR findings.  No history of lung disease. Had multiple episodes of coffee ground emesis this evening and developed worsening hypoxia requiring 10 LPM O2. Current workup less concerning for ACS or PE.     O2 requirement down to 2 L NC  Code status is DNR-DNI

## 2021-04-27 PROBLEM — J69.0 ASPIRATION PNEUMONIA (HCC): Status: ACTIVE | Noted: 2021-04-27

## 2021-04-27 LAB
ANION GAP SERPL CALC-SCNC: 9 MMOL/L (ref 7–16)
BASOPHILS # BLD AUTO: 0.3 % (ref 0–1.8)
BASOPHILS # BLD: 0.04 K/UL (ref 0–0.12)
BUN SERPL-MCNC: 35 MG/DL (ref 8–22)
CALCIUM SERPL-MCNC: 8 MG/DL (ref 8.5–10.5)
CHLORIDE SERPL-SCNC: 100 MMOL/L (ref 96–112)
CO2 SERPL-SCNC: 22 MMOL/L (ref 20–33)
CREAT SERPL-MCNC: 2.13 MG/DL (ref 0.5–1.4)
EKG IMPRESSION: NORMAL
EOSINOPHIL # BLD AUTO: 0.32 K/UL (ref 0–0.51)
EOSINOPHIL NFR BLD: 2 % (ref 0–6.9)
ERYTHROCYTE [DISTWIDTH] IN BLOOD BY AUTOMATED COUNT: 44.7 FL (ref 35.9–50)
GLUCOSE BLD-MCNC: 136 MG/DL (ref 65–99)
GLUCOSE BLD-MCNC: 159 MG/DL (ref 65–99)
GLUCOSE BLD-MCNC: 181 MG/DL (ref 65–99)
GLUCOSE SERPL-MCNC: 141 MG/DL (ref 65–99)
HCT VFR BLD AUTO: 40 % (ref 37–47)
HGB BLD-MCNC: 13.5 G/DL (ref 12–16)
IMM GRANULOCYTES # BLD AUTO: 0.11 K/UL (ref 0–0.11)
IMM GRANULOCYTES NFR BLD AUTO: 0.7 % (ref 0–0.9)
LYMPHOCYTES # BLD AUTO: 0.83 K/UL (ref 1–4.8)
LYMPHOCYTES NFR BLD: 5.3 % (ref 22–41)
MAGNESIUM SERPL-MCNC: 1.9 MG/DL (ref 1.5–2.5)
MCH RBC QN AUTO: 29.2 PG (ref 27–33)
MCHC RBC AUTO-ENTMCNC: 33.8 G/DL (ref 33.6–35)
MCV RBC AUTO: 86.6 FL (ref 81.4–97.8)
MONOCYTES # BLD AUTO: 1.41 K/UL (ref 0–0.85)
MONOCYTES NFR BLD AUTO: 9 % (ref 0–13.4)
NEUTROPHILS # BLD AUTO: 13.04 K/UL (ref 2–7.15)
NEUTROPHILS NFR BLD: 82.7 % (ref 44–72)
NRBC # BLD AUTO: 0 K/UL
NRBC BLD-RTO: 0 /100 WBC
PHOSPHATE SERPL-MCNC: 3.4 MG/DL (ref 2.5–4.5)
PLATELET # BLD AUTO: 223 K/UL (ref 164–446)
PMV BLD AUTO: 11 FL (ref 9–12.9)
POTASSIUM SERPL-SCNC: 3.8 MMOL/L (ref 3.6–5.5)
RBC # BLD AUTO: 4.62 M/UL (ref 4.2–5.4)
SODIUM SERPL-SCNC: 131 MMOL/L (ref 135–145)
WBC # BLD AUTO: 15.8 K/UL (ref 4.8–10.8)

## 2021-04-27 PROCEDURE — A9270 NON-COVERED ITEM OR SERVICE: HCPCS | Performed by: HOSPITALIST

## 2021-04-27 PROCEDURE — 770020 HCHG ROOM/CARE - TELE (206)

## 2021-04-27 PROCEDURE — A9270 NON-COVERED ITEM OR SERVICE: HCPCS | Performed by: STUDENT IN AN ORGANIZED HEALTH CARE EDUCATION/TRAINING PROGRAM

## 2021-04-27 PROCEDURE — 93010 ELECTROCARDIOGRAM REPORT: CPT | Performed by: INTERNAL MEDICINE

## 2021-04-27 PROCEDURE — 700105 HCHG RX REV CODE 258: Performed by: EMERGENCY MEDICINE

## 2021-04-27 PROCEDURE — 700102 HCHG RX REV CODE 250 W/ 637 OVERRIDE(OP): Performed by: STUDENT IN AN ORGANIZED HEALTH CARE EDUCATION/TRAINING PROGRAM

## 2021-04-27 PROCEDURE — 700102 HCHG RX REV CODE 250 W/ 637 OVERRIDE(OP): Performed by: HOSPITALIST

## 2021-04-27 PROCEDURE — 84100 ASSAY OF PHOSPHORUS: CPT

## 2021-04-27 PROCEDURE — 85025 COMPLETE CBC W/AUTO DIFF WBC: CPT

## 2021-04-27 PROCEDURE — 82962 GLUCOSE BLOOD TEST: CPT

## 2021-04-27 PROCEDURE — 83735 ASSAY OF MAGNESIUM: CPT

## 2021-04-27 PROCEDURE — 700111 HCHG RX REV CODE 636 W/ 250 OVERRIDE (IP): Performed by: EMERGENCY MEDICINE

## 2021-04-27 PROCEDURE — 99233 SBSQ HOSP IP/OBS HIGH 50: CPT | Performed by: HOSPITALIST

## 2021-04-27 PROCEDURE — 700105 HCHG RX REV CODE 258: Performed by: HOSPITALIST

## 2021-04-27 PROCEDURE — 80048 BASIC METABOLIC PNL TOTAL CA: CPT

## 2021-04-27 RX ORDER — SODIUM CHLORIDE, SODIUM LACTATE, POTASSIUM CHLORIDE, CALCIUM CHLORIDE 600; 310; 30; 20 MG/100ML; MG/100ML; MG/100ML; MG/100ML
250 INJECTION, SOLUTION INTRAVENOUS ONCE
Status: COMPLETED | OUTPATIENT
Start: 2021-04-27 | End: 2021-04-27

## 2021-04-27 RX ORDER — CARVEDILOL 3.12 MG/1
3.12 TABLET ORAL 2 TIMES DAILY WITH MEALS
Status: DISCONTINUED | OUTPATIENT
Start: 2021-04-27 | End: 2021-04-29 | Stop reason: HOSPADM

## 2021-04-27 RX ADMIN — CARVEDILOL 3.12 MG: 3.12 TABLET, FILM COATED ORAL at 18:15

## 2021-04-27 RX ADMIN — SERTRALINE HYDROCHLORIDE 50 MG: 50 TABLET ORAL at 06:13

## 2021-04-27 RX ADMIN — CEFTRIAXONE SODIUM 2 G: 2 INJECTION, POWDER, FOR SOLUTION INTRAMUSCULAR; INTRAVENOUS at 06:14

## 2021-04-27 RX ADMIN — OMEPRAZOLE 20 MG: 20 CAPSULE, DELAYED RELEASE ORAL at 06:13

## 2021-04-27 RX ADMIN — INSULIN LISPRO 2 UNITS: 100 INJECTION, SOLUTION INTRAVENOUS; SUBCUTANEOUS at 18:15

## 2021-04-27 RX ADMIN — DOCUSATE SODIUM 50 MG AND SENNOSIDES 8.6 MG 2 TABLET: 8.6; 5 TABLET, FILM COATED ORAL at 18:15

## 2021-04-27 RX ADMIN — DOCUSATE SODIUM 50 MG AND SENNOSIDES 8.6 MG 2 TABLET: 8.6; 5 TABLET, FILM COATED ORAL at 06:13

## 2021-04-27 RX ADMIN — LISINOPRIL 5 MG: 5 TABLET ORAL at 06:13

## 2021-04-27 RX ADMIN — SODIUM CHLORIDE, POTASSIUM CHLORIDE, SODIUM LACTATE AND CALCIUM CHLORIDE 250 ML: 600; 310; 30; 20 INJECTION, SOLUTION INTRAVENOUS at 11:40

## 2021-04-27 RX ADMIN — INSULIN LISPRO 2 UNITS: 100 INJECTION, SOLUTION INTRAVENOUS; SUBCUTANEOUS at 11:43

## 2021-04-27 RX ADMIN — CALCIUM CARBONATE 500 MG: 500 TABLET, CHEWABLE ORAL at 11:40

## 2021-04-27 RX ADMIN — INSULIN GLARGINE 30 UNITS: 100 INJECTION, SOLUTION SUBCUTANEOUS at 18:15

## 2021-04-27 RX ADMIN — CALCIUM CARBONATE 500 MG: 500 TABLET, CHEWABLE ORAL at 08:44

## 2021-04-27 RX ADMIN — CALCIUM CARBONATE 500 MG: 500 TABLET, CHEWABLE ORAL at 18:14

## 2021-04-27 RX ADMIN — AMLODIPINE BESYLATE 5 MG: 10 TABLET ORAL at 18:14

## 2021-04-27 RX ADMIN — CARVEDILOL 3.12 MG: 3.12 TABLET, FILM COATED ORAL at 11:40

## 2021-04-27 RX ADMIN — OMEPRAZOLE 20 MG: 20 CAPSULE, DELAYED RELEASE ORAL at 18:14

## 2021-04-27 ASSESSMENT — FIBROSIS 4 INDEX
FIB4 SCORE: 1.46
FIB4 SCORE: 1.46

## 2021-04-27 ASSESSMENT — PAIN DESCRIPTION - PAIN TYPE
TYPE: ACUTE PAIN

## 2021-04-27 NOTE — PROGRESS NOTES
Woke patient for dinner and medications. Pt confused but pleasant, AO1. Change from AO4 earlier in the day. Paged Dr Conte to update. Most likely ICU delirium plus UTI, possible history of early dementia. Called daughter Nba Torrez for more information, left voicemail. Will continue to monitor.

## 2021-04-27 NOTE — THERAPY
"Missed Therapy     Patient Name: Amrita Torrez  Age:  76 y.o., Sex:  female  Medical Record #: [id number]  Today's Date: 4/27/2021    Discussed missed therapy with Nsg       04/27/21 1012   Interdisciplinary Plan of Care Collaboration   Collaboration Comments OT tx attempted, pt asleep in bed & stated \"I don't feel good\"  Pt had emesis bag on her lap.  Nsg reported pt has been up to bedside chair earlier today.  Pt declined any OOB ADL's at this time.  Will attempt again another day.     "

## 2021-04-27 NOTE — CARE PLAN
Problem: Communication  Goal: The ability to communicate needs accurately and effectively will improve  Outcome: PROGRESSING AS EXPECTED     Problem: Safety  Goal: Will remain free from injury  Outcome: PROGRESSING AS EXPECTED  Goal: Will remain free from falls  Outcome: PROGRESSING AS EXPECTED     Problem: Infection  Goal: Will remain free from infection  Outcome: PROGRESSING AS EXPECTED     Problem: Venous Thromboembolism (VTW)/Deep Vein Thrombosis (DVT) Prevention:  Goal: Patient will participate in Venous Thrombosis (VTE)/Deep Vein Thrombosis (DVT)Prevention Measures  Outcome: PROGRESSING AS EXPECTED     Problem: Bowel/Gastric:  Goal: Normal bowel function is maintained or improved  Outcome: PROGRESSING AS EXPECTED     Problem: Knowledge Deficit  Goal: Knowledge of disease process/condition, treatment plan, diagnostic tests, and medications will improve  Outcome: PROGRESSING SLOWER THAN EXPECTED  Goal: Knowledge of the prescribed therapeutic regimen will improve  Outcome: PROGRESSING AS EXPECTED     Problem: Discharge Barriers/Planning  Goal: Patient's continuum of care needs will be met  Outcome: PROGRESSING SLOWER THAN EXPECTED     Problem: Pain Management  Goal: Pain level will decrease to patient's comfort goal  Outcome: PROGRESSING AS EXPECTED     Problem: Fluid Volume:  Goal: Will maintain balanced intake and output  Outcome: PROGRESSING AS EXPECTED     Problem: Respiratory:  Goal: Respiratory status will improve  Outcome: PROGRESSING AS EXPECTED     Problem: Urinary Elimination:  Goal: Ability to reestablish a normal urinary elimination pattern will improve  Outcome: PROGRESSING SLOWER THAN EXPECTED     Problem: Skin Integrity  Goal: Risk for impaired skin integrity will decrease  Outcome: PROGRESSING AS EXPECTED

## 2021-04-27 NOTE — PROGRESS NOTES
Daughter Nba called back with information. Stated patient has been getting more confused at home and is frequent. Also mentioned that Shirlene will have moments of vomiting with tachycardia and SOB frequently at home.

## 2021-04-27 NOTE — CONSULTS
"Reason for PC Consult: Advance Care Planning    Consulted by: Dr. Ng    Assessment:  General: 77 y/o woman from home with syncope and fall, believed to be related to her severe aortic stenosis and volume deplation. Pt found to have hematuria/UTI, possible UGIB with hematemesis with stable Hgb, and fluctuating renal function. PMHx of Afib on AC (now on hold), macular degeneration, chronic vertigo, CAD, severe AS, DM, STEMI s/p PCI of LAD. Last ECHO 3/2021 with EF 60% mild mitral stenosis and moderate AS. Pt recommended for outpatient stress test and f/u of AS with cardiology and possibly TAVR team. Pt with \"early dementia\" per daughter and confused for this RN during this encounter.    Social: Pt lives with her daughter Michelle in [place]. She has 2 other kids, Scott in [place] and Lily in [place]. They are all involved but Michelle is most closely involved. The pt has a walker and uses O2 at night. Michelle works 10-12 hr shifts where Shirlene is alone, which is concerning to her with her recent decline.    Consults: cardiology, PMA/critical care    Dyspnea: No-    Last BM: 04/27/21-    Pain: No-    Depression: No-    Dementia: No;       Spiritual:  Is Adventist or spirituality important for coping with this illness? No-    Has a  or spiritual provider visit been requested? No    Palliative Performance Scale: 50%    Advance Directive: None-    DPOA: No- NOK is her dtr Nba   POLST: No-      Code Status: DNR- DNI- confirmed with Michelle    Outcome:  PC RN met with Henrique at bedside and explained the role of PC to help with discussions about the current medical situation and goals moving forward. Michelle provided this RN with the family/social information, as well as her understanding of the medical situation. The patient is [place], and when speaking loudly, she could hear this RN but appeared confused about the medical situation. Michelle expressed understanding of the need to f/u with cardiology " outpatient to determine next best steps for her mom's AS. She expressed that her mom is home much of the day alone and is concerned with her falls/syncope. She reports being open to SNF at this time to help rebuild strength prior to her going home.     AD and code status discussed. She expressed having never completed medical POA paperwork but this being something they wanted to do. PC explained that given her confusion here, she is unable to complete on this hospitalization, but if she were to improve at home, they could do it there. PC provided Michelle with a packet and instructions on how to complete. Code status and POLST discussed and she confirmed her mom's wishes for DNR/DNI. POLST completed reflecting this desire, as well as selective focused treatment and trial TF. PC discussed tube feeding in detail and Shirlene was unable to understand well enough to weigh an opinion, but TOMMIE Amaya believes she would only be open to a trial period, not long term. The POLST was reviewed and signed by Michelle as her surrogate. She inquired about different SNF facilities and PC offered to obtain a list from  for her and she was appreciative. No questions/needs at this time.    While talking to Shirlene and Michelle, PC RN provided therapeutic communication, including open ended questions, reflective listening, and normalization of thought and feelings throughout encounter, as well as reinforced her goals of care. PC contact information provided to TOMMIEMonique and encouraged to call with any questions or concerns.     POLST reviewed and signed by MD; Original left at head of bed for time of DC.     Recommendations: I do not recommend an ethics or hospice consult at this time because not desired.    Updated: MD/REJI    Plan: SNF when accepted/cleared    Thank you for allowing Palliative Care to support this patient and family. Contact [phone] for additional assistance, change in patient status, or with any questions/concerns.

## 2021-04-27 NOTE — PROGRESS NOTES
Patient having worsening confusion this AM. Woke patient up to give medications and she was completely disoriented and confused with expressive aphagia. Neuro assessment otherwise unchanged with no other deficits noted. Dr. Holder updated with no new orders at this time, will continue to assess orientation and other neurological issues. Vital signs stable

## 2021-04-27 NOTE — PROGRESS NOTES
Hospital Medicine Daily Progress Note    Date of Service  4/27/2021    Chief Complaint  76 y.o. female admitted 4/22/2021 with syncope and lethargy    Hospital Course  76 y.o. female w/ hx of paroxysmal afib on chronic anticoagulation, macular degeneration, chronic vertigo CAD, prior 3/21 Echo with severe AS, DM, admitted 4/22/2021 with syncope while ambulating to the bathroom. Over the prior days she had reduced oral intake and increase thirst/polyuria. While walking she had lightheadedness and fell losing consciousness. She was admitted with acute renal insufficiency. Her 4/22 Head CT was negative for acute finding. The patient was felt to be intravascularly depleted in setting of aortic stenosis as etiology of her syncope/dizziness.  The patient was transferred to ICU secondary to increased lethargy and hematuria as well as suprapubic tenderness.  Anticoagulation was held, the patient treated for UTI, respiratory difficulty with increased oxygen demand and possible aspiration.    Interval Problem Update  Patient seen and examined today.    Patient tolerating treatment and therapies.  All Data, Medication data reviewed.  Case discussed with nursing as available.  Plan of Care reviewed with patient and notified of changes.  4/27 the patient has afebrile, T-max 98.6, respiration in lower 20s, the patient is on 2 L of oxygen, blood pressure is 157/92, the patient is alert and oriented times 3, she cannot tell me much about what led to her increasing illness, leukocytosis likely improved to 15.8, sodium remains low at 131 fairly unchanged from yesterday, her renal parameters worsened slightly,  Elevated procalcitonin, urine culture with staph epi, blood cultures negative, chest x-ray from yesterday shows interstitial pulmonary parenchymal prominence/edema versus infiltrate, echocardiogram with moderate LVH, moderate to severe aortic stenosis.  Consultants/Specialty  Critical care  Cardiology with plans for future  evaluation for TAVR   Code Status  DNAR/DNI    Disposition  To telemetry    Review of Systems  Review of Systems   Unable to perform ROS: Mental acuity        Physical Exam  Temp:  [35.7 °C (96.2 °F)-37 °C (98.6 °F)] 36.8 °C (98.2 °F)  Pulse:  [] 113  Resp:  [16-29] 25  BP: (101-179)/(49-83) 122/67  SpO2:  [92 %-97 %] 93 %    Physical Exam  Vitals and nursing note reviewed.   Constitutional:       Appearance: She is well-developed. She is not diaphoretic.   HENT:      Head: Normocephalic and atraumatic.      Nose: Nose normal.   Eyes:      Conjunctiva/sclera: Conjunctivae normal.      Pupils: Pupils are equal, round, and reactive to light.   Neck:      Thyroid: No thyromegaly.      Vascular: No JVD.   Cardiovascular:      Rate and Rhythm: Regular rhythm. Tachycardia present.      Heart sounds: Murmur present. No friction rub. No gallop.    Pulmonary:      Effort: Pulmonary effort is normal.      Breath sounds: Rhonchi and rales present. No wheezing.   Abdominal:      General: Bowel sounds are normal. There is no distension.      Palpations: Abdomen is soft. There is no mass.      Tenderness: There is no abdominal tenderness. There is no guarding or rebound.   Musculoskeletal:         General: No tenderness. Normal range of motion.      Cervical back: Normal range of motion and neck supple.   Lymphadenopathy:      Cervical: No cervical adenopathy.   Skin:     General: Skin is warm and dry.   Neurological:      Mental Status: She is alert. She is disoriented.      Cranial Nerves: No cranial nerve deficit.   Psychiatric:         Behavior: Behavior normal.         Fluids    Intake/Output Summary (Last 24 hours) at 4/27/2021 0738  Last data filed at 4/27/2021 0600  Gross per 24 hour   Intake 1055.83 ml   Output 0 ml   Net 1055.83 ml       Laboratory  Recent Labs     04/25/21  0006 04/26/21  0101 04/27/21  0347   WBC 12.6* 17.6* 15.8*   RBC 4.50 5.01 4.62   HEMOGLOBIN 13.4 14.9 13.5   HEMATOCRIT 37.9 42.3 40.0   MCV  84.2 84.4 86.6   MCH 29.8 29.7 29.2   MCHC 35.4* 35.2* 33.8   RDW 41.9 42.4 44.7   PLATELETCT 206 227 223   MPV 10.7 10.9 11.0     Recent Labs     04/25/21  0006 04/26/21  0101 04/27/21  0347   SODIUM 133* 131* 131*   POTASSIUM 4.1 3.7 3.8   CHLORIDE 102 98 100   CO2 25 23 22   GLUCOSE 127* 158* 141*   BUN 22 25* 35*   CREATININE 1.48* 1.47* 2.13*   CALCIUM 8.5 9.0 8.0*                   Imaging  DX-CHEST-LIMITED (1 VIEW)   Final Result         1.  Interstitial pulmonary parenchymal prominence suggest chronic underlying lung disease, component of interstitial edema and/or infiltrates not excluded.   2.  Atherosclerosis      DX-CHEST-LIMITED (1 VIEW)   Final Result         1.  Pulmonary edema and/or infiltrates are identified, which appear somewhat increased since the prior exam.   2.  Atherosclerosis      CT-RENAL COLIC EVALUATION(A/P W/O)   Final Result      No urolithiasis, hydronephrosis or acute inflammatory abnormalities detected      Above average stool volume      Mild urinary bladder wall thickening most likely is due to incomplete distention although direct visualization may prove helpful if hematuria does not resolve      Small bilateral effusions, left heart enlargement      CT-HEAD W/O   Final Result      1.  Generalized atrophy and chronic microvascular ischemic type changes.   2.  No acute intracranial abnormality.           Assessment/Plan  * Syncope and collapse- (present on admission)  Assessment & Plan  Likely multifactorial, including intravascular volume depletion secondary to polyuria from inadequate insulin use, aortic stenosis, arrhythmia.   Non-contrast head CT negative for acute intracranial process. TSH and vitamin B-12 WNL. Flat trop-T trend. Orthostatic vitals signs positive.   Noted on 4/26/21 to have episodes of 2nd degree AV block with 3-9 second pauses via tele monitoring.    Plan:  Cardiology following, appreciate recommendations   Close electrolyte monitoring and replacement    PT/OT    Upper GI bleed  Assessment & Plan  Developed nausea and coffee ground emesis on 4/25/21 evening.   4/26 ongoing vomiting but only bile green emesis noted.  Monitor Hgb  Hold on GI consult for now.  PPI BID  Possible Carlota Young tear  Hemodynamically stable.  Holding home apixaban     Acute respiratory failure with hypoxia (HCC)  Assessment & Plan  Likely secondary to aspiration; possible component of pulmonary edema per chest XR findings.  No history of lung disease. Had multiple episodes of coffee ground emesis this evening and developed worsening hypoxia requiring 10 LPM O2. Current workup less concerning for ACS or PE.   Improved with treatment of possible aspiration pneumonia, pulmonary edema, monitor    Cystitis  Assessment & Plan  Developed hematuria, LLQ pain and some suprapubic tenderness 4/24/21-4/25/21. Leukocytosis on CBC. 4/24/21 urine culture grew Staph aureus.      Antibiotic therapy changed on 4/26/21 to empiric C3 and Flagyl for aspiration PNA, this should cover  Apixaban held due to hematuria; consider Urology consult if hematuria worsens or recurs  Monitor leukocytosis    Orthostatic hypotension- (present on admission)  Assessment & Plan  This is likely contributory to the patient's presentation of syncope along with other factors such as intravascular volume depletion 2/2 polyuria from unmanaged diabetes. High fall risk.  There could also be a component of autonomic dysfunction in this patient given her age and comorbidities.  Conservative management, e.g. slow position changes, adequate hydration, compression stockings whenever patient is out of bed and ambulating  Cardiology consulted, appreciate recommendations.   Fall precautions  PT and OT consulted, discharge to SNF recommended    Acute kidney injury superimposed on chronic kidney disease (HCC)- (present on admission)  Assessment & Plan  CKD likely secondary to diabetes.   Cr 1.4 at baseline  RICHARD likely secondary to pre-renal  azotemia; question cardiorenal syndrome developing with poor cardiac output with moderate to severe aortic stenosis    Lactic acidosis- (present on admission)  Assessment & Plan  Resolved with fluid resuscitation.     Type 2 diabetes mellitus with hyperglycemia, with long-term current use of insulin (HCC)- (present on admission)  Assessment & Plan  Poorly controlled, A1c 12.1 on 4/22/21. Home regimen is metformin 500 mg BID, glargine 25 units qhs, and SSI regular insulin; patient reported that she had not been taking her insulin as prescribed PTA due to issues with insurance coverage.  Complicated by diabetic neuropathy, nephropathy, and retinopathy.   Home metformin held on admission due to RICHARD and lactic acidosis  Glargine 30 units qhs   SSI and Hypoglycemia precautions   Lisinopril 5 mg daily with parameters and as renal functions allows.    Nonrheumatic aortic valve stenosis- (present on admission)  Assessment & Plan  3/11/21 TTE significant for low flow, lo gradient severe AS with normal EF.  Cardiology consulted, close outpatient follow up recommended.   Per Cardiologist, Dr. Larson, no need for PPM at this time, but patient will need TAVR for AS as well as outpatient dobutamine stress echo     Hypertension- (present on admission)  Assessment & Plan  Continue amlodipine and low-dose lisinopril.   Developed arrhythmia on 4/26/21 with 3-9 second pauses; carvedilol held.  Hydralazine PRN if SBP > 180 mmHg.    Aspiration pneumonia (HCC)  Assessment & Plan  Possible, on empiric antibiotics, pulmonary toilet, oxygen weaning    Debility- (present on admission)  Assessment & Plan  The patient lives with her daughter, daughter states that the patient relies on her for ADLs; daughter notes inability to provide support 24-7 due to her job.   PT and OT consulted, discharge to SNF recommended.   SNF referral placed.    GERD (gastroesophageal reflux disease)- (present on admission)  Assessment & Plan  TUMS, antiemetics  and GI cocktail PRN.   Developed nausea and coffee ground emesis on 4/25/21 evening; H and H stable; continue PPI, continue to observe    Essential tremor- (present on admission)  Assessment & Plan  Had an anxiety-driven episode of diffuse tremors that resolved with reassurance.  Chronic issue per chart review.    Paroxysmal atrial fibrillation (HCC)- (present on admission)  Assessment & Plan  On apixaban outpatient.   Monitor telemetry     Hypokalemia- (present on admission)  Assessment & Plan  CTM, replete PRN for goal of K > 4.     CAD (coronary artery disease)- (present on admission)  Assessment & Plan  History of STEMI in 2016 s.p. PCI to LAD, not on antiplatelets due to concurrent apixaban use for PAF, fall risk. Not on secondary CAD medical management.  Carvedilol held due to AV block   Lisinopril 5 mg daily, titrate up PRN  Amlodipine 5 mg daily   Close outpatient Cardiology follow up  Plan  Slight rehydration, monitor renal function  Ongoing antibiotic therapy  Follow cultures  Follow-up chest x-ray  Currently seems clinically improved  Continue twice daily PPI  See orders  Medically complex high risk patient  Okay to downgrade to telemetry currently  Consult palliative care  VTE prophylaxis: On hold secondary to hematuria, SCD    I have performed a physical exam and reviewed and updated ROS and Plan today . In review of yesterday's note , there are no changes except as documented above.        Please note that this dictation was created using voice recognition software. I have made every reasonable attempt to correct obvious errors, but I expect that there are errors of grammar and possibly context that I did not discover before finalizing the note.

## 2021-04-28 PROBLEM — R41.0 DELIRIUM: Status: ACTIVE | Noted: 2021-04-28

## 2021-04-28 LAB
ANION GAP SERPL CALC-SCNC: 7 MMOL/L (ref 7–16)
BASOPHILS # BLD AUTO: 0.3 % (ref 0–1.8)
BASOPHILS # BLD: 0.03 K/UL (ref 0–0.12)
BUN SERPL-MCNC: 36 MG/DL (ref 8–22)
CALCIUM SERPL-MCNC: 7.9 MG/DL (ref 8.5–10.5)
CHLORIDE SERPL-SCNC: 99 MMOL/L (ref 96–112)
CO2 SERPL-SCNC: 25 MMOL/L (ref 20–33)
CREAT SERPL-MCNC: 1.91 MG/DL (ref 0.5–1.4)
EOSINOPHIL # BLD AUTO: 0.45 K/UL (ref 0–0.51)
EOSINOPHIL NFR BLD: 4.1 % (ref 0–6.9)
ERYTHROCYTE [DISTWIDTH] IN BLOOD BY AUTOMATED COUNT: 43 FL (ref 35.9–50)
GLUCOSE BLD-MCNC: 113 MG/DL (ref 65–99)
GLUCOSE BLD-MCNC: 168 MG/DL (ref 65–99)
GLUCOSE BLD-MCNC: 180 MG/DL (ref 65–99)
GLUCOSE BLD-MCNC: 88 MG/DL (ref 65–99)
GLUCOSE SERPL-MCNC: 123 MG/DL (ref 65–99)
HCT VFR BLD AUTO: 35.3 % (ref 37–47)
HGB BLD-MCNC: 12.2 G/DL (ref 12–16)
IMM GRANULOCYTES # BLD AUTO: 0.05 K/UL (ref 0–0.11)
IMM GRANULOCYTES NFR BLD AUTO: 0.5 % (ref 0–0.9)
LYMPHOCYTES # BLD AUTO: 1.32 K/UL (ref 1–4.8)
LYMPHOCYTES NFR BLD: 11.9 % (ref 22–41)
MAGNESIUM SERPL-MCNC: 2.1 MG/DL (ref 1.5–2.5)
MCH RBC QN AUTO: 29.3 PG (ref 27–33)
MCHC RBC AUTO-ENTMCNC: 34.6 G/DL (ref 33.6–35)
MCV RBC AUTO: 84.7 FL (ref 81.4–97.8)
MONOCYTES # BLD AUTO: 1.4 K/UL (ref 0–0.85)
MONOCYTES NFR BLD AUTO: 12.6 % (ref 0–13.4)
NEUTROPHILS # BLD AUTO: 7.84 K/UL (ref 2–7.15)
NEUTROPHILS NFR BLD: 70.6 % (ref 44–72)
NRBC # BLD AUTO: 0 K/UL
NRBC BLD-RTO: 0 /100 WBC
NT-PROBNP SERPL IA-MCNC: 7006 PG/ML (ref 0–125)
PHOSPHATE SERPL-MCNC: 3.4 MG/DL (ref 2.5–4.5)
PLATELET # BLD AUTO: 223 K/UL (ref 164–446)
PMV BLD AUTO: 10.3 FL (ref 9–12.9)
POTASSIUM SERPL-SCNC: 4.4 MMOL/L (ref 3.6–5.5)
RBC # BLD AUTO: 4.17 M/UL (ref 4.2–5.4)
SODIUM SERPL-SCNC: 131 MMOL/L (ref 135–145)
WBC # BLD AUTO: 11.1 K/UL (ref 4.8–10.8)

## 2021-04-28 PROCEDURE — A9270 NON-COVERED ITEM OR SERVICE: HCPCS | Performed by: HOSPITALIST

## 2021-04-28 PROCEDURE — 84100 ASSAY OF PHOSPHORUS: CPT

## 2021-04-28 PROCEDURE — A9270 NON-COVERED ITEM OR SERVICE: HCPCS | Performed by: FAMILY MEDICINE

## 2021-04-28 PROCEDURE — 700102 HCHG RX REV CODE 250 W/ 637 OVERRIDE(OP): Performed by: STUDENT IN AN ORGANIZED HEALTH CARE EDUCATION/TRAINING PROGRAM

## 2021-04-28 PROCEDURE — 97535 SELF CARE MNGMENT TRAINING: CPT

## 2021-04-28 PROCEDURE — 700102 HCHG RX REV CODE 250 W/ 637 OVERRIDE(OP): Performed by: HOSPITALIST

## 2021-04-28 PROCEDURE — 83880 ASSAY OF NATRIURETIC PEPTIDE: CPT

## 2021-04-28 PROCEDURE — A9270 NON-COVERED ITEM OR SERVICE: HCPCS | Performed by: STUDENT IN AN ORGANIZED HEALTH CARE EDUCATION/TRAINING PROGRAM

## 2021-04-28 PROCEDURE — 97530 THERAPEUTIC ACTIVITIES: CPT | Mod: CQ

## 2021-04-28 PROCEDURE — 700102 HCHG RX REV CODE 250 W/ 637 OVERRIDE(OP): Performed by: FAMILY MEDICINE

## 2021-04-28 PROCEDURE — 85025 COMPLETE CBC W/AUTO DIFF WBC: CPT

## 2021-04-28 PROCEDURE — 99232 SBSQ HOSP IP/OBS MODERATE 35: CPT | Performed by: FAMILY MEDICINE

## 2021-04-28 PROCEDURE — 83735 ASSAY OF MAGNESIUM: CPT

## 2021-04-28 PROCEDURE — 770020 HCHG ROOM/CARE - TELE (206)

## 2021-04-28 PROCEDURE — 82962 GLUCOSE BLOOD TEST: CPT | Mod: 91

## 2021-04-28 PROCEDURE — 36415 COLL VENOUS BLD VENIPUNCTURE: CPT

## 2021-04-28 PROCEDURE — 700111 HCHG RX REV CODE 636 W/ 250 OVERRIDE (IP): Performed by: EMERGENCY MEDICINE

## 2021-04-28 PROCEDURE — 97116 GAIT TRAINING THERAPY: CPT | Mod: CQ

## 2021-04-28 PROCEDURE — 700105 HCHG RX REV CODE 258: Performed by: EMERGENCY MEDICINE

## 2021-04-28 PROCEDURE — 80048 BASIC METABOLIC PNL TOTAL CA: CPT

## 2021-04-28 RX ORDER — CEFDINIR 300 MG/1
300 CAPSULE ORAL DAILY
Status: DISCONTINUED | OUTPATIENT
Start: 2021-04-28 | End: 2021-04-29 | Stop reason: HOSPADM

## 2021-04-28 RX ORDER — CEFDINIR 300 MG/1
300 CAPSULE ORAL DAILY
Status: DISCONTINUED | OUTPATIENT
Start: 2021-04-29 | End: 2021-04-28

## 2021-04-28 RX ADMIN — CALCIUM CARBONATE 500 MG: 500 TABLET, CHEWABLE ORAL at 17:29

## 2021-04-28 RX ADMIN — CALCIUM CARBONATE 500 MG: 500 TABLET, CHEWABLE ORAL at 11:37

## 2021-04-28 RX ADMIN — CALCIUM CARBONATE 500 MG: 500 TABLET, CHEWABLE ORAL at 07:44

## 2021-04-28 RX ADMIN — INSULIN LISPRO 2 UNITS: 100 INJECTION, SOLUTION INTRAVENOUS; SUBCUTANEOUS at 20:42

## 2021-04-28 RX ADMIN — SERTRALINE HYDROCHLORIDE 50 MG: 50 TABLET ORAL at 05:42

## 2021-04-28 RX ADMIN — INSULIN GLARGINE 30 UNITS: 100 INJECTION, SOLUTION SUBCUTANEOUS at 17:30

## 2021-04-28 RX ADMIN — CEFDINIR 300 MG: 300 CAPSULE ORAL at 17:29

## 2021-04-28 RX ADMIN — CARVEDILOL 3.12 MG: 3.12 TABLET, FILM COATED ORAL at 17:29

## 2021-04-28 RX ADMIN — AMLODIPINE BESYLATE 5 MG: 10 TABLET ORAL at 17:29

## 2021-04-28 RX ADMIN — OMEPRAZOLE 20 MG: 20 CAPSULE, DELAYED RELEASE ORAL at 17:29

## 2021-04-28 RX ADMIN — LISINOPRIL 5 MG: 5 TABLET ORAL at 05:42

## 2021-04-28 RX ADMIN — ACETAMINOPHEN 650 MG: 325 TABLET ORAL at 20:37

## 2021-04-28 RX ADMIN — CARVEDILOL 3.12 MG: 3.12 TABLET, FILM COATED ORAL at 07:44

## 2021-04-28 RX ADMIN — INSULIN LISPRO 2 UNITS: 100 INJECTION, SOLUTION INTRAVENOUS; SUBCUTANEOUS at 17:29

## 2021-04-28 RX ADMIN — OMEPRAZOLE 20 MG: 20 CAPSULE, DELAYED RELEASE ORAL at 05:42

## 2021-04-28 ASSESSMENT — COGNITIVE AND FUNCTIONAL STATUS - GENERAL
DRESSING REGULAR UPPER BODY CLOTHING: A LITTLE
MOVING TO AND FROM BED TO CHAIR: A LITTLE
PERSONAL GROOMING: A LITTLE
HELP NEEDED FOR BATHING: A LOT
SUGGESTED CMS G CODE MODIFIER MOBILITY: CK
DAILY ACTIVITIY SCORE: 15
MOBILITY SCORE: 18
STANDING UP FROM CHAIR USING ARMS: A LITTLE
MOVING FROM LYING ON BACK TO SITTING ON SIDE OF FLAT BED: A LITTLE
TURNING FROM BACK TO SIDE WHILE IN FLAT BAD: A LITTLE
EATING MEALS: A LITTLE
TOILETING: A LOT
CLIMB 3 TO 5 STEPS WITH RAILING: A LITTLE
WALKING IN HOSPITAL ROOM: A LITTLE
SUGGESTED CMS G CODE MODIFIER DAILY ACTIVITY: CK
DRESSING REGULAR LOWER BODY CLOTHING: A LOT

## 2021-04-28 ASSESSMENT — GAIT ASSESSMENTS
DISTANCE (FEET): 50
ASSISTIVE DEVICE: FRONT WHEEL WALKER
GAIT LEVEL OF ASSIST: MINIMAL ASSIST

## 2021-04-28 ASSESSMENT — PAIN DESCRIPTION - PAIN TYPE: TYPE: ACUTE PAIN

## 2021-04-28 NOTE — PROGRESS NOTES
With patient’s permission, completed daily phone call to designated support daughter Nba.  Discussed patient condition and plan of care. All questions answered.  Follow up requested: Yes. Per daughter, mother uses [facility] at home. Will be at bedside 4/28.

## 2021-04-28 NOTE — DISCHARGE PLANNING
Agency/Facility Name: [facility]  Spoke To: Skye  Outcome: Can take patient when ready for transfer @ 3043

## 2021-04-28 NOTE — PROGRESS NOTES
Notified MD that pt demonstrated some head bobbing with difficulty in balance during ambulation with pt and ot.

## 2021-04-28 NOTE — PROGRESS NOTES
Report called to DUANE Young at 1927, pt brought to room with transport. Pt resting  comfortably in bed prior to leaving. All questions addressed with receiving RN

## 2021-04-28 NOTE — CARE PLAN
Problem: Communication  Goal: The ability to communicate needs accurately and effectively will improve  Outcome: PROGRESSING AS EXPECTED     Problem: Infection  Goal: Will remain free from infection  Outcome: PROGRESSING AS EXPECTED     Problem: Urinary Elimination:  Goal: Ability to reestablish a normal urinary elimination pattern will improve  Outcome: PROGRESSING AS EXPECTED      Patient and co-learner are educated of disease process and condition. Treatment team has included patient in plan of care. All medications indications and side effects are explained. Patient and co-learner (daughter) are encouraged to ask questions. Patient and co-learner indicate understanding.

## 2021-04-28 NOTE — ASSESSMENT & PLAN NOTE
Likely related to underlying overall general medical condition with possibly undiagnosed cognitive impairment.  Avoid benzodiazepines and anticholinergic medications.  Minimize hypnotics, sedatives, narcotics.  Minimize lines.  Avoid physical restraints  Utilize one-on-one sitter.  Daily orientation

## 2021-04-28 NOTE — PALLIATIVE CARE
Palliative Care follow-up  Consult received and EMR reviewed; case discussed with BS RN and MD noting current medical concerns and pt's confusion. Plan made for this RN to reach out to pt's family to discuss goals and POC.      Updated: MD    Plan: formal consult to follow    Thank you for allowing Palliative Care to support this patient and family. Contact [phone] for additional assistance, change in patient status, or with any questions/concerns.

## 2021-04-28 NOTE — PROGRESS NOTES
Received transfer report from [facility] RN, pt care assumed, pt assessment complete. Pt AAOx4 - slow to respond, c/o 0/10 pain at this time. No signs of acute distress noted at this time. POC discussed with pt and daughter and verbalizes no questions. Pt denies any additional needs at this time. Bed in lowest position, bed alarm on - high fall risk - CNA report given. Pt educated on fall risk and verbalized understanding, call light within reach, hourly rounding initiated.

## 2021-04-28 NOTE — DISCHARGE PLANNING
Spoke to Jg @ [facility]    Transport is scheduled for 04/29/21@5019 going to [facility].    Approved Services faxed to [facility] [phone]

## 2021-04-28 NOTE — PROGRESS NOTES
Monitor summary:      Rhythm: Afib - convert to SR @2248  Rate:   Ectopy: rPAC, rPVC, Coup  Measurements: .16/.08/.36      12hr chart check

## 2021-04-28 NOTE — PROGRESS NOTES
Hospital Medicine Daily Progress Note    Date of Service  4/28/2021    Chief Complaint  76 y.o. female admitted 4/22/2021 with syncope and lethargy    Hospital Course  76 y.o. female w/ hx of paroxysmal afib on chronic anticoagulation, macular degeneration, chronic vertigo CAD, prior 3/21 Echo with severe AS, DM, admitted 4/22/2021 with syncope while ambulating to the bathroom. Over the prior days she had reduced oral intake and increase thirst/polyuria. While walking she had lightheadedness and fell losing consciousness. She was admitted with acute renal insufficiency. Her 4/22 Head CT was negative for acute finding. The patient was felt to be intravascularly depleted in setting of aortic stenosis as etiology of her syncope/dizziness.  The patient was transferred to ICU secondary to increased lethargy and hematuria as well as suprapubic tenderness.  Anticoagulation was held, the patient treated for UTI, respiratory difficulty with increased oxygen demand and possible aspiration.    Interval Problem Update  Patient seen and examined today.    Patient tolerating treatment and therapies.  All Data, Medication data reviewed.  Case discussed with nursing as available.  Plan of Care reviewed with patient and notified of changes.  4/27 the patient has afebrile, T-max 98.6, respiration in lower 20s, the patient is on 2 L of oxygen, blood pressure is 157/92, the patient is alert and oriented times 3, she cannot tell me much about what led to her increasing illness, leukocytosis likely improved to 15.8, sodium remains low at 131 fairly unchanged from yesterday, her renal parameters worsened slightly,  Elevated procalcitonin, urine culture with staph epi, blood cultures negative, chest x-ray from yesterday shows interstitial pulmonary parenchymal prominence/edema versus infiltrate, echocardiogram with moderate LVH, moderate to severe aortic stenosis.  4/28: Resting comfortably in bed.  Pleasantly confused.  Respiratory status  stable.  Hemodynamically stable.  Lisinopril held due to worsening kidney functions.  No acute distress noted.  No issues overnight per staff.  Consultants/Specialty  Critical care  Cardiology with plans for future evaluation for TAVR   Code Status  DNAR/DNI    Disposition  To telemetry    Review of Systems  Review of Systems   Unable to perform ROS: Mental acuity        Physical Exam  Temp:  [36.4 °C (97.6 °F)-37.5 °C (99.5 °F)] 36.6 °C (97.9 °F)  Pulse:  [] 69  Resp:  [16-21] 16  BP: (120-148)/(66-76) 144/69  SpO2:  [92 %-98 %] 97 %    Physical Exam  Vitals and nursing note reviewed.   Constitutional:       Appearance: She is well-developed. She is not diaphoretic.   HENT:      Head: Normocephalic and atraumatic.      Nose: Nose normal.   Eyes:      Extraocular Movements: Extraocular movements intact.      Pupils: Pupils are equal, round, and reactive to light.   Neck:      Thyroid: No thyromegaly.      Vascular: No JVD.   Cardiovascular:      Rate and Rhythm: Regular rhythm. Tachycardia present.      Heart sounds: Murmur present. No friction rub. No gallop.    Pulmonary:      Effort: Pulmonary effort is normal.      Breath sounds: Rhonchi and rales present. No wheezing.   Abdominal:      General: Bowel sounds are normal. There is no distension.      Palpations: Abdomen is soft. There is no mass.      Tenderness: There is no abdominal tenderness.   Musculoskeletal:         General: No tenderness. Normal range of motion.      Cervical back: Normal range of motion and neck supple.   Lymphadenopathy:      Cervical: No cervical adenopathy.   Skin:     General: Skin is warm and dry.   Neurological:      Mental Status: She is alert. She is disoriented.      Cranial Nerves: No cranial nerve deficit.   Psychiatric:         Behavior: Behavior is slowed and withdrawn.         Fluids    Intake/Output Summary (Last 24 hours) at 4/28/2021 1601  Last data filed at 4/28/2021 0926  Gross per 24 hour   Intake 480 ml   Output  450 ml   Net 30 ml       Laboratory  Recent Labs     04/26/21  0101 04/27/21  0347 04/28/21  1150   WBC 17.6* 15.8* 11.1*   RBC 5.01 4.62 4.17*   HEMOGLOBIN 14.9 13.5 12.2   HEMATOCRIT 42.3 40.0 35.3*   MCV 84.4 86.6 84.7   MCH 29.7 29.2 29.3   MCHC 35.2* 33.8 34.6   RDW 42.4 44.7 43.0   PLATELETCT 227 223 223   MPV 10.9 11.0 10.3     Recent Labs     04/26/21  0101 04/27/21  0347 04/28/21  1150   SODIUM 131* 131* 131*   POTASSIUM 3.7 3.8 4.4   CHLORIDE 98 100 99   CO2 23 22 25   GLUCOSE 158* 141* 123*   BUN 25* 35* 36*   CREATININE 1.47* 2.13* 1.91*   CALCIUM 9.0 8.0* 7.9*                   Imaging  DX-CHEST-LIMITED (1 VIEW)   Final Result         1.  Interstitial pulmonary parenchymal prominence suggest chronic underlying lung disease, component of interstitial edema and/or infiltrates not excluded.   2.  Atherosclerosis      DX-CHEST-LIMITED (1 VIEW)   Final Result         1.  Pulmonary edema and/or infiltrates are identified, which appear somewhat increased since the prior exam.   2.  Atherosclerosis      CT-RENAL COLIC EVALUATION(A/P W/O)   Final Result      No urolithiasis, hydronephrosis or acute inflammatory abnormalities detected      Above average stool volume      Mild urinary bladder wall thickening most likely is due to incomplete distention although direct visualization may prove helpful if hematuria does not resolve      Small bilateral effusions, left heart enlargement      CT-HEAD W/O   Final Result      1.  Generalized atrophy and chronic microvascular ischemic type changes.   2.  No acute intracranial abnormality.           Assessment/Plan  * Syncope and collapse- (present on admission)  Assessment & Plan  Likely multifactorial, including intravascular volume depletion secondary to polyuria from inadequate insulin use, aortic stenosis, arrhythmia.   Non-contrast head CT negative for acute intracranial process. TSH and vitamin B-12 WNL. Flat trop-T trend. Orthostatic vitals signs positive.   Noted  on 4/26/21 to have episodes of 2nd degree AV block with 3-9 second pauses via tele monitoring.    Plan:  Cardiology following, appreciate recommendations   Close electrolyte monitoring and replacement   PT/OT    Upper GI bleed  Assessment & Plan  Developed nausea and coffee ground emesis on 4/25/21 evening.   4/26 ongoing vomiting but only bile green emesis noted.  Monitor Hgb  Hold on GI consult for now.  PPI BID  Possible Carlota Young tear  Hemodynamically stable.  Holding home apixaban     Acute respiratory failure with hypoxia (HCC)  Assessment & Plan  Likely secondary to aspiration; possible component of pulmonary edema per chest XR findings.  No history of lung disease. Had multiple episodes of coffee ground emesis this evening and developed worsening hypoxia requiring 10 LPM O2. Current workup less concerning for ACS or PE.   Improved with treatment of possible aspiration pneumonia, pulmonary edema, monitor    Cystitis  Assessment & Plan  Developed hematuria, LLQ pain and some suprapubic tenderness 4/24/21-4/25/21. Leukocytosis on CBC. 4/24/21 urine culture grew Staph aureus.      Antibiotic therapy changed on 4/26/21 to empiric C3 and Flagyl for aspiration PNA, this should cover  Apixaban held due to hematuria; consider Urology consult if hematuria worsens or recurs  Monitor leukocytosis  4/28: MSSA in the urine.  Completed course of antibiotics.    Orthostatic hypotension- (present on admission)  Assessment & Plan  This is likely contributory to the patient's presentation of syncope along with other factors such as intravascular volume depletion 2/2 polyuria from unmanaged diabetes. High fall risk.  There could also be a component of autonomic dysfunction in this patient given her age and comorbidities.  Conservative management, e.g. slow position changes, adequate hydration, compression stockings whenever patient is out of bed and ambulating  Cardiology consulted, appreciate recommendations.   Fall  precautions  PT and OT consulted, discharge to SNF recommended    Acute kidney injury superimposed on chronic kidney disease (HCC)- (present on admission)  Assessment & Plan  CKD likely secondary to diabetes.   Cr 1.4 at baseline  RICHARD likely secondary to pre-renal azotemia; question cardiorenal syndrome developing with poor cardiac output with moderate to severe aortic stenosis  4/28: Holding lisinopril.    Lactic acidosis- (present on admission)  Assessment & Plan  Resolved with fluid resuscitation.     Type 2 diabetes mellitus with hyperglycemia, with long-term current use of insulin (HCC)- (present on admission)  Assessment & Plan  Poorly controlled, A1c 12.1 on 4/22/21. Home regimen is metformin 500 mg BID, glargine 25 units qhs, and SSI regular insulin; patient reported that she had not been taking her insulin as prescribed PTA due to issues with insurance coverage.  Complicated by diabetic neuropathy, nephropathy, and retinopathy.   Home metformin held on admission due to RICHARD and lactic acidosis  Glargine 30 units qhs   SSI and Hypoglycemia precautions   Lisinopril 5 mg daily with parameters and as renal functions allows.    Nonrheumatic aortic valve stenosis- (present on admission)  Assessment & Plan  3/11/21 TTE significant for low flow, lo gradient severe AS with normal EF.  Cardiology consulted, close outpatient follow up recommended.   Per Cardiologist, Dr. Larson, no need for PPM at this time, but patient will need TAVR for AS as well as outpatient dobutamine stress echo     Hypertension- (present on admission)  Assessment & Plan  Continue amlodipine and low-dose lisinopril.   Developed arrhythmia on 4/26/21 with 3-9 second pauses; carvedilol held.  Hydralazine PRN if SBP > 180 mmHg.    Delirium- (present on admission)  Assessment & Plan  Likely related to underlying overall general medical condition with possibly undiagnosed cognitive impairment.  Avoid benzodiazepines and anticholinergic  medications.  Minimize hypnotics, sedatives, narcotics.  Minimize lines.  Avoid physical restraints  Utilize one-on-one sitter.  Daily orientation      Aspiration pneumonia (HCC)  Assessment & Plan  Possible, on empiric antibiotics, pulmonary toilet, oxygen weaning    Debility- (present on admission)  Assessment & Plan  The patient lives with her daughter, daughter states that the patient relies on her for ADLs; daughter notes inability to provide support 24-7 due to her job.   PT and OT consulted, discharge to SNF recommended.   SNF referral placed.    GERD (gastroesophageal reflux disease)- (present on admission)  Assessment & Plan  TUMS, antiemetics and GI cocktail PRN.   Developed nausea and coffee ground emesis on 4/25/21 evening; H and H stable; continue PPI, continue to observe    Essential tremor- (present on admission)  Assessment & Plan  Had an anxiety-driven episode of diffuse tremors that resolved with reassurance.  Chronic issue per chart review.    Paroxysmal atrial fibrillation (HCC)- (present on admission)  Assessment & Plan  On apixaban outpatient.   Monitor telemetry     Hypokalemia- (present on admission)  Assessment & Plan  CTM, replete PRN for goal of K > 4.     CAD (coronary artery disease)- (present on admission)  Assessment & Plan  History of STEMI in 2016 s.p. PCI to LAD, not on antiplatelets due to concurrent apixaban use for PAF, fall risk. Not on secondary CAD medical management.  Carvedilol held due to AV block   Lisinopril 5 mg daily, titrate up PRN  Amlodipine 5 mg daily   Close outpatient Cardiology follow up  VTE prophylaxis: On hold secondary to hematuria, SCD

## 2021-04-29 VITALS
OXYGEN SATURATION: 97 % | HEART RATE: 69 BPM | RESPIRATION RATE: 18 BRPM | BODY MASS INDEX: 24.65 KG/M2 | WEIGHT: 147.93 LBS | DIASTOLIC BLOOD PRESSURE: 74 MMHG | SYSTOLIC BLOOD PRESSURE: 102 MMHG | HEIGHT: 65 IN | TEMPERATURE: 97.7 F

## 2021-04-29 PROBLEM — J69.0 ASPIRATION PNEUMONIA (HCC): Status: RESOLVED | Noted: 2021-04-27 | Resolved: 2021-04-29

## 2021-04-29 PROBLEM — I95.1 ORTHOSTATIC HYPOTENSION: Status: RESOLVED | Noted: 2021-04-23 | Resolved: 2021-04-29

## 2021-04-29 PROBLEM — K92.2 UPPER GI BLEED: Status: RESOLVED | Noted: 2021-04-26 | Resolved: 2021-04-29

## 2021-04-29 PROBLEM — E87.20 LACTIC ACIDOSIS: Status: RESOLVED | Noted: 2021-04-22 | Resolved: 2021-04-29

## 2021-04-29 PROBLEM — N17.9 ACUTE KIDNEY INJURY SUPERIMPOSED ON CHRONIC KIDNEY DISEASE (HCC): Status: RESOLVED | Noted: 2021-04-22 | Resolved: 2021-04-29

## 2021-04-29 PROBLEM — N18.9 ACUTE KIDNEY INJURY SUPERIMPOSED ON CHRONIC KIDNEY DISEASE (HCC): Status: RESOLVED | Noted: 2021-04-22 | Resolved: 2021-04-29

## 2021-04-29 LAB
ALBUMIN SERPL BCP-MCNC: 2.3 G/DL (ref 3.2–4.9)
ALBUMIN/GLOB SERPL: 0.7 G/DL
ALP SERPL-CCNC: 60 U/L (ref 30–99)
ALT SERPL-CCNC: 13 U/L (ref 2–50)
ANION GAP SERPL CALC-SCNC: 7 MMOL/L (ref 7–16)
AST SERPL-CCNC: 14 U/L (ref 12–45)
BASOPHILS # BLD AUTO: 0.4 % (ref 0–1.8)
BASOPHILS # BLD: 0.03 K/UL (ref 0–0.12)
BILIRUB SERPL-MCNC: 0.3 MG/DL (ref 0.1–1.5)
BUN SERPL-MCNC: 31 MG/DL (ref 8–22)
CALCIUM SERPL-MCNC: 8.2 MG/DL (ref 8.5–10.5)
CHLORIDE SERPL-SCNC: 102 MMOL/L (ref 96–112)
CO2 SERPL-SCNC: 24 MMOL/L (ref 20–33)
CREAT SERPL-MCNC: 1.71 MG/DL (ref 0.5–1.4)
EOSINOPHIL # BLD AUTO: 0.32 K/UL (ref 0–0.51)
EOSINOPHIL NFR BLD: 3.9 % (ref 0–6.9)
ERYTHROCYTE [DISTWIDTH] IN BLOOD BY AUTOMATED COUNT: 42 FL (ref 35.9–50)
GLOBULIN SER CALC-MCNC: 3.3 G/DL (ref 1.9–3.5)
GLUCOSE BLD-MCNC: 127 MG/DL (ref 65–99)
GLUCOSE BLD-MCNC: 77 MG/DL (ref 65–99)
GLUCOSE SERPL-MCNC: 83 MG/DL (ref 65–99)
HCT VFR BLD AUTO: 37.2 % (ref 37–47)
HGB BLD-MCNC: 12.9 G/DL (ref 12–16)
IMM GRANULOCYTES # BLD AUTO: 0.05 K/UL (ref 0–0.11)
IMM GRANULOCYTES NFR BLD AUTO: 0.6 % (ref 0–0.9)
LYMPHOCYTES # BLD AUTO: 1.18 K/UL (ref 1–4.8)
LYMPHOCYTES NFR BLD: 14.4 % (ref 22–41)
MAGNESIUM SERPL-MCNC: 2 MG/DL (ref 1.5–2.5)
MCH RBC QN AUTO: 29.1 PG (ref 27–33)
MCHC RBC AUTO-ENTMCNC: 34.7 G/DL (ref 33.6–35)
MCV RBC AUTO: 84 FL (ref 81.4–97.8)
MONOCYTES # BLD AUTO: 1.15 K/UL (ref 0–0.85)
MONOCYTES NFR BLD AUTO: 14 % (ref 0–13.4)
NEUTROPHILS # BLD AUTO: 5.47 K/UL (ref 2–7.15)
NEUTROPHILS NFR BLD: 66.7 % (ref 44–72)
NRBC # BLD AUTO: 0 K/UL
NRBC BLD-RTO: 0 /100 WBC
PHOSPHATE SERPL-MCNC: 3.3 MG/DL (ref 2.5–4.5)
PLATELET # BLD AUTO: 236 K/UL (ref 164–446)
PMV BLD AUTO: 10.1 FL (ref 9–12.9)
POTASSIUM SERPL-SCNC: 4.4 MMOL/L (ref 3.6–5.5)
PROT SERPL-MCNC: 5.6 G/DL (ref 6–8.2)
RBC # BLD AUTO: 4.43 M/UL (ref 4.2–5.4)
SODIUM SERPL-SCNC: 133 MMOL/L (ref 135–145)
WBC # BLD AUTO: 8.2 K/UL (ref 4.8–10.8)

## 2021-04-29 PROCEDURE — A9270 NON-COVERED ITEM OR SERVICE: HCPCS | Performed by: STUDENT IN AN ORGANIZED HEALTH CARE EDUCATION/TRAINING PROGRAM

## 2021-04-29 PROCEDURE — 85025 COMPLETE CBC W/AUTO DIFF WBC: CPT

## 2021-04-29 PROCEDURE — 36415 COLL VENOUS BLD VENIPUNCTURE: CPT

## 2021-04-29 PROCEDURE — A9270 NON-COVERED ITEM OR SERVICE: HCPCS | Performed by: FAMILY MEDICINE

## 2021-04-29 PROCEDURE — A9270 NON-COVERED ITEM OR SERVICE: HCPCS | Performed by: HOSPITALIST

## 2021-04-29 PROCEDURE — 83735 ASSAY OF MAGNESIUM: CPT

## 2021-04-29 PROCEDURE — 84100 ASSAY OF PHOSPHORUS: CPT

## 2021-04-29 PROCEDURE — 700102 HCHG RX REV CODE 250 W/ 637 OVERRIDE(OP): Performed by: STUDENT IN AN ORGANIZED HEALTH CARE EDUCATION/TRAINING PROGRAM

## 2021-04-29 PROCEDURE — 80053 COMPREHEN METABOLIC PANEL: CPT

## 2021-04-29 PROCEDURE — 700102 HCHG RX REV CODE 250 W/ 637 OVERRIDE(OP): Performed by: HOSPITALIST

## 2021-04-29 PROCEDURE — 99239 HOSP IP/OBS DSCHRG MGMT >30: CPT | Performed by: FAMILY MEDICINE

## 2021-04-29 PROCEDURE — 82962 GLUCOSE BLOOD TEST: CPT | Mod: 91

## 2021-04-29 PROCEDURE — 700102 HCHG RX REV CODE 250 W/ 637 OVERRIDE(OP): Performed by: FAMILY MEDICINE

## 2021-04-29 RX ORDER — CEFDINIR 300 MG/1
300 CAPSULE ORAL DAILY
Qty: 2 CAPSULE | Refills: 0 | Status: SHIPPED | OUTPATIENT
Start: 2021-04-30 | End: 2021-05-02

## 2021-04-29 RX ORDER — OMEPRAZOLE 20 MG/1
20 CAPSULE, DELAYED RELEASE ORAL 2 TIMES DAILY
Qty: 30 CAPSULE | Status: SHIPPED
Start: 2021-04-29 | End: 2021-06-21

## 2021-04-29 RX ORDER — AMLODIPINE BESYLATE 5 MG/1
5 TABLET ORAL DAILY
Qty: 30 TABLET | Refills: 0 | Status: SHIPPED | OUTPATIENT
Start: 2021-04-29

## 2021-04-29 RX ORDER — CARVEDILOL 3.12 MG/1
3.12 TABLET ORAL 2 TIMES DAILY WITH MEALS
Qty: 60 TABLET | Refills: 0 | Status: SHIPPED | OUTPATIENT
Start: 2021-04-29 | End: 2021-12-22 | Stop reason: SDUPTHER

## 2021-04-29 RX ORDER — LISINOPRIL 5 MG/1
5 TABLET ORAL DAILY
Qty: 30 TABLET | Refills: 0 | Status: ON HOLD | OUTPATIENT
Start: 2021-04-29 | End: 2021-11-24

## 2021-04-29 RX ADMIN — ACETAMINOPHEN 650 MG: 325 TABLET ORAL at 11:35

## 2021-04-29 RX ADMIN — OMEPRAZOLE 20 MG: 20 CAPSULE, DELAYED RELEASE ORAL at 05:02

## 2021-04-29 RX ADMIN — SERTRALINE HYDROCHLORIDE 50 MG: 50 TABLET ORAL at 05:02

## 2021-04-29 RX ADMIN — CALCIUM CARBONATE 500 MG: 500 TABLET, CHEWABLE ORAL at 11:32

## 2021-04-29 RX ADMIN — CARVEDILOL 3.12 MG: 3.12 TABLET, FILM COATED ORAL at 07:19

## 2021-04-29 RX ADMIN — CEFDINIR 300 MG: 300 CAPSULE ORAL at 05:02

## 2021-04-29 RX ADMIN — CALCIUM CARBONATE 500 MG: 500 TABLET, CHEWABLE ORAL at 07:19

## 2021-04-29 ASSESSMENT — PAIN DESCRIPTION - PAIN TYPE
TYPE: ACUTE PAIN
TYPE: ACUTE PAIN

## 2021-04-29 NOTE — THERAPY
Physical Therapy   Daily Treatment     Patient Name: Amrita Costa Elder  Age:  76 y.o., Sex:  female  Medical Record #: [id number]  Today's Date: 4/28/2021     Precautions: Fall Risk    Assessment    Pt was pleasantly confused but agreeable to therapy. Up in restroom with OT upon arrival. She demonstrated delayed responses but followed commands appropriately. She demonstrates tremors full body and posterior lean with standing and during gait training. She required Joseph for gait with balance. Slow pace with increased farideh, requiring Joseph due to tremor and intermittent posterior lean. Rn notified of mobility and will benefit from continued acute skilled therapy.     Plan    Continue current treatment plan.    DC Equipment Recommendations: None  Discharge Recommendations: Recommend post-acute placement for additional physical therapy services prior to discharge home       04/28/21 1458   Balance   Sitting Balance (Static) Fair   Sitting Balance (Dynamic) Fair -   Standing Balance (Static) Fair -   Standing Balance (Dynamic) Poor +   Weight Shift Sitting Fair   Weight Shift Standing Fair   Comments occassional posterior lean and tremor.    Gait Analysis   Gait Level Of Assist Minimal Assist   Assistive Device Front Wheel Walker   Distance (Feet) 50   # of Times Distance was Traveled 1   Deviation   (tremor, full body )   Skilled Intervention Verbal Cuing   Comments slow pace increased base of support. Occassional posterior lean with tremor. But no overt lob at this time    Bed Mobility    Supine to Sit   (up wiht OT upon arrival )   Sit to Supine   (left up in chair )   Scooting Supervised   Functional Mobility   Sit to Stand Minimal Assist   Bed, Chair, Wheelchair Transfer Minimal Assist   Skilled Intervention Verbal Cuing;Sequencing;Compensatory Strategies   Comments Joseph with fww slight posterior lob   Short Term Goals    Short Term Goal # 1 Pt will perform bed mobility with mod indep in 6 visits.   Goal Outcome # 1 goal  not met   Short Term Goal # 2 Pt will transfer with mod indep in 6 visits to improve functional indep.    Goal Outcome # 2 Goal not met   Short Term Goal # 3 Pt will ambulate x 125 feet using FWW with supervision in 6 visits to improve functional indep.    Goal Outcome # 3 Goal not met   Short Term Goal # 4 Pt will go up/down 2 stairs with supervision in 6 visits to access home.    Goal Outcome # 4 Goal not met

## 2021-04-29 NOTE — DISCHARGE SUMMARY
Discharge Summary    CHIEF COMPLAINT ON ADMISSION  Chief Complaint   Patient presents with   • Head Injury     Pt got out of bed took a few steps then got dizzy and fell hitting her head on dresser, contusion to R side of head no bleeding. Possible LOC. Pt is on blood thinners. . GCS 15, VSS on RA.    • GLF     R/T dizziness   • Weakness     Generalized X 3 days       Reason for Admission  EMS TBI     CODE STATUS  DNAR/DNI    HPI & HOSPITAL COURSE  76 y.o. female w/ hx of paroxysmal afib on chronic anticoagulation, macular degeneration, chronic vertigo CAD, prior 3/21 Echo with severe AS, DM, chronic kidney disease admitted 4/22/2021 with syncope while ambulating to the bathroom. Over the prior days she had reduced oral intake and increase thirst/polyuria. While walking she had lightheadedness and fell losing consciousness. She was admitted with acute renal insufficiency. Her 4/22 Head CT was negative for acute finding. The patient was felt to be intravascularly depleted in setting of aortic stenosis as etiology of her syncope/dizziness.  The patient was transferred to ICU secondary to increased lethargy and hematuria as well as suprapubic tenderness.  Anticoagulation was held, the patient treated for UTI, respiratory difficulty with increased oxygen demand and possible aspiration.    Hematuria resolved.  Cardiology consulted.  [place] that her syncope is related to dehydration from hyperglycemia and aortic stenosis.  Recommended to continue Coreg.  Amlodipine was added.  Cardiology recommended outpatient follow-up for dobutamine stress echocardiogram to evaluate aortic stenosis.  Most recent echocardiogram showed moderate concentric left ventricular hypertrophy with ejection fraction estimated to be around 60% and moderate aortic stenosis with transvalvular gradient peak at 43 mmHg and mean of 28 mmHg.  No other valvular abnormalities.  Patient then developed coffee-ground emesis so Eliquis was held and patient  was started on PPI twice daily.  That resolved and H&H continue to be stable.  Was on antibiotics for cystitis.  Blood pressure continued to be stable.  Patient transferred outside of ICU.  Kidney functions improved and were close to baseline.  Noted to have delirium occasionally during this hospital stay felt to be secondary to underlying overall general medical condition and elderly patient with possibly an diagnosed cognitive impairment.  Was evaluated by physical outpatient therapy who recommended postacute care placement.  Referral was sent and patient was accepted. Patient was cleared for discharge from medical standpoint.      Therefore, she is discharged in guarded and stable condition to skilled nursing facility.    The patient met 2-midnight criteria for an inpatient stay at the time of discharge.      FOLLOW UP ITEMS POST DISCHARGE  Follow-up with MD at [facility] as per recommendations  Follow-up with cardiology as per recommendations    DISCHARGE DIAGNOSES  Principal Problem (Resolved):    Syncope and collapse POA: Yes  Active Problems:    Cystitis POA: No    Acute respiratory failure with hypoxia (HCC) POA: No    Hypertension POA: Yes    Nonrheumatic aortic valve stenosis POA: Yes    Type 2 diabetes mellitus with hyperglycemia, with long-term current use of insulin (HCC) POA: Yes    Delirium POA: Yes    CAD (coronary artery disease) POA: Yes    Paroxysmal atrial fibrillation (HCC) POA: Yes    Essential tremor POA: Yes    GERD (gastroesophageal reflux disease) (Chronic) POA: Yes    Debility (Chronic) POA: Yes  Resolved Problems:    Acute kidney injury superimposed on chronic kidney disease (HCC) POA: Yes    Orthostatic hypotension POA: Yes    Upper GI bleed POA: Clinically Undetermined    Lactic acidosis POA: Yes    Aspiration pneumonia (HCC) POA: Unknown    Hypokalemia POA: Yes      FOLLOW UP  Future Appointments   Date Time Provider Department Center   5/6/2021  2:15 PM DEANDRE Disla [facility]  None     Fitz Turpin D.O.  [address]  [place] [address]  [phone]    Call  Please call your Primary Care Provider to schedule a follow up appointment. Thank You.    [facility] [place] [facility] [place] [address]  [phone]          MEDICATIONS ON DISCHARGE     Medication List      START taking these medications      Instructions   carvedilol 3.125 MG Tabs  Commonly known as: COREG   Take 1 tablet by mouth 2 times a day with meals.  Dose: 3.125 mg     cefdinir 300 MG Caps  Start taking on: April 30, 2021  Commonly known as: OMNICEF   Take 1 capsule by mouth every day for 2 days.  Dose: 300 mg     insulin lispro 100 UNIT/ML Sopn injection PEN  Commonly known as: HumaLOG   Inject 2 Units under the skin 3 times a day before meals. Inject 2-9 units under skin 3 times a day before meals depending on blood sugar measurements  Dose: 2 Units     lisinopril 5 MG Tabs  Commonly known as: PRINIVIL   Take 1 tablet by mouth every day.  Dose: 5 mg     NovoLOG FlexPen 100 UNIT/ML injection PEN  Generic drug: insulin aspart   Inject 2 Units under the skin 3 times a day before meals. Inject 2-9 units under skin 3 times a day before meals depending on blood sugar measurements  Dose: 2 Units     omeprazole 20 MG delayed-release capsule  Commonly known as: PRILOSEC   Take 1 capsule by mouth 2 times a day.  Dose: 20 mg        CHANGE how you take these medications      Instructions   amLODIPine 5 MG Tabs  What changed: how much to take  Commonly known as: NORVASC   Take 1 tablet by mouth every day.  Dose: 5 mg        CONTINUE taking these medications      Instructions   Eliquis 5mg Tabs  Generic drug: apixaban   TAKE 1 TABLET BY MOUTH TWICE A DAY     ferrous sulfate 325 (65 Fe) MG tablet   Take 325 mg by mouth every 7 days. Indications: supplement  Dose: 325 mg     Lantus 100 UNIT/ML Soln  Generic drug: insulin glargine   Inject 25 Units under the skin every evening.  Dose: 25 Units     NovoLIN  R 100 Unit/mL Soln  Generic drug: insulin regular   Doctor's comments: Per medication reconciliation  Inject 2-8 Units as instructed 4 times a day. PER SLIDING SCALE  150-200 = 2 UNITS  201-250 = 4 UNITS  251-300 = 6 UNITS  301-350 = 8 UNITS  >400 CALL MD  Dose: 2-8 Units     sertraline 50 MG Tabs  Commonly known as: Zoloft   Take 50 mg by mouth every day. Indications: Major Depressive Disorder  Dose: 50 mg     traZODone 50 MG Tabs  Commonly known as: DESYREL   Take 50 mg by mouth every bedtime. Indications: Trouble Sleeping  Dose: 50 mg     vitamin D (Ergocalciferol) 1.25 MG (59686 UT) Caps capsule  Commonly known as: DRISDOL   Take 50,000 Units by mouth every Sunday.  Dose: 50,000 Units        STOP taking these medications    metFORMIN  MG Tb24  Commonly known as: GLUCOPHAGE XR            Allergies  Allergies   Allergen Reactions   • Pcn [Penicillins] Hives, Shortness of Breath and Swelling     Tolerated ceftriaxone April 2021   • Sulfa Drugs Hives, Shortness of Breath and Swelling       DIET  Orders Placed This Encounter   Procedures   • Diet Order Diet: Level 5 - Minced and Moist; Liquid level: Level 0 - Thin; Second Modifier: (optional): Consistent CHO (Diabetic)     Standing Status:   Standing     Number of Occurrences:   1     Order Specific Question:   Diet:     Answer:   Level 5 - Minced and Moist [24]     Order Specific Question:   Liquid level     Answer:   Level 0 - Thin     Order Specific Question:   Second Modifier: (optional)     Answer:   Consistent CHO (Diabetic) [4]       ACTIVITY  As tolerated.  Weight bearing as tolerated    LINES, DRAINS, AND WOUNDS  This is an automated list. Peripheral IVs will be removed prior to discharge.  Peripheral IV 04/28/21 22 G Anterior;Distal;Right Forearm (Active)   Site Assessment Clean;Dry;Intact 04/29/21 0800   Dressing Type Occlusive;Transparent 04/29/21 0800   Line Status Flushed;Scrubbed the hub prior to access 04/29/21 0800   Dressing Status  Clean;Dry;Intact 04/29/21 0800   Dressing Intervention N/A 04/29/21 0800   Infiltration Grading ([facility] 0 04/29/21 0800   Phlebitis Scale ([facility] Only) 0 04/29/21 0800     External Urinary Catheter (Active)   Collection Container Suction container 04/28/21 0800   Suction Level (Female Wick Catheter) 80 mmHg 04/27/21 2000   Output (mL) 120 mL 04/28/21 0800         Peripheral IV 04/28/21 22 G Anterior;Distal;Right Forearm (Active)   Site Assessment Clean;Dry;Intact 04/29/21 0800   Dressing Type Occlusive;Transparent 04/29/21 0800   Line Status Flushed;Scrubbed the hub prior to access 04/29/21 0800   Dressing Status Clean;Dry;Intact 04/29/21 0800   Dressing Intervention N/A 04/29/21 0800   Infiltration Grading ([facility] 0 04/29/21 0800   Phlebitis Scale ([facility] Only) 0 04/29/21 0800               MENTAL STATUS ON TRANSFER  Level of Consciousness: Alert          CONSULTATIONS  Cardiology  Critical care    PROCEDURES  None    LABORATORY  Lab Results   Component Value Date    SODIUM 133 (L) 04/29/2021    POTASSIUM 4.4 04/29/2021    CHLORIDE 102 04/29/2021    CO2 24 04/29/2021    GLUCOSE 83 04/29/2021    BUN 31 (H) 04/29/2021    CREATININE 1.71 (H) 04/29/2021        Lab Results   Component Value Date    WBC 8.2 04/29/2021    HEMOGLOBIN 12.9 04/29/2021    HEMATOCRIT 37.2 04/29/2021    PLATELETCT 236 04/29/2021        Total time of the discharge process exceeds 33 minutes.

## 2021-04-29 NOTE — CARE PLAN
Problem: Nutritional:  Goal: Achieve adequate nutritional intake  Description: Patient will consume 50-75% or > of meals  Outcome: MET   Eating >50% majority of meals - pt eating adequately at this time.  RD to monitor per department policy.

## 2021-04-29 NOTE — PROGRESS NOTES
Report received at bedside, pt care assumed, tele box on. Pt aaox4, no signs of distress noted at this time. Patient resting comfortably in bed. POC discussed with pt and verbalizes no questions. Pt c/o of no pain. Pt denies any additional needs at this time. Bed in lowest position, pt educated on fall risk and verbalized understanding, call light within reach, bed alarm plugged in and on. Will continue hourly rounding.

## 2021-04-29 NOTE — DISCHARGE INSTRUCTIONS
Discharge Instructions    Discharged to other by medical transportation with escort. Discharged via wheelchair, hospital escort: Yes.  Special equipment needed: Oxygen    Be sure to schedule a follow-up appointment with your primary care doctor or any specialists as instructed.     Discharge Plan:   Diet Plan: Discussed  Activity Level: Discussed  Confirmed Follow up Appointment: Patient to Call and Schedule Appointment  Confirmed Symptoms Management: Discussed  Medication Reconciliation Updated: Yes    I understand that a diet low in cholesterol, fat, and sodium is recommended for good health. Unless I have been given specific instructions below for another diet, I accept this instruction as my diet prescription.   Other diet: Cardiac and Diabetic healthy diet    Special Instructions: None    · Is patient discharged on Warfarin / Coumadin?   No     Depression / Suicide Risk    As you are discharged from this [facility] facility, it is important to learn how to keep safe from harming yourself.    Recognize the warning signs:  · Abrupt changes in personality, positive or negative- including increase in energy   · Giving away possessions  · Change in eating patterns- significant weight changes-  positive or negative  · Change in sleeping patterns- unable to sleep or sleeping all the time   · Unwillingness or inability to communicate  · Depression  · Unusual sadness, discouragement and loneliness  · Talk of wanting to die  · Neglect of personal appearance   · Rebelliousness- reckless behavior  · Withdrawal from people/activities they love  · Confusion- inability to concentrate     If you or a loved one observes any of these behaviors or has concerns about self-harm, here's what you can do:  · Talk about it- your feelings and reasons for harming yourself  · Remove any means that you might use to hurt yourself (examples: pills, rope, extension cords, firearm)  · Get professional help from the community (Mental Health,  Substance Abuse, psychological counseling)  · Do not be alone:Call your Safe Contact- someone whom you trust who will be there for you.  · Call your local CRISIS HOTLINE 141-3200 or 995-484-1010  · Call your local Children's Mobile Crisis Response Team [place] (973) 855-5323 or www.Groupize.com  · Call the toll free National Suicide Prevention Hotlines   · National Suicide Prevention Lifeline 995-629-OPVZ (7259)  · [employer] Hope Line Network 800-SUICIDE (825-6433)      Syncope  Syncope is when you pass out (faint) for a short time. It is caused by a sudden decrease in blood flow to the brain. Signs that you may be about to pass out include:  · Feeling dizzy or light-headed.  · Feeling sick to your stomach (nauseous).  · Seeing all white or all black.  · Having cold, clammy skin.  If you pass out, get help right away. Call your local emergency services (911 in the [place] Do not drive yourself to the hospital.  Follow these instructions at home:  Watch for any changes in your symptoms. Take these actions to stay safe and help with your symptoms:  Lifestyle  · Do not drive, use machinery, or play sports until your doctor says it is okay.  · Do not drink alcohol.  · Do not use any products that contain nicotine or tobacco, such as cigarettes and e-cigarettes. If you need help quitting, ask your doctor.  · Drink enough fluid to keep your pee (urine) pale yellow.  General instructions  · Take over-the-counter and prescription medicines only as told by your doctor.  · If you are taking blood pressure or heart medicine, sit up and stand up slowly. Spend a few minutes getting ready to sit and then stand. This can help you feel less dizzy.  · Have someone stay with you until you feel stable.  · If you start to feel like you might pass out, lie down right away and raise (elevate) your feet above the level of your heart. Breathe deeply and steadily. Wait until all of the symptoms are gone.  · Keep all follow-up visits  as told by your doctor. This is important.  Get help right away if:  · You have a very bad headache.  · You pass out once or more than once.  · You have pain in your chest, belly, or back.  · You have a very fast or uneven heartbeat (palpitations).  · It hurts to breathe.  · You are bleeding from your mouth or your bottom (rectum).  · You have black or tarry poop (stool).  · You have jerky movements that you cannot control (seizure).  · You are confused.  · You have trouble walking.  · You are very weak.  · You have vision problems.  These symptoms may be an emergency. Do not wait to see if the symptoms will go away. Get medical help right away. Call your local emergency services (911 in the [place] Do not drive yourself to the hospital.  Summary  · Syncope is when you pass out (faint) for a short time. It is caused by a sudden decrease in blood flow to the brain.  · Signs that you may be about to faint include feeling dizzy, light-headed, or sick to your stomach, seeing all white or all black, or having cold, clammy skin.  · If you start to feel like you might pass out, lie down right away and raise (elevate) your feet above the level of your heart. Breathe deeply and steadily. Wait until all of the symptoms are gone.  This information is not intended to replace advice given to you by your health care provider. Make sure you discuss any questions you have with your health care provider.  Document Released: 06/05/2009 Document Revised: 01/30/2019 Document Reviewed: 01/30/2019  [employer] Patient Education © 2020 [employer]

## 2021-04-29 NOTE — THERAPY
"Occupational Therapy  Daily Treatment     Patient Name: Amrita Costa Elder  Age:  76 y.o., Sex:  female  Medical Record #: [id number]  Today's Date: 4/28/2021       Precautions: Fall Risk  Comments: pt had mild head tremors & reported feeling light headed at times    Assessment    Pt seen for OT tx.  Pt was incontinent of B&B in bed.  Pt required Min A to amb with FWW to bathroom.  Pt noted to have multiple mild LOB both in standing & sitting.  Pt appeared more confused & had difficulty with word finding.  Pt also noted to have some head bobbing as well?   Unsure if pt having sundowning with increased confusion in PM?   Pt will need Post Acute OT services.    Plan    Continue current treatment plan.    DC Equipment Recommendations: Unable to determine at this time  Discharge Recommendations: Recommend post-acute placement for additional occupational therapy services prior to discharge home    Subjective    \"I'll be alright, I'm always dizzy\"     Objective       04/28/21 1428   Cognition    Cognition / Consciousness X   Speech/ Communication Delayed Responses   Level of Consciousness Alert   Ability To Follow Commands 1 Step   Safety Awareness Impaired   New Learning Impaired   Attention Impaired   Comments Pt seen in pm & had noted confusion, poor recall of new learning & had word finding difficulty   Neuro-Muscular Treatments   Comments pt appeared to have multiple mild LOB seated on toilet & while amb to toilet   Balance   Sitting Balance (Static) Fair   Sitting Balance (Dynamic) Fair -   Standing Balance (Static) Fair -   Standing Balance (Dynamic) Poor +   Weight Shift Sitting Fair   Weight Shift Standing Fair   Comments pt did have mild head tremors both in sitting & standing   Bed Mobility    Supine to Sit Minimal Assist   Sit to Supine   (pt left sitting up in chair)   Scooting Supervised   Rolling Minimal Assist to Rt.   Activities of Daily Living   Eating Supervision   Grooming Minimal Assist;Standing   Upper " Body Dressing Minimal Assist   Lower Body Dressing Maximal Assist   Toileting Maximal Assist   Functional Mobility   Sit to Stand Minimal Assist   Bed, Chair, Wheelchair Transfer Minimal Assist   Toilet Transfers Minimal Assist   Mobility pt amb with FWW & Min A to bathroom   Patient / Family Goals   Patient / Family Goal #1 to return home   Goal #1 Outcome Goal not met   Short Term Goals   Short Term Goal # 1 supervised with LB dressing   Goal Outcome # 1 Goal not met   Short Term Goal # 2 supervised with ADL txfs   Goal Outcome # 2 Goal not met          Yes

## 2021-04-29 NOTE — DISCHARGE PLANNING
Anticipated Discharge Disposition: [facility]    Action: spoke w/ Dr. Blackman during round, pt possibly medically cleared tomorrow. [facility] transport is scheduled 13:30 to [facility] tomorrow due to pt's confusion and needing O2 during transport. Faxed approved services to [facility] [phone]. Fax confirmation received    Barriers to Discharge: medical clearance    Plan: [facility]

## 2021-04-29 NOTE — PROGRESS NOTES
Pt ANO4. Patient given discharge instructions regarding follow up appointment, diet, activity, prescriptions, and when to call a provider. Patient demonstrated verbal understanding of information given. Tele box off, IV discharged, transported in wheel chair off of the unit with all belongings by [facility] transport services. Photo copy ID taken of transporter due to pt intermittent confusion and dementia.

## 2021-04-29 NOTE — DISCHARGE PLANNING
Anticipated Discharge Disposition: [facility]    Action: transfer packet w/ COBRA completed. Notified daughter Nba [phone] of transfer. 2nd IMM obtained from daughter. This writer unable to scan into epic media. Requested assistance from DPA to scan 2nd IMM to [facility]    Barriers to Discharge: none    Plan: [facility] via [facility] 13:30 today

## 2021-05-06 ENCOUNTER — OFFICE VISIT (OUTPATIENT)
Dept: CARDIOLOGY | Facility: MEDICAL CENTER | Age: 76
End: 2021-05-06
Payer: MEDICARE

## 2021-05-06 VITALS
OXYGEN SATURATION: 95 % | SYSTOLIC BLOOD PRESSURE: 154 MMHG | BODY MASS INDEX: 27.49 KG/M2 | HEART RATE: 82 BPM | DIASTOLIC BLOOD PRESSURE: 86 MMHG | RESPIRATION RATE: 14 BRPM | HEIGHT: 65 IN | WEIGHT: 165 LBS

## 2021-05-06 DIAGNOSIS — D68.318 ACQUIRED CIRCULATING ANTICOAGULANTS (HCC): ICD-10-CM

## 2021-05-06 DIAGNOSIS — E11.65 TYPE 2 DIABETES MELLITUS WITH HYPERGLYCEMIA, WITH LONG-TERM CURRENT USE OF INSULIN (HCC): ICD-10-CM

## 2021-05-06 DIAGNOSIS — R53.81 DEBILITY: Chronic | ICD-10-CM

## 2021-05-06 DIAGNOSIS — I10 ESSENTIAL HYPERTENSION: ICD-10-CM

## 2021-05-06 DIAGNOSIS — I48.0 PAROXYSMAL ATRIAL FIBRILLATION (HCC): ICD-10-CM

## 2021-05-06 DIAGNOSIS — N18.32 STAGE 3B CHRONIC KIDNEY DISEASE: ICD-10-CM

## 2021-05-06 DIAGNOSIS — E78.5 DYSLIPIDEMIA: ICD-10-CM

## 2021-05-06 DIAGNOSIS — Z79.4 TYPE 2 DIABETES MELLITUS WITH HYPERGLYCEMIA, WITH LONG-TERM CURRENT USE OF INSULIN (HCC): ICD-10-CM

## 2021-05-06 DIAGNOSIS — I35.0 NONRHEUMATIC AORTIC VALVE STENOSIS: ICD-10-CM

## 2021-05-06 DIAGNOSIS — I25.10 CORONARY ARTERY DISEASE INVOLVING NATIVE HEART WITHOUT ANGINA PECTORIS, UNSPECIFIED VESSEL OR LESION TYPE: ICD-10-CM

## 2021-05-06 PROBLEM — R11.2 NON-INTRACTABLE VOMITING WITH NAUSEA: Status: RESOLVED | Noted: 2019-08-15 | Resolved: 2021-05-06

## 2021-05-06 PROBLEM — R41.0 DELIRIUM: Status: RESOLVED | Noted: 2021-04-28 | Resolved: 2021-05-06

## 2021-05-06 PROBLEM — R10.84 GENERALIZED ABDOMINAL PAIN: Status: RESOLVED | Noted: 2019-08-15 | Resolved: 2021-05-06

## 2021-05-06 PROBLEM — J96.01 ACUTE RESPIRATORY FAILURE WITH HYPOXIA (HCC): Status: RESOLVED | Noted: 2021-04-26 | Resolved: 2021-05-06

## 2021-05-06 PROBLEM — E83.42 HYPOMAGNESEMIA: Status: RESOLVED | Noted: 2019-08-24 | Resolved: 2021-05-06

## 2021-05-06 PROBLEM — E87.1 HYPONATREMIA: Status: RESOLVED | Noted: 2018-10-13 | Resolved: 2021-05-06

## 2021-05-06 LAB — EKG IMPRESSION: NORMAL

## 2021-05-06 PROCEDURE — 93000 ELECTROCARDIOGRAM COMPLETE: CPT | Performed by: INTERNAL MEDICINE

## 2021-05-06 PROCEDURE — 99214 OFFICE O/P EST MOD 30 MIN: CPT | Performed by: NURSE PRACTITIONER

## 2021-05-06 RX ORDER — FUROSEMIDE 20 MG/1
20 TABLET ORAL DAILY
Qty: 30 TABLET | Refills: 11 | Status: SHIPPED | OUTPATIENT
Start: 2021-05-06 | End: 2021-05-20

## 2021-05-06 RX ORDER — SYRING-NEEDL,DISP,INSUL,0.3 ML 31GX15/64"
SYRINGE, EMPTY DISPOSABLE MISCELLANEOUS
COMMUNITY
Start: 2021-04-02 | End: 2021-11-16

## 2021-05-06 RX ORDER — METFORMIN HYDROCHLORIDE 500 MG/1
TABLET, EXTENDED RELEASE ORAL
COMMUNITY
Start: 2021-04-30 | End: 2021-05-29

## 2021-05-06 ASSESSMENT — ENCOUNTER SYMPTOMS
PALPITATIONS: 0
SHORTNESS OF BREATH: 0
CLAUDICATION: 0
ABDOMINAL PAIN: 0
ORTHOPNEA: 0
MYALGIAS: 0
COUGH: 0
DIZZINESS: 0
MEMORY LOSS: 1
FEVER: 0
PND: 0

## 2021-05-06 ASSESSMENT — FIBROSIS 4 INDEX: FIB4 SCORE: 1.28

## 2021-05-06 NOTE — PROGRESS NOTES
"Subjective:   Amrita Torrez is a 70 y.o. female who presents today for hospital follow up for moderate AS and syncopal event.    She is a prior patient in our office > 4 years ago. Lost to follow up.    Hx of moderate AS with recent syncopal event, CAD with prior stent placement, afib/flutter with OAC, DM, HTN, HLD, and CKD.     She is currently at [facility], but has been living with her daughter prior to this and is hoping to be back at home soon. She keeps repeating herself and asking if \"I can send her home.\"    She is working with therapies and overall feels good except some lower extremity edema.    She doesn't know why she passed out at home that led her to the hospital. She doesn't understand why she is here in the appointment today. She didn't know she had a heart problem. There is no family present during this appointment.    She does have a wheelchair from her care center.    She has no chest pain, shortness of breath, or lightheadedness.    Patient is a very poor historian today in office.    Past Medical History:   Diagnosis Date   • Anxiety    • Arthritis     fingers/toes   • Breath shortness    • CAD (coronary artery disease)    • Cataract     bilat   • Dental disorder     upper denture   • Depression    • Diabetes     insulin and oral meds   • Goiter    • Heart attack ([facility])     Dr. Tobar   • High cholesterol    • Hyperlipidemia    • Hypertension    • MI (myocardial infarction) ([facility]) 2016    x2 stints placed   • Oxygen dependent     2 liters @ HS   • Pericardial effusion    • Pneumonia 2019   • Snoring    • Stroke ([facility]) 10/2019    reports possible   • Thyroid nodule      Past Surgical History:   Procedure Laterality Date   • VITRECTOMY POSTERIOR N/A 11/24/2020    Procedure: VITRECTOMY, POSTERIOR PORTION-RIGHT ENDO LASER LEFT PAN RETINAL LASER;  Surgeon: Jean-Claude Franklin M.D.;  Location: SURGERY SAME DAY [facility];  Service: Ophthalmology   • LAPAROSCOPY  12/1/2014    Performed by Abdi BUENO" CURTIS Navarro at SURGERY SAME DAY [facility]   • BAN BY LAPAROSCOPY  2012    Performed by MIAN NAVARRO at SURGERY SAME DAY [facility]   • OTHER ORTHOPEDIC SURGERY      knee   • GYN SURGERY      fibroids   • OTHER ABDOMINAL SURGERY      appendectomy   • GYN SURGERY      hysterectomy   • ZZZ CARDIAC CATH       History reviewed. No pertinent family history.  Social History     Tobacco Use   Smoking Status Former Smoker   • Packs/day: 1.00   • Years: 40.00   • Pack years: 40.00   • Types: Cigarettes   • Quit date: 1978   • Years since quittin.3   Smokeless Tobacco Never Used     Allergies   Allergen Reactions   • Pcn [Penicillins] Hives, Shortness of Breath and Swelling     Tolerated ceftriaxone 2021   • Sulfa Drugs Hives, Shortness of Breath and Swelling     Outpatient Encounter Medications as of 2021   Medication Sig Dispense Refill   • furosemide (LASIX) 20 MG Tab Take 1 tablet by mouth every day. 30 tablet 11   • insulin lispro (HUMALOG) 100 UNIT/ML Solution Pen-injector injection PEN Inject 2 Units under the skin 3 times a day before meals. Inject 2-9 units under skin 3 times a day before meals depending on blood sugar measurements 1 Each 3   • insulin glargine (LANTUS) 100 UNIT/ML Solution Inject 25 Units under the skin every evening.     • insulin regular (NOVOLIN R) 100 Unit/mL Solution Inject 2-8 Units as instructed 4 times a day. PER SLIDING SCALE  150-200 = 2 UNITS  201-250 = 4 UNITS  251-300 = 6 UNITS  301-350 = 8 UNITS  >400 CALL MD     • amLODIPine (NORVASC) 5 MG Tab Take 1 tablet by mouth every day. 30 tablet 0   • carvedilol (COREG) 3.125 MG Tab Take 1 tablet by mouth 2 times a day with meals. 60 tablet 0   • lisinopril (PRINIVIL) 5 MG Tab Take 1 tablet by mouth every day. 30 tablet 0   • omeprazole (PRILOSEC) 20 MG delayed-release capsule Take 1 capsule by mouth 2 times a day. 30 capsule    • insulin aspart (NOVOLOG FLEXPEN) 100 UNIT/ML injection PEN  "Inject 2 Units under the skin 3 times a day before meals. Inject 2-9 units under skin 3 times a day before meals depending on blood sugar measurements 1 Each 3   • ELIQUIS 5 MG Tab TAKE 1 TABLET BY MOUTH TWICE A  tablet 1   • sertraline (ZOLOFT) 50 MG Tab Take 50 mg by mouth every day. Indications: Major Depressive Disorder     • ferrous sulfate 325 (65 Fe) MG tablet Take 325 mg by mouth every 7 days. Indications: supplement     • vitamin D, Ergocalciferol, (DRISDOL) 95743 units Cap capsule Take 50,000 Units by mouth every Sunday.     • trazodone (DESYREL) 50 MG Tab Take 50 mg by mouth every bedtime. Indications: Trouble Sleeping       No facility-administered encounter medications on file as of 5/6/2021.     Review of Systems   Constitutional: Negative for fever and malaise/fatigue.   Respiratory: Negative for cough and shortness of breath.    Cardiovascular: Positive for leg swelling. Negative for chest pain, palpitations, orthopnea, claudication and PND.   Gastrointestinal: Negative for abdominal pain.   Musculoskeletal: Negative for myalgias.   Neurological: Negative for dizziness.   Psychiatric/Behavioral: Positive for memory loss.   All other systems reviewed and are negative.       Objective:   /86 (BP Location: Left arm, Patient Position: Sitting, BP Cuff Size: Adult)   Pulse 82   Resp 14   Ht 1.651 m (5' 5\")   Wt 74.8 kg (165 lb)   LMP  (LMP Unknown)   SpO2 95%   BMI 27.46 kg/m²     Physical Exam   Constitutional: She is oriented to person, place, and time. She appears well-developed and well-nourished. No distress.   HENT:   Head: Normocephalic and atraumatic.   Eyes: EOM are normal.   Neck: No JVD present.   Cardiovascular: Regular rhythm, normal heart sounds and intact distal pulses. Tachycardia present.   No murmur heard.  Pulmonary/Chest: Effort normal and breath sounds normal. No respiratory distress.   Short of breath at rest, dry non-productive cough and pain on inspiration "   Abdominal: Soft. Bowel sounds are normal.   Musculoskeletal:         General: Edema present. Normal range of motion.      Cervical back: Normal range of motion.      Comments: LE edema 2+ pitting with leg wound on anterior R calf   Neurological: She is alert and oriented to person, place, and time.   Skin: Skin is warm and dry.   Psychiatric: She has a normal mood and affect.   Nursing note and vitals reviewed.    Reviewed with patient  Lab Results   Component Value Date/Time    CHOLSTRLTOT 135 08/12/2020 10:09 AM    LDL 62 08/12/2020 10:09 AM    HDL 40 08/12/2020 10:09 AM    TRIGLYCERIDE 166 (H) 08/12/2020 10:09 AM       Lab Results   Component Value Date/Time    SODIUM 133 (L) 04/29/2021 06:12 AM    POTASSIUM 4.4 04/29/2021 06:12 AM    CHLORIDE 102 04/29/2021 06:12 AM    CO2 24 04/29/2021 06:12 AM    GLUCOSE 83 04/29/2021 06:12 AM    BUN 31 (H) 04/29/2021 06:12 AM    CREATININE 1.71 (H) 04/29/2021 06:12 AM     Lab Results   Component Value Date/Time    ALKPHOSPHAT 60 04/29/2021 06:12 AM    ASTSGOT 14 04/29/2021 06:12 AM    ALTSGPT 13 04/29/2021 06:12 AM    TBILIRUBIN 0.3 04/29/2021 06:12 AM      1/27/16 CTA   1. There is no CT evidence of pulmonary embolism.  2. Small left pleural fluid collection and associated consolidative atelectasis at the left posterior costophrenic angle.  3. Borderline enlarged mediastinal nodes are noted that could be due to inflammation or infection. Neoplasm is less likely.  4. Small to moderate size left pericardial fluid collection is also present.  5. Lobular enlargement of left lobe of thyroid gland. Thyroid ultrasound could be obtained for further evaluation if clinically indicated. Nodules were documented in left lobe of thyroid gland on prior soft tissue ultrasound report.    1/19/16 CXR   1. No acute cardiopulmonary process identified.  2. Enlarged cardiac silhouette  3. Aortic atherosclerotic plaque    12/22/15 ECHO CONCLUSIONS  No prior study is available for  comparison.  Normal left ventricular systolic function.  Left ventricular ejection fraction is visually estimated to be 70%.  Basal inferior wall hypokinesis to akinesis.  Mild mitral regurgitation.  Mild aortic stenosis.  Vmax of 2.01 m/s. Transvalvular gradients are - Peak: 16 mmHg, Mean: 10   mmHg. Calculated LEENA is 1.4 cm2.  Mild tricuspid regurgitation.  Right ventricular systolic pressure is estimated to be 25 mmHg.  Small pericardial effusion without evidence of hemodynamic compromise.    2/5/16 ECHO CONCLUSIONS  Normal left ventricular systolic function.  Left ventricular ejection fraction is visually estimated to be 60%.  Normal regional wall motion.  Grade II diastolic dysfunction.  Mild mitral regurgitation.  Mild aortic stenosis.  Mean gradient 14 mmHg.    12/24/15 CATH POSTOPERATIVE DIAGNOSES:  1. ST elevation myocardial infarction related to an anterior occlusion.  2. Chronic total occlusion right coronary artery.  3. Successful percutaneous coronary intervention with drug-eluting stent   placement to the mid left anterior descending artery.    Assessment:     1. Essential hypertension  EKG    Comp Metabolic Panel   2. Nonrheumatic aortic valve stenosis  EKG    proBrain Natriuretic Peptide, NT    MAGNESIUM   3. Type 2 diabetes mellitus with hyperglycemia, with long-term current use of insulin (HCC)     4. Dyslipidemia  Lipid Profile   5. Coronary artery disease involving native heart without angina pectoris, unspecified vessel or lesion type     6. Acquired circulating anticoagulants (HCC)     7. Debility     8. Paroxysmal atrial fibrillation (HCC)  CBC WITHOUT DIFFERENTIAL   9. Stage 3b chronic kidney disease         Medical Decision Making:  Today's Assessment / Status / Plan:     1. CAD with prior anterior STEMI with DOMINGA to mid LAD and  of RCA  -no angina or NIEVES  -cont statin, no ASA on eliquis  -follow symptoms clinically    2. DM with CKD  -well controlled on insulin per patient  -follow with  PCP    3. HLD  -statin  -LDL goal <70 with CAD  -repeat lipid and cmp with labs    4. Moderate AS with preserved EF  -consider ordering DSE  -patient not understanding disease process and no family present  -recommend follow up in 2 weeks to determine if DSE warranted by structural heart MD  -currently asymptomatic during exam except mild LE edema  -we will start furosemide 20 mg daily  -follow up with labs in 2 weeks before next apt    5. Afib/flutter on eliquis  -asymptomatic, rate controlled  -consider AWA with DCCV in near future  -order EKG at next apt  -cont coreg for rate control    Patient is to follow up with Dr. Ruiz in 2 weeks with labs, consider DSE at next apt-recommended family come with patient to next apt.

## 2021-05-20 RX ORDER — FUROSEMIDE 20 MG/1
20 TABLET ORAL DAILY
Qty: 90 TABLET | Refills: 3 | Status: SHIPPED | OUTPATIENT
Start: 2021-05-20 | End: 2021-06-14 | Stop reason: SDUPTHER

## 2021-05-20 NOTE — TELEPHONE ENCOUNTER
Nuria (extern):     Called pt to confirm if pt will have lab work done before appt with AK on 5/25/21. No answer, left voicemail for pt to call back.

## 2021-05-21 ENCOUNTER — TELEPHONE (OUTPATIENT)
Dept: HEALTH INFORMATION MANAGEMENT | Facility: OTHER | Age: 76
End: 2021-05-21

## 2021-05-21 NOTE — TELEPHONE ENCOUNTER
PC to patient's daughter Nba to discuss discharge planning needs and referral to [facility]. Mrs. Torrez is planned for d/c back to home on 5/26, returning to the home that she shares with her daughter.  The transitional care program was explained to the daughter, who is agreeable to both services. She will be the primary contact for making the appointments for her mother.                         Plan f/u post d/c.

## 2021-05-25 ENCOUNTER — OFFICE VISIT (OUTPATIENT)
Dept: CARDIOLOGY | Facility: MEDICAL CENTER | Age: 76
End: 2021-05-25
Payer: MEDICARE

## 2021-05-25 VITALS
BODY MASS INDEX: 25.88 KG/M2 | HEART RATE: 77 BPM | SYSTOLIC BLOOD PRESSURE: 128 MMHG | DIASTOLIC BLOOD PRESSURE: 82 MMHG | HEIGHT: 66 IN | RESPIRATION RATE: 14 BRPM | OXYGEN SATURATION: 98 % | WEIGHT: 161 LBS

## 2021-05-25 DIAGNOSIS — I35.0 MODERATE AORTIC STENOSIS: ICD-10-CM

## 2021-05-25 PROCEDURE — 99214 OFFICE O/P EST MOD 30 MIN: CPT | Performed by: INTERNAL MEDICINE

## 2021-05-25 ASSESSMENT — FIBROSIS 4 INDEX: FIB4 SCORE: 1.28

## 2021-05-25 NOTE — PROGRESS NOTES
Cardiology Follow-up Consultation Note    Date of note:    5/25/2021    Primary Care Provider: Fitz Turpin D.O.    Name:             Amrita Torrez   YOB: 1945  MRN:               [id number]    CC: Moderate aortic stenosis, coronary disease, paroxysmal atrial fibrillation    Patient ID/HPI:   76-year-old female patient with history of coronary artery disease, paroxysmal atrial fibrillation, dyslipidemia, aortic stenosis, diabetes mellitus recently admitted to the hospital with dehydration, acute kidney injury, fall.  Echocardiogram showed moderate aortic stenosis.  She was subsequently sent to nursing home she is here for follow-up.  She is going to go home tomorrow and live with her daughter and sister.  She feels well, she still has off balance uses walker but she can able to walk around her facility 4 times a day.  Denies chest pain, shortness of breath.      ROS  Hard of hearing, arthritis, loss of balance.  All other systems reviewed and negative    Past Medical History:   Diagnosis Date   • Anxiety    • Arthritis     fingers/toes   • Breath shortness    • CAD (coronary artery disease)    • Cataract     bilat   • Dental disorder     upper denture   • Depression    • Diabetes     insulin and oral meds   • Goiter    • Heart attack ([facility])     Dr. Tobar   • High cholesterol    • Hyperlipidemia    • Hypertension    • MI (myocardial infarction) ([facility]) 2016    x2 stints placed   • Oxygen dependent     2 liters @ HS   • Pericardial effusion    • Pneumonia 2019   • Snoring    • Stroke ([facility]) 10/2019    reports possible   • Thyroid nodule          Past Surgical History:   Procedure Laterality Date   • VITRECTOMY POSTERIOR N/A 11/24/2020    Procedure: VITRECTOMY, POSTERIOR PORTION-RIGHT ENDO LASER LEFT PAN RETINAL LASER;  Surgeon: Jean-Claude Franklin M.D.;  Location: SURGERY SAME DAY [facility];  Service: Ophthalmology   • LAPAROSCOPY  12/1/2014    Performed by Abdi Navarro M.D. at  "SURGERY SAME DAY [facility] ORS   • BAN BY LAPAROSCOPY  9/14/2012    Performed by MIAN YI at SURGERY SAME DAY [facility] ORS   • OTHER ORTHOPEDIC SURGERY  7/11    knee   • GYN SURGERY  1978    fibroids   • OTHER ABDOMINAL SURGERY  1966    appendectomy   • GYN SURGERY      hysterectomy   • ZZZ CARDIAC CATH           Current Outpatient Medications   Medication Sig Dispense Refill   • insulin lispro (HUMALOG) 100 UNIT/ML Solution Pen-injector injection PEN Inject 2 Units under the skin 3 times a day before meals. Inject 2-9 units under skin 3 times a day before meals depending on blood sugar measurements 1 Each 3   • insulin aspart (NOVOLOG FLEXPEN) 100 UNIT/ML injection PEN Inject 2 Units under the skin 3 times a day before meals. Inject 2-9 units under skin 3 times a day before meals depending on blood sugar measurements 1 Each 3   • insulin glargine (LANTUS) 100 UNIT/ML Solution Inject 25 Units under the skin every evening.     • insulin regular (NOVOLIN R) 100 Unit/mL Solution Inject 2-8 Units as instructed 4 times a day. PER SLIDING SCALE  150-200 = 2 UNITS  201-250 = 4 UNITS  251-300 = 6 UNITS  301-350 = 8 UNITS  >400 CALL MD     • furosemide (LASIX) 20 MG Tab TAKE 1 TABLET BY MOUTH EVERY DAY 90 tablet 3   • metFORMIN ER (GLUCOPHAGE XR) 500 MG TABLET SR 24 HR      • BD VEO INSULIN SYRINGE U/F 31G X 15/64\" 0.5 ML Misc USE TO INJECT INSULIN 3 4 TIMES DAILY     • amLODIPine (NORVASC) 5 MG Tab Take 1 tablet by mouth every day. 30 tablet 0   • carvedilol (COREG) 3.125 MG Tab Take 1 tablet by mouth 2 times a day with meals. 60 tablet 0   • lisinopril (PRINIVIL) 5 MG Tab Take 1 tablet by mouth every day. 30 tablet 0   • omeprazole (PRILOSEC) 20 MG delayed-release capsule Take 1 capsule by mouth 2 times a day. 30 capsule    • ELIQUIS 5 MG Tab TAKE 1 TABLET BY MOUTH TWICE A  tablet 1   • sertraline (ZOLOFT) 50 MG Tab Take 50 mg by mouth every day. Indications: Major Depressive Disorder     • ferrous " sulfate 325 (65 Fe) MG tablet Take 325 mg by mouth every 7 days. Indications: supplement     • vitamin D, Ergocalciferol, (DRISDOL) 28161 units Cap capsule Take 50,000 Units by mouth every .     • trazodone (DESYREL) 50 MG Tab Take 50 mg by mouth every bedtime. Indications: Trouble Sleeping       No current facility-administered medications for this visit.         Allergies   Allergen Reactions   • Pcn [Penicillins] Hives, Shortness of Breath and Swelling     Tolerated ceftriaxone 2021   • Sulfa Drugs Hives, Shortness of Breath and Swelling         History reviewed. No pertinent family history.      Social History     Socioeconomic History   • Marital status:      Spouse name: Not on file   • Number of children: Not on file   • Years of education: Not on file   • Highest education level: Not on file   Occupational History   • Not on file   Tobacco Use   • Smoking status: Former Smoker     Packs/day: 1.00     Years: 40.00     Pack years: 40.00     Types: Cigarettes     Quit date: 1978     Years since quittin.4   • Smokeless tobacco: Never Used   Vaping Use   • Vaping Use: Never used   Substance and Sexual Activity   • Alcohol use: No   • Drug use: No   • Sexual activity: Not on file   Other Topics Concern   • Not on file   Social History Narrative   • Not on file     Social Determinants of Health     Financial Resource Strain:    • Difficulty of Paying Living Expenses:    Food Insecurity:    • Worried About Running Out of Food in the Last Year:    • Ran Out of Food in the Last Year:    Transportation Needs:    • Lack of Transportation (Medical):    • Lack of Transportation (Non-Medical):    Physical Activity:    • Days of Exercise per Week:    • Minutes of Exercise per Session:    Stress:    • Feeling of Stress :    Social Connections:    • Frequency of Communication with Friends and Family:    • Frequency of Social Gatherings with Friends and Family:    • Attends Christianity Services:    •  "Active Member of Clubs or Organizations:    • Attends Club or Organization Meetings:    • Marital Status:    Intimate Partner Violence:    • Fear of Current or Ex-Partner:    • Emotionally Abused:    • Physically Abused:    • Sexually Abused:          Physical Exam:  Ambulatory Vitals  /82 (BP Location: Left arm, Patient Position: Sitting, BP Cuff Size: Adult)   Pulse 77   Resp 14   Ht 1.676 m (5' 6\")   Wt 73 kg (161 lb)   SpO2 98%    Oxygen Therapy:  Pulse Oximetry: 98 %  BP Readings from Last 4 Encounters:   05/25/21 128/82   05/06/21 154/86   04/29/21 102/74   01/21/21 118/74       Weight/BMI: Body mass index is 25.99 kg/m².  Wt Readings from Last 4 Encounters:   05/25/21 73 kg (161 lb)   05/06/21 74.8 kg (165 lb)   04/27/21 67.1 kg (147 lb 14.9 oz)   01/21/21 68.9 kg (151 lb 14.4 oz)     General: Well appearing and in no apparent distress  Head: atrumatic  Eyes: No conjunctival pallor   ENT: normal external appearance of nose and ears  Neck: JVD absent, carotid bruits absent  Lungs: respiratory sounds  normal, additional breath sounds absent  Heart: Irregular rhythm   No palpable thrills on palpation, 3/6 systolic murmur aortic area, no rubs,   Lower extremity edema absent.   Pedal pulses normal  Abdomen: soft, non tender, non distended.  Extremities/MSK: no clubbing, no cyanosis  Neurological: normal orientation, Gait normal   Psychiatric: Appropriate affect, intact judgement and insight  Skin: Warm extremities        Lab Data Review:  Lab Results   Component Value Date/Time    CHOLSTRLTOT 135 08/12/2020 10:09 AM    LDL 62 08/12/2020 10:09 AM    HDL 40 08/12/2020 10:09 AM    TRIGLYCERIDE 166 (H) 08/12/2020 10:09 AM       Lab Results   Component Value Date/Time    SODIUM 133 (L) 04/29/2021 06:12 AM    POTASSIUM 4.4 04/29/2021 06:12 AM    CHLORIDE 102 04/29/2021 06:12 AM    CO2 24 04/29/2021 06:12 AM    GLUCOSE 83 04/29/2021 06:12 AM    BUN 31 (H) 04/29/2021 06:12 AM    CREATININE 1.71 (H) 04/29/2021 " 06:12 AM     Lab Results   Component Value Date/Time    ALKPHOSPHAT 60 04/29/2021 06:12 AM    ASTSGOT 14 04/29/2021 06:12 AM    ALTSGPT 13 04/29/2021 06:12 AM    TBILIRUBIN 0.3 04/29/2021 06:12 AM      Lab Results   Component Value Date/Time    WBC 8.2 04/29/2021 06:12 AM     Hospital discharge summary, notes reviewed.    Echocardiogram from 3/11/2021 reviewed, personally interpreted  Shows normal ejection fraction, moderate aortic stenosis.    Impression and Plan:  76-year-old female patient with hypertension, diabetes mellitus, dyslipidemia, coronary artery disease with prior stent, paroxysmal atrial fibrillation, chronic kidney disease with moderate aortic stenosis.  She is improving, will be discharged home tomorrow from nursing home.  Appears euvolemic on exam.  Heart rate is normal.  Continue current therapy with lisinopril, Metformin, furosemide, Eliquis, Coreg, amlodipine.    Return in about 6 months (around 11/25/2021).      Tejas CAPPS  Interventional cardiologist  [facility]  [place] [facility], [address]  [address] [address]  Phone: [phone]  Fax: [phone]

## 2021-05-26 ENCOUNTER — HOME HEALTH ADMISSION (OUTPATIENT)
Dept: HOME HEALTH SERVICES | Facility: HOME HEALTHCARE | Age: 76
End: 2021-05-26
Payer: MEDICARE

## 2021-05-29 ENCOUNTER — HOME CARE VISIT (OUTPATIENT)
Dept: HOME HEALTH SERVICES | Facility: HOME HEALTHCARE | Age: 76
End: 2021-05-29
Payer: MEDICARE

## 2021-05-29 PROCEDURE — G0493 RN CARE EA 15 MIN HH/HOSPICE: HCPCS

## 2021-05-29 PROCEDURE — 665001 SOC-HOME HEALTH

## 2021-05-29 ASSESSMENT — FIBROSIS 4 INDEX: FIB4 SCORE: 1.28

## 2021-05-29 ASSESSMENT — PATIENT HEALTH QUESTIONNAIRE - PHQ9: CLINICAL INTERPRETATION OF PHQ2 SCORE: 0

## 2021-05-31 ENCOUNTER — HOME CARE VISIT (OUTPATIENT)
Dept: HOME HEALTH SERVICES | Facility: HOME HEALTHCARE | Age: 76
End: 2021-05-31
Payer: MEDICARE

## 2021-05-31 VITALS
RESPIRATION RATE: 18 BRPM | DIASTOLIC BLOOD PRESSURE: 98 MMHG | OXYGEN SATURATION: 99 % | HEART RATE: 97 BPM | TEMPERATURE: 98.8 F | SYSTOLIC BLOOD PRESSURE: 168 MMHG

## 2021-05-31 VITALS
DIASTOLIC BLOOD PRESSURE: 100 MMHG | HEART RATE: 78 BPM | SYSTOLIC BLOOD PRESSURE: 160 MMHG | RESPIRATION RATE: 20 BRPM | OXYGEN SATURATION: 98 % | HEIGHT: 64 IN | BODY MASS INDEX: 27.49 KG/M2 | TEMPERATURE: 99.2 F | WEIGHT: 161 LBS

## 2021-05-31 PROCEDURE — G0151 HHCP-SERV OF PT,EA 15 MIN: HCPCS

## 2021-05-31 SDOH — ECONOMIC STABILITY: HOUSING INSECURITY
HOME SAFETY: PT TRACED TUBING TO CONCENTRATOR.  PT DID NOT NOTE ANY LEAKS OR PROBLEMS.  O2 CONCENTRATOR SET AT DOSAGE ON MD ORDER.  NOTED NO OPEN FLAMES. PATIENT USES AT BEDTIME ONLY.

## 2021-05-31 SDOH — ECONOMIC STABILITY: HOUSING INSECURITY: EVIDENCE OF SMOKING MATERIAL: 0

## 2021-05-31 ASSESSMENT — ACTIVITIES OF DAILY LIVING (ADL)
AMBULATION ASSISTANCE ON FLAT SURFACES: 1
TRANSPORTATION COMMENTS: REQUIRES ASSIST OF ANOTHER PERSON AND AD OUT OF HOME ON STEPS AND UNEVEN SURFACES
OASIS_M1830: 03
AMBULATION_DISTANCE/DURATION_TOLERATED: 75 FEET

## 2021-05-31 ASSESSMENT — ENCOUNTER SYMPTOMS
DEPRESSED MOOD: 1
MENTAL STATUS CHANGE: 0
VOMITING: DENIES
POOR JUDGMENT: 1
NAUSEA: DENIES

## 2021-05-31 ASSESSMENT — PATIENT HEALTH QUESTIONNAIRE - PHQ9
2. FEELING DOWN, DEPRESSED, IRRITABLE, OR HOPELESS: 00
1. LITTLE INTEREST OR PLEASURE IN DOING THINGS: 00

## 2021-05-31 NOTE — CASE COMMUNICATION
PT completed evaluation on 5/31/21.  Frequency 2 week 3, effective 5/31/21.  Please assist with authorization as needed.

## 2021-05-31 NOTE — CASE COMMUNICATION
lilian, at PT visit on 5/31/21 patient's BP was 168/98 and pulse 97 at rest.  Patient denies any dizziness, light headedness or nausea.  States she feels really good.  PT noted anxiety during visit.

## 2021-06-01 ENCOUNTER — HOME CARE VISIT (OUTPATIENT)
Dept: HOME HEALTH SERVICES | Facility: HOME HEALTHCARE | Age: 76
End: 2021-06-01
Payer: MEDICARE

## 2021-06-01 PROCEDURE — G0493 RN CARE EA 15 MIN HH/HOSPICE: HCPCS

## 2021-06-02 ENCOUNTER — HOME CARE VISIT (OUTPATIENT)
Dept: HOME HEALTH SERVICES | Facility: HOME HEALTHCARE | Age: 76
End: 2021-06-02
Payer: MEDICARE

## 2021-06-02 VITALS
RESPIRATION RATE: 20 BRPM | HEART RATE: 88 BPM | SYSTOLIC BLOOD PRESSURE: 160 MMHG | DIASTOLIC BLOOD PRESSURE: 85 MMHG | TEMPERATURE: 99.4 F

## 2021-06-02 ASSESSMENT — ENCOUNTER SYMPTOMS
NAUSEA: DENIES
VOMITING: DENIES

## 2021-06-02 NOTE — CASE COMMUNICATION
fyi, patient called PT to cancel visit for 6/2/21 due to having a bad headache.  States she has had them on and off since she was 6 years old.  States she needs to go back to bed.

## 2021-06-03 ENCOUNTER — HOME CARE VISIT (OUTPATIENT)
Dept: HOME HEALTH SERVICES | Facility: HOME HEALTHCARE | Age: 76
End: 2021-06-03
Payer: MEDICARE

## 2021-06-03 ENCOUNTER — DOCUMENTATION (OUTPATIENT)
Dept: MEDICAL GROUP | Facility: PHYSICIAN GROUP | Age: 76
End: 2021-06-03

## 2021-06-03 VITALS
OXYGEN SATURATION: 99 % | TEMPERATURE: 97.5 F | HEART RATE: 79 BPM | RESPIRATION RATE: 18 BRPM | DIASTOLIC BLOOD PRESSURE: 85 MMHG | SYSTOLIC BLOOD PRESSURE: 165 MMHG

## 2021-06-03 PROCEDURE — G0151 HHCP-SERV OF PT,EA 15 MIN: HCPCS

## 2021-06-03 PROCEDURE — G0299 HHS/HOSPICE OF RN EA 15 MIN: HCPCS

## 2021-06-03 SDOH — ECONOMIC STABILITY: HOUSING INSECURITY
HOME SAFETY: PT TRACED TUBING TO CONCENTRATOR.  PT DID NOT NOTE ANY LEAKS OR PROBLEMS.  O2 CONCENTRATOR SET AT DOSAGE ON MD ORDER.  NOTED NO OPEN FLAMES. PATIENT REPORTS ONLY USES AT NIGHT

## 2021-06-03 ASSESSMENT — ENCOUNTER SYMPTOMS
DIFFICULTY THINKING: 1
POOR JUDGMENT: 1

## 2021-06-03 ASSESSMENT — ACTIVITIES OF DAILY LIVING (ADL): TRANSPORTATION COMMENTS: REQUIRES ASSIST OF ANOTHER PERSON AND WALKER OUT OF HOME ON UNEVEN SURFACES AND STEPS

## 2021-06-03 NOTE — PROGRESS NOTES
Medication chart review for [facility] services    PCP:  Fitz Turpin D.O.  [address]  [place] [address]  Fax: [phone]    Current medication list     Current Outpatient Medications:   •  Home Care Oxygen, 2 L/min, Inhalation, QHS  •  Multivitamin Adult (Minerals), 1 tablet , Oral, DAILY  •  acetaminophen, 500 mg, Oral, Q6HRS PRN  •  Acetaminophen PM, 1 tablet , Oral, QHS PRN  •  potassium chloride SA, 10 mEq, Oral, DAILY  •  furosemide, 20 mg, Oral, DAILY (Patient taking differently: 40 mg, Oral, DAILY)  •  BD Veo Insulin Syringe U/F, USE TO INJECT INSULIN 3 4 TIMES DAILY  •  amLODIPine, 5 mg, Oral, DAILY  •  carvedilol, 3.125 mg, Oral, BID WITH MEALS  •  lisinopril, 5 mg, Oral, DAILY  •  omeprazole, 20 mg, Oral, BID  •  insulin lispro, 2 Units, Subcutaneous, TID AC (Patient not taking: Reported on 5/29/2021)  •  NovoLOG FlexPen, 2 Units, Subcutaneous, TID AC (Patient not taking: Reported on 5/29/2021)  •  insulin glargine, 25 Units, Subcutaneous, Q EVENING  •  Eliquis, TAKE 1 TABLET BY MOUTH TWICE A DAY  •  insulin regular, 2-8 Units, Subcutaneous, 4X/DAY  •  sertraline, 50 mg, Oral, DAILY  •  ferrous sulfate, 325 mg, Oral, Q7 DAYS  •  vitamin D (Ergocalciferol), 50,000 Units, Oral, Q SUNDAY  •  traZODone, 50 mg, Oral, QHS    Allergies   Allergen Reactions   • Pcn [Penicillins] Hives, Shortness of Breath and Swelling     Tolerated ceftriaxone April 2021   • Sulfa Drugs Hives, Shortness of Breath and Swelling         Labs / Images Reviewed:     Lab Results   Component Value Date/Time    SODIUM 133 (L) 04/29/2021 06:12 AM    POTASSIUM 4.4 04/29/2021 06:12 AM    CHLORIDE 102 04/29/2021 06:12 AM    CO2 24 04/29/2021 06:12 AM    GLUCOSE 83 04/29/2021 06:12 AM    BUN 31 (H) 04/29/2021 06:12 AM    CREATININE 1.71 (H) 04/29/2021 06:12 AM      Lab Results   Component Value Date/Time    ALKPHOSPHAT 60 04/29/2021 06:12 AM    ASTSGOT 14 04/29/2021 06:12 AM    ALTSGPT 13 04/29/2021 06:12 AM     TBILIRUBIN 0.3 04/29/2021 06:12 AM    ALBUMIN 2.3 (L) 04/29/2021 06:12 AM    INR 1.08 09/14/2020 11:27 AM          Assessment and Plan:   • Received referral from [facility]. Medications reviewed.         Zev Aranda, PharmD, MS, BCACP, [facility]  Phone [phone] fax [phone]    This note was created using voice recognition software (Dragon). The accuracy of the dictation is limited by the abilities of the software. I have reviewed the note prior to signing, however some errors in grammar and context are still possible. If you have any questions related to this note please do not hesitate to contact our office.

## 2021-06-04 NOTE — CASE COMMUNICATION
lilian, at PT visit on 6/3/21, patient's BP was 165/85.  Other vitals within parameters.  Denies any dizziness, light headedness, nausea, or headache.  Very good tolerance for PT session.

## 2021-06-06 VITALS
DIASTOLIC BLOOD PRESSURE: 80 MMHG | TEMPERATURE: 98.1 F | HEART RATE: 85 BPM | SYSTOLIC BLOOD PRESSURE: 142 MMHG | OXYGEN SATURATION: 99 % | RESPIRATION RATE: 17 BRPM

## 2021-06-06 ASSESSMENT — ENCOUNTER SYMPTOMS
POOR JUDGMENT: 1
MUSCLE WEAKNESS: 1

## 2021-06-06 NOTE — CASE COMMUNICATION
Patient's xander blood sugar was over 400, has not checked it siince yesterday because she was not able to locate it. Speech therapist is scheduled to see her for jose mercado on 6/09. Home care RN will be managing meds for now.

## 2021-06-07 ENCOUNTER — TELEPHONE (OUTPATIENT)
Dept: HEALTH INFORMATION MANAGEMENT | Facility: OTHER | Age: 76
End: 2021-06-07

## 2021-06-07 ENCOUNTER — HOME CARE VISIT (OUTPATIENT)
Dept: HOME HEALTH SERVICES | Facility: HOME HEALTHCARE | Age: 76
End: 2021-06-07
Payer: MEDICARE

## 2021-06-07 ENCOUNTER — PATIENT OUTREACH (OUTPATIENT)
Dept: HEALTH INFORMATION MANAGEMENT | Facility: OTHER | Age: 76
End: 2021-06-07

## 2021-06-07 VITALS
RESPIRATION RATE: 18 BRPM | HEART RATE: 78 BPM | TEMPERATURE: 97.7 F | SYSTOLIC BLOOD PRESSURE: 165 MMHG | OXYGEN SATURATION: 97 % | DIASTOLIC BLOOD PRESSURE: 90 MMHG

## 2021-06-07 PROCEDURE — G0151 HHCP-SERV OF PT,EA 15 MIN: HCPCS

## 2021-06-07 SDOH — ECONOMIC STABILITY: HOUSING INSECURITY
HOME SAFETY: PT TRACED TUBING TO CONCENTRATOR.  PT DID NOT NOTE ANY LEAKS OR PROBLEMS.  O2 CONCENTRATOR SET AT DOSAGE ON MD ORDER.  NOTED NO OPEN FLAMES. PATIENT USES AT NIGHT ONLY

## 2021-06-07 ASSESSMENT — ACTIVITIES OF DAILY LIVING (ADL): TRANSPORTATION COMMENTS: REQUIRES ASSIST OF ANOTHER PERSON AND AD OUT OF HOME ON UNEVEN SURFACES

## 2021-06-07 ASSESSMENT — ENCOUNTER SYMPTOMS: POOR JUDGMENT: 1

## 2021-06-07 NOTE — CASE COMMUNICATION
Quality Review for 5/29/21 SOC OASIS by OCTAVIO Black, RN on  June 7, 2021      Edits completed by OCTAVIO Black RN:  1.  is 5/29/21 per [facility] order  2.  checked #5 per chart review  3.  is 3 per [facility] 10 assessment  4.  and  are 3 per ambulation status  5. Completed F2F information  6.  is 4 as pt only uses oxygen at night (at rest)  7. Completed triage code per chart review  8. Changed scheduled depression care assessments to each visit in care plan

## 2021-06-07 NOTE — CASE COMMUNICATION
fyi, patient's BP was 165/90 at PT visit on 6/7/21.  Other vitals within parameters.  Denies any dizziness, light headedness, nausea.  Patient reports had not taken medication yet at 11:00 am appt but took while PT in home.

## 2021-06-07 NOTE — TELEPHONE ENCOUNTER
Late entry.  PC to Nba Shan on 5/21/21 to discuss discharge planning needs and referral to [facility].  An overview of the TCM program was given to Nba, who indicated that she was agreeable to post discharge follow up.

## 2021-06-07 NOTE — CASE COMMUNICATION
lilian, when I arrived at patient's home for PT, she had out vacuum (no 4WW) and was going to attempt to vacuum.  PT educated that this was a task that was too risky for falls/injury at this time and patient agreed, stating she would wait for daughter to do it.  If possible at your next visit, would you review and see what you think.

## 2021-06-07 NOTE — PROGRESS NOTES
Outcome: Left Message to schedule Comprehensive Health Assessment.    Please transfer to Patient Outreach Team at [phone] when patient returns call.      Attempt # 2

## 2021-06-08 ENCOUNTER — HOME CARE VISIT (OUTPATIENT)
Dept: HOME HEALTH SERVICES | Facility: HOME HEALTHCARE | Age: 76
End: 2021-06-08
Payer: MEDICARE

## 2021-06-08 PROCEDURE — G0299 HHS/HOSPICE OF RN EA 15 MIN: HCPCS

## 2021-06-09 ENCOUNTER — HOME CARE VISIT (OUTPATIENT)
Dept: HOME HEALTH SERVICES | Facility: HOME HEALTHCARE | Age: 76
End: 2021-06-09
Payer: MEDICARE

## 2021-06-09 VITALS
OXYGEN SATURATION: 94 % | DIASTOLIC BLOOD PRESSURE: 80 MMHG | RESPIRATION RATE: 18 BRPM | TEMPERATURE: 98 F | SYSTOLIC BLOOD PRESSURE: 140 MMHG | HEART RATE: 88 BPM

## 2021-06-09 PROCEDURE — G0151 HHCP-SERV OF PT,EA 15 MIN: HCPCS

## 2021-06-09 SDOH — ECONOMIC STABILITY: HOUSING INSECURITY
HOME SAFETY: PT TRACED TUBING TO CONCENTRATOR.  PT DID NOT NOTE ANY LEAKS OR PROBLEMS.  O2 CONCENTRATOR SET AT DOSAGE ON MD ORDER.  NOTED NO OPEN FLAMES.  O2 SIGN ON DOOR.

## 2021-06-09 ASSESSMENT — ENCOUNTER SYMPTOMS: POOR JUDGMENT: 1

## 2021-06-09 ASSESSMENT — ACTIVITIES OF DAILY LIVING (ADL): TRANSPORTATION COMMENTS: REQUIRES ASSIST OF ANOTHER PERSON AND AD OUT OF HOME

## 2021-06-09 NOTE — CASE COMMUNICATION
Noted and agree with all changes. Thank you.     Laila Rowan RN    ----- Message -----  From: Nabila Black R.N.  Sent: 6/7/2021   7:25 AM PDT  To: Jacey Rowan R.N.      Quality Review for 5/29/21 SOC OASIS by OCTAVIO Black RN on  June 7, 2021      Edits completed by OCTAVIO Black RN:  1.  is 5/29/21 per [facility] order  2.  checked #5 per chart review  3.  is 3 per [facility] 10 assessment  4.  and  are 3 per ambulation status  5. Completed F2F information  6.  is 4 as pt only uses oxygen at night (at rest)  7. Completed triage code per chart review  8. Changed scheduled depression care assessments to each visit in care plan

## 2021-06-10 ENCOUNTER — HOME CARE VISIT (OUTPATIENT)
Dept: HOME HEALTH SERVICES | Facility: HOME HEALTHCARE | Age: 76
End: 2021-06-10
Payer: MEDICARE

## 2021-06-10 VITALS
SYSTOLIC BLOOD PRESSURE: 130 MMHG | DIASTOLIC BLOOD PRESSURE: 80 MMHG | OXYGEN SATURATION: 98 % | TEMPERATURE: 97.4 F | HEART RATE: 90 BPM | RESPIRATION RATE: 19 BRPM

## 2021-06-10 VITALS
TEMPERATURE: 97.3 F | HEART RATE: 78 BPM | SYSTOLIC BLOOD PRESSURE: 130 MMHG | OXYGEN SATURATION: 99 % | DIASTOLIC BLOOD PRESSURE: 80 MMHG | RESPIRATION RATE: 18 BRPM

## 2021-06-10 PROCEDURE — G0153 HHCP-SVS OF S/L PATH,EA 15MN: HCPCS

## 2021-06-10 PROCEDURE — G0299 HHS/HOSPICE OF RN EA 15 MIN: HCPCS

## 2021-06-10 PROCEDURE — G0152 HHCP-SERV OF OT,EA 15 MIN: HCPCS

## 2021-06-10 ASSESSMENT — ENCOUNTER SYMPTOMS
AGGRESSION WITHIN DEFINED LIMITS: 1
DIFFICULTY THINKING: 1
ANGER WITHIN DEFINED LIMITS: 1
POOR JUDGMENT: 1
POOR JUDGMENT: 1

## 2021-06-11 SDOH — ECONOMIC STABILITY: HOUSING INSECURITY
HOME SAFETY: OT'S CONTACT INFO LEFT IN PT BINDER ON CHART. PT'S BEDROOM WITH DIRTY CLOTHING ON FLOOR LIMITING WALKING PATH. DOG HAD A FECES AND URINE ACCIDENT ON THE FLOOR IN THE BEDROOM THAT PT IS UNABLE TO SEE DUE TO MACULAR DEGENERATION. USED WET WIPES AND DIR

## 2021-06-11 SDOH — ECONOMIC STABILITY: HOUSING INSECURITY: EVIDENCE OF SMOKING MATERIAL: 0

## 2021-06-11 SDOH — ECONOMIC STABILITY: HOUSING INSECURITY
HOME SAFETY: TY CLOTHING WERE ON THE BATHROOM FLOOR. WHEN OT INQUIRED ABOUT SOILED WIPES ON THE FLOOR PT INDICATED THAT THE DOG HAD GOTTEN INTO THE TRASH AND THAT SHE WILL CLEAN IT UP AND PICK UP THE DIRTY LAUNDRY TODAY.

## 2021-06-11 ASSESSMENT — ACTIVITIES OF DAILY LIVING (ADL)
SHOPPING: NEEDS ASSISTANCE
TOILETING: INDEPENDENT
LAUNDRY: INDEPENDENT
BATHING_CURRENT_FUNCTION: SUPERVISION
AMBULATION ASSISTANCE: 1
TOILETING: 1
LAUNDRY ASSESSED: 1
GROOMING ASSESSED: 1
ORAL_CARE_CURRENT_FUNCTION: INDEPENDENT
ORAL_CARE_ASSESSED: 1
FEEDING: INDEPENDENT
TELEPHONE USE ASSESSED: 1
TRANSPORTATION ASSESSED: 1
DRESSING_UB_CURRENT_FUNCTION: INDEPENDENT
USING THE TELPHONE: INDEPENDENT
TRANSPORTATION: DEPENDENT
HOUSEKEEPING ASSESSED: 1
AMBULATION ASSISTANCE: INDEPENDENT
LIGHT HOUSEKEEPING: NEEDS ASSISTANCE
SHOPPING ASSESSED: 1
GROOMING_CURRENT_FUNCTION: INDEPENDENT
PREPARING MEALS: NEEDS ASSISTANCE
FEEDING ASSESSED: 1
BATHING ASSESSED: 1
DRESSING_LB_CURRENT_FUNCTION: INDEPENDENT

## 2021-06-11 ASSESSMENT — ENCOUNTER SYMPTOMS: MUSCLE WEAKNESS: 1

## 2021-06-12 VITALS
DIASTOLIC BLOOD PRESSURE: 78 MMHG | TEMPERATURE: 97.8 F | RESPIRATION RATE: 18 BRPM | OXYGEN SATURATION: 98 % | SYSTOLIC BLOOD PRESSURE: 132 MMHG | HEART RATE: 80 BPM

## 2021-06-13 VITALS
OXYGEN SATURATION: 98 % | HEART RATE: 80 BPM | SYSTOLIC BLOOD PRESSURE: 132 MMHG | DIASTOLIC BLOOD PRESSURE: 78 MMHG | TEMPERATURE: 97.8 F | RESPIRATION RATE: 18 BRPM

## 2021-06-14 ENCOUNTER — HOME CARE VISIT (OUTPATIENT)
Dept: HOME HEALTH SERVICES | Facility: HOME HEALTHCARE | Age: 76
End: 2021-06-14
Payer: MEDICARE

## 2021-06-15 ENCOUNTER — HOME CARE VISIT (OUTPATIENT)
Dept: HOME HEALTH SERVICES | Facility: HOME HEALTHCARE | Age: 76
End: 2021-06-15
Payer: MEDICARE

## 2021-06-15 VITALS
DIASTOLIC BLOOD PRESSURE: 74 MMHG | TEMPERATURE: 97.9 F | SYSTOLIC BLOOD PRESSURE: 120 MMHG | RESPIRATION RATE: 18 BRPM | OXYGEN SATURATION: 98 % | HEART RATE: 82 BPM

## 2021-06-15 VITALS
OXYGEN SATURATION: 98 % | TEMPERATURE: 97.9 F | HEART RATE: 82 BPM | DIASTOLIC BLOOD PRESSURE: 74 MMHG | RESPIRATION RATE: 18 BRPM | SYSTOLIC BLOOD PRESSURE: 120 MMHG

## 2021-06-15 PROCEDURE — G0153 HHCP-SVS OF S/L PATH,EA 15MN: HCPCS

## 2021-06-15 PROCEDURE — G0493 RN CARE EA 15 MIN HH/HOSPICE: HCPCS

## 2021-06-15 ASSESSMENT — ENCOUNTER SYMPTOMS
POOR JUDGMENT: 1
POOR JUDGMENT: 1
MUSCLE WEAKNESS: 1
DIFFICULTY THINKING: 1

## 2021-06-15 ASSESSMENT — ACTIVITIES OF DAILY LIVING (ADL)
CURRENT_FUNCTION: STAND BY ASSIST
AMBULATION ASSISTANCE: STAND BY ASSIST

## 2021-06-16 ENCOUNTER — HOME CARE VISIT (OUTPATIENT)
Dept: HOME HEALTH SERVICES | Facility: HOME HEALTHCARE | Age: 76
End: 2021-06-16
Payer: MEDICARE

## 2021-06-16 VITALS
SYSTOLIC BLOOD PRESSURE: 148 MMHG | RESPIRATION RATE: 18 BRPM | HEART RATE: 82 BPM | DIASTOLIC BLOOD PRESSURE: 82 MMHG | OXYGEN SATURATION: 97 % | TEMPERATURE: 97.5 F

## 2021-06-16 PROCEDURE — G0151 HHCP-SERV OF PT,EA 15 MIN: HCPCS

## 2021-06-16 SDOH — ECONOMIC STABILITY: HOUSING INSECURITY
HOME SAFETY: PT TRACED TUBING TO CONCENTRATOR.  PT DID NOT NOTE ANY LEAKS OR PROBLEMS.  O2 CONCENTRATOR SET AT DOSAGE ON MD ORDER.   PT EDUCATED IN O2 SAFETY WITH USE OF O2 AT NIGHTTIME, NO OPEN FLAMES/NO SMOKING.

## 2021-06-16 ASSESSMENT — ENCOUNTER SYMPTOMS: POOR JUDGMENT: 1

## 2021-06-17 ENCOUNTER — HOME CARE VISIT (OUTPATIENT)
Dept: HOME HEALTH SERVICES | Facility: HOME HEALTHCARE | Age: 76
End: 2021-06-17
Payer: MEDICARE

## 2021-06-17 VITALS
TEMPERATURE: 98.1 F | SYSTOLIC BLOOD PRESSURE: 128 MMHG | DIASTOLIC BLOOD PRESSURE: 78 MMHG | RESPIRATION RATE: 20 BRPM | OXYGEN SATURATION: 98 % | HEART RATE: 76 BPM

## 2021-06-17 PROCEDURE — G0299 HHS/HOSPICE OF RN EA 15 MIN: HCPCS

## 2021-06-17 PROCEDURE — G0495 RN CARE TRAIN/EDU IN HH: HCPCS

## 2021-06-17 ASSESSMENT — ENCOUNTER SYMPTOMS
DIFFICULTY THINKING: 1
MUSCLE WEAKNESS: 1
AGITATION: 1

## 2021-06-18 ENCOUNTER — HOME CARE VISIT (OUTPATIENT)
Dept: HOME HEALTH SERVICES | Facility: HOME HEALTHCARE | Age: 76
End: 2021-06-18
Payer: MEDICARE

## 2021-06-18 VITALS
TEMPERATURE: 97.5 F | SYSTOLIC BLOOD PRESSURE: 158 MMHG | HEART RATE: 75 BPM | OXYGEN SATURATION: 95 % | RESPIRATION RATE: 18 BRPM | DIASTOLIC BLOOD PRESSURE: 82 MMHG

## 2021-06-18 PROCEDURE — G0151 HHCP-SERV OF PT,EA 15 MIN: HCPCS

## 2021-06-18 PROCEDURE — G0155 HHCP-SVS OF CSW,EA 15 MIN: HCPCS

## 2021-06-18 SDOH — ECONOMIC STABILITY: HOUSING INSECURITY
HOME SAFETY: ENTRATOR SET AT DOSAGE ON MD ORDER.  NOTED NO OPEN FLAMES.  PATIENT REPORTS SHE TAKES O2 OFF BRIEFLY IF SHE GETS UP IN MIDDLE OF NIGHT TO GO BATHROOM THEN SHE PUTS IT BACK ON WHEN COMES BACK TO BED.

## 2021-06-18 SDOH — ECONOMIC STABILITY: HOUSING INSECURITY
HOME SAFETY: PT AND DTR EDUCATED ON HEAT PRECAUTIONS FOR HIGH HEAT AND IMPORTANCE OF STAYING COOL AND INCREASED HYDRATION.  PATIENT AND DTR ABLE TO SPEAKBACK VERBAL UNDERSTANDING.  PT TRACED TUBING TO CONCENTRATOR.  PT DID NOT NOTE ANY LEAKS OR PROBLEMS.  O2 CONC

## 2021-06-18 ASSESSMENT — ENCOUNTER SYMPTOMS: POOR JUDGMENT: 1

## 2021-06-18 NOTE — CASE COMMUNICATION
PT discharged on 6/18/21 with all goals met.  PT reinforced to patient and daughter the importance of checking BG and taking insulin as ordered by MD and educated by RN.  Also reinforced importance of fall prevention strategies and safety with daughter and patient.  Dtr stated she will assist now that she knows what patient is supposed to be doing.

## 2021-06-22 ENCOUNTER — HOME CARE VISIT (OUTPATIENT)
Dept: HOME HEALTH SERVICES | Facility: HOME HEALTHCARE | Age: 76
End: 2021-06-22
Payer: MEDICARE

## 2021-06-22 VITALS
OXYGEN SATURATION: 96 % | SYSTOLIC BLOOD PRESSURE: 162 MMHG | RESPIRATION RATE: 18 BRPM | TEMPERATURE: 97.7 F | DIASTOLIC BLOOD PRESSURE: 70 MMHG | HEART RATE: 77 BPM

## 2021-06-22 PROCEDURE — G0493 RN CARE EA 15 MIN HH/HOSPICE: HCPCS

## 2021-06-22 ASSESSMENT — ACTIVITIES OF DAILY LIVING (ADL)
AMBULATION ASSISTANCE: STAND BY ASSIST
CURRENT_FUNCTION: STAND BY ASSIST

## 2021-06-24 ENCOUNTER — HOME CARE VISIT (OUTPATIENT)
Dept: HOME HEALTH SERVICES | Facility: HOME HEALTHCARE | Age: 76
End: 2021-06-24
Payer: MEDICARE

## 2021-06-30 ENCOUNTER — HOSPITAL ENCOUNTER (OUTPATIENT)
Dept: LAB | Facility: MEDICAL CENTER | Age: 76
End: 2021-06-30
Attending: FAMILY MEDICINE
Payer: MEDICARE

## 2021-06-30 ENCOUNTER — HOME CARE VISIT (OUTPATIENT)
Dept: HOME HEALTH SERVICES | Facility: HOME HEALTHCARE | Age: 76
End: 2021-06-30
Payer: MEDICARE

## 2021-06-30 LAB
ALBUMIN SERPL BCP-MCNC: 3.2 G/DL (ref 3.2–4.9)
ALBUMIN/GLOB SERPL: 0.8 G/DL
ALP SERPL-CCNC: 91 U/L (ref 30–99)
ALT SERPL-CCNC: 14 U/L (ref 2–50)
ANION GAP SERPL CALC-SCNC: 9 MMOL/L (ref 7–16)
AST SERPL-CCNC: 20 U/L (ref 12–45)
BASOPHILS # BLD AUTO: 0.6 % (ref 0–1.8)
BASOPHILS # BLD: 0.06 K/UL (ref 0–0.12)
BILIRUB SERPL-MCNC: 0.6 MG/DL (ref 0.1–1.5)
BUN SERPL-MCNC: 16 MG/DL (ref 8–22)
CALCIUM SERPL-MCNC: 9.1 MG/DL (ref 8.5–10.5)
CHLORIDE SERPL-SCNC: 103 MMOL/L (ref 96–112)
CHOLEST SERPL-MCNC: 175 MG/DL (ref 100–199)
CO2 SERPL-SCNC: 26 MMOL/L (ref 20–33)
CREAT SERPL-MCNC: 1.38 MG/DL (ref 0.5–1.4)
EOSINOPHIL # BLD AUTO: 0.34 K/UL (ref 0–0.51)
EOSINOPHIL NFR BLD: 3.4 % (ref 0–6.9)
ERYTHROCYTE [DISTWIDTH] IN BLOOD BY AUTOMATED COUNT: 43.4 FL (ref 35.9–50)
EST. AVERAGE GLUCOSE BLD GHB EST-MCNC: 223 MG/DL
FASTING STATUS PATIENT QL REPORTED: NORMAL
GLOBULIN SER CALC-MCNC: 3.9 G/DL (ref 1.9–3.5)
GLUCOSE SERPL-MCNC: 205 MG/DL (ref 65–99)
HBA1C MFR BLD: 9.4 % (ref 4–5.6)
HCT VFR BLD AUTO: 42 % (ref 37–47)
HDLC SERPL-MCNC: 35 MG/DL
HGB BLD-MCNC: 14.2 G/DL (ref 12–16)
IMM GRANULOCYTES # BLD AUTO: 0.03 K/UL (ref 0–0.11)
IMM GRANULOCYTES NFR BLD AUTO: 0.3 % (ref 0–0.9)
LDLC SERPL CALC-MCNC: 108 MG/DL
LYMPHOCYTES # BLD AUTO: 1.73 K/UL (ref 1–4.8)
LYMPHOCYTES NFR BLD: 17.1 % (ref 22–41)
MCH RBC QN AUTO: 28.1 PG (ref 27–33)
MCHC RBC AUTO-ENTMCNC: 33.8 G/DL (ref 33.6–35)
MCV RBC AUTO: 83 FL (ref 81.4–97.8)
MONOCYTES # BLD AUTO: 0.87 K/UL (ref 0–0.85)
MONOCYTES NFR BLD AUTO: 8.6 % (ref 0–13.4)
NEUTROPHILS # BLD AUTO: 7.08 K/UL (ref 2–7.15)
NEUTROPHILS NFR BLD: 70 % (ref 44–72)
NRBC # BLD AUTO: 0 K/UL
NRBC BLD-RTO: 0 /100 WBC
PLATELET # BLD AUTO: 242 K/UL (ref 164–446)
PMV BLD AUTO: 10.1 FL (ref 9–12.9)
POTASSIUM SERPL-SCNC: 4.5 MMOL/L (ref 3.6–5.5)
PROT SERPL-MCNC: 7.1 G/DL (ref 6–8.2)
RBC # BLD AUTO: 5.06 M/UL (ref 4.2–5.4)
SODIUM SERPL-SCNC: 138 MMOL/L (ref 135–145)
TRIGL SERPL-MCNC: 158 MG/DL (ref 0–149)
WBC # BLD AUTO: 10.1 K/UL (ref 4.8–10.8)

## 2021-06-30 PROCEDURE — 36415 COLL VENOUS BLD VENIPUNCTURE: CPT

## 2021-06-30 PROCEDURE — 83036 HEMOGLOBIN GLYCOSYLATED A1C: CPT

## 2021-06-30 PROCEDURE — 80061 LIPID PANEL: CPT

## 2021-06-30 PROCEDURE — 665001 SOC-HOME HEALTH

## 2021-06-30 PROCEDURE — 80053 COMPREHEN METABOLIC PANEL: CPT

## 2021-06-30 PROCEDURE — 85025 COMPLETE CBC W/AUTO DIFF WBC: CPT

## 2021-06-30 PROCEDURE — G0493 RN CARE EA 15 MIN HH/HOSPICE: HCPCS

## 2021-07-02 VITALS
RESPIRATION RATE: 17 BRPM | SYSTOLIC BLOOD PRESSURE: 132 MMHG | DIASTOLIC BLOOD PRESSURE: 80 MMHG | TEMPERATURE: 97.8 F | HEART RATE: 73 BPM | OXYGEN SATURATION: 98 %

## 2021-07-02 SDOH — ECONOMIC STABILITY: HOUSING INSECURITY: EVIDENCE OF SMOKING MATERIAL: 0

## 2021-07-02 ASSESSMENT — ACTIVITIES OF DAILY LIVING (ADL)
HOME_HEALTH_OASIS: 00
OASIS_M1830: 01

## 2021-07-02 ASSESSMENT — PATIENT HEALTH QUESTIONNAIRE - PHQ9: CLINICAL INTERPRETATION OF PHQ2 SCORE: 0

## 2021-07-05 ENCOUNTER — HOME CARE VISIT (OUTPATIENT)
Dept: HOME HEALTH SERVICES | Facility: HOME HEALTHCARE | Age: 76
End: 2021-07-05
Payer: MEDICARE

## 2021-07-05 NOTE — CASE COMMUNICATION
Quality Review Completed for HI  OASIS by MERCED Catherine RN on July 5, 2021:  Edits completed by MERCED Catherine RN:  1.  is yes to c depression per care plan interventions

## 2021-07-06 NOTE — CASE COMMUNICATION
agree to changes made=LB  ----- Message -----  From: Dia Catherine R.N.  Sent: 7/5/2021   1:09 PM PDT  To: Mily Sin R.N.      Quality Review Completed for OR  OASIS by MERCED Catherine, RN on July 5, 2021:  Edits completed by MERCED Catherine RN:  1.  is yes to c depression per care plan interventions

## 2021-07-15 ENCOUNTER — TELEPHONE (OUTPATIENT)
Dept: CARDIOLOGY | Facility: MEDICAL CENTER | Age: 76
End: 2021-07-15

## 2021-10-05 DIAGNOSIS — I48.0 PAROXYSMAL ATRIAL FIBRILLATION (HCC): ICD-10-CM

## 2021-10-06 RX ORDER — APIXABAN 5 MG/1
TABLET, FILM COATED ORAL
Qty: 180 TABLET | Refills: 2 | Status: SHIPPED | OUTPATIENT
Start: 2021-10-06

## 2021-10-08 NOTE — CARE PLAN
Problem: Communication  Goal: The ability to communicate needs accurately and effectively will improve  9/15/2020 1649 by Isabelle Fontenot R.N.  Outcome: MET  9/15/2020 1154 by Isabelle Fontenot R.N.  Outcome: PROGRESSING AS EXPECTED     Problem: Safety  Goal: Will remain free from injury  9/15/2020 1649 by Isabelle Fontenot R.N.  Outcome: MET  9/15/2020 1154 by Isabelle Fontenot R.N.  Outcome: PROGRESSING AS EXPECTED  Goal: Will remain free from falls  9/15/2020 1649 by Isabelle Fontenot R.N.  Outcome: MET  9/15/2020 1154 by Isabelle Fontenot R.N.  Outcome: PROGRESSING AS EXPECTED     Problem: Infection  Goal: Will remain free from infection  9/15/2020 1649 by Isabelle Fontenot R.N.  Outcome: MET  9/15/2020 1154 by Isabelle Fontenot R.N.  Outcome: PROGRESSING AS EXPECTED     Problem: Venous Thromboembolism (VTW)/Deep Vein Thrombosis (DVT) Prevention:  Goal: Patient will participate in Venous Thrombosis (VTE)/Deep Vein Thrombosis (DVT)Prevention Measures  9/15/2020 1649 by Isabelle Fontenot R.N.  Outcome: MET  9/15/2020 1154 by Isabelle Fontenot R.N.  Outcome: PROGRESSING AS EXPECTED     Problem: Bowel/Gastric:  Goal: Normal bowel function is maintained or improved  9/15/2020 1649 by Isabelle Fontenot R.N.  Outcome: MET  9/15/2020 1154 by Isabelle Fontenot R.N.  Outcome: PROGRESSING AS EXPECTED  Goal: Will not experience complications related to bowel motility  Outcome: MET     Problem: Knowledge Deficit  Goal: Knowledge of disease process/condition, treatment plan, diagnostic tests, and medications will improve  Outcome: MET  Goal: Knowledge of the prescribed therapeutic regimen will improve  Outcome: MET     Problem: Discharge Barriers/Planning  Goal: Patient's continuum of care needs will be met  Outcome: MET     Problem: Pain Management  Goal: Pain level will decrease to patient's comfort goal  Outcome: MET      [Symptom and Test Evaluation] : symptom and test evaluation

## 2021-11-16 ENCOUNTER — APPOINTMENT (OUTPATIENT)
Dept: RADIOLOGY | Facility: MEDICAL CENTER | Age: 76
DRG: 872 | End: 2021-11-16
Attending: EMERGENCY MEDICINE
Payer: MEDICARE

## 2021-11-16 ENCOUNTER — HOSPITAL ENCOUNTER (INPATIENT)
Facility: MEDICAL CENTER | Age: 76
LOS: 8 days | DRG: 872 | End: 2021-11-24
Attending: EMERGENCY MEDICINE | Admitting: STUDENT IN AN ORGANIZED HEALTH CARE EDUCATION/TRAINING PROGRAM
Payer: MEDICARE

## 2021-11-16 DIAGNOSIS — L03.115 CELLULITIS OF RIGHT LOWER EXTREMITY: ICD-10-CM

## 2021-11-16 DIAGNOSIS — A41.9 SEPSIS, DUE TO UNSPECIFIED ORGANISM, UNSPECIFIED WHETHER ACUTE ORGAN DYSFUNCTION PRESENT (HCC): ICD-10-CM

## 2021-11-16 DIAGNOSIS — R42 VERTIGO: ICD-10-CM

## 2021-11-16 DIAGNOSIS — R73.9 HYPERGLYCEMIA: ICD-10-CM

## 2021-11-16 DIAGNOSIS — I48.91 ATRIAL FIBRILLATION WITH RVR (HCC): ICD-10-CM

## 2021-11-16 DIAGNOSIS — E11.65 TYPE 2 DIABETES MELLITUS WITH HYPERGLYCEMIA, WITH LONG-TERM CURRENT USE OF INSULIN (HCC): ICD-10-CM

## 2021-11-16 DIAGNOSIS — R53.81 DEBILITY: Chronic | ICD-10-CM

## 2021-11-16 DIAGNOSIS — E11.621 DIABETIC ULCER OF OTHER PART OF RIGHT FOOT ASSOCIATED WITH TYPE 2 DIABETES MELLITUS, LIMITED TO BREAKDOWN OF SKIN (HCC): ICD-10-CM

## 2021-11-16 DIAGNOSIS — L97.511 DIABETIC ULCER OF OTHER PART OF RIGHT FOOT ASSOCIATED WITH TYPE 2 DIABETES MELLITUS, LIMITED TO BREAKDOWN OF SKIN (HCC): ICD-10-CM

## 2021-11-16 DIAGNOSIS — Z79.4 TYPE 2 DIABETES MELLITUS WITH HYPERGLYCEMIA, WITH LONG-TERM CURRENT USE OF INSULIN (HCC): ICD-10-CM

## 2021-11-16 LAB
ALBUMIN SERPL BCP-MCNC: 2.9 G/DL (ref 3.2–4.9)
ALBUMIN/GLOB SERPL: 0.8 G/DL
ALP SERPL-CCNC: 93 U/L (ref 30–99)
ALT SERPL-CCNC: 9 U/L (ref 2–50)
ANION GAP SERPL CALC-SCNC: 15 MMOL/L (ref 7–16)
APPEARANCE UR: CLEAR
AST SERPL-CCNC: 16 U/L (ref 12–45)
BACTERIA #/AREA URNS HPF: NEGATIVE /HPF
BASOPHILS # BLD AUTO: 0.3 % (ref 0–1.8)
BASOPHILS # BLD: 0.07 K/UL (ref 0–0.12)
BILIRUB SERPL-MCNC: 1.1 MG/DL (ref 0.1–1.5)
BILIRUB UR QL STRIP.AUTO: NEGATIVE
BUN SERPL-MCNC: 17 MG/DL (ref 8–22)
CALCIUM SERPL-MCNC: 8.3 MG/DL (ref 8.5–10.5)
CHLORIDE SERPL-SCNC: 94 MMOL/L (ref 96–112)
CO2 SERPL-SCNC: 21 MMOL/L (ref 20–33)
COLOR UR: YELLOW
CREAT SERPL-MCNC: 1.38 MG/DL (ref 0.5–1.4)
EKG IMPRESSION: NORMAL
EOSINOPHIL # BLD AUTO: 0.01 K/UL (ref 0–0.51)
EOSINOPHIL NFR BLD: 0 % (ref 0–6.9)
EPI CELLS #/AREA URNS HPF: ABNORMAL /HPF
ERYTHROCYTE [DISTWIDTH] IN BLOOD BY AUTOMATED COUNT: 39.3 FL (ref 35.9–50)
EST. AVERAGE GLUCOSE BLD GHB EST-MCNC: 295 MG/DL
GLOBULIN SER CALC-MCNC: 3.7 G/DL (ref 1.9–3.5)
GLUCOSE BLD-MCNC: 252 MG/DL (ref 65–99)
GLUCOSE BLD-MCNC: 311 MG/DL (ref 65–99)
GLUCOSE BLD-MCNC: 354 MG/DL (ref 65–99)
GLUCOSE BLD-MCNC: 368 MG/DL (ref 65–99)
GLUCOSE SERPL-MCNC: 365 MG/DL (ref 65–99)
GLUCOSE UR STRIP.AUTO-MCNC: >=1000 MG/DL
HBA1C MFR BLD: 11.9 % (ref 4–5.6)
HCT VFR BLD AUTO: 38.8 % (ref 37–47)
HGB BLD-MCNC: 14 G/DL (ref 12–16)
HYALINE CASTS #/AREA URNS LPF: ABNORMAL /LPF
IMM GRANULOCYTES # BLD AUTO: 0.18 K/UL (ref 0–0.11)
IMM GRANULOCYTES NFR BLD AUTO: 0.8 % (ref 0–0.9)
INR PPP: 1.43 (ref 0.87–1.13)
KETONES UR STRIP.AUTO-MCNC: 15 MG/DL
LACTATE BLD-SCNC: 1.3 MMOL/L (ref 0.5–2)
LEUKOCYTE ESTERASE UR QL STRIP.AUTO: NEGATIVE
LYMPHOCYTES # BLD AUTO: 1.61 K/UL (ref 1–4.8)
LYMPHOCYTES NFR BLD: 7 % (ref 22–41)
MAGNESIUM SERPL-MCNC: 1.6 MG/DL (ref 1.5–2.5)
MCH RBC QN AUTO: 29.3 PG (ref 27–33)
MCHC RBC AUTO-ENTMCNC: 36.1 G/DL (ref 33.6–35)
MCV RBC AUTO: 81.2 FL (ref 81.4–97.8)
MICRO URNS: ABNORMAL
MONOCYTES # BLD AUTO: 1.45 K/UL (ref 0–0.85)
MONOCYTES NFR BLD AUTO: 6.3 % (ref 0–13.4)
NEUTROPHILS # BLD AUTO: 19.77 K/UL (ref 2–7.15)
NEUTROPHILS NFR BLD: 85.6 % (ref 44–72)
NITRITE UR QL STRIP.AUTO: NEGATIVE
NRBC # BLD AUTO: 0 K/UL
NRBC BLD-RTO: 0 /100 WBC
PH UR STRIP.AUTO: 6 [PH] (ref 5–8)
PLATELET # BLD AUTO: 277 K/UL (ref 164–446)
PMV BLD AUTO: 10 FL (ref 9–12.9)
POTASSIUM SERPL-SCNC: 3.5 MMOL/L (ref 3.6–5.5)
PROCALCITONIN SERPL-MCNC: 0.37 NG/ML
PROT SERPL-MCNC: 6.6 G/DL (ref 6–8.2)
PROT UR QL STRIP: >=1000 MG/DL
PROTHROMBIN TIME: 17 SEC (ref 12–14.6)
RBC # BLD AUTO: 4.78 M/UL (ref 4.2–5.4)
RBC # URNS HPF: ABNORMAL /HPF
RBC UR QL AUTO: ABNORMAL
SODIUM SERPL-SCNC: 130 MMOL/L (ref 135–145)
SP GR UR STRIP.AUTO: 1.03
TROPONIN T SERPL-MCNC: 49 NG/L (ref 6–19)
TSH SERPL DL<=0.005 MIU/L-ACNC: 1.5 UIU/ML (ref 0.38–5.33)
UROBILINOGEN UR STRIP.AUTO-MCNC: 1 MG/DL
WBC # BLD AUTO: 23.1 K/UL (ref 4.8–10.8)
WBC #/AREA URNS HPF: ABNORMAL /HPF

## 2021-11-16 PROCEDURE — 85025 COMPLETE CBC W/AUTO DIFF WBC: CPT

## 2021-11-16 PROCEDURE — 73630 X-RAY EXAM OF FOOT: CPT | Mod: RT

## 2021-11-16 PROCEDURE — 99223 1ST HOSP IP/OBS HIGH 75: CPT | Mod: AI | Performed by: STUDENT IN AN ORGANIZED HEALTH CARE EDUCATION/TRAINING PROGRAM

## 2021-11-16 PROCEDURE — 700117 HCHG RX CONTRAST REV CODE 255: Performed by: EMERGENCY MEDICINE

## 2021-11-16 PROCEDURE — 71045 X-RAY EXAM CHEST 1 VIEW: CPT

## 2021-11-16 PROCEDURE — 87040 BLOOD CULTURE FOR BACTERIA: CPT | Mod: 91

## 2021-11-16 PROCEDURE — 83605 ASSAY OF LACTIC ACID: CPT

## 2021-11-16 PROCEDURE — 83036 HEMOGLOBIN GLYCOSYLATED A1C: CPT

## 2021-11-16 PROCEDURE — A9270 NON-COVERED ITEM OR SERVICE: HCPCS | Performed by: EMERGENCY MEDICINE

## 2021-11-16 PROCEDURE — 700111 HCHG RX REV CODE 636 W/ 250 OVERRIDE (IP): Performed by: EMERGENCY MEDICINE

## 2021-11-16 PROCEDURE — 96372 THER/PROPH/DIAG INJ SC/IM: CPT

## 2021-11-16 PROCEDURE — 70496 CT ANGIOGRAPHY HEAD: CPT | Mod: MG

## 2021-11-16 PROCEDURE — 80053 COMPREHEN METABOLIC PANEL: CPT

## 2021-11-16 PROCEDURE — 84484 ASSAY OF TROPONIN QUANT: CPT

## 2021-11-16 PROCEDURE — 93005 ELECTROCARDIOGRAM TRACING: CPT | Performed by: EMERGENCY MEDICINE

## 2021-11-16 PROCEDURE — 85610 PROTHROMBIN TIME: CPT

## 2021-11-16 PROCEDURE — 99285 EMERGENCY DEPT VISIT HI MDM: CPT

## 2021-11-16 PROCEDURE — 96365 THER/PROPH/DIAG IV INF INIT: CPT

## 2021-11-16 PROCEDURE — A9270 NON-COVERED ITEM OR SERVICE: HCPCS | Performed by: STUDENT IN AN ORGANIZED HEALTH CARE EDUCATION/TRAINING PROGRAM

## 2021-11-16 PROCEDURE — 81001 URINALYSIS AUTO W/SCOPE: CPT

## 2021-11-16 PROCEDURE — 700102 HCHG RX REV CODE 250 W/ 637 OVERRIDE(OP): Performed by: EMERGENCY MEDICINE

## 2021-11-16 PROCEDURE — 84443 ASSAY THYROID STIM HORMONE: CPT

## 2021-11-16 PROCEDURE — 700105 HCHG RX REV CODE 258: Performed by: STUDENT IN AN ORGANIZED HEALTH CARE EDUCATION/TRAINING PROGRAM

## 2021-11-16 PROCEDURE — 770001 HCHG ROOM/CARE - MED/SURG/GYN PRIV*

## 2021-11-16 PROCEDURE — 700105 HCHG RX REV CODE 258: Performed by: EMERGENCY MEDICINE

## 2021-11-16 PROCEDURE — 70498 CT ANGIOGRAPHY NECK: CPT | Mod: MG

## 2021-11-16 PROCEDURE — 83735 ASSAY OF MAGNESIUM: CPT

## 2021-11-16 PROCEDURE — 96375 TX/PRO/DX INJ NEW DRUG ADDON: CPT

## 2021-11-16 PROCEDURE — 36415 COLL VENOUS BLD VENIPUNCTURE: CPT

## 2021-11-16 PROCEDURE — 82962 GLUCOSE BLOOD TEST: CPT | Mod: 91

## 2021-11-16 PROCEDURE — 700102 HCHG RX REV CODE 250 W/ 637 OVERRIDE(OP): Performed by: STUDENT IN AN ORGANIZED HEALTH CARE EDUCATION/TRAINING PROGRAM

## 2021-11-16 PROCEDURE — 84145 PROCALCITONIN (PCT): CPT

## 2021-11-16 RX ORDER — ONDANSETRON 4 MG/1
4 TABLET, ORALLY DISINTEGRATING ORAL EVERY 4 HOURS PRN
Status: DISCONTINUED | OUTPATIENT
Start: 2021-11-16 | End: 2021-11-16

## 2021-11-16 RX ORDER — TRAZODONE HYDROCHLORIDE 100 MG/1
50 TABLET ORAL
Status: DISCONTINUED | OUTPATIENT
Start: 2021-11-16 | End: 2021-11-24 | Stop reason: HOSPADM

## 2021-11-16 RX ORDER — LISINOPRIL 5 MG/1
5 TABLET ORAL DAILY
Status: DISCONTINUED | OUTPATIENT
Start: 2021-11-17 | End: 2021-11-18

## 2021-11-16 RX ORDER — AMOXICILLIN 250 MG
2 CAPSULE ORAL 2 TIMES DAILY
Status: DISCONTINUED | OUTPATIENT
Start: 2021-11-16 | End: 2021-11-24 | Stop reason: HOSPADM

## 2021-11-16 RX ORDER — CARVEDILOL 6.25 MG/1
3.12 TABLET ORAL 2 TIMES DAILY WITH MEALS
Status: DISCONTINUED | OUTPATIENT
Start: 2021-11-16 | End: 2021-11-24 | Stop reason: HOSPADM

## 2021-11-16 RX ORDER — HYDROMORPHONE HYDROCHLORIDE 1 MG/ML
0.25 INJECTION, SOLUTION INTRAMUSCULAR; INTRAVENOUS; SUBCUTANEOUS
Status: DISCONTINUED | OUTPATIENT
Start: 2021-11-16 | End: 2021-11-24 | Stop reason: HOSPADM

## 2021-11-16 RX ORDER — OXYCODONE HYDROCHLORIDE 5 MG/1
2.5 TABLET ORAL
Status: DISCONTINUED | OUTPATIENT
Start: 2021-11-16 | End: 2021-11-24 | Stop reason: HOSPADM

## 2021-11-16 RX ORDER — METOPROLOL TARTRATE 1 MG/ML
5 INJECTION, SOLUTION INTRAVENOUS
Status: DISCONTINUED | OUTPATIENT
Start: 2021-11-16 | End: 2021-11-24 | Stop reason: HOSPADM

## 2021-11-16 RX ORDER — SODIUM CHLORIDE, SODIUM LACTATE, POTASSIUM CHLORIDE, CALCIUM CHLORIDE 600; 310; 30; 20 MG/100ML; MG/100ML; MG/100ML; MG/100ML
INJECTION, SOLUTION INTRAVENOUS CONTINUOUS
Status: ACTIVE | OUTPATIENT
Start: 2021-11-16 | End: 2021-11-17

## 2021-11-16 RX ORDER — POLYETHYLENE GLYCOL 3350 17 G/17G
1 POWDER, FOR SOLUTION ORAL
Status: DISCONTINUED | OUTPATIENT
Start: 2021-11-16 | End: 2021-11-24 | Stop reason: HOSPADM

## 2021-11-16 RX ORDER — AMLODIPINE BESYLATE 5 MG/1
5 TABLET ORAL DAILY
Status: DISCONTINUED | OUTPATIENT
Start: 2021-11-17 | End: 2021-11-24 | Stop reason: HOSPADM

## 2021-11-16 RX ORDER — ONDANSETRON 2 MG/ML
4 INJECTION INTRAMUSCULAR; INTRAVENOUS EVERY 4 HOURS PRN
Status: DISCONTINUED | OUTPATIENT
Start: 2021-11-16 | End: 2021-11-16

## 2021-11-16 RX ORDER — SODIUM CHLORIDE 9 MG/ML
1000 INJECTION, SOLUTION INTRAVENOUS ONCE
Status: COMPLETED | OUTPATIENT
Start: 2021-11-16 | End: 2021-11-16

## 2021-11-16 RX ORDER — ENALAPRILAT 1.25 MG/ML
1.25 INJECTION INTRAVENOUS EVERY 6 HOURS PRN
Status: DISCONTINUED | OUTPATIENT
Start: 2021-11-16 | End: 2021-11-24 | Stop reason: HOSPADM

## 2021-11-16 RX ORDER — LABETALOL HYDROCHLORIDE 5 MG/ML
10 INJECTION, SOLUTION INTRAVENOUS EVERY 4 HOURS PRN
Status: DISCONTINUED | OUTPATIENT
Start: 2021-11-16 | End: 2021-11-24 | Stop reason: HOSPADM

## 2021-11-16 RX ORDER — CLINDAMYCIN PHOSPHATE 600 MG/50ML
600 INJECTION, SOLUTION INTRAVENOUS ONCE
Status: DISCONTINUED | OUTPATIENT
Start: 2021-11-16 | End: 2021-11-16

## 2021-11-16 RX ORDER — FUROSEMIDE 40 MG/1
40 TABLET ORAL DAILY
Status: DISCONTINUED | OUTPATIENT
Start: 2021-11-17 | End: 2021-11-18

## 2021-11-16 RX ORDER — BISACODYL 10 MG
10 SUPPOSITORY, RECTAL RECTAL
Status: DISCONTINUED | OUTPATIENT
Start: 2021-11-16 | End: 2021-11-24 | Stop reason: HOSPADM

## 2021-11-16 RX ORDER — ACETAMINOPHEN 325 MG/1
650 TABLET ORAL ONCE
Status: COMPLETED | OUTPATIENT
Start: 2021-11-16 | End: 2021-11-16

## 2021-11-16 RX ORDER — ONDANSETRON 2 MG/ML
4 INJECTION INTRAMUSCULAR; INTRAVENOUS ONCE
Status: COMPLETED | OUTPATIENT
Start: 2021-11-16 | End: 2021-11-16

## 2021-11-16 RX ORDER — ACETAMINOPHEN 325 MG/1
650 TABLET ORAL ONCE
Status: DISCONTINUED | OUTPATIENT
Start: 2021-11-16 | End: 2021-11-16

## 2021-11-16 RX ORDER — OXYCODONE HYDROCHLORIDE 5 MG/1
5 TABLET ORAL
Status: DISCONTINUED | OUTPATIENT
Start: 2021-11-16 | End: 2021-11-24 | Stop reason: HOSPADM

## 2021-11-16 RX ORDER — MECLIZINE HYDROCHLORIDE 25 MG/1
25 TABLET ORAL ONCE
Status: COMPLETED | OUTPATIENT
Start: 2021-11-16 | End: 2021-11-16

## 2021-11-16 RX ORDER — ACETAMINOPHEN 325 MG/1
650 TABLET ORAL EVERY 6 HOURS PRN
Status: DISCONTINUED | OUTPATIENT
Start: 2021-11-16 | End: 2021-11-24 | Stop reason: HOSPADM

## 2021-11-16 RX ORDER — LORAZEPAM 2 MG/ML
0.5 INJECTION INTRAMUSCULAR EVERY 4 HOURS PRN
Status: DISCONTINUED | OUTPATIENT
Start: 2021-11-16 | End: 2021-11-24 | Stop reason: HOSPADM

## 2021-11-16 RX ORDER — DEXTROSE MONOHYDRATE 25 G/50ML
50 INJECTION, SOLUTION INTRAVENOUS
Status: DISCONTINUED | OUTPATIENT
Start: 2021-11-16 | End: 2021-11-24 | Stop reason: HOSPADM

## 2021-11-16 RX ADMIN — IOHEXOL 80 ML: 350 INJECTION, SOLUTION INTRAVENOUS at 17:11

## 2021-11-16 RX ADMIN — SODIUM CHLORIDE, POTASSIUM CHLORIDE, SODIUM LACTATE AND CALCIUM CHLORIDE: 600; 310; 30; 20 INJECTION, SOLUTION INTRAVENOUS at 18:44

## 2021-11-16 RX ADMIN — DOCUSATE SODIUM 50 MG AND SENNOSIDES 8.6 MG 2 TABLET: 8.6; 5 TABLET, FILM COATED ORAL at 18:48

## 2021-11-16 RX ADMIN — MECLIZINE HYDROCHLORIDE 25 MG: 25 TABLET ORAL at 14:45

## 2021-11-16 RX ADMIN — ACETAMINOPHEN 650 MG: 325 TABLET, FILM COATED ORAL at 16:45

## 2021-11-16 RX ADMIN — INSULIN HUMAN 8 UNITS: 100 INJECTION, SOLUTION PARENTERAL at 17:21

## 2021-11-16 RX ADMIN — INSULIN HUMAN 8 UNITS: 100 INJECTION, SOLUTION PARENTERAL at 22:22

## 2021-11-16 RX ADMIN — CARVEDILOL 3.12 MG: 6.25 TABLET, FILM COATED ORAL at 20:01

## 2021-11-16 RX ADMIN — CEFTRIAXONE SODIUM 1 G: 1 INJECTION, POWDER, FOR SOLUTION INTRAMUSCULAR; INTRAVENOUS at 15:43

## 2021-11-16 RX ADMIN — TRAZODONE HYDROCHLORIDE 50 MG: 100 TABLET ORAL at 22:20

## 2021-11-16 RX ADMIN — SODIUM CHLORIDE 1000 ML: 9 INJECTION, SOLUTION INTRAVENOUS at 14:45

## 2021-11-16 RX ADMIN — APIXABAN 5 MG: 5 TABLET, FILM COATED ORAL at 20:01

## 2021-11-16 RX ADMIN — ONDANSETRON 4 MG: 2 INJECTION INTRAMUSCULAR; INTRAVENOUS at 14:45

## 2021-11-16 RX ADMIN — INSULIN GLARGINE 25 UNITS: 100 INJECTION, SOLUTION SUBCUTANEOUS at 20:02

## 2021-11-16 ASSESSMENT — CHA2DS2 SCORE
CHF OR LEFT VENTRICULAR DYSFUNCTION: NO
VASCULAR DISEASE: NO
HYPERTENSION: YES
SEX: FEMALE
AGE 75 OR GREATER: YES
PRIOR STROKE OR TIA OR THROMBOEMBOLISM: NO
CHA2DS2 VASC SCORE: 5
AGE 65 TO 74: NO
DIABETES: YES

## 2021-11-16 ASSESSMENT — ENCOUNTER SYMPTOMS
SHORTNESS OF BREATH: 0
WEAKNESS: 0
WHEEZING: 0
ABDOMINAL PAIN: 0
LOSS OF CONSCIOUSNESS: 1
INSOMNIA: 0
CONSTIPATION: 0
FOCAL WEAKNESS: 0
DIARRHEA: 0
CHILLS: 0
MYALGIAS: 1
VOMITING: 0
FEVER: 0
HEADACHES: 0
BACK PAIN: 0
PALPITATIONS: 0
BLURRED VISION: 0
NAUSEA: 1
BLOOD IN STOOL: 0
DEPRESSION: 0
COUGH: 0
NECK PAIN: 0
DIZZINESS: 1
BRUISES/BLEEDS EASILY: 0
SORE THROAT: 0
DOUBLE VISION: 0
HEARTBURN: 0
FALLS: 1

## 2021-11-16 ASSESSMENT — FIBROSIS 4 INDEX: FIB4 SCORE: 1.72

## 2021-11-16 NOTE — ED PROVIDER NOTES
CHIEF COMPLAINT  Chief Complaint   Patient presents with   • Nausea       HPI  Amrita Torrez is a 76 y.o. female with a history of diabetes and hypertension who presents with self-described dizziness/spinning associated with nausea/vomiting for 2 to 3 days.  She apparently has been noncompliant with her diabetes medications.  She does report some chest discomfort but feels that might be from the vomiting.  No significant shortness of breath.  No abdominal pain.  She has a mild headache.  No burning with urination.  She complains of pain and a rash on her right lower extremity.  She also feels that she may have passed out and hit her head yesterday.  The patient does have a mild dementia and apparently is at her baseline mentation.  She takes Eliquis but it is not clear why.    REVIEW OF SYSTEMS  All other systems are negative.     PAST MEDICAL HISTORY  Past Medical History:   Diagnosis Date   • Anxiety    • Arthritis     fingers/toes   • Breath shortness    • CAD (coronary artery disease)    • Cataract     bilat   • Dental disorder     upper denture   • Depression    • Diabetes     insulin and oral meds   • Goiter    • Heart attack ([facility])     Dr. Tobar   • High cholesterol    • Hyperlipidemia    • Hypertension    • MI (myocardial infarction) ([facility]) 2016    x2 stints placed   • Oxygen dependent     2 liters @ HS   • Pericardial effusion    • Pneumonia 2019   • Snoring    • Stroke ([facility]) 10/2019    reports possible   • Thyroid nodule        FAMILY HISTORY  No family history on file.    SOCIAL HISTORY  Social History     Socioeconomic History   • Marital status:      Spouse name: Not on file   • Number of children: Not on file   • Years of education: Not on file   • Highest education level: Not on file   Occupational History   • Not on file   Tobacco Use   • Smoking status: Former Smoker     Packs/day: 1.00     Years: 40.00     Pack years: 40.00     Types: Cigarettes     Quit date: 1/1/1978     Years since  quittin.9   • Smokeless tobacco: Never Used   Vaping Use   • Vaping Use: Never used   Substance and Sexual Activity   • Alcohol use: No   • Drug use: No   • Sexual activity: Not on file   Other Topics Concern   • Not on file   Social History Narrative   • Not on file     Social Determinants of Health     Financial Resource Strain:    • Difficulty of Paying Living Expenses: Not on file   Food Insecurity:    • Worried About Running Out of Food in the Last Year: Not on file   • Ran Out of Food in the Last Year: Not on file   Transportation Needs:    • Lack of Transportation (Medical): Not on file   • Lack of Transportation (Non-Medical): Not on file   Physical Activity:    • Days of Exercise per Week: Not on file   • Minutes of Exercise per Session: Not on file   Stress:    • Feeling of Stress : Not on file   Social Connections:    • Frequency of Communication with Friends and Family: Not on file   • Frequency of Social Gatherings with Friends and Family: Not on file   • Attends [employer] Services: Not on file   • Active Member of Clubs or Organizations: Not on file   • Attends Club or Organization Meetings: Not on file   • Marital Status: Not on file   Intimate Partner Violence:    • Fear of Current or Ex-Partner: Not on file   • Emotionally Abused: Not on file   • Physically Abused: Not on file   • Sexually Abused: Not on file   Housing Stability:    • Unable to Pay for Housing in the Last Year: Not on file   • Number of Places Lived in the Last Year: Not on file   • Unstable Housing in the Last Year: Not on file       SURGICAL HISTORY  Past Surgical History:   Procedure Laterality Date   • VITRECTOMY POSTERIOR N/A 2020    Procedure: VITRECTOMY, POSTERIOR PORTION-RIGHT ENDO LASER LEFT PAN RETINAL LASER;  Surgeon: Jean-Claude Franklin M.D.;  Location: SURGERY SAME DAY [facility];  Service: Ophthalmology   • LAPAROSCOPY  2014    Performed by Abdi Navarro M.D. at SURGERY SAME DAY [facility] ORS   •  "BAN BY LAPAROSCOPY  9/14/2012    Performed by MIAN YI at SURGERY SAME DAY [facility] ORS   • OTHER ORTHOPEDIC SURGERY  7/11    knee   • GYN SURGERY  1978    fibroids   • OTHER ABDOMINAL SURGERY  1966    appendectomy   • GYN SURGERY      hysterectomy   • ZZZ CARDIAC CATH         CURRENT MEDICATIONS  Home Medications    **Home medications have not yet been reviewed for this encounter**         ALLERGIES  Allergies   Allergen Reactions   • Pcn [Penicillins] Hives, Shortness of Breath and Swelling     Tolerated ceftriaxone April 2021   • Sulfa Drugs Hives, Shortness of Breath and Swelling       PHYSICAL EXAM  VITAL SIGNS: BP (!) 170/82   Pulse (!) 112   Temp 36.7 °C (98 °F) (Temporal)   Resp 16   Ht 1.626 m (5' 4\")   Wt 73 kg (161 lb)   LMP  (LMP Unknown)   SpO2 99%   BMI 27.64 kg/m²      Constitutional: Well developed, Well nourished, No acute distress, Non-toxic appearance.   HENT: Normocephalic, Atraumatic, TMs normal, mucous membranes moist, no erythema, exudates, swelling, or masses, nares patent  Eyes: nonicteric  Neck: Supple, no meningismus  Lymphatic: No lymphadenopathy noted.   Cardiovascular: Tachycardic with irregularly irregular rhythm, no gallops rubs or murmurs  Lungs: Clear bilaterally   Abdomen: Soft and nontender throughout, no distention  Skin: Warm, Dry, no rash  Back: No tenderness, No CVA tenderness.   Genitalia: Deferred  Rectal: Deferred  Extremities: Erythema noted emanating from the right great toe interspace up into the leg, distal pulses are intact, motor and sensory function grossly intact  Neurologic: Alert, appropriate, follows commands, moving all extremities, normal speech, no drift, no hemineglect, cranial nerves are grossly intact, finger-to-nose intact bilaterally  Psychiatric: Affect normal    EKG  Time is 231, rate 128, atrial fibrillation with rapid ventricular response, LVH, QT interval 508, no ST elevation or depression or T wave " change    RADIOLOGY/PROCEDURES  DX-FOOT-COMPLETE 3+ RIGHT   Final Result      Osteopenia.      CT-CTA HEAD WITH & W/O-POST PROCESS   Final Result      1.  No thrombosis is seen within the [place].   2.  New left temporal lobe encephalomalacia compatible with interval infarction.   3.  Atrophy   4.  There are periventricular and subcortical white matter changes present.  This finding is nonspecific and could be from previous small vessel ischemia, demyelination, or gliosis.   5.  Focal hypodensities in the basal ganglia are likely related to prior lacunar infarcts.      CT-CTA NECK WITH & W/O-POST PROCESSING   Final Result      1.  No evidence of carotid or vertebral artery occlusion or dissection.      2.  Atherosclerotic plaque at the carotid bifurcations bilaterally which measures less than 50%.      3.  Left sided thyroid nodules which measure 2cm in size.  This could be further evaluated with thyroid ultrasound.      DX-CHEST-PORTABLE (1 VIEW)   Final Result      No acute cardiopulmonary abnormality.               COURSE & MEDI, LVH notedCAL DECISION MAKING  Pertinent Labs & Imaging studies reviewed. (See chart for details)  This is a 76-year-old female who is a diabetic with a history of A. fib, on blood thinners.  She presents tachycardic with a heart rate in the 130s, A. fib with RVR.  Her initial complaint to me was vertigo which she states is been going on a couple of days-she states that she thinks she may have passed out and hit her head yesterday.  Imaging of her head and neck demonstrates no evidence of dissection hemorrhage or other process.  On exam the patient notably has cellulitis of her right foot and leg.  She was started on antibiotics and fluids here.  Notably in chart review she does have a history of moderate aortic stenosis.  Patient has had multiple episodes of vomiting related to apparently her dizziness sensation.  Given her history of diabetes, leg infection, persistent vomiting  and her vertigo, she will be admitted for antibiotics, fluids, blood sugar control and symptomatic relief.    FINAL IMPRESSION  1.  A. fib with RVR  2.  Cellulitis right lower extremity  3.  Hyperglycemia  4.  Sepsis  5.  Vertigo         Electronically signed by: Elgin Tellez M.D., 11/16/2021 2:29 PM

## 2021-11-16 NOTE — ED TRIAGE NOTES
Pt arrives to ED from home c/o nausea x 3 days. Is a insulin dependent diabetic and has not been complaint with insulin over that time.  per EMS. Pt with mild hx of dementia and is at baseline mentation.

## 2021-11-17 ENCOUNTER — APPOINTMENT (OUTPATIENT)
Dept: CARDIOLOGY | Facility: MEDICAL CENTER | Age: 76
DRG: 872 | End: 2021-11-17
Attending: STUDENT IN AN ORGANIZED HEALTH CARE EDUCATION/TRAINING PROGRAM
Payer: MEDICARE

## 2021-11-17 LAB
ALBUMIN SERPL BCP-MCNC: 2.3 G/DL (ref 3.2–4.9)
ALBUMIN/GLOB SERPL: 0.7 G/DL
ALP SERPL-CCNC: 72 U/L (ref 30–99)
ALT SERPL-CCNC: 6 U/L (ref 2–50)
ANION GAP SERPL CALC-SCNC: 10 MMOL/L (ref 7–16)
AST SERPL-CCNC: 10 U/L (ref 12–45)
BASOPHILS # BLD AUTO: 0.2 % (ref 0–1.8)
BASOPHILS # BLD: 0.03 K/UL (ref 0–0.12)
BILIRUB SERPL-MCNC: 0.2 MG/DL (ref 0.1–1.5)
BUN SERPL-MCNC: 28 MG/DL (ref 8–22)
CALCIUM SERPL-MCNC: 7.7 MG/DL (ref 8.5–10.5)
CHLORIDE SERPL-SCNC: 97 MMOL/L (ref 96–112)
CO2 SERPL-SCNC: 23 MMOL/L (ref 20–33)
CREAT SERPL-MCNC: 2.29 MG/DL (ref 0.5–1.4)
EOSINOPHIL # BLD AUTO: 0.19 K/UL (ref 0–0.51)
EOSINOPHIL NFR BLD: 1.3 % (ref 0–6.9)
ERYTHROCYTE [DISTWIDTH] IN BLOOD BY AUTOMATED COUNT: 40.3 FL (ref 35.9–50)
ERYTHROCYTE [SEDIMENTATION RATE] IN BLOOD BY WESTERGREN METHOD: 62 MM/HOUR (ref 0–25)
GLOBULIN SER CALC-MCNC: 3.1 G/DL (ref 1.9–3.5)
GLUCOSE BLD-MCNC: 100 MG/DL (ref 65–99)
GLUCOSE BLD-MCNC: 191 MG/DL (ref 65–99)
GLUCOSE BLD-MCNC: 205 MG/DL (ref 65–99)
GLUCOSE BLD-MCNC: 231 MG/DL (ref 65–99)
GLUCOSE SERPL-MCNC: 225 MG/DL (ref 65–99)
HCT VFR BLD AUTO: 33.7 % (ref 37–47)
HGB BLD-MCNC: 12 G/DL (ref 12–16)
IMM GRANULOCYTES # BLD AUTO: 0.09 K/UL (ref 0–0.11)
IMM GRANULOCYTES NFR BLD AUTO: 0.6 % (ref 0–0.9)
LACTATE BLD-SCNC: 2 MMOL/L (ref 0.5–2)
LV EJECT FRACT  99904: 55
LV EJECT FRACT MOD 2C 99903: 59.63
LV EJECT FRACT MOD 4C 99902: 52.75
LV EJECT FRACT MOD BP 99901: 57.07
LYMPHOCYTES # BLD AUTO: 2.69 K/UL (ref 1–4.8)
LYMPHOCYTES NFR BLD: 18.3 % (ref 22–41)
MCH RBC QN AUTO: 29.4 PG (ref 27–33)
MCHC RBC AUTO-ENTMCNC: 35.6 G/DL (ref 33.6–35)
MCV RBC AUTO: 82.6 FL (ref 81.4–97.8)
MONOCYTES # BLD AUTO: 1.29 K/UL (ref 0–0.85)
MONOCYTES NFR BLD AUTO: 8.8 % (ref 0–13.4)
NEUTROPHILS # BLD AUTO: 10.41 K/UL (ref 2–7.15)
NEUTROPHILS NFR BLD: 70.8 % (ref 44–72)
NRBC # BLD AUTO: 0 K/UL
NRBC BLD-RTO: 0 /100 WBC
PLATELET # BLD AUTO: 225 K/UL (ref 164–446)
PMV BLD AUTO: 10.3 FL (ref 9–12.9)
POTASSIUM SERPL-SCNC: 3.8 MMOL/L (ref 3.6–5.5)
PROT SERPL-MCNC: 5.4 G/DL (ref 6–8.2)
RBC # BLD AUTO: 4.08 M/UL (ref 4.2–5.4)
SODIUM SERPL-SCNC: 130 MMOL/L (ref 135–145)
WBC # BLD AUTO: 14.7 K/UL (ref 4.8–10.8)

## 2021-11-17 PROCEDURE — 93306 TTE W/DOPPLER COMPLETE: CPT

## 2021-11-17 PROCEDURE — 85652 RBC SED RATE AUTOMATED: CPT

## 2021-11-17 PROCEDURE — 93306 TTE W/DOPPLER COMPLETE: CPT | Mod: 26 | Performed by: INTERNAL MEDICINE

## 2021-11-17 PROCEDURE — 96372 THER/PROPH/DIAG INJ SC/IM: CPT

## 2021-11-17 PROCEDURE — 80053 COMPREHEN METABOLIC PANEL: CPT

## 2021-11-17 PROCEDURE — 85025 COMPLETE CBC W/AUTO DIFF WBC: CPT

## 2021-11-17 PROCEDURE — A9270 NON-COVERED ITEM OR SERVICE: HCPCS | Performed by: STUDENT IN AN ORGANIZED HEALTH CARE EDUCATION/TRAINING PROGRAM

## 2021-11-17 PROCEDURE — 770001 HCHG ROOM/CARE - MED/SURG/GYN PRIV*

## 2021-11-17 PROCEDURE — 96367 TX/PROPH/DG ADDL SEQ IV INF: CPT

## 2021-11-17 PROCEDURE — 36415 COLL VENOUS BLD VENIPUNCTURE: CPT

## 2021-11-17 PROCEDURE — 99232 SBSQ HOSP IP/OBS MODERATE 35: CPT | Performed by: INTERNAL MEDICINE

## 2021-11-17 PROCEDURE — 97162 PT EVAL MOD COMPLEX 30 MIN: CPT

## 2021-11-17 PROCEDURE — 700101 HCHG RX REV CODE 250: Performed by: INTERNAL MEDICINE

## 2021-11-17 PROCEDURE — 82962 GLUCOSE BLOOD TEST: CPT

## 2021-11-17 PROCEDURE — 700102 HCHG RX REV CODE 250 W/ 637 OVERRIDE(OP): Performed by: STUDENT IN AN ORGANIZED HEALTH CARE EDUCATION/TRAINING PROGRAM

## 2021-11-17 PROCEDURE — 83605 ASSAY OF LACTIC ACID: CPT

## 2021-11-17 RX ORDER — CLINDAMYCIN PHOSPHATE 600 MG/50ML
600 INJECTION, SOLUTION INTRAVENOUS EVERY 8 HOURS
Status: DISCONTINUED | OUTPATIENT
Start: 2021-11-17 | End: 2021-11-18

## 2021-11-17 RX ADMIN — TRAZODONE HYDROCHLORIDE 50 MG: 100 TABLET ORAL at 21:00

## 2021-11-17 RX ADMIN — INSULIN HUMAN 3 UNITS: 100 INJECTION, SOLUTION PARENTERAL at 14:18

## 2021-11-17 RX ADMIN — INSULIN HUMAN 2 UNITS: 100 INJECTION, SOLUTION PARENTERAL at 17:14

## 2021-11-17 RX ADMIN — APIXABAN 5 MG: 5 TABLET, FILM COATED ORAL at 05:24

## 2021-11-17 RX ADMIN — INSULIN HUMAN 3 UNITS: 100 INJECTION, SOLUTION PARENTERAL at 21:23

## 2021-11-17 RX ADMIN — ACETAMINOPHEN 650 MG: 325 TABLET, FILM COATED ORAL at 14:51

## 2021-11-17 RX ADMIN — CLINDAMYCIN IN 5 PERCENT DEXTROSE 600 MG: 12 INJECTION, SOLUTION INTRAVENOUS at 12:59

## 2021-11-17 RX ADMIN — AMLODIPINE BESYLATE 5 MG: 5 TABLET ORAL at 05:25

## 2021-11-17 RX ADMIN — CARVEDILOL 3.12 MG: 6.25 TABLET, FILM COATED ORAL at 17:14

## 2021-11-17 RX ADMIN — APIXABAN 5 MG: 5 TABLET, FILM COATED ORAL at 17:14

## 2021-11-17 RX ADMIN — SERTRALINE HYDROCHLORIDE 50 MG: 50 TABLET ORAL at 05:25

## 2021-11-17 RX ADMIN — CARVEDILOL 3.12 MG: 6.25 TABLET, FILM COATED ORAL at 09:44

## 2021-11-17 RX ADMIN — FUROSEMIDE 40 MG: 40 TABLET ORAL at 05:24

## 2021-11-17 RX ADMIN — OXYCODONE 5 MG: 5 TABLET ORAL at 18:29

## 2021-11-17 RX ADMIN — INSULIN GLARGINE 25 UNITS: 100 INJECTION, SOLUTION SUBCUTANEOUS at 17:14

## 2021-11-17 RX ADMIN — CLINDAMYCIN IN 5 PERCENT DEXTROSE 600 MG: 12 INJECTION, SOLUTION INTRAVENOUS at 21:05

## 2021-11-17 RX ADMIN — LISINOPRIL 5 MG: 10 TABLET ORAL at 05:24

## 2021-11-17 ASSESSMENT — PAIN DESCRIPTION - PAIN TYPE
TYPE: ACUTE PAIN
TYPE: ACUTE PAIN

## 2021-11-17 ASSESSMENT — GAIT ASSESSMENTS
GAIT LEVEL OF ASSIST: MINIMAL ASSIST
ASSISTIVE DEVICE: HAND HELD ASSIST
DISTANCE (FEET): 45
DEVIATION: SHUFFLED GAIT;BRADYKINETIC;DECREASED BASE OF SUPPORT

## 2021-11-17 ASSESSMENT — COGNITIVE AND FUNCTIONAL STATUS - GENERAL
TURNING FROM BACK TO SIDE WHILE IN FLAT BAD: A LITTLE
STANDING UP FROM CHAIR USING ARMS: A LITTLE
MOVING FROM LYING ON BACK TO SITTING ON SIDE OF FLAT BED: A LOT
WALKING IN HOSPITAL ROOM: A LITTLE
SUGGESTED CMS G CODE MODIFIER MOBILITY: CK
MOBILITY SCORE: 16
MOVING TO AND FROM BED TO CHAIR: A LITTLE
CLIMB 3 TO 5 STEPS WITH RAILING: A LOT

## 2021-11-17 NOTE — PROGRESS NOTES
Tylenol given for right leg pain. Report called to Marta [id number] on SMT, pt will be transferred to [id number].

## 2021-11-17 NOTE — PROGRESS NOTES
Handoff assessment: pt transferred from R5. Pt sleepy, arousable. Very [place]. Denies pain. Reviewed fall precautions and plan of care. Call light within reach.

## 2021-11-17 NOTE — ASSESSMENT & PLAN NOTE
Twelve-lead EKG reveals atrial fibrillation with RVR  Coreg 3.125 mg p.o. twice daily  Lopressor 5 mg IV x3 for heart rate greater than 120  Continue Eliquis 5 mg p.o. twice daily

## 2021-11-17 NOTE — ASSESSMENT & PLAN NOTE
This is Sepsis Present on admission  SIRS criteria identified on my evaluation include: Fever, with temperature greater than 101 deg F, Tachycardia, with heart rate greater than 90 BPM and Leukocytosis, with WBC greater than 12,000  Source is Skin soft tissue  Sepsis protocol initiated  Fluid resuscitation ordered per protocol  IV antibiotics as appropriate for source of sepsis  While organ dysfunction may be noted elsewhere in this problem list or in the chart, degree of organ dysfunction does not meet CMS criteria for severe sepsis  Procalcitonin ordered and pending. Urinalysis ordered and pending. Blood culture x2/lactic acid ordered and pending. IV antibiotics initiated in ED we will continue onward.

## 2021-11-17 NOTE — ED NOTES
Pt sleeping comfortably in bed. Sats dropping while sleeping to 88%, placed on 1L nc. Night meds given. FSBS checked 368, medicated per MAR. Hospital bed ordered. Call light within reach. No complaints from pt at this time. Will continue to monitor

## 2021-11-17 NOTE — ED NOTES
Pt medicated per MAR. Awaiting pharmacy to approve the rest of pts medication. IVF started. Pt swallows pills and water without difficulty. Pt reports she does not have to urinate at this time for urine sample.

## 2021-11-17 NOTE — ASSESSMENT & PLAN NOTE
Avoid nephrotoxic agents - hold lasix, lisinopril  Renal ultrasound - no hydro  Fena suggests prerenal injury  Completed IVF x 24 hours  Will continue to monitor  3.25-->2.2  improving

## 2021-11-17 NOTE — PROGRESS NOTES
[facility] Medicine Daily Progress Note    Date of Service  11/17/2021    Chief Complaint  Amrita Torrez is a 76 y.o. female admitted 11/16/2021 with RLE pain    Hospital Course  Amrita Torrez is a 76 y.o. female who presented 11/16/2021 with complaints of right lower extremity pain, mild swelling, fever, incoordination and syncope as of yesterday. Patient also complained of nausea and vertigo-like symptoms and concern for stroke. She is on Eliquis for atrial fibrillation and compliant with her home medication regiment. She also has a history of diabetes and hypertension and denies ever having diabetic foot ulcer. She does have swelling of her right lower extremity/foot with erythema and states that this is the first time this is ever occurred. She states that she went to outpatient care facility which was caring for her right lower extremity earlier in the week and now is red and swollen. She otherwise denies shortness of breath, chest pain, cough with sputum production, resolution of dizziness/vertigo and nausea with vomiting. Denies hematemesis, constipation, diarrhea, melena, hematochezia, dysuria, hematuria.     Vital signs at time of presentation were as follows: 100.6, 118, 16, 147/82, 99% room air.     Chest x-ray performed and unremarkable. CTA head and neck performed and CTA head reveals new left temporal lobe encephalomalacia compatible with interval infarction, atrophy, there are periventricular and subcortical white matter changes and focal hypodensities in the basal ganglia are likely related to prior lacunar infarcts. CTA neck reveals left-sided thyroid nodule 2 cm in size otherwise relatively unremarkable. Foot x-ray reveals osteopenia.     Labs significant for leukocytosis of 23, troponin 49, mild hyponatremia of 130, mild hypokalemia of 3.5, hyperglycemia of 365 and otherwise relatively unremarkable. Lactic acid ordered and pending, magnesium ordered and pending and urinalysis ordered and  pending.     IV antibiotics initiated in ED. Hospitalist consulted for admission and patient agreeable to inpatient hospitalization for sepsis secondary to right lower extremity cellulitis. She agrees to DO NOT RESUSCITATE DO NOT INTUBATE CODE STATUS at time of evaluation and alert and oriented x4. She will be optimized in ED and subsequently transferred to medical andrade floor for further optimization of medical management.    Interval Problem Update  Complain about right foot pain.  On empiric antibiotic.     I have personally seen and examined the patient at bedside. I discussed the plan of care with patient and bedside RN.    Consultants/Specialty  none    Code Status  DNAR/DNI    Disposition  Patient is not medically cleared.   Anticipate discharge to to home with close outpatient follow-up.  I have placed the appropriate orders for post-discharge needs.    Review of Systems  ROS     Physical Exam  Temp:  [36.2 °C (97.2 °F)-38.1 °C (100.6 °F)] 36.2 °C (97.2 °F)  Pulse:  [] 65  Resp:  [14-18] 16  BP: (106-170)/(57-93) 147/93  SpO2:  [86 %-99 %] 92 %    Physical Exam    Fluids    Intake/Output Summary (Last 24 hours) at 11/17/2021 0736  Last data filed at 11/16/2021 1613  Gross per 24 hour   Intake 100 ml   Output --   Net 100 ml       Laboratory  Recent Labs     11/16/21  1411 11/17/21  0213   WBC 23.1* 14.7*   RBC 4.78 4.08*   HEMOGLOBIN 14.0 12.0   HEMATOCRIT 38.8 33.7*   MCV 81.2* 82.6   MCH 29.3 29.4   MCHC 36.1* 35.6*   RDW 39.3 40.3   PLATELETCT 277 225   MPV 10.0 10.3     Recent Labs     11/16/21  1411   SODIUM 130*   POTASSIUM 3.5*   CHLORIDE 94*   CO2 21   GLUCOSE 365*   BUN 17   CREATININE 1.38   CALCIUM 8.3*     Recent Labs     11/16/21  1411   INR 1.43*               Imaging  DX-FOOT-COMPLETE 3+ RIGHT   Final Result      Osteopenia.      CT-CTA HEAD WITH & W/O-POST PROCESS   Final Result      1.  No thrombosis is seen within the [place].   2.  New left temporal lobe encephalomalacia  compatible with interval infarction.   3.  Atrophy   4.  There are periventricular and subcortical white matter changes present.  This finding is nonspecific and could be from previous small vessel ischemia, demyelination, or gliosis.   5.  Focal hypodensities in the basal ganglia are likely related to prior lacunar infarcts.      CT-CTA NECK WITH & W/O-POST PROCESSING   Final Result      1.  No evidence of carotid or vertebral artery occlusion or dissection.      2.  Atherosclerotic plaque at the carotid bifurcations bilaterally which measures less than 50%.      3.  Left sided thyroid nodules which measure 2cm in size.  This could be further evaluated with thyroid ultrasound.      DX-CHEST-PORTABLE (1 VIEW)   Final Result      No acute cardiopulmonary abnormality.         EC-ECHOCARDIOGRAM COMPLETE W/O CONT    (Results Pending)        Assessment/Plan  * Sepsis (HCC)- (present on admission)  Assessment & Plan  This is Sepsis Present on admission  SIRS criteria identified on my evaluation include: Fever, with temperature greater than 101 deg F, Tachycardia, with heart rate greater than 90 BPM and Leukocytosis, with WBC greater than 12,000  Source is Skin soft tissue  Sepsis protocol initiated  Fluid resuscitation ordered per protocol  IV antibiotics as appropriate for source of sepsis  While organ dysfunction may be noted elsewhere in this problem list or in the chart, degree of organ dysfunction does not meet CMS criteria for severe sepsis  Procalcitonin ordered and pending. Urinalysis ordered and pending. Blood culture x2/lactic acid ordered and pending. IV antibiotics initiated in ED we will continue onward.        Cellulitis of right lower extremity- (present on admission)  Assessment & Plan  X-ray performed and no evidence of osteomyelitis however osteopenia has been seen on x-ray imaging as seen above.  Continue IV antibiotics with clindamycin, patient is allergic to multiple abx  Check ESR, if significantly  elevated, to get MRI  CBC in a.m.    Type 2 diabetes mellitus with hyperglycemia, with long-term current use of insulin (HCC)- (present on admission)  Assessment & Plan  Urinalysis ordered and pending  Hemoglobin A1c 11.9  Continue basal/SSI  Goal of 140-180 glycemic control within hospital      Hypertension- (present on admission)  Assessment & Plan  Continue home antihypertensive medication regiment  2 g sodium restricted diet  As needed antihypertensives ordered    Paroxysmal atrial fibrillation (HCC)- (present on admission)  Assessment & Plan  Twelve-lead EKG reveals atrial fibrillation with RVR  Coreg 3.125 mg p.o. twice daily  Lopressor 5 mg IV x3 for heart rate greater than 120  Continue Eliquis 5 mg p.o. twice daily    CKD (chronic kidney disease), stage III- (present on admission)  Assessment & Plan  Avoid nephrotoxic agents  CMP in a.m.  Stable    Syncope- (present on admission)  Assessment & Plan  No longer symptomatic  CT head/CTA head and neck imaging as above  Consider neurology consultation  Echocardiogram ordered and pending  Fall precautions      Thyroid nodule- (present on admission)  Assessment & Plan  TSH ordered for RVR however likely secondary to sepsis  Consider free T4 if TSH abnormal  Follow-up outpatient work-up       VTE prophylaxis: therapeutic anticoagulation with eliquis    I have performed a physical exam and reviewed and updated ROS and Plan today (11/17/2021). In review of yesterday's note (11/16/2021), there are no changes except as documented above.

## 2021-11-17 NOTE — H&P
Hospital Medicine History & Physical Note    Date of Service  11/16/2021    Primary Care Physician  Fitz Turpin D.O.    Consultants  None      Code Status  DNAR/DNI    Chief Complaint  Chief Complaint   Patient presents with   • Nausea       History of Presenting Illness  Amrita Torrez is a 76 y.o. female who presented 11/16/2021 with complaints of right lower extremity pain, mild swelling, fever, incoordination and syncope as of yesterday. Patient also complained of nausea and vertigo-like symptoms and concern for stroke. She is on Eliquis for atrial fibrillation and compliant with her home medication regiment. She also has a history of diabetes and hypertension and denies ever having diabetic foot ulcer. She does have swelling of her right lower extremity/foot with erythema and states that this is the first time this is ever occurred. She states that she went to outpatient care facility which was caring for her right lower extremity earlier in the week and now is red and swollen. She otherwise denies shortness of breath, chest pain, cough with sputum production, resolution of dizziness/vertigo and nausea with vomiting. Denies hematemesis, constipation, diarrhea, melena, hematochezia, dysuria, hematuria.    Vital signs at time of presentation were as follows: 100.6, 118, 16, 147/82, 99% room air.    Chest x-ray performed and unremarkable. CTA head and neck performed and CTA head reveals new left temporal lobe encephalomalacia compatible with interval infarction, atrophy, there are periventricular and subcortical white matter changes and focal hypodensities in the basal ganglia are likely related to prior lacunar infarcts. CTA neck reveals left-sided thyroid nodule 2 cm in size otherwise relatively unremarkable. Foot x-ray reveals osteopenia.    Labs significant for leukocytosis of 23, troponin 49, mild hyponatremia of 130, mild hypokalemia of 3.5, hyperglycemia of 365 and otherwise relatively  unremarkable. Lactic acid ordered and pending, magnesium ordered and pending and urinalysis ordered and pending.    IV antibiotics initiated in ED. Hospitalist consulted for admission and patient agreeable to inpatient hospitalization for sepsis secondary to right lower extremity cellulitis. She agrees to DO NOT RESUSCITATE DO NOT INTUBATE CODE STATUS at time of evaluation and alert and oriented x4. She will be optimized in ED and subsequently transferred to medical andrade floor for further optimization of medical management.    I discussed the plan of care with patient.    Review of Systems  Review of Systems   Constitutional: Negative for chills and fever.   HENT: Negative for congestion and sore throat.    Eyes: Negative for blurred vision and double vision.   Respiratory: Negative for cough, shortness of breath and wheezing.    Cardiovascular: Negative for chest pain and palpitations.   Gastrointestinal: Positive for nausea (resolved). Negative for abdominal pain, blood in stool, constipation, diarrhea, heartburn, melena and vomiting.   Genitourinary: Negative for dysuria and frequency.   Musculoskeletal: Positive for falls, joint pain (RLE) and myalgias (RLE). Negative for back pain and neck pain.   Skin: Negative for itching and rash.   Neurological: Positive for dizziness (resolved) and loss of consciousness (resolved). Negative for focal weakness, weakness and headaches.   Endo/Heme/Allergies: Negative for environmental allergies. Does not bruise/bleed easily.   Psychiatric/Behavioral: Negative for depression. The patient does not have insomnia.        Past Medical History   has a past medical history of Anxiety, Arthritis, Breath shortness, CAD (coronary artery disease), Cataract, Dental disorder, Depression, Diabetes, Goiter, Heart attack (HCC), High cholesterol, Hyperlipidemia, Hypertension, MI (myocardial infarction) ([facility]) (2016), Oxygen dependent, Pericardial effusion, Pneumonia (2019), Snoring, Stroke  "(HCC) (10/2019), and Thyroid nodule.    Surgical History   has a past surgical history that includes other orthopedic surgery (7/11); other abdominal surgery (1966); gyn surgery (1978); gyn surgery; aditya by laparoscopy (9/14/2012); laparoscopy (12/1/2014); zzz cardiac cath; and vitrectomy posterior (N/A, 11/24/2020).     Family History  family history includes No Known Problems in her father and mother.   Family history reviewed with patient. There is no family history that is pertinent to the chief complaint.     Social History   reports that she quit smoking about 43 years ago. Her smoking use included cigarettes. She has a 40.00 pack-year smoking history. She has never used smokeless tobacco. She reports that she does not drink alcohol and does not use drugs.    Allergies  Allergies   Allergen Reactions   • Pcn [Penicillins] Hives, Shortness of Breath and Swelling     Tolerated ceftriaxone April 2021   • Sulfa Drugs Hives, Shortness of Breath and Swelling       Medications  Prior to Admission Medications   Prescriptions Last Dose Informant Patient Reported? Taking?   BD VEO INSULIN SYRINGE U/F 31G X 15/64\" 0.5 ML Misc   Yes No   Sig: USE TO INJECT INSULIN 3 4 TIMES DAILY   ELIQUIS 5 MG Tab   No No   Sig: TAKE 1 TABLET BY MOUTH TWICE A DAY   Home Care Oxygen   Yes No   Sig: Inhale 2 L/min at bedtime. Oxygen dose range:  2 L/min  Respiratory route via: Nasal Cannula   Oxygen supplier: Preferred   Multiple Vitamins-Minerals (MULTIVITAMIN ADULT, MINERALS,) Tab   Yes No   Sig: Take 1 tablet  by mouth every day.   acetaminophen (TYLENOL) 500 MG Tab   Yes No   Sig: Take 500 mg by mouth every 6 hours as needed.   amLODIPine (NORVASC) 5 MG Tab   No No   Sig: Take 1 tablet by mouth every day.   carvedilol (COREG) 3.125 MG Tab   No No   Sig: Take 1 tablet by mouth 2 times a day with meals.   diphenhydrAMINE-APAP, sleep, (ACETAMINOPHEN PM)  MG Tab   Yes No   Sig: Take 1 tablet  by mouth at bedtime as needed (sleep). "   furosemide (LASIX) 20 MG Tab   No No   Sig: Take 2 Tablets by mouth every day.   insulin glargine (LANTUS) 100 UNIT/ML Solution  Patient Yes No   Sig: Inject 25 Units under the skin every evening.   insulin regular (NOVOLIN R) 100 Unit/mL Solution  Patient Yes No   Sig: Inject 2-8 Units as instructed 4 times a day. PER SLIDING SCALE  150-200 = 2 UNITS  201-250 = 4 UNITS  251-300 = 6 UNITS  301-350 = 8 UNITS  >400 CALL MD   lisinopril (PRINIVIL) 5 MG Tab   No No   Sig: Take 1 tablet by mouth every day.   potassium chloride SA (K-DUR) 10 MEQ Tab CR   Yes No   Sig: Take 10 mEq by mouth every day.   sertraline (ZOLOFT) 50 MG Tab  Patient's Home Pharmacy Yes No   Sig: Take 50 mg by mouth every day. Indications: Major Depressive Disorder   trazodone (DESYREL) 50 MG Tab  Patient's Home Pharmacy Yes No   Sig: Take 50 mg by mouth every bedtime. Indications: Trouble Sleeping   vitamin D, Ergocalciferol, (DRISDOL) 60644 units Cap capsule  Patient's Home Pharmacy Yes No   Sig: Take 50,000 Units by mouth every Sunday.      Facility-Administered Medications: None       Physical Exam  Temp:  [36.7 °C (98 °F)-38.1 °C (100.6 °F)] 38.1 °C (100.6 °F)  Pulse:  [112-132] 132  Resp:  [16-17] 17  BP: (121-170)/(72-82) 121/72  SpO2:  [93 %-99 %] 93 %  Blood Pressure : 121/72   Temperature: (!) 38.1 °C (100.6 °F)   Pulse: (!) 132   Respiration: 17   Pulse Oximetry: 93 %       Physical Exam  Constitutional:       Appearance: Normal appearance.   HENT:      Head: Normocephalic and atraumatic.      Mouth/Throat:      Mouth: Mucous membranes are moist.      Pharynx: Oropharynx is clear. No posterior oropharyngeal erythema.   Eyes:      General: No scleral icterus.     Extraocular Movements: Extraocular movements intact.      Conjunctiva/sclera: Conjunctivae normal.      Pupils: Pupils are equal, round, and reactive to light.   Neck:      Vascular: No carotid bruit.   Cardiovascular:      Rate and Rhythm: Normal rate and regular rhythm.       Pulses: Normal pulses.      Heart sounds: Normal heart sounds. No murmur heard.  No friction rub. No gallop.    Pulmonary:      Effort: Pulmonary effort is normal.      Breath sounds: Normal breath sounds. No wheezing, rhonchi or rales.   Abdominal:      General: Abdomen is flat. Bowel sounds are normal. There is no distension.      Palpations: There is no mass.      Tenderness: There is no abdominal tenderness. There is no rebound.   Musculoskeletal:         General: No swelling. Normal range of motion.      Cervical back: Normal range of motion.      Right lower leg: No edema.      Left lower leg: No edema.   Lymphadenopathy:      Cervical: No cervical adenopathy.   Skin:     General: Skin is warm and dry.      Capillary Refill: Capillary refill takes less than 2 seconds.      Findings: Erythema (on RLE see pic below) present. No rash.   Neurological:      General: No focal deficit present.      Mental Status: She is alert and oriented to person, place, and time. Mental status is at baseline.      Cranial Nerves: No cranial nerve deficit.      Sensory: No sensory deficit.      Motor: No weakness.   Psychiatric:         Mood and Affect: Mood normal.         Behavior: Behavior normal.         Laboratory:  Recent Labs     11/16/21  1411   WBC 23.1*   RBC 4.78   HEMOGLOBIN 14.0   HEMATOCRIT 38.8   MCV 81.2*   MCH 29.3   MCHC 36.1*   RDW 39.3   PLATELETCT 277   MPV 10.0     Recent Labs     11/16/21  1411   SODIUM 130*   POTASSIUM 3.5*   CHLORIDE 94*   CO2 21   GLUCOSE 365*   BUN 17   CREATININE 1.38   CALCIUM 8.3*     Recent Labs     11/16/21  1411   ALTSGPT 9   ASTSGOT 16   ALKPHOSPHAT 93   TBILIRUBIN 1.1   GLUCOSE 365*         No results for input(s): NTPROBNP in the last 72 hours.      Recent Labs     11/16/21  1538   TROPONINT 49*     I reviewed and interpreted the above twelve-lead EKG and do appreciate atrial fibrillation with mild ST segment depressions in lateral leads and no ST segment elevations. QTc mildly  elevated and poor R wave progression in precordial leads.    Imaging:  DX-FOOT-COMPLETE 3+ RIGHT   Final Result      Osteopenia.      CT-CTA HEAD WITH & W/O-POST PROCESS   Final Result      1.  No thrombosis is seen within the [place].   2.  New left temporal lobe encephalomalacia compatible with interval infarction.   3.  Atrophy   4.  There are periventricular and subcortical white matter changes present.  This finding is nonspecific and could be from previous small vessel ischemia, demyelination, or gliosis.   5.  Focal hypodensities in the basal ganglia are likely related to prior lacunar infarcts.      CT-CTA NECK WITH & W/O-POST PROCESSING   Final Result      1.  No evidence of carotid or vertebral artery occlusion or dissection.      2.  Atherosclerotic plaque at the carotid bifurcations bilaterally which measures less than 50%.      3.  Left sided thyroid nodules which measure 2cm in size.  This could be further evaluated with thyroid ultrasound.      DX-CHEST-PORTABLE (1 VIEW)   Final Result      No acute cardiopulmonary abnormality.           I reviewed and interpreted the above foot x-ray and do not appreciate osteomyelitis however osteopenic bone density appreciated.    Assessment/Plan:  I anticipate this patient will require at least two midnights for appropriate medical management, necessitating inpatient admission.    * Sepsis (HCC)- (present on admission)  Assessment & Plan  This is Sepsis Present on admission  SIRS criteria identified on my evaluation include: Fever, with temperature greater than 101 deg F, Tachycardia, with heart rate greater than 90 BPM and Leukocytosis, with WBC greater than 12,000  Source is Skin soft tissue  Sepsis protocol initiated  Fluid resuscitation ordered per protocol  IV antibiotics as appropriate for source of sepsis  While organ dysfunction may be noted elsewhere in this problem list or in the chart, degree of organ dysfunction does not meet CMS criteria for  severe sepsis  Procalcitonin ordered and pending. Urinalysis ordered and pending. Blood culture x2/lactic acid ordered and pending. IV antibiotics initiated in ED we will continue onward.        Cellulitis of right lower extremity- (present on admission)  Assessment & Plan  Pictures as seen above  Analgesics for pain control  Anti-inflammatories for pain  X-ray performed and no evidence of osteomyelitis however osteopenia has been seen on x-ray imaging as seen above.  Continue IV antibiotics  Procalcitonin ordered and pending  CBC in a.m.    Type 2 diabetes mellitus with hyperglycemia, with long-term current use of insulin (HCC)- (present on admission)  Assessment & Plan  Urinalysis ordered and pending  Hemoglobin A1c ordered and pending  Continue basal/SSI  Goal of 140-180 glycemic control within hospital  Follow-up outpatient PCP and endocrinologist after discharge.    Hypertension- (present on admission)  Assessment & Plan  Continue home antihypertensive medication regiment  2 g sodium restricted diet  As needed antihypertensives ordered    Paroxysmal atrial fibrillation (HCC)- (present on admission)  Assessment & Plan  Twelve-lead EKG reveals atrial fibrillation with RVR  Coreg 3.125 mg p.o. twice daily  Lopressor 5 mg IV x3 for heart rate greater than 120  Continue Eliquis 5 mg p.o. twice daily    CKD (chronic kidney disease), stage III- (present on admission)  Assessment & Plan  Avoid nephrotoxic agents  CMP in a.m.  Stable    Syncope- (present on admission)  Assessment & Plan  No longer symptomatic  CT head/CTA head and neck imaging as above  Consider neurology consultation  Echocardiogram ordered and pending  Fall precautions      Thyroid nodule- (present on admission)  Assessment & Plan  TSH ordered for RVR however likely secondary to sepsis  Consider free T4 if TSH abnormal  Follow-up outpatient work-up      VTE prophylaxis: therapeutic anticoagulation with Eliquis

## 2021-11-17 NOTE — ASSESSMENT & PLAN NOTE
No longer symptomatic  CT head/CTA head and neck imaging as above  Consider neurology consultation  Echocardiogram: low normal EF, mod/severe AS  Fall precautions

## 2021-11-17 NOTE — THERAPY
"Physical Therapy   Initial Evaluation     Patient Name: Amrita Costa Elder  Age:  76 y.o., Sex:  female  Medical Record #: [id number]  Today's Date: 11/17/2021     Precautions  Precautions: Fall Risk    Assessment  Patient is 76 y.o. female admitted with RLE pain, nausea, vertigo and syncope. Pt found to have R LE cellulitis and started on IV antibiotics. PMH includes HTN and DM. CTA head reveals new left temporal lobe encephalomalacia compatible with interval infarction, atrophy, there are periventricular and subcortical white matter changes and focal hypodensities in the basal ganglia are likely related to prior lacunar infarcts. Pt currently requires min assist with transfers and gait and stated \"my legs feel very weak.\" Pt appears to be functioning below her baseline and is a fall risk. PT will cont while in acute to address strength, balance, activity tolerance, and safety awareness.     Plan    Recommend Physical Therapy 4 times per week until therapy goals are met for the following treatments:  Gait Training, Neuro Re-Education / Balance, Self Care/Home Evaluation, Stair Training, Therapeutic Activities and Therapeutic Exercises    DC Equipment Recommendations: Unable to determine at this time  Discharge Recommendations: Recommend post-acute placement for additional physical therapy services prior to discharge home (pt would require assistance from daughter and HHPT to dc heidi)       Subjective    \"I think this is because my daughter moved back in\"       11/17/21 1134   Prior Living Situation   Prior Services Home-Independent   Housing / Facility 1 Story House   Steps Into Home 2   Steps In Home 0   Equipment Owned 4-Wheel Walker   Lives with - Patient's Self Care Capacity Adult Children   Comments pts daughter lives with her but works during the day. Son brings groceries by    Prior Level of Functional Mobility   Bed Mobility Independent   Transfer Status Independent   Ambulation Independent   Distance Ambulation " (Feet)   (household)   Assistive Devices Used 4-Wheel Walker   Stairs Independent   Comments independent with mobility prior   History of Falls   History of Falls Yes   Cognition    Cognition / Consciousness X   Speech/ Communication Hard of Hearing   Safety Awareness Impaired   New Learning Impaired   Comments unsure what baseline is   Active ROM Lower Body    Active ROM Lower Body  WDL   Strength Lower Body   Lower Body Strength  X   Gross Strength Generalized Weakness, Equal Bilaterally   Sensation Lower Body   Lower Extremity Sensation   X   Comments R foot hypersensitivity   Gait Analysis   Gait Level Of Assist Minimal Assist   Assistive Device Hand Held Assist   Distance (Feet) 45   # of Times Distance was Traveled 1   Deviation Shuffled Gait;Bradykinetic;Decreased Base Of Support   # of Stairs Climbed 0   Weight Bearing Status no restrictions   Comments very unsteady on first attempt, progressed with second attempt   Bed Mobility    Supine to Sit Supervised   Sit to Supine Supervised   Scooting Supervised   Functional Mobility   Sit to Stand Minimal Assist   Bed, Chair, Wheelchair Transfer Minimal Assist   Transfer Method Stand Step   Mobility in room and hallway of ED   Short Term Goals    Short Term Goal # 1 pt will be able to complete functional transfers with FWW and SPV in 6tx in order to return home   Short Term Goal # 2 pt will be able to ambulate 150ft with FWW and SPV in 6tx in order to return home   Short Term Goal # 3 pt will be able to negotiate 2 steps with SPV in 6tx in order to return home   Anticipated Discharge Equipment and Recommendations   DC Equipment Recommendations Unable to determine at this time   Discharge Recommendations Recommend post-acute placement for additional physical therapy services prior to discharge home  (pt would require assistance from daughter and HHPT to dc heidi)

## 2021-11-17 NOTE — ED NOTES
Pt completed diet tray, resting in bed. VSS. Sts feeling much better at this time. Call light within reach. Will continue to monitor

## 2021-11-17 NOTE — ASSESSMENT & PLAN NOTE
X-ray performed and no evidence of osteomyelitis however osteopenia has been seen on x-ray imaging as seen above.  Keflex  Blood culture negative

## 2021-11-17 NOTE — ED NOTES
RN assist: Floor previously called and notified of transport/report.  Amrita Torrez transported to [facility] via hospital bed with transport. All personal belongings in possession.  NAD noted.

## 2021-11-17 NOTE — ASSESSMENT & PLAN NOTE
Continue home antihypertensive medication regiment  2 g sodium restricted diet  As needed antihypertensives ordered

## 2021-11-18 PROBLEM — I35.0 SEVERE AORTIC STENOSIS: Status: ACTIVE | Noted: 2021-11-18

## 2021-11-18 LAB
ALBUMIN SERPL BCP-MCNC: 1.9 G/DL (ref 3.2–4.9)
ALBUMIN/GLOB SERPL: 0.5 G/DL
ALP SERPL-CCNC: 96 U/L (ref 30–99)
ALT SERPL-CCNC: 5 U/L (ref 2–50)
ANION GAP SERPL CALC-SCNC: 10 MMOL/L (ref 7–16)
AST SERPL-CCNC: 11 U/L (ref 12–45)
BASOPHILS # BLD AUTO: 0.4 % (ref 0–1.8)
BASOPHILS # BLD: 0.06 K/UL (ref 0–0.12)
BILIRUB SERPL-MCNC: 0.3 MG/DL (ref 0.1–1.5)
BUN SERPL-MCNC: 32 MG/DL (ref 8–22)
CALCIUM SERPL-MCNC: 7.8 MG/DL (ref 8.5–10.5)
CHLORIDE SERPL-SCNC: 98 MMOL/L (ref 96–112)
CO2 SERPL-SCNC: 21 MMOL/L (ref 20–33)
CREAT SERPL-MCNC: 2.75 MG/DL (ref 0.5–1.4)
EOSINOPHIL # BLD AUTO: 0.29 K/UL (ref 0–0.51)
EOSINOPHIL NFR BLD: 1.9 % (ref 0–6.9)
ERYTHROCYTE [DISTWIDTH] IN BLOOD BY AUTOMATED COUNT: 42.8 FL (ref 35.9–50)
GLOBULIN SER CALC-MCNC: 3.8 G/DL (ref 1.9–3.5)
GLUCOSE BLD-MCNC: 104 MG/DL (ref 65–99)
GLUCOSE BLD-MCNC: 134 MG/DL (ref 65–99)
GLUCOSE BLD-MCNC: 167 MG/DL (ref 65–99)
GLUCOSE BLD-MCNC: 93 MG/DL (ref 65–99)
GLUCOSE SERPL-MCNC: 95 MG/DL (ref 65–99)
HCT VFR BLD AUTO: 34.3 % (ref 37–47)
HGB BLD-MCNC: 11.9 G/DL (ref 12–16)
IMM GRANULOCYTES # BLD AUTO: 0.11 K/UL (ref 0–0.11)
IMM GRANULOCYTES NFR BLD AUTO: 0.7 % (ref 0–0.9)
LYMPHOCYTES # BLD AUTO: 2.1 K/UL (ref 1–4.8)
LYMPHOCYTES NFR BLD: 13.6 % (ref 22–41)
MCH RBC QN AUTO: 29.5 PG (ref 27–33)
MCHC RBC AUTO-ENTMCNC: 34.7 G/DL (ref 33.6–35)
MCV RBC AUTO: 84.9 FL (ref 81.4–97.8)
MONOCYTES # BLD AUTO: 1.26 K/UL (ref 0–0.85)
MONOCYTES NFR BLD AUTO: 8.2 % (ref 0–13.4)
NEUTROPHILS # BLD AUTO: 11.57 K/UL (ref 2–7.15)
NEUTROPHILS NFR BLD: 75.2 % (ref 44–72)
NRBC # BLD AUTO: 0 K/UL
NRBC BLD-RTO: 0 /100 WBC
PLATELET # BLD AUTO: 269 K/UL (ref 164–446)
PMV BLD AUTO: 10.5 FL (ref 9–12.9)
POTASSIUM SERPL-SCNC: 3.3 MMOL/L (ref 3.6–5.5)
PROT SERPL-MCNC: 5.7 G/DL (ref 6–8.2)
RBC # BLD AUTO: 4.04 M/UL (ref 4.2–5.4)
SODIUM SERPL-SCNC: 129 MMOL/L (ref 135–145)
WBC # BLD AUTO: 15.4 K/UL (ref 4.8–10.8)

## 2021-11-18 PROCEDURE — 94760 N-INVAS EAR/PLS OXIMETRY 1: CPT

## 2021-11-18 PROCEDURE — 700102 HCHG RX REV CODE 250 W/ 637 OVERRIDE(OP): Performed by: STUDENT IN AN ORGANIZED HEALTH CARE EDUCATION/TRAINING PROGRAM

## 2021-11-18 PROCEDURE — 36415 COLL VENOUS BLD VENIPUNCTURE: CPT

## 2021-11-18 PROCEDURE — 82962 GLUCOSE BLOOD TEST: CPT | Mod: 91

## 2021-11-18 PROCEDURE — 700101 HCHG RX REV CODE 250: Performed by: INTERNAL MEDICINE

## 2021-11-18 PROCEDURE — 700102 HCHG RX REV CODE 250 W/ 637 OVERRIDE(OP): Performed by: INTERNAL MEDICINE

## 2021-11-18 PROCEDURE — 770001 HCHG ROOM/CARE - MED/SURG/GYN PRIV*

## 2021-11-18 PROCEDURE — 85025 COMPLETE CBC W/AUTO DIFF WBC: CPT

## 2021-11-18 PROCEDURE — 700105 HCHG RX REV CODE 258: Performed by: INTERNAL MEDICINE

## 2021-11-18 PROCEDURE — A9270 NON-COVERED ITEM OR SERVICE: HCPCS | Performed by: STUDENT IN AN ORGANIZED HEALTH CARE EDUCATION/TRAINING PROGRAM

## 2021-11-18 PROCEDURE — A9270 NON-COVERED ITEM OR SERVICE: HCPCS | Performed by: INTERNAL MEDICINE

## 2021-11-18 PROCEDURE — 99233 SBSQ HOSP IP/OBS HIGH 50: CPT | Performed by: INTERNAL MEDICINE

## 2021-11-18 PROCEDURE — 80053 COMPREHEN METABOLIC PANEL: CPT

## 2021-11-18 RX ORDER — SODIUM CHLORIDE 9 MG/ML
INJECTION, SOLUTION INTRAVENOUS CONTINUOUS
Status: DISCONTINUED | OUTPATIENT
Start: 2021-11-18 | End: 2021-11-19

## 2021-11-18 RX ORDER — CEPHALEXIN 500 MG/1
500 CAPSULE ORAL EVERY 6 HOURS
Status: DISCONTINUED | OUTPATIENT
Start: 2021-11-18 | End: 2021-11-18

## 2021-11-18 RX ORDER — DEXTROSE AND SODIUM CHLORIDE 5; .45 G/100ML; G/100ML
INJECTION, SOLUTION INTRAVENOUS CONTINUOUS
Status: DISCONTINUED | OUTPATIENT
Start: 2021-11-18 | End: 2021-11-18

## 2021-11-18 RX ORDER — CEPHALEXIN 500 MG/1
500 CAPSULE ORAL EVERY 8 HOURS
Status: COMPLETED | OUTPATIENT
Start: 2021-11-18 | End: 2021-11-23

## 2021-11-18 RX ADMIN — CEPHALEXIN 500 MG: 500 CAPSULE ORAL at 13:27

## 2021-11-18 RX ADMIN — SODIUM CHLORIDE: 9 INJECTION, SOLUTION INTRAVENOUS at 13:28

## 2021-11-18 RX ADMIN — INSULIN GLARGINE 25 UNITS: 100 INJECTION, SOLUTION SUBCUTANEOUS at 17:11

## 2021-11-18 RX ADMIN — INSULIN HUMAN 2 UNITS: 100 INJECTION, SOLUTION PARENTERAL at 21:00

## 2021-11-18 RX ADMIN — AMLODIPINE BESYLATE 5 MG: 5 TABLET ORAL at 05:56

## 2021-11-18 RX ADMIN — APIXABAN 5 MG: 5 TABLET, FILM COATED ORAL at 05:56

## 2021-11-18 RX ADMIN — CARVEDILOL 3.12 MG: 6.25 TABLET, FILM COATED ORAL at 17:11

## 2021-11-18 RX ADMIN — CEPHALEXIN 500 MG: 500 CAPSULE ORAL at 21:01

## 2021-11-18 RX ADMIN — APIXABAN 5 MG: 5 TABLET, FILM COATED ORAL at 17:11

## 2021-11-18 RX ADMIN — FUROSEMIDE 40 MG: 40 TABLET ORAL at 05:56

## 2021-11-18 RX ADMIN — LISINOPRIL 5 MG: 10 TABLET ORAL at 05:56

## 2021-11-18 RX ADMIN — TRAZODONE HYDROCHLORIDE 50 MG: 100 TABLET ORAL at 21:02

## 2021-11-18 RX ADMIN — SERTRALINE HYDROCHLORIDE 50 MG: 50 TABLET ORAL at 05:56

## 2021-11-18 RX ADMIN — CARVEDILOL 3.12 MG: 6.25 TABLET, FILM COATED ORAL at 07:59

## 2021-11-18 RX ADMIN — CLINDAMYCIN IN 5 PERCENT DEXTROSE 600 MG: 12 INJECTION, SOLUTION INTRAVENOUS at 05:56

## 2021-11-18 RX ADMIN — DOCUSATE SODIUM 50 MG AND SENNOSIDES 8.6 MG 2 TABLET: 8.6; 5 TABLET, FILM COATED ORAL at 17:11

## 2021-11-18 ASSESSMENT — ENCOUNTER SYMPTOMS
FEVER: 0
PALPITATIONS: 0
SHORTNESS OF BREATH: 0
DIZZINESS: 0
ABDOMINAL PAIN: 0
DEPRESSION: 0
COUGH: 0
FALLS: 0
LOSS OF CONSCIOUSNESS: 0
CHILLS: 0
VOMITING: 0
SORE THROAT: 0
NAUSEA: 0

## 2021-11-18 NOTE — CARE PLAN
The patient is Stable - Low risk of patient condition declining or worsening         Progress made toward(s) clinical / shift goals:    Problem: Pain - Standard  Goal: Alleviation of pain or a reduction in pain to the patient’s comfort goal  Outcome: Progressing     Problem: Knowledge Deficit - Standard  Goal: Patient and family/care givers will demonstrate understanding of plan of care, disease process/condition, diagnostic tests and medications  Outcome: Progressing     Problem: Fall Risk  Goal: Patient will remain free from falls  Outcome: Progressing       Patient is not progressing towards the following goals:

## 2021-11-18 NOTE — PROGRESS NOTES
Received bedside report and accepted care of patient.    Pt is currently A/ox4, very head of hearing. 2L NC. Pt is currently tolerating diet. Pt has a PIV, saline locked at this time. Pt does not complain of pain at this time.    Patient currently resting in bed in no visible or stated signs of distress. Bed alarm in place, controls on and bed in locked and lowest position. Call light and personal possessions within reach. Patient educated about use of call light and verbalized understanding.

## 2021-11-18 NOTE — PROGRESS NOTES
[facility] Medicine Daily Progress Note    Date of Service  11/18/2021    Chief Complaint  Amrita Torrez is a 76 y.o. female admitted 11/16/2021 with RLE pain    Hospital Course  Amrita Torrez is a 76 y.o. female who presented 11/16/2021 with complaints of right lower extremity pain, mild swelling, fever, incoordination and syncope as of yesterday. Patient also complained of nausea and vertigo-like symptoms and concern for stroke. She is on Eliquis for atrial fibrillation and compliant with her home medication regiment. She also has a history of diabetes and hypertension and denies ever having diabetic foot ulcer. She does have swelling of her right lower extremity/foot with erythema and states that this is the first time this is ever occurred. She states that she went to outpatient care facility which was caring for her right lower extremity earlier in the week and now is red and swollen. She otherwise denies shortness of breath, chest pain, cough with sputum production, resolution of dizziness/vertigo and nausea with vomiting. Denies hematemesis, constipation, diarrhea, melena, hematochezia, dysuria, hematuria.     Vital signs at time of presentation were as follows: 100.6, 118, 16, 147/82, 99% room air.     Chest x-ray performed and unremarkable. CTA head and neck performed and CTA head reveals new left temporal lobe encephalomalacia compatible with interval infarction, atrophy, there are periventricular and subcortical white matter changes and focal hypodensities in the basal ganglia are likely related to prior lacunar infarcts. CTA neck reveals left-sided thyroid nodule 2 cm in size otherwise relatively unremarkable. Foot x-ray reveals osteopenia.     Labs significant for leukocytosis of 23, troponin 49, mild hyponatremia of 130, mild hypokalemia of 3.5, hyperglycemia of 365 and otherwise relatively unremarkable. Lactic acid ordered and pending, magnesium ordered and pending and urinalysis ordered and  pending.     IV antibiotics initiated in ED. Hospitalist consulted for admission and patient agreeable to inpatient hospitalization for sepsis secondary to right lower extremity cellulitis. She agrees to DO NOT RESUSCITATE DO NOT INTUBATE CODE STATUS at time of evaluation and alert and oriented x4. She will be optimized in ED and subsequently transferred to medical andrade floor for further optimization of medical management.    Interval Problem Update  Patient was seen and examined at bedside.  No acute events overnight. Patient is resting comfortably in bed and in no acute distress.     PT recommend SNF placement  Keflex for lower extremity cellulitis  WBC 23.1-->15.4  Blood culture negative x2 days  RICHARD Cr 2.75  IVF x 24 hours    I have personally seen and examined the patient at bedside. I discussed the plan of care with patient and bedside RN.    Consultants/Specialty  none    Code Status  DNAR/DNI    Disposition  Patient is medically cleared.   Anticipate discharge to to home with close outpatient follow-up.  I have placed the appropriate orders for post-discharge needs.    Review of Systems  Review of Systems   Constitutional: Negative for chills and fever.   HENT: Negative for sore throat.    Respiratory: Negative for cough and shortness of breath.    Cardiovascular: Positive for leg swelling. Negative for chest pain and palpitations.   Gastrointestinal: Negative for abdominal pain, nausea and vomiting.   Genitourinary: Negative for dysuria and frequency.   Musculoskeletal: Negative for falls.   Skin: Positive for rash.   Neurological: Negative for dizziness and loss of consciousness.   Psychiatric/Behavioral: Negative for depression.        Physical Exam  Temp:  [36.6 °C (97.8 °F)-37.2 °C (98.9 °F)] 36.6 °C (97.9 °F)  Pulse:  [56-83] 83  Resp:  [17-19] 18  BP: (116-130)/(53-61) 125/53  SpO2:  [86 %-93 %] 93 %    Physical Exam  Constitutional:       General: She is not in acute distress.  HENT:      Head:  Normocephalic.      Right Ear: External ear normal.      Left Ear: External ear normal.      Nose: No congestion.      Mouth/Throat:      Pharynx: No oropharyngeal exudate.   Eyes:      General: No scleral icterus.  Cardiovascular:      Rate and Rhythm: Normal rate and regular rhythm.      Heart sounds: No murmur heard.      Pulmonary:      Breath sounds: No wheezing.   Abdominal:      Tenderness: There is no abdominal tenderness. There is no guarding or rebound.   Skin:     Capillary Refill: Capillary refill takes less than 2 seconds.      Coloration: Skin is not jaundiced.      Findings: No bruising.   Neurological:      General: No focal deficit present.      Mental Status: She is alert and oriented to person, place, and time.   Psychiatric:         Mood and Affect: Mood normal.         Behavior: Behavior normal.         Fluids    Intake/Output Summary (Last 24 hours) at 11/18/2021 1311  Last data filed at 11/18/2021 0626  Gross per 24 hour   Intake 510 ml   Output --   Net 510 ml       Laboratory  Recent Labs     11/16/21  1411 11/17/21  0213 11/18/21  0659   WBC 23.1* 14.7* 15.4*   RBC 4.78 4.08* 4.04*   HEMOGLOBIN 14.0 12.0 11.9*   HEMATOCRIT 38.8 33.7* 34.3*   MCV 81.2* 82.6 84.9   MCH 29.3 29.4 29.5   MCHC 36.1* 35.6* 34.7   RDW 39.3 40.3 42.8   PLATELETCT 277 225 269   MPV 10.0 10.3 10.5     Recent Labs     11/16/21  1411 11/17/21  1940 11/18/21  0659   SODIUM 130* 130* 129*   POTASSIUM 3.5* 3.8 3.3*   CHLORIDE 94* 97 98   CO2 21 23 21   GLUCOSE 365* 225* 95   BUN 17 28* 32*   CREATININE 1.38 2.29* 2.75*   CALCIUM 8.3* 7.7* 7.8*     Recent Labs     11/16/21  1411   INR 1.43*               Imaging  EC-ECHOCARDIOGRAM COMPLETE W/O CONT   Final Result      DX-FOOT-COMPLETE 3+ RIGHT   Final Result      Osteopenia.      CT-CTA HEAD WITH & W/O-POST PROCESS   Final Result      1.  No thrombosis is seen within the [place].   2.  New left temporal lobe encephalomalacia compatible with interval infarction.    3.  Atrophy   4.  There are periventricular and subcortical white matter changes present.  This finding is nonspecific and could be from previous small vessel ischemia, demyelination, or gliosis.   5.  Focal hypodensities in the basal ganglia are likely related to prior lacunar infarcts.      CT-CTA NECK WITH & W/O-POST PROCESSING   Final Result      1.  No evidence of carotid or vertebral artery occlusion or dissection.      2.  Atherosclerotic plaque at the carotid bifurcations bilaterally which measures less than 50%.      3.  Left sided thyroid nodules which measure 2cm in size.  This could be further evaluated with thyroid ultrasound.      DX-CHEST-PORTABLE (1 VIEW)   Final Result      No acute cardiopulmonary abnormality.              Assessment/Plan  * Cellulitis of right lower extremity- (present on admission)  Assessment & Plan  X-ray performed and no evidence of osteomyelitis however osteopenia has been seen on x-ray imaging as seen above.  Keflex  Blood culture negative      Acute renal failure superimposed on stage 3 chronic kidney disease (HCC)- (present on admission)  Assessment & Plan  Avoid nephrotoxic agents - hold lasix, lisinopril  Renal ultrasound  Gentle hydration x24 hours    Paroxysmal atrial fibrillation (HCC)- (present on admission)  Assessment & Plan  Twelve-lead EKG reveals atrial fibrillation with RVR  Coreg 3.125 mg p.o. twice daily  Lopressor 5 mg IV x3 for heart rate greater than 120  Continue Eliquis 5 mg p.o. twice daily    Severe aortic stenosis  Assessment & Plan  Per echo    Type 2 diabetes mellitus with hyperglycemia, with long-term current use of insulin (HCC)- (present on admission)  Assessment & Plan  Hemoglobin A1c 11.9  Continue basal/SSI  Goal of 140-180 glycemic control within hospital      Hypertension- (present on admission)  Assessment & Plan  Continue home antihypertensive medication regiment  2 g sodium restricted diet  As needed antihypertensives  ordered    Syncope- (present on admission)  Assessment & Plan  No longer symptomatic  CT head/CTA head and neck imaging as above  Consider neurology consultation  Echocardiogram ordered and pending  Fall precautions      Sepsis (HCC)- (present on admission)  Assessment & Plan  This is Sepsis Present on admission  SIRS criteria identified on my evaluation include: Fever, with temperature greater than 101 deg F, Tachycardia, with heart rate greater than 90 BPM and Leukocytosis, with WBC greater than 12,000  Source is Skin soft tissue  Sepsis protocol initiated  Fluid resuscitation ordered per protocol  IV antibiotics as appropriate for source of sepsis  While organ dysfunction may be noted elsewhere in this problem list or in the chart, degree of organ dysfunction does not meet CMS criteria for severe sepsis  Procalcitonin ordered and pending. Urinalysis ordered and pending. Blood culture x2/lactic acid ordered and pending. IV antibiotics initiated in ED we will continue onward.        Thyroid nodule- (present on admission)  Assessment & Plan  TSH WNL         VTE prophylaxis: therapeutic anticoagulation with eliquis    I have performed a physical exam and reviewed and updated ROS and Plan today (11/18/2021). In review of yesterday's note (11/17/2021), there are no changes except as documented above.

## 2021-11-19 ENCOUNTER — APPOINTMENT (OUTPATIENT)
Dept: RADIOLOGY | Facility: MEDICAL CENTER | Age: 76
DRG: 872 | End: 2021-11-19
Attending: INTERNAL MEDICINE
Payer: MEDICARE

## 2021-11-19 PROBLEM — J90 PLEURAL EFFUSION: Status: ACTIVE | Noted: 2021-11-19

## 2021-11-19 PROBLEM — R80.9 PROTEINURIA: Status: ACTIVE | Noted: 2021-11-19

## 2021-11-19 LAB
ALBUMIN SERPL BCP-MCNC: 2.1 G/DL (ref 3.2–4.9)
BUN SERPL-MCNC: 37 MG/DL (ref 8–22)
CALCIUM SERPL-MCNC: 7.9 MG/DL (ref 8.5–10.5)
CHLORIDE SERPL-SCNC: 99 MMOL/L (ref 96–112)
CO2 SERPL-SCNC: 20 MMOL/L (ref 20–33)
CREAT SERPL-MCNC: 3.04 MG/DL (ref 0.5–1.4)
CREAT UR-MCNC: 119.08 MG/DL
ERYTHROCYTE [DISTWIDTH] IN BLOOD BY AUTOMATED COUNT: 41.3 FL (ref 35.9–50)
GLUCOSE BLD-MCNC: 112 MG/DL (ref 65–99)
GLUCOSE BLD-MCNC: 210 MG/DL (ref 65–99)
GLUCOSE BLD-MCNC: 254 MG/DL (ref 65–99)
GLUCOSE BLD-MCNC: 256 MG/DL (ref 65–99)
GLUCOSE SERPL-MCNC: 114 MG/DL (ref 65–99)
HCT VFR BLD AUTO: 35.2 % (ref 37–47)
HGB BLD-MCNC: 12.5 G/DL (ref 12–16)
MAGNESIUM SERPL-MCNC: 1.9 MG/DL (ref 1.5–2.5)
MCH RBC QN AUTO: 29.6 PG (ref 27–33)
MCHC RBC AUTO-ENTMCNC: 35.5 G/DL (ref 33.6–35)
MCV RBC AUTO: 83.2 FL (ref 81.4–97.8)
PHOSPHATE SERPL-MCNC: 5 MG/DL (ref 2.5–4.5)
PLATELET # BLD AUTO: 288 K/UL (ref 164–446)
PMV BLD AUTO: 10.3 FL (ref 9–12.9)
POTASSIUM SERPL-SCNC: 3.7 MMOL/L (ref 3.6–5.5)
RBC # BLD AUTO: 4.23 M/UL (ref 4.2–5.4)
SODIUM SERPL-SCNC: 132 MMOL/L (ref 135–145)
SODIUM UR-SCNC: <20 MMOL/L
WBC # BLD AUTO: 18.6 K/UL (ref 4.8–10.8)

## 2021-11-19 PROCEDURE — 36415 COLL VENOUS BLD VENIPUNCTURE: CPT

## 2021-11-19 PROCEDURE — A9270 NON-COVERED ITEM OR SERVICE: HCPCS | Performed by: STUDENT IN AN ORGANIZED HEALTH CARE EDUCATION/TRAINING PROGRAM

## 2021-11-19 PROCEDURE — 82962 GLUCOSE BLOOD TEST: CPT | Mod: 91

## 2021-11-19 PROCEDURE — 83735 ASSAY OF MAGNESIUM: CPT

## 2021-11-19 PROCEDURE — 700102 HCHG RX REV CODE 250 W/ 637 OVERRIDE(OP): Performed by: STUDENT IN AN ORGANIZED HEALTH CARE EDUCATION/TRAINING PROGRAM

## 2021-11-19 PROCEDURE — 85027 COMPLETE CBC AUTOMATED: CPT

## 2021-11-19 PROCEDURE — 770001 HCHG ROOM/CARE - MED/SURG/GYN PRIV*

## 2021-11-19 PROCEDURE — 84300 ASSAY OF URINE SODIUM: CPT

## 2021-11-19 PROCEDURE — 99233 SBSQ HOSP IP/OBS HIGH 50: CPT | Performed by: INTERNAL MEDICINE

## 2021-11-19 PROCEDURE — 82570 ASSAY OF URINE CREATININE: CPT

## 2021-11-19 PROCEDURE — A9270 NON-COVERED ITEM OR SERVICE: HCPCS | Performed by: INTERNAL MEDICINE

## 2021-11-19 PROCEDURE — 80069 RENAL FUNCTION PANEL: CPT

## 2021-11-19 PROCEDURE — 700102 HCHG RX REV CODE 250 W/ 637 OVERRIDE(OP): Performed by: INTERNAL MEDICINE

## 2021-11-19 PROCEDURE — 76775 US EXAM ABDO BACK WALL LIM: CPT

## 2021-11-19 RX ADMIN — INSULIN GLARGINE 25 UNITS: 100 INJECTION, SOLUTION SUBCUTANEOUS at 17:54

## 2021-11-19 RX ADMIN — AMLODIPINE BESYLATE 5 MG: 5 TABLET ORAL at 04:57

## 2021-11-19 RX ADMIN — SERTRALINE HYDROCHLORIDE 50 MG: 50 TABLET ORAL at 04:58

## 2021-11-19 RX ADMIN — INSULIN HUMAN 3 UNITS: 100 INJECTION, SOLUTION PARENTERAL at 17:54

## 2021-11-19 RX ADMIN — CEPHALEXIN 500 MG: 500 CAPSULE ORAL at 21:22

## 2021-11-19 RX ADMIN — INSULIN HUMAN 5 UNITS: 100 INJECTION, SOLUTION PARENTERAL at 11:40

## 2021-11-19 RX ADMIN — CEPHALEXIN 500 MG: 500 CAPSULE ORAL at 14:57

## 2021-11-19 RX ADMIN — DOCUSATE SODIUM 50 MG AND SENNOSIDES 8.6 MG 2 TABLET: 8.6; 5 TABLET, FILM COATED ORAL at 04:57

## 2021-11-19 RX ADMIN — CEPHALEXIN 500 MG: 500 CAPSULE ORAL at 04:57

## 2021-11-19 RX ADMIN — INSULIN HUMAN 5 UNITS: 100 INJECTION, SOLUTION PARENTERAL at 21:27

## 2021-11-19 RX ADMIN — APIXABAN 5 MG: 5 TABLET, FILM COATED ORAL at 17:48

## 2021-11-19 RX ADMIN — TRAZODONE HYDROCHLORIDE 50 MG: 100 TABLET ORAL at 21:22

## 2021-11-19 RX ADMIN — APIXABAN 5 MG: 5 TABLET, FILM COATED ORAL at 04:57

## 2021-11-19 RX ADMIN — CARVEDILOL 3.12 MG: 6.25 TABLET, FILM COATED ORAL at 17:47

## 2021-11-19 ASSESSMENT — ENCOUNTER SYMPTOMS
FEVER: 0
ABDOMINAL PAIN: 0
FALLS: 0
SORE THROAT: 0
DEPRESSION: 0
VOMITING: 0
DIZZINESS: 0
CHILLS: 0
LOSS OF CONSCIOUSNESS: 0
COUGH: 0
SHORTNESS OF BREATH: 0
NAUSEA: 0
PALPITATIONS: 0

## 2021-11-19 NOTE — PROGRESS NOTES
Received bedside report and accepted care of patient.    Pt is currently A/ox4, very head of hearing. 5L NC. Pt is currently tolerating diet. Pt has a PIV, saline locked at this time. Pt does not complain of pain at this time.    Patient currently resting in bed in no visible or stated signs of distress. Bed alarm in place, controls on and bed in locked and lowest position. Call light and personal possessions within reach. Patient educated about use of call light and verbalized understanding.

## 2021-11-19 NOTE — CARE PLAN
The patient is Stable - Low risk of patient condition declining or worsening    Shift Goals  Clinical Goals: Monitor Pain    Progress made toward(s) clinical / shift goals:    Problem: Pain - Standard  Goal: Alleviation of pain or a reduction in pain to the patient’s comfort goal  Outcome: Progressing     Problem: Knowledge Deficit - Standard  Goal: Patient and family/care givers will demonstrate understanding of plan of care, disease process/condition, diagnostic tests and medications  Outcome: Progressing     Problem: Respiratory  Goal: Patient will achieve/maintain optimum respiratory ventilation and gas exchange  Outcome: Progressing       Patient is not progressing towards the following goals:

## 2021-11-19 NOTE — PROGRESS NOTES
[facility] Medicine Daily Progress Note    Date of Service  11/19/2021    Chief Complaint  Amrita Torrez is a 76 y.o. female admitted 11/16/2021 with RLE pain    Hospital Course  Amrita Torrez is a 76 y.o. female who presented 11/16/2021 with complaints of right lower extremity pain, mild swelling, fever, incoordination and syncope as of yesterday. Patient also complained of nausea and vertigo-like symptoms and concern for stroke. She is on Eliquis for atrial fibrillation and compliant with her home medication regiment. She also has a history of diabetes and hypertension and denies ever having diabetic foot ulcer. She does have swelling of her right lower extremity/foot with erythema and states that this is the first time this is ever occurred. She states that she went to outpatient care facility which was caring for her right lower extremity earlier in the week and now is red and swollen. She otherwise denies shortness of breath, chest pain, cough with sputum production, resolution of dizziness/vertigo and nausea with vomiting. Denies hematemesis, constipation, diarrhea, melena, hematochezia, dysuria, hematuria.     Vital signs at time of presentation were as follows: 100.6, 118, 16, 147/82, 99% room air.     Chest x-ray performed and unremarkable. CTA head and neck performed and CTA head reveals new left temporal lobe encephalomalacia compatible with interval infarction, atrophy, there are periventricular and subcortical white matter changes and focal hypodensities in the basal ganglia are likely related to prior lacunar infarcts. CTA neck reveals left-sided thyroid nodule 2 cm in size otherwise relatively unremarkable. Foot x-ray reveals osteopenia.     Labs significant for leukocytosis of 23, troponin 49, mild hyponatremia of 130, mild hypokalemia of 3.5, hyperglycemia of 365 and otherwise relatively unremarkable. Lactic acid ordered and pending, magnesium ordered and pending and urinalysis ordered and  pending.     IV antibiotics initiated in ED. Hospitalist consulted for admission and patient agreeable to inpatient hospitalization for sepsis secondary to right lower extremity cellulitis. She agrees to DO NOT RESUSCITATE DO NOT INTUBATE CODE STATUS at time of evaluation and alert and oriented x4. She will be optimized in ED and subsequently transferred to medical andrade floor for further optimization of medical management.    Interval Problem Update  Patient was seen and examined at bedside.  No acute events overnight. Patient is resting comfortably in bed and in no acute distress.     PT recommend SNF placement  Keflex for lower extremity cellulitis  Blood culture negative x2 days  RICHARD Cr 2.75-->3.04  LUIS: no hydro  FeNa - pre renal     I have personally seen and examined the patient at bedside. I discussed the plan of care with patient and bedside RN.    Consultants/Specialty  none    Code Status  DNAR/DNI    Disposition  Patient is medically cleared.   Anticipate discharge to to home with close outpatient follow-up.  I have placed the appropriate orders for post-discharge needs.    Review of Systems  Review of Systems   Constitutional: Negative for chills and fever.   HENT: Negative for sore throat.    Respiratory: Negative for cough and shortness of breath.    Cardiovascular: Positive for leg swelling. Negative for chest pain and palpitations.   Gastrointestinal: Negative for abdominal pain, nausea and vomiting.   Genitourinary: Negative for dysuria and frequency.   Musculoskeletal: Negative for falls.   Skin: Positive for rash.   Neurological: Negative for dizziness and loss of consciousness.   Psychiatric/Behavioral: Negative for depression.        Physical Exam  Temp:  [36 °C (96.8 °F)-36.8 °C (98.3 °F)] 36.1 °C (97 °F)  Pulse:  [] 86  Resp:  [16-18] 18  BP: (100-159)/(59-71) 102/63  SpO2:  [90 %-96 %] 96 %    Physical Exam  Constitutional:       General: She is not in acute distress.  HENT:      Head:  Normocephalic.      Right Ear: External ear normal.      Left Ear: External ear normal.      Nose: No congestion.      Mouth/Throat:      Pharynx: No oropharyngeal exudate.   Eyes:      General: No scleral icterus.  Cardiovascular:      Rate and Rhythm: Normal rate and regular rhythm.      Heart sounds: No murmur heard.      Pulmonary:      Breath sounds: No wheezing.   Abdominal:      Tenderness: There is no abdominal tenderness. There is no guarding or rebound.   Skin:     Capillary Refill: Capillary refill takes less than 2 seconds.      Coloration: Skin is not jaundiced.      Findings: No bruising.   Neurological:      General: No focal deficit present.      Mental Status: She is alert and oriented to person, place, and time.   Psychiatric:         Mood and Affect: Mood normal.         Behavior: Behavior normal.         Fluids    Intake/Output Summary (Last 24 hours) at 11/19/2021 1331  Last data filed at 11/19/2021 0900  Gross per 24 hour   Intake 736.67 ml   Output 300 ml   Net 436.67 ml       Laboratory  Recent Labs     11/17/21  0213 11/18/21  0659 11/19/21  0722   WBC 14.7* 15.4* 18.6*   RBC 4.08* 4.04* 4.23   HEMOGLOBIN 12.0 11.9* 12.5   HEMATOCRIT 33.7* 34.3* 35.2*   MCV 82.6 84.9 83.2   MCH 29.4 29.5 29.6   MCHC 35.6* 34.7 35.5*   RDW 40.3 42.8 41.3   PLATELETCT 225 269 288   MPV 10.3 10.5 10.3     Recent Labs     11/17/21  1940 11/18/21  0659 11/19/21  0722   SODIUM 130* 129* 132*   POTASSIUM 3.8 3.3* 3.7   CHLORIDE 97 98 99   CO2 23 21 20   GLUCOSE 225* 95 114*   BUN 28* 32* 37*   CREATININE 2.29* 2.75* 3.04*   CALCIUM 7.7* 7.8* 7.9*     Recent Labs     11/16/21  1411   INR 1.43*               Imaging  US-RENAL   Final Result      1.  Right lower pole simple renal cyst.   2.  Medical renal disease.   3.  Echogenic debris in the bladder. This can be seen in urinary tract infection.   4.  Incidentally noted bilateral pleural effusions.      EC-ECHOCARDIOGRAM COMPLETE W/O CONT   Final Result       DX-FOOT-COMPLETE 3+ RIGHT   Final Result      Osteopenia.      CT-CTA HEAD WITH & W/O-POST PROCESS   Final Result      1.  No thrombosis is seen within the [place].   2.  New left temporal lobe encephalomalacia compatible with interval infarction.   3.  Atrophy   4.  There are periventricular and subcortical white matter changes present.  This finding is nonspecific and could be from previous small vessel ischemia, demyelination, or gliosis.   5.  Focal hypodensities in the basal ganglia are likely related to prior lacunar infarcts.      CT-CTA NECK WITH & W/O-POST PROCESSING   Final Result      1.  No evidence of carotid or vertebral artery occlusion or dissection.      2.  Atherosclerotic plaque at the carotid bifurcations bilaterally which measures less than 50%.      3.  Left sided thyroid nodules which measure 2cm in size.  This could be further evaluated with thyroid ultrasound.      DX-CHEST-PORTABLE (1 VIEW)   Final Result      No acute cardiopulmonary abnormality.              Assessment/Plan  * Cellulitis of right lower extremity- (present on admission)  Assessment & Plan  X-ray performed and no evidence of osteomyelitis however osteopenia has been seen on x-ray imaging as seen above.  Keflex  Blood culture negative      Acute renal failure superimposed on stage 3 chronic kidney disease (HCC)- (present on admission)  Assessment & Plan  Avoid nephrotoxic agents - hold lasix, lisinopril  Renal ultrasound - no hydro  Fena suggests prerenal injury  Completed IVF x 24 hours  Will continue to monitor    Paroxysmal atrial fibrillation (HCC)- (present on admission)  Assessment & Plan  Twelve-lead EKG reveals atrial fibrillation with RVR  Coreg 3.125 mg p.o. twice daily  Lopressor 5 mg IV x3 for heart rate greater than 120  Continue Eliquis 5 mg p.o. twice daily    Proteinuria  Assessment & Plan  As seen on UA    Pleural effusion  Assessment & Plan  Bilateral  As identified on imaging    Severe aortic  stenosis  Assessment & Plan  Per echo    Type 2 diabetes mellitus with hyperglycemia, with long-term current use of insulin (HCC)- (present on admission)  Assessment & Plan  Hemoglobin A1c 11.9  Continue basal/SSI  Goal of 140-180 glycemic control within hospital      Hypertension- (present on admission)  Assessment & Plan  Continue home antihypertensive medication regiment  2 g sodium restricted diet  As needed antihypertensives ordered    Syncope- (present on admission)  Assessment & Plan  No longer symptomatic  CT head/CTA head and neck imaging as above  Consider neurology consultation  Echocardiogram ordered and pending  Fall precautions      Sepsis (HCC)- (present on admission)  Assessment & Plan  This is Sepsis Present on admission  SIRS criteria identified on my evaluation include: Fever, with temperature greater than 101 deg F, Tachycardia, with heart rate greater than 90 BPM and Leukocytosis, with WBC greater than 12,000  Source is Skin soft tissue  Sepsis protocol initiated  Fluid resuscitation ordered per protocol  IV antibiotics as appropriate for source of sepsis  While organ dysfunction may be noted elsewhere in this problem list or in the chart, degree of organ dysfunction does not meet CMS criteria for severe sepsis  Procalcitonin ordered and pending. Urinalysis ordered and pending. Blood culture x2/lactic acid ordered and pending. IV antibiotics initiated in ED we will continue onward.        Thyroid nodule- (present on admission)  Assessment & Plan  TSH WNL         VTE prophylaxis: therapeutic anticoagulation with eliquis    I have performed a physical exam and reviewed and updated ROS and Plan today (11/19/2021). In review of yesterday's note (11/18/2021), there are no changes except as documented above.

## 2021-11-20 LAB
ALBUMIN SERPL BCP-MCNC: 2.4 G/DL (ref 3.2–4.9)
BASOPHILS # BLD AUTO: 0.5 % (ref 0–1.8)
BASOPHILS # BLD: 0.07 K/UL (ref 0–0.12)
BUN SERPL-MCNC: 42 MG/DL (ref 8–22)
CALCIUM SERPL-MCNC: 8.1 MG/DL (ref 8.5–10.5)
CHLORIDE SERPL-SCNC: 99 MMOL/L (ref 96–112)
CO2 SERPL-SCNC: 20 MMOL/L (ref 20–33)
CREAT SERPL-MCNC: 3.25 MG/DL (ref 0.5–1.4)
EOSINOPHIL # BLD AUTO: 0.32 K/UL (ref 0–0.51)
EOSINOPHIL NFR BLD: 2.1 % (ref 0–6.9)
ERYTHROCYTE [DISTWIDTH] IN BLOOD BY AUTOMATED COUNT: 39.7 FL (ref 35.9–50)
GLUCOSE BLD-MCNC: 129 MG/DL (ref 65–99)
GLUCOSE BLD-MCNC: 181 MG/DL (ref 65–99)
GLUCOSE BLD-MCNC: 205 MG/DL (ref 65–99)
GLUCOSE BLD-MCNC: 81 MG/DL (ref 65–99)
GLUCOSE SERPL-MCNC: 92 MG/DL (ref 65–99)
HCT VFR BLD AUTO: 36.8 % (ref 37–47)
HGB BLD-MCNC: 12.8 G/DL (ref 12–16)
IMM GRANULOCYTES # BLD AUTO: 0.16 K/UL (ref 0–0.11)
IMM GRANULOCYTES NFR BLD AUTO: 1.1 % (ref 0–0.9)
LYMPHOCYTES # BLD AUTO: 1.14 K/UL (ref 1–4.8)
LYMPHOCYTES NFR BLD: 7.6 % (ref 22–41)
MAGNESIUM SERPL-MCNC: 1.9 MG/DL (ref 1.5–2.5)
MCH RBC QN AUTO: 28.5 PG (ref 27–33)
MCHC RBC AUTO-ENTMCNC: 34.8 G/DL (ref 33.6–35)
MCV RBC AUTO: 82 FL (ref 81.4–97.8)
MONOCYTES # BLD AUTO: 1.04 K/UL (ref 0–0.85)
MONOCYTES NFR BLD AUTO: 6.9 % (ref 0–13.4)
NEUTROPHILS # BLD AUTO: 12.34 K/UL (ref 2–7.15)
NEUTROPHILS NFR BLD: 81.8 % (ref 44–72)
NRBC # BLD AUTO: 0 K/UL
NRBC BLD-RTO: 0 /100 WBC
PHOSPHATE SERPL-MCNC: 3.9 MG/DL (ref 2.5–4.5)
PLATELET # BLD AUTO: 333 K/UL (ref 164–446)
PMV BLD AUTO: 10.2 FL (ref 9–12.9)
POTASSIUM SERPL-SCNC: 3.3 MMOL/L (ref 3.6–5.5)
RBC # BLD AUTO: 4.49 M/UL (ref 4.2–5.4)
SODIUM SERPL-SCNC: 132 MMOL/L (ref 135–145)
WBC # BLD AUTO: 15.1 K/UL (ref 4.8–10.8)

## 2021-11-20 PROCEDURE — 83735 ASSAY OF MAGNESIUM: CPT

## 2021-11-20 PROCEDURE — 36415 COLL VENOUS BLD VENIPUNCTURE: CPT

## 2021-11-20 PROCEDURE — 82962 GLUCOSE BLOOD TEST: CPT | Mod: 91

## 2021-11-20 PROCEDURE — 99232 SBSQ HOSP IP/OBS MODERATE 35: CPT | Performed by: INTERNAL MEDICINE

## 2021-11-20 PROCEDURE — A9270 NON-COVERED ITEM OR SERVICE: HCPCS | Performed by: INTERNAL MEDICINE

## 2021-11-20 PROCEDURE — A9270 NON-COVERED ITEM OR SERVICE: HCPCS | Performed by: STUDENT IN AN ORGANIZED HEALTH CARE EDUCATION/TRAINING PROGRAM

## 2021-11-20 PROCEDURE — 700102 HCHG RX REV CODE 250 W/ 637 OVERRIDE(OP): Performed by: STUDENT IN AN ORGANIZED HEALTH CARE EDUCATION/TRAINING PROGRAM

## 2021-11-20 PROCEDURE — 85025 COMPLETE CBC W/AUTO DIFF WBC: CPT

## 2021-11-20 PROCEDURE — 700111 HCHG RX REV CODE 636 W/ 250 OVERRIDE (IP): Performed by: STUDENT IN AN ORGANIZED HEALTH CARE EDUCATION/TRAINING PROGRAM

## 2021-11-20 PROCEDURE — 700102 HCHG RX REV CODE 250 W/ 637 OVERRIDE(OP): Performed by: INTERNAL MEDICINE

## 2021-11-20 PROCEDURE — 80069 RENAL FUNCTION PANEL: CPT

## 2021-11-20 PROCEDURE — 770001 HCHG ROOM/CARE - MED/SURG/GYN PRIV*

## 2021-11-20 PROCEDURE — 700105 HCHG RX REV CODE 258: Performed by: INTERNAL MEDICINE

## 2021-11-20 RX ORDER — SODIUM CHLORIDE 9 MG/ML
INJECTION, SOLUTION INTRAVENOUS CONTINUOUS
Status: ACTIVE | OUTPATIENT
Start: 2021-11-20 | End: 2021-11-21

## 2021-11-20 RX ORDER — POTASSIUM CHLORIDE 20 MEQ/1
40 TABLET, EXTENDED RELEASE ORAL ONCE
Status: COMPLETED | OUTPATIENT
Start: 2021-11-20 | End: 2021-11-20

## 2021-11-20 RX ADMIN — CARVEDILOL 3.12 MG: 6.25 TABLET, FILM COATED ORAL at 16:56

## 2021-11-20 RX ADMIN — TRAZODONE HYDROCHLORIDE 50 MG: 100 TABLET ORAL at 21:51

## 2021-11-20 RX ADMIN — CEPHALEXIN 500 MG: 500 CAPSULE ORAL at 21:51

## 2021-11-20 RX ADMIN — INSULIN HUMAN 3 UNITS: 100 INJECTION, SOLUTION PARENTERAL at 21:55

## 2021-11-20 RX ADMIN — SERTRALINE HYDROCHLORIDE 50 MG: 50 TABLET ORAL at 04:04

## 2021-11-20 RX ADMIN — AMLODIPINE BESYLATE 5 MG: 5 TABLET ORAL at 04:04

## 2021-11-20 RX ADMIN — POTASSIUM CHLORIDE 40 MEQ: 1500 TABLET, EXTENDED RELEASE ORAL at 11:01

## 2021-11-20 RX ADMIN — SODIUM CHLORIDE: 9 INJECTION, SOLUTION INTRAVENOUS at 11:02

## 2021-11-20 RX ADMIN — APIXABAN 5 MG: 5 TABLET, FILM COATED ORAL at 16:56

## 2021-11-20 RX ADMIN — CEPHALEXIN 500 MG: 500 CAPSULE ORAL at 04:04

## 2021-11-20 RX ADMIN — ENALAPRILAT 1.25 MG: 1.25 INJECTION INTRAVENOUS at 04:05

## 2021-11-20 RX ADMIN — CARVEDILOL 3.12 MG: 6.25 TABLET, FILM COATED ORAL at 08:44

## 2021-11-20 RX ADMIN — INSULIN HUMAN 2 UNITS: 100 INJECTION, SOLUTION PARENTERAL at 16:47

## 2021-11-20 RX ADMIN — APIXABAN 5 MG: 5 TABLET, FILM COATED ORAL at 04:05

## 2021-11-20 RX ADMIN — CEPHALEXIN 500 MG: 500 CAPSULE ORAL at 14:32

## 2021-11-20 RX ADMIN — DOCUSATE SODIUM 50 MG AND SENNOSIDES 8.6 MG 2 TABLET: 8.6; 5 TABLET, FILM COATED ORAL at 16:57

## 2021-11-20 RX ADMIN — INSULIN GLARGINE 25 UNITS: 100 INJECTION, SOLUTION SUBCUTANEOUS at 16:47

## 2021-11-20 ASSESSMENT — ENCOUNTER SYMPTOMS
CHILLS: 0
FALLS: 0
DEPRESSION: 0
ABDOMINAL PAIN: 0
VOMITING: 0
SORE THROAT: 0
COUGH: 0
SHORTNESS OF BREATH: 0
PALPITATIONS: 0
NAUSEA: 0
DIZZINESS: 0
FEVER: 0
LOSS OF CONSCIOUSNESS: 0

## 2021-11-20 ASSESSMENT — FIBROSIS 4 INDEX: FIB4 SCORE: 1.15

## 2021-11-20 NOTE — PROGRESS NOTES
Assumed care of patient @1915, beside report received from Claudio RN, Pt A&OX4 and denies any pain. Bed locked an lowered with call light in reach and questions answered in present time.

## 2021-11-20 NOTE — CARE PLAN
Problem: Respiratory  Goal: Patient will achieve/maintain optimum respiratory ventilation and gas exchange  Outcome: Progressing     Problem: Fall Risk  Goal: Patient will remain free from falls  Outcome: Progressing   The patient is Watcher - Medium risk of patient condition declining or worsening    Shift Goals  Clinical Goals: Monitor Pain    Progress made toward(s) clinical / shift goals:      Patient is not progressing towards the following goals:

## 2021-11-20 NOTE — PROGRESS NOTES
Hospital Medicine Daily Progress Note    Date of Service  11/20/2021    Chief Complaint  Amrita Torrez is a 76 y.o. female admitted 11/16/2021 with RLE pain    Hospital Course  Amrita Torrez is a 76 y.o. female who presented 11/16/2021 with complaints of right lower extremity pain, mild swelling, fever, incoordination and syncope as of yesterday. Patient also complained of nausea and vertigo-like symptoms and concern for stroke. She is on Eliquis for atrial fibrillation and compliant with her home medication regiment. She also has a history of diabetes and hypertension and denies ever having diabetic foot ulcer. She does have swelling of her right lower extremity/foot with erythema and states that this is the first time this is ever occurred. She states that she went to outpatient care facility which was caring for her right lower extremity earlier in the week and now is red and swollen. She otherwise denies shortness of breath, chest pain, cough with sputum production, resolution of dizziness/vertigo and nausea with vomiting. Denies hematemesis, constipation, diarrhea, melena, hematochezia, dysuria, hematuria.     Vital signs at time of presentation were as follows: 100.6, 118, 16, 147/82, 99% room air.     Chest x-ray performed and unremarkable. CTA head and neck performed and CTA head reveals new left temporal lobe encephalomalacia compatible with interval infarction, atrophy, there are periventricular and subcortical white matter changes and focal hypodensities in the basal ganglia are likely related to prior lacunar infarcts. CTA neck reveals left-sided thyroid nodule 2 cm in size otherwise relatively unremarkable. Foot x-ray reveals osteopenia.     Labs significant for leukocytosis of 23, troponin 49, mild hyponatremia of 130, mild hypokalemia of 3.5, hyperglycemia of 365 and otherwise relatively unremarkable. Lactic acid ordered and pending, magnesium ordered and pending and urinalysis ordered and  pending.     IV antibiotics initiated in ED. Hospitalist consulted for admission and patient agreeable to inpatient hospitalization for sepsis secondary to right lower extremity cellulitis. She agrees to DO NOT RESUSCITATE DO NOT INTUBATE CODE STATUS at time of evaluation and alert and oriented x4. She will be optimized in ED and subsequently transferred to medical andrade floor for further optimization of medical management.    Interval Problem Update  Patient was seen and examined at bedside.  No acute events overnight. Patient is resting comfortably in bed and in no acute distress.     Keflex for lower extremity cellulitis  Blood culture negative x4 days  RICHARD Cr 2.75-->3.25  LUIS: no hydro  FeNa - pre renal   IVF x24 hours  No nephrotoxic agents    I have personally seen and examined the patient at bedside. I discussed the plan of care with patient and bedside RN.    Consultants/Specialty  none    Code Status  DNAR/DNI    Disposition  Patient is medically cleared.   Anticipate discharge to to home with close outpatient follow-up.  I have placed the appropriate orders for post-discharge needs.    Review of Systems  Review of Systems   Constitutional: Negative for chills and fever.   HENT: Negative for sore throat.    Respiratory: Negative for cough and shortness of breath.    Cardiovascular: Positive for leg swelling. Negative for chest pain and palpitations.   Gastrointestinal: Negative for abdominal pain, nausea and vomiting.   Genitourinary: Negative for dysuria and frequency.   Musculoskeletal: Negative for falls.   Skin: Positive for rash.   Neurological: Negative for dizziness and loss of consciousness.   Psychiatric/Behavioral: Negative for depression.        Physical Exam  Temp:  [36.3 °C (97.3 °F)-37 °C (98.6 °F)] 36.3 °C (97.4 °F)  Pulse:  [70-83] 75  Resp:  [16-18] 18  BP: (127-169)/(53-64) 152/63  SpO2:  [89 %-95 %] 89 %    Physical Exam  Constitutional:       General: She is not in acute distress.  HENT:       Head: Normocephalic.      Right Ear: External ear normal.      Left Ear: External ear normal.      Nose: No congestion.      Mouth/Throat:      Pharynx: No oropharyngeal exudate.   Eyes:      General: No scleral icterus.  Cardiovascular:      Rate and Rhythm: Normal rate and regular rhythm.      Heart sounds: No murmur heard.      Pulmonary:      Breath sounds: No wheezing.   Abdominal:      Tenderness: There is no abdominal tenderness. There is no guarding or rebound.   Skin:     Capillary Refill: Capillary refill takes less than 2 seconds.      Coloration: Skin is not jaundiced.      Findings: No bruising.   Neurological:      General: No focal deficit present.      Mental Status: She is alert and oriented to person, place, and time.   Psychiatric:         Mood and Affect: Mood normal.         Behavior: Behavior normal.     Patient was seen and examined at bedside. No changes in physical exam from prior evaluation.    Fluids    Intake/Output Summary (Last 24 hours) at 11/20/2021 1030  Last data filed at 11/20/2021 0353  Gross per 24 hour   Intake 120 ml   Output 500 ml   Net -380 ml       Laboratory  Recent Labs     11/18/21  0659 11/19/21  0722 11/20/21  0324   WBC 15.4* 18.6* 15.1*   RBC 4.04* 4.23 4.49   HEMOGLOBIN 11.9* 12.5 12.8   HEMATOCRIT 34.3* 35.2* 36.8*   MCV 84.9 83.2 82.0   MCH 29.5 29.6 28.5   MCHC 34.7 35.5* 34.8   RDW 42.8 41.3 39.7   PLATELETCT 269 288 333   MPV 10.5 10.3 10.2     Recent Labs     11/18/21  0659 11/19/21  0722 11/20/21  0324   SODIUM 129* 132* 132*   POTASSIUM 3.3* 3.7 3.3*   CHLORIDE 98 99 99   CO2 21 20 20   GLUCOSE 95 114* 92   BUN 32* 37* 42*   CREATININE 2.75* 3.04* 3.25*   CALCIUM 7.8* 7.9* 8.1*                   Imaging  US-RENAL   Final Result      1.  Right lower pole simple renal cyst.   2.  Medical renal disease.   3.  Echogenic debris in the bladder. This can be seen in urinary tract infection.   4.  Incidentally noted bilateral pleural effusions.       EC-ECHOCARDIOGRAM COMPLETE W/O CONT   Final Result      DX-FOOT-COMPLETE 3+ RIGHT   Final Result      Osteopenia.      CT-CTA HEAD WITH & W/O-POST PROCESS   Final Result      1.  No thrombosis is seen within the [place].   2.  New left temporal lobe encephalomalacia compatible with interval infarction.   3.  Atrophy   4.  There are periventricular and subcortical white matter changes present.  This finding is nonspecific and could be from previous small vessel ischemia, demyelination, or gliosis.   5.  Focal hypodensities in the basal ganglia are likely related to prior lacunar infarcts.      CT-CTA NECK WITH & W/O-POST PROCESSING   Final Result      1.  No evidence of carotid or vertebral artery occlusion or dissection.      2.  Atherosclerotic plaque at the carotid bifurcations bilaterally which measures less than 50%.      3.  Left sided thyroid nodules which measure 2cm in size.  This could be further evaluated with thyroid ultrasound.      DX-CHEST-PORTABLE (1 VIEW)   Final Result      No acute cardiopulmonary abnormality.              Assessment/Plan  * Cellulitis of right lower extremity- (present on admission)  Assessment & Plan  X-ray performed and no evidence of osteomyelitis however osteopenia has been seen on x-ray imaging as seen above.  Keflex  Blood culture negative      Acute renal failure superimposed on stage 3 chronic kidney disease (HCC)- (present on admission)  Assessment & Plan  Avoid nephrotoxic agents - hold lasix, lisinopril  Renal ultrasound - no hydro  Fena suggests prerenal injury  Completed IVF x 24 hours  Will continue to monitor    Paroxysmal atrial fibrillation (HCC)- (present on admission)  Assessment & Plan  Twelve-lead EKG reveals atrial fibrillation with RVR  Coreg 3.125 mg p.o. twice daily  Lopressor 5 mg IV x3 for heart rate greater than 120  Continue Eliquis 5 mg p.o. twice daily    Proteinuria  Assessment & Plan  As seen on UA    Pleural effusion  Assessment &  Plan  Bilateral  As identified on imaging    Severe aortic stenosis  Assessment & Plan  Per echo    Type 2 diabetes mellitus with hyperglycemia, with long-term current use of insulin (HCC)- (present on admission)  Assessment & Plan  Hemoglobin A1c 11.9  Continue basal/SSI  Goal of 140-180 glycemic control within hospital      Hypertension- (present on admission)  Assessment & Plan  Continue home antihypertensive medication regiment  2 g sodium restricted diet  As needed antihypertensives ordered    Syncope- (present on admission)  Assessment & Plan  No longer symptomatic  CT head/CTA head and neck imaging as above  Consider neurology consultation  Echocardiogram: low normal EF, mod/severe AS  Fall precautions      Sepsis (HCC)- (present on admission)  Assessment & Plan  This is Sepsis Present on admission  SIRS criteria identified on my evaluation include: Fever, with temperature greater than 101 deg F, Tachycardia, with heart rate greater than 90 BPM and Leukocytosis, with WBC greater than 12,000  Source is Skin soft tissue  Sepsis protocol initiated  Fluid resuscitation ordered per protocol  IV antibiotics as appropriate for source of sepsis  While organ dysfunction may be noted elsewhere in this problem list or in the chart, degree of organ dysfunction does not meet CMS criteria for severe sepsis  Procalcitonin ordered and pending. Urinalysis ordered and pending. Blood culture x2/lactic acid ordered and pending. IV antibiotics initiated in ED we will continue onward.        Thyroid nodule- (present on admission)  Assessment & Plan  TSH WNL         VTE prophylaxis: therapeutic anticoagulation with eliquis    I have performed a physical exam and reviewed and updated ROS and Plan today (11/20/2021). In review of yesterday's note (11/19/2021), there are no changes except as documented above.

## 2021-11-21 LAB
ALBUMIN SERPL BCP-MCNC: 2 G/DL (ref 3.2–4.9)
BACTERIA BLD CULT: NORMAL
BACTERIA BLD CULT: NORMAL
BUN SERPL-MCNC: 38 MG/DL (ref 8–22)
CALCIUM SERPL-MCNC: 8 MG/DL (ref 8.5–10.5)
CHLORIDE SERPL-SCNC: 104 MMOL/L (ref 96–112)
CO2 SERPL-SCNC: 20 MMOL/L (ref 20–33)
CREAT SERPL-MCNC: 2.2 MG/DL (ref 0.5–1.4)
GLUCOSE BLD-MCNC: 237 MG/DL (ref 65–99)
GLUCOSE BLD-MCNC: 336 MG/DL (ref 65–99)
GLUCOSE BLD-MCNC: 87 MG/DL (ref 65–99)
GLUCOSE SERPL-MCNC: 231 MG/DL (ref 65–99)
MAGNESIUM SERPL-MCNC: 2 MG/DL (ref 1.5–2.5)
PHOSPHATE SERPL-MCNC: 3.9 MG/DL (ref 2.5–4.5)
POTASSIUM SERPL-SCNC: 4.1 MMOL/L (ref 3.6–5.5)
SIGNIFICANT IND 70042: NORMAL
SIGNIFICANT IND 70042: NORMAL
SITE SITE: NORMAL
SITE SITE: NORMAL
SODIUM SERPL-SCNC: 133 MMOL/L (ref 135–145)
SOURCE SOURCE: NORMAL
SOURCE SOURCE: NORMAL

## 2021-11-21 PROCEDURE — 700102 HCHG RX REV CODE 250 W/ 637 OVERRIDE(OP): Performed by: INTERNAL MEDICINE

## 2021-11-21 PROCEDURE — 700101 HCHG RX REV CODE 250: Performed by: STUDENT IN AN ORGANIZED HEALTH CARE EDUCATION/TRAINING PROGRAM

## 2021-11-21 PROCEDURE — 36415 COLL VENOUS BLD VENIPUNCTURE: CPT

## 2021-11-21 PROCEDURE — 82962 GLUCOSE BLOOD TEST: CPT

## 2021-11-21 PROCEDURE — A9270 NON-COVERED ITEM OR SERVICE: HCPCS | Performed by: INTERNAL MEDICINE

## 2021-11-21 PROCEDURE — 83735 ASSAY OF MAGNESIUM: CPT

## 2021-11-21 PROCEDURE — 770001 HCHG ROOM/CARE - MED/SURG/GYN PRIV*

## 2021-11-21 PROCEDURE — 80069 RENAL FUNCTION PANEL: CPT

## 2021-11-21 PROCEDURE — 700102 HCHG RX REV CODE 250 W/ 637 OVERRIDE(OP): Performed by: STUDENT IN AN ORGANIZED HEALTH CARE EDUCATION/TRAINING PROGRAM

## 2021-11-21 PROCEDURE — A9270 NON-COVERED ITEM OR SERVICE: HCPCS | Performed by: STUDENT IN AN ORGANIZED HEALTH CARE EDUCATION/TRAINING PROGRAM

## 2021-11-21 PROCEDURE — 99232 SBSQ HOSP IP/OBS MODERATE 35: CPT | Performed by: INTERNAL MEDICINE

## 2021-11-21 RX ADMIN — APIXABAN 5 MG: 5 TABLET, FILM COATED ORAL at 05:21

## 2021-11-21 RX ADMIN — TRAZODONE HYDROCHLORIDE 50 MG: 100 TABLET ORAL at 21:23

## 2021-11-21 RX ADMIN — LABETALOL HYDROCHLORIDE 10 MG: 5 INJECTION, SOLUTION INTRAVENOUS at 19:40

## 2021-11-21 RX ADMIN — INSULIN GLARGINE 25 UNITS: 100 INJECTION, SOLUTION SUBCUTANEOUS at 17:19

## 2021-11-21 RX ADMIN — INSULIN HUMAN 3 UNITS: 100 INJECTION, SOLUTION PARENTERAL at 21:31

## 2021-11-21 RX ADMIN — AMLODIPINE BESYLATE 5 MG: 5 TABLET ORAL at 05:21

## 2021-11-21 RX ADMIN — CEPHALEXIN 500 MG: 500 CAPSULE ORAL at 05:21

## 2021-11-21 RX ADMIN — CEPHALEXIN 500 MG: 500 CAPSULE ORAL at 13:44

## 2021-11-21 RX ADMIN — CEPHALEXIN 500 MG: 500 CAPSULE ORAL at 21:23

## 2021-11-21 RX ADMIN — INSULIN HUMAN 6 UNITS: 100 INJECTION, SOLUTION PARENTERAL at 17:17

## 2021-11-21 RX ADMIN — APIXABAN 5 MG: 5 TABLET, FILM COATED ORAL at 17:12

## 2021-11-21 RX ADMIN — CARVEDILOL 3.12 MG: 6.25 TABLET, FILM COATED ORAL at 17:12

## 2021-11-21 RX ADMIN — CARVEDILOL 3.12 MG: 6.25 TABLET, FILM COATED ORAL at 08:00

## 2021-11-21 RX ADMIN — SERTRALINE HYDROCHLORIDE 50 MG: 50 TABLET ORAL at 05:21

## 2021-11-21 ASSESSMENT — ENCOUNTER SYMPTOMS
DEPRESSION: 0
DIZZINESS: 0
SORE THROAT: 0
COUGH: 0
PALPITATIONS: 0
LOSS OF CONSCIOUSNESS: 0
ABDOMINAL PAIN: 0
NAUSEA: 0
FALLS: 0
FEVER: 0
SHORTNESS OF BREATH: 0
VOMITING: 0
CHILLS: 0

## 2021-11-21 ASSESSMENT — COGNITIVE AND FUNCTIONAL STATUS - GENERAL
HELP NEEDED FOR BATHING: A LITTLE
STANDING UP FROM CHAIR USING ARMS: A LITTLE
SUGGESTED CMS G CODE MODIFIER MOBILITY: CK
SUGGESTED CMS G CODE MODIFIER DAILY ACTIVITY: CK
WALKING IN HOSPITAL ROOM: A LITTLE
TOILETING: A LITTLE
DAILY ACTIVITIY SCORE: 19
MOVING TO AND FROM BED TO CHAIR: A LITTLE
PERSONAL GROOMING: A LITTLE
DRESSING REGULAR UPPER BODY CLOTHING: A LITTLE
MOVING FROM LYING ON BACK TO SITTING ON SIDE OF FLAT BED: A LOT
MOBILITY SCORE: 16
DRESSING REGULAR LOWER BODY CLOTHING: A LITTLE
CLIMB 3 TO 5 STEPS WITH RAILING: A LOT
TURNING FROM BACK TO SIDE WHILE IN FLAT BAD: A LITTLE

## 2021-11-21 NOTE — CARE PLAN
The patient is Stable - Low risk of patient condition declining or worsening    Shift Goals  Clinical Goals: Monitor Pain    Progress made toward(s) clinical / shift goals:  Pt up to commode today for bathroom, continues on supplemental O2, calls appropriately     Patient is not progressing towards the following goals: Needs placement      Problem: Knowledge Deficit - Standard  Goal: Patient and family/care givers will demonstrate understanding of plan of care, disease process/condition, diagnostic tests and medications  Outcome: Progressing     Problem: Pain - Standard  Goal: Alleviation of pain or a reduction in pain to the patient’s comfort goal  Outcome: Progressing     Problem: Hemodynamics  Goal: Patient's hemodynamics, fluid balance and neurologic status will be stable or improve  Outcome: Progressing     Problem: Fluid Volume  Goal: Fluid volume balance will be maintained  Outcome: Progressing

## 2021-11-21 NOTE — PROGRESS NOTES
Assumed care of patient @1915, beside report received from Marlen RN, Pt A&OX4 and denies any pain. Bed locked an lowered with call light in reach and questions answered in present time.

## 2021-11-21 NOTE — PROGRESS NOTES
Assumed care of patient. Bedside report, received from Adalid ZEPEDA. Updated POC, call light within reach, and fall precautions in place. Bed locked and and in lowest position. Patient instructed to call for assistance before getting out of bed. All questions answered, no further needs at this time.

## 2021-11-22 ENCOUNTER — HOME HEALTH ADMISSION (OUTPATIENT)
Dept: HOME HEALTH SERVICES | Facility: HOME HEALTHCARE | Age: 76
End: 2021-11-22
Payer: MEDICARE

## 2021-11-22 LAB
GLUCOSE BLD-MCNC: 179 MG/DL (ref 65–99)
GLUCOSE BLD-MCNC: 202 MG/DL (ref 65–99)
GLUCOSE BLD-MCNC: 202 MG/DL (ref 65–99)
GLUCOSE BLD-MCNC: 78 MG/DL (ref 65–99)

## 2021-11-22 PROCEDURE — A9270 NON-COVERED ITEM OR SERVICE: HCPCS | Performed by: STUDENT IN AN ORGANIZED HEALTH CARE EDUCATION/TRAINING PROGRAM

## 2021-11-22 PROCEDURE — 700102 HCHG RX REV CODE 250 W/ 637 OVERRIDE(OP): Performed by: STUDENT IN AN ORGANIZED HEALTH CARE EDUCATION/TRAINING PROGRAM

## 2021-11-22 PROCEDURE — 700111 HCHG RX REV CODE 636 W/ 250 OVERRIDE (IP): Performed by: STUDENT IN AN ORGANIZED HEALTH CARE EDUCATION/TRAINING PROGRAM

## 2021-11-22 PROCEDURE — 97535 SELF CARE MNGMENT TRAINING: CPT

## 2021-11-22 PROCEDURE — 99232 SBSQ HOSP IP/OBS MODERATE 35: CPT | Performed by: INTERNAL MEDICINE

## 2021-11-22 PROCEDURE — 700102 HCHG RX REV CODE 250 W/ 637 OVERRIDE(OP): Performed by: INTERNAL MEDICINE

## 2021-11-22 PROCEDURE — 700101 HCHG RX REV CODE 250: Performed by: STUDENT IN AN ORGANIZED HEALTH CARE EDUCATION/TRAINING PROGRAM

## 2021-11-22 PROCEDURE — A9270 NON-COVERED ITEM OR SERVICE: HCPCS | Performed by: INTERNAL MEDICINE

## 2021-11-22 PROCEDURE — 770001 HCHG ROOM/CARE - MED/SURG/GYN PRIV*

## 2021-11-22 PROCEDURE — 97166 OT EVAL MOD COMPLEX 45 MIN: CPT

## 2021-11-22 PROCEDURE — 82962 GLUCOSE BLOOD TEST: CPT

## 2021-11-22 RX ADMIN — DOCUSATE SODIUM 50 MG AND SENNOSIDES 8.6 MG 2 TABLET: 8.6; 5 TABLET, FILM COATED ORAL at 05:44

## 2021-11-22 RX ADMIN — ENALAPRILAT 1.25 MG: 1.25 INJECTION INTRAVENOUS at 20:58

## 2021-11-22 RX ADMIN — APIXABAN 5 MG: 5 TABLET, FILM COATED ORAL at 17:35

## 2021-11-22 RX ADMIN — INSULIN HUMAN 3 UNITS: 100 INJECTION, SOLUTION PARENTERAL at 21:07

## 2021-11-22 RX ADMIN — INSULIN HUMAN 3 UNITS: 100 INJECTION, SOLUTION PARENTERAL at 12:40

## 2021-11-22 RX ADMIN — INSULIN HUMAN 2 UNITS: 100 INJECTION, SOLUTION PARENTERAL at 17:34

## 2021-11-22 RX ADMIN — APIXABAN 5 MG: 5 TABLET, FILM COATED ORAL at 05:44

## 2021-11-22 RX ADMIN — AMLODIPINE BESYLATE 5 MG: 5 TABLET ORAL at 05:44

## 2021-11-22 RX ADMIN — CARVEDILOL 3.12 MG: 6.25 TABLET, FILM COATED ORAL at 17:35

## 2021-11-22 RX ADMIN — CEPHALEXIN 500 MG: 500 CAPSULE ORAL at 05:44

## 2021-11-22 RX ADMIN — CARVEDILOL 3.12 MG: 6.25 TABLET, FILM COATED ORAL at 08:57

## 2021-11-22 RX ADMIN — CEPHALEXIN 500 MG: 500 CAPSULE ORAL at 13:26

## 2021-11-22 RX ADMIN — TRAZODONE HYDROCHLORIDE 50 MG: 100 TABLET ORAL at 20:42

## 2021-11-22 RX ADMIN — LABETALOL HYDROCHLORIDE 10 MG: 5 INJECTION, SOLUTION INTRAVENOUS at 22:23

## 2021-11-22 RX ADMIN — INSULIN GLARGINE 25 UNITS: 100 INJECTION, SOLUTION SUBCUTANEOUS at 17:34

## 2021-11-22 RX ADMIN — SERTRALINE HYDROCHLORIDE 50 MG: 50 TABLET ORAL at 05:44

## 2021-11-22 RX ADMIN — CEPHALEXIN 500 MG: 500 CAPSULE ORAL at 20:41

## 2021-11-22 ASSESSMENT — ENCOUNTER SYMPTOMS
SHORTNESS OF BREATH: 0
FEVER: 0
VOMITING: 0
CHILLS: 0
PALPITATIONS: 0
DEPRESSION: 0
DIZZINESS: 0
ABDOMINAL PAIN: 0
FALLS: 0
NAUSEA: 0
LOSS OF CONSCIOUSNESS: 0
COUGH: 0
SORE THROAT: 0

## 2021-11-22 ASSESSMENT — COGNITIVE AND FUNCTIONAL STATUS - GENERAL
SUGGESTED CMS G CODE MODIFIER DAILY ACTIVITY: CJ
HELP NEEDED FOR BATHING: A LITTLE
DAILY ACTIVITIY SCORE: 21
DRESSING REGULAR LOWER BODY CLOTHING: A LITTLE
TOILETING: A LITTLE

## 2021-11-22 ASSESSMENT — PAIN DESCRIPTION - PAIN TYPE: TYPE: ACUTE PAIN

## 2021-11-22 ASSESSMENT — ACTIVITIES OF DAILY LIVING (ADL): TOILETING: INDEPENDENT

## 2021-11-22 NOTE — DISCHARGE PLANNING
Anticipated Discharge Disposition:   Home with home health, home oxygen    Action:   Discussed discharge planning needs during rounds. Per DO, pt refused SNF. HH referral in place. Discussed possible PT/OT reeval. RN REJI also spoke to pt's daughter and she said gave the same information that pt does not want to go to SNF, prefers to go home, okay with home health. Received HH choice. Choice form faxed to [facility].    Per pt's daughter, pt was on 2 LPM O2 at night only. Pt currently on 5 LPM O2., continuous. Discussed with IDT about need for O2 eval.     Per pt's daughter, she will be available to provide assistance pt pt. Pt uses home oxygen at night only, she cannot remember the DME company name. Pt has a FWW at home. Preferred pharmacy is [facility].     Barriers to Discharge:   Medical clearance.  Home health acceptance.    Plan:   Hospital Care Management will continue to follow and assist with discharge planning needs.              Care Transition Team Assessment    Information Source  Orientation Level: Oriented X4  Information Given By: Relative,Other (Comments) (daughter)  Informant's Name: DESIREE ALCANTARA       Elopement Risk  Legal Hold: No  Ambulatory or Self Mobile in Wheelchair: No-Not an Elopement Risk  Elopement Risk: Not at Risk for Elopement    Interdisciplinary Discharge Planning  Primary Care Physician: LENORE STEVE D.O.  Lives with - Patient's Self Care Capacity: Adult Children  Support Systems: Children  Housing / Facility: [address] [facility]  Prior Services: Home-Independent  Durable Medical Equipment: Home Oxygen,Walker  DME Provider / Phone:  (2 LPM at night only, cannot remember DME company name)    Discharge Preparedness  What is your plan after discharge?: Home health care,Home with help  What are your discharge supports?: Child (daughter)  Prior Functional Level: Uses Walker,Independent with Activities of Daily Living    Functional Assesment  Prior Functional Level: Uses Walker,Independent  with Activities of Daily Living    Finances  Financial Barriers to Discharge: No  Prescription Coverage: Yes    Vision / Hearing Impairment  Right Eye Vision: Impaired,Wears Glasses  Left Eye Vision: Impaired,Wears Glasses  Hearing Impairment: Both Ears,Hearing Device Not Available       Advance Directive  Advance Directive?: POLST    Domestic Abuse  Have you ever been the victim of abuse or violence?: No    Psychological Assessment  History of Substance Abuse: None  History of Psychiatric Problems: No    Discharge Risks or Barriers  Discharge risks or barriers?: Complex medical needs  Patient risk factors: Complex medical needs    Anticipated Discharge Information  Discharge Disposition: D/T to home under [facility] care in anticipation of covered skilled care (06)

## 2021-11-22 NOTE — CARE PLAN
Problem: Pain - Standard  Goal: Alleviation of pain or a reduction in pain to the patient’s comfort goal  Outcome: Progressing     Problem: Knowledge Deficit - Standard  Goal: Patient and family/care givers will demonstrate understanding of plan of care, disease process/condition, diagnostic tests and medications  Outcome: Progressing     Problem: Fall Risk  Goal: Patient will remain free from falls  Outcome: Progressing       The patient is Stable - Low risk of patient condition declining or worsening    Shift Goals  Clinical Goals: placement    Progress made toward(s) clinical / shift goals: POC discussed with pt. Pt call light within reach and pt calls appropriately. PT reporting no pain currently. Pt bed locked and in lowest position.

## 2021-11-22 NOTE — FACE TO FACE
Face to Face Supporting Documentation - Home Health    The encounter with this patient was in whole or in part the primary reason for home health admission.    Date of encounter:   Patient:                    MRN:                       YOB: 2023  Amrita Torrez  [id number]  1945     Home health to see patient for:  Skilled Nursing care for assessment, interventions & education, Physical Therapy evaluation and treatment and Occupational therapy evaluation and treatment    Skilled need for:  Comment: cellulitis, acute kidney injury    Skilled nursing interventions to include:  Comment:      Homebound status evidenced by:  Needs the assistance of another person in order to leave the home. Leaving home requires a considerable and taxing effort. There is a normal inability to leave the home.    Community Physician to provide follow up care: Fitz Turpin D.O.     Optional Interventions? No      I certify the face to face encounter for this home health care referral meets the CMS requirements and the encounter/clinical assessment with the patient was, in whole, or in part, for the medical condition(s) listed above, which is the primary reason for home health care. Based on my clinical findings: the service(s) are medically necessary, support the need for home health care, and the homebound criteria are met.  I certify that this patient has had a face to face encounter by myself.  Brandon Resendiz D.O. - NPI: [id number]

## 2021-11-22 NOTE — PROGRESS NOTES
Bedside report received.  Assessment complete.  A&Ox4. Patient calls appropriately.  Patient ambulates with x1 assist w/ a FWW.  Patient has 0/10 pain. Declined intervention at this time.  Skin per flow sheet.  Tolerating cardiac, diabetic diet. Denies N/V.  + void, + flatus, + BM. Last BM 11/21.  Reviewed plan of care with patient. Call light and personal belongings within reach. Hourly rounding in place. All needs met at this time.

## 2021-11-22 NOTE — PROGRESS NOTES
[facility] Medicine Daily Progress Note    Date of Service  11/22/2021    Chief Complaint  Amrita Torrez is a 76 y.o. female admitted 11/16/2021 with RLE pain    Hospital Course  Amrita Torrez is a 76 y.o. female who presented 11/16/2021 with complaints of right lower extremity pain, mild swelling, fever, incoordination and syncope as of yesterday. Patient also complained of nausea and vertigo-like symptoms and concern for stroke. She is on Eliquis for atrial fibrillation and compliant with her home medication regiment. She also has a history of diabetes and hypertension and denies ever having diabetic foot ulcer. She does have swelling of her right lower extremity/foot with erythema and states that this is the first time this is ever occurred. She states that she went to outpatient care facility which was caring for her right lower extremity earlier in the week and now is red and swollen. She otherwise denies shortness of breath, chest pain, cough with sputum production, resolution of dizziness/vertigo and nausea with vomiting. Denies hematemesis, constipation, diarrhea, melena, hematochezia, dysuria, hematuria.     Vital signs at time of presentation were as follows: 100.6, 118, 16, 147/82, 99% room air.     Chest x-ray performed and unremarkable. CTA head and neck performed and CTA head reveals new left temporal lobe encephalomalacia compatible with interval infarction, atrophy, there are periventricular and subcortical white matter changes and focal hypodensities in the basal ganglia are likely related to prior lacunar infarcts. CTA neck reveals left-sided thyroid nodule 2 cm in size otherwise relatively unremarkable. Foot x-ray reveals osteopenia.     Labs significant for leukocytosis of 23, troponin 49, mild hyponatremia of 130, mild hypokalemia of 3.5, hyperglycemia of 365 and otherwise relatively unremarkable. Lactic acid ordered and pending, magnesium ordered and pending and urinalysis ordered and  pending.     IV antibiotics initiated in ED. Hospitalist consulted for admission and patient agreeable to inpatient hospitalization for sepsis secondary to right lower extremity cellulitis. She agrees to DO NOT RESUSCITATE DO NOT INTUBATE CODE STATUS at time of evaluation and alert and oriented x4. She will be optimized in ED and subsequently transferred to medical andrade floor for further optimization of medical management.    Interval Problem Update  Patient was seen and examined at bedside.  No acute events overnight. Patient is resting comfortably in bed and in no acute distress.      Keflex for lower extremity cellulitis  Blood culture negative x4 days  RICHARD Cr 2.75-->3.25--> 2.2  LUIS: no hydro  Patient refusing SNF  HH order placed  Anticipate disharge in 24-48 hours  Wound care to evaluate wound    I have personally seen and examined the patient at bedside. I discussed the plan of care with patient and bedside RN.    Consultants/Specialty  none    Code Status  DNAR/DNI    Disposition  Patient is medically cleared.   Anticipate discharge to to home with close outpatient follow-up.  I have placed the appropriate orders for post-discharge needs.    Review of Systems  Review of Systems   Constitutional: Negative for chills and fever.   HENT: Negative for sore throat.    Respiratory: Negative for cough and shortness of breath.    Cardiovascular: Positive for leg swelling. Negative for chest pain and palpitations.   Gastrointestinal: Negative for abdominal pain, nausea and vomiting.   Genitourinary: Negative for dysuria and frequency.   Musculoskeletal: Negative for falls.   Skin: Positive for rash.   Neurological: Negative for dizziness and loss of consciousness.   Psychiatric/Behavioral: Negative for depression.        Physical Exam  Temp:  [35.9 °C (96.7 °F)-36.7 °C (98.1 °F)] 36.7 °C (98.1 °F)  Pulse:  [72-87] 75  Resp:  [16-18] 16  BP: (132-176)/(54-73) 152/73  SpO2:  [92 %-98 %] 92 %    Physical  Exam  Constitutional:       General: She is not in acute distress.  HENT:      Head: Normocephalic.      Right Ear: External ear normal.      Left Ear: External ear normal.      Nose: No congestion.      Mouth/Throat:      Pharynx: No oropharyngeal exudate.   Eyes:      General: No scleral icterus.  Cardiovascular:      Rate and Rhythm: Normal rate and regular rhythm.      Heart sounds: No murmur heard.      Pulmonary:      Breath sounds: No wheezing.   Abdominal:      Tenderness: There is no abdominal tenderness. There is no guarding or rebound.   Musculoskeletal:      Comments: Right foot cellulitis in skin next to right great toe, erythema present   Skin:     Capillary Refill: Capillary refill takes less than 2 seconds.      Coloration: Skin is not jaundiced.      Findings: No bruising.   Neurological:      General: No focal deficit present.      Mental Status: She is alert and oriented to person, place, and time.   Psychiatric:         Mood and Affect: Mood normal.         Behavior: Behavior normal.       Fluids    Intake/Output Summary (Last 24 hours) at 11/22/2021 1410  Last data filed at 11/21/2021 1800  Gross per 24 hour   Intake 120 ml   Output 450 ml   Net -330 ml       Laboratory  Recent Labs     11/20/21  0324   WBC 15.1*   RBC 4.49   HEMOGLOBIN 12.8   HEMATOCRIT 36.8*   MCV 82.0   MCH 28.5   MCHC 34.8   RDW 39.7   PLATELETCT 333   MPV 10.2     Recent Labs     11/20/21  0324 11/21/21  1212   SODIUM 132* 133*   POTASSIUM 3.3* 4.1   CHLORIDE 99 104   CO2 20 20   GLUCOSE 92 231*   BUN 42* 38*   CREATININE 3.25* 2.20*   CALCIUM 8.1* 8.0*                   Imaging  US-RENAL   Final Result      1.  Right lower pole simple renal cyst.   2.  Medical renal disease.   3.  Echogenic debris in the bladder. This can be seen in urinary tract infection.   4.  Incidentally noted bilateral pleural effusions.      EC-ECHOCARDIOGRAM COMPLETE W/O CONT   Final Result      DX-FOOT-COMPLETE 3+ RIGHT   Final Result       Osteopenia.      CT-CTA HEAD WITH & W/O-POST PROCESS   Final Result      1.  No thrombosis is seen within the [place].   2.  New left temporal lobe encephalomalacia compatible with interval infarction.   3.  Atrophy   4.  There are periventricular and subcortical white matter changes present.  This finding is nonspecific and could be from previous small vessel ischemia, demyelination, or gliosis.   5.  Focal hypodensities in the basal ganglia are likely related to prior lacunar infarcts.      CT-CTA NECK WITH & W/O-POST PROCESSING   Final Result      1.  No evidence of carotid or vertebral artery occlusion or dissection.      2.  Atherosclerotic plaque at the carotid bifurcations bilaterally which measures less than 50%.      3.  Left sided thyroid nodules which measure 2cm in size.  This could be further evaluated with thyroid ultrasound.      DX-CHEST-PORTABLE (1 VIEW)   Final Result      No acute cardiopulmonary abnormality.              Assessment/Plan  * Cellulitis of right lower extremity- (present on admission)  Assessment & Plan  X-ray performed and no evidence of osteomyelitis however osteopenia has been seen on x-ray imaging as seen above.  Keflex  Blood culture negative      Acute renal failure superimposed on stage 3 chronic kidney disease (HCC)- (present on admission)  Assessment & Plan  Avoid nephrotoxic agents - hold lasix, lisinopril  Renal ultrasound - no hydro  Fena suggests prerenal injury  Completed IVF x 24 hours  Will continue to monitor  3.25-->2.2  improving    Paroxysmal atrial fibrillation (HCC)- (present on admission)  Assessment & Plan  Twelve-lead EKG reveals atrial fibrillation with RVR  Coreg 3.125 mg p.o. twice daily  Lopressor 5 mg IV x3 for heart rate greater than 120  Continue Eliquis 5 mg p.o. twice daily    Proteinuria  Assessment & Plan  As seen on UA    Pleural effusion  Assessment & Plan  Bilateral  As identified on imaging    Severe aortic stenosis  Assessment &  Plan  Per echo    Type 2 diabetes mellitus with hyperglycemia, with long-term current use of insulin (HCC)- (present on admission)  Assessment & Plan  Hemoglobin A1c 11.9  Continue basal/SSI  Goal of 140-180 glycemic control within hospital      Hypertension- (present on admission)  Assessment & Plan  Continue home antihypertensive medication regiment  2 g sodium restricted diet  As needed antihypertensives ordered    Syncope- (present on admission)  Assessment & Plan  No longer symptomatic  CT head/CTA head and neck imaging as above  Consider neurology consultation  Echocardiogram: low normal EF, mod/severe AS  Fall precautions      Sepsis (HCC)- (present on admission)  Assessment & Plan  This is Sepsis Present on admission  SIRS criteria identified on my evaluation include: Fever, with temperature greater than 101 deg F, Tachycardia, with heart rate greater than 90 BPM and Leukocytosis, with WBC greater than 12,000  Source is Skin soft tissue  Sepsis protocol initiated  Fluid resuscitation ordered per protocol  IV antibiotics as appropriate for source of sepsis  While organ dysfunction may be noted elsewhere in this problem list or in the chart, degree of organ dysfunction does not meet CMS criteria for severe sepsis  Procalcitonin ordered and pending. Urinalysis ordered and pending. Blood culture x2/lactic acid ordered and pending. IV antibiotics initiated in ED we will continue onward.        Thyroid nodule- (present on admission)  Assessment & Plan  TSH WNL         VTE prophylaxis: therapeutic anticoagulation with eliquis    I have performed a physical exam and reviewed and updated ROS and Plan today (11/22/2021). In review of yesterday's note (11/21/2021), there are no changes except as documented above.

## 2021-11-22 NOTE — DISCHARGE PLANNING
Received Choice form at [phone]  Agency/Facility Name: [facility]  Referral sent per Choice form @ [phone]

## 2021-11-22 NOTE — DISCHARGE PLANNING
ATTN: Case Management  RE: Referral for Home Health    As of 11/22/21, we have accepted the Home Health referral for the patient listed above.    A [facility] clinician will be out to see the patient within 48 hours. If you have any questions or concerns regarding the patient’s transition to Home Health, please do not hesitate to contact us at [phone].      We look forward to collaborating with you,  [facility] Team

## 2021-11-23 ENCOUNTER — APPOINTMENT (OUTPATIENT)
Dept: RADIOLOGY | Facility: MEDICAL CENTER | Age: 76
DRG: 872 | End: 2021-11-23
Attending: STUDENT IN AN ORGANIZED HEALTH CARE EDUCATION/TRAINING PROGRAM
Payer: MEDICARE

## 2021-11-23 LAB
GLUCOSE BLD-MCNC: 122 MG/DL (ref 65–99)
GLUCOSE BLD-MCNC: 127 MG/DL (ref 65–99)
GLUCOSE BLD-MCNC: 183 MG/DL (ref 65–99)
GLUCOSE BLD-MCNC: 198 MG/DL (ref 65–99)

## 2021-11-23 PROCEDURE — 82962 GLUCOSE BLOOD TEST: CPT | Mod: 91

## 2021-11-23 PROCEDURE — 93922 UPR/L XTREMITY ART 2 LEVELS: CPT

## 2021-11-23 PROCEDURE — A9270 NON-COVERED ITEM OR SERVICE: HCPCS | Performed by: INTERNAL MEDICINE

## 2021-11-23 PROCEDURE — 700102 HCHG RX REV CODE 250 W/ 637 OVERRIDE(OP): Performed by: STUDENT IN AN ORGANIZED HEALTH CARE EDUCATION/TRAINING PROGRAM

## 2021-11-23 PROCEDURE — 700102 HCHG RX REV CODE 250 W/ 637 OVERRIDE(OP): Performed by: INTERNAL MEDICINE

## 2021-11-23 PROCEDURE — 770001 HCHG ROOM/CARE - MED/SURG/GYN PRIV*

## 2021-11-23 PROCEDURE — 97116 GAIT TRAINING THERAPY: CPT | Mod: CQ

## 2021-11-23 PROCEDURE — A9270 NON-COVERED ITEM OR SERVICE: HCPCS | Performed by: STUDENT IN AN ORGANIZED HEALTH CARE EDUCATION/TRAINING PROGRAM

## 2021-11-23 PROCEDURE — 99233 SBSQ HOSP IP/OBS HIGH 50: CPT | Performed by: HOSPITALIST

## 2021-11-23 PROCEDURE — 97602 WOUND(S) CARE NON-SELECTIVE: CPT

## 2021-11-23 PROCEDURE — 97530 THERAPEUTIC ACTIVITIES: CPT | Mod: CQ

## 2021-11-23 RX ADMIN — CARVEDILOL 3.12 MG: 6.25 TABLET, FILM COATED ORAL at 16:54

## 2021-11-23 RX ADMIN — AMLODIPINE BESYLATE 5 MG: 5 TABLET ORAL at 05:30

## 2021-11-23 RX ADMIN — APIXABAN 5 MG: 5 TABLET, FILM COATED ORAL at 05:30

## 2021-11-23 RX ADMIN — CARVEDILOL 3.12 MG: 6.25 TABLET, FILM COATED ORAL at 06:44

## 2021-11-23 RX ADMIN — TRAZODONE HYDROCHLORIDE 50 MG: 100 TABLET ORAL at 21:04

## 2021-11-23 RX ADMIN — NITROGLYCERIN 0.5 INCH: 20 OINTMENT TOPICAL at 01:40

## 2021-11-23 RX ADMIN — INSULIN HUMAN 2 UNITS: 100 INJECTION, SOLUTION PARENTERAL at 21:13

## 2021-11-23 RX ADMIN — INSULIN HUMAN 2 UNITS: 100 INJECTION, SOLUTION PARENTERAL at 16:56

## 2021-11-23 RX ADMIN — SERTRALINE HYDROCHLORIDE 50 MG: 50 TABLET ORAL at 05:30

## 2021-11-23 RX ADMIN — DOCUSATE SODIUM 50 MG AND SENNOSIDES 8.6 MG 2 TABLET: 8.6; 5 TABLET, FILM COATED ORAL at 05:30

## 2021-11-23 RX ADMIN — CEPHALEXIN 500 MG: 500 CAPSULE ORAL at 05:30

## 2021-11-23 RX ADMIN — NITROGLYCERIN 0.5 INCH: 20 OINTMENT TOPICAL at 05:00

## 2021-11-23 RX ADMIN — APIXABAN 5 MG: 5 TABLET, FILM COATED ORAL at 16:54

## 2021-11-23 RX ADMIN — INSULIN GLARGINE 25 UNITS: 100 INJECTION, SOLUTION SUBCUTANEOUS at 16:57

## 2021-11-23 ASSESSMENT — ENCOUNTER SYMPTOMS
DIZZINESS: 0
SHORTNESS OF BREATH: 0
NAUSEA: 0
VOMITING: 0
ABDOMINAL PAIN: 0
SORE THROAT: 0
LOSS OF CONSCIOUSNESS: 0
FEVER: 0
COUGH: 0
PALPITATIONS: 0
DEPRESSION: 0
FALLS: 0
CHILLS: 0

## 2021-11-23 ASSESSMENT — COGNITIVE AND FUNCTIONAL STATUS - GENERAL
TURNING FROM BACK TO SIDE WHILE IN FLAT BAD: A LITTLE
STANDING UP FROM CHAIR USING ARMS: A LITTLE
CLIMB 3 TO 5 STEPS WITH RAILING: A LOT
MOBILITY SCORE: 17
WALKING IN HOSPITAL ROOM: A LITTLE
SUGGESTED CMS G CODE MODIFIER MOBILITY: CK
MOVING TO AND FROM BED TO CHAIR: A LITTLE
MOVING FROM LYING ON BACK TO SITTING ON SIDE OF FLAT BED: A LITTLE

## 2021-11-23 ASSESSMENT — GAIT ASSESSMENTS
ASSISTIVE DEVICE: FRONT WHEEL WALKER
DISTANCE (FEET): 55
GAIT LEVEL OF ASSIST: MINIMAL ASSIST
DEVIATION: DECREASED BASE OF SUPPORT;SHUFFLED GAIT

## 2021-11-23 NOTE — PROGRESS NOTES
1:00- Patient oxygen demand increased from 5L NC to 13L oxymask. BP- 198/71, RR 22, HR 74. Lung sounds unchanged. No signs of distress or pain. Neuro assessment unchanged.  1:20-MD paged.  1:40- Nitro ointment ordered and applied. BP WDL, Oxygen demand decreased back to 5L.     4:30- Pt /75. Increase in oxygen demand back to 15L. MD notified. Nitro ointment repeated.     6:50- Patient resting comfortably on 5L NC. Scheduled BP meds given. No further orders from Dr. Montero. Day shift RN notified.

## 2021-11-23 NOTE — PROGRESS NOTES
Assumed care of patient at 1915, received bedside report from day shift RN. Bed is locked and in lowest position with call light within reach. Treaded socks in place. Patient updated on plan of care, no complaints or pain at this time. White board updated. Pt A&Ox4 Patient's breathing pattern is unlabored. All needs met at this time. Patient is medical

## 2021-11-23 NOTE — PROGRESS NOTES
Hospital Medicine Daily Progress Note    Date of Service  11/23/2021    Chief Complaint  Amrita Torrez is a 76 y.o. female admitted 11/16/2021 with RLE pain    Hospital Course  Amrita Torrez is a 76 y.o. female who presented 11/16/2021 with complaints of right lower extremity pain, mild swelling, fever, incoordination and syncope as of yesterday. Patient also complained of nausea and vertigo-like symptoms and concern for stroke. She is on Eliquis for atrial fibrillation and compliant with her home medication regiment. She also has a history of diabetes and hypertension and denies ever having diabetic foot ulcer. She does have swelling of her right lower extremity/foot with erythema and states that this is the first time this is ever occurred. She states that she went to outpatient care facility which was caring for her right lower extremity earlier in the week and now is red and swollen. She otherwise denies shortness of breath, chest pain, cough with sputum production, resolution of dizziness/vertigo and nausea with vomiting. Denies hematemesis, constipation, diarrhea, melena, hematochezia, dysuria, hematuria.     Vital signs at time of presentation were as follows: 100.6, 118, 16, 147/82, 99% room air.     Chest x-ray performed and unremarkable. CTA head and neck performed and CTA head reveals new left temporal lobe encephalomalacia compatible with interval infarction, atrophy, there are periventricular and subcortical white matter changes and focal hypodensities in the basal ganglia are likely related to prior lacunar infarcts. CTA neck reveals left-sided thyroid nodule 2 cm in size otherwise relatively unremarkable. Foot x-ray reveals osteopenia.     Labs significant for leukocytosis of 23, troponin 49, mild hyponatremia of 130, mild hypokalemia of 3.5, hyperglycemia of 365 and otherwise relatively unremarkable. Lactic acid ordered and pending, magnesium ordered and pending and urinalysis ordered and  pending.     IV antibiotics initiated in ED. Hospitalist consulted for admission and patient agreeable to inpatient hospitalization for sepsis secondary to right lower extremity cellulitis. She agrees to DO NOT RESUSCITATE DO NOT INTUBATE CODE STATUS at time of evaluation and alert and oriented x4. She will be optimized in ED and subsequently transferred to medical andrade floor for further optimization of medical management.    Interval Problem Update  Patient was seen and examined at bedside.  No acute events overnight. Patient is resting comfortably in bed and in no acute distress.    Keflex for lower extremity cellulitis  Blood culture negative x4 days  LUIS: no hydro  Patient refusing SNF, then agreeing to?   HH order placed    Discussed with LPS, they are ordering add'n imaging.       I have personally seen and examined the patient at bedside. I discussed the plan of care with patient and bedside RN.    Consultants/Specialty  none    Code Status  DNAR/DNI    Disposition  Patient is not medically cleared.   Anticipate discharge to to home with close outpatient follow-up. Vs SNF  I have placed the appropriate orders for post-discharge needs.    Review of Systems  Review of Systems   Constitutional: Negative for chills and fever.   HENT: Negative for sore throat.    Respiratory: Negative for cough and shortness of breath.    Cardiovascular: Positive for leg swelling. Negative for chest pain and palpitations.   Gastrointestinal: Negative for abdominal pain, nausea and vomiting.   Genitourinary: Negative for dysuria and frequency.   Musculoskeletal: Negative for falls.   Skin: Positive for rash.   Neurological: Negative for dizziness and loss of consciousness.   Psychiatric/Behavioral: Negative for depression.        Physical Exam  Temp:  [36 °C (96.8 °F)-36.7 °C (98.1 °F)] 36 °C (96.8 °F)  Pulse:  [68-79] 68  Resp:  [16-22] 22  BP: (145-209)/(58-78) 172/73  SpO2:  [88 %-94 %] 94 %    Physical Exam  Constitutional:        General: She is not in acute distress.  HENT:      Head: Normocephalic.      Right Ear: External ear normal.      Left Ear: External ear normal.      Nose: No congestion.      Mouth/Throat:      Pharynx: No oropharyngeal exudate.   Eyes:      General: No scleral icterus.  Cardiovascular:      Rate and Rhythm: Normal rate and regular rhythm.      Heart sounds: No murmur heard.      Pulmonary:      Breath sounds: No wheezing.   Abdominal:      Tenderness: There is no abdominal tenderness. There is no guarding or rebound.   Musculoskeletal:      Comments: Right foot cellulitis in skin next to right great toe, erythema present   Skin:     Capillary Refill: Capillary refill takes less than 2 seconds.      Coloration: Skin is not jaundiced.      Findings: No bruising.   Neurological:      General: No focal deficit present.      Mental Status: She is alert and oriented to person, place, and time.   Psychiatric:         Mood and Affect: Mood normal.         Behavior: Behavior normal.       Fluids    Intake/Output Summary (Last 24 hours) at 11/23/2021 0726  Last data filed at 11/22/2021 1645  Gross per 24 hour   Intake --   Output 800 ml   Net -800 ml       Laboratory      Recent Labs     11/21/21  1212   SODIUM 133*   POTASSIUM 4.1   CHLORIDE 104   CO2 20   GLUCOSE 231*   BUN 38*   CREATININE 2.20*   CALCIUM 8.0*                   Imaging  US-RENAL   Final Result      1.  Right lower pole simple renal cyst.   2.  Medical renal disease.   3.  Echogenic debris in the bladder. This can be seen in urinary tract infection.   4.  Incidentally noted bilateral pleural effusions.      EC-ECHOCARDIOGRAM COMPLETE W/O CONT   Final Result      DX-FOOT-COMPLETE 3+ RIGHT   Final Result      Osteopenia.      CT-CTA HEAD WITH & W/O-POST PROCESS   Final Result      1.  No thrombosis is seen within the [place].   2.  New left temporal lobe encephalomalacia compatible with interval infarction.   3.  Atrophy   4.  There are  periventricular and subcortical white matter changes present.  This finding is nonspecific and could be from previous small vessel ischemia, demyelination, or gliosis.   5.  Focal hypodensities in the basal ganglia are likely related to prior lacunar infarcts.      CT-CTA NECK WITH & W/O-POST PROCESSING   Final Result      1.  No evidence of carotid or vertebral artery occlusion or dissection.      2.  Atherosclerotic plaque at the carotid bifurcations bilaterally which measures less than 50%.      3.  Left sided thyroid nodules which measure 2cm in size.  This could be further evaluated with thyroid ultrasound.      DX-CHEST-PORTABLE (1 VIEW)   Final Result      No acute cardiopulmonary abnormality.              Assessment/Plan  * Cellulitis of right lower extremity- (present on admission)  Assessment & Plan  X-ray performed and no evidence of osteomyelitis however osteopenia has been seen on x-ray imaging as seen above.  Keflex  Blood culture negative      Proteinuria  Assessment & Plan  As seen on UA    Pleural effusion  Assessment & Plan  Bilateral  As identified on imaging    Severe aortic stenosis  Assessment & Plan  Per echo    Type 2 diabetes mellitus with hyperglycemia, with long-term current use of insulin (HCC)- (present on admission)  Assessment & Plan  Hemoglobin A1c 11.9  Continue basal/SSI  Goal of 140-180 glycemic control within hospital      Sepsis (HCC)- (present on admission)  Assessment & Plan  This is Sepsis Present on admission  SIRS criteria identified on my evaluation include: Fever, with temperature greater than 101 deg F, Tachycardia, with heart rate greater than 90 BPM and Leukocytosis, with WBC greater than 12,000  Source is Skin soft tissue  Sepsis protocol initiated  Fluid resuscitation ordered per protocol  IV antibiotics as appropriate for source of sepsis  While organ dysfunction may be noted elsewhere in this problem list or in the chart, degree of organ dysfunction does not meet  CMS criteria for severe sepsis  Procalcitonin ordered and pending. Urinalysis ordered and pending. Blood culture x2/lactic acid ordered and pending. IV antibiotics initiated in ED we will continue onward.        Hypertension- (present on admission)  Assessment & Plan  Continue home antihypertensive medication regiment  2 g sodium restricted diet  As needed antihypertensives ordered    Paroxysmal atrial fibrillation (HCC)- (present on admission)  Assessment & Plan  Twelve-lead EKG reveals atrial fibrillation with RVR  Coreg 3.125 mg p.o. twice daily  Lopressor 5 mg IV x3 for heart rate greater than 120  Continue Eliquis 5 mg p.o. twice daily    Acute renal failure superimposed on stage 3 chronic kidney disease (HCC)- (present on admission)  Assessment & Plan  Avoid nephrotoxic agents - hold lasix, lisinopril  Renal ultrasound - no hydro  Fena suggests prerenal injury  Completed IVF x 24 hours  Will continue to monitor  3.25-->2.2  improving    Syncope- (present on admission)  Assessment & Plan  No longer symptomatic  CT head/CTA head and neck imaging as above  Consider neurology consultation  Echocardiogram: low normal EF, mod/severe AS  Fall precautions      Thyroid nodule- (present on admission)  Assessment & Plan  TSH WNL     Follow up LPS imaging  DC planning    VTE prophylaxis: therapeutic anticoagulation with eliquis    I have performed a physical exam and reviewed and updated ROS and Plan today (11/23/2021). In review of yesterday's note (11/22/2021), there are no changes except as documented above.

## 2021-11-23 NOTE — CARE PLAN
The patient is Stable - Low risk of patient condition declining or worsening    Shift Goals  Clinical Goals: discharge planning, safety  Patient Goals: comfort, go home  Family Goals: n/a      Problem: Skin Integrity  Goal: Skin integrity is maintained or improved  Outcome: Progressing     Wound care consult pending for rt foot wound; encouraged frequent repositioning.      Problem: Fall Risk  Goal: Patient will remain free from falls  Outcome: Progressing     Pt remains free from falls; RN educated pt on fall risk and precautions in place. Call bell within reach

## 2021-11-23 NOTE — THERAPY
"Occupational Therapy   Initial Evaluation     Patient Name: Amrita Costa Elder  Age:  76 y.o., Sex:  female  Medical Record #: [id number]  Today's Date: 11/22/2021       Precautions: Fall Risk    Assessment  Patient is 76 y.o. female with a diagnosis of RLE cellulitis.  Additional factors influencing patient status / progress: Pt reports she is usually only on 3L O2 at night, however today pt is on 5L O2 N/C & was sating in the low 90's%.  Pt is very pleasant & motivated & eager to return home.  Pt does appear to have mild cognitive impairments but reports her daughter lives with her.  Unclear how far from her baseline function she is?  Pt encouraged to be OOB daily for meals & amb in halls with staff.  Pt should be able to progress well & D/C home iwt daughter once medically cleared.    Plan    Recommend Occupational Therapy 3 times per week until therapy goals are met for the following treatments:  Adaptive Equipment, Cognitive Skill Development, Neuro Re-Education / Balance, Self Care/Activities of Daily Living, Therapeutic Activities and Therapeutic Exercises.    DC Equipment Recommendations: Unable to determine at this time  Discharge Recommendations: Recommend home health for continued occupational therapy services     Subjective    \"It feels so good to be sitting up & out of that bed\"     Objective       11/22/21 1442   Prior Living Situation   Prior Services None   Housing / Facility 1 Story House   Steps Into Home 2   Bathroom Set up Bathtub / Shower Combination;Walk In Shower   Equipment Owned 4-Wheel Walker   Lives with - Patient's Self Care Capacity Adult Children   Comments Pt was not a reliable historian but reports she lives with her daughter who recently started a job working [employer]   Cognition    Cognition / Consciousness X   Speech/ Communication Hard of Hearing   Level of Consciousness Alert   Safety Awareness Impaired   New Learning Impaired   Comments Pt appears to have mild cognitive " "impairments, unclear if this is her baseline?   Balance Assessment   Sitting Balance (Static) Fair +   Sitting Balance (Dynamic) Fair   Standing Balance (Static) Fair   Standing Balance (Dynamic) Fair -   Weight Shift Sitting Good   Weight Shift Standing Fair   Bed Mobility    Supine to Sit Supervised   Sit to Supine   (pt left up in recliner chair)   Scooting Supervised   Rolling Supervised   ADL Assessment   Eating Modified Independent   Grooming Supervision;Standing   Upper Body Dressing Supervision   Lower Body Dressing Minimal Assist   Toileting Minimal Assist   Functional Mobility   Sit to Stand Minimal Assist   Bed, Chair, Wheelchair Transfer Minimal Assist   Toilet Transfers Minimal Assist   Mobility pt able to amb with FWW & Min A in [facility]   Edema / Skin Assessment   Comments Pt has a wound on her right foot/great toe   Patient / Family Goals   Patient / Family Goal #1 I don't want to go to a SNF I want to go home\"   Short Term Goals   Short Term Goal # 1 Pt will be supervised with ADL transfers   Short Term Goal # 2 Pt will dress LB with supervision   Short Term Goal # 3 Pt will maintain O2 sats >90% during ADL's                 "

## 2021-11-23 NOTE — CONSULTS
LIMB PRESERVATION SERVICE CONSULT      REFERRED BY: Dr. Resendiz    DATE OF CONSULTATION: 11/23/2021    REASON FOR CONSULT: R DFU     HISTORY OF PRESENT ILLNESS: Amrita Torrez is a 76 y.o.  with a past medical history that includes type 2 diabetes, PAD, hypoxic respiratory failure, admitted 11/16/2021 for Sepsis (HCC) [A41.9].   LPS has been consulted for Right DFU.     Patient is poor historian. She does not know how long she has had foot wound. She thinks she developed wound after recent fall, but isnt sure. She reports seeing a provider who was helping her with her feet, thinks it was podiatrist, but does not recall the last time she saw a podiatrist.     IV antibiotics were started on this admission, completed 7 day course.  Infectious diseases has not been consulted.    Xray completed and is negative for osteomyelitis.  Ortho has not been consulted yet.    Diagnosed with diabetes years ago, and is currently managing with insulin.  Checks blood sugars occasionally and reports that she does not remember what her glucose normally runs.  Has had previous diabetes education.  Does have numbness to feet.  Does not check feet routinely. Usually wears tennis shoes . Does not have diabetic shoes and inserts.  Wears size 8-8.5 shoes.      Smoking:   reports that she quit smoking about 43 years ago. Her smoking use included cigarettes. She has a 40.00 pack-year smoking history. She has never used smokeless tobacco.    Alcohol:   reports no history of alcohol use.    Drug:   reports no history of drug use.      PAST MEDICAL HISTORY:   Past Medical History:   Diagnosis Date   • Anxiety    • Arthritis     fingers/toes   • Breath shortness    • CAD (coronary artery disease)    • Cataract     bilat   • Dental disorder     upper denture   • Depression    • Diabetes     insulin and oral meds   • Goiter    • Heart attack (HCC)     Dr. Tobar   • High cholesterol    • Hyperlipidemia    • Hypertension    • MI (myocardial  infarction) ([facility]) 2016    x2 stints placed   • Oxygen dependent     2 liters @ HS   • Pericardial effusion    • Pneumonia 2019   • Snoring    • Stroke ([facility]) 10/2019    reports possible   • Thyroid nodule         PAST SURGICAL HISTORY:   Past Surgical History:   Procedure Laterality Date   • VITRECTOMY POSTERIOR N/A 11/24/2020    Procedure: VITRECTOMY, POSTERIOR PORTION-RIGHT ENDO LASER LEFT PAN RETINAL LASER;  Surgeon: Jean-Claude Franklin M.D.;  Location: SURGERY SAME DAY [facility];  Service: Ophthalmology   • LAPAROSCOPY  12/1/2014    Performed by Abdi Navarro M.D. at SURGERY SAME DAY [facility] ORS   • BAN BY LAPAROSCOPY  9/14/2012    Performed by ABDI NAVARRO at SURGERY SAME DAY [facility] ORS   • OTHER ORTHOPEDIC SURGERY  7/11    knee   • GYN SURGERY  1978    fibroids   • OTHER ABDOMINAL SURGERY  1966    appendectomy   • GYN SURGERY      hysterectomy   • ZZZ CARDIAC CATH         MEDICATIONS:   Current Facility-Administered Medications   Medication Dose   • amLODIPine (NORVASC) tablet 5 mg  5 mg   • carvedilol (COREG) tablet 3.125 mg  3.125 mg   • apixaban (ELIQUIS) tablet 5 mg  5 mg   • insulin glargine (Semglee) injection  25 Units   • sertraline (Zoloft) tablet 50 mg  50 mg   • traZODone (DESYREL) tablet 50 mg  50 mg   • senna-docusate (PERICOLACE or SENOKOT S) 8.6-50 MG per tablet 2 Tablet  2 Tablet    And   • polyethylene glycol/lytes (MIRALAX) PACKET 1 Packet  1 Packet    And   • magnesium hydroxide (MILK OF MAGNESIA) suspension 30 mL  30 mL    And   • bisacodyl (DULCOLAX) suppository 10 mg  10 mg   • acetaminophen (Tylenol) tablet 650 mg  650 mg   • Pharmacy Consult Request ...Pain Management Review 1 Each  1 Each   • oxyCODONE immediate-release (ROXICODONE) tablet 2.5 mg  2.5 mg    Or   • oxyCODONE immediate-release (ROXICODONE) tablet 5 mg  5 mg    Or   • HYDROmorphone (Dilaudid) injection 0.25 mg  0.25 mg   • enalaprilat (Vasotec) injection 1.25 mg 1 mL  1.25 mg   • labetalol  (NORMODYNE/TRANDATE) injection 10 mg  10 mg   • insulin regular (HumuLIN R,NovoLIN R) injection  2-9 Units    And   • glucose 4 g chewable tablet 16 g  16 g    And   • dextrose 50% (D50W) injection 50 mL  50 mL   • LORazepam (ATIVAN) injection 0.5 mg  0.5 mg   • Metoprolol Tartrate (LOPRESSOR) injection 5 mg  5 mg       ALLERGIES:    Allergies   Allergen Reactions   • Pcn [Penicillins] Hives, Shortness of Breath and Swelling     Tolerated ceftriaxone April 2021   • Sulfa Drugs Hives, Shortness of Breath and Swelling        FAMILY HISTORY:   Family History   Problem Relation Age of Onset   • No Known Problems Mother    • No Known Problems Father          REVIEW OF SYSTEMS:   Constitutional: Negative for chills, fever   Respiratory: Negative for cough and shortness of breath.    Cardiovascular:Negative for chest pain, and claudication.   Gastrointestinal: Negative for constipation, diarrhea, nausea and vomiting.   Lower extremities: positive for swelling and redness right foot  Neurological: positive for numbness to feet and lower legs  All other systems reviewed and are negative     RESULTS:         Recent Labs     11/21/21  1212   SODIUM 133*   POTASSIUM 4.1   CHLORIDE 104   CO2 20   GLUCOSE 231*   BUN 38*         ESR:     Results from last 7 days   Lab Units 11/17/21  1120   SED RATE WESTERGREN 1526 mm/hour 62*       CRP:             COVID-19: Not completed this admission    X-ray: 11/16/2021  FINDINGS:  The alignment is grossly normal.  There is no evidence of displaced fracture or dislocation.  There is no focal soft tissue swelling.     The bones are osteopenic. There are vascular calcifications.     IMPRESSION:     Osteopenia.    MRI: NA    Arterial studies: Pending    A1c:  Lab Results   Component Value Date/Time    HBA1C 11.9 (H) 11/16/2021 02:11 PM            Microbiology:  Results     Procedure Component Value Units Date/Time    BLOOD CULTURE [[id number]] Collected: 11/16/21 1537    Order Status: Completed  "Specimen: Blood from Peripheral Updated: 11/21/21 1700     Significant Indicator NEG     Source BLD     Site PERIPHERAL     Culture Result No growth after 5 days of incubation.    Narrative:      Per Hospital Policy: Only change Specimen Src: to \"Line\" if  specified by physician order.  No site indicated    BLOOD CULTURE [[id number]] Collected: 11/16/21 1537    Order Status: Completed Specimen: Blood from Peripheral Updated: 11/21/21 1700     Significant Indicator NEG     Source BLD     Site PERIPHERAL     Culture Result No growth after 5 days of incubation.    Narrative:      Per Hospital Policy: Only change Specimen Src: to \"Line\" if  specified by physician order.  No site indicated    URINALYSIS (UA) [[id number]]  (Abnormal) Collected: 11/16/21 1902    Order Status: Completed Specimen: Urine Updated: 11/16/21 1922     Color Yellow     Character Clear     Specific Gravity 1.030     Ph 6.0     Glucose >=1000 mg/dL      Ketones 15 mg/dL      Protein >=1000 mg/dL      Bilirubin Negative     Urobilinogen, Urine 1.0     Nitrite Negative     Leukocyte Esterase Negative     Occult Blood Moderate     Micro Urine Req Microscopic    Narrative:      If not done within the last 24 hours    Urinalysis [[id number]]     Order Status: Canceled Specimen: Urine, Clean Catch            PHYSICAL EXAMINATION:     VITAL SIGNS: /74   Pulse 72   Temp 36.2 °C (97.1 °F) (Temporal)   Resp 18   Ht 1.626 m (5' 4\")   Wt 70.7 kg (155 lb 13.8 oz)   LMP  (LMP Unknown)   SpO2 91%   BMI 26.75 kg/m²       General Appearance:  Disheveled , well nourished, in no acute distress    Lower Extremity Assessment:    Edema:   Minimal edema    ROM dorsi/plantarflexion   Dorsiflexion intact    Structural /mechanical changes:   Dystrophic nails    Sensory Assessment  Feet Insensate to light touch    Pulses:  R foot: Faintly palpable DP, PT. Mono to biphasic signals on doppler  L foot: Faintly palpable      Wound Assessment:    Right Plantar Foot " Wound, 1st MTH  Right 1st / 2nd toe interweb space wound  Interweb space with maceration, sloughing skin and maceration  Tunnels 1cm deep, tissue over bone  Serous drainage  Erythema: right foot  Drainage: Serous  Callus: Plantar foot  Odor: None                Wound care completed by wound team, see flow sheet for details      ASSESSMENT AND PLAN:     76F with PMHx of poorly controlled DM with neuropathy who was admitted for fall and found to have wound and cellulitis of right foot.    Wound care:   -Wound care orders placed for nursing  - Hydrofera blue secured with hypafix tape    Imaging/Labs:  -COVID-19: not completed, vaccinated    Vascular status:   -Faintly palpable pulses bilaterally, she reports history of PAD  - Non invasive vascular studies ordered    Surgery:   - No plans for surgery at this time    Antibiotics:   -Completed 7 day course of Cephalexin      Weight Bearing Status:   -Weight bearing as tolerated    Offloading:   -Offloading shoe; ordered  -Orthotic company:     PT/OT:   -Following    Diabetes Education:   - consult CDE and RD.     - Implications of loss of protective sensation (LOPS) discussed with patient- including increased risk for amputation.  Advised to check feet at least daily, moisturize feet, and to always wear protective foot wear.   -avoid trimming own nails. See podiatrist or certified foot and nail RN  -keep blood sugars <150 for improved wound healing      Discharge Plan:    Planning on discharging to SNF  Recommend continued wound care at SNF    Follow up: wound care clinic referral placed  Follow up: LPS rounds not indicated at this time    D/W: pt, RN, Dr. Argueta    Please note that this dictation was created using voice recognition software. I have  worked with technical experts from [employer] to optimize the interface.  I have made every reasonable attempt to correct obvious errors, but there may be errors of grammar and possibly content that I did not discover  before finalizing the note.    Please contact LPS through [employer].

## 2021-11-23 NOTE — DISCHARGE PLANNING
Anticipated Discharge Disposition:   SNF    Action:   RN REJI spoke to pt at bedside. Dr. Resendiz also present. Pt now agrees to go to SNF. Received SNF choice. Pt cannot remember what DME company she had for noc O2 at home. Choice form faxed to [employer]. Pending OT note.     Barriers to Discharge:   Medical clearance.  Placement acceptance and bed availability.    Plan:   Hospital Care Management will continue to follow and assist with discharge planning needs.

## 2021-11-23 NOTE — DISCHARGE PLANNING
Anticipated Discharge Disposition:   SNF    Action:   Discussed discharge planning needs during rounds. Per MD, pt is not medically cleared, LPS consulted, pending US-EXTREMITY ARTERY LOWER BILAT W/MELITA.     RN CM spoke to pt at bedside. Pt agreed to be discharged to SNF when medically cleared. Pt accepted by multiple SNFs, pending medical clearance.    Barriers to Discharge:   Medical clearance  SNF bed availability.    Plan:   Hospital Care Management will continue to follow and assist with discharge planning needs.

## 2021-11-23 NOTE — THERAPY
Physical Therapy   Daily Treatment     Patient Name: Amrita Costa Elder  Age:  76 y.o., Sex:  female  Medical Record #: [id number]  Today's Date: 11/23/2021     Precautions  Precautions: Fall Risk    Assessment    Pt was pleasant and agreeable to therapy session, she reached EOB with spv but with bed features. Educated on pacing self with all activity and extra time in between transitions due to patient reporting she occasionally gets dizzy. She was able to increase total gait distance today and was more steady using fww. No overt lob at this time. She required Joseph for transfers with vc for hand placement and more assist for lift off from low surface. She would benefit from continued acute skilled therapy to focus on current impairments at this time.     Plan    Continue current treatment plan.    DC Equipment Recommendations: Unable to determine at this time  Discharge Recommendations: Recommend post-acute placement for additional physical therapy services prior to discharge home (pt would require assistance from daughter and HHPT to dc heidi)         11/23/21 0835   Balance   Sitting Balance (Static) Fair +   Sitting Balance (Dynamic) Fair   Standing Balance (Static) Fair -   Standing Balance (Dynamic) Fair -   Weight Shift Sitting Good   Weight Shift Standing Fair   Comments with fww    Gait Analysis   Gait Level Of Assist Minimal Assist   Assistive Device Front Wheel Walker   Distance (Feet) 55   # of Times Distance was Traveled 1   Deviation Decreased Base Of Support;Shuffled Gait   Skilled Intervention Verbal Cuing;Sequencing   Comments improved with balance, Joseph for safety, not as unsteady today utilized fww.    Bed Mobility    Supine to Sit Supervised   Sit to Supine   (left up in chair )   Scooting Supervised   Rolling Supervised   Comments hob elevated    Functional Mobility   Sit to Stand Minimal Assist   Bed, Chair, Wheelchair Transfer Minimal Assist   Toilet Transfers Minimal Assist   Skilled Intervention  Verbal Cuing;Sequencing   Comments vc for hand placement and required more assist due to low surface    Short Term Goals    Short Term Goal # 1 pt will be able to complete functional transfers with FWW and SPV in 6tx in order to return home   Goal Outcome # 1 goal not met   Short Term Goal # 2 pt will be able to ambulate 150ft with FWW and SPV in 6tx in order to return home   Goal Outcome # 2 Goal not met   Short Term Goal # 3 pt will be able to negotiate 2 steps with SPV in 6tx in order to return home   Goal Outcome # 3 Goal not met

## 2021-11-23 NOTE — WOUND TEAM
[facility]  Inpatient Services  Initial Wound and Skin Care Evaluation    Admission Date: 2021     Last order of IP CONSULT TO WOUND CARE was found on 2021 from Hospital Encounter on 2021     HPI, PMH, SH: Reviewed    Past Surgical History:   Procedure Laterality Date   • VITRECTOMY POSTERIOR N/A 2020    Procedure: VITRECTOMY, POSTERIOR PORTION-RIGHT ENDO LASER LEFT PAN RETINAL LASER;  Surgeon: Jean-Claude Franklin M.D.;  Location: SURGERY SAME DAY [facility];  Service: Ophthalmology   • LAPAROSCOPY  2014    Performed by Mian Navarro M.D. at SURGERY SAME DAY [facility] ORS   • BAN BY LAPAROSCOPY  2012    Performed by MIAN NAVARRO at SURGERY SAME DAY [facility] ORS   • OTHER ORTHOPEDIC SURGERY      knee   • GYN SURGERY      fibroids   • OTHER ABDOMINAL SURGERY      appendectomy   • GYN SURGERY      hysterectomy   • ZZZ CARDIAC CATH       Social History     Tobacco Use   • Smoking status: Former Smoker     Packs/day: 1.00     Years: 40.00     Pack years: 40.00     Types: Cigarettes     Quit date: 1978     Years since quittin.9   • Smokeless tobacco: Never Used   Substance Use Topics   • Alcohol use: No     Chief Complaint   Patient presents with   • Nausea     Diagnosis: Sepsis (HCC) [A41.9]    Unit where seen by Wound Team: S143/     WOUND CONSULT/FOLLOW UP RELATED TO:  Patient's right foot     WOUND HISTORY:  Amrita Torrez is a 76 y.o. female who presented 2021 with complaints of right lower extremity pain, mild swelling, fever, incoordination and syncope as of yesterday. Patient also complained of nausea and vertigo-like symptoms and concern for stroke. She is on Eliquis for atrial fibrillation and compliant with her home medication regiment. She also has a history of diabetes and hypertension and denies ever having diabetic foot ulcer. She does have swelling of her right lower extremity/foot with erythema and states that  this is the first time this is ever occurred. She states that she went to outpatient care facility which was caring for her right lower extremity earlier in the week and now is red and swollen. She otherwise denies shortness of breath, chest pain, cough with sputum production, resolution of dizziness/vertigo and nausea with vomiting. Denies hematemesis, constipation, diarrhea, melena, hematochezia, dysuria, hematuria.     Vital signs at time of presentation were as follows: 100.6, 118, 16, 147/82, 99% room air.     Chest x-ray performed and unremarkable. CTA head and neck performed and CTA head reveals new left temporal lobe encephalomalacia compatible with interval infarction, atrophy, there are periventricular and subcortical white matter changes and focal hypodensities in the basal ganglia are likely related to prior lacunar infarcts. CTA neck reveals left-sided thyroid nodule 2 cm in size otherwise relatively unremarkable. Foot x-ray reveals osteopenia.    WOUND ASSESSMENT/LDA  Wound 11/17/21 Toe, Hallux;Toe, 2nd Plantar;Dorsal Right (Active)   Wound Image     11/23/21 1100   Site Assessment Red;Pink;Yellow;Drainage;Eschar 11/23/21 1100   Periwound Assessment Callused;Red;Pink;Cool 11/23/21 1100   Margins Undefined edges 11/23/21 1100   Closure Secondary intention 11/23/21 1100   Drainage Amount Small 11/23/21 1100   Drainage Description Duarte;Serous 11/23/21 1100   Treatments Cleansed;Site care;Offloading 11/23/21 1100   Wound Cleansing Approved Wound Cleanser 11/23/21 1100   Periwound Protectant Skin Protectant Wipes to Periwound 11/23/21 1100   Dressing Cleansing/Solutions Not Applicable 11/23/21 1100   Dressing Options Hydrofera Blue Ready;Hypafix Tape 11/23/21 1100   Dressing Changed New 11/23/21 1100   Dressing Status Clean;Dry;Intact 11/23/21 1100   Dressing Change/Treatment Frequency Every 48 hrs, and As Needed 11/23/21 1100   NEXT Dressing Change/Treatment Date 11/25/21 11/23/21 1100   NEXT Weekly Photo  (Inpatient Only) 11/30/21 11/23/21 1100   Non-staged Wound Description Full thickness 11/23/21 1100   Shape irregular 11/23/21 1100   Wound Odor Mild 11/23/21 1100   Pulses Doppler;2+ 11/23/21 1100   Exposed Structures ELSA 11/23/21 1100   WOUND NURSE ONLY - Time Spent with Patient (mins) 60 11/23/21 1100   Number of days: 6        Vascular:    MELITA:   No results found.    Lab Values:    Lab Results   Component Value Date/Time    WBC 15.1 (H) 11/20/2021 03:24 AM    RBC 4.49 11/20/2021 03:24 AM    HEMOGLOBIN 12.8 11/20/2021 03:24 AM    HEMATOCRIT 36.8 (L) 11/20/2021 03:24 AM    SEDRATEWES 62 (H) 11/17/2021 11:20 AM    HBA1C 11.9 (H) 11/16/2021 02:11 PM        Culture Results show:  No results found for this or any previous visit (from the past 720 hour(s)).    Pain Level/Medicated:  Patient tolerated wtihout prior medication.       INTERVENTIONS BY WOUND TEAM:  Chart and images reviewed. Discussed with bedside RN. All areas of concern (based on picture review, LDA review and discussion with bedside RN) have been thoroughly assessed. Documentation of areas based on significant findings. This RN in to assess patient. Performed standard wound care which includes appropriate positioning, dressing removal and non-selective debridement. Pictures and measurements obtained weekly if/when required.  Preparation for Dressing removal: Dressing soaked with n/a  Non-selectively Debrided with:  wound cleanser and gauze.  Sharp debridement: See. Dr. Garner note  Denisa wound: Cleansed with wound cleanser, Prepped with n/a  Primary Dressing: hydrofera blue ready  Secondary (Outer) Dressing: hypafix tape    Interdisciplinary consultation: Patient, Bedside RN , Dr. Garner    EVALUATION / RATIONALE FOR TREATMENT:  Most Recent Date:  11/23/21: patient's right 1st and 2nd toe are swollen and red with yellow adherent slough between the digits.  Eschar on MTH, small amount of serous drainage.  Applied hydrofera blue, Hydrofera Blue applied  for the hydrophilic polyurethane foam which contains ethylene oxide used as a bactericidal, fungicidal, and sporicidal disinfectant. Hydrofera Blue also aids in maintaining a moist wound environment. The absorption properties of this dressing are important in collecting exudates and bacteria from the injured area. These harmful fluid secretions bind to the dressing removing it from the wound without the foam sticking to the wound causing more harm. Recommending patient be seen by outpatient wound clinic.         Goals: Steady decrease in wound area and depth weekly.    WOUND TEAM PLAN OF CARE ([X] for frequency of wound follow up,): wound team will continue to follow while she is in patient  Nursing to follow orders written for wound care. Contact wound team if area fails to progress, deteriorates or with any questions/concerns  Dressing changes by wound team:                   Follow up 3 times weekly:                NPWT change 3 times weekly:     Follow up 1-2 times weekly:      Follow up Bi-Monthly:                   Follow up as needed:     Other (explain):     NURSING PLAN OF CARE ORDERS (X):  Dressing changes: See Dressing Care orders: X  Skin care: See Skin Care orders: X  RN Prevention Protocol: X  Rectal tube care: See Rectal Tube Care orders:   Other orders:    RSKIN:   CURRENTLY IN PLACE (X), APPLIED THIS VISIT (A), ORDERED (O):   Q shift Ha:  X  Q shift pressure point assessments:  X    Surface/Positioning X  Pressure redistribution mattress   X         Low Airloss          Bariatric foam      Bariatric NAOMI     Waffle cushion        Waffle Overlay    X      Reposition q 2 hours      TAPs Turning system     Z Giles Pillow     Offloading/Redistribution offloading boot being ordered  Sacral Mepilex (Silicone dressing)     Heel Mepilex (Silicone dressing)         Heel float boots (Prevalon boot)             Float Heels off Bed with Pillows           Respiratory X  Silicone O2 tubing      X   Gray Foam Ear  protectors     Cannula fixation Device (Tender )          High flow offloading Clip    Elastic head band offloading device      Anchorfast                                                         Trach with Optifoam split foam             Containment/Moisture Prevention ELSA    Rectal tube or BMS    Purwick/Condom Cath        Schaefer Catheter    Barrier wipes           Barrier paste       Antifungal tx      Interdry        Mobilization x1 assist with FWW      Up to chair        Ambulate      PT/OT      Nutrition PO      Dietician        Diabetes Education      PO     TF     TPN     NPO   # days     Other        Anticipated discharge plans: Referral to out patient wound clinic.    LTACH:        SNF/Rehab:                  Home Health Care:           Outpatient Wound Center:            Self/Family Care:        Other:                  Vac Discharge Needs: n/a   Not Applicable Pt not on a wound vac:       Regular Vac while inpatient, alternative dressing at DC:        Regular Vac in use and continued at DC:            Reg. Vac w/ Skin Sub/Biologic in use. Will need to be changed 2x wkly:      Veraflo Vac while inpatient, ok to transition to Regular Vac on Discharge:           Veraflo Vac while inpatient, will need to remain on Veraflo Vac upon discharge:

## 2021-11-23 NOTE — DISCHARGE PLANNING
Received Choice form at 0800  Agency/Facility Name: [facility]  Referral sent per Choice form @ 0800

## 2021-11-23 NOTE — PROGRESS NOTES
Size medium diabetic off loading shoe has been applied and fitted to pt's R LE. At this time pt is tolerating it well. Pt presents positive CMS both pre and post application of shoe. Peg removal from 1st greater toe area to off load sensitive wound area.

## 2021-11-24 ENCOUNTER — PATIENT OUTREACH (OUTPATIENT)
Dept: HEALTH INFORMATION MANAGEMENT | Facility: OTHER | Age: 76
End: 2021-11-24

## 2021-11-24 VITALS
OXYGEN SATURATION: 91 % | WEIGHT: 155.87 LBS | DIASTOLIC BLOOD PRESSURE: 66 MMHG | HEART RATE: 76 BPM | RESPIRATION RATE: 16 BRPM | BODY MASS INDEX: 26.61 KG/M2 | HEIGHT: 64 IN | TEMPERATURE: 97.9 F | SYSTOLIC BLOOD PRESSURE: 125 MMHG

## 2021-11-24 LAB
ANION GAP SERPL CALC-SCNC: 8 MMOL/L (ref 7–16)
BASOPHILS # BLD AUTO: 0.5 % (ref 0–1.8)
BASOPHILS # BLD: 0.06 K/UL (ref 0–0.12)
BUN SERPL-MCNC: 36 MG/DL (ref 8–22)
CALCIUM SERPL-MCNC: 8.3 MG/DL (ref 8.5–10.5)
CHLORIDE SERPL-SCNC: 105 MMOL/L (ref 96–112)
CO2 SERPL-SCNC: 22 MMOL/L (ref 20–33)
CREAT SERPL-MCNC: 1.74 MG/DL (ref 0.5–1.4)
EOSINOPHIL # BLD AUTO: 0.34 K/UL (ref 0–0.51)
EOSINOPHIL NFR BLD: 2.8 % (ref 0–6.9)
ERYTHROCYTE [DISTWIDTH] IN BLOOD BY AUTOMATED COUNT: 41.7 FL (ref 35.9–50)
GLUCOSE BLD-MCNC: 223 MG/DL (ref 65–99)
GLUCOSE BLD-MCNC: 88 MG/DL (ref 65–99)
GLUCOSE SERPL-MCNC: 97 MG/DL (ref 65–99)
HCT VFR BLD AUTO: 35.1 % (ref 37–47)
HGB BLD-MCNC: 12 G/DL (ref 12–16)
IMM GRANULOCYTES # BLD AUTO: 0.15 K/UL (ref 0–0.11)
IMM GRANULOCYTES NFR BLD AUTO: 1.2 % (ref 0–0.9)
LYMPHOCYTES # BLD AUTO: 2.53 K/UL (ref 1–4.8)
LYMPHOCYTES NFR BLD: 20.9 % (ref 22–41)
MCH RBC QN AUTO: 28.9 PG (ref 27–33)
MCHC RBC AUTO-ENTMCNC: 34.2 G/DL (ref 33.6–35)
MCV RBC AUTO: 84.6 FL (ref 81.4–97.8)
MONOCYTES # BLD AUTO: 1.18 K/UL (ref 0–0.85)
MONOCYTES NFR BLD AUTO: 9.8 % (ref 0–13.4)
NEUTROPHILS # BLD AUTO: 7.83 K/UL (ref 2–7.15)
NEUTROPHILS NFR BLD: 64.8 % (ref 44–72)
NRBC # BLD AUTO: 0 K/UL
NRBC BLD-RTO: 0 /100 WBC
PLATELET # BLD AUTO: 341 K/UL (ref 164–446)
PMV BLD AUTO: 9.3 FL (ref 9–12.9)
POTASSIUM SERPL-SCNC: 4.9 MMOL/L (ref 3.6–5.5)
RBC # BLD AUTO: 4.15 M/UL (ref 4.2–5.4)
SODIUM SERPL-SCNC: 135 MMOL/L (ref 135–145)
WBC # BLD AUTO: 12.1 K/UL (ref 4.8–10.8)

## 2021-11-24 PROCEDURE — 85025 COMPLETE CBC W/AUTO DIFF WBC: CPT

## 2021-11-24 PROCEDURE — 82962 GLUCOSE BLOOD TEST: CPT | Mod: 91

## 2021-11-24 PROCEDURE — 700102 HCHG RX REV CODE 250 W/ 637 OVERRIDE(OP): Performed by: STUDENT IN AN ORGANIZED HEALTH CARE EDUCATION/TRAINING PROGRAM

## 2021-11-24 PROCEDURE — 99239 HOSP IP/OBS DSCHRG MGMT >30: CPT | Performed by: HOSPITALIST

## 2021-11-24 PROCEDURE — 36415 COLL VENOUS BLD VENIPUNCTURE: CPT

## 2021-11-24 PROCEDURE — A9270 NON-COVERED ITEM OR SERVICE: HCPCS | Performed by: STUDENT IN AN ORGANIZED HEALTH CARE EDUCATION/TRAINING PROGRAM

## 2021-11-24 PROCEDURE — 80048 BASIC METABOLIC PNL TOTAL CA: CPT

## 2021-11-24 RX ORDER — ACETAMINOPHEN 325 MG/1
650 TABLET ORAL EVERY 6 HOURS PRN
Qty: 30 TABLET | Refills: 0 | Status: SHIPPED | OUTPATIENT
Start: 2021-11-24

## 2021-11-24 RX ADMIN — CARVEDILOL 3.12 MG: 6.25 TABLET, FILM COATED ORAL at 08:50

## 2021-11-24 RX ADMIN — APIXABAN 5 MG: 5 TABLET, FILM COATED ORAL at 05:26

## 2021-11-24 RX ADMIN — AMLODIPINE BESYLATE 5 MG: 5 TABLET ORAL at 05:27

## 2021-11-24 RX ADMIN — INSULIN HUMAN 3 UNITS: 100 INJECTION, SOLUTION PARENTERAL at 11:51

## 2021-11-24 RX ADMIN — SERTRALINE HYDROCHLORIDE 50 MG: 50 TABLET ORAL at 05:26

## 2021-11-24 RX ADMIN — DOCUSATE SODIUM 50 MG AND SENNOSIDES 8.6 MG 2 TABLET: 8.6; 5 TABLET, FILM COATED ORAL at 05:26

## 2021-11-24 ASSESSMENT — PAIN DESCRIPTION - PAIN TYPE: TYPE: ACUTE PAIN

## 2021-11-24 NOTE — CARE PLAN
The patient is Stable - Low risk of patient condition declining or worsening    Shift Goals  Clinical Goals: safety, ACHS, monitol and control BP  Patient Goals: comfort  Family Goals: NA    Progress made toward(s) clinical / shift goals:  yes      Problem: Knowledge Deficit - Standard  Goal: Patient and family/care givers will demonstrate understanding of plan of care, disease process/condition, diagnostic tests and medications  Outcome: Progressing     Problem: Fall Risk  Goal: Patient will remain free from falls  Outcome: Progressing

## 2021-11-24 NOTE — DISCHARGE PLANNING
@1515  Agency/Facility Name: [facility]  Spoke To: Wallace   Outcome: Transport set with GMT for 1600    @1450  Agency/Facility Name: [facility]  Spoke To: Wallace   Outcome: Transport set with GMT for 1700.    Agency/Facility Name: [facility]  Spoke To: Wallace  Outcome: Has a bed today, needs [facility] to set up transport.

## 2021-11-24 NOTE — DISCHARGE SUMMARY
"Discharge Summary    CHIEF COMPLAINT ON ADMISSION  Chief Complaint   Patient presents with   • Nausea       Reason for Admission  EMS     Admission Date  11/16/2021    CODE STATUS  DNAR/DNI    HPI & HOSPITAL COURSE  \"Amrita Torrez is a 76 y.o. female who presented 11/16/2021 with complaints of right lower extremity pain, mild swelling, fever, incoordination and syncope as of yesterday. Patient also complained of nausea and vertigo-like symptoms and concern for stroke. She is on Eliquis for atrial fibrillation and compliant with her home medication regiment. She also has a history of diabetes and hypertension and denies ever having diabetic foot ulcer. She does have swelling of her right lower extremity/foot with erythema and states that this is the first time this is ever occurred. She states that she went to outpatient care facility which was caring for her right lower extremity earlier in the week and now is red and swollen. She otherwise denies shortness of breath, chest pain, cough with sputum production, resolution of dizziness/vertigo and nausea with vomiting. Denies hematemesis, constipation, diarrhea, melena, hematochezia, dysuria, hematuria.\"     Vital signs at time of presentation were as follows: 100.6, 118, 16, 147/82, 99% room air.     Chest x-ray performed and unremarkable. CTA head and neck performed and CTA head reveals new left temporal lobe encephalomalacia compatible with interval infarction, atrophy, there are periventricular and subcortical white matter changes and focal hypodensities in the basal ganglia are likely related to prior lacunar infarcts. CTA neck reveals left-sided thyroid nodule 2 cm in size otherwise relatively unremarkable. Foot x-ray reveals osteopenia.     Labs significant for leukocytosis of 23, troponin 49, mild hyponatremia of 130, mild hypokalemia of 3.5, hyperglycemia of 365 and otherwise relatively unremarkable. Lactic acid ordered and pending, magnesium ordered and " pending and urinalysis ordered and pending.     IV antibiotics initiated in ED. Hospitalist consulted for admission and patient agreeable to inpatient hospitalization for sepsis secondary to right lower extremity cellulitis. She agrees to DO NOT RESUSCITATE DO NOT INTUBATE CODE STATUS at time of evaluation and alert and oriented x4.    Patient completed ABX, and see by LPS - no surgical procedures planned, recommending continued wound care and they ordered MELITA studies:  Mildly diminished right TBI.  Left just within normal limits.    Unable to evaluate MELITA due to noncompressibility of the tibial vessels.    Leukocytosis improving as well as RICHARD, medications were reviewed and optimized, potentially patient was over diuresed in the setting of acute illness.     Therefore, she is discharged in fair and stable condition to skilled nursing facility.    The patient met 2-midnight criteria for an inpatient stay at the time of discharge.    Discharge Date  11/24/2021    FOLLOW UP ITEMS POST DISCHARGE  Continue to follow daily CBC, BMP  Follow up PCP after DC from SNF    DISCHARGE DIAGNOSES  Principal Problem:    Cellulitis of right lower extremity POA: Yes  Active Problems:    Thyroid nodule POA: Yes    Syncope POA: Yes    Acute renal failure superimposed on stage 3 chronic kidney disease (HCC) POA: Yes    Paroxysmal atrial fibrillation (HCC) POA: Yes    Hypertension POA: Yes    Sepsis (HCC) POA: Yes    Type 2 diabetes mellitus with hyperglycemia, with long-term current use of insulin (HCC) POA: Yes    Severe aortic stenosis POA: Unknown    Pleural effusion POA: Unknown    Proteinuria POA: Unknown  Resolved Problems:    * No resolved hospital problems. *      FOLLOW UP  No future appointments.  No follow-up provider specified.    MEDICATIONS ON DISCHARGE     Medication List      START taking these medications      Instructions   acetaminophen 325 MG Tabs  Commonly known as: Tylenol   Take 2 Tablets by mouth every 6 hours as  needed.  Dose: 650 mg        CHANGE how you take these medications      Instructions   insulin regular 100 Unit/mL Soln  What changed:   · how much to take  · when to take this  · additional instructions  Commonly known as: HumuLIN R   Inject 2-9 Units under the skin 4 Times a Day,Before Meals and at Bedtime.  Dose: 2-9 Units        CONTINUE taking these medications      Instructions   amLODIPine 5 MG Tabs  Commonly known as: NORVASC   Take 1 tablet by mouth every day.  Dose: 5 mg     carvedilol 3.125 MG Tabs  Commonly known as: COREG   Take 1 tablet by mouth 2 times a day with meals.  Dose: 3.125 mg     Eliquis 5mg Tabs  Generic drug: apixaban   TAKE 1 TABLET BY MOUTH TWICE A DAY     Lantus 100 UNIT/ML Soln  Generic drug: insulin glargine   Inject 25 Units under the skin every evening.  Dose: 25 Units     sertraline 50 MG Tabs  Commonly known as: Zoloft   Take 50 mg by mouth every day. Indications: Major Depressive Disorder  Dose: 50 mg     traZODone 50 MG Tabs  Commonly known as: DESYREL   Take 50 mg by mouth every bedtime. Indications: Trouble Sleeping  Dose: 50 mg        STOP taking these medications    furosemide 20 MG Tabs  Commonly known as: LASIX     lisinopril 5 MG Tabs  Commonly known as: PRINIVIL     metFORMIN 500 MG Tabs  Commonly known as: GLUCOPHAGE     Multivitamin Adult (Minerals) Tabs     vitamin D2 (Ergocalciferol) 1.25 MG (21940 UT) Caps capsule  Commonly known as: Drisdol            Allergies  Allergies   Allergen Reactions   • Pcn [Penicillins] Hives, Shortness of Breath and Swelling     Tolerated ceftriaxone April 2021   • Sulfa Drugs Hives, Shortness of Breath and Swelling       DIET  Orders Placed This Encounter   Procedures   • Diet Order Diet: Cardiac; Second Modifier: (optional): Consistent CHO (Diabetic)     Standing Status:   Standing     Number of Occurrences:   1     Order Specific Question:   Diet:     Answer:   Cardiac [6]     Order Specific Question:   Second Modifier: (optional)      Answer:   Consistent CHO (Diabetic) [4]       ACTIVITY  As tolerated.  Weight bearing as tolerated    CONSULTATIONS  LPS    PROCEDURES  MELITA    LABORATORY  Lab Results   Component Value Date    SODIUM 135 11/24/2021    POTASSIUM 4.9 11/24/2021    CHLORIDE 105 11/24/2021    CO2 22 11/24/2021    GLUCOSE 97 11/24/2021    BUN 36 (H) 11/24/2021    CREATININE 1.74 (H) 11/24/2021        Lab Results   Component Value Date    WBC 12.1 (H) 11/24/2021    HEMOGLOBIN 12.0 11/24/2021    HEMATOCRIT 35.1 (L) 11/24/2021    PLATELETCT 341 11/24/2021        Total time of the discharge process exceeds 40 minutes.

## 2021-11-24 NOTE — DISCHARGE INSTRUCTIONS
Discharge Instructions    Discharged to other by medical transportation with escort. Discharged via wheelchair, hospital escort: Yes.  Special equipment needed: Not Applicable    Be sure to schedule a follow-up appointment with your primary care doctor or any specialists as instructed.     Discharge Plan:   Diet Plan: Discussed  Activity Level: Discussed  Confirmed Follow up Appointment: No (Comments) (DC to SNF)  Confirmed Symptoms Management: Discussed  Medication Reconciliation Updated: Yes  Influenza Vaccine Indication: Patient Refuses    I understand that a diet low in cholesterol, fat, and sodium is recommended for good health. Unless I have been given specific instructions below for another diet, I accept this instruction as my diet prescription.   Other diet: Diabetic Cardiac diet    Special Instructions: None    · Is patient discharged on Warfarin / Coumadin?   No       Cellulitis, Adult    Cellulitis is a skin infection. The infected area is often warm, red, swollen, and sore. It occurs most often in the arms and lower legs. It is very important to get treated for this condition.  What are the causes?  This condition is caused by bacteria. The bacteria enter through a break in the skin, such as a cut, burn, insect bite, open sore, or crack.  What increases the risk?  This condition is more likely to occur in people who:  · Have a weak body defense system (immune system).  · Have open cuts, burns, bites, or scrapes on the skin.  · Are older than 60 years of age.  · Have a blood sugar problem (diabetes).  · Have a long-lasting (chronic) liver disease (cirrhosis) or kidney disease.  · Are very overweight (obese).  · Have a skin problem, such as:  ? Itchy rash (eczema).  ? Slow movement of blood in the veins (venous stasis).  ? Fluid buildup below the skin (edema).  · Have been treated with high-energy rays (radiation).  · Use IV drugs.  What are the signs or symptoms?  Symptoms of this condition  include:  · Skin that is:  ? Red.  ? Streaking.  ? Spotting.  ? Swollen.  ? Sore or painful when you touch it.  ? Warm.  · A fever.  · Chills.  · Blisters.  How is this diagnosed?  This condition is diagnosed based on:  · Medical history.  · Physical exam.  · Blood tests.  · Imaging tests.  How is this treated?  Treatment for this condition may include:  · Medicines to treat infections or allergies.  · Home care, such as:  ? Rest.  ? Placing cold or warm cloths (compresses) on the skin.  · Hospital care, if the condition is very bad.  Follow these instructions at home:  Medicines  · Take over-the-counter and prescription medicines only as told by your doctor.  · If you were prescribed an antibiotic medicine, take it as told by your doctor. Do not stop taking it even if you start to feel better.  General instructions    · Drink enough fluid to keep your pee (urine) pale yellow.  · Do not touch or rub the infected area.  · Raise (elevate) the infected area above the level of your heart while you are sitting or lying down.  · Place cold or warm cloths on the area as told by your doctor.  · Keep all follow-up visits as told by your doctor. This is important.  Contact a doctor if:  · You have a fever.  · You do not start to get better after 1-2 days of treatment.  · Your bone or joint under the infected area starts to hurt after the skin has healed.  · Your infection comes back. This can happen in the same area or another area.  · You have a swollen bump in the area.  · You have new symptoms.  · You feel ill and have muscle aches and pains.  Get help right away if:  · Your symptoms get worse.  · You feel very sleepy.  · You throw up (vomit) or have watery poop (diarrhea) for a long time.  · You see red streaks coming from the area.  · Your red area gets larger.  · Your red area turns dark in color.  These symptoms may represent a serious problem that is an emergency. Do not wait to see if the symptoms will go away. Get  medical help right away. Call your local emergency services (911 in the [place] Do not drive yourself to the hospital.  Summary  · Cellulitis is a skin infection. The area is often warm, red, swollen, and sore.  · This condition is treated with medicines, rest, and cold and warm cloths.  · Take all medicines only as told by your doctor.  · Tell your doctor if symptoms do not start to get better after 1-2 days of treatment.  This information is not intended to replace advice given to you by your health care provider. Make sure you discuss any questions you have with your health care provider.  Document Released: 06/05/2009 Document Revised: 05/09/2019 Document Reviewed: 05/09/2019  [employer] Patient Education © 2020 [employer]          Hypertension, Adult  Hypertension is another name for high blood pressure. High blood pressure forces your heart to work harder to pump blood. This can cause problems over time.  There are two numbers in a blood pressure reading. There is a top number (systolic) over a bottom number (diastolic). It is best to have a blood pressure that is below 120/80. Healthy choices can help lower your blood pressure, or you may need medicine to help lower it.  What are the causes?  The cause of this condition is not known. Some conditions may be related to high blood pressure.  What increases the risk?  · Smoking.  · Having type 2 diabetes mellitus, high cholesterol, or both.  · Not getting enough exercise or physical activity.  · Being overweight.  · Having too much fat, sugar, calories, or salt (sodium) in your diet.  · Drinking too much alcohol.  · Having long-term (chronic) kidney disease.  · Having a family history of high blood pressure.  · Age. Risk increases with age.  · Race. You may be at higher risk if you are .  · Gender. Men are at higher risk than women before age 45. After age 65, women are at higher risk than men.  · Having obstructive sleep apnea.  · Stress.  What  are the signs or symptoms?  · High blood pressure may not cause symptoms. Very high blood pressure (hypertensive crisis) may cause:  ? Headache.  ? Feelings of worry or nervousness (anxiety).  ? Shortness of breath.  ? Nosebleed.  ? A feeling of being sick to your stomach (nausea).  ? Throwing up (vomiting).  ? Changes in how you see.  ? Very bad chest pain.  ? Seizures.  How is this treated?  · This condition is treated by making healthy lifestyle changes, such as:  ? Eating healthy foods.  ? Exercising more.  ? Drinking less alcohol.  · Your health care provider may prescribe medicine if lifestyle changes are not enough to get your blood pressure under control, and if:  ? Your top number is above 130.  ? Your bottom number is above 80.  · Your personal target blood pressure may vary.  Follow these instructions at home:  Eating and drinking    · If told, follow the DASH eating plan. To follow this plan:  ? Fill one half of your plate at each meal with fruits and vegetables.  ? Fill one fourth of your plate at each meal with whole grains. Whole grains include whole-wheat pasta, brown rice, and whole-grain bread.  ? Eat or drink low-fat dairy products, such as skim milk or low-fat yogurt.  ? Fill one fourth of your plate at each meal with low-fat (lean) proteins. Low-fat proteins include fish, chicken without skin, eggs, beans, and tofu.  ? Avoid fatty meat, cured and processed meat, or chicken with skin.  ? Avoid pre-made or processed food.  · Eat less than 1,500 mg of salt each day.  · Do not drink alcohol if:  ? Your doctor tells you not to drink.  ? You are pregnant, may be pregnant, or are planning to become pregnant.  · If you drink alcohol:  ? Limit how much you use to:  § 0-1 drink a day for women.  § 0-2 drinks a day for men.  ? Be aware of how much alcohol is in your drink. In the [place] one drink equals one 12 oz bottle of beer (355 mL), one 5 oz glass of wine (148 mL), or one 1½ oz glass of hard liquor (44  mL).  Lifestyle    · Work with your doctor to stay at a healthy weight or to lose weight. Ask your doctor what the best weight is for you.  · Get at least 30 minutes of exercise most days of the week. This may include walking, swimming, or biking.  · Get at least 30 minutes of exercise that strengthens your muscles (resistance exercise) at least 3 days a week. This may include lifting weights or doing Pilates.  · Do not use any products that contain nicotine or tobacco, such as cigarettes, e-cigarettes, and chewing tobacco. If you need help quitting, ask your doctor.  · Check your blood pressure at home as told by your doctor.  · Keep all follow-up visits as told by your doctor. This is important.  Medicines  · Take over-the-counter and prescription medicines only as told by your doctor. Follow directions carefully.  · Do not skip doses of blood pressure medicine. The medicine does not work as well if you skip doses. Skipping doses also puts you at risk for problems.  · Ask your doctor about side effects or reactions to medicines that you should watch for.  Contact a doctor if you:  · Think you are having a reaction to the medicine you are taking.  · Have headaches that keep coming back (recurring).  · Feel dizzy.  · Have swelling in your ankles.  · Have trouble with your vision.  Get help right away if you:  · Get a very bad headache.  · Start to feel mixed up (confused).  · Feel weak or numb.  · Feel faint.  · Have very bad pain in your:  ? Chest.  ? Belly (abdomen).  · Throw up more than once.  · Have trouble breathing.  Summary  · Hypertension is another name for high blood pressure.  · High blood pressure forces your heart to work harder to pump blood.  · For most people, a normal blood pressure is less than 120/80.  · Making healthy choices can help lower blood pressure. If your blood pressure does not get lower with healthy choices, you may need to take medicine.  This information is not intended to replace  advice given to you by your health care provider. Make sure you discuss any questions you have with your health care provider.  Document Released: 06/05/2009 Document Revised: 08/28/2019 Document Reviewed: 08/28/2019  Elsevier Patient Education © 2020 [employer]    Sepsis, Diagnosis, Adult  Sepsis is a serious bodily reaction to an infection. The infection that triggers sepsis may be from a bacteria, virus, or fungus. Sepsis can result from an infection in any part of your body. Infections that commonly lead to sepsis include skin, lung, and urinary tract infections.  Sepsis is a medical emergency that must be treated right away in a hospital. In severe cases, it can lead to septic shock. Septic shock can weaken your heart and cause your blood pressure to drop. This can cause your central nervous system and your body's organs to stop working.  What are the causes?  This condition is caused by a severe reaction to infections from bacteria, viruses, or fungus. The germs that most often lead to sepsis include:  · Escherichia coli (E. coli) bacteria.  · Staphylococcus aureus (staph) bacteria.  · Some types of Streptococcus bacteria.  The most common infections affect these organs:  · The lung (pneumonia).  · The kidneys or bladder (urinary tract infection).  · The skin (cellulitis).  · The bowel, gallbladder, or pancreas.  What increases the risk?  You are more likely to develop this condition if:  · Your body's disease-fighting system (immune system) is weakened.  · You are age 65 or older.  · You are male.  · You had surgery or you have been hospitalized.  · You have these devices inserted into your body:  ? A small, thin tube (catheter).  ? IV line.  ? Breathing tube.  ? Drainage tube.  · You are not getting enough nutrients from food (malnourished).  · You have a long-term (chronic) disease, such as cancer, lung disease, kidney disease, or diabetes.  · You are .  What are the signs or  symptoms?  Symptoms of this condition may include:  · Fever.  · Chills or feeling very cold.  · Confusion or anxiety.  · Fatigue.  · Muscle aches.  · Shortness of breath.  · Nausea and vomiting.  · Urinating much less than usual.  · Fast heart rate (tachycardia).  · Rapid breathing (hyperventilation).  · Changes in skin color. Your skin may look blotchy, pale, or blue.  · Cool, clammy, or sweaty skin.  · Skin rash.  Other symptoms depend on the source of your infection.  How is this diagnosed?  This condition is diagnosed based on:  · Your symptoms.  · Your medical history.  · A physical exam.  Other tests may also be done to find out the cause of the infection and how severe the sepsis is. These tests may include:  · Blood tests.  · Urine tests.  · Swabs from other areas of your body that may have an infection. These samples may be tested (cultured) to find out what type of bacteria is causing the infection.  · Chest X-ray to check for pneumonia. Other imaging tests, such as a CT scan, may also be done.  · Lumbar puncture. This removes a small amount of the fluid that surrounds your brain and spinal cord. The fluid is then examined for infection.  How is this treated?  This condition must be treated in a hospital. Based on the cause of your infection, you may be given an antibiotic, antiviral, or antifungal medicine.  You may also receive:  · Fluids through an IV.  · Oxygen and breathing assistance.  · Medicines to increase your blood pressure.  · Kidney dialysis. This process cleans your blood if your kidneys have failed.  · Surgery to remove infected tissue.  · Blood transfusion if needed.  · Medicine to prevent blood clots.  · Nutrients to correct imbalances in basic body function (metabolism). You may:  ? Receive important salts and minerals (electrolytes) through an IV.  ? Have your blood sugar level adjusted.  Follow these instructions at home:  Medicines    · Take over-the-counter and prescription medicines  only as told by your health care provider.  · If you were prescribed an antibiotic, antiviral, or antifungal medicine, take it as told by your health care provider. Do not stop taking the medicine even if you start to feel better.  General instructions  · If you have a catheter or other indwelling device, ask to have it removed as soon as possible.  · Keep all follow-up visits as told by your health care provider. This is important.  Contact a health care provider if:  · You do not feel like you are getting better or regaining strength.  · You are having trouble coping with your recovery.  · You frequently feel tired.  · You feel worse or do not seem to get better after surgery.  · You think you may have an infection after surgery.  Get help right away if:  · You have any symptoms of sepsis.  · You have difficulty breathing.  · You have a rapid or skipping heartbeat.  · You become confused or disoriented.  · You have a high fever.  · Your skin becomes blotchy, pale, or blue.  · You have an infection that is getting worse or not getting better.  These symptoms may represent a serious problem that is an emergency. Do not wait to see if the symptoms will go away. Get medical help right away. Call your local emergency services (911 in the [place] Do not drive yourself to the hospital.  Summary  · Sepsis is a medical emergency that requires immediate treatment in a hospital.  · This condition is caused by a severe reaction to infections from bacteria, viruses, or fungus.  · Based on the cause of your infection, you may be given an antibiotic, antiviral, or antifungal medicine.  · Treatment may also include IV fluids, breathing assistance, and kidney dialysis.  This information is not intended to replace advice given to you by your health care provider. Make sure you discuss any questions you have with your health care provider.  Document Released: 09/15/2004 Document Revised: 07/26/2019 Document Reviewed:  07/26/2019  Elsevier Patient Education © 2020 Elsevier Inc.          Depression / Suicide Risk    As you are discharged from this [facility] facility, it is important to learn how to keep safe from harming yourself.    Recognize the warning signs:  · Abrupt changes in personality, positive or negative- including increase in energy   · Giving away possessions  · Change in eating patterns- significant weight changes-  positive or negative  · Change in sleeping patterns- unable to sleep or sleeping all the time   · Unwillingness or inability to communicate  · Depression  · Unusual sadness, discouragement and loneliness  · Talk of wanting to die  · Neglect of personal appearance   · Rebelliousness- reckless behavior  · Withdrawal from people/activities they love  · Confusion- inability to concentrate     If you or a loved one observes any of these behaviors or has concerns about self-harm, here's what you can do:  · Talk about it- your feelings and reasons for harming yourself  · Remove any means that you might use to hurt yourself (examples: pills, rope, extension cords, firearm)  · Get professional help from the community (Mental Health, Substance Abuse, psychological counseling)  · Do not be alone:Call your Safe Contact- someone whom you trust who will be there for you.  · Call your local CRISIS HOTLINE 996-2040 or 310-179-8996  · Call your local Children's Mobile Crisis Response Team [place] (967) 094-4208 or www.Mah[facility] REVA  · Call the toll free National Suicide Prevention Hotlines   · National Suicide Prevention Lifeline 623-832-FJWP (2242)  · National Hope Line Network 800-SUICIDE (927-0705)

## 2021-11-24 NOTE — DISCHARGE PLANNING
Anticipated Discharge Disposition:   SNF    Action:   Per MD, pt is medically cleared to discharge to SNF. Per DPA, [facility] accepted and has a bed for pt, [facility] to set up transport. Bedside RN updated via Voalte.    RN REJI spoke to pt at bedside. Discussed discharge planning needs. Received consent for discharge to [facility] and transportation. Pt requested for RN CM to call her daughter, Nba, for updates. RN Reji called Nba. No answer, left voicemail for call back.    Transportation communication form faxed to [employer]. Voalte message sent.    Barriers to Discharge:   Transportation confirmation.    Plan:   Discharge to [facility] via [facility].   Hospital Care Management will continue to follow and assist with discharge planning needs.    Addendum:  COBRA, transfer packet given to bedside RN.

## 2021-11-24 NOTE — PROGRESS NOTES
11/24: Assumed care of patient, she is alert and oriented X4. Reports no pain and no concerns for her right leg. She is requiring 3-4L NC when ambulating, and 2-3 L while sitting in chair/bed. She is ready for discharge.

## 2021-11-24 NOTE — DISCHARGE PLANNING
DC Transport Scheduled    Received request at: [id number]    Transport Company Scheduled:  [facility]      Scheduled Date: 11/24/21  Scheduled Time: 1700    Destination: [facility]  [address]    Notified care team of scheduled transport via Voalte.     If there are any changes needed to the DC transportation scheduled, please contact [facility] at ext. [phone] between the hours of 2939-8521 Mon-Fri. If outside those hours, contact the ED Case Manager at ext. [phone].

## 2021-11-24 NOTE — DISCHARGE PLANNING
Anticipated Discharge Disposition:   SNF    PAS Number: [id number]    Action:   Discussed discharge planning needs during rounds. Per note, pt has a diabetic off loading shoe on RLE. Per MD, will follow up with [facility] for clearance, then will update RN CM. Pt has been accepted by multiple SNFs, pending medical clearance confirmation and bed availability.     Barriers to Discharge:   Medical clearance.  Placement acceptance and bed availability.    Plan:   Hospital Care Management will continue to follow and assist with discharge planning needs.

## 2021-11-24 NOTE — CARE PLAN
The patient is Stable - Low risk of patient condition declining or worsening    Shift Goals  Clinical Goals: go home, maintain adequate oxygen saturation  Patient Goals: rest, up to chair  Family Goals: ELSA    Progress made toward(s) clinical / shift goals: Patient has met all of her care plan criteria. Ready to be discharged.  Problem: Pain - Standard  Goal: Alleviation of pain or a reduction in pain to the patient’s comfort goal  Outcome: Met     Problem: Knowledge Deficit - Standard  Goal: Patient and family/care givers will demonstrate understanding of plan of care, disease process/condition, diagnostic tests and medications  Outcome: Met     Problem: Hemodynamics  Goal: Patient's hemodynamics, fluid balance and neurologic status will be stable or improve  Outcome: Met     Problem: Fluid Volume  Goal: Fluid volume balance will be maintained  Outcome: Met     Problem: Urinary - Renal Perfusion  Goal: Ability to achieve and maintain adequate renal perfusion and functioning will improve  Outcome: Met     Problem: Respiratory  Goal: Patient will achieve/maintain optimum respiratory ventilation and gas exchange  Outcome: Met     Problem: Mechanical Ventilation  Goal: Safe management of artificial airway and ventilation  Outcome: Met  Goal: Successful weaning off mechanical ventilator, spontaneously maintains adequate gas exchange  Outcome: Met  Goal: Patient will be able to express needs and understand communication  Outcome: Met     Problem: Physical Regulation  Goal: Diagnostic test results will improve  Outcome: Met  Goal: Signs and symptoms of infection will decrease  Outcome: Met     Problem: Fall Risk  Goal: Patient will remain free from falls  Outcome: Met     Problem: Skin Integrity  Goal: Skin integrity is maintained or improved  Outcome: Met       Patient is not progressing towards the following goals:

## 2021-11-25 NOTE — PROGRESS NOTES
Discharging Patient MIRELLA per physician order.  Report given to Beth. Demonstrated understanding of discharge instructions, follow up appointments, home medications, prescriptions.  Ambulating without FFW,  voiding without difficulty, pain well controlled, tolerating oral medications, oxygen saturation greater than 90% with 3 liter, tolerating diet. IV removed. Verbalized understanding of discharge instructions and educational handouts.  All questions answered.  Belongings with patient at time of discharge. Pt left with [facility] at 16:20.

## 2021-11-26 NOTE — ED NOTES
ERP at bedside, COVID swab obtained and sent to lab.   
Hospitalist at bedside.   
Med rec complete per interview with pt at bedside and phone call with pt home pharmacy. Allergies reviewed. Pt denies antibiotic use in the past 14 days.   
Pt placed on Purewik.   
Pt resting comfortably, on monitor, call light in reach.   
Pt straight cathed with Navya ZEPEDA as chaperone.   
Pt to BL18 from CT, received report from Dominic ZEPEDA.   
Pt to [id number] with Rajni ZEPEDA, report given, pt in possession of all belongings.   
Tele notified.   
No

## 2021-12-15 ENCOUNTER — HOME HEALTH ADMISSION (OUTPATIENT)
Dept: HOME HEALTH SERVICES | Facility: HOME HEALTHCARE | Age: 76
End: 2021-12-15
Payer: MEDICARE

## 2021-12-16 ENCOUNTER — TELEPHONE (OUTPATIENT)
Dept: HEALTH INFORMATION MANAGEMENT | Facility: OTHER | Age: 76
End: 2021-12-16

## 2021-12-16 NOTE — TELEPHONE ENCOUNTER
PC to pts daughter Nba regarding follow up for discharge planning and referral to [facility].  A brief overview of the post discharge services provided was left with [facility] contact information. Referrals were placed to  and [facility] by the care team at [facility].

## 2021-12-20 ENCOUNTER — HOME CARE VISIT (OUTPATIENT)
Dept: HOME HEALTH SERVICES | Facility: HOME HEALTHCARE | Age: 76
End: 2021-12-20

## 2021-12-20 NOTE — CASE COMMUNICATION
"Mary Grace, please sned this to GRACY Mike ([facility]) and Dr. Byrnes. [place]  Pt is non-admit; she is unable to manage her medications; she has been home from Rehab since  and has performed BG 1 timeand daughter has not attempted to get missing medications \"they told me the nurses would do that\".; Daughter has moved back but works nights and stated n\"nobody has told me how to do anything\" . Pt was on  in  and was provided r esources. Daughter just returned home (pt kicked her out several months ago) Daughter works FT now. Jignat requires daily supervision.DaughterLuly's phone number is [phone] and is best contact as I advised her that DUANE Ortiz is following up with wound center to get a new referral as previous order .     "

## 2021-12-23 PROBLEM — S91.301D UNSPECIFIED OPEN WOUND, RIGHT FOOT, SUBSEQUENT ENCOUNTER: Status: ACTIVE | Noted: 2021-12-23

## 2021-12-23 PROBLEM — J96.10 CHRONIC RESPIRATORY FAILURE (HCC): Status: ACTIVE | Noted: 2021-12-23

## 2021-12-23 PROBLEM — I50.9 CHF (CONGESTIVE HEART FAILURE) (HCC): Status: ACTIVE | Noted: 2021-12-23

## 2021-12-23 PROBLEM — S91.302A NON HEALING LEFT HEEL WOUND: Status: ACTIVE | Noted: 2021-12-23

## 2021-12-27 ENCOUNTER — TELEPHONE (OUTPATIENT)
Dept: CARDIOLOGY | Facility: MEDICAL CENTER | Age: 76
End: 2021-12-27

## 2021-12-27 NOTE — TELEPHONE ENCOUNTER
Your patient has been flagged through our echocardiogram surveillance with the following echocardiogram results:    Moderate-Severe AS with consideration for low flow severe AS    [facility] is dedicated to providing comprehensive care to all of its patients.     We recognize that ensuring excellent communication with our patients' providers during and after care at our facility is a key component in achieving the highest level of care for each patient.    Our Structural Heart Program has implemented a quality initiative aimed at identifying patients with valvular heart disease and confirming a clinical plan for their care upon diagnosis.     At [facility], we are committed to establishing collaborative relationships with the local physician community to ensure that patients receive the highest quality care.     Should you have any questions regarding this information or referral process:    Please contact:  [facility]  directly at [phone]    Respectfully,    RUBIA Disla.

## 2022-02-04 ENCOUNTER — HOME HEALTH ADMISSION (OUTPATIENT)
Dept: HOME HEALTH SERVICES | Facility: HOME HEALTHCARE | Age: 77
End: 2022-02-04
Payer: MEDICARE

## 2022-02-04 ENCOUNTER — OFFICE VISIT (OUTPATIENT)
Dept: WOUND CARE | Facility: MEDICAL CENTER | Age: 77
End: 2022-02-04
Attending: FAMILY MEDICINE
Payer: MEDICARE

## 2022-02-04 VITALS
SYSTOLIC BLOOD PRESSURE: 180 MMHG | OXYGEN SATURATION: 93 % | TEMPERATURE: 97 F | RESPIRATION RATE: 18 BRPM | DIASTOLIC BLOOD PRESSURE: 102 MMHG | HEART RATE: 93 BPM

## 2022-02-04 DIAGNOSIS — L60.2 OVERGROWN TOENAILS: ICD-10-CM

## 2022-02-04 DIAGNOSIS — B35.1 ONYCHOMYCOSIS: ICD-10-CM

## 2022-02-04 DIAGNOSIS — L97.412 DIABETIC ULCER OF RIGHT MIDFOOT ASSOCIATED WITH TYPE 2 DIABETES MELLITUS, WITH FAT LAYER EXPOSED (HCC): ICD-10-CM

## 2022-02-04 DIAGNOSIS — L89.620 PRESSURE INJURY OF LEFT HEEL, UNSTAGEABLE (HCC): ICD-10-CM

## 2022-02-04 DIAGNOSIS — E11.42 DIABETIC POLYNEUROPATHY ASSOCIATED WITH TYPE 2 DIABETES MELLITUS (HCC): ICD-10-CM

## 2022-02-04 DIAGNOSIS — E11.621 DIABETIC ULCER OF RIGHT MIDFOOT ASSOCIATED WITH TYPE 2 DIABETES MELLITUS, WITH FAT LAYER EXPOSED (HCC): ICD-10-CM

## 2022-02-04 PROCEDURE — 99214 OFFICE O/P EST MOD 30 MIN: CPT | Mod: 25 | Performed by: NURSE PRACTITIONER

## 2022-02-04 PROCEDURE — 11042 DBRDMT SUBQ TIS 1ST 20SQCM/<: CPT

## 2022-02-04 PROCEDURE — 99214 OFFICE O/P EST MOD 30 MIN: CPT

## 2022-02-04 PROCEDURE — 11720 DEBRIDE NAIL 1-5: CPT

## 2022-02-04 PROCEDURE — 11042 DBRDMT SUBQ TIS 1ST 20SQCM/<: CPT | Mod: 59 | Performed by: NURSE PRACTITIONER

## 2022-02-04 PROCEDURE — 11719 TRIM NAIL(S) ANY NUMBER: CPT | Performed by: NURSE PRACTITIONER

## 2022-02-04 ASSESSMENT — ENCOUNTER SYMPTOMS
NAUSEA: 0
FEVER: 0
CONSTIPATION: 0
SHORTNESS OF BREATH: 0
VOMITING: 0
WEIGHT LOSS: 0
MEMORY LOSS: 1
FALLS: 1
COUGH: 0
CHILLS: 0
DIARRHEA: 0

## 2022-02-04 NOTE — PROGRESS NOTES
Provider Encounter- Diabetic Foot Ulcer      HISTORY OF PRESENT ILLNESS  Wound History:    START OF CARE IN CLINIC: 2/4/2022    REFERRING PROVIDER: Fitz Turpin      WOUND- Diabetic foot ulcer   LOCATION: Right foot interdigital, between toes 1 and 2                                   Right lateral fourth toe            Left posterior heel            Nail beds of both great toes            HISTORY: Patient is a very poor historian, uncertain how or when these wounds started.  The wound between her right first and second toes was noted during hospitalization in November 2021 when she was hospitalized for right lower extremity pain and syncope. According to inpatient notes, prior to her hospitalization she believes she was receiving care from a podiatrist.   During this admission she was seen by the LPS team, arterial studies and x-rays were completed, and no surgery recommended.  She was treated with IV antibiotics, discharged to a skilled nursing facility 8 days later.  After brief stay at SNF, she was discharged to home with her daughter.  During recent follow-up with her PCP, it was noted that she still had wound between her first and second toes, but also a wound to her right fourth toe, and left heel, and nailbeds of both great toes.  She was referred to [facility] for management.       Pertinent Medical History: Uncontrolled diabetes mellitus type 2, atrial fibrillation, CHF, CAD, debility    DIABETES HX: Diagnosed with type 2 diabetes more than 20 years ago, and is currently managing with insulin.  Checks blood sugars two times per day and reports that these typically run around 200.  Has  had previous diabetes education.    Denies that she has numbness in feet.  Usually wears regular, nonprescriptive shoes.  She does check feet routinely.  Has not had previous foot ulcers or foot surgery.  Current occupation-she is retired and lives with her daughter..         TOBACCO USE:   Smoked briefly in her 20s, quit in  "1978    Patient's problem list, allergies, and current medications reviewed and updated in Epic    Interval History:  2/4/2022 Initial clinic visit with Jessa Chang, RUKHSANA, FNP-BC, CWMIGUEL ÁNGELN, CFCN.  Patient presents today accompanied by her daughter.  She is extremely hard of hearing, and also a very poor historian.  Most information is gathered from her daughter.  Neither she nor her daughter know when or how these wounds started.  She states that the toenails of both great toes, \"just fell off\".  Collagenase and triamcinolone were both on patient's med rec.  It is unclear which of these she has been applying to her wounds.  I instructed her to discontinue both.    Patient is not certain when she last checked her blood sugar, but believes it was around 195.  He presents today wearing regular tennis shoes.  The heel portion of the right shoe is collapsed.  Daughter states patient's foot is too swollen to fit into the shoe so she walks on top of it.  Toenails two through four of her right foot are severely overgrown and mycotic, placing patient at risk for self injury.  She agreed to allow me to trim and file these in clinic today.      REVIEW OF SYSTEMS:   Review of Systems   Constitutional: Negative for chills, fever and weight loss.        Per daughter, patient's appetite is good and weight is stable   HENT: Positive for hearing loss.    Respiratory: Negative for cough and shortness of breath.    Cardiovascular: Negative for chest pain.   Gastrointestinal: Negative for constipation, diarrhea, nausea and vomiting.   Musculoskeletal: Positive for falls.        Uses walker for mobility  Frequent falls per daughter   Neurological:        Patient denies numbness in her feet   Psychiatric/Behavioral: Positive for memory loss.       PHYSICAL EXAMINATION:   BP (!) 180/102 Comment: RN notified  Pulse 93   Temp 36.1 °C (97 °F)   Resp 18   LMP  (LMP Unknown)   SpO2 93%     Physical Exam  Constitutional:       Appearance: " She is normal weight.      Comments: Slightly disheveled appearance   Eyes:      Pupils: Pupils are equal, round, and reactive to light.   Cardiovascular:      Rate and Rhythm: Normal rate. Rhythm irregular.      Comments: Pedal pulses difficult to palpate, irregular by Doppler  Pulmonary:      Effort: Pulmonary effort is normal.   Musculoskeletal:      Right lower leg: Edema present.      Left lower leg: Edema present.      Comments: Nonpitting edema bilateral lower extremities.     Skin:     Comments: Full-thickness wound between right first and second toes  Full-thickness wound to lateral fourth toe of right foot  Unstageable ulcer to left posterior heel, covered with stable dry eschar, no periwound erythema, no drainage, no fluctuance  Refer to wound flowsheet and photos   Neurological:      Mental Status: She is alert.     Monofilament testing with a 10 gram force: sensation intact: decreased bilaterally  Visual Inspection: Feet with maceration, ulcers, fissures.  Pedal pulses: decreased bilaterally    WOUND ASSESSMENT  Wound 02/04/22 righr great toe and 2nd toe webspace (Active)   Wound Image    02/04/22 1000   Site Assessment Pink;Yellow 02/04/22 1000   Periwound Assessment Dry;Callused 02/04/22 1000   Margins Unattached edges 02/04/22 1000   Drainage Amount Small 02/04/22 1000   Drainage Description Serosanguineous 02/04/22 1000   Treatments Cleansed;Topical Lidocaine;Provider debridement;Site care 02/04/22 1000   Wound Cleansing Puracyn [place] 02/04/22 1000   Periwound Protectant Skin Protectant Wipes to Periwound 02/04/22 1000   Dressing Cleansing/Solutions Not Applicable 02/04/22 1000   Dressing Options Hydrofiber Silver;Dry Gauze;Hypafix Tape 02/04/22 1000   Dressing Changed New 02/04/22 1000   Dressing Status Clean;Dry;Intact 02/04/22 1000   Non-staged Wound Description Full thickness 02/04/22 1000   Wound Length (cm) 0.8 cm 02/04/22 1000   Wound Width (cm) 0.2 cm 02/04/22 1000   Wound Depth (cm) 0.2 cm  02/04/22 1000   Wound Surface Area (cm^2) 0.16 cm^2 02/04/22 1000   Wound Volume (cm^3) 0.032 cm^3 02/04/22 1000   Post-Procedure Length (cm) 0.8 cm 02/04/22 1000   Post-Procedure Width (cm) 0.3 cm 02/04/22 1000   Post-Procedure Depth (cm) 0.2 cm 02/04/22 1000   Post-Procedure Surface Area (cm^2) 0.24 cm^2 02/04/22 1000   Post-Procedure Volume (cm^3) 0.048 cm^3 02/04/22 1000   Tunneling (cm) 0 cm 02/04/22 1000   Undermining (cm) 0 cm 02/04/22 1000   Wound Odor None 02/04/22 1000   Pulses Doppler;2+;PT;DP 02/04/22 1000   Right Foot Monofilament 10-point exam (Sensate) 0/10 02/04/22 1000   Left Foot Monofilament 10-point exam (Sensate) 0/10 02/04/22 1000   Exposed Structures None 02/04/22 1000   Number of days: 0       Wound 02/04/22 right 4th and 5th toe web space (Active)   Wound Image    02/04/22 1000   Site Assessment Yellow;White;[place] 02/04/22 1000   Periwound Assessment Maceration 02/04/22 1000   Margins Epibole (rolled edges) 02/04/22 1000   Drainage Amount Small 02/04/22 1000   Drainage Description Serosanguineous 02/04/22 1000   Treatments Cleansed;Topical Lidocaine;Provider debridement;Site care 02/04/22 1000   Wound Cleansing Puracyn [place] 02/04/22 1000   Periwound Protectant Skin Protectant Wipes to Periwound 02/04/22 1000   Dressing Cleansing/Solutions Not Applicable 02/04/22 1000   Dressing Options Hydrofiber Silver;Dry Gauze;Hypafix Tape 02/04/22 1000   Dressing Changed New 02/04/22 1000   Dressing Status Clean;Dry;Intact 02/04/22 1000   Non-staged Wound Description Full thickness 02/04/22 1000   Wound Length (cm) 0.3 cm 02/04/22 1000   Wound Width (cm) 0.4 cm 02/04/22 1000   Wound Depth (cm) 0.1 cm 02/04/22 1000   Wound Surface Area (cm^2) 0.12 cm^2 02/04/22 1000   Wound Volume (cm^3) 0.012 cm^3 02/04/22 1000   Post-Procedure Length (cm) 0.4 cm 02/04/22 1000   Post-Procedure Width (cm) 0.4 cm 02/04/22 1000   Post-Procedure Depth (cm) 0.1 cm 02/04/22 1000   Post-Procedure Surface Area (cm^2) 0.16 cm^2  02/04/22 1000   Post-Procedure Volume (cm^3) 0.016 cm^3 02/04/22 1000   Tunneling (cm) 0 cm 02/04/22 1000   Undermining (cm) 0 cm 02/04/22 1000   Wound Odor None 02/04/22 1000   Exposed Structures None 02/04/22 1000   Number of days: 0       Wound 02/04/22 Left heel (Active)   Wound Image   02/04/22 1000   Site Assessment Dry;Brown 02/04/22 1000   Periwound Assessment Dry;Intact 02/04/22 1000   Drainage Amount None 02/04/22 1000   Treatments Cleansed;Site care 02/04/22 1000   Dressing Cleansing/Solutions 3% Betadine 02/04/22 1000   Dressing Options Open to Air 02/04/22 1000   Dressing Status Open to Air 02/04/22 1000   Non-staged Wound Description Not applicable 02/04/22 1000   Wound Odor None 02/04/22 1000   Number of days: 0       Wound 02/04/22 left hallux nail bed (Active)   Wound Image   02/04/22 1000   Site Assessment Dry;Red 02/04/22 1000   Periwound Assessment Clean;Dry;Intact 02/04/22 1000   Margins Attached edges 02/04/22 1000   Drainage Amount Scant 02/04/22 1000   Drainage Description Sanguineous 02/04/22 1000   Treatments Cleansed;Site care 02/04/22 1000   Wound Cleansing Puracyn [place] 02/04/22 1000   Periwound Protectant Skin Protectant Wipes to Periwound 02/04/22 1000   Dressing Cleansing/Solutions Not Applicable 02/04/22 1000   Dressing Options Dry Gauze;Hypafix Tape 02/04/22 1000   Dressing Changed Changed 02/04/22 1000   Dressing Status Clean;Dry;Intact 02/04/22 1000   Non-staged Wound Description Partial thickness 02/04/22 1000   Wound Length (cm) 0.5 cm 02/04/22 1000   Wound Width (cm) 0.6 cm 02/04/22 1000   Wound Depth (cm) 0.1 cm 02/04/22 1000   Wound Surface Area (cm^2) 0.3 cm^2 02/04/22 1000   Wound Volume (cm^3) 0.03 cm^3 02/04/22 1000   Tunneling (cm) 0 cm 02/04/22 1000   Undermining (cm) 0 cm 02/04/22 1000   Wound Odor None 02/04/22 1000   Exposed Structures None 02/04/22 1000   Number of days: 0         PROCEDURE: Excisional debridement of ulcer between right first and second toes, and  right lateral fourth toe  -2% viscous lidocaine applied topically to wound bed sfor approximately 5 minutes prior to debridement  -Curette used to debride wound bed and periwound callus.  Excisional debridement was performed to remove devitalized tissue until healthy, bleeding tissue was visualized.   Entire surface of wounds, 0.4 cm2 debrided.  Tissue debrided into the subcutaneous layer.  Periwound callus, ~ 1.0 cm2, debrided to skin level, excising hyperkeratinized tissue.   -Bleeding controlled with manual pressure.    -Wound care completed by wound RN, refer to flowsheet  -Patient tolerated the procedure well, without c/o pain or discomfort.     PROCEDURE: Trimming and filing of toenails two through five of right foot  - podiatric clippers used to trim toenails.  No nicks or cuts  -Report used to file rough edges  -Patient tolerated the procedure well, without c/o pain or discomfort.       Pertinent Labs and Diagnostics:    Labs:     A1c:   Lab Results   Component Value Date/Time    HBA1C 11.9 (H) 11/16/2021 02:11 PM          IMAGING: Foot/Toes Imaging, Past Year DX-FOOT-COMPLETE 3+ RIGHT    Result Date: 11/16/2021  Narrative 11/16/2021 5:08 PM HISTORY/REASON FOR EXAM:  Atraumatic Pain/Swelling/Deformity TECHNIQUE/EXAM DESCRIPTION AND NUMBER OF VIEWS: 3 views of the RIGHT foot. COMPARISON: FINDINGS: The alignment is grossly normal. There is no evidence of displaced fracture or dislocation. There is no focal soft tissue swelling. The bones are osteopenic. There are vascular calcifications.     Impression Osteopenia.      VASCULAR STUDIES:   11/23/2021-arterial studies, bilateral lower extremity  Findings   Bilateral.    Doppler waveforms of the common femoral, popliteal, posterior tibial, and    dorsalis pedis arteries are of high amplitude and triphasic.    Digit PPG waveforms are normal.    The ankle pressures are not accurately measured due to calcification and    noncompressibility of the tibial vessels.      LAST  WOUND CULTURE:  DATE :        Lab Results   Component Value Date/Time    CULTRSULT No growth after 5 days of incubation. 11/16/2021 03:37 PM    CULTRSULT No growth after 5 days of incubation. 11/16/2021 03:37 PM           ASSESSMENT AND PLAN:     1. Diabetic ulcer of right midfoot associated with type 2 diabetes mellitus, with fat layer exposed (HCC)  Comments: Ulcers between toes one and two, and lateral aspect of toe four.  Suspect excessive moisture and inappropriate footwear is being contributory factors.  She appears to have adequate blood flow, with normal PPG's and TBI.    02/04/2022: Initial clinic visit.  Ulcer between first and second toes surrounded by thick callus.  Fortunately this wound does not track to deeply.  Lateral fourth toe ulcer is also quite shallow.  -Excisional debridement of wound in clinic today, medically necessary to promote wound healing.  -Home health referral  -Home health to change dressing 1-2 times per week in between clinic visits  -Rx for offloading shoes provided in clinic.  Patient advised to wear these whenever ambulating.  -Patient to return to clinic weekly for assessment, debridement, and dressing changes    Wound care: Silver Hydrofiber to manage exudate and bioburden, foam cover dressing, Hypafix tape      2. Pressure injury of left heel, unstageable ([facility])  Comments: Stable eschar to left posterior heel.  Underlying etiology is undoubtedly pressure complicated by diabetes.    02/04/2022: Wound covered with stable dry eschar, no fluctuance, no drainage, no periwound erythema or induration  -No debridement in clinic today  -Patient instructed to offload.  However, she appears to have some dementia, and unlikely to remember my instructions    Wound care: Paint with Betadine, leave open to air.  Offload    3. 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  Per daughter, patient tries to manage her own diabetes, however is very forgetful and may not be taking her medications as prescribed.  -Referral to endocrinology    4. Diabetic polyneuropathy associated with type 2 diabetes mellitus (HCC)  Comments: Monofilament testing in clinic indicates significant LOPS    02/04/2022:  - Implications of loss of protective sensation (LOPS) discussed with patient and her daughter- including increased risk for amputation.  Advised to check feet at least daily, moisturize feet, and to always wear protective foot wear.     5. Onychomycosis/ Overgrown toenails    02/04/2022: Patient presents today with severely overgrown and mycotic toenails of her right foot, placing her at risk for self injury.  -Toenails trimmed and filed in clinic today without incident            PATIENT EDUCATION  - Importance of tight glucose control for wound healing   - Implications of loss of protective sensation (LOPS) discussed with patient- including increased risk for amputation.  - Advised to check feet at least daily, moisturize feet, and to always wear protective foot wear.   -  Importance of offloading foot to assist with wound healing  - Advised pt not to trim nails or calluses, seek foot/nail care from podiatrist or certified foot/nail nurse  - Importance of adequate nutrition for wound healing    My total time spent caring for the patient on the day of the encounter was 30  minutes.   This does not include time spent on separately billable procedures/tests.       Please note that this note may have been created using voice recognition software. I have worked with technical experts from [facility] to optimize the interface.  I have made every reasonable attempt to correct obvious errors, but there may be errors of grammar and possibly content that I did not discover before finalizing the note.    N

## 2022-02-04 NOTE — PATIENT INSTRUCTIONS
-Keep your wound dressing clean, dry, and intact.    -Change your dressing if it becomes soiled, soaked, or falls off.    -Should you experience any significant changes in your wound(s), such as infection (redness, swelling, localized heat, increased pain, fever > 101 F, chills) or have any questions regarding your home care instructions, please contact the wound center at [phone]. If after hours, contact your primary care physician or go to the hospital emergency room.

## 2022-02-06 ENCOUNTER — HOME CARE VISIT (OUTPATIENT)
Dept: HOME HEALTH SERVICES | Facility: HOME HEALTHCARE | Age: 77
End: 2022-02-06

## 2022-02-08 ENCOUNTER — OFFICE VISIT (OUTPATIENT)
Dept: WOUND CARE | Facility: MEDICAL CENTER | Age: 77
End: 2022-02-08
Attending: FAMILY MEDICINE
Payer: MEDICARE

## 2022-02-08 VITALS
RESPIRATION RATE: 18 BRPM | HEART RATE: 89 BPM | SYSTOLIC BLOOD PRESSURE: 149 MMHG | DIASTOLIC BLOOD PRESSURE: 87 MMHG | TEMPERATURE: 98.1 F | OXYGEN SATURATION: 97 %

## 2022-02-08 DIAGNOSIS — E11.42 DIABETIC POLYNEUROPATHY ASSOCIATED WITH TYPE 2 DIABETES MELLITUS (HCC): ICD-10-CM

## 2022-02-08 DIAGNOSIS — S91.104D UNSPECIFIED OPEN WOUND OF RIGHT LESSER TOE(S) WITHOUT DAMAGE TO NAIL, SUBSEQUENT ENCOUNTER: ICD-10-CM

## 2022-02-08 DIAGNOSIS — E11.621 DIABETIC ULCER OF RIGHT MIDFOOT ASSOCIATED WITH TYPE 2 DIABETES MELLITUS, WITH FAT LAYER EXPOSED (HCC): ICD-10-CM

## 2022-02-08 DIAGNOSIS — L97.412 DIABETIC ULCER OF RIGHT MIDFOOT ASSOCIATED WITH TYPE 2 DIABETES MELLITUS, WITH FAT LAYER EXPOSED (HCC): ICD-10-CM

## 2022-02-08 DIAGNOSIS — L89.620 PRESSURE INJURY OF LEFT HEEL, UNSTAGEABLE (HCC): ICD-10-CM

## 2022-02-08 PROCEDURE — 99214 OFFICE O/P EST MOD 30 MIN: CPT | Mod: 25

## 2022-02-08 PROCEDURE — 11042 DBRDMT SUBQ TIS 1ST 20SQCM/<: CPT | Performed by: STUDENT IN AN ORGANIZED HEALTH CARE EDUCATION/TRAINING PROGRAM

## 2022-02-08 PROCEDURE — 99214 OFFICE O/P EST MOD 30 MIN: CPT | Mod: 25 | Performed by: STUDENT IN AN ORGANIZED HEALTH CARE EDUCATION/TRAINING PROGRAM

## 2022-02-08 PROCEDURE — 11042 DBRDMT SUBQ TIS 1ST 20SQCM/<: CPT

## 2022-02-08 ASSESSMENT — ENCOUNTER SYMPTOMS
MEMORY LOSS: 1
DIARRHEA: 0
FEVER: 0
CONSTIPATION: 0
SHORTNESS OF BREATH: 0
NAUSEA: 0
CHILLS: 0
WEIGHT LOSS: 0
FALLS: 1
COUGH: 0
VOMITING: 0

## 2022-02-08 NOTE — PROGRESS NOTES
Provider Encounter- Diabetic Foot Ulcer      HISTORY OF PRESENT ILLNESS  Wound History:    START OF CARE IN CLINIC: 2/4/2022    REFERRING PROVIDER: Fitz Turpin      WOUND- Diabetic foot ulcer   LOCATION: Right foot interdigital, between toes 1 and 2                                   Right lateral fourth toe            Left posterior heel            Nail beds of both great toes            HISTORY: Patient is a very poor historian, uncertain how or when these wounds started.  The wound between her right first and second toes was noted during hospitalization in November 2021 when she was hospitalized for right lower extremity pain and syncope. According to inpatient notes, prior to her hospitalization she believes she was receiving care from a podiatrist.   During this admission she was seen by the LPS team, arterial studies and x-rays were completed, and no surgery recommended.  She was treated with IV antibiotics, discharged to a skilled nursing facility 8 days later.  After brief stay at SNF, she was discharged to home with her daughter.  During recent follow-up with her PCP, it was noted that she still had wound between her first and second toes, but also a wound to her right fourth toe, and left heel, and nailbeds of both great toes.  She was referred to [facility] for management.       Pertinent Medical History: Uncontrolled diabetes mellitus type 2, atrial fibrillation, CHF, CAD, debility    DIABETES HX: Diagnosed with type 2 diabetes more than 20 years ago, and is currently managing with insulin.  Checks blood sugars two times per day and reports that these typically run around 200.  Has  had previous diabetes education.    Denies that she has numbness in feet.  Usually wears regular, nonprescriptive shoes.  She does check feet routinely.  Has not had previous foot ulcers or foot surgery.  Current occupation-she is retired and lives with her daughter..         TOBACCO USE:   Smoked briefly in her 20s, quit in  "1978    Patient's problem list, allergies, and current medications reviewed and updated in Epic    Interval History:  2/4/2022 Initial clinic visit with RUKHSANA Mancilla, FNP-BC, CWMIGUEL ÁNGELN, CFCN.  Patient presents today accompanied by her daughter.  She is extremely hard of hearing, and also a very poor historian.  Most information is gathered from her daughter.  Neither she nor her daughter know when or how these wounds started.  She states that the toenails of both great toes, \"just fell off\".  Collagenase and triamcinolone were both on patient's med rec.  It is unclear which of these she has been applying to her wounds.  I instructed her to discontinue both.    Patient is not certain when she last checked her blood sugar, but believes it was around 195.  He presents today wearing regular tennis shoes.  The heel portion of the right shoe is collapsed.  Daughter states patient's foot is too swollen to fit into the shoe so she walks on top of it.  Toenails two through four of her right foot are severely overgrown and mycotic, placing patient at risk for self injury.  She agreed to allow me to trim and file these in clinic today.    2/8/2022: Clinic visit with Tutu Garner MD. Patient is poor historian, is accompanied by daughter. Patient reports feeling well, denies any fevers or chills. Patient does report right 5th toe pain, does not appear to be any trauma and suspect referred pain from right 4th toe wound. Patient is unsure if she has had significant drainage. Has home health to start this week. Patient obtained offloading shoes and was wearing them to clinic.        REVIEW OF SYSTEMS:   Review of Systems   Constitutional: Negative for chills, fever and weight loss.        Per daughter, patient's appetite is good and weight is stable   HENT: Positive for hearing loss.    Respiratory: Negative for cough and shortness of breath.    Cardiovascular: Negative for chest pain.   Gastrointestinal: Negative for " constipation, diarrhea, nausea and vomiting.   Musculoskeletal: Positive for falls.        Uses walker for mobility  Frequent falls per daughter   Neurological:        Patient denies numbness in her feet   Psychiatric/Behavioral: Positive for memory loss.       PHYSICAL EXAMINATION:   /87   Pulse 89   Temp 36.7 °C (98.1 °F)   Resp 18   LMP  (LMP Unknown)   SpO2 97%     Physical Exam  Constitutional:       Appearance: She is normal weight.      Comments: Slightly disheveled appearance   Eyes:      Pupils: Pupils are equal, round, and reactive to light.   Cardiovascular:      Rate and Rhythm: Normal rate. Rhythm irregular.      Comments: Pedal pulses difficult to palpate, irregular by Doppler  Pulmonary:      Effort: Pulmonary effort is normal.   Musculoskeletal:      Right lower leg: Edema present.      Left lower leg: Edema present.      Comments: Nonpitting edema bilateral lower extremities.     Skin:     Comments: Full-thickness wound between right first and second toes  Full-thickness wound to lateral fourth toe of right foot  Unstageable ulcer to left posterior heel, covered with stable dry eschar, no periwound erythema, no drainage, no fluctuance  Refer to wound flowsheet and photos   Neurological:      Mental Status: She is alert.     Monofilament testing with a 10 gram force: sensation intact: decreased bilaterally  Visual Inspection: Feet with maceration, ulcers, fissures.  Pedal pulses: decreased bilaterally    WOUND ASSESSMENT  Wound 02/04/22 righr great toe and 2nd toe webspace (Active)   Wound Image   02/08/22 1540   Site Assessment Pink;Yellow 02/08/22 1513   Periwound Assessment Callused;Other (Comment) 02/08/22 1513   Margins Unattached edges 02/08/22 1513   Closure Secondary intention 02/08/22 1513   Drainage Amount Small 02/08/22 1513   Drainage Description Serosanguineous 02/08/22 1513   Treatments Cleansed;Topical Lidocaine 02/08/22 1513   Wound Cleansing Normal Saline Irrigation  02/08/22 1513   Periwound Protectant Skin Protectant Wipes to Periwound 02/08/22 1513   Dressing Cleansing/Solutions Not Applicable 02/08/22 1513   Dressing Options Hydrofiber Silver;Dry Gauze;Hypafix Tape 02/08/22 1513   Dressing Changed Changed 02/08/22 1513   Dressing Status Clean;Dry;Intact 02/08/22 1513   Dressing Change/Treatment Frequency Every 72 hrs, and As Needed 02/08/22 1513   Non-staged Wound Description Full thickness 02/08/22 1513   Wound Length (cm) 0.3 cm 02/08/22 1513   Wound Width (cm) 1 cm 02/08/22 1513   Wound Depth (cm) 0.2 cm 02/08/22 1513   Wound Surface Area (cm^2) 0.3 cm^2 02/08/22 1513   Wound Volume (cm^3) 0.06 cm^3 02/08/22 1513   Post-Procedure Length (cm) 0.2 cm 02/08/22 1513   Post-Procedure Width (cm) 0.4 cm 02/08/22 1513   Post-Procedure Depth (cm) 0.2 cm 02/08/22 1513   Post-Procedure Surface Area (cm^2) 0.08 cm^2 02/08/22 1513   Post-Procedure Volume (cm^3) 0.016 cm^3 02/08/22 1513   Wound Healing % -88 02/08/22 1513   Tunneling (cm) 0 cm 02/08/22 1513   Undermining (cm) 0 cm 02/08/22 1513   Wound Odor None 02/08/22 1513   Pulses Doppler;2+;PT;DP 02/04/22 1000   Right Foot Monofilament 10-point exam (Sensate) 0/10 02/04/22 1000   Left Foot Monofilament 10-point exam (Sensate) 0/10 02/04/22 1000   Exposed Structures None 02/08/22 1513   Number of days: 4       Wound 02/04/22 right 4th and 5th toe web space (Active)   Wound Image   02/08/22 1541   Site Assessment Yellow;Pink;Red 02/08/22 1513   Periwound Assessment Maceration 02/08/22 1513   Margins Epibole (rolled edges) 02/08/22 1513   Closure Secondary intention 02/08/22 1513   Drainage Amount Small 02/08/22 1513   Drainage Description Serosanguineous 02/08/22 1513   Treatments Cleansed;Topical Lidocaine 02/08/22 1513   Wound Cleansing Normal Saline Irrigation 02/08/22 1513   Periwound Protectant Skin Protectant Wipes to Periwound 02/08/22 1513   Dressing Cleansing/Solutions Not Applicable 02/08/22 1513   Dressing Options  Hydrofiber Silver;Dry Gauze;Hypafix Tape 02/08/22 1513   Dressing Changed Changed 02/08/22 1513   Dressing Status Clean;Dry;Intact 02/08/22 1513   Dressing Change/Treatment Frequency Every 72 hrs, and As Needed 02/08/22 1513   Non-staged Wound Description Full thickness 02/08/22 1513   Wound Length (cm) 0.3 cm 02/08/22 1513   Wound Width (cm) 0.4 cm 02/08/22 1513   Wound Depth (cm) 0.1 cm 02/08/22 1513   Wound Surface Area (cm^2) 0.12 cm^2 02/08/22 1513   Wound Volume (cm^3) 0.012 cm^3 02/08/22 1513   Post-Procedure Length (cm) 0.3 cm 02/08/22 1513   Post-Procedure Width (cm) 0.4 cm 02/08/22 1513   Post-Procedure Depth (cm) 0.1 cm 02/08/22 1513   Post-Procedure Surface Area (cm^2) 0.12 cm^2 02/08/22 1513   Post-Procedure Volume (cm^3) 0.012 cm^3 02/08/22 1513   Wound Healing % 0 02/08/22 1513   Tunneling (cm) 0 cm 02/08/22 1513   Undermining (cm) 0 cm 02/08/22 1513   Wound Odor None 02/08/22 1513   Exposed Structures None 02/08/22 1513   Number of days: 4       Wound 02/04/22 Left heel (Active)   Wound Image   02/08/22 1512   Site Assessment Dry;Brown 02/08/22 1513   Periwound Assessment Dry;Intact 02/08/22 1513   Margins Other (Comment) 02/08/22 1513   Closure Open to air 02/08/22 1513   Drainage Amount None 02/08/22 1513   Treatments Cleansed 02/08/22 1513   Wound Cleansing Not Applicable 02/08/22 1513   Periwound Protectant Not Applicable 02/08/22 1513   Dressing Cleansing/Solutions 3% Betadine 02/08/22 1513   Dressing Options Open to Air 02/08/22 1513   Dressing Status Open to Air 02/08/22 1513   Dressing Change/Treatment Frequency Daily, and As Needed 02/08/22 1513   Non-staged Wound Description Not applicable 02/08/22 1513   Wound Length (cm) 1 cm 02/08/22 1513   Wound Width (cm) 1.5 cm 02/08/22 1513   Wound Surface Area (cm^2) 1.5 cm^2 02/08/22 1513   Wound Bed Eschar (%) 100 % 02/08/22 1513   Tunneling (cm) 0 cm 02/08/22 1513   Undermining (cm) 0 cm 02/08/22 1513   Wound Odor None 02/08/22 1513   Exposed  Structures ELSA 02/08/22 1513   Number of days: 4       Wound 02/04/22 left hallux nail bed (Active)   Wound Image   02/08/22 1512   Site Assessment Dry;Red;Other (Comment) 02/08/22 1513   Periwound Assessment Clean;Dry;Intact 02/08/22 1513   Margins Other (Comment) 02/08/22 1513   Closure Open to air 02/08/22 1513   Drainage Amount None 02/08/22 1513   Drainage Description Sanguineous 02/04/22 1000   Treatments Cleansed 02/08/22 1513   Wound Cleansing Normal Saline Irrigation 02/08/22 1513   Periwound Protectant Not Applicable 02/08/22 1513   Dressing Cleansing/Solutions 3% Betadine 02/08/22 1513   Dressing Options Open to Air 02/08/22 1513   Dressing Changed Changed 02/08/22 1513   Dressing Status Clean;Dry;Intact 02/04/22 1000   Dressing Change/Treatment Frequency Daily, and As Needed 02/08/22 1513   Non-staged Wound Description Not applicable 02/08/22 1513   Wound Length (cm) 0.2 cm 02/08/22 1513   Wound Width (cm) 0.5 cm 02/08/22 1513   Wound Depth (cm) 0.1 cm 02/04/22 1000   Wound Surface Area (cm^2) 0.1 cm^2 02/08/22 1513   Wound Volume (cm^3) 0.03 cm^3 02/04/22 1000   Tunneling (cm) 0 cm 02/08/22 1513   Undermining (cm) 0 cm 02/08/22 1513   Wound Odor None 02/08/22 1513   Exposed Structures None 02/08/22 1513   Number of days: 4         PROCEDURE: Excisional debridement of ulcer between right first and second toes, and right lateral fourth toe  -2% viscous lidocaine applied topically to wound bed sfor approximately 5 minutes prior to debridement  -Curette used to debride wound bed and periwound callus.  Excisional debridement was performed to remove devitalized tissue until healthy, bleeding tissue was visualized.   Entire surface of wounds, 0.2 cm2 debrided.  Tissue debrided into the subcutaneous layer.  Periwound callus, ~ 1.0 cm2, debrided to skin level, excising hyperkeratinized tissue.   -Bleeding controlled with manual pressure.    -Wound care completed by wound RN, refer to flowsheet  -Patient  tolerated the procedure well, without c/o pain or discomfort.       Pertinent Labs and Diagnostics:    Labs:     A1c:   Lab Results   Component Value Date/Time    HBA1C 11.9 (H) 11/16/2021 02:11 PM          IMAGING: Foot/Toes Imaging, Past Year DX-FOOT-COMPLETE 3+ RIGHT    Result Date: 11/16/2021  Narrative 11/16/2021 5:08 PM HISTORY/REASON FOR EXAM:  Atraumatic Pain/Swelling/Deformity TECHNIQUE/EXAM DESCRIPTION AND NUMBER OF VIEWS: 3 views of the RIGHT foot. COMPARISON: FINDINGS: The alignment is grossly normal. There is no evidence of displaced fracture or dislocation. There is no focal soft tissue swelling. The bones are osteopenic. There are vascular calcifications.     Impression Osteopenia.      VASCULAR STUDIES:   11/23/2021-arterial studies, bilateral lower extremity  Findings   Bilateral.    Doppler waveforms of the common femoral, popliteal, posterior tibial, and    dorsalis pedis arteries are of high amplitude and triphasic.    Digit PPG waveforms are normal.    The ankle pressures are not accurately measured due to calcification and    noncompressibility of the tibial vessels.     LAST  WOUND CULTURE:  DATE :        Lab Results   Component Value Date/Time    CULTRSULT No growth after 5 days of incubation. 11/16/2021 03:37 PM    CULTRSULT No growth after 5 days of incubation. 11/16/2021 03:37 PM           ASSESSMENT AND PLAN:     1. Diabetic ulcer of right midfoot associated with type 2 diabetes mellitus, with fat layer exposed (HCC)  Comments: Ulcers between toes one and two, and lateral aspect of toe four.  Suspect excessive moisture and inappropriate footwear is being contributory factors.  She appears to have adequate blood flow, with normal PPG's and TBI.    02/08/2022: Ulcer between first and second toes surrounded by thick callus but does not appear to be very deep. Lateral fourth toe ulcer is also quite shallow.  -Excisional debridement of wound in clinic today, medically necessary to promote  wound healing.  -Home health to start this week  -Home health to change dressing 1-2 times per week in between clinic visits  -Patient obtained offloading shoes, counseled on importance of wearing for adequate offloading when ambulating.  -Patient to return to clinic weekly for assessment, debridement, and dressing changes    Wound care: Silver Hydrofiber to manage exudate and bioburden, foam cover dressing, Hypafix tape      2. Pressure injury of left heel, unstageable ([facility])  Comments: Stable eschar to left posterior heel.  Underlying etiology is undoubtedly pressure complicated by diabetes.    02/08/2022: Wound covered with stable dry eschar, no fluctuance, no drainage, no periwound erythema or induration  -No debridement in clinic today  -Patient instructed to offload, however does seem to have some cognitive deficit and has difficulty remembering. Discussed with daughter who ordered Prevalon boot for offloading at night.    Wound care: Paint with Betadine, leave open to air.  Offload    3. Uncontrolled diabetes mellitus type 2 with atherosclerosis of arteries of extremities ([facility])  Comments: A1c checked during hospitalization, 11.9    02/08/2022: Patient glucose appears to continue to be poorly controlled. Somewhat labile due to sometimes forgetting to eat.  -Referred to endocrinology was made. Discussed with daughter to follow up on referral    4. Diabetic polyneuropathy associated with type 2 diabetes mellitus ([facility])  Comments: Monofilament testing in clinic indicates significant LOPS    02/08/2022:  - Implications of loss of protective sensation (LOPS) discussed with patient and her daughter- including increased risk for amputation.  Advised to check feet at least daily, moisturize feet, and to always wear protective foot wear.     5. Onychomycosis/ Overgrown toenails    02/08/2022: Toenails trimmed last visit  - Recommend podiatry follow up for routine nail care in setting of neuropathy      PATIENT  EDUCATION  - Importance of tight glucose control for wound healing   - Implications of loss of protective sensation (LOPS) discussed with patient- including increased risk for amputation.  - Advised to check feet at least daily, moisturize feet, and to always wear protective foot wear.   -  Importance of offloading foot to assist with wound healing  - Advised pt not to trim nails or calluses, seek foot/nail care from podiatrist or certified foot/nail nurse  - Importance of adequate nutrition for wound healing    My total time spent caring for the patient on the day of the encounter was 30 minutes.   This does not include time spent on separately billable procedures/tests.       Please note that this note may have been created using voice recognition software. I have worked with technical experts from [facility] to optimize the interface.  I have made every reasonable attempt to correct obvious errors, but there may be errors of grammar and possibly content that I did not discover before finalizing the note.    N

## 2022-02-08 NOTE — PATIENT INSTRUCTIONS
Should you experience any significant changes in your wound(s) such as infection (redness, swelling, localized heat, increased pain, fever >101 F, chills) or have any questions regarding your home care instructions, please contact the wound center [phone]. If after hours, contact your primary care physician or go the hospital emergency room.  Keep dressing clean and dry and cover while bathing. Only change dressing if over saturated, soiled or its falling off or by your home health nurse. Wear your offloading shoes. Acquire and use a Prevalon boot when you are in bed, or offload your heel using pillows to float your heel in the air.

## 2022-02-09 ENCOUNTER — HOME CARE VISIT (OUTPATIENT)
Dept: HOME HEALTH SERVICES | Facility: HOME HEALTHCARE | Age: 77
End: 2022-02-09
Payer: MEDICARE

## 2022-02-09 VITALS
OXYGEN SATURATION: 98 % | HEART RATE: 82 BPM | TEMPERATURE: 98.4 F | BODY MASS INDEX: 23.32 KG/M2 | WEIGHT: 140 LBS | HEIGHT: 65 IN | SYSTOLIC BLOOD PRESSURE: 144 MMHG | DIASTOLIC BLOOD PRESSURE: 74 MMHG | RESPIRATION RATE: 18 BRPM

## 2022-02-09 PROCEDURE — G0493 RN CARE EA 15 MIN HH/HOSPICE: HCPCS

## 2022-02-09 PROCEDURE — 665001 SOC-HOME HEALTH

## 2022-02-09 SDOH — ECONOMIC STABILITY: HOUSING INSECURITY: EVIDENCE OF SMOKING MATERIAL: 0

## 2022-02-09 ASSESSMENT — FIBROSIS 4 INDEX: FIB4 SCORE: 1.13

## 2022-02-09 ASSESSMENT — ENCOUNTER SYMPTOMS
POOR JUDGMENT: 1
SEVERE DYSPNEA: 1
DEBILITATING PAIN: 1
PAIN LOCATION - EXACERBATING FACTORS: WOUND CARE
DEPRESSED MOOD: 1
PAIN SEVERITY GOAL: 0/10
PAIN LOCATION - RELIEVING FACTORS: TYLENOL
PERSON REPORTING PAIN: PATIENT
DIFFICULTY THINKING: 1
PAIN LOCATION - PAIN FREQUENCY: INTERMITTENT
PAIN LOCATION - PAIN QUALITY: THROBBING
SHORTNESS OF BREATH: T
PAIN LOCATION: RIGHT FOOT
PAIN LOCATION - PAIN SEVERITY: 0/10
SUBJECTIVE PAIN PROGRESSION: RAPIDLY WORSENING
PAIN: 1
LOWEST PAIN SEVERITY IN PAST 24 HOURS: 0/10
HIGHEST PAIN SEVERITY IN PAST 24 HOURS: 10/10
FATIGUE: 1

## 2022-02-09 ASSESSMENT — PATIENT HEALTH QUESTIONNAIRE - PHQ9
CLINICAL INTERPRETATION OF PHQ2 SCORE: 1
1. LITTLE INTEREST OR PLEASURE IN DOING THINGS: 00
5. POOR APPETITE OR OVEREATING: 0 - NOT AT ALL
2. FEELING DOWN, DEPRESSED, IRRITABLE, OR HOPELESS: 01
SUM OF ALL RESPONSES TO PHQ QUESTIONS 1-9: 7

## 2022-02-10 ENCOUNTER — DOCUMENTATION (OUTPATIENT)
Dept: MEDICAL GROUP | Facility: PHYSICIAN GROUP | Age: 77
End: 2022-02-10

## 2022-02-10 ENCOUNTER — PATIENT MESSAGE (OUTPATIENT)
Dept: HEALTH INFORMATION MANAGEMENT | Facility: OTHER | Age: 77
End: 2022-02-10
Payer: MEDICARE

## 2022-02-10 ENCOUNTER — HOME CARE VISIT (OUTPATIENT)
Dept: HOME HEALTH SERVICES | Facility: HOME HEALTHCARE | Age: 77
End: 2022-02-10
Payer: MEDICARE

## 2022-02-10 NOTE — PROGRESS NOTES
Medication chart review for [facility] services    PCP:  Fitz Turpin D.O.  [address]  [facility] [address]  Fax: [phone]    Current medication list     Current Outpatient Medications:   •  sertraline, 25 mg, Oral, DAILY  •  omeprazole, 20 mg, Oral, DAILY  •  docusate sodium, 100 mg, Oral, BID PRN  •  Centrum Silver 50+Women, 1 Tablet, Oral, DAILY  •  acetaminophen, 500-1,000 mg, Oral, Q6HRS PRN  •  sertraline, 50 mg, Oral, DAILY (Patient not taking: Reported on 2/9/2022)  •  traZODone, 50 mg, Oral, QHS  •  collagenase, Cleanse with normal saline, pat dry, apply Santyl ointment and Bactroban ointment, cut appropriate size strip of silver alginate and cover the wound only.  Then cover with foam dressing with Kerlix or gauze and secure with tape.  Indications: Skin Ulcer (Patient not taking: Reported on 2/9/2022)  •  povidine-iodine 10%, For the left heel pressure injury: Cleanse with normal saline, pat dry, paint with Betadine solution, allow to air dry, white periwound with skin prep, allow to air dry, cover with foam dressing every day.  •  Home Care Oxygen, 2 L/min, Inhalation, Continuous  •  carvedilol, 6.25 mg, Oral, BID WITH MEALS  •  furosemide, 20 mg, Oral, DAILY (Patient not taking: Reported on 2/9/2022)  •  acetaminophen, 650 mg, Oral, Q6HRS PRN (Patient not taking: Reported on 2/9/2022)  •  insulin regular, 2-9 Units, Subcutaneous, 4X/DAY ACHS (Patient taking differently: 2-10 Units, Subcutaneous, 4 TIMES DAILY - BEFORE MEALS , NIGHTLY, -200= 2 units -250= 4 units -300= 6 units -350= 8 units -400= 10 units BG over 400= call doctor )  •  Eliquis, TAKE 1 TABLET BY MOUTH TWICE A DAY  •  amLODIPine, 5 mg, Oral, DAILY  •  insulin glargine, 20 Units, Subcutaneous, Q EVENING    Allergies   Allergen Reactions   • Pcn [Penicillins] Hives, Shortness of Breath and Swelling     Tolerated ceftriaxone April 2021   • Sulfa Drugs Hives, Shortness of Breath and Swelling        Labs     Lab Results   Component Value Date/Time    SODIUM 135 11/24/2021 03:23 AM    POTASSIUM 4.9 11/24/2021 03:23 AM    CHLORIDE 105 11/24/2021 03:23 AM    CO2 22 11/24/2021 03:23 AM    GLUCOSE 97 11/24/2021 03:23 AM    BUN 36 (H) 11/24/2021 03:23 AM    CREATININE 1.74 (H) 11/24/2021 03:23 AM     Lab Results   Component Value Date/Time    ALKPHOSPHAT 96 11/18/2021 06:59 AM    ASTSGOT 11 (L) 11/18/2021 06:59 AM    ALTSGPT 5 11/18/2021 06:59 AM    TBILIRUBIN 0.3 11/18/2021 06:59 AM    INR 1.43 (H) 11/16/2021 02:11 PM    ALBUMIN 2.0 (L) 11/21/2021 12:12 PM        Assessment and Plan:   • Received referral from [facility]. Medications reviewed.       Zev Aranda, PharmD, MS, BCACP, LCC  [facility]  Phone [phone] fax [phone]    This note was created using voice recognition software (Dragon). The accuracy of the dictation is limited by the abilities of the software. I have reviewed the note prior to signing, however some errors in grammar and context are still possible. If you have any questions related to this note please do not hesitate to contact our office.

## 2022-02-11 ENCOUNTER — HOME CARE VISIT (OUTPATIENT)
Dept: HOME HEALTH SERVICES | Facility: HOME HEALTHCARE | Age: 77
End: 2022-02-11
Payer: MEDICARE

## 2022-02-11 PROCEDURE — G0299 HHS/HOSPICE OF RN EA 15 MIN: HCPCS

## 2022-02-11 NOTE — CASE COMMUNICATION
Please fax to Dr. Turpin:    Patient's daughter called with concern that her mom is getting more confused, sundowning at night, and threatening to kill herself, stop eating and stop taking her meds. Home health nurse will see her tomorrow, recommend SLP for cog eval and memory, MSW for long term planning. Daughter does not have POA at this point. Instructed her to call 911 if it escalates and she feels her mom is a danger to herself o r others. Daughter verbalized understanding.

## 2022-02-14 ENCOUNTER — HOME CARE VISIT (OUTPATIENT)
Dept: HOME HEALTH SERVICES | Facility: HOME HEALTHCARE | Age: 77
End: 2022-02-14
Payer: MEDICARE

## 2022-02-14 VITALS
HEART RATE: 79 BPM | RESPIRATION RATE: 19 BRPM | DIASTOLIC BLOOD PRESSURE: 80 MMHG | OXYGEN SATURATION: 98 % | SYSTOLIC BLOOD PRESSURE: 122 MMHG | TEMPERATURE: 97.8 F

## 2022-02-14 ASSESSMENT — ENCOUNTER SYMPTOMS
SUBJECTIVE PAIN PROGRESSION: UNCHANGED
PERSON REPORTING PAIN: PATIENT
PAIN LOCATION - RELIEVING FACTORS: RESTING
PAIN SEVERITY GOAL: 0/10
POOR JUDGMENT: 1
PAIN LOCATION: RIGHT FOOT
PAIN LOCATION - PAIN QUALITY: SHARP
PAIN LOCATION - EXACERBATING FACTORS: WOUND CARE
PAIN LOCATION - PAIN SEVERITY: 2/10
HIGHEST PAIN SEVERITY IN PAST 24 HOURS: 2/10
PAIN: 1
LOWEST PAIN SEVERITY IN PAST 24 HOURS: 0/10
PAIN LOCATION - PAIN FREQUENCY: INTERMITTENT

## 2022-02-14 ASSESSMENT — PAIN SCALES - PAIN ASSESSMENT IN ADVANCED DEMENTIA (PAINAD)
TOTALSCORE: 0
BODYLANGUAGE: 0 - RELAXED.
FACIALEXPRESSION: 0 - SMILING OR INEXPRESSIVE.
NEGVOCALIZATION: 0 - NONE.
CONSOLABILITY: 0 - NO NEED TO CONSOLE.

## 2022-02-15 ENCOUNTER — HOME CARE VISIT (OUTPATIENT)
Dept: HOME HEALTH SERVICES | Facility: HOME HEALTHCARE | Age: 77
End: 2022-02-15
Payer: MEDICARE

## 2022-02-15 ENCOUNTER — APPOINTMENT (OUTPATIENT)
Dept: WOUND CARE | Facility: MEDICAL CENTER | Age: 77
End: 2022-02-15
Attending: FAMILY MEDICINE
Payer: MEDICARE

## 2022-02-15 VITALS
DIASTOLIC BLOOD PRESSURE: 88 MMHG | RESPIRATION RATE: 18 BRPM | OXYGEN SATURATION: 98 % | SYSTOLIC BLOOD PRESSURE: 138 MMHG | HEART RATE: 85 BPM | TEMPERATURE: 98.2 F

## 2022-02-15 PROCEDURE — G0153 HHCP-SVS OF S/L PATH,EA 15MN: HCPCS

## 2022-02-15 ASSESSMENT — ENCOUNTER SYMPTOMS
PAIN LOCATION - PAIN FREQUENCY: FREQUENT
DIFFICULTY THINKING: 1
PAIN LOCATION: LEFT FOOT
PAIN SEVERITY GOAL: 0/10
SUBJECTIVE PAIN PROGRESSION: WAXING AND WANING
AGGRESSION WITHIN DEFINED LIMITS: 1
PAIN: 1
THOUGHT CONTENT - SUSPICIOUS: 1
AGITATION: 1
LOWEST PAIN SEVERITY IN PAST 24 HOURS: 0/10
PERSON REPORTING PAIN: PATIENT
POOR JUDGMENT: 1
PAIN LOCATION - PAIN SEVERITY: 4/10
HIGHEST PAIN SEVERITY IN PAST 24 HOURS: 4/10

## 2022-02-15 ASSESSMENT — ACTIVITIES OF DAILY LIVING (ADL): OASIS_M1830: 05

## 2022-02-15 NOTE — CASE COMMUNICATION
Quality Review Completed for 2/9 SOC OASIS by MERCED Catherine, RN on 2/15/2022:  Edits completed by MERCED Catherine RN:  1. Added anxiety to homebound status per narrative  2.  is NA, no LSOC ordered.  is 2/4 date of valid referral per intake  3.  added #6 and #9 to risks for readmits per EMR reports  4.  is #4 due to NOC oxygen use  5.  changed to 5 due to lack of safety equipment for safe bathing.  changd to #3 per  narrative ambulates with moderate level of assist  6.  and  changed to 3, per guidelines when ambulation is with supervision or lower, and in addition, per EMR patient is not able at this time to administer her own insulin  7.  changed to 2 per therapy orders to eval  8. Added exercises prescribed to activities permited, hearing to functional limitations, needle precautions and proper medication use to safety measures, an d F2F to 485 forms

## 2022-02-16 ENCOUNTER — HOME CARE VISIT (OUTPATIENT)
Dept: HOME HEALTH SERVICES | Facility: HOME HEALTHCARE | Age: 77
End: 2022-02-16
Payer: MEDICARE

## 2022-02-16 NOTE — CASE COMMUNICATION
I agree with these changes.  ----- Message -----  From: Dia Catherine R.N.  Sent: 2/15/2022  10:02 AM PST  To: Ama Vaughn R.N.      Quality Review Completed for 2/9 SOC OASIS by MERCED Catherine, RN on 2/15/2022:  Edits completed by MERCED Catherine, RN:  1. Added anxiety to homebound status per narrative  2.  is NA, no LSOC ordered.  is 2/4 date of valid referral per intake  3.  added #6 and #9 to risks for readmits per EMR re ports  4.  is #4 due to NOC oxygen use  5.  changed to 5 due to lack of safety equipment for safe bathing.  changd to #3 per narrative ambulates with moderate level of assist  6.  and  changed to 3, per guidelines when ambulation is with supervision or lower, and in addition, per EMR patient is not able at this time to administer her own insulin  7.  changed to 2 per therapy orders to eval  8. Added exercis es prescribed to activities permited, hearing to functional limitations, needle precautions and proper medication use to safety measures, and F2F to 485 forms

## 2022-02-18 ENCOUNTER — HOME CARE VISIT (OUTPATIENT)
Dept: HOME HEALTH SERVICES | Facility: HOME HEALTHCARE | Age: 77
End: 2022-02-18
Payer: MEDICARE

## 2022-02-18 PROCEDURE — G0299 HHS/HOSPICE OF RN EA 15 MIN: HCPCS

## 2022-02-20 VITALS
OXYGEN SATURATION: 99 % | RESPIRATION RATE: 16 BRPM | HEART RATE: 76 BPM | DIASTOLIC BLOOD PRESSURE: 78 MMHG | TEMPERATURE: 97.6 F | SYSTOLIC BLOOD PRESSURE: 120 MMHG

## 2022-02-20 ASSESSMENT — ENCOUNTER SYMPTOMS
POOR JUDGMENT: 1
MUSCLE WEAKNESS: 1
PERSON REPORTING PAIN: PATIENT
DENIES PAIN: 1

## 2022-02-20 ASSESSMENT — PAIN SCALES - PAIN ASSESSMENT IN ADVANCED DEMENTIA (PAINAD)
BODYLANGUAGE: 0 - RELAXED.
CONSOLABILITY: 0 - NO NEED TO CONSOLE.
NEGVOCALIZATION: 0 - NONE.
TOTALSCORE: 0
FACIALEXPRESSION: 0 - SMILING OR INEXPRESSIVE.

## 2022-02-21 ENCOUNTER — HOME CARE VISIT (OUTPATIENT)
Dept: HOME HEALTH SERVICES | Facility: HOME HEALTHCARE | Age: 77
End: 2022-02-21
Payer: MEDICARE

## 2022-02-22 ENCOUNTER — HOME CARE VISIT (OUTPATIENT)
Dept: HOME HEALTH SERVICES | Facility: HOME HEALTHCARE | Age: 77
End: 2022-02-22
Payer: MEDICARE

## 2022-02-22 ENCOUNTER — APPOINTMENT (OUTPATIENT)
Dept: WOUND CARE | Facility: MEDICAL CENTER | Age: 77
End: 2022-02-22
Attending: FAMILY MEDICINE
Payer: MEDICARE

## 2022-02-22 PROCEDURE — G0155 HHCP-SVS OF CSW,EA 15 MIN: HCPCS

## 2022-02-22 PROCEDURE — G0299 HHS/HOSPICE OF RN EA 15 MIN: HCPCS

## 2022-02-22 NOTE — CASE COMMUNICATION
fyi, missed PT evaluation on 2/21/22.  PT arrived at home for scheduled PT evaluation and no answer to phone or door.  Left message with daughter, thus far no call back.

## 2022-02-23 NOTE — CASE COMMUNICATION
attempted to contact daughter and sent text which daughter had responded previously regarding rescheduling missed PT evaluation.  Offered time for 2/23/22.  No text back or call back thus far.

## 2022-02-24 ENCOUNTER — HOME CARE VISIT (OUTPATIENT)
Dept: HOME HEALTH SERVICES | Facility: HOME HEALTHCARE | Age: 77
End: 2022-02-24
Payer: MEDICARE

## 2022-02-24 VITALS
DIASTOLIC BLOOD PRESSURE: 80 MMHG | HEART RATE: 72 BPM | RESPIRATION RATE: 19 BRPM | TEMPERATURE: 98 F | SYSTOLIC BLOOD PRESSURE: 130 MMHG | OXYGEN SATURATION: 94 %

## 2022-02-24 VITALS
RESPIRATION RATE: 18 BRPM | OXYGEN SATURATION: 97 % | DIASTOLIC BLOOD PRESSURE: 60 MMHG | HEART RATE: 66 BPM | SYSTOLIC BLOOD PRESSURE: 120 MMHG | TEMPERATURE: 98.7 F

## 2022-02-24 PROCEDURE — G0299 HHS/HOSPICE OF RN EA 15 MIN: HCPCS

## 2022-02-24 ASSESSMENT — PAIN SCALES - PAIN ASSESSMENT IN ADVANCED DEMENTIA (PAINAD)
CONSOLABILITY: 0 - NO NEED TO CONSOLE.
FACIALEXPRESSION: 0 - SMILING OR INEXPRESSIVE.
NEGVOCALIZATION: 0 - NONE.
BODYLANGUAGE: 0 - RELAXED.
TOTALSCORE: 0

## 2022-02-24 ASSESSMENT — ENCOUNTER SYMPTOMS
DENIES PAIN: 1
POOR JUDGMENT: 1
MUSCLE WEAKNESS: 1
PERSON REPORTING PAIN: PATIENT
SEVERE DYSPNEA: 1

## 2022-02-24 NOTE — CASE COMMUNICATION
FANI BURCH,   I AM TRYING TO PLACE HER OR SEND TO ED, SHE IS NOT TAKING MEDS, SHE IS NOT SHOWING TO WOUND APPOINTMENTS, SHE IS UNABLE TO MANAGE DIABETES, INSULIN AND MEALS AND MORE CONFUSE/COMBATIVE. FAMILY CALLED [facility] AND  BUT SHE DECLINED TO GO ANYWHERE... ONGOING COORDINATION AND WILL KEEP YOU POSTED.

## 2022-02-25 ENCOUNTER — HOME CARE VISIT (OUTPATIENT)
Dept: HOME HEALTH SERVICES | Facility: HOME HEALTHCARE | Age: 77
End: 2022-02-25
Payer: MEDICARE

## 2022-02-27 ASSESSMENT — ENCOUNTER SYMPTOMS
PAIN LOCATION - PAIN QUALITY: ACHY
PAIN: 1
PERSON REPORTING PAIN: PATIENT
HIGHEST PAIN SEVERITY IN PAST 24 HOURS: 7/10
VOMITING: ONGOING
SUBJECTIVE PAIN PROGRESSION: GRADUALLY WORSENING
LOWEST PAIN SEVERITY IN PAST 24 HOURS: 3/10
PAIN LOCATION - PAIN FREQUENCY: CONSTANT
PAIN LOCATION - PAIN SEVERITY: 7/10
PAIN LOCATION: RIGHT FOOT
MUSCLE WEAKNESS: 1
ASSOCIATED SYMPTOMS: RESTLESS
CONTUSION: 1
PAIN SEVERITY GOAL: 3/10
LIMITED RANGE OF MOTION: 1

## 2022-02-27 ASSESSMENT — PAIN SCALES - PAIN ASSESSMENT IN ADVANCED DEMENTIA (PAINAD)
BODYLANGUAGE: 1 - TENSE. DISTRESSED PACING. FIDGETING.
NEGVOCALIZATION: 0 - NONE.
TOTALSCORE: 1
FACIALEXPRESSION: 0 - SMILING OR INEXPRESSIVE.
CONSOLABILITY: 0 - NO NEED TO CONSOLE.

## 2022-02-27 ASSESSMENT — ACTIVITIES OF DAILY LIVING (ADL)
AMBULATION ASSISTANCE: ONE PERSON
CURRENT_FUNCTION: ONE PERSON

## 2022-02-28 ENCOUNTER — HOME CARE VISIT (OUTPATIENT)
Dept: HOME HEALTH SERVICES | Facility: HOME HEALTHCARE | Age: 77
End: 2022-02-28
Payer: MEDICARE

## 2022-02-28 NOTE — CASE COMMUNICATION
Quality Review Completed for Transfer 2/25 [facility] by MERCED Catherine, RN on 2/28/2022:  Edits completed by MERCED Catherine RN:  1.  is Yes to depression per care plan interventions   2.  is yes to the flu data collection and #8 for reason not received

## 2022-03-02 NOTE — CASE COMMUNICATION
agree to all changes made-LB  ----- Message -----  From: Dia Catherine R.N.  Sent: 2/28/2022  11:52 AM PST  To: Mily Sin R.N.      Quality Review Completed for Transfer 2/25 [facility] by MERCED Catherine, RN on 2/28/2022:  Edits completed by MERCED Catherine, RN:  1.  is Yes to depression per care plan interventions   2.  is yes to the flu data collection and #8 for reason not received

## 2022-06-26 NOTE — ADDENDUM NOTE
Addended by: ANNA FAIRBANKS on: 2/4/2022 01:57 PM     Modules accepted: Orders    
Attending Attestation (For Attendings USE Only)...

## 2022-07-22 PROCEDURE — 99999 PR NO CHARGE: CPT | Performed by: NURSE PRACTITIONER

## 2022-10-07 NOTE — PROGRESS NOTES
Hospital Medicine Daily Progress Note    Date of Service  11/21/2021    Chief Complaint  Amrita Torrez is a 76 y.o. female admitted 11/16/2021 with RLE pain    Hospital Course  Amrita Torrez is a 76 y.o. female who presented 11/16/2021 with complaints of right lower extremity pain, mild swelling, fever, incoordination and syncope as of yesterday. Patient also complained of nausea and vertigo-like symptoms and concern for stroke. She is on Eliquis for atrial fibrillation and compliant with her home medication regiment. She also has a history of diabetes and hypertension and denies ever having diabetic foot ulcer. She does have swelling of her right lower extremity/foot with erythema and states that this is the first time this is ever occurred. She states that she went to outpatient care facility which was caring for her right lower extremity earlier in the week and now is red and swollen. She otherwise denies shortness of breath, chest pain, cough with sputum production, resolution of dizziness/vertigo and nausea with vomiting. Denies hematemesis, constipation, diarrhea, melena, hematochezia, dysuria, hematuria.     Vital signs at time of presentation were as follows: 100.6, 118, 16, 147/82, 99% room air.     Chest x-ray performed and unremarkable. CTA head and neck performed and CTA head reveals new left temporal lobe encephalomalacia compatible with interval infarction, atrophy, there are periventricular and subcortical white matter changes and focal hypodensities in the basal ganglia are likely related to prior lacunar infarcts. CTA neck reveals left-sided thyroid nodule 2 cm in size otherwise relatively unremarkable. Foot x-ray reveals osteopenia.     Labs significant for leukocytosis of 23, troponin 49, mild hyponatremia of 130, mild hypokalemia of 3.5, hyperglycemia of 365 and otherwise relatively unremarkable. Lactic acid ordered and pending, magnesium ordered and pending and urinalysis ordered and  pending.     IV antibiotics initiated in ED. Hospitalist consulted for admission and patient agreeable to inpatient hospitalization for sepsis secondary to right lower extremity cellulitis. She agrees to DO NOT RESUSCITATE DO NOT INTUBATE CODE STATUS at time of evaluation and alert and oriented x4. She will be optimized in ED and subsequently transferred to medical andrade floor for further optimization of medical management.    Interval Problem Update  Patient was seen and examined at bedside.  No acute events overnight. Patient is resting comfortably in bed and in no acute distress.     Keflex for lower extremity cellulitis  Blood culture negative x4 days  RICHARD Cr 2.75-->3.25--> 2.2  LUIS: no hydro    I have personally seen and examined the patient at bedside. I discussed the plan of care with patient and bedside RN.    Consultants/Specialty  none    Code Status  DNAR/DNI    Disposition  Patient is medically cleared.   Anticipate discharge to to home with close outpatient follow-up.  I have placed the appropriate orders for post-discharge needs.    Review of Systems  Review of Systems   Constitutional: Negative for chills and fever.   HENT: Negative for sore throat.    Respiratory: Negative for cough and shortness of breath.    Cardiovascular: Positive for leg swelling. Negative for chest pain and palpitations.   Gastrointestinal: Negative for abdominal pain, nausea and vomiting.   Genitourinary: Negative for dysuria and frequency.   Musculoskeletal: Negative for falls.   Skin: Positive for rash.   Neurological: Negative for dizziness and loss of consciousness.   Psychiatric/Behavioral: Negative for depression.        Physical Exam  Temp:  [36.1 °C (97 °F)-36.9 °C (98.4 °F)] 36.2 °C (97.1 °F)  Pulse:  [68-72] 71  Resp:  [18] 18  BP: (145-161)/(60-74) 161/74  SpO2:  [90 %-93 %] 93 %    Physical Exam  Constitutional:       General: She is not in acute distress.  HENT:      Head: Normocephalic.      Right Ear: External  ear normal.      Left Ear: External ear normal.      Nose: No congestion.      Mouth/Throat:      Pharynx: No oropharyngeal exudate.   Eyes:      General: No scleral icterus.  Cardiovascular:      Rate and Rhythm: Normal rate and regular rhythm.      Heart sounds: No murmur heard.      Pulmonary:      Breath sounds: No wheezing.   Abdominal:      Tenderness: There is no abdominal tenderness. There is no guarding or rebound.   Skin:     Capillary Refill: Capillary refill takes less than 2 seconds.      Coloration: Skin is not jaundiced.      Findings: No bruising.   Neurological:      General: No focal deficit present.      Mental Status: She is alert and oriented to person, place, and time.   Psychiatric:         Mood and Affect: Mood normal.         Behavior: Behavior normal.     Patient was seen and examined at bedside. No changes in physical exam from prior evaluation.      Fluids    Intake/Output Summary (Last 24 hours) at 11/21/2021 1313  Last data filed at 11/21/2021 0800  Gross per 24 hour   Intake 120 ml   Output 800 ml   Net -680 ml       Laboratory  Recent Labs     11/19/21  0722 11/20/21  0324   WBC 18.6* 15.1*   RBC 4.23 4.49   HEMOGLOBIN 12.5 12.8   HEMATOCRIT 35.2* 36.8*   MCV 83.2 82.0   MCH 29.6 28.5   MCHC 35.5* 34.8   RDW 41.3 39.7   PLATELETCT 288 333   MPV 10.3 10.2     Recent Labs     11/19/21  0722 11/20/21  0324 11/21/21  1212   SODIUM 132* 132* 133*   POTASSIUM 3.7 3.3* 4.1   CHLORIDE 99 99 104   CO2 20 20 20   GLUCOSE 114* 92 231*   BUN 37* 42* 38*   CREATININE 3.04* 3.25* 2.20*   CALCIUM 7.9* 8.1* 8.0*                   Imaging  US-RENAL   Final Result      1.  Right lower pole simple renal cyst.   2.  Medical renal disease.   3.  Echogenic debris in the bladder. This can be seen in urinary tract infection.   4.  Incidentally noted bilateral pleural effusions.      EC-ECHOCARDIOGRAM COMPLETE W/O CONT   Final Result      DX-FOOT-COMPLETE 3+ RIGHT   Final Result      Osteopenia.      CT-CTA  HEAD WITH & W/O-POST PROCESS   Final Result      1.  No thrombosis is seen within the [place].   2.  New left temporal lobe encephalomalacia compatible with interval infarction.   3.  Atrophy   4.  There are periventricular and subcortical white matter changes present.  This finding is nonspecific and could be from previous small vessel ischemia, demyelination, or gliosis.   5.  Focal hypodensities in the basal ganglia are likely related to prior lacunar infarcts.      CT-CTA NECK WITH & W/O-POST PROCESSING   Final Result      1.  No evidence of carotid or vertebral artery occlusion or dissection.      2.  Atherosclerotic plaque at the carotid bifurcations bilaterally which measures less than 50%.      3.  Left sided thyroid nodules which measure 2cm in size.  This could be further evaluated with thyroid ultrasound.      DX-CHEST-PORTABLE (1 VIEW)   Final Result      No acute cardiopulmonary abnormality.              Assessment/Plan  * Cellulitis of right lower extremity- (present on admission)  Assessment & Plan  X-ray performed and no evidence of osteomyelitis however osteopenia has been seen on x-ray imaging as seen above.  Keflex  Blood culture negative      Acute renal failure superimposed on stage 3 chronic kidney disease (HCC)- (present on admission)  Assessment & Plan  Avoid nephrotoxic agents - hold lasix, lisinopril  Renal ultrasound - no hydro  Fena suggests prerenal injury  Completed IVF x 24 hours  Will continue to monitor  3.25-->2.2  improving    Paroxysmal atrial fibrillation (HCC)- (present on admission)  Assessment & Plan  Twelve-lead EKG reveals atrial fibrillation with RVR  Coreg 3.125 mg p.o. twice daily  Lopressor 5 mg IV x3 for heart rate greater than 120  Continue Eliquis 5 mg p.o. twice daily    Proteinuria  Assessment & Plan  As seen on UA    Pleural effusion  Assessment & Plan  Bilateral  As identified on imaging    Severe aortic stenosis  Assessment & Plan  Per echo    Type 2  diabetes mellitus with hyperglycemia, with long-term current use of insulin (HCC)- (present on admission)  Assessment & Plan  Hemoglobin A1c 11.9  Continue basal/SSI  Goal of 140-180 glycemic control within hospital      Hypertension- (present on admission)  Assessment & Plan  Continue home antihypertensive medication regiment  2 g sodium restricted diet  As needed antihypertensives ordered    Syncope- (present on admission)  Assessment & Plan  No longer symptomatic  CT head/CTA head and neck imaging as above  Consider neurology consultation  Echocardiogram: low normal EF, mod/severe AS  Fall precautions      Sepsis (HCC)- (present on admission)  Assessment & Plan  This is Sepsis Present on admission  SIRS criteria identified on my evaluation include: Fever, with temperature greater than 101 deg F, Tachycardia, with heart rate greater than 90 BPM and Leukocytosis, with WBC greater than 12,000  Source is Skin soft tissue  Sepsis protocol initiated  Fluid resuscitation ordered per protocol  IV antibiotics as appropriate for source of sepsis  While organ dysfunction may be noted elsewhere in this problem list or in the chart, degree of organ dysfunction does not meet CMS criteria for severe sepsis  Procalcitonin ordered and pending. Urinalysis ordered and pending. Blood culture x2/lactic acid ordered and pending. IV antibiotics initiated in ED we will continue onward.        Thyroid nodule- (present on admission)  Assessment & Plan  TSH WNL         VTE prophylaxis: therapeutic anticoagulation with eliquis    I have performed a physical exam and reviewed and updated ROS and Plan today (11/21/2021). In review of yesterday's note (11/20/2021), there are no changes except as documented above.       No

## 2024-02-09 NOTE — CARE PLAN
Problem: Communication  Goal: The ability to communicate needs accurately and effectively will improve  Outcome: PROGRESSING AS EXPECTED    Intervention: Jonancy patient and significant other/support system to call light to alert staff of needs   10/13/18 0358   OTHER   Oriented to: All of the Following : Location of Bathroom, Visiting Policy, Unit Routine, Call Light and Bedside Controls, Bedside Rail Policy, Smoking Policy, Rights and Responsibilities, Bedside Report, and Patient Education Notebook         Problem: Knowledge Deficit  Goal: Knowledge of disease process/condition, treatment plan, diagnostic tests, and medications will improve  Outcome: PROGRESSING AS EXPECTED  Patient educated about disease process and plan of care for the shift. Patient expressed understanding. All questions answered at this time.       
Problem: Safety  Goal: Will remain free from falls  Outcome: PROGRESSING AS EXPECTED  Patient will remain free from fall during this shift.  Plan of care discussed with patient. Bed alarm active, call light within reach and patient encouraged to notify staff if assistance is needed.    Problem: Respiratory:  Goal: Respiratory status will improve  Outcome: PROGRESSING AS EXPECTED  Patient respiratory status will remain the same or improve during this shift.      
Problem: Safety  Goal: Will remain free from falls  Outcome: PROGRESSING AS EXPECTED  Patient will remain free from fall during this shift.  Plan of care discussed with patient. Bed alarm active, call light within reach and patient encouraged to notify staff if assistance is needed.    Problem: Respiratory:  Goal: Respiratory status will improve  Outcome: PROGRESSING SLOWER THAN EXPECTED  Patient respiratory status will remain the same or improve during this shift.        
Problem: Safety  Goal: Will remain free from injury  Outcome: PROGRESSING AS EXPECTED  Patient educated on the use of the call light. Patient verbalizes understanding and calls appropriately. Bed locked and in lowest position. Hourly rounding in place.     Problem: Venous Thromboembolism (VTW)/Deep Vein Thrombosis (DVT) Prevention:  Goal: Patient will participate in Venous Thrombosis (VTE)/Deep Vein Thrombosis (DVT)Prevention Measures  Outcome: PROGRESSING AS EXPECTED   10/13/18 2000   OTHER   Pharmacologic Prophylaxis Used Apixaban         
Female

## 2024-12-17 NOTE — CONSULTS
Hold all meds on the morning of surgery.  Hold diclofenac/ibuprofen/aleve/aspirin 7 days prior to surgery.   PULMONARY AND CRITICAL CARE MEDICINE CONSULTATION      Date of Consultation: 4/26/2021  Requesting Physician: Vlad Webb M.D.  Consulting Physician: Jaspreet Holder MD  Reason for Consultation: Worsening acute hypoxic respiratory failure       Chief Complaint: Nausea, hematemesis      HPI and Hospital Course:  The patient was lethargic and unable to provide much meaningful information. Per chart review, she was admitted on 4/22/21 with a 1-week history of poor PO intake, increased thirst, polyuria, and hypersomnolence, as well as a syncopal episode upon getting out of bed on the morning of admission that resulted in a GLF. This was in the setting of poor glycemic control secondary to the patient rationing her siding-scale insulin due to insurance coverage issues. The patient's PMH is significant for CAD s.p. STEMI s.p. DOMINGA to LAD in 2016, moderate-severe AS with normal EF per 3/11/21 TTE, BPPV, history of CVA with baseline R facial weakness, paroxysmal atrial fibrillation on apixaban, and poorly controlled type 2 diabetes. The patient was admitted to tele for syncope in the setting of dehydration and hyperglycemia. Cardiology was consulted. The patient was noted to have positive orthostatic VS. Her volume status and blood glucose control improved. Cardiology recommended medical management and close outpatient Cardiology follow up for a dobutamine stress echo to further evaluate the patient's AS. On 4/25/21, the patient developed hematuria and suprapubic tenderness during the day; her home apixaban was held and the patient was started on nitrofurantoin for UTI. The patient subsequently developed hematemesis 4/25/21 evening and was noted on tele around this time to have episodes of 2nd degree AV block with pauses > 3 seconds. She remained hemodynamically stable. The on-call Cardiologist was made aware of the patient's condition. The patient was started on IV PPI and antiemetics. Her nausea and vomiting  improved but she developed worsening hypoxia requiring 10 LPM O2. Critical Care was consulted and the patient was transferred to the ICU for close monitoring and higher level of care.       Medications Prior to Admission:    Prior to Admission Medications   Prescriptions Last Dose Informant Patient Reported? Taking?   ELIQUIS 5 MG Tab 4/21/2021 at PM Patient No No   Sig: TAKE 1 TABLET BY MOUTH TWICE A DAY   amLODIPine (NORVASC) 5 MG Tab Not Taking at Unknown time Patient's Home Pharmacy No No   Sig: Take 2 Tabs by mouth every day. Indications: High Blood Pressure Disorder   Patient not taking: Reported on 4/22/2021   ferrous sulfate 325 (65 Fe) MG tablet unknown at unknown Patient's Home Pharmacy Yes No   Sig: Take 325 mg by mouth every 7 days. Indications: supplement   insulin glargine (LANTUS) 100 UNIT/ML Solution 4/21/2021 at PM Patient Yes Yes   Sig: Inject 25 Units under the skin every evening.   insulin regular (NOVOLIN R) 100 Unit/mL Solution 4/22/2021 at AM Patient Yes No   Sig: Inject 2-8 Units as instructed 4 times a day. PER SLIDING SCALE  150-200 = 2 UNITS  201-250 = 4 UNITS  251-300 = 6 UNITS  301-350 = 8 UNITS  >400 CALL MD   metFORMIN ER (GLUCOPHAGE XR) 500 MG TABLET SR 24 HR 4/21/2021 at PM Patient Yes No   Sig: Take 500 mg by mouth 2 times a day.   sertraline (ZOLOFT) 50 MG Tab unknown at unknown Patient's Home Pharmacy Yes No   Sig: Take 50 mg by mouth every day. Indications: Major Depressive Disorder   trazodone (DESYREL) 50 MG Tab unknown at unknown Patient's Home Pharmacy Yes No   Sig: Take 50 mg by mouth every bedtime. Indications: Trouble Sleeping   vitamin D, Ergocalciferol, (DRISDOL) 38967 units Cap capsule 4/18/2021 at PM Patient's Home Pharmacy Yes No   Sig: Take 50,000 Units by mouth every Sunday.      Facility-Administered Medications: None       Current Facility-Administered Medications:   •  prochlorperazine (COMPAZINE) injection 10 mg, 10 mg, Intravenous, Q6HRS PRN, Abdi Mandujano,  M.D.  •  PROCHLORPERAZINE EDISYLATE 10 MG/2ML INJ SOLN, , , ,   •  pantoprazole (PROTONIX) injection 40 mg, 40 mg, Intravenous, BID, Ilana Hayward M.D., 40 mg at 04/26/21 0229  •  cefTRIAXone (ROCEPHIN) 1 g in  mL IVPB, 1 g, Intravenous, Q24HRS, Ilana Hayward M.D.  •  metroNIDAZOLE (FLAGYL) IVPB 500 mg, 500 mg, Intravenous, Q8HRS, Ilana Hayward M.D.  •  lidocaine (LIDODERM) 5 % 2 Patch, 2 Patch, Transdermal, Q24HR, Treva King M.D.  •  insulin glargine (Lantus) injection, 30 Units, Subcutaneous, Q EVENING, 30 Units at 04/25/21 1715 **AND** insulin lispro (HumaLOG) injection, 2-9 Units, Subcutaneous, 4X/DAY ACHS, 3 Units at 04/25/21 2129 **AND** POC blood glucose manual result, , , Q AC AND BEDTIME(S) **AND** NOTIFY MD and PharmD, , , Once **AND** glucose 4 g chewable tablet 16 g, 16 g, Oral, Q15 MIN PRN **AND** dextrose 50% (D50W) injection 50 mL, 50 mL, Intravenous, Q15 MIN PRN, Treva King M.D.  •  nitrofurantoin (MACROBID) capsule 100 mg, 100 mg, Oral, BID WITH MEALS, Treva iKng M.D., 100 mg at 04/25/21 1644  •  ondansetron (ZOFRAN ODT) dispertab 4 mg, 4 mg, Oral, Q6HRS PRN, Treva King M.D.  •  lisinopril (PRINIVIL) tablet 5 mg, 5 mg, Oral, Q DAY, Treva King M.D., Stopped at 04/25/21 0600  •  [Held by provider] carvedilol (COREG) tablet 12.5 mg, 12.5 mg, Oral, BID WITH MEALS, Treva King M.D., 12.5 mg at 04/25/21 1644  •  hydrALAZINE (APRESOLINE) injection 20 mg, 20 mg, Intravenous, Q6HRS PRN, Treva King M.D.  •  amLODIPine (NORVASC) tablet 5 mg, 5 mg, Oral, Q DAY, Karla House M.D., 5 mg at 04/25/21 1714  •  hyoscyamine-maalox plus-lidocaine viscous (GI COCKTAIL) oral susp 15 mL, 15 mL, Oral, TID PRN, Treva King M.D.  •  ondansetron (ZOFRAN) syringe/vial injection 4 mg, 4 mg, Intravenous, Q6HRS PRN, Treva King M.D., 4 mg at 04/26/21 0047  •  calcium carbonate (TUMS) chewable tab 500 mg, 500 mg, Oral, TID WITH MEALS, Treva King M.D., 500 mg at 04/25/21 1644  •   senna-docusate (PERICOLACE or SENOKOT S) 8.6-50 MG per tablet 2 tablet, 2 tablet, Oral, BID, 2 tablet at 04/25/21 1714 **AND** polyethylene glycol/lytes (MIRALAX) PACKET 1 Packet, 1 Packet, Oral, QDAY PRN **AND** magnesium hydroxide (MILK OF MAGNESIA) suspension 30 mL, 30 mL, Oral, QDAY PRN **AND** bisacodyl (DULCOLAX) suppository 10 mg, 10 mg, Rectal, QDAY PRN, Hina Taveras M.D.  •  acetaminophen (Tylenol) tablet 650 mg, 650 mg, Oral, Q6HRS PRN, Hina Taveras M.D., 650 mg at 04/25/21 2133  •  [Held by provider] labetalol (NORMODYNE/TRANDATE) injection 10 mg, 10 mg, Intravenous, Q4HRS PRN, Hina Taveras M.D., 10 mg at 04/24/21 2034  •  [Held by provider] apixaban (ELIQUIS) tablet 5 mg, 5 mg, Oral, BID, Hina Taveras M.D., Stopped at 04/25/21 0600  •  sertraline (Zoloft) tablet 50 mg, 50 mg, Oral, DAILY, Hina Taveras M.D., 50 mg at 04/25/21 0501  •  vitamin D (Ergocalciferol) (DRISDOL) capsule 50,000 Units, 50,000 Units, Oral, Q SUNDAY, Hina Taveras M.D., 50,000 Units at 04/25/21 1007  •  oxyCODONE immediate-release (ROXICODONE) tablet 5 mg, 5 mg, Oral, Q6HRS PRN, Apple Lucero M.D., 5 mg at 04/22/21 2052    Allergies:    Pcn [penicillins] and Sulfa drugs    Past Surgical History:    Past Surgical History:   Procedure Laterality Date   • VITRECTOMY POSTERIOR N/A 11/24/2020    Procedure: VITRECTOMY, POSTERIOR PORTION-RIGHT ENDO LASER LEFT PAN RETINAL LASER;  Surgeon: Jean-Claude M Khanani, M.D.;  Location: SURGERY SAME DAY [facility];  Service: Ophthalmology   • LAPAROSCOPY  12/1/2014    Performed by Mian Navarro M.D. at SURGERY SAME DAY [facility] ORS   • BAN BY LAPAROSCOPY  9/14/2012    Performed by MIAN NAVARRO at SURGERY SAME DAY [facility] ORS   • OTHER ORTHOPEDIC SURGERY  7/11    knee   • GYN SURGERY  1978    fibroids   • OTHER ABDOMINAL SURGERY  1966    appendectomy   • GYN SURGERY      hysterectomy   • ZZZ CARDIAC CATH         Past Medical History:    Past Medical History:    Diagnosis Date   • Anxiety    • Arthritis     fingers/toes   • Breath shortness    • CAD (coronary artery disease)    • Cataract     bilat   • Dental disorder     upper denture   • Depression    • Diabetes     insulin and oral meds   • Goiter    • Heart attack ([facility])     Dr. Tobar   • High cholesterol    • Hyperlipidemia    • Hypertension    • MI (myocardial infarction) ([facility]) 2016    x2 stints placed   • Oxygen dependent     2 liters @ HS   • Pericardial effusion    • Pneumonia    • Snoring    • Stroke ([facility]) 10/2019    reports possible   • Thyroid nodule        Social History:    Social History     Socioeconomic History   • Marital status:      Spouse name: Not on file   • Number of children: Not on file   • Years of education: Not on file   • Highest education level: Not on file   Occupational History   • Not on file   Tobacco Use   • Smoking status: Former Smoker     Packs/day: 1.00     Years: 40.00     Pack years: 40.00     Types: Cigarettes     Quit date: 1978     Years since quittin.3   • Smokeless tobacco: Never Used   Substance and Sexual Activity   • Alcohol use: No   • Drug use: No   • Sexual activity: Not on file   Other Topics Concern   • Not on file   Social History Narrative   • Not on file     Social Determinants of Health     Financial Resource Strain:    • Difficulty of Paying Living Expenses:    Food Insecurity:    • Worried About Running Out of Food in the Last Year:    • Ran Out of Food in the Last Year:    Transportation Needs:    • Lack of Transportation (Medical):    • Lack of Transportation (Non-Medical):    Physical Activity:    • Days of Exercise per Week:    • Minutes of Exercise per Session:    Stress:    • Feeling of Stress :    Social Connections:    • Frequency of Communication with Friends and Family:    • Frequency of Social Gatherings with Friends and Family:    • Attends Sikh Services:    • Active Member of Clubs or Organizations:    • Attends Club or  "Organization Meetings:    • Marital Status:    Intimate Partner Violence:    • Fear of Current or Ex-Partner:    • Emotionally Abused:    • Physically Abused:    • Sexually Abused:        Family History:    History reviewed. No pertinent family history.    Review of Systems:    Review of Systems   Constitutional: Negative for chills and fever.   HENT: Negative for nosebleeds and sore throat.    Eyes: Negative for photophobia and pain.   Respiratory: Negative for cough and shortness of breath.    Cardiovascular: Negative for chest pain, palpitations and leg swelling.   Gastrointestinal: Positive for nausea and vomiting.   Genitourinary: Positive for hematuria. Negative for flank pain.   Musculoskeletal: Positive for falls. Negative for joint pain.   Skin: Negative for itching and rash.   Neurological: Negative for seizures and headaches.       Physical Examination:    BP (!) 168/79   Pulse 81   Temp 37.6 °C (99.7 °F) (Axillary)   Resp 20   Ht 1.651 m (5' 5\")   Wt 66 kg (145 lb 8.1 oz)   LMP  (LMP Unknown)   SpO2 96%   BMI 24.21 kg/m²   Physical Exam   Constitutional: She appears lethargic. She appears to not be writhing in pain and not jaundiced. She appears unhealthy.  Non-toxic appearance. No distress.   HENT:   Head: Normocephalic and atraumatic.   Right Ear: External ear normal.   Left Ear: External ear normal.   Mouth/Throat: Mucous membranes are not cyanotic.   Neck: No thyromegaly present.   Cardiovascular: Normal rate, regular rhythm and intact distal pulses.   No murmur heard.  Pulmonary/Chest: Tachypnea noted. She is in respiratory distress. She has no wheezes. She has rales. She exhibits no tenderness.   Bibasilar crackles.    Abdominal: Soft. There is no abdominal tenderness. There is no rebound and no guarding.   Musculoskeletal:         General: No tenderness, deformity or edema.   Lymphadenopathy:     She has no cervical adenopathy.   Neurological: She appears lethargic. She is not agitated. " She displays weakness. She displays normal speech. She shows no pronator drift.   Lethargic, awakes to voice, responding appropriately to questions.   Skin: Skin is warm and dry. No rash noted. She is not diaphoretic. No erythema. There is pallor.       Laboratory Data:        Recent Labs     04/23/21  0611 04/23/21  0611 04/24/21  1938 04/25/21  0006 04/26/21  0101   WBC 10.2  --   --  12.6* 17.6*   RBC 5.33  --   --  4.50 5.01   HEMOGLOBIN 15.7   < > 12.8 13.4 14.9   HEMATOCRIT 44.8   < > 35.6* 37.9 42.3   MCV 84.1  --   --  84.2 84.4   MCH 29.5  --   --  29.8 29.7   MCHC 35.0  --   --  35.4* 35.2*   RDW 42.8  --   --  41.9 42.4   PLATELETCT 241  --   --  206 227   MPV 10.6  --   --  10.7 10.9    < > = values in this interval not displayed.     Recent Labs     04/24/21  0102 04/25/21  0006 04/26/21  0101   SODIUM 138 133* 131*   POTASSIUM 4.4 4.1 3.7   CHLORIDE 108 102 98   CO2 23 25 23   GLUCOSE 103* 127* 158*   BUN 24* 22 25*   CREATININE 1.67* 1.48* 1.47*   CALCIUM 8.9 8.5 9.0       Imaging:    All the available chest XR and CT scan images associated with this encounter along with their Radiology reports were personally reviewed.    DX-CHEST-LIMITED-1-VIEW  4/26/2021 2:37 AM  REASON FOR EXAM: possible aspiration  EXAM DESCRIPTION:  Single AP view of the chest.  COMPARISON: April 26, 2021  IMPRESSION:  Interstitial pulmonary parenchymal prominence suggest chronic underlying lung disease, component of interstitial edema and/or infiltrates not excluded.  Atherosclerosis    DX-CHEST-LIMITED-1-VIEW  4/26/2021 12:55 AM  REASON FOR EXAM: Chest Pain  EXAM DESCRIPTION:  Single AP view of the chest.  COMPARISON: September 14, 2020  IMPRESSION:  Pulmonary edema and/or infiltrates are identified, which appear somewhat increased since the prior exam.  Atherosclerosis    CT-RENAL-COLIC  4/24/2021  REASON FOR EXAM: Hematuria, right flank pain, history of kidney stones.  EXAM DESCRIPTION: CT abdomen/pelvis without contrast. 5  mm helical images of the abdomen and pelvis were obtained from the diaphragmatic domes through the pubic symphysis. Low dose optimization technique was utilized for this CT exam including automated exposure control and adjustment of the mA and/or kV according to patient size.  COMPARISON: 8/22/2019  IMPRESSION:  No urolithiasis, hydronephrosis or acute inflammatory abnormalities detected  Above average stool volume  Mild urinary bladder wall thickening most likely is due to incomplete distention although direct visualization may prove helpful if hematuria does not resolve  Small bilateral effusions, left heart enlargement                Assessment and Plan:    * Syncope and collapse- (present on admission)  Assessment & Plan  Likely multifactorial, including intravascular volume depletion secondary to polyuria from inadequate insulin use, aortic stenosis, arrhythmia.   Non-contrast head CT negative for acute intracranial process. TSH and vitamin B-12 WNL. Flat trop-T trend. Orthostatic vitals signs positive.   Noted on 4/26/21 to have episodes of 2nd degree AV block with 3-9 second pauses via tele monitoring.    Plan:  Tele, continuous pulse ox  O2 and RT per protocol   Compression stockings with OOB  IVF PRN while NPO, advance diet per clinic course if appropriate   Holding home beta-blocker   Cardiology following, appreciate recommendations   Close electrolyte monitoring and replacement     Acute respiratory failure with hypoxia (HCC)  Assessment & Plan  Likely secondary to aspiration; possible component of pulmonary edema per chest XR findings.  No history of lung disease. Had multiple episodes of coffee ground emesis this evening and developed worsening hypoxia requiring 10 LPM O2. Current workup less concerning for ACS or PE.     Plan:  Transferred to the ICU for higher level of care   Empiric C3 and Flagyl for aspiration pneumonia  O2 and RT per protocol   Code status is DNR-DNI, clarify GOC with the patient  (and possibly the patient's daughter) once clinically improved    Cystitis  Assessment & Plan  Developed hematuria, LLQ pain and some suprapubic tenderness 4/24/21-4/25/21. Leukocytosis on CBC. 4/24/21 urine culture grew Staph aureus. Started on nitrofurantoin on 4/25/21.     Plan:  Antibiotic therapy changed on 4/26/21 to empiric C3 and Flagyl for aspiration PNA, this should cover  Apixaban held due to hematuria; consider Urology consult if hematuria worsens or recurs  Monitor leukocytosis    Orthostatic hypotension- (present on admission)  Assessment & Plan  This is likely contributory to the patient's presentation of syncope along with other factors such as intravascular volume depletion 2/2 polyuria from unmanaged diabetes. High fall risk.  There could also be a component of autonomic dysfunction in this patient given her age and comorbidities.    Conservative management, e.g. slow position changes, adequate hydration, compression stockings whenever patient is out of bed and ambulating  Cardiology consulted, appreciate recommendations.   Fall precautions  PT and OT consulted, discharge to SNF recommended    Acute kidney injury superimposed on chronic kidney disease (HCC)- (present on admission)  Assessment & Plan  CKD likely secondary to diabetes.   Cr 1.4 at baseline; Cr 1.66 on admission with elevated BUN 32.   RICHARD likely secondary to pre-renal azotemia; resolved with fluid resuscitation.     Plan:  Strict ins and outs   Monitor renal function  Avoid nephrotoxic drugs  Renally dose all medications    Lactic acidosis- (present on admission)  Assessment & Plan  Resolved with fluid resuscitation.     Type 2 diabetes mellitus with hyperglycemia, with long-term current use of insulin (HCC)- (present on admission)  Assessment & Plan  Poorly controlled, A1c 12.1 on 4/22/21. Home regimen is metformin 500 mg BID, glargine 25 units qhs, and SSI regular insulin; patient reported that she had not been taking her insulin  as prescribed PTA due to issues with insurance coverage.  Complicated by diabetic neuropathy, nephropathy, and retinopathy.     Plan:  Home metformin held on admission due to RICHARD and lactic acidosis  Glargine 30 units qhs   SSI and Hypoglycemia precautions   Lisinopril 5 mg daily    Nonrheumatic aortic valve stenosis- (present on admission)  Assessment & Plan  3/11/21 TTE significant for low flow, lo gradient severe AS with normal EF.  Cardiology consulted, close outpatient follow up recommended.     Plan:  Fluid replacement PRN  Holding betablocker due to development of AV block   Cautious BP control considering orthostatic hypotension   Per Cardiologist, Dr. Larson, no need for PPM at this time, but patient will need TAVR for AS as well as outpatient dobutamine stress echo     Hypertension- (present on admission)  Assessment & Plan  Continue amlodipine and low-dose lisinopril.   Developed arrhythmia on 4/26/21 with 3-9 second pauses; carvedilol held.  Hydralazine PRN if SBP > 180 mmHg.    Debility- (present on admission)  Assessment & Plan  The patient lives with her daughter, daughter states that the patient relies on her for IADLs; daughter notes inability to provide support 24-7 due to her job.   PT and OT consulted, discharge to SNF recommended.   SNF referral placed.    GERD (gastroesophageal reflux disease)- (present on admission)  Assessment & Plan  TUMS, antiemetics and GI cocktail PRN.   Developed nausea and coffee ground emesis on 4/25/21 evening; H and H stable; started on IV PPI.     Essential tremor- (present on admission)  Assessment & Plan  Had an anxiety-driven episode of diffuse tremors that resolved with reassurance.  Chronic issue per chart review.    Paroxysmal atrial fibrillation (HCC)- (present on admission)  Assessment & Plan  On apixaban outpatient. CT head on admission negative for acute intracranial process. Eliquis held on 4/25/21 due to hematuria. Monitoring on tele. Beta-blocker  held on 4/26/21 due to development of AV block with greater than 3 second pauses. Cardiology on-board.     Hypokalemia- (present on admission)  Assessment & Plan  CTM, replete PRN for goal of K > 4.     CAD (coronary artery disease)- (present on admission)  Assessment & Plan  History of STEMI in 2016 s.p. PCI to LAD, not on antiplatelets due to concurrent apixaban use for PAF, fall risk. Not on secondary CAD medical management.    Plan:  Carvedilol held due to AV block   Lisinopril 5 mg daily, titrate up PRN  Amlodipine 5 mg daily   Close outpatient Cardiology follow up      High risk of deterioration and worsening vital organ dysfunction and death without the above critical care interventions.

## (undated) DEVICE — CANNULA DIVIDED ADULT CO2 - SAMPLE W/FEMALE CONNCT (25/CA)

## (undated) DEVICE — CANISTER SUCTION RIGID RED 1500CC (40EA/CA)

## (undated) DEVICE — SODIUM CHL IRRIGATION 0.9% 1000ML (12EA/CA)

## (undated) DEVICE — KIT  I.V. START (100EA/CA)

## (undated) DEVICE — KIT ANESTHESIA W/CIRCUIT & 3/LT BAG W/FILTER (20EA/CA)

## (undated) DEVICE — PROBE 23 GA ILLUM FLEX CURVED - LASER(6/BX)

## (undated) DEVICE — TUBING CLEARLINK DUO-VENT - C-FLO (48EA/CA)

## (undated) DEVICE — PAD EYE GAUZE COVERED OVAL 1 5/8 X 2 5/8" STERILE"

## (undated) DEVICE — GLOVE BIOGEL PI ORTHO SZ 8 PF LF (40PR/BX)

## (undated) DEVICE — CANNULA INJECTION 27G (EYE) - 10/BX ALCON

## (undated) DEVICE — GLOVE BIOGEL SZ 7.5 SURGICAL PF LTX - (50PR/BX 4BX/CA)

## (undated) DEVICE — SUTURE EYE

## (undated) DEVICE — CATHETER IV 20 GA X 1-1/4 ---SURG.& SDS ONLY--- (50EA/BX)

## (undated) DEVICE — CANISTER SUCTION 3000ML MECHANICAL FILTER AUTO SHUTOFF MEDI-VAC NONSTERILE LF DISP  (40EA/CA)

## (undated) DEVICE — SLEEVE, VASO, THIGH, MED

## (undated) DEVICE — PACK VITRECTOMY 23G 10K BEVELED (1EA/BX)

## (undated) DEVICE — MASK ANESTHESIA ADULT  - (100/CA)

## (undated) DEVICE — ANTI-FOG SOLUTION - 60BTL/CA

## (undated) DEVICE — PACK VITRECTOMY (1EA/CA)

## (undated) DEVICE — SENSOR SPO2 NEO LNCS ADHESIVE (20/BX) SEE USER NOTES

## (undated) DEVICE — SUTURE 6-0 PLAIN GUT G-1 D/A 18 (12PK/BX)"

## (undated) DEVICE — NEEDLE FILTER ASPIRATION 18 GA X 1 1/2 IN (100EA/BX)

## (undated) DEVICE — SUTURE GENERAL

## (undated) DEVICE — WATER IRRIGATION STERILE 1000ML (12EA/CA)

## (undated) DEVICE — SHIELD OPTH AL GRTR CVR FOX (50EA/BX)

## (undated) DEVICE — SET LEADWIRE 5 LEAD BEDSIDE DISPOSABLE ECG (1SET OF 5/EA)

## (undated) DEVICE — SYRINGE SAFETY 10 ML 18 GA X 1 1/2 BLUNT LL (100/BX 4BX/CA)

## (undated) DEVICE — SUCTION INSTRUMENT YANKAUER BULBOUS TIP W/O VENT (50EA/CA)

## (undated) DEVICE — CANNULA SUB-TENONS ANESTH. 1.1X25MM 19GAX1IN (10EA/SP)

## (undated) DEVICE — ELECTRODE 850 FOAM ADHESIVE - HYDROGEL RADIOTRNSPRNT (50/PK)

## (undated) DEVICE — LACTATED RINGERS INJ 1000 ML - (14EA/CA 60CA/PF)

## (undated) DEVICE — CANNULA SOFT TIP STERILE SINGLE USE VITRECTOMY 23GA 0.8MM (10EA/BX)

## (undated) DEVICE — PROTECTOR ULNA NERVE - (36PR/CA)